# Patient Record
Sex: MALE | Race: WHITE | Employment: FULL TIME | URBAN - METROPOLITAN AREA
[De-identification: names, ages, dates, MRNs, and addresses within clinical notes are randomized per-mention and may not be internally consistent; named-entity substitution may affect disease eponyms.]

---

## 2017-03-20 ENCOUNTER — CARE COORDINATION (OUTPATIENT)
Dept: CARDIOLOGY | Facility: CLINIC | Age: 52
End: 2017-03-20

## 2017-03-20 NOTE — PROGRESS NOTES
Patient's wife Danielle called to speak with Dr. Quan or his PH nurses about Olivier.  She states patient called Dr. Quan about three weeks ago to discuss his plan of care but has not heard back.  Wife states that they are going to [facility] April 24th and she believes if he does not get a RHC here before they go they will require one there when they go and they much prefer it to be done here with Dr. Lundy. They are just really concerned about the plan for Olivier and would like to discuss it. Danielle states the patient is feeling well and the only change is an increases PSA level that is being rechecked in follow up. Patient's wife is also wondering if they should establish care in [place] vs here at the [facility].  They do not want to establish care cardiac care at [facility].  Will route message to PH team.    Anshul Golden, RN, BSN  Cardiology Care Coordinator  [facility]  [email or web address]  [phone]

## 2017-03-28 ENCOUNTER — DOCUMENTATION ONLY (OUTPATIENT)
Dept: CARDIOLOGY | Facility: CLINIC | Age: 52
End: 2017-03-28

## 2017-03-28 NOTE — TELEPHONE ENCOUNTER
Musa Martin M.D.  M.D. [facility]  Wali Doran M.D.   THUJARET. [facility]    Mr Estes and I discussed his condition by phone and concluded an appropriate strategy would include repeat right heart catherization, full pulmonary function with DLCO, and laboratories.                    Current Outpatient Prescriptions on File Prior to Visit:  metoprolol (TOPROL-XL) 25 MG 24 hr tablet Take 1 tablet (25 mg) by mouth daily   SIROLIMUS PO Take 0.5 mg by mouth daily .5mg daily except for Friday takes 1 mg   FLUCONAZOLE PO Take 200 mg by mouth daily   NIFEdipine osmotic (NIFEDICAL XL) 30 MG 24 hr tablet Take 30 mg by mouth daily   Tadalafil (CIALIS PO) Take 10 mg by mouth daily   PREDNISONE PO Take 5 mg by mouth every other day    Ursodiol (ACTIGALL PO) Take 300 mg by mouth 3 times daily   VITAMIN D, CHOLECALCIFEROL, PO Take 2,000 Units by mouth daily    valGANciclovir (VALCYTE) 450 MG tablet Take 500 mg by mouth every 48 hours as needed   moxifloxacin (AVELOX) 400 MG tablet Take 400 mg by mouth daily   CLONAZEPAM PO Take 2 mg by mouth At Bedtime   calcium carbonate (OS-PANFILO 500 MG [place]. [place]) 500 MG tablet Take 600 mg by mouth 2 times daily   Pantoprazole Sodium (PROTONIX PO) Take 40 mg by mouth   Rosuvastatin Calcium (CRESTOR PO) Take 5 mg by mouth   MAGNESIUM OXIDE PO Take 800 mg by mouth daily   insulin detemir (LEVEMIR FLEXPEN/FLEXTOUCH) 100 UNIT/ML soln Inject 10 Units Subcutaneous daily   insulin aspart (NOVOLOG FLEXPEN) 100 UNIT/ML soln Inject Subcutaneous daily as needed for high blood sugar   atovaquone (MEPRON) 750 MG/5ML suspension Take 1,500 mg by mouth daily     No current facility-administered medications on file prior to visit.      Results for BENEDICT ESTES (MRN [id number]) as of 3/28/2017 08:16   Ref. Range 7/14/2015 16:57 7/23/2015 00:00 8/17/2016 10:34 8/31/2016 14:42   GENEVIEVE Screen by EIA Latest Ref Range: <1.0  6.4 (H)      CRP Inflammation Latest Ref Range: 0.0 - 8.0 mg/L <2.9       Cytokine IL-6 Latest Ref Range: 0.00 - 3.00 pg/mL 7.20 (H)      Iron Latest Ref Range: 35 - 180 ug/dL 74      Iron Binding Cap Latest Ref Range: 240 - 430 ug/dL 384      Iron Saturation Index Latest Ref Range: 15 - 46 % 19      TSH Latest Ref Range: 0.40 - 4.00 mU/L 4.08 (H)      Vitamin B1 Unknown Specimen not rece...      RNA Polymerase III Gertrude IgG Unknown <10.0...      Scleroderma Antibody Scl-70 XAVIER IgG Latest Ref Range: 0.0 - 0.9 AI <0.2...      NM LUNG QUANT PERFUSION Unknown    Rpt   US IMAGING - HIM SCAN Unknown  Attch     HEART CATH RIGHT HEART CATH Unknown   Attch        Examination:NM LUNG SCAN VENTILATION AND PERFUSION, 7/14/2015 10:04 AM        Indication: Other chronic pulmonary heart diseases      Additional Information: none     Technique:     The patient received 2 mCi of Tc-99m DTPA aerosol for ventilation and  6.2 mCi of Tc-99m MAA for perfusion. Multiple images were obtained of  the lungs in Anterior, posterior, MARTIN, RPO, LPO, and CELESTINO projections.     Comparison: Same date chest x-ray     Findings: No suspicious ventilation or perfusion defect to suggest  pulmonary emboli.     IMPRESSION  Impression:  No evidence for pulmonary emboli.     I have personally reviewed the examination and initial interpretation  and I agree with the findings.     MEAGAN VALENCIA MD    ASSESSMENT AND RECOMMENDATION: Mr. 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 The patient has had vleht-buvoik-zjje disease with 2 flares after the original presentation that was with transaminitis. That was documented with liver biopsy. His other manifestations of chronic myfcj-bcbuwl-hkwo disease include sclerotic skin changes, mucosal changes in his mouth, xerostomia and polyserositis as reflected by the mild to moderate pericardial effusion that has been documented. 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 All of these immune dysregulation findings are very likely related and the possibility related to his sdyxt-rykwmn-slzx disease. I cannot exclude that this patient has another immune mediated disease, but

## 2017-03-29 DIAGNOSIS — I27.20 PULMONARY HYPERTENSION (H): Primary | ICD-10-CM

## 2017-03-29 RX ORDER — LIDOCAINE 40 MG/G
CREAM TOPICAL
Status: CANCELLED | OUTPATIENT
Start: 2017-03-29

## 2017-03-31 NOTE — PROGRESS NOTES
Dr. Quan called pt and pt will come to the [facility] for a Right Heart Cath and appt to see Dr. Quan before going to [facility] on April 24th.

## 2017-04-04 DIAGNOSIS — I27.20 PULMONARY HYPERTENSION (H): Primary | ICD-10-CM

## 2017-04-11 ENCOUNTER — APPOINTMENT (OUTPATIENT)
Dept: CARDIOLOGY | Facility: CLINIC | Age: 52
End: 2017-04-11
Attending: INTERNAL MEDICINE
Payer: COMMERCIAL

## 2017-04-11 ENCOUNTER — APPOINTMENT (OUTPATIENT)
Dept: MEDSURG UNIT | Facility: CLINIC | Age: 52
End: 2017-04-11
Attending: INTERNAL MEDICINE
Payer: COMMERCIAL

## 2017-04-11 ENCOUNTER — PRE VISIT (OUTPATIENT)
Dept: CARDIOLOGY | Facility: CLINIC | Age: 52
End: 2017-04-11

## 2017-04-11 ENCOUNTER — HOSPITAL ENCOUNTER (OUTPATIENT)
Facility: CLINIC | Age: 52
Discharge: HOME | End: 2017-04-11
Attending: INTERNAL MEDICINE | Admitting: INTERNAL MEDICINE
Payer: COMMERCIAL

## 2017-04-11 VITALS
DIASTOLIC BLOOD PRESSURE: 79 MMHG | SYSTOLIC BLOOD PRESSURE: 122 MMHG | OXYGEN SATURATION: 93 % | RESPIRATION RATE: 18 BRPM | TEMPERATURE: 98 F

## 2017-04-11 VITALS
DIASTOLIC BLOOD PRESSURE: 79 MMHG | HEART RATE: 86 BPM | HEIGHT: 77 IN | WEIGHT: 285.5 LBS | BODY MASS INDEX: 33.71 KG/M2 | OXYGEN SATURATION: 97 % | SYSTOLIC BLOOD PRESSURE: 122 MMHG

## 2017-04-11 DIAGNOSIS — I27.20 PULMONARY HYPERTENSION (H): ICD-10-CM

## 2017-04-11 DIAGNOSIS — I73.00 RAYNAUD'S SYNDROME: ICD-10-CM

## 2017-04-11 DIAGNOSIS — I27.20 PULMONARY HYPERTENSION (H): Primary | ICD-10-CM

## 2017-04-11 LAB
CO2 BLDCOV-SCNC: 19 MMOL/L (ref 21–28)
INR PPP: 1.04 (ref 0.86–1.14)
LACTATE BLD-SCNC: 7.6 MMOL/L (ref 0.7–2.1)
NT-PROBNP SERPL-MCNC: 668 PG/ML (ref 0–900)
PCO2 BLDV: 45 MM HG (ref 40–50)
PH BLDV: 7.24 PH (ref 7.32–7.43)
PO2 BLDV: 40 MM HG (ref 25–47)
SAO2 % BLDV FROM PO2: 66 %

## 2017-04-11 PROCEDURE — 99212 OFFICE O/P EST SF 10 MIN: CPT | Mod: 25

## 2017-04-11 PROCEDURE — 27211181 ZZH BALLOON TIP PRESSURE CR5

## 2017-04-11 PROCEDURE — 93464 EXERCISE W/HEMODYNAMIC MEAS: CPT

## 2017-04-11 PROCEDURE — 27210807 ZZH SHEATH CR6

## 2017-04-11 PROCEDURE — 4A023N6 MEASUREMENT OF CARDIAC SAMPLING AND PRESSURE, RIGHT HEART, PERCUTANEOUS APPROACH: ICD-10-PCS | Performed by: INTERNAL MEDICINE

## 2017-04-11 PROCEDURE — 85610 PROTHROMBIN TIME: CPT | Performed by: INTERNAL MEDICINE

## 2017-04-11 PROCEDURE — 83605 ASSAY OF LACTIC ACID: CPT

## 2017-04-11 PROCEDURE — 27210982 ZZH KIT RT HC TOTES DISP CR7

## 2017-04-11 PROCEDURE — 93451 RIGHT HEART CATH: CPT | Mod: 26 | Performed by: INTERNAL MEDICINE

## 2017-04-11 PROCEDURE — 93464 EXERCISE W/HEMODYNAMIC MEAS: CPT | Mod: 26 | Performed by: INTERNAL MEDICINE

## 2017-04-11 PROCEDURE — 93451 RIGHT HEART CATH: CPT

## 2017-04-11 PROCEDURE — 82803 BLOOD GASES ANY COMBINATION: CPT

## 2017-04-11 PROCEDURE — 40000166 ZZH STATISTIC PP CARE STAGE 1

## 2017-04-11 PROCEDURE — 83880 ASSAY OF NATRIURETIC PEPTIDE: CPT | Performed by: INTERNAL MEDICINE

## 2017-04-11 PROCEDURE — 27210787 ZZH MANIFOLD CR2

## 2017-04-11 PROCEDURE — 4A0335C MEASUREMENT OF ARTERIAL FLOW, CORONARY, PERCUTANEOUS APPROACH: ICD-10-PCS | Performed by: INTERNAL MEDICINE

## 2017-04-11 PROCEDURE — 99214 OFFICE O/P EST MOD 30 MIN: CPT | Mod: 25 | Performed by: INTERNAL MEDICINE

## 2017-04-11 RX ORDER — FLUTICASONE PROPIONATE 220 UG/1
2 AEROSOL, METERED RESPIRATORY (INHALATION) 2 TIMES DAILY
COMMUNITY
End: 2018-10-10

## 2017-04-11 RX ORDER — LIDOCAINE 40 MG/G
CREAM TOPICAL
Status: COMPLETED | OUTPATIENT
Start: 2017-04-11 | End: 2017-04-11

## 2017-04-11 RX ADMIN — LIDOCAINE: 40 CREAM TOPICAL at 07:51

## 2017-04-11 ASSESSMENT — PAIN SCALES - GENERAL: PAINLEVEL: NO PAIN (0)

## 2017-04-11 NOTE — PROCEDURES
PRELIMINARY CARDIAC CATH REPORT:     PROCEDURES PERFORMED:   Right Heart Catheterization    PHYSICIANS:  Attending Physician: Mary Cloud MD  Interventional Cardiology Fellow: None  Cardiology Fellow: Reji Lieberman MD    INDICATION:  Olivier Gómez is a 51 year old male with risk factor profile  elective basis. The clinical presentation was dyspnea on exertion. The indication for the procedure was severe PAH with worsening exercise tolerance.     DESCRIPTION:  1. Consent obtained with discussion of risks.  All questions were answered.  2. Sterile prep and procedure.  3. Location with Sheaths:   Rt IJ  7 Fr 10 cm [short]  4. Access: Local anesthetic with lidocaine.  A micropuncture 21 guage needle with ultrasound guidance was used to establish vascular access using a modified Seldinger technique.  5. Diagnostic Catheters:   7 Fr  [place] Chris  6. Guiding Catheters:  None  6. Estimated blood loss: < 5 ml    MEDICATIONS:  The procedure was performed under no conscious sedation.     Procedures:    HEMODYNAMICS:  BSA 2.7  1. HR 75 bpm  2. /78 mmHg  3. RA 12/6/8   4. RV 76/2  5. PA 80/17   6. PCW 17/12/8  7. PA sat 73.8%   8. PCW sat-not done  9. Hgb 12.8 g/dL   10. Carter CO 8.03   11. Carter CI 3.11   12. TD CO 9.2   13. TD CI 3.6   14. PVR 3.8    POST EXERCISE HEMODYNAMICS:  Patient exercise for 5min of laying bicycle.   1.  bpm  2. /95/126 mmHg  3. /31/69   4. PCW 25/18/18  5. PA sat 73.8%   6. Hgb 12.8 g/dL   7. Carter CO 8.1   8. Carter CI 3.1   9. TD CO 14.15  10. TD CI 5.48   11. PVR 3.7    Post Exercise Lactate: 7.6     Sheath Removal:  The 7th Fr sheath was manually removed in the cardiac catheterization laboratory.    Contrast: Isovue, 0 ml     Fluoroscopy Time: 1.7 min    COMPLICATIONS:  1. None    SUMMARY:   >> Normal right sided filling pressures.  >> Normal left sided filling pressures.  >> Severe pulmonary artery hypertension  >> Normal left ventricular filling pressures.  >> Hyperdynamic  cardiac output, 9.2 L/min with index 3.6 L/min/m2   >> Normal HR and BP respond to exercise  >> Increase mPA pressure to 69mmhg with exercise  >> Increase PWP with exercise   >> Appropriate increase in CO with exercise   >> Post exercise lactate of 7.6.    PLAN:   - Results Discuss with Dr. Quan  - patient to follow with Dr. Quan later today,     The attending cardiologist was present and supervised all critical aspects the procedure.    See CVIS report for final draft.    Reji Lieberman MD  Cardiology Fellow    Mary Cloud MD  Cardiology Staff

## 2017-04-11 NOTE — MR AVS SNAPSHOT
After Visit Summary   4/11/2017    Olivier Gómez    MRN: [id number]           Patient Information     Date Of Birth          1965        Visit Information        Provider Department      4/11/2017 12:00 PM Dk Quan MD [facility]        Care Instructions    Medication Changes:  No medication changes at this time. Please continue current medication regiment.     Patient Instructions:      Follow up Appointment Information:  June    We are located on the third floor of the [facility] ([facility]) on the [facility].  Our address is     [address] [place] [address] on 3rd [place] [address]      Thank you for allowing us to be a part of your care here at the [facility]    If you have questions or concerns please contact us at:    Marlys Anderson RN, BSN    Kai Gant (Schedule,P.A.)  Nurse Coordinator     Clinic   Pulmonary Hypertension   Pulmonary Hypertension  [facility]  (P)[phone]    (P) [phone]        (F)[phone]                ** Please note that you will NOT receive a reminder call regarding your scheduled testing, reminder calls are for provider appointments only.  If you are scheduled for testing within the [facility] system you may receive a call regarding pre-registration for billing purposes only.**     Remember to weigh yourself daily after voiding and before you consume any food or beverages and log the numbers.  If you have gained/lost 2 pounds overnight or 5 pounds in a week contact us immediately for medication adjustments or further instructions.  **Please call us immediately if you have any syncope, chest pain, edema, or decline in your functional status.        Follow-ups after your visit        Follow-up notes from your care team     Return in about 8 weeks (around 6/7/2017) for 1130 , with  "Avelina, Return PH, with, Labs.      Your next 10 appointments already scheduled     2017 11:00 AM CDT   Lab with  LAB   [facility] ([facility])    [address]  1st Floor  [facility] [address] [phone]            2017 11:30 AM CDT   (Arrive by 11:15 AM)   RETURN PRIMARY PULMONARY with Dk Quan MD   [facility] ([facility])    [address]  3rd [facility] [address] [phone]              Who to contact     If you have questions or need follow up information about today's clinic visit or your schedule please contact [facility] directly at [phone].  Normal or non-critical lab and imaging results will be communicated to you by MyChart, letter or phone within 4 business days after the clinic has received the results. If you do not hear from us within 7 days, please contact the clinic through MyChart or phone. If you have a critical or abnormal lab result, we will notify you by phone as soon as possible.  Submit refill requests through [employer] or call your pharmacy and they will forward the refill request to us. Please allow 3 business days for your refill to be completed.          Additional Information About Your Visit        [employer] Information     [employer] lets you send messages to your doctor, view your test results, renew your prescriptions, schedule appointments and more. To sign up, go to [email or web address] . Click on \"Log in\" on the left side of the screen, which will take you to the Welcome page. Then click on \"Sign up Now\" on the right side of the page.     You will be asked to enter the access code listed below, as well as some personal information. Please follow the directions to create your username and password.     Your access code is: [email or web address]  Expires: 2017 10:56 AM     Your access code will  in 90 days. If you need help or a new code, " "please call your [facility] clinic or [phone].        Care EveryWhere ID     This is your Care EveryWhere ID. This could be used by other organizations to access your [facility] medical records  [id number]        Your Vitals Were     Pulse Height Pulse Oximetry BMI (Body Mass Index)          86 1.956 m (6' 5\") 97% 33.86 kg/m2         Blood Pressure from Last 3 Encounters:   04/11/17 122/79   04/11/17 122/79   08/17/16 150/85    Weight from Last 3 Encounters:   04/11/17 129.5 kg (285 lb 8 oz)   04/12/16 133.8 kg (294 lb 15.6 oz)   08/26/15 133.8 kg (294 lb 15.6 oz)              Today, you had the following     No orders found for display       Primary Care Provider Office Phone # Fax #    Adilson Nicholson [phone]       [facility] [address] [facility] [address]   [facility] [address]        Thank you!     Thank you for choosing [facility]  for your care. Our goal is always to provide you with excellent care. Hearing back from our patients is one way we can continue to improve our services. Please take a few minutes to complete the written survey that you may receive in the mail after your visit with us. Thank you!             Your Updated Medication List - Protect others around you: Learn how to safely use, store and throw away your medicines at www.disposemymeds.org.          This list is accurate as of: 4/11/17  1:35 PM.  Always use your most recent med list.                   Brand Name Dispense Instructions for use    ACTIGALL PO      Take 300 mg by mouth 3 times daily       atovaquone 750 MG/5ML suspension    MEPRON     Take 1,500 mg by mouth daily       calcium carbonate 500 MG tablet    OS-PANFILO 500 mg [place]. Ca     Take 600 mg by mouth 2 times daily       CIALIS PO      Take 10 mg by mouth daily       CLONAZEPAM PO      Take 2 mg by mouth At Bedtime       CRESTOR PO      Take 5 mg by mouth       FLUCONAZOLE PO      Take 200 mg by mouth daily       fluticasone 220 " MCG/ACT Inhaler    FLOVENT HFA     Inhale 2 puffs into the lungs 2 times daily       MAGNESIUM OXIDE PO      Take 800 mg by mouth daily       metoprolol 25 MG 24 hr tablet    TOPROL-XL    90 tablet    Take 1 tablet (25 mg) by mouth daily       moxifloxacin 400 MG tablet    AVELOX     Take 400 mg by mouth daily       NIFEDICAL XL 30 MG 24 hr tablet   Generic drug:  NIFEdipine ER osmotic      Take 30 mg by mouth daily       PREDNISONE PO      Take 5 mg by mouth every other day       PROTONIX PO      Take 40 mg by mouth       SIROLIMUS PO      Take 0.5 mg by mouth daily .5mg daily except for Friday takes 1 mg       valGANciclovir 450 MG tablet    VALCYTE     Take 500 mg by mouth every 48 hours as needed       VITAMIN D (CHOLECALCIFEROL) PO      Take 2,000 Units by mouth daily

## 2017-04-11 NOTE — PROGRESS NOTES
Pt arrives to 2a, with spouse, for RHC. H&P needs to be updated. Consent is signed. INR and BNP sent to lab. CBC and chemistry completed 4/6/17 at [facility], see care everywhere-Ok per Dr Lieberman to not redraw these labs today.

## 2017-04-11 NOTE — DISCHARGE INSTRUCTIONS
[facility]                        Interventional Cardiology  Discharge Instructions   Post Right Heart Cath      AFTER YOU GO HOME:    DO drink plenty of fluids    DO resume your regular diet and medications unless otherwise instructed by your Primary Physician    Do Not scrub the procedure site vigorously    No lotion or powder to the puncture site for 3 days    CALL YOUR PRIMARY PHYSICIAN IF: You may resume all normal activity.  Monitor neck site for bleeding, swelling, or voice changes. If you notice bleeding or swelling immediately apply pressure to the site and call number below to speak with Cardiology Fellow.  If you experience any changes in your breathing you should call your doctor immediately or come to the closest Emergency Department.  Do not drive yourself.    ADDITIONAL INSTRUCTIONS: Medications: You are to resume all home medications including anticoagulation therapy unless otherwise advised by your primary cardiologist or nurse coordinator.    Follow Up: Per your primary cardiology team    If you have any questions or concerns regarding your procedure site please call [phone] at anytime and ask for Cardiology Fellow on call.  They are available 24 hours a day.  You may also contact the Cardiology Clinic after hours number at [phone].                                                       Telephone Numbers [phone] Monday-Friday 8:00 am to 4:30 pm    [phone] After 4:30 pm Monday-Friday, Weekends & Holidays  Ask for Interventional Cardiologist on call. Someone is on call 24 hours/day   [facility] toll free number [phone] Monday-Friday 8:00 am to 4:30 pm   [facility] Emergency Dept [phone]

## 2017-04-11 NOTE — PROGRESS NOTES
"Dk Quan M.D.  Cardiovascular Medicine    I personally saw and examined this patient, discussed care with housestaff and other consultants, reviewed current laboratories and imaging studies, and conveyed impression and diagnostic/therapeutic plan to patient.    Problem List  AML treated with BMT  GVHD  GI angiodysplasia  Sclerodema  Raynauds              Objective  /79  Pulse 86  Ht 1.956 m (6' 5\")  Wt 129.5 kg (285 lb 8 oz)  SpO2 97%  BMI 33.86 kg/m2   Constitutional: alert, oriented, normal gait and station, normal mentation.  Oral: moist mucous membrans  Lymph: without pathologic adenopathy  Chest: clear to ausculation and percussion  Cor: No evidence of left or right ventricular activity.  Rhythm is regular.  S1 normal, S2 split physiologically. Murmurs are not present  Abdomen: without tenderness, rebound, guarding, masses, ascites  Extremities: Edema not present  Neuro: no focal defects, normal mentation  Skin: without open lesions  Psych: oriented, verbal, mental status in tact    Wt Readings from Last 5 Encounters:   04/11/17 129.5 kg (285 lb 8 oz)   04/12/16 133.8 kg (294 lb 15.6 oz)   08/26/15 133.8 kg (294 lb 15.6 oz)   08/13/15 132.5 kg (292 lb)   07/14/15 127 kg (280 lb)       Meds  Current Outpatient Prescriptions   Medication     fluticasone (FLOVENT HFA) 220 MCG/ACT Inhaler     metoprolol (TOPROL-XL) 25 MG 24 hr tablet     SIROLIMUS PO     FLUCONAZOLE PO     NIFEdipine osmotic (NIFEDICAL XL) 30 MG 24 hr tablet     Tadalafil (CIALIS PO)     PREDNISONE PO     Ursodiol (ACTIGALL PO)     VITAMIN D, CHOLECALCIFEROL, PO     valGANciclovir (VALCYTE) 450 MG tablet     moxifloxacin (AVELOX) 400 MG tablet     CLONAZEPAM PO     calcium carbonate (OS-PANFILO 500 MG [place]. CA) 500 MG tablet     Pantoprazole Sodium (PROTONIX PO)     Rosuvastatin Calcium (CRESTOR PO)     MAGNESIUM OXIDE PO     atovaquone (MEPRON) 750 MG/5ML suspension     No current facility-administered medications for this visit.  "         Labs    Results for BENEDICT ESTES (MRN [id number]) as of 4/11/2017 11:58   Ref. Range 4/11/2017 08:03 4/11/2017 09:21 4/11/2017 09:25   Lactic Acid Latest Ref Range: 0.7 - 2.1 mmol/L  7.6 (HH)    N-Terminal Pro BNP Inpatient Latest Ref Range: 0 - 900 pg/mL 668     Ph Venous Latest Ref Range: 7.32 - 7.43 pH  7.24 (L)    PCO2 Venous Latest Ref Range: 40 - 50 mm Hg  45    PO2 Venous Latest Ref Range: 25 - 47 mm Hg  40    Bicarbonate Venous Latest Ref Range: 21 - 28 mmol/L  19 (L)    O2 Sat Venous Latest Units: %  66    INR Latest Ref Range: 0.86 - 1.14  1.04     HEART CATH RIGHT HEART CATH Unknown   Attch       Imaging     BSA 2.7  1. HR 75 bpm  2. /78 mmHg  3. RA 12/6/8   4. RV 76/2  5. PA 80/17   6. PCW 17/12/8  7. PA sat 73.8%   8. PCW sat-not done  9. Hgb 12.8 g/dL   10. Carter CO 8.03   11. Carter CI 3.11   12. TD CO 9.2   13. TD CI 3.6   14. PVR 3.8     POST EXERCISE HEMODYNAMICS:  Patient exercise for 5min of laying bicycle.   1.  bpm  2. /95/126 mmHg  3. /31/69   4. PCW 25/18/18  5. PA sat 73.8%   6. Hgb 12.8 g/dL   7. Carter CO 8.1   8. Carter CI 3.1   9. TD CO 14.15  10. TD CI 5.48   11. PVR 3.7     Post Exercise Lactate: 7.6    Assessment/Plan     Further assessment of pericardial effusion, hemodynamic assessment, possible drainage.

## 2017-04-11 NOTE — PROGRESS NOTES
Pt back from [facility] s/p RHC.  VSS.  Pt alert and oriented x4.  Pt denies any pain.  Right neck site F/D/I.  Pt's family at the bedside.  0948-D/C instructions went over with and given to pt and his family, all questions answered.  Pt discharged.  Pt going to clinic for an appointment.  Family with pt.

## 2017-04-11 NOTE — IP AVS SNAPSHOT
[address] [address]                                       After Visit Summary   4/11/2017    Olivier Gómez    MRN: [id number]           After Visit Summary Signature Page     I have received my discharge instructions, and my questions have been answered. I have discussed any challenges I see with this plan with the nurse or doctor.    ..........................................................................................................................................  Patient/Patient Representative Signature      ..........................................................................................................................................  Patient Representative Print Name and Relationship to Patient    ..................................................               ................................................  Date                                            Time    ..........................................................................................................................................  Reviewed by Signature/Title    ...................................................              ..............................................  Date                                                            Time

## 2017-04-11 NOTE — PROGRESS NOTES
D: Pt arrived in cath lab for Right Heart Catheterization  I: Right heart catheterization completed per MD.  7fr sheath removed from RIJ site, manual pressure applied until hemostasis achieved.  A: RIJ site clean, dry and intact. Soft, no hematoma.  P: Pt to transfer to  for discharge.  Pt educated on watching neck site for bleeding, hematoma, pressure on airway and voice changes.

## 2017-04-11 NOTE — PATIENT INSTRUCTIONS
Medication Changes:  No medication changes at this time. Please continue current medication regiment.     Patient Instructions:      Follow up Appointment Information:  June    We are located on the third floor of the [facility] ([facility]) on the [facility].  Our address is     [address] [place] [address] on 3rd [place] [address]      Thank you for allowing us to be a part of your care here at the [facility]    If you have questions or concerns please contact us at:    Marlys Anderson RN, BSN    Kai Gant (Schedule,P.A.)  Nurse Coordinator     Clinic   Pulmonary Hypertension   Pulmonary Hypertension  [facility]  (P)[phone]    (P) [phone]        (F)[phone]                ** Please note that you will NOT receive a reminder call regarding your scheduled testing, reminder calls are for provider appointments only.  If you are scheduled for testing within the [facility] system you may receive a call regarding pre-registration for billing purposes only.**     Remember to weigh yourself daily after voiding and before you consume any food or beverages and log the numbers.  If you have gained/lost 2 pounds overnight or 5 pounds in a week contact us immediately for medication adjustments or further instructions.  **Please call us immediately if you have any syncope, chest pain, edema, or decline in your functional status.

## 2017-04-11 NOTE — LETTER
"4/11/2017    RE: Olivier Gómez  [address]  [place] [address]     Dear Colleague,    Thank you for the opportunity to participate in the care of your patient, Olivier Gómez, at the [facility]. Please see a copy of my visit note below.    Dk Quan M.D.  Cardiovascular Medicine    I personally saw and examined this patient, discussed care with housestaff and other consultants, reviewed current laboratories and imaging studies, and conveyed impression and diagnostic/therapeutic plan to patient.    Problem List  AML treated with BMT  GVHD  GI angiodysplasia  Sclerodema  Raynauds              Objective  /79  Pulse 86  Ht 1.956 m (6' 5\")  Wt 129.5 kg (285 lb 8 oz)  SpO2 97%  BMI 33.86 kg/m2   Constitutional: alert, oriented, normal gait and station, normal mentation.  Oral: moist mucous membrans  Lymph: without pathologic adenopathy  Chest: clear to ausculation and percussion  Cor: No evidence of left or right ventricular activity.  Rhythm is regular.  S1 normal, S2 split physiologically. Murmurs are not present  Abdomen: without tenderness, rebound, guarding, masses, ascites  Extremities: Edema not present  Neuro: no focal defects, normal mentation  Skin: without open lesions  Psych: oriented, verbal, mental status in tact    Wt Readings from Last 5 Encounters:   04/11/17 129.5 kg (285 lb 8 oz)   04/12/16 133.8 kg (294 lb 15.6 oz)   08/26/15 133.8 kg (294 lb 15.6 oz)   08/13/15 132.5 kg (292 lb)   07/14/15 127 kg (280 lb)       Meds  Current Outpatient Prescriptions   Medication     fluticasone (FLOVENT HFA) 220 MCG/ACT Inhaler     metoprolol (TOPROL-XL) 25 MG 24 hr tablet     SIROLIMUS PO     FLUCONAZOLE PO     NIFEdipine osmotic (NIFEDICAL XL) 30 MG 24 hr tablet     Tadalafil (CIALIS PO)     PREDNISONE PO     Ursodiol (ACTIGALL PO)     VITAMIN D, CHOLECALCIFEROL, PO     valGANciclovir (VALCYTE) 450 MG tablet     moxifloxacin " (AVELOX) 400 MG tablet     CLONAZEPAM PO     calcium carbonate (OS-PANFILO 500 MG [place]. [place]) 500 MG tablet     Pantoprazole Sodium (PROTONIX PO)     Rosuvastatin Calcium (CRESTOR PO)     MAGNESIUM OXIDE PO     atovaquone (MEPRON) 750 MG/5ML suspension     No current facility-administered medications for this visit.          Labs    Results for BENEDICT ESTES (MRN [id number]) as of 4/11/2017 11:58   Ref. Range 4/11/2017 08:03 4/11/2017 09:21 4/11/2017 09:25   Lactic Acid Latest Ref Range: 0.7 - 2.1 mmol/L  7.6 (HH)    N-Terminal Pro BNP Inpatient Latest Ref Range: 0 - 900 pg/mL 668     Ph Venous Latest Ref Range: 7.32 - 7.43 pH  7.24 (L)    PCO2 Venous Latest Ref Range: 40 - 50 mm Hg  45    PO2 Venous Latest Ref Range: 25 - 47 mm Hg  40    Bicarbonate Venous Latest Ref Range: 21 - 28 mmol/L  19 (L)    O2 Sat Venous Latest Units: %  66    INR Latest Ref Range: 0.86 - 1.14  1.04     HEART CATH RIGHT HEART CATH Unknown   Attch       Imaging     BSA 2.7  1. HR 75 bpm  2. /78 mmHg  3. RA 12/6/8   4. RV 76/2  5. PA 80/17   6. PCW 17/12/8  7. PA sat 73.8%   8. PCW sat-not done  9. Hgb 12.8 g/dL   10. Carter CO 8.03   11. Carter CI 3.11   12. TD CO 9.2   13. TD CI 3.6   14. PVR 3.8     POST EXERCISE HEMODYNAMICS:  Patient exercise for 5min of laying bicycle.   1.  bpm  2. /95/126 mmHg  3. /31/69   4. PCW 25/18/18  5. PA sat 73.8%   6. Hgb 12.8 g/dL   7. Carter CO 8.1   8. Carter CI 3.1   9. TD CO 14.15  10. TD CI 5.48   11. PVR 3.7     Post Exercise Lactate: 7.6    Assessment/Plan     Further assessment of pericardial effusion, hemodynamic assessment, possible drainage.        Please do not hesitate to contact me if you have any questions/concerns.     Sincerely,     Dk Quan MD

## 2017-04-24 ENCOUNTER — DOCUMENTATION ONLY (OUTPATIENT)
Dept: OTHER | Facility: CLINIC | Age: 52
End: 2017-04-24

## 2017-04-24 NOTE — TELEPHONE ENCOUNTER
Impression:  1. Bone marrow transplantation for AML/remote  2. Graft versus host disease  3. High cardiac output  4. Raynauds  5. Pericardial effusion with compromise    White male patient traveling from [facility] where he was found to have an enlarging pericardial effusion for which drainage was suggested.  He wishes to have this here.  Recent catherization=:    HEMODYNAMICS:  BSA 2.7  1. HR 75 bpm  2. /78 mmHg  3. RA 12/6/8   4. RV 76/2  5. PA 80/17   6. PCW 17/12/8  7. PA sat 73.8%   8. PCW sat-not done  9. Hgb 12.8 g/dL   10. Carter CO 8.03   11. Carter CI 3.11   12. TD CO 9.2   13. TD CI 3.6   14. PVR 3.8     POST EXERCISE HEMODYNAMICS:  Patient exercise for 5min of laying bicycle.   1.  bpm  2. /95/126 mmHg  3. /31/69   4. PCW 25/18/18  5. PA sat 73.8%   6. Hgb 12.8 g/dL   7. Carter CO 8.1   8. Carter CI 3.1   9. TD CO 14.15  10. TD CI 5.48   11. PVR 3.7     Post Exercise Lactate: 7.6  Sheath Removal:  The 7th Fr sheath was manually removed in the cardiac catheterization laboratory.     Contrast: Isovue, 0 ml      Fluoroscopy Time: 1.7 min     COMPLICATIONS:  1. None     SUMMARY:   >> Normal right sided filling pressures.  >> Normal left sided filling pressures.  >> Severe pulmonary artery hypertension  >> Normal left ventricular filling pressures.  >> Hyperdynamic cardiac output, 9.2 L/min with index 3.6 L/min/m2   >> Normal HR and BP respond to exercise  >> Increase mPA pressure to 69mmhg with exercise  >> Increase PWP with exercise   >> Appropriate increase in CO with exercise   >> Post exercise lactate of 7.6.    Patient will be bringing imaging studies.

## 2017-04-25 ENCOUNTER — APPOINTMENT (OUTPATIENT)
Dept: CARDIOLOGY | Facility: CLINIC | Age: 52
DRG: 808 | End: 2017-04-25
Attending: INTERNAL MEDICINE
Payer: COMMERCIAL

## 2017-04-25 ENCOUNTER — APPOINTMENT (OUTPATIENT)
Dept: CARDIOLOGY | Facility: CLINIC | Age: 52
DRG: 808 | End: 2017-04-25
Payer: COMMERCIAL

## 2017-04-25 ENCOUNTER — APPOINTMENT (OUTPATIENT)
Dept: GENERAL RADIOLOGY | Facility: CLINIC | Age: 52
DRG: 808 | End: 2017-04-25
Attending: INTERNAL MEDICINE
Payer: COMMERCIAL

## 2017-04-25 ENCOUNTER — HOSPITAL ENCOUNTER (INPATIENT)
Facility: CLINIC | Age: 52
LOS: 6 days | Discharge: HOME | DRG: 808 | End: 2017-05-01
Attending: INTERNAL MEDICINE | Admitting: INTERNAL MEDICINE
Payer: COMMERCIAL

## 2017-04-25 DIAGNOSIS — T38.0X5A STEROID-INDUCED DIABETES MELLITUS (H): Primary | ICD-10-CM

## 2017-04-25 DIAGNOSIS — E09.9 STEROID-INDUCED DIABETES MELLITUS (H): Primary | ICD-10-CM

## 2017-04-25 DIAGNOSIS — G47.00 INSOMNIA, UNSPECIFIED TYPE: ICD-10-CM

## 2017-04-25 LAB
ALBUMIN SERPL-MCNC: 3.8 G/DL (ref 3.4–5)
ALP SERPL-CCNC: 91 U/L (ref 40–150)
ALT SERPL W P-5'-P-CCNC: 27 U/L (ref 0–70)
ANION GAP SERPL CALCULATED.3IONS-SCNC: 7 MMOL/L (ref 3–14)
APPEARANCE FLD: NORMAL
AST SERPL W P-5'-P-CCNC: 23 U/L (ref 0–45)
BASE DEFICIT BLDV-SCNC: 0.2 MMOL/L
BILIRUB DIRECT SERPL-MCNC: 0.1 MG/DL (ref 0–0.2)
BILIRUB SERPL-MCNC: 0.9 MG/DL (ref 0.2–1.3)
BUN SERPL-MCNC: 22 MG/DL (ref 7–30)
CALCIUM SERPL-MCNC: 9.1 MG/DL (ref 8.5–10.1)
CHLORIDE SERPL-SCNC: 111 MMOL/L (ref 94–109)
CO2 SERPL-SCNC: 23 MMOL/L (ref 20–32)
COLOR FLD: NORMAL
CREAT SERPL-MCNC: 1.61 MG/DL (ref 0.66–1.25)
EOSINOPHIL NFR FLD MANUAL: 1 %
ERYTHROCYTE [DISTWIDTH] IN BLOOD BY AUTOMATED COUNT: 18.3 % (ref 10–15)
GFR SERPL CREATININE-BSD FRML MDRD: 45 ML/MIN/1.7M2
GLUCOSE FLD-MCNC: 76 MG/DL
GLUCOSE SERPL-MCNC: 92 MG/DL (ref 70–99)
GRAM STN SPEC: NORMAL
HCO3 BLDV-SCNC: 24 MMOL/L (ref 21–28)
HCT VFR BLD AUTO: 41.1 % (ref 40–53)
HGB BLD-MCNC: 13.3 G/DL (ref 13.3–17.7)
INR PPP: 1.06 (ref 0.86–1.14)
LACTATE BLD-SCNC: 0.8 MMOL/L (ref 0.7–2.1)
LDH FLD L TO P-CCNC: 255 U/L
LYMPHOCYTES NFR FLD MANUAL: 5 %
MCH RBC QN AUTO: 28.5 PG (ref 26.5–33)
MCHC RBC AUTO-ENTMCNC: 32.4 G/DL (ref 31.5–36.5)
MCV RBC AUTO: 88 FL (ref 78–100)
MICRO REPORT STATUS: NORMAL
MRSA DNA SPEC QL NAA+PROBE: NORMAL
NEUTS BAND NFR FLD MANUAL: 5 %
O2/TOTAL GAS SETTING VFR VENT: 21 %
OTHER CELLS FLD MANUAL: 89 %
OXYHGB MFR BLDV: 67 %
PCO2 BLDV: 37 MM HG (ref 40–50)
PH BLDV: 7.42 PH (ref 7.32–7.43)
PLATELET # BLD AUTO: 284 10E9/L (ref 150–450)
PO2 BLDV: 37 MM HG (ref 25–47)
POTASSIUM SERPL-SCNC: 3.9 MMOL/L (ref 3.4–5.3)
PROT FLD-MCNC: 4.8 G/DL
PROT SERPL-MCNC: 6.6 G/DL (ref 6.8–8.8)
RBC # BLD AUTO: 4.66 10E12/L (ref 4.4–5.9)
RBC # FLD: NORMAL /UL
SODIUM SERPL-SCNC: 141 MMOL/L (ref 133–144)
SPECIMEN SOURCE FLD: NORMAL
SPECIMEN SOURCE: NORMAL
SPECIMEN SOURCE: NORMAL
WBC # BLD AUTO: 6.9 10E9/L (ref 4–11)
WBC # FLD AUTO: 901 /UL

## 2017-04-25 PROCEDURE — 84157 ASSAY OF PROTEIN OTHER: CPT | Performed by: INTERNAL MEDICINE

## 2017-04-25 PROCEDURE — 93451 RIGHT HEART CATH: CPT | Mod: 26 | Performed by: INTERNAL MEDICINE

## 2017-04-25 PROCEDURE — 27211089 ZZH KIT ACIST INJECTOR CR3

## 2017-04-25 PROCEDURE — 88185 FLOWCYTOMETRY/TC ADD-ON: CPT | Performed by: STUDENT IN AN ORGANIZED HEALTH CARE EDUCATION/TRAINING PROGRAM

## 2017-04-25 PROCEDURE — 82805 BLOOD GASES W/O2 SATURATION: CPT | Performed by: INTERNAL MEDICINE

## 2017-04-25 PROCEDURE — 25000128 H RX IP 250 OP 636: Performed by: STUDENT IN AN ORGANIZED HEALTH CARE EDUCATION/TRAINING PROGRAM

## 2017-04-25 PROCEDURE — 27210982 ZZH KIT RT HC TOTES DISP CR7

## 2017-04-25 PROCEDURE — 40001005 ZZHCL STATISTIC FLOW >15 ABY TC 88189: Performed by: STUDENT IN AN ORGANIZED HEALTH CARE EDUCATION/TRAINING PROGRAM

## 2017-04-25 PROCEDURE — 87070 CULTURE OTHR SPECIMN AEROBIC: CPT | Performed by: INTERNAL MEDICINE

## 2017-04-25 PROCEDURE — 20000004 ZZH R&B ICU UMMC

## 2017-04-25 PROCEDURE — 93005 ELECTROCARDIOGRAM TRACING: CPT

## 2017-04-25 PROCEDURE — 82945 GLUCOSE OTHER FLUID: CPT | Performed by: INTERNAL MEDICINE

## 2017-04-25 PROCEDURE — 88185 FLOWCYTOMETRY/TC ADD-ON: CPT | Performed by: INTERNAL MEDICINE

## 2017-04-25 PROCEDURE — 93308 TTE F-UP OR LMTD: CPT

## 2017-04-25 PROCEDURE — 93306 TTE W/DOPPLER COMPLETE: CPT | Mod: 26 | Performed by: INTERNAL MEDICINE

## 2017-04-25 PROCEDURE — 25000125 ZZHC RX 250: Performed by: INTERNAL MEDICINE

## 2017-04-25 PROCEDURE — 87075 CULTR BACTERIA EXCEPT BLOOD: CPT | Performed by: INTERNAL MEDICINE

## 2017-04-25 PROCEDURE — 80048 BASIC METABOLIC PNL TOTAL CA: CPT | Performed by: INTERNAL MEDICINE

## 2017-04-25 PROCEDURE — 93325 DOPPLER ECHO COLOR FLOW MAPG: CPT | Mod: 26 | Performed by: INTERNAL MEDICINE

## 2017-04-25 PROCEDURE — 87205 SMEAR GRAM STAIN: CPT | Performed by: INTERNAL MEDICINE

## 2017-04-25 PROCEDURE — 89051 BODY FLUID CELL COUNT: CPT | Performed by: INTERNAL MEDICINE

## 2017-04-25 PROCEDURE — 93306 TTE W/DOPPLER COMPLETE: CPT

## 2017-04-25 PROCEDURE — 27210785 ZZH KIT PERICARDIAL TAP CR8

## 2017-04-25 PROCEDURE — 36415 COLL VENOUS BLD VENIPUNCTURE: CPT | Performed by: INTERNAL MEDICINE

## 2017-04-25 PROCEDURE — 27210787 ZZH MANIFOLD CR2

## 2017-04-25 PROCEDURE — 80076 HEPATIC FUNCTION PANEL: CPT | Performed by: INTERNAL MEDICINE

## 2017-04-25 PROCEDURE — 00000155 ZZHCL STATISTIC H-CELL BLOCK W/STAIN: Performed by: INTERNAL MEDICINE

## 2017-04-25 PROCEDURE — 93321 DOPPLER ECHO F-UP/LMTD STD: CPT | Mod: 26 | Performed by: INTERNAL MEDICINE

## 2017-04-25 PROCEDURE — 00000102 ZZHCL STATISTIC CYTO WRIGHT STAIN TC: Performed by: INTERNAL MEDICINE

## 2017-04-25 PROCEDURE — 40000141 ZZH STATISTIC PERIPHERAL IV START W/O US GUIDANCE

## 2017-04-25 PROCEDURE — 71010 XR CHEST PORT 1 VW: CPT

## 2017-04-25 PROCEDURE — 40001004 ZZHCL STATISTIC FLOW INT 9-15 ABY TC 88188: Performed by: INTERNAL MEDICINE

## 2017-04-25 PROCEDURE — 85610 PROTHROMBIN TIME: CPT | Performed by: INTERNAL MEDICINE

## 2017-04-25 PROCEDURE — 87641 MR-STAPH DNA AMP PROBE: CPT | Performed by: INTERNAL MEDICINE

## 2017-04-25 PROCEDURE — 87252 VIRUS INOCULATION TISSUE: CPT | Performed by: INTERNAL MEDICINE

## 2017-04-25 PROCEDURE — 93010 ELECTROCARDIOGRAM REPORT: CPT | Performed by: INTERNAL MEDICINE

## 2017-04-25 PROCEDURE — 84311 SPECTROPHOTOMETRY: CPT | Performed by: INTERNAL MEDICINE

## 2017-04-25 PROCEDURE — 25000131 ZZH RX MED GY IP 250 OP 636 PS 637: Performed by: STUDENT IN AN ORGANIZED HEALTH CARE EDUCATION/TRAINING PROGRAM

## 2017-04-25 PROCEDURE — 99152 MOD SED SAME PHYS/QHP 5/>YRS: CPT

## 2017-04-25 PROCEDURE — 93451 RIGHT HEART CATH: CPT

## 2017-04-25 PROCEDURE — 25000132 ZZH RX MED GY IP 250 OP 250 PS 637: Performed by: STUDENT IN AN ORGANIZED HEALTH CARE EDUCATION/TRAINING PROGRAM

## 2017-04-25 PROCEDURE — 88184 FLOWCYTOMETRY/ TC 1 MARKER: CPT | Performed by: INTERNAL MEDICINE

## 2017-04-25 PROCEDURE — 33015 ZZHC TUBE PERICARDIOSTOMY: CPT

## 2017-04-25 PROCEDURE — 99153 MOD SED SAME PHYS/QHP EA: CPT

## 2017-04-25 PROCEDURE — 40000275 ZZH STATISTIC RCP TIME EA 10 MIN

## 2017-04-25 PROCEDURE — 87206 SMEAR FLUORESCENT/ACID STAI: CPT | Performed by: INTERNAL MEDICINE

## 2017-04-25 PROCEDURE — C1894 INTRO/SHEATH, NON-LASER: HCPCS

## 2017-04-25 PROCEDURE — 99221 1ST HOSP IP/OBS SF/LOW 40: CPT | Mod: 24 | Performed by: INTERNAL MEDICINE

## 2017-04-25 PROCEDURE — 93308 TTE F-UP OR LMTD: CPT | Mod: 26 | Performed by: INTERNAL MEDICINE

## 2017-04-25 PROCEDURE — 87640 STAPH A DNA AMP PROBE: CPT | Performed by: INTERNAL MEDICINE

## 2017-04-25 PROCEDURE — 33015 ZZHC TUBE PERICARDIOSTOMY: CPT | Mod: 59 | Performed by: INTERNAL MEDICINE

## 2017-04-25 PROCEDURE — 27210795 ZZH PAD DEFIB QUICK CR4

## 2017-04-25 PROCEDURE — 25000128 H RX IP 250 OP 636: Performed by: INTERNAL MEDICINE

## 2017-04-25 PROCEDURE — 83615 LACTATE (LD) (LDH) ENZYME: CPT | Performed by: INTERNAL MEDICINE

## 2017-04-25 PROCEDURE — 88305 TISSUE EXAM BY PATHOLOGIST: CPT | Performed by: INTERNAL MEDICINE

## 2017-04-25 PROCEDURE — 25000128 H RX IP 250 OP 636

## 2017-04-25 PROCEDURE — 88184 FLOWCYTOMETRY/ TC 1 MARKER: CPT | Performed by: STUDENT IN AN ORGANIZED HEALTH CARE EDUCATION/TRAINING PROGRAM

## 2017-04-25 PROCEDURE — 27211181 ZZH BALLOON TIP PRESSURE CR5

## 2017-04-25 PROCEDURE — 83605 ASSAY OF LACTIC ACID: CPT | Performed by: INTERNAL MEDICINE

## 2017-04-25 PROCEDURE — 88112 CYTOPATH CELL ENHANCE TECH: CPT | Performed by: INTERNAL MEDICINE

## 2017-04-25 PROCEDURE — 87102 FUNGUS ISOLATION CULTURE: CPT | Performed by: INTERNAL MEDICINE

## 2017-04-25 PROCEDURE — 85027 COMPLETE CBC AUTOMATED: CPT | Performed by: INTERNAL MEDICINE

## 2017-04-25 PROCEDURE — 87116 MYCOBACTERIA CULTURE: CPT | Performed by: INTERNAL MEDICINE

## 2017-04-25 RX ORDER — NICARDIPINE HYDROCHLORIDE 2.5 MG/ML
100 INJECTION INTRAVENOUS
Status: DISCONTINUED | OUTPATIENT
Start: 2017-04-25 | End: 2017-04-25 | Stop reason: HOSPADM

## 2017-04-25 RX ORDER — VERAPAMIL HYDROCHLORIDE 2.5 MG/ML
1-5 INJECTION, SOLUTION INTRAVENOUS
Status: DISCONTINUED | OUTPATIENT
Start: 2017-04-25 | End: 2017-04-25 | Stop reason: HOSPADM

## 2017-04-25 RX ORDER — METOPROLOL SUCCINATE 25 MG/1
25 TABLET, EXTENDED RELEASE ORAL DAILY
Status: DISCONTINUED | OUTPATIENT
Start: 2017-04-25 | End: 2017-05-01 | Stop reason: HOSPADM

## 2017-04-25 RX ORDER — CALCIUM CARBONATE 500(1250)
600 TABLET ORAL 2 TIMES DAILY
Status: DISCONTINUED | OUTPATIENT
Start: 2017-04-25 | End: 2017-04-26

## 2017-04-25 RX ORDER — PROTAMINE SULFATE 10 MG/ML
1-5 INJECTION, SOLUTION INTRAVENOUS
Status: DISCONTINUED | OUTPATIENT
Start: 2017-04-25 | End: 2017-04-25 | Stop reason: HOSPADM

## 2017-04-25 RX ORDER — HEPARIN SODIUM 1000 [USP'U]/ML
1000-10000 INJECTION, SOLUTION INTRAVENOUS; SUBCUTANEOUS EVERY 5 MIN PRN
Status: DISCONTINUED | OUTPATIENT
Start: 2017-04-25 | End: 2017-04-25 | Stop reason: HOSPADM

## 2017-04-25 RX ORDER — ARGATROBAN 1 MG/ML
350 INJECTION, SOLUTION INTRAVENOUS
Status: DISCONTINUED | OUTPATIENT
Start: 2017-04-25 | End: 2017-04-25 | Stop reason: HOSPADM

## 2017-04-25 RX ORDER — NIFEDIPINE 10 MG/1
10 CAPSULE ORAL
Status: DISCONTINUED | OUTPATIENT
Start: 2017-04-25 | End: 2017-04-25 | Stop reason: HOSPADM

## 2017-04-25 RX ORDER — PENICILLIN V POTASSIUM 500 MG/1
500 TABLET, FILM COATED ORAL
Status: DISCONTINUED | OUTPATIENT
Start: 2017-04-25 | End: 2017-05-01 | Stop reason: HOSPADM

## 2017-04-25 RX ORDER — FLUTICASONE PROPIONATE 220 UG/1
2 AEROSOL, METERED RESPIRATORY (INHALATION) 2 TIMES DAILY
Status: DISCONTINUED | OUTPATIENT
Start: 2017-04-25 | End: 2017-05-01 | Stop reason: HOSPADM

## 2017-04-25 RX ORDER — ACETAMINOPHEN 325 MG/1
650 TABLET ORAL EVERY 4 HOURS PRN
Status: DISCONTINUED | OUTPATIENT
Start: 2017-04-25 | End: 2017-05-01 | Stop reason: HOSPADM

## 2017-04-25 RX ORDER — PHENYLEPHRINE HCL IN 0.9% NACL 1 MG/10 ML
20-100 SYRINGE (ML) INTRAVENOUS
Status: DISCONTINUED | OUTPATIENT
Start: 2017-04-25 | End: 2017-04-25 | Stop reason: HOSPADM

## 2017-04-25 RX ORDER — CLOPIDOGREL BISULFATE 75 MG/1
75 TABLET ORAL
Status: DISCONTINUED | OUTPATIENT
Start: 2017-04-25 | End: 2017-04-25 | Stop reason: HOSPADM

## 2017-04-25 RX ORDER — LIDOCAINE HYDROCHLORIDE 10 MG/ML
30 INJECTION, SOLUTION EPIDURAL; INFILTRATION; INTRACAUDAL; PERINEURAL
Status: DISCONTINUED | OUTPATIENT
Start: 2017-04-25 | End: 2017-04-25 | Stop reason: HOSPADM

## 2017-04-25 RX ORDER — HYDROMORPHONE HYDROCHLORIDE 1 MG/ML
0.5 INJECTION, SOLUTION INTRAMUSCULAR; INTRAVENOUS; SUBCUTANEOUS EVERY 6 HOURS PRN
Status: DISCONTINUED | OUTPATIENT
Start: 2017-04-25 | End: 2017-05-01 | Stop reason: HOSPADM

## 2017-04-25 RX ORDER — SIROLIMUS 0.5 MG/1
0.5 TABLET, SUGAR COATED ORAL DAILY
Status: DISCONTINUED | OUTPATIENT
Start: 2017-04-25 | End: 2017-04-28

## 2017-04-25 RX ORDER — HYDROMORPHONE HYDROCHLORIDE 1 MG/ML
INJECTION, SOLUTION INTRAMUSCULAR; INTRAVENOUS; SUBCUTANEOUS
Status: COMPLETED
Start: 2017-04-25 | End: 2017-04-25

## 2017-04-25 RX ORDER — NALOXONE HYDROCHLORIDE 0.4 MG/ML
.1-.4 INJECTION, SOLUTION INTRAMUSCULAR; INTRAVENOUS; SUBCUTANEOUS
Status: DISCONTINUED | OUTPATIENT
Start: 2017-04-25 | End: 2017-05-01 | Stop reason: HOSPADM

## 2017-04-25 RX ORDER — ROSUVASTATIN CALCIUM 5 MG/1
5 TABLET, COATED ORAL DAILY
Status: DISCONTINUED | OUTPATIENT
Start: 2017-04-25 | End: 2017-05-01 | Stop reason: HOSPADM

## 2017-04-25 RX ORDER — NITROGLYCERIN 20 MG/100ML
.07-1.64 INJECTION INTRAVENOUS CONTINUOUS PRN
Status: DISCONTINUED | OUTPATIENT
Start: 2017-04-25 | End: 2017-04-25 | Stop reason: HOSPADM

## 2017-04-25 RX ORDER — ADENOSINE 3 MG/ML
12-12000 INJECTION, SOLUTION INTRAVENOUS
Status: DISCONTINUED | OUTPATIENT
Start: 2017-04-25 | End: 2017-04-25 | Stop reason: HOSPADM

## 2017-04-25 RX ORDER — PROTAMINE SULFATE 10 MG/ML
25-100 INJECTION, SOLUTION INTRAVENOUS EVERY 5 MIN PRN
Status: DISCONTINUED | OUTPATIENT
Start: 2017-04-25 | End: 2017-04-25 | Stop reason: HOSPADM

## 2017-04-25 RX ORDER — ATOVAQUONE 750 MG/5ML
1500 SUSPENSION ORAL DAILY
Status: DISCONTINUED | OUTPATIENT
Start: 2017-04-25 | End: 2017-05-01 | Stop reason: HOSPADM

## 2017-04-25 RX ORDER — TADALAFIL 10 MG/1
10 TABLET ORAL DAILY
Status: DISCONTINUED | OUTPATIENT
Start: 2017-04-25 | End: 2017-05-01 | Stop reason: HOSPADM

## 2017-04-25 RX ORDER — METHYLPREDNISOLONE SODIUM SUCCINATE 125 MG/2ML
125 INJECTION, POWDER, LYOPHILIZED, FOR SOLUTION INTRAMUSCULAR; INTRAVENOUS
Status: DISCONTINUED | OUTPATIENT
Start: 2017-04-25 | End: 2017-04-25 | Stop reason: HOSPADM

## 2017-04-25 RX ORDER — ENALAPRILAT 1.25 MG/ML
1.25-2.5 INJECTION INTRAVENOUS
Status: DISCONTINUED | OUTPATIENT
Start: 2017-04-25 | End: 2017-04-25 | Stop reason: HOSPADM

## 2017-04-25 RX ORDER — DOPAMINE HYDROCHLORIDE 160 MG/100ML
2-20 INJECTION, SOLUTION INTRAVENOUS CONTINUOUS PRN
Status: DISCONTINUED | OUTPATIENT
Start: 2017-04-25 | End: 2017-04-25 | Stop reason: HOSPADM

## 2017-04-25 RX ORDER — ARGATROBAN 1 MG/ML
150 INJECTION, SOLUTION INTRAVENOUS
Status: DISCONTINUED | OUTPATIENT
Start: 2017-04-25 | End: 2017-04-25 | Stop reason: HOSPADM

## 2017-04-25 RX ORDER — LIDOCAINE 40 MG/G
CREAM TOPICAL
Status: DISCONTINUED | OUTPATIENT
Start: 2017-04-25 | End: 2017-05-01 | Stop reason: HOSPADM

## 2017-04-25 RX ORDER — EPTIFIBATIDE 2 MG/ML
180 INJECTION, SOLUTION INTRAVENOUS EVERY 10 MIN PRN
Status: DISCONTINUED | OUTPATIENT
Start: 2017-04-25 | End: 2017-04-25 | Stop reason: HOSPADM

## 2017-04-25 RX ORDER — HYDROMORPHONE HYDROCHLORIDE 1 MG/ML
0.5 INJECTION, SOLUTION INTRAMUSCULAR; INTRAVENOUS; SUBCUTANEOUS ONCE
Status: COMPLETED | OUTPATIENT
Start: 2017-04-25 | End: 2017-04-25

## 2017-04-25 RX ORDER — POTASSIUM CHLORIDE 7.45 MG/ML
10 INJECTION INTRAVENOUS
Status: DISCONTINUED | OUTPATIENT
Start: 2017-04-25 | End: 2017-04-25 | Stop reason: HOSPADM

## 2017-04-25 RX ORDER — NITROGLYCERIN 0.4 MG/1
0.4 TABLET SUBLINGUAL EVERY 5 MIN PRN
Status: DISCONTINUED | OUTPATIENT
Start: 2017-04-25 | End: 2017-04-25 | Stop reason: HOSPADM

## 2017-04-25 RX ORDER — MAGNESIUM OXIDE 400 MG/1
800 TABLET ORAL DAILY
Status: DISCONTINUED | OUTPATIENT
Start: 2017-04-25 | End: 2017-05-01 | Stop reason: HOSPADM

## 2017-04-25 RX ORDER — NALOXONE HYDROCHLORIDE 0.4 MG/ML
0.4 INJECTION, SOLUTION INTRAMUSCULAR; INTRAVENOUS; SUBCUTANEOUS EVERY 5 MIN PRN
Status: DISCONTINUED | OUTPATIENT
Start: 2017-04-25 | End: 2017-04-25 | Stop reason: HOSPADM

## 2017-04-25 RX ORDER — ASPIRIN 81 MG/1
81-324 TABLET, CHEWABLE ORAL
Status: DISCONTINUED | OUTPATIENT
Start: 2017-04-25 | End: 2017-04-25 | Stop reason: HOSPADM

## 2017-04-25 RX ORDER — DOBUTAMINE HYDROCHLORIDE 200 MG/100ML
2-20 INJECTION INTRAVENOUS CONTINUOUS PRN
Status: DISCONTINUED | OUTPATIENT
Start: 2017-04-25 | End: 2017-04-25 | Stop reason: HOSPADM

## 2017-04-25 RX ORDER — POTASSIUM CHLORIDE 29.8 MG/ML
20 INJECTION INTRAVENOUS
Status: DISCONTINUED | OUTPATIENT
Start: 2017-04-25 | End: 2017-04-25 | Stop reason: HOSPADM

## 2017-04-25 RX ORDER — DIPHENHYDRAMINE HYDROCHLORIDE 50 MG/ML
25-50 INJECTION INTRAMUSCULAR; INTRAVENOUS
Status: DISCONTINUED | OUTPATIENT
Start: 2017-04-25 | End: 2017-04-25 | Stop reason: HOSPADM

## 2017-04-25 RX ORDER — NITROGLYCERIN 5 MG/ML
100-500 VIAL (ML) INTRAVENOUS
Status: DISCONTINUED | OUTPATIENT
Start: 2017-04-25 | End: 2017-04-25 | Stop reason: HOSPADM

## 2017-04-25 RX ORDER — FLUMAZENIL 0.1 MG/ML
0.2 INJECTION, SOLUTION INTRAVENOUS
Status: DISCONTINUED | OUTPATIENT
Start: 2017-04-25 | End: 2017-04-25 | Stop reason: HOSPADM

## 2017-04-25 RX ORDER — PRASUGREL 10 MG/1
10-60 TABLET, FILM COATED ORAL
Status: DISCONTINUED | OUTPATIENT
Start: 2017-04-25 | End: 2017-04-25 | Stop reason: HOSPADM

## 2017-04-25 RX ORDER — PANTOPRAZOLE SODIUM 40 MG/1
40 TABLET, DELAYED RELEASE ORAL DAILY
Status: DISCONTINUED | OUTPATIENT
Start: 2017-04-25 | End: 2017-05-01 | Stop reason: HOSPADM

## 2017-04-25 RX ORDER — CLOPIDOGREL BISULFATE 75 MG/1
300-600 TABLET ORAL
Status: DISCONTINUED | OUTPATIENT
Start: 2017-04-25 | End: 2017-04-25 | Stop reason: HOSPADM

## 2017-04-25 RX ORDER — NIFEDIPINE 30 MG/1
30 TABLET, EXTENDED RELEASE ORAL DAILY
Status: DISCONTINUED | OUTPATIENT
Start: 2017-04-25 | End: 2017-05-01 | Stop reason: HOSPADM

## 2017-04-25 RX ORDER — ASPIRIN 325 MG
325 TABLET ORAL
Status: DISCONTINUED | OUTPATIENT
Start: 2017-04-25 | End: 2017-04-25 | Stop reason: HOSPADM

## 2017-04-25 RX ORDER — PROMETHAZINE HYDROCHLORIDE 25 MG/ML
6.25-25 INJECTION, SOLUTION INTRAMUSCULAR; INTRAVENOUS EVERY 4 HOURS PRN
Status: DISCONTINUED | OUTPATIENT
Start: 2017-04-25 | End: 2017-04-25 | Stop reason: HOSPADM

## 2017-04-25 RX ORDER — METOPROLOL TARTRATE 1 MG/ML
5 INJECTION, SOLUTION INTRAVENOUS EVERY 5 MIN PRN
Status: DISCONTINUED | OUTPATIENT
Start: 2017-04-25 | End: 2017-04-25 | Stop reason: HOSPADM

## 2017-04-25 RX ORDER — ONDANSETRON 2 MG/ML
4 INJECTION INTRAMUSCULAR; INTRAVENOUS EVERY 4 HOURS PRN
Status: DISCONTINUED | OUTPATIENT
Start: 2017-04-25 | End: 2017-04-25 | Stop reason: HOSPADM

## 2017-04-25 RX ORDER — LORAZEPAM 2 MG/ML
.5-2 INJECTION INTRAMUSCULAR EVERY 4 HOURS PRN
Status: DISCONTINUED | OUTPATIENT
Start: 2017-04-25 | End: 2017-04-25 | Stop reason: HOSPADM

## 2017-04-25 RX ORDER — DEXTROSE MONOHYDRATE 25 G/50ML
12.5-5 INJECTION, SOLUTION INTRAVENOUS EVERY 30 MIN PRN
Status: DISCONTINUED | OUTPATIENT
Start: 2017-04-25 | End: 2017-04-25 | Stop reason: HOSPADM

## 2017-04-25 RX ORDER — CLONAZEPAM 1 MG/1
1 TABLET ORAL AT BEDTIME
Status: DISCONTINUED | OUTPATIENT
Start: 2017-04-25 | End: 2017-05-01 | Stop reason: HOSPADM

## 2017-04-25 RX ORDER — VALGANCICLOVIR 450 MG/1
500 TABLET, FILM COATED ORAL
Status: DISCONTINUED | OUTPATIENT
Start: 2017-04-26 | End: 2017-05-01 | Stop reason: HOSPADM

## 2017-04-25 RX ORDER — PREDNISONE 5 MG/1
5 TABLET ORAL EVERY OTHER DAY
Status: DISCONTINUED | OUTPATIENT
Start: 2017-04-27 | End: 2017-04-28

## 2017-04-25 RX ORDER — URSODIOL 300 MG/1
300 CAPSULE ORAL 3 TIMES DAILY
Status: DISCONTINUED | OUTPATIENT
Start: 2017-04-25 | End: 2017-05-01 | Stop reason: HOSPADM

## 2017-04-25 RX ORDER — NITROGLYCERIN 5 MG/ML
100-200 VIAL (ML) INTRAVENOUS
Status: DISCONTINUED | OUTPATIENT
Start: 2017-04-25 | End: 2017-04-25 | Stop reason: HOSPADM

## 2017-04-25 RX ORDER — KETOROLAC TROMETHAMINE 15 MG/ML
15 INJECTION, SOLUTION INTRAMUSCULAR; INTRAVENOUS EVERY 6 HOURS PRN
Status: DISCONTINUED | OUTPATIENT
Start: 2017-04-25 | End: 2017-04-26

## 2017-04-25 RX ORDER — FENTANYL CITRATE 50 UG/ML
25-50 INJECTION, SOLUTION INTRAMUSCULAR; INTRAVENOUS
Status: DISCONTINUED | OUTPATIENT
Start: 2017-04-25 | End: 2017-04-25 | Stop reason: HOSPADM

## 2017-04-25 RX ORDER — ONDANSETRON 2 MG/ML
4 INJECTION INTRAMUSCULAR; INTRAVENOUS EVERY 6 HOURS PRN
Status: DISCONTINUED | OUTPATIENT
Start: 2017-04-25 | End: 2017-05-01 | Stop reason: HOSPADM

## 2017-04-25 RX ORDER — EPTIFIBATIDE 2 MG/ML
1 INJECTION, SOLUTION INTRAVENOUS CONTINUOUS PRN
Status: DISCONTINUED | OUTPATIENT
Start: 2017-04-25 | End: 2017-04-25 | Stop reason: HOSPADM

## 2017-04-25 RX ORDER — FUROSEMIDE 10 MG/ML
20-100 INJECTION INTRAMUSCULAR; INTRAVENOUS
Status: DISCONTINUED | OUTPATIENT
Start: 2017-04-25 | End: 2017-04-25 | Stop reason: HOSPADM

## 2017-04-25 RX ORDER — OXYCODONE HYDROCHLORIDE 5 MG/1
5 TABLET ORAL EVERY 4 HOURS PRN
Status: DISCONTINUED | OUTPATIENT
Start: 2017-04-25 | End: 2017-05-01 | Stop reason: HOSPADM

## 2017-04-25 RX ORDER — HYDRALAZINE HYDROCHLORIDE 20 MG/ML
10-20 INJECTION INTRAMUSCULAR; INTRAVENOUS
Status: DISCONTINUED | OUTPATIENT
Start: 2017-04-25 | End: 2017-04-25 | Stop reason: HOSPADM

## 2017-04-25 RX ORDER — ATROPINE SULFATE 0.1 MG/ML
.5-1 INJECTION INTRAVENOUS
Status: DISCONTINUED | OUTPATIENT
Start: 2017-04-25 | End: 2017-04-25 | Stop reason: HOSPADM

## 2017-04-25 RX ORDER — FLUCONAZOLE 200 MG/1
200 TABLET ORAL DAILY
Status: DISCONTINUED | OUTPATIENT
Start: 2017-04-25 | End: 2017-05-01 | Stop reason: HOSPADM

## 2017-04-25 RX ORDER — ACETAMINOPHEN 650 MG/1
650 SUPPOSITORY RECTAL EVERY 4 HOURS PRN
Status: DISCONTINUED | OUTPATIENT
Start: 2017-04-25 | End: 2017-05-01 | Stop reason: HOSPADM

## 2017-04-25 RX ORDER — SODIUM NITROPRUSSIDE 25 MG/ML
100-200 INJECTION INTRAVENOUS
Status: DISCONTINUED | OUTPATIENT
Start: 2017-04-25 | End: 2017-04-25 | Stop reason: HOSPADM

## 2017-04-25 RX ADMIN — URSODIOL 300 MG: 300 CAPSULE ORAL at 19:41

## 2017-04-25 RX ADMIN — CALCIUM 625 MG: 500 TABLET ORAL at 19:39

## 2017-04-25 RX ADMIN — NIFEDIPINE 30 MG: 30 TABLET, FILM COATED, EXTENDED RELEASE ORAL at 17:17

## 2017-04-25 RX ADMIN — FENTANYL CITRATE 50 MCG: 50 INJECTION, SOLUTION INTRAMUSCULAR; INTRAVENOUS at 15:53

## 2017-04-25 RX ADMIN — URSODIOL 300 MG: 300 CAPSULE ORAL at 17:15

## 2017-04-25 RX ADMIN — ONDANSETRON 4 MG: 2 INJECTION INTRAMUSCULAR; INTRAVENOUS at 20:59

## 2017-04-25 RX ADMIN — OXYCODONE HYDROCHLORIDE 5 MG: 5 TABLET ORAL at 19:40

## 2017-04-25 RX ADMIN — MIDAZOLAM HYDROCHLORIDE 1 MG: 1 INJECTION, SOLUTION INTRAMUSCULAR; INTRAVENOUS at 15:38

## 2017-04-25 RX ADMIN — FLUCONAZOLE 200 MG: 200 TABLET ORAL at 17:17

## 2017-04-25 RX ADMIN — PANTOPRAZOLE SODIUM 40 MG: 40 TABLET, DELAYED RELEASE ORAL at 17:39

## 2017-04-25 RX ADMIN — TADALAFIL 10 MG: 10 TABLET, FILM COATED ORAL at 19:40

## 2017-04-25 RX ADMIN — MIDAZOLAM HYDROCHLORIDE 2 MG: 1 INJECTION, SOLUTION INTRAMUSCULAR; INTRAVENOUS at 15:25

## 2017-04-25 RX ADMIN — VITAMIN D, TAB 1000IU (100/BT) 2000 UNITS: 25 TAB at 17:18

## 2017-04-25 RX ADMIN — ACETAMINOPHEN 650 MG: 325 TABLET, FILM COATED ORAL at 20:00

## 2017-04-25 RX ADMIN — MAGNESIUM OXIDE TAB 400 MG (241.3 MG ELEMENTAL MG) 800 MG: 400 (241.3 MG) TAB at 17:19

## 2017-04-25 RX ADMIN — HYDROMORPHONE HYDROCHLORIDE 0.5 MG: 10 INJECTION, SOLUTION INTRAMUSCULAR; INTRAVENOUS; SUBCUTANEOUS at 17:19

## 2017-04-25 RX ADMIN — FENTANYL CITRATE 50 MCG: 50 INJECTION, SOLUTION INTRAMUSCULAR; INTRAVENOUS at 15:45

## 2017-04-25 RX ADMIN — MIDAZOLAM HYDROCHLORIDE 1 MG: 1 INJECTION, SOLUTION INTRAMUSCULAR; INTRAVENOUS at 15:30

## 2017-04-25 RX ADMIN — HYDROMORPHONE HYDROCHLORIDE 0.5 MG: 10 INJECTION, SOLUTION INTRAMUSCULAR; INTRAVENOUS; SUBCUTANEOUS at 20:27

## 2017-04-25 RX ADMIN — VITAMIN D, TAB 1000IU (100/BT) 2000 UNITS: 25 TAB at 19:40

## 2017-04-25 RX ADMIN — HYDROMORPHONE HYDROCHLORIDE 0.5 MG: 1 INJECTION, SOLUTION INTRAMUSCULAR; INTRAVENOUS; SUBCUTANEOUS at 17:19

## 2017-04-25 RX ADMIN — MIDAZOLAM HYDROCHLORIDE 2 MG: 1 INJECTION, SOLUTION INTRAMUSCULAR; INTRAVENOUS at 16:00

## 2017-04-25 RX ADMIN — FENTANYL CITRATE 100 MCG: 50 INJECTION, SOLUTION INTRAMUSCULAR; INTRAVENOUS at 15:26

## 2017-04-25 RX ADMIN — ATOVAQUONE 1500 MG: 750 SUSPENSION ORAL at 17:15

## 2017-04-25 RX ADMIN — PENICILLIN V POTASSIUM 500 MG: 500 TABLET, FILM COATED ORAL at 17:16

## 2017-04-25 RX ADMIN — METOPROLOL SUCCINATE 25 MG: 25 TABLET, EXTENDED RELEASE ORAL at 17:18

## 2017-04-25 RX ADMIN — FLUTICASONE PROPIONATE 2 PUFF: 220 AEROSOL, METERED RESPIRATORY (INHALATION) at 20:03

## 2017-04-25 RX ADMIN — SIROLIMUS 0.5 MG: 0.5 TABLET, SUGAR COATED ORAL at 17:16

## 2017-04-25 RX ADMIN — CLONAZEPAM 1 MG: 1 TABLET ORAL at 21:41

## 2017-04-25 RX ADMIN — ROSUVASTATIN CALCIUM 5 MG: 5 TABLET ORAL at 17:16

## 2017-04-25 RX ADMIN — KETOROLAC TROMETHAMINE 15 MG: 15 INJECTION, SOLUTION INTRAMUSCULAR; INTRAVENOUS at 22:49

## 2017-04-25 RX ADMIN — PENICILLIN V POTASSIUM 500 MG: 500 TABLET, FILM COATED ORAL at 19:42

## 2017-04-25 RX ADMIN — HYDROMORPHONE HYDROCHLORIDE 0.5 MG: 1 INJECTION, SOLUTION INTRAMUSCULAR; INTRAVENOUS; SUBCUTANEOUS at 20:27

## 2017-04-25 ASSESSMENT — PAIN DESCRIPTION - DESCRIPTORS
DESCRIPTORS: ACHING
DESCRIPTORS: DISCOMFORT

## 2017-04-25 NOTE — H&P
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     # Pericardial effusion  Patient has known mild to moderate pericardial effusion. Effusion is very large now on most recent echocardiogram (4/25/17) with early diastolic collapse of RV free wall concerning for early tamponade.  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 The patient has had olljh-inolty-exuy disease with 2 flares after the original presentation that was with transaminitis. That was documented with liver biopsy. His other manifestations of chronic hxhqk-yjyrkd-ubvt disease include sclerotic skin changes, mucosal changes in his mouth, xerostomia and polyserositis as reflected by the mild to moderate pericardial effusion that has been documented.   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    Patient seen and discussed with Dr. Quan who agrees with the above assessment and plan.    Sharita Oquendo  PGY2   [phone]    Primary Care Physician   Adelso Delgado    Chief Complaint   Pericardial  effusion    History of Present Illness   History obtained from chart review and discussed with patient.    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   Patient was seen at [facility] for his routine follow-up. He was getting an echocardiogram there per Cardiology request as he wasn't able to have it completed here earlier this month. The findings were concerning for pericardial effusion with tamponade physiology. Other than echocardiogram findings, pt is completely asymptomatic. He denies any SOB, CORREA, orthopnea, or chest pain. He denies lower extremity swelling. He denies recent fevers, chills, N/V, abdominal pain. Weight has been stable.    Past Medical History    I have reviewed this patient's medical history and updated it with pertinent information if needed.   Past Medical History:   Diagnosis Date     Cancer (H) 2011    AML     Rpypc-lbaajm-ttmp disease (H) need to confirm this history     History of blood transfusion      Pericardial effusion 2014     Raynaud disease      Scleroderma (H)        Past Surgical History   I have reviewed this patient's surgical history and updated it with pertinent information if needed.  Past Surgical History:   Procedure Laterality Date     ENT SURGERY      adenoidectomy     EYE SURGERY  2014    both eyes cataract surgery     HERNIA REPAIR  1980     VASCULAR SURGERY  2011    port       Prior to Admission Medications   Prior to Admission Medications   Prescriptions Last Dose Informant Patient Reported? Taking?   CLONAZEPAM PO  Self Yes No   Sig: Take 1 mg by mouth At Bedtime    FLUCONAZOLE PO  Self Yes No   Sig: Take 200 mg by mouth daily   MAGNESIUM OXIDE PO  Self Yes No   Sig: Take 800 mg by mouth daily   NIFEdipine osmotic (NIFEDICAL XL) 30 MG 24 hr tablet  Self Yes No   Sig: Take 30 mg by mouth daily   PREDNISONE PO  Self  Yes No   Sig: Take 5 mg by mouth every other day    Pantoprazole Sodium (PROTONIX PO)  Self Yes No   Sig: Take 40 mg by mouth daily    Penicillin V Potassium (PEN-VEE K OR)  Spouse/Significant Other Yes No   Sig: Take 500 mg by mouth 2 times daily   Rosuvastatin Calcium (CRESTOR PO)  Self Yes No   Sig: Take 5 mg by mouth daily    SIROLIMUS PO  Self Yes No   Sig: Take 0.5 mg by mouth daily    Tadalafil (CIALIS PO)  Self Yes No   Sig: Take 10 mg by mouth daily   Ursodiol (ACTIGALL PO)  Self Yes No   Sig: Take 300 mg by mouth 3 times daily   VITAMIN D, CHOLECALCIFEROL, PO  Self Yes No   Sig: Take 2,000 Units by mouth 3 times daily    atovaquone (MEPRON) 750 MG/5ML suspension  Self Yes No   Sig: Take 1,500 mg by mouth daily   calcium carbonate (OS-PANFILO 500 MG [place]. [place]) 500 MG tablet  Self Yes No   Sig: Take 600 mg by mouth 2 times daily   fluticasone (FLOVENT HFA) 220 MCG/ACT Inhaler  Self Yes No   Sig: Inhale 2 puffs into the lungs 2 times daily   metoprolol (TOPROL-XL) 25 MG 24 hr tablet  Self No No   Sig: Take 1 tablet (25 mg) by mouth daily   valGANciclovir (VALCYTE) 450 MG tablet  Self Yes No   Sig: Take 500 mg by mouth every 48 hours       Facility-Administered Medications: None     Allergies   Allergies   Allergen Reactions     Tegaderm Transparent Dressing (Informational Only) Rash       Social History   I have reviewed this patient's social history and updated it with pertinent information if needed. Olivier AGUDELO Rico  reports that he is a non-smoker but has been exposed to tobacco smoke. He does not have any smokeless tobacco history on file. He reports that he does not drink alcohol or use illicit drugs.    Family History   I have reviewed this patient's family history and updated it with pertinent information if needed.   Family History   Problem Relation Age of Onset     CANCER No family hx of      no skin cancer       Review of Systems   The 10 point Review of Systems is negative other than noted in the HPI or  here.     Physical Exam   /76  Temp 98.4  F (36.9  C) (Oral)  Wt 122.3 kg (269 lb 10 oz)  SpO2 95%  BMI 31.97 kg/m2  Vitals:    04/25/17 0900   Weight: 122.3 kg (269 lb 10 oz)       GENERAL: NAD. Resting in bed comfortably.  HEENT: NCAT. Normal conjunctiva. No scleral icterus.  LUNG: LCTAB. No wheezing or crackles.  CV: Regular rate and rhythm. No murmur or rub.   GI: Normoactive bowel sounds. Abdomen soft, non-distended, and non-tender. No palpable masses or organomegaly.  SKIN: Warm and dry. No concerning lesions or rash on exposed surfaces.  MSK: No LE edema.  NEURO: A&O x 3. No focal deficits.    Data   CBC  Recent Labs  Lab 04/25/17  1200   WBC 6.9   RBC 4.66   HGB 13.3   HCT 41.1   MCV 88   MCH 28.5   MCHC 32.4   RDW 18.3*        CMPNo lab results found in last 7 days.  INR  Recent Labs  Lab 04/25/17  1200   INR 1.06       Results for orders placed or performed during the hospital encounter of 08/31/16   NM Lung Quant Perfusion    Narrative    Examination:  NM LUNG QUANT PERFUSION       Date:  8/31/2016 2:42 PM     Indication:    Other secondary pulmonary hypertension     Previous Study: Lung VQ scan on 7/14/2015.    Additional Information: none    Technique:    The patient received 6.5 mCi of Tc-99m labeled MAA intravenously.  Anterior and posterior quantitative views were obtained of the lungs  using a dual headed camera camera. A standard eight view lung  perfusion scan was also obtained. Calculations were performed using  the geometric mean technique.    Findings:    Comparison is made with a previous VQ scan from 7/14/2015. The  previous VQ scan demonstrated normal perfusion of the lungs. The  perfusion images demonstrate normal perfusion of the lungs without  segmental or subsegmental perfusion abnormalities.    The quantitative evaluation shows that there is 52% function on the  right as compared to 48% function on the left.     Within the right lung there is approximately 16% in the  upper 1/3, 23%  in the middle 1/3, and 13% in the lower 1/3.     Within the left lung there is approximately 19% in the upper 1/3, 20%  in the middle 1/3, and 9% in the lower 1/3.    A right-to-left shunt fraction was calculated to be 2.6% which is well  within normal limits.      Impression    Impression:    1. Quantitative evaluation shows 52% contribution of the right lung as  compared to 48% contribution of the left lung.    2. No evidence of a right to left shunt and the lung perfusion images  are normal.    MEAGAN VALENCIA MD     I personally provided care for this patient, reviewed chart, discussed course with patient, housestaff and consulting physicians.  I answered all questions.  I reviewed echocardiogram, outside CT and believe pericardial tap for diagnosis and therapy with maintained central hemodynamic monitoring is indicated.    Dk Quan M.D.  Division of Cardiology  Department of Medicine

## 2017-04-25 NOTE — IP AVS SNAPSHOT
MRN:[id number]                      After Visit Summary   4/25/2017    Olivier Gómez    MRN: [id number]           Thank you!     Thank you for choosing [facility] for your care. Our goal is always to provide you with excellent care. Hearing back from our patients is one way we can continue to improve our services. Please take a few minutes to complete the written survey that you may receive in the mail after you visit with us. Thank you!        Patient Information     Date Of Birth          1965        Designated Caregiver       Most Recent Value    Caregiver    Will someone help with your care after discharge? no      About your hospital stay     You were admitted on:  April 25, 2017 You last received care in the:  [address] [facility]    You were discharged on:  May 1, 2017        Reason for your hospital stay       You were admitted for an enlarging pericardial effusion which was drained. Studies on the fluid did not show infection. 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 We started you on high dose steroids to treat this issue and stopped your sirolimus in the mean time.                  Who to Call     For medical emergencies, please call 911.  For non-urgent questions about your medical care, please call your primary care provider or clinic, [phone]          Attending Provider     Provider Specialty    Dk Quan MD Cardiology       Primary Care Provider Office Phone # Fax #    Adelso Delgado [phone]       [facility] [address]  [place] [address]         When to contact your care team       Call your primary doctor if you have any of the following:  increased shortness of breath, increased swelling or increased pain.                  After Care Instructions     Activity       Your activity upon discharge: activity as tolerated            Diet       Follow this diet upon discharge: Orders Placed This  Encounter      Regular Diet Adult            Discharge Instructions       You will be discharged with a slow prednisone taper due to pericardial effusion.  Hold sirolimus during this time.  We recommend you discuss current regimen with BMT doctors to allow for any changes they recommend. You will need to obtain pulmonary function testing and echocardiogram in 1 week to be followed-up by Dr. Quan. You will also be seen in Dr. Quan cardiology clinic in 4 weeks with right heart catheterization prior.  For diabetes continue levemir 10u daily and Januvia 50 + your correction scale.  Follow up with endocrinology for adjustments.                  Follow-up Appointments     Adult [facility] Follow-up and recommended labs and tests       You will need full PFTs with [facility] in 1 week in [place] with PCP Dr. Delgado.    Endocrinology follow-up in 1 week.    You will need a complete echocardiogram in 1 week in [place].    You will follow-up with Dr. Quan in 4 weeks with right heart catheterization prior to appointment.    Appointments on [place] and/or [facility] (with [facility] or [facility] provider or service). Call [phone] if you haven't heard regarding these appointments within 7 days of discharge.                  Your next 10 appointments already scheduled     Jun 07, 2017 11:00 AM CDT   Lab with  LAB   [facility] ([facility])    [address]  1st Floor  [facility] [address] [phone]            Jun 07, 2017 11:30 AM CDT   (Arrive by 11:15 AM)   RETURN PRIMARY PULMONARY with Dk Quan MD   [facility] ([facility])    [address]  3rd Floor  [facility] [address] [phone]              Additional Services     Medication Therapy Management Referral       Reason for referral:  on more than 5 medications and managing chronic disease and on more than 10 medications and hospitalized or in the ED in  the past 6 months     This service is designed to help you get the most from your medications.  A specially trained pharmacist will work closely with you and your doctors  to solve any problems related to your medications and to help you get the   best results from taking them.      The Medication Therapy Management staff will call you to schedule an appointment.                  Further instructions from your care team       Plan for discharge:  - detemir 10 units qAM this morning.   - sitagliptin 50mg daily   -correction aspart: high intensity before meals and bedtime  HIGH INSULIN RESISTANCE DOSING   Before Meals   Do Not give Correction Insulin if Pre-Meal BG less than 140.    For Pre-Meal  - 164 give 1 unit.    For Pre-Meal  - 189 give 2 units.    For Pre-Meal  - 214 give 3 units.    For Pre-Meal  - 239 give 4 units.    For Pre-Meal  - 264 give 5 units.    For Pre-Meal  - 289 give 6 units.    For Pre-Meal  - 314 give 7 units.    For Pre-Meal  - 339 give 8 units.    For Pre-Meal  - 364 give 9 units.    For Pre-Meal BG greater than or equal to 365 give 10 units     HIGH INSULIN RESISTANCE DOSING  Bedtime   Do Not give Bedtime Correction Insulin if BG less than 200.    For  - 224 give 1 units.    For  - 249 give 2 units.    For  - 274 give 3 units.    For  - 299 give 4 units.    For  - 324 give 5 units.    For  - 349 give 6 units.    For BG greater than or equal to 350 give 7 units.          -monitor glucoses ac, hs, and 0200 ( if awake)    Pending Results     Date and Time Order Name Status Description    4/28/2017 2200 Antinuclear antibody screen by EIA In process     4/25/2017 1457 Viral culture In process     4/25/2017 1356 Anaerobic bacterial culture Preliminary     4/25/2017 1356 Fungus Culture, non-blood Preliminary     4/25/2017 1356 AFB Culture Non Blood Preliminary             Statement of Approval     Ordered        "   17 1325  I have reviewed and agree with all the recommendations and orders detailed in this document.  EFFECTIVE NOW     Approved and electronically signed by:  Jerrell Navarro MD             Admission Information     Date & Time Provider Department Dept. Phone    2017 Dk Quan MD [address] [facility] [phone]      Your Vitals Were     Blood Pressure Temperature Respirations Weight Pulse Oximetry BMI (Body Mass Index)    117/82 (BP Location: Right arm) 98  F (36.7  C) (Oral) 18 125.1 kg (275 lb 11.2 oz) 94% 32.69 kg/m2      MyChart Information     [employer] lets you send messages to your doctor, view your test results, renew your prescriptions, schedule appointments and more. To sign up, go to [email or web address] . Click on \"Log in\" on the left side of the screen, which will take you to the Welcome page. Then click on \"Sign up Now\" on the right side of the page.     You will be asked to enter the access code listed below, as well as some personal information. Please follow the directions to create your username and password.     Your access code is: [email or web address]  Expires: 2017 10:56 AM     Your access code will  in 90 days. If you need help or a new code, please call your [facility] clinic or [phone].        Care EveryWhere ID     This is your Care EveryWhere ID. This could be used by other organizations to access your [facility] medical records  [id number]           Review of your medicines      START taking        Dose / Directions    blood glucose monitoring test strip   Commonly known as:  ONE TOUCH ULTRA        Use to test blood sugars 4 times daily or as directed.   Quantity:  100 strip   Refills:  11       insulin detemir 100 UNIT/ML injection   Commonly known as:  LEVEMIR        Dose:  10 Units   Inject 10 Units Subcutaneous every morning   Quantity:  3 mL   Refills:  1       sitagliptin 50 MG tablet   Commonly known as:  JANUVIA        Dose:  50 " mg   Take 1 tablet (50 mg) by mouth daily   Quantity:  30 tablet   Refills:  1       traZODone 50 MG tablet   Commonly known as:  DESYREL   Used for:  Insomnia, unspecified type        Dose:  50 mg   Take 1 tablet (50 mg) by mouth nightly as needed for sleep   Quantity:  90 tablet   Refills:  0         CONTINUE these medicines which may have CHANGED, or have new prescriptions. If we are uncertain of the size of tablets/capsules you have at home, strength may be listed as something that might have changed.        Dose / Directions    * predniSONE 10 MG tablet   Commonly known as:  DELTASONE   This may have changed:  You were already taking a medication with the same name, and this prescription was added. Make sure you understand how and when to take each.        Dose:  10 mg   Take 1 tablet (10 mg) by mouth daily   Quantity:  30 tablet   Refills:  0       * predniSONE 1 MG tablet   Commonly known as:  DELTASONE   This may have changed:  Another medication with the same name was added. Make sure you understand how and when to take each.        Dose:  5 mg   Start taking on:  6/11/2017   Take 5 tablets (5 mg) by mouth every other day   Refills:  0       * Notice:  This list has 2 medication(s) that are the same as other medications prescribed for you. Read the directions carefully, and ask your doctor or other care provider to review them with you.      CONTINUE these medicines which have NOT CHANGED        Dose / Directions    ACTIGALL PO        Dose:  300 mg   Take 300 mg by mouth 3 times daily   Refills:  0       atovaquone 750 MG/5ML suspension   Commonly known as:  MEPRON        Dose:  1500 mg   Take 1,500 mg by mouth daily   Refills:  0       calcium carbonate 500 MG tablet   Commonly known as:  OS-PANFILO 500 mg [place]. Ca        Dose:  600 mg   Take 600 mg by mouth 2 times daily   Refills:  0       CIALIS PO        Dose:  10 mg   Take 10 mg by mouth daily   Refills:  0       CLONAZEPAM PO        Dose:  1 mg   Take 1  mg by mouth At Bedtime   Refills:  0       CRESTOR PO        Dose:  5 mg   Take 5 mg by mouth daily   Refills:  0       FLUCONAZOLE PO        Dose:  200 mg   Take 200 mg by mouth daily   Refills:  0       fluticasone 220 MCG/ACT Inhaler   Commonly known as:  FLOVENT HFA        Dose:  2 puff   Inhale 2 puffs into the lungs 2 times daily   Refills:  0       MAGNESIUM OXIDE PO        Dose:  800 mg   Take 800 mg by mouth daily   Refills:  0       metoprolol 25 MG 24 hr tablet   Commonly known as:  TOPROL-XL   Used for:  Pulmonary hypertension (H)        Dose:  25 mg   Take 1 tablet (25 mg) by mouth daily   Quantity:  90 tablet   Refills:  3       NIFEDICAL XL 30 MG 24 hr tablet   Generic drug:  NIFEdipine ER osmotic        Dose:  30 mg   Take 30 mg by mouth daily   Refills:  0       PEN-VEE K OR        Dose:  500 mg   Take 500 mg by mouth 2 times daily   Refills:  0       PROTONIX PO        Dose:  40 mg   Take 40 mg by mouth daily   Refills:  0       valGANciclovir 450 MG tablet   Commonly known as:  VALCYTE   Notes to Patient:  Next dose 5/2        Dose:  500 mg   Take 500 mg by mouth every 48 hours   Refills:  0       VITAMIN D (CHOLECALCIFEROL) PO        Dose:  2000 Units   Take 2,000 Units by mouth 3 times daily   Refills:  0         STOP taking     SIROLIMUS PO                Where to get your medicines      These medications were sent to [facility] [place], [place] - [address]  [address] [address]     Phone:  [phone]     insulin detemir 100 UNIT/ML injection    sitagliptin 50 MG tablet    traZODone 50 MG tablet         These medications were sent [place] [facility] [address] [facility]  [address] [facility] [address]     Phone:  [phone]     blood glucose monitoring test strip         Some of these will need a paper prescription and others can be bought over the counter. Ask your nurse if you have questions.      Bring a paper prescription for each of these medications     predniSONE 10 MG tablet                Protect others around you: Learn how to safely use, store and throw away your medicines at www.disposemymeds.org.             Medication List: This is a list of all your medications and when to take them. Check marks below indicate your daily home schedule. Keep this list as a reference.      Medications           Morning Afternoon Evening Bedtime As Needed    ACTIGALL PO   Take 300 mg by mouth 3 times daily   Last time this was given:  300 mg on 5/1/2017  1:55 PM                                         atovaquone 750 MG/5ML suspension   Commonly known as:  MEPRON   Take 1,500 mg by mouth daily   Last time this was given:  1,500 mg on 5/1/2017  8:00 AM                                   blood glucose monitoring test strip   Commonly known as:  ONE TOUCH ULTRA   Use to test blood sugars 4 times daily or as directed.                                calcium carbonate 500 MG tablet   Commonly known as:  OS-PANFILO 500 mg [place]. Ca   Take 600 mg by mouth 2 times daily   Last time this was given:  1,250 mg on 5/1/2017  8:00 AM                                      CIALIS PO   Take 10 mg by mouth daily   Last time this was given:  10 mg on 5/1/2017  8:05 AM                                   CLONAZEPAM PO   Take 1 mg by mouth At Bedtime   Last time this was given:  1 mg on 4/30/2017  9:29 PM                                   CRESTOR PO   Take 5 mg by mouth daily   Last time this was given:  5 mg on 5/1/2017  7:59 AM                                   FLUCONAZOLE PO   Take 200 mg by mouth daily   Last time this was given:  200 mg on 5/1/2017  7:59 AM                                   fluticasone 220 MCG/ACT Inhaler   Commonly known as:  FLOVENT HFA   Inhale 2 puffs into the lungs 2 times daily   Last time this was given:  2 puffs on 5/1/2017  7:58 AM                                      insulin detemir 100 UNIT/ML injection   Commonly  known as:  LEVEMIR   Inject 10 Units Subcutaneous every morning   Last time this was given:  10 Units on 5/1/2017  8:00 AM                                   MAGNESIUM OXIDE PO   Take 800 mg by mouth daily   Last time this was given:  800 mg on 5/1/2017  7:59 AM                                   metoprolol 25 MG 24 hr tablet   Commonly known as:  TOPROL-XL   Take 1 tablet (25 mg) by mouth daily   Last time this was given:  25 mg on 5/1/2017  7:59 AM                                   NIFEDICAL XL 30 MG 24 hr tablet   Take 30 mg by mouth daily   Last time this was given:  30 mg on 5/1/2017  7:59 AM   Generic drug:  NIFEdipine ER osmotic                                   PEN-VEE K OR   Take 500 mg by mouth 2 times daily   Last time this was given:  500 mg on 5/1/2017  7:58 AM                                      * predniSONE 10 MG tablet   Commonly known as:  DELTASONE   Take 1 tablet (10 mg) by mouth daily   Last time this was given:  50 mg on 5/1/2017  7:59 AM                                   * predniSONE 1 MG tablet   Commonly known as:  DELTASONE   Take 5 tablets (5 mg) by mouth every other day   Start taking on:  6/11/2017   Last time this was given:  50 mg on 5/1/2017  7:59 AM                                PROTONIX PO   Take 40 mg by mouth daily   Last time this was given:  40 mg on 5/1/2017  8:00 AM                                   sitagliptin 50 MG tablet   Commonly known as:  JANUVIA   Take 1 tablet (50 mg) by mouth daily   Last time this was given:  50 mg on 5/1/2017  8:05 AM                                   traZODone 50 MG tablet   Commonly known as:  DESYREL   Take 1 tablet (50 mg) by mouth nightly as needed for sleep   Last time this was given:  50 mg on 4/30/2017 11:37 PM                                   valGANciclovir 450 MG tablet   Commonly known as:  VALCYTE   Take 500 mg by mouth every 48 hours   Last time this was given:  450 mg on 4/30/2017 12:57 AM   Notes to Patient:  Next dose 5/2                                 VITAMIN D (CHOLECALCIFEROL) PO   Take 2,000 Units by mouth 3 times daily   Last time this was given:  2,000 Units on 5/1/2017  1:55 PM                                         * Notice:  This list has 2 medication(s) that are the same as other medications prescribed for you. Read the directions carefully, and ask your doctor or other care provider to review them with you.

## 2017-04-25 NOTE — PROCEDURES
FINAL CARDIAC CATH REPORT:     PROCEDURES PERFORMED:   Right Heart Catheterization  Pericardiocentesis     PHYSICIANS:  Attending Physician: Sohn Lockett MD  Interventional Cardiology Fellow: None  Cardiology Fellow: Reji Lieberman MD    INDICATION:  Olivier Gómez is a 51 year old male with AML s/p BMT, ?scleroderma, Raynaud's disease and severe PAH who was admitted for enlarging pericardial effusion with some signs of early tamponade.     DESCRIPTION:  1. Consent obtained with discussion of risks.  All questions were answered.  2. Sterile prep and procedure.  3. Location with Sheaths:   Rt IJ  7 Fr 10 cm [short] Lock-in  4. Access: Local anesthetic with lidocaine.  A standard 18 guage needle with ultrasound guidance was used to establish vascular access using a modified Seldinger technique.  5. Diagnostic Catheters:   7 Fr  [place] Chris  6. Guiding Catheters:  None  6. Estimated blood loss: < 5 ml    MEDICATIONS:  The procedure was performed under conscious sedation for 50 minutes from 1525 to 1615.  Midazolam 6 mg and Fentanyl 200 mcg were administered.  Heart rate, BP, respiration, oxygen saturation and patient responses were monitored throughout the procedure with the assistance of the RN under my supervision.    Procedures:    HEMODYNAMICS:  BSA 1.96  1. HR 72 bpm  2. /79/94 mmHg  3. RA 15/13/12   4. /16  5. /37/60   6. PCW 15/20/16   7. PA sat 71.8%   8. PCW sat not obtained  9. Hgb 12.7 g/dL   10. Carter CO 7.8   11. Carter CI 3.0   12.  PVR 5.6     PERICARDIALCENTESIS  The patient s thorax was prepped and draped in sterile fashion. 1% Lidocaine was used to anesthetize the surrounding skin area.TTE was used to identify the fluid and observe the needle entering the pericardial space. The needle was introduced into the pericardial space. Appropriate fluid return was obtained, wire was passed through the needle and confirmed under flouroscopy that is remained within the pericardial sack. Track was  then dilated and an 8.3Fr pig tail catheter was placed in the pericardial space. The patient tolerated the procedure well and there were no complications. Blood loss was minimal.  - Approx. 1.1L of fluid was removed. Fluid was straw color with reddish tinge  - Pigtail catheter was left in placed  - Catheter secure prior to leaving cath lab.        Sheath Removal:  7Fr Lock in venous sheath was left in place as requested by primary team.   [place]-chris catheter was lock-in at 58cm     Contrast: Isovue, 0 ml     Fluoroscopy Time: 0.9 min    COMPLICATIONS:  1. None    SUMMARY:   >> High right sided filling pressures.  >> High left sided filling pressures.  >> Severe pulmonary artery hypertension  >> Normal cardiac output, 7.8 L/min with index 3.0 L/min/m2   >> Large pericardial effusion, without tamponade physiology.  >> Successful pericardiocentesis, with removal of 1030ml of blood tinged serous fluid  >> Pericardial drain left in place    PLAN:   >> Pericardial fluid sent for analysis (60cc)  >> [place]-Chris catheter lock-in place  >>. Return to the primary inpatient team for further evaluation and management.    The attending interventional cardiologist was present and supervised all critical aspects the procedure.    Findings discussed with Dr. Quan.    See CVIS report for final draft.    Reji Lieberman MD   Cardiology Fellow    Staff Cardiologist: I supervised the cardiology fellow and reviewed the hemodynamic findings with the fellow at the completion of the procedure.  I personally performed the pericardiocentesis.  I agree with the documentation above.    Shon Lockett MD

## 2017-04-25 NOTE — IP AVS SNAPSHOT
[address] [address]    Phone:  [phone]                                       After Visit Summary   4/25/2017    Olivier Gómez    MRN: [id number]           After Visit Summary Signature Page     I have received my discharge instructions, and my questions have been answered. I have discussed any challenges I see with this plan with the nurse or doctor.    ..........................................................................................................................................  Patient/Patient Representative Signature      ..........................................................................................................................................  Patient Representative Print Name and Relationship to Patient    ..................................................               ................................................  Date                                            Time    ..........................................................................................................................................  Reviewed by Signature/Title    ...................................................              ..............................................  Date                                                            Time

## 2017-04-26 ENCOUNTER — APPOINTMENT (OUTPATIENT)
Dept: GENERAL RADIOLOGY | Facility: CLINIC | Age: 52
DRG: 808 | End: 2017-04-26
Attending: INTERNAL MEDICINE
Payer: COMMERCIAL

## 2017-04-26 LAB
ABO + RH BLD: NORMAL
ABO + RH BLD: NORMAL
ANION GAP SERPL CALCULATED.3IONS-SCNC: 10 MMOL/L (ref 3–14)
BASE DEFICIT BLDA-SCNC: 2 MMOL/L
BASE DEFICIT BLDV-SCNC: 1.6 MMOL/L
BASE DEFICIT BLDV-SCNC: 1.9 MMOL/L
BASE DEFICIT BLDV-SCNC: 2.6 MMOL/L
BLD GP AB SCN SERPL QL: NORMAL
BLOOD BANK CMNT PATIENT-IMP: NORMAL
BUN SERPL-MCNC: 22 MG/DL (ref 7–30)
CALCIUM SERPL-MCNC: 8.6 MG/DL (ref 8.5–10.1)
CHLORIDE SERPL-SCNC: 109 MMOL/L (ref 94–109)
CO2 SERPL-SCNC: 24 MMOL/L (ref 20–32)
CREAT SERPL-MCNC: 1.83 MG/DL (ref 0.66–1.25)
ERYTHROCYTE [DISTWIDTH] IN BLOOD BY AUTOMATED COUNT: 18 % (ref 10–15)
GFR SERPL CREATININE-BSD FRML MDRD: 39 ML/MIN/1.7M2
GLUCOSE SERPL-MCNC: 155 MG/DL (ref 70–99)
HCO3 BLD-SCNC: 23 MMOL/L (ref 21–28)
HCO3 BLDV-SCNC: 23 MMOL/L (ref 21–28)
HCO3 BLDV-SCNC: 24 MMOL/L (ref 21–28)
HCT VFR BLD AUTO: 46.5 % (ref 40–53)
HGB BLD-MCNC: 14.9 G/DL (ref 13.3–17.7)
INTERPRETATION ECG - MUSE: NORMAL
LDH SERPL L TO P-CCNC: 218 U/L (ref 85–227)
MAGNESIUM SERPL-MCNC: 2.2 MG/DL (ref 1.6–2.3)
MCH RBC QN AUTO: 28.6 PG (ref 26.5–33)
MCHC RBC AUTO-ENTMCNC: 32 G/DL (ref 31.5–36.5)
MCV RBC AUTO: 89 FL (ref 78–100)
O2/TOTAL GAS SETTING VFR VENT: ABNORMAL %
O2/TOTAL GAS SETTING VFR VENT: NORMAL %
OXYHGB MFR BLDV: 55 %
OXYHGB MFR BLDV: 62 %
OXYHGB MFR BLDV: 63 %
OXYHGB MFR BLDV: 64 %
OXYHGB MFR BLDV: 67 %
PCO2 BLD: 38 MM HG (ref 35–45)
PCO2 BLDV: 38 MM HG (ref 40–50)
PCO2 BLDV: 39 MM HG (ref 40–50)
PCO2 BLDV: 41 MM HG (ref 40–50)
PCO2 BLDV: 41 MM HG (ref 40–50)
PCO2 BLDV: 42 MM HG (ref 40–50)
PH BLD: 7.39 PH (ref 7.35–7.45)
PH BLDV: 7.34 PH (ref 7.32–7.43)
PH BLDV: 7.37 PH (ref 7.32–7.43)
PH BLDV: 7.37 PH (ref 7.32–7.43)
PH BLDV: 7.38 PH (ref 7.32–7.43)
PH BLDV: 7.39 PH (ref 7.32–7.43)
PLATELET # BLD AUTO: 273 10E9/L (ref 150–450)
PO2 BLD: 64 MM HG (ref 80–105)
PO2 BLDV: 32 MM HG (ref 25–47)
PO2 BLDV: 35 MM HG (ref 25–47)
PO2 BLDV: 37 MM HG (ref 25–47)
PO2 BLDV: 38 MM HG (ref 25–47)
PO2 BLDV: 39 MM HG (ref 25–47)
POTASSIUM SERPL-SCNC: 4.4 MMOL/L (ref 3.4–5.3)
RBC # BLD AUTO: 5.21 10E12/L (ref 4.4–5.9)
SODIUM SERPL-SCNC: 142 MMOL/L (ref 133–144)
SPECIMEN EXP DATE BLD: NORMAL
WBC # BLD AUTO: 13.5 10E9/L (ref 4–11)

## 2017-04-26 PROCEDURE — 25000131 ZZH RX MED GY IP 250 OP 636 PS 637: Performed by: STUDENT IN AN ORGANIZED HEALTH CARE EDUCATION/TRAINING PROGRAM

## 2017-04-26 PROCEDURE — 71010 XR CHEST PORT 1 VW: CPT

## 2017-04-26 PROCEDURE — 82805 BLOOD GASES W/O2 SATURATION: CPT

## 2017-04-26 PROCEDURE — 25000128 H RX IP 250 OP 636: Performed by: STUDENT IN AN ORGANIZED HEALTH CARE EDUCATION/TRAINING PROGRAM

## 2017-04-26 PROCEDURE — 83735 ASSAY OF MAGNESIUM: CPT | Performed by: INTERNAL MEDICINE

## 2017-04-26 PROCEDURE — 36600 WITHDRAWAL OF ARTERIAL BLOOD: CPT

## 2017-04-26 PROCEDURE — 40000196 ZZH STATISTIC RAPCV CVP MONITORING

## 2017-04-26 PROCEDURE — 99233 SBSQ HOSP IP/OBS HIGH 50: CPT | Mod: 24 | Performed by: INTERNAL MEDICINE

## 2017-04-26 PROCEDURE — 25000132 ZZH RX MED GY IP 250 OP 250 PS 637: Performed by: STUDENT IN AN ORGANIZED HEALTH CARE EDUCATION/TRAINING PROGRAM

## 2017-04-26 PROCEDURE — 40000048 ZZH STATISTIC DAILY SWAN MONITORING

## 2017-04-26 PROCEDURE — 86850 RBC ANTIBODY SCREEN: CPT | Performed by: INTERNAL MEDICINE

## 2017-04-26 PROCEDURE — 80048 BASIC METABOLIC PNL TOTAL CA: CPT | Performed by: INTERNAL MEDICINE

## 2017-04-26 PROCEDURE — 82805 BLOOD GASES W/O2 SATURATION: CPT | Performed by: INTERNAL MEDICINE

## 2017-04-26 PROCEDURE — 86901 BLOOD TYPING SEROLOGIC RH(D): CPT | Performed by: INTERNAL MEDICINE

## 2017-04-26 PROCEDURE — 40000275 ZZH STATISTIC RCP TIME EA 10 MIN

## 2017-04-26 PROCEDURE — 25000132 ZZH RX MED GY IP 250 OP 250 PS 637: Performed by: INTERNAL MEDICINE

## 2017-04-26 PROCEDURE — 85027 COMPLETE CBC AUTOMATED: CPT | Performed by: INTERNAL MEDICINE

## 2017-04-26 PROCEDURE — 82803 BLOOD GASES ANY COMBINATION: CPT | Performed by: INTERNAL MEDICINE

## 2017-04-26 PROCEDURE — 86900 BLOOD TYPING SEROLOGIC ABO: CPT | Performed by: INTERNAL MEDICINE

## 2017-04-26 PROCEDURE — 20000004 ZZH R&B ICU UMMC

## 2017-04-26 RX ORDER — CALCIUM CARBONATE 500(1250)
1250 TABLET ORAL 2 TIMES DAILY
Status: DISCONTINUED | OUTPATIENT
Start: 2017-04-26 | End: 2017-05-01 | Stop reason: HOSPADM

## 2017-04-26 RX ORDER — HEPARIN SODIUM 5000 [USP'U]/.5ML
5000 INJECTION, SOLUTION INTRAVENOUS; SUBCUTANEOUS EVERY 8 HOURS
Status: DISCONTINUED | OUTPATIENT
Start: 2017-04-26 | End: 2017-05-01 | Stop reason: HOSPADM

## 2017-04-26 RX ADMIN — PANTOPRAZOLE SODIUM 40 MG: 40 TABLET, DELAYED RELEASE ORAL at 08:41

## 2017-04-26 RX ADMIN — HEPARIN SODIUM 5000 UNITS: 5000 INJECTION, SOLUTION INTRAVENOUS; SUBCUTANEOUS at 10:55

## 2017-04-26 RX ADMIN — URSODIOL 300 MG: 300 CAPSULE ORAL at 19:51

## 2017-04-26 RX ADMIN — SIROLIMUS 0.5 MG: 0.5 TABLET, SUGAR COATED ORAL at 08:42

## 2017-04-26 RX ADMIN — CALCIUM 1250 MG: 500 TABLET ORAL at 19:51

## 2017-04-26 RX ADMIN — OXYCODONE HYDROCHLORIDE 5 MG: 5 TABLET ORAL at 10:53

## 2017-04-26 RX ADMIN — PENICILLIN V POTASSIUM 500 MG: 500 TABLET, FILM COATED ORAL at 08:42

## 2017-04-26 RX ADMIN — NIFEDIPINE 30 MG: 30 TABLET, FILM COATED, EXTENDED RELEASE ORAL at 08:41

## 2017-04-26 RX ADMIN — TADALAFIL 10 MG: 10 TABLET, FILM COATED ORAL at 08:40

## 2017-04-26 RX ADMIN — PENICILLIN V POTASSIUM 500 MG: 500 TABLET, FILM COATED ORAL at 15:13

## 2017-04-26 RX ADMIN — MAGNESIUM OXIDE TAB 400 MG (241.3 MG ELEMENTAL MG) 800 MG: 400 (241.3 MG) TAB at 08:40

## 2017-04-26 RX ADMIN — URSODIOL 300 MG: 300 CAPSULE ORAL at 08:42

## 2017-04-26 RX ADMIN — OXYCODONE HYDROCHLORIDE 5 MG: 5 TABLET ORAL at 15:25

## 2017-04-26 RX ADMIN — URSODIOL 300 MG: 300 CAPSULE ORAL at 13:58

## 2017-04-26 RX ADMIN — HEPARIN SODIUM 5000 UNITS: 5000 INJECTION, SOLUTION INTRAVENOUS; SUBCUTANEOUS at 17:52

## 2017-04-26 RX ADMIN — ROSUVASTATIN CALCIUM 5 MG: 5 TABLET ORAL at 08:42

## 2017-04-26 RX ADMIN — OXYCODONE HYDROCHLORIDE 5 MG: 5 TABLET ORAL at 22:26

## 2017-04-26 RX ADMIN — VITAMIN D, TAB 1000IU (100/BT) 2000 UNITS: 25 TAB at 19:52

## 2017-04-26 RX ADMIN — METOPROLOL SUCCINATE 25 MG: 25 TABLET, EXTENDED RELEASE ORAL at 08:41

## 2017-04-26 RX ADMIN — FLUTICASONE PROPIONATE 2 PUFF: 220 AEROSOL, METERED RESPIRATORY (INHALATION) at 19:58

## 2017-04-26 RX ADMIN — VITAMIN D, TAB 1000IU (100/BT) 2000 UNITS: 25 TAB at 08:40

## 2017-04-26 RX ADMIN — CLONAZEPAM 1 MG: 1 TABLET ORAL at 22:26

## 2017-04-26 RX ADMIN — VALGANCICLOVIR HYDROCHLORIDE 450 MG: 450 TABLET, FILM COATED ORAL at 08:57

## 2017-04-26 RX ADMIN — VITAMIN D, TAB 1000IU (100/BT) 2000 UNITS: 25 TAB at 13:58

## 2017-04-26 RX ADMIN — KETOROLAC TROMETHAMINE 15 MG: 15 INJECTION, SOLUTION INTRAMUSCULAR; INTRAVENOUS at 05:03

## 2017-04-26 RX ADMIN — CALCIUM 1250 MG: 500 TABLET ORAL at 10:53

## 2017-04-26 RX ADMIN — FLUCONAZOLE 200 MG: 200 TABLET ORAL at 08:40

## 2017-04-26 RX ADMIN — ATOVAQUONE 1500 MG: 750 SUSPENSION ORAL at 08:43

## 2017-04-26 RX ADMIN — FLUTICASONE PROPIONATE 2 PUFF: 220 AEROSOL, METERED RESPIRATORY (INHALATION) at 08:45

## 2017-04-26 ASSESSMENT — PAIN DESCRIPTION - DESCRIPTORS
DESCRIPTORS: DISCOMFORT

## 2017-04-26 NOTE — PROGRESS NOTES
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     #Large pericardial effusion w/ early tamponade   Patient has known mild to moderate pericardial effusion. Effusion is very large on most recent echocardiogram (4/25/17) with early diastolic collapse of RV free wall concerning for early tamponade.  RHC 4/25/17: RA 15/13/12, /16, /73/60, PCW 15/20/16, PA sat 71.8%, CI 3.0, PVR 5.6  - s/p drainage 4/25 with catheter left in place  - Cell count, gram stain, cultures, AFB stain and culture, and adenosine deaminase pending  - Helotes in place with q6h hemodynamics    #Acute hypoxic respiratory failure  Post procedure patient with increasing O2 requirements up to 10L oxymask. No evidence of pneumothorax on CXR. Pt with significant pain post procedure and taking shallow breaths. Most likely etiology is post-procedure.  - Although ketorolac was helpful with pain control, pt has hx of GIB so will hold at this time. 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 The patient has had GVHD  with 2 flares after the original presentation that was with transaminitis and acute lung injury. His other manifestations of chronic GVHD include sclerotic skin changes, mucosal changes in his mouth, xerostomia and polyserositis as reflected by the mild to moderate pericardial effusion that has been documented.   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 Quan, who agrees with the above assessment and plan.    Sharita Oquendo  PGY2   [phone]  _____________________________________________________________________________  Interval History   Overnight patient with increasing O2 requirements. Thought overnight was that patient was shallow breathing due to pain. Pain is improving this morning and pt is using IS.    Physical Exam   BP 92/63  Temp 98.9  F (37.2  C) (Oral)  Resp 22  Wt 122.3 kg (269 lb 10 oz)  SpO2 93%  BMI 31.97 kg/m2  I/O last 3 completed shifts:  In: 342 [P.O.:120; I.V.:222]  Out: 3335 [Urine:2200; Emesis/NG output:100; Chest Tube:1035]  Hemodynamics: CVP 6, PA 87/40, PCW 5, CI 3.2     GENERAL: NAD. Resting in bed comfortably.  HEENT: NCAT. Normal conjunctiva. No scleral icterus.  LUNG: Coarse breath sounds in bilateral bases. No wheezing appreciated.  CV: Regular rate and rhythm. No murmur or rub.   GI: Normoactive bowel sounds. Abdomen soft, non-distended, and non-tender. No palpable masses or organomegaly.  SKIN: Pericardial drain over L chest, site is C/D/I.  MSK: No LE edema.  NEURO: A&O x 3. No focal deficits.    Data   CBC  Recent Labs  Lab 04/26/17  0324 04/25/17  1200   WBC 13.5* 6.9   RBC 5.21 4.66   HGB 14.9 13.3   HCT 46.5 41.1   MCV 89 88   MCH 28.6 28.5   MCHC 32.0 32.4   RDW 18.0* 18.3*    284     CMP  Recent Labs  Lab 04/26/17  0324 04/25/17  1200   NA  142 141   POTASSIUM 4.4 3.9   CHLORIDE 109 111*   CO2 24 23   ANIONGAP 10 7   * 92   BUN 22 22   CR 1.83* 1.61*   GFRESTIMATED 39* 45*   GFRESTBLACK 47* 55*   PANFILO 8.6 9.1   MAG 2.2  --    PROTTOTAL  --  6.6*   ALBUMIN  --  3.8   BILITOTAL  --  0.9   ALKPHOS  --  91   AST  --  23   ALT  --  27     INR  Recent Labs  Lab 04/25/17  1200   INR 1.06     Echocardiogram 4/25/17  Very large pericardial effusion. Early diastolic collapse of the RV free wall.  The IVC is dilated without respiratory variability. Early tamponade.    I personally provided care for this patient, reviewed chart, discussed course with patient, housestaff and consulting physicians.  I answered all questions.    Dk Quan M.D.  Division of Cardiology  Department of Medicine

## 2017-04-26 NOTE — PLAN OF CARE
Problem: Goal Outcome Summary  Goal: Goal Outcome Summary  Outcome: No Change  D: Pericardial effusion  I/A:   Neuro: AO x 4. PERRLA  Resp: Pt was on oxymask throughout the day, O2 delivery ranging 10-12 L with oxygen saturation ranging 88-93%. MD aware of high oxygen demands. ABG drawn this morning, PaO2 64.  CV: Pt was in sinus rhythm today. Blood pressures were within normal limits. Hemodynamic calculations were conducted Q6H per order. PA 85/35;68/30. CVP 5;6. CO 5.5. CI 2.1. Per Cards II, wedge was not performed this shift. Pt's HR ranged 70-90's today. Pt was afebrile. Pt's chest pain improved today throughout the day, oxycodone given for pain x2. Pericardial drain output around 200mL after pt getting up to chair.  GI: Pt continued on regular diet. Poor appetite, fluids encouraged.  : See flowsheet for urine output.  Skin: Pt's fingers were blue this afternoon due to pt's Reynaud's.  Family: Wife has been on unit majority of the day and updated about treatment plan.    P: Continue to monitor hemodynamic calculations per order. Continue to monitor respiratory status closely. Pericardial drain may be discontinued 4/27.

## 2017-04-26 NOTE — PLAN OF CARE
Problem: Goal Outcome Summary  Goal: Goal Outcome Summary  Outcome: No Change  D:  Pt planned admission, arrived to unit 4E at approximately 0900.  Admitted for worsening pericardial effusion.  PMH of AML s/p BMT, scleroderma, raynauds, [id number].  S/P pericardiocentesis and swan placement today.        I/A:   Neuro: Pt alert and oriented.  Uses call light appropriately.  Baseline numbness in feet and toes.  Dilaudid 0.5mg given for chest discomfort post procedure with relief.      CV:  NSR, HR 60-80s.  -110s/60-70s.  Pericardiocentesis drained 1000ml straw colored fluid.  R IJ swan placed.  PA pressure 80/30 with CVP of 6.  SVO2 67.  CO 6.4.  CI 2.5.  Resp:  On NC 1 liter. Sats 93-93%.  Lung sounds clear/diminished in bases.  Chest tube to water seal with no output.      GI/:  Regular diet post procedure.  Pt with good appetite.  +Bowel sounds.  BM prior to arrival on 04/25/2017.  Voids spontaneously with urinal.  Voided x4 good amounts throughout shift.    Skin:  Scars on extremities.  No acute issues.       Plan:  Continue plan of care.  Notify team of changes or concerns.

## 2017-04-26 NOTE — PROGRESS NOTES
Care Coordinator Progress Note     Admission Date/Time:  4/25/2017  Attending MD:  Dk Quan, *     Data  Chart reviewed, discussed with interdisciplinary team.   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      Concerns with insurance coverage for discharge needs: None.  Current Living Situation: Patient lives with spouse.  Support System: Supportive and Involved  Services Involved:  None.  Transportation: Family or Friend will provide  Barriers to Discharge: Pt is not medically ready for d/c.    Assessment  Pt had RHC and swan placement done yesterday.  Pt is in ICU with swan.  I had met pt and spouse yesterday afternoon.  Pt and spouse had asked the team to have some info, diagnosis and the type of procedure he is going to have, be faxed to pt insurance to have approval  CC informed pt and spouse how our process works regarding getting authorization for hospitalization.  CC informed pt and spouse that they don't need to fax any info., our UR department will review the chart and communicate with the insurance company.    CC discussed about pt and spouse request with UR dep. UR dep. stated our Financial clearance dep has already communicated with pt insurance and got the approval.  UR dep will follow up and communicate with pt insurance if pt hospital stay extended more than the approval days.  CC shared the above info with pt spouse.     Plan  Anticipated Discharge Date:  TBD.  Anticipated Discharge Plan:   TBD.  CC will cont to follow plan of care.      Neftali Jose RN, BSN  4A and 4E/ ICU  Care Coordinator  Phone: [phone]  Pager: [phone]

## 2017-04-26 NOTE — PLAN OF CARE
Problem: Goal Outcome Summary  Goal: Goal Outcome Summary  Outcome: Declining  D/I/A:   Neuro: Intact, calls appropriately. Anxious/restless throughout the night. Pt complained of chest pain/difficulty breathing, PRN oxy, dilaudid, tylenol, and toradol given. Pt stated his pain was tolerable after receiving Toradol.   CV: BP stable. HR 80s-90s NSR. EKG and chest x-ray done due to severe chest pain. L pericardial chest tube in place with 35mL out. RIJ swan @ 59. PA pressure 90/40, PAWP 5, CO 8.4, CI 3.2.   Pulm: O2 requirements continued to increase throughout shift. MDs notified. Pt went from needing 1L nasal cannula to 10-12L oxyplus. Currently on 10L oxyplus. O2 sats 88-90%. RR 18-24, with shallow breaths. Lung sounds clear/dim. IS encouraged, pt using IS independently. Pt states he can't take deep breathes due to pain. Switched to home CPAP while asleep, O2 sats did not improve.   GI: Pt had eaten a regular diet for dinner. Had emesis x1 shortly after Dilaudid was given, PRN zofran given and nausea subsided.   : Voided w/ urinal x1 with total of 450mL out.  P: Continue to monitor respiratory status and chest tube output, notify MDs of any changes.

## 2017-04-27 ENCOUNTER — APPOINTMENT (OUTPATIENT)
Dept: GENERAL RADIOLOGY | Facility: CLINIC | Age: 52
DRG: 808 | End: 2017-04-27
Payer: COMMERCIAL

## 2017-04-27 ENCOUNTER — APPOINTMENT (OUTPATIENT)
Dept: GENERAL RADIOLOGY | Facility: CLINIC | Age: 52
DRG: 808 | End: 2017-04-27
Attending: INTERNAL MEDICINE
Payer: COMMERCIAL

## 2017-04-27 ENCOUNTER — APPOINTMENT (OUTPATIENT)
Dept: CT IMAGING | Facility: CLINIC | Age: 52
DRG: 808 | End: 2017-04-27
Payer: COMMERCIAL

## 2017-04-27 ENCOUNTER — APPOINTMENT (OUTPATIENT)
Dept: CARDIOLOGY | Facility: CLINIC | Age: 52
DRG: 808 | End: 2017-04-27
Attending: INTERNAL MEDICINE
Payer: COMMERCIAL

## 2017-04-27 LAB
ADENOSINE DEAMINASE PCAR-CCNC: 4.9 U/L
ANION GAP SERPL CALCULATED.3IONS-SCNC: 9 MMOL/L (ref 3–14)
ANION GAP SERPL CALCULATED.3IONS-SCNC: 9 MMOL/L (ref 3–14)
BASE DEFICIT BLDA-SCNC: NORMAL MMOL/L
BASE DEFICIT BLDA-SCNC: NORMAL MMOL/L
BASE DEFICIT BLDV-SCNC: 0.7 MMOL/L
BASE DEFICIT BLDV-SCNC: 1.6 MMOL/L
BASE DEFICIT BLDV-SCNC: 1.6 MMOL/L
BASE DEFICIT BLDV-SCNC: 2 MMOL/L
BASE DEFICIT BLDV-SCNC: 2.7 MMOL/L
BASE EXCESS BLDA CALC-SCNC: NORMAL MMOL/L
BASE EXCESS BLDA CALC-SCNC: NORMAL MMOL/L
BASOPHILS # BLD AUTO: 0 10E9/L (ref 0–0.2)
BASOPHILS NFR BLD AUTO: 0.1 %
BUN SERPL-MCNC: 26 MG/DL (ref 7–30)
BUN SERPL-MCNC: 27 MG/DL (ref 7–30)
CALCIUM SERPL-MCNC: 8.2 MG/DL (ref 8.5–10.1)
CALCIUM SERPL-MCNC: 8.4 MG/DL (ref 8.5–10.1)
CHLORIDE SERPL-SCNC: 102 MMOL/L (ref 94–109)
CHLORIDE SERPL-SCNC: 102 MMOL/L (ref 94–109)
CO2 SERPL-SCNC: 22 MMOL/L (ref 20–32)
CO2 SERPL-SCNC: 23 MMOL/L (ref 20–32)
COPATH REPORT: NORMAL
COPATH REPORT: NORMAL
CREAT SERPL-MCNC: 1.96 MG/DL (ref 0.66–1.25)
CREAT SERPL-MCNC: 2.09 MG/DL (ref 0.66–1.25)
DIFFERENTIAL METHOD BLD: ABNORMAL
EOSINOPHIL # BLD AUTO: 0.1 10E9/L (ref 0–0.7)
EOSINOPHIL NFR BLD AUTO: 0.8 %
ERYTHROCYTE [DISTWIDTH] IN BLOOD BY AUTOMATED COUNT: 17.4 % (ref 10–15)
GFR SERPL CREATININE-BSD FRML MDRD: 33 ML/MIN/1.7M2
GFR SERPL CREATININE-BSD FRML MDRD: 36 ML/MIN/1.7M2
GLUCOSE SERPL-MCNC: 118 MG/DL (ref 70–99)
GLUCOSE SERPL-MCNC: 162 MG/DL (ref 70–99)
HCO3 BLD-SCNC: NORMAL MMOL/L (ref 21–28)
HCO3 BLD-SCNC: NORMAL MMOL/L (ref 21–28)
HCO3 BLDV-SCNC: 22 MMOL/L (ref 21–28)
HCO3 BLDV-SCNC: 23 MMOL/L (ref 21–28)
HCO3 BLDV-SCNC: 23 MMOL/L (ref 21–28)
HCO3 BLDV-SCNC: 24 MMOL/L (ref 21–28)
HCO3 BLDV-SCNC: 24 MMOL/L (ref 21–28)
HCT VFR BLD AUTO: 40.1 % (ref 40–53)
HGB BLD-MCNC: 13.4 G/DL (ref 13.3–17.7)
IMM GRANULOCYTES # BLD: 0 10E9/L (ref 0–0.4)
IMM GRANULOCYTES NFR BLD: 0.2 %
LACTATE BLD-SCNC: 0.6 MMOL/L (ref 0.7–2.1)
LYMPHOCYTES # BLD AUTO: 2.4 10E9/L (ref 0.8–5.3)
LYMPHOCYTES NFR BLD AUTO: 19.9 %
MAGNESIUM SERPL-MCNC: 2.2 MG/DL (ref 1.6–2.3)
MCH RBC QN AUTO: 29.1 PG (ref 26.5–33)
MCHC RBC AUTO-ENTMCNC: 33.4 G/DL (ref 31.5–36.5)
MCV RBC AUTO: 87 FL (ref 78–100)
MONOCYTES # BLD AUTO: 2.4 10E9/L (ref 0–1.3)
MONOCYTES NFR BLD AUTO: 20 %
NEUTROPHILS # BLD AUTO: 7.2 10E9/L (ref 1.6–8.3)
NEUTROPHILS NFR BLD AUTO: 59 %
NRBC # BLD AUTO: 0 10*3/UL
NRBC BLD AUTO-RTO: 0 /100
O2/TOTAL GAS SETTING VFR VENT: 21 %
O2/TOTAL GAS SETTING VFR VENT: 21 %
O2/TOTAL GAS SETTING VFR VENT: ABNORMAL %
O2/TOTAL GAS SETTING VFR VENT: ABNORMAL %
O2/TOTAL GAS SETTING VFR VENT: NORMAL %
OXYHGB MFR BLD: NORMAL % (ref 92–100)
OXYHGB MFR BLD: NORMAL % (ref 92–100)
OXYHGB MFR BLDV: 53 %
OXYHGB MFR BLDV: 54 %
OXYHGB MFR BLDV: 55 %
OXYHGB MFR BLDV: 56 %
OXYHGB MFR BLDV: 59 %
PCO2 BLD: NORMAL MM HG (ref 35–45)
PCO2 BLD: NORMAL MM HG (ref 35–45)
PCO2 BLDV: 36 MM HG (ref 40–50)
PCO2 BLDV: 38 MM HG (ref 40–50)
PCO2 BLDV: 39 MM HG (ref 40–50)
PCO2 BLDV: 39 MM HG (ref 40–50)
PCO2 BLDV: 40 MM HG (ref 40–50)
PH BLD: NORMAL PH (ref 7.35–7.45)
PH BLD: NORMAL PH (ref 7.35–7.45)
PH BLDV: 7.38 PH (ref 7.32–7.43)
PH BLDV: 7.4 PH (ref 7.32–7.43)
PH BLDV: 7.41 PH (ref 7.32–7.43)
PLATELET # BLD AUTO: 193 10E9/L (ref 150–450)
PLATELET # BLD EST: ABNORMAL 10*3/UL
PO2 BLD: NORMAL MM HG (ref 80–105)
PO2 BLD: NORMAL MM HG (ref 80–105)
PO2 BLDV: 30 MM HG (ref 25–47)
PO2 BLDV: 31 MM HG (ref 25–47)
PO2 BLDV: 32 MM HG (ref 25–47)
PO2 BLDV: 32 MM HG (ref 25–47)
PO2 BLDV: 33 MM HG (ref 25–47)
POTASSIUM SERPL-SCNC: 4.2 MMOL/L (ref 3.4–5.3)
POTASSIUM SERPL-SCNC: 4.4 MMOL/L (ref 3.4–5.3)
RADIOLOGIST FLAGS: NORMAL
RBC # BLD AUTO: 4.6 10E12/L (ref 4.4–5.9)
SODIUM SERPL-SCNC: 133 MMOL/L (ref 133–144)
SODIUM SERPL-SCNC: 134 MMOL/L (ref 133–144)
WBC # BLD AUTO: 12.1 10E9/L (ref 4–11)

## 2017-04-27 PROCEDURE — 40000196 ZZH STATISTIC RAPCV CVP MONITORING

## 2017-04-27 PROCEDURE — 4A133B3 MONITORING OF ARTERIAL PRESSURE, PULMONARY, PERCUTANEOUS APPROACH: ICD-10-PCS | Performed by: INTERNAL MEDICINE

## 2017-04-27 PROCEDURE — 99233 SBSQ HOSP IP/OBS HIGH 50: CPT | Mod: 24 | Performed by: INTERNAL MEDICINE

## 2017-04-27 PROCEDURE — 25000132 ZZH RX MED GY IP 250 OP 250 PS 637: Performed by: STUDENT IN AN ORGANIZED HEALTH CARE EDUCATION/TRAINING PROGRAM

## 2017-04-27 PROCEDURE — 80048 BASIC METABOLIC PNL TOTAL CA: CPT

## 2017-04-27 PROCEDURE — 40000275 ZZH STATISTIC RCP TIME EA 10 MIN

## 2017-04-27 PROCEDURE — 85004 AUTOMATED DIFF WBC COUNT: CPT | Performed by: INTERNAL MEDICINE

## 2017-04-27 PROCEDURE — 25000132 ZZH RX MED GY IP 250 OP 250 PS 637: Performed by: INTERNAL MEDICINE

## 2017-04-27 PROCEDURE — 93308 TTE F-UP OR LMTD: CPT | Mod: 26 | Performed by: INTERNAL MEDICINE

## 2017-04-27 PROCEDURE — 93321 DOPPLER ECHO F-UP/LMTD STD: CPT | Mod: 26 | Performed by: INTERNAL MEDICINE

## 2017-04-27 PROCEDURE — 82805 BLOOD GASES W/O2 SATURATION: CPT | Performed by: INTERNAL MEDICINE

## 2017-04-27 PROCEDURE — 93308 TTE F-UP OR LMTD: CPT

## 2017-04-27 PROCEDURE — 20000004 ZZH R&B ICU UMMC

## 2017-04-27 PROCEDURE — 40000048 ZZH STATISTIC DAILY SWAN MONITORING

## 2017-04-27 PROCEDURE — 71250 CT THORAX DX C-: CPT

## 2017-04-27 PROCEDURE — 0W9D30Z DRAINAGE OF PERICARDIAL CAVITY WITH DRAINAGE DEVICE, PERCUTANEOUS APPROACH: ICD-10-PCS | Performed by: INTERNAL MEDICINE

## 2017-04-27 PROCEDURE — 25000131 ZZH RX MED GY IP 250 OP 636 PS 637: Performed by: STUDENT IN AN ORGANIZED HEALTH CARE EDUCATION/TRAINING PROGRAM

## 2017-04-27 PROCEDURE — 25000125 ZZHC RX 250: Performed by: STUDENT IN AN ORGANIZED HEALTH CARE EDUCATION/TRAINING PROGRAM

## 2017-04-27 PROCEDURE — 85027 COMPLETE CBC AUTOMATED: CPT | Performed by: INTERNAL MEDICINE

## 2017-04-27 PROCEDURE — 83735 ASSAY OF MAGNESIUM: CPT | Performed by: INTERNAL MEDICINE

## 2017-04-27 PROCEDURE — 4A023N6 MEASUREMENT OF CARDIAC SAMPLING AND PRESSURE, RIGHT HEART, PERCUTANEOUS APPROACH: ICD-10-PCS | Performed by: INTERNAL MEDICINE

## 2017-04-27 PROCEDURE — 80048 BASIC METABOLIC PNL TOTAL CA: CPT | Performed by: INTERNAL MEDICINE

## 2017-04-27 PROCEDURE — 40000940 XR CHEST PORT 1 VW

## 2017-04-27 PROCEDURE — 93325 DOPPLER ECHO COLOR FLOW MAPG: CPT | Mod: 26 | Performed by: INTERNAL MEDICINE

## 2017-04-27 PROCEDURE — 02HQ32Z INSERTION OF MONITORING DEVICE INTO RIGHT PULMONARY ARTERY, PERCUTANEOUS APPROACH: ICD-10-PCS | Performed by: INTERNAL MEDICINE

## 2017-04-27 PROCEDURE — 25000128 H RX IP 250 OP 636: Performed by: STUDENT IN AN ORGANIZED HEALTH CARE EDUCATION/TRAINING PROGRAM

## 2017-04-27 PROCEDURE — 71010 XR CHEST PORT 1 VW: CPT

## 2017-04-27 PROCEDURE — 4A1239Z MONITORING OF CARDIAC OUTPUT, PERCUTANEOUS APPROACH: ICD-10-PCS | Performed by: INTERNAL MEDICINE

## 2017-04-27 PROCEDURE — 83605 ASSAY OF LACTIC ACID: CPT

## 2017-04-27 RX ADMIN — VALGANCICLOVIR HYDROCHLORIDE 450 MG: 450 TABLET, FILM COATED ORAL at 23:00

## 2017-04-27 RX ADMIN — VITAMIN D, TAB 1000IU (100/BT) 2000 UNITS: 25 TAB at 13:44

## 2017-04-27 RX ADMIN — CLONAZEPAM 1 MG: 1 TABLET ORAL at 22:56

## 2017-04-27 RX ADMIN — VITAMIN D, TAB 1000IU (100/BT) 2000 UNITS: 25 TAB at 20:46

## 2017-04-27 RX ADMIN — FLUTICASONE PROPIONATE 2 PUFF: 220 AEROSOL, METERED RESPIRATORY (INHALATION) at 08:17

## 2017-04-27 RX ADMIN — CALCIUM 1250 MG: 500 TABLET ORAL at 20:46

## 2017-04-27 RX ADMIN — CALCIUM 1250 MG: 500 TABLET ORAL at 08:16

## 2017-04-27 RX ADMIN — URSODIOL 300 MG: 300 CAPSULE ORAL at 08:16

## 2017-04-27 RX ADMIN — URSODIOL 300 MG: 300 CAPSULE ORAL at 13:44

## 2017-04-27 RX ADMIN — HEPARIN SODIUM 5000 UNITS: 5000 INJECTION, SOLUTION INTRAVENOUS; SUBCUTANEOUS at 10:16

## 2017-04-27 RX ADMIN — METOPROLOL SUCCINATE 25 MG: 25 TABLET, EXTENDED RELEASE ORAL at 08:16

## 2017-04-27 RX ADMIN — MAGNESIUM OXIDE TAB 400 MG (241.3 MG ELEMENTAL MG) 800 MG: 400 (241.3 MG) TAB at 08:16

## 2017-04-27 RX ADMIN — ATOVAQUONE 1500 MG: 750 SUSPENSION ORAL at 08:18

## 2017-04-27 RX ADMIN — FLUCONAZOLE 200 MG: 200 TABLET ORAL at 08:16

## 2017-04-27 RX ADMIN — PENICILLIN V POTASSIUM 500 MG: 500 TABLET, FILM COATED ORAL at 16:21

## 2017-04-27 RX ADMIN — VITAMIN D, TAB 1000IU (100/BT) 2000 UNITS: 25 TAB at 08:16

## 2017-04-27 RX ADMIN — URSODIOL 300 MG: 300 CAPSULE ORAL at 20:46

## 2017-04-27 RX ADMIN — PENICILLIN V POTASSIUM 500 MG: 500 TABLET, FILM COATED ORAL at 08:18

## 2017-04-27 RX ADMIN — PANTOPRAZOLE SODIUM 40 MG: 40 TABLET, DELAYED RELEASE ORAL at 08:16

## 2017-04-27 RX ADMIN — HEPARIN SODIUM 5000 UNITS: 5000 INJECTION, SOLUTION INTRAVENOUS; SUBCUTANEOUS at 02:06

## 2017-04-27 RX ADMIN — PREDNISONE 5 MG: 5 TABLET ORAL at 09:18

## 2017-04-27 RX ADMIN — TADALAFIL 10 MG: 10 TABLET, FILM COATED ORAL at 08:16

## 2017-04-27 RX ADMIN — SIROLIMUS 0.5 MG: 0.5 TABLET, SUGAR COATED ORAL at 08:16

## 2017-04-27 RX ADMIN — ROSUVASTATIN CALCIUM 5 MG: 5 TABLET ORAL at 08:18

## 2017-04-27 RX ADMIN — FLUTICASONE PROPIONATE 2 PUFF: 220 AEROSOL, METERED RESPIRATORY (INHALATION) at 20:47

## 2017-04-27 RX ADMIN — NIFEDIPINE 30 MG: 30 TABLET, FILM COATED, EXTENDED RELEASE ORAL at 08:18

## 2017-04-27 RX ADMIN — HEPARIN SODIUM 5000 UNITS: 5000 INJECTION, SOLUTION INTRAVENOUS; SUBCUTANEOUS at 18:13

## 2017-04-27 NOTE — PROGRESS NOTES
Patient continued to be on 12 L Oxymask with SpO2 88-91%. Patient does not report shortness of breath, and MDs are aware of his SpO2 and ABG on 12 L. Notified Cards II fellow of low CI (1.7) and ordered to recheck a Carter at 0500. Last CI was 2.1 and physician was updated. PA 75-85/20s-30s, CVP 5,14,9. SvO2 55-56, -1280. Chest tube to water seal, 60-80 cc/hr of serosang drainage while in chair. Received 5 mg Oxycodone for chest discomfort. Continue to monitor patient closely and update MDs as needed.

## 2017-04-27 NOTE — PLAN OF CARE
"Problem: Goal Outcome Summary  Goal: Goal Outcome Summary  Outcome: No Change  Neuro: AOX4, denies pain.   Respiratory: pt initially on 12lpm oxy plus mask this AM, at 1600 transitioned to 3lpm NC, per MD Avelina in room. Pt, tolerating well. SPO2 probe changed from finger to ear. L pericardial chest tube in place, patent. Increase output noted with activity. CT completed this afternoon.   Cardiac: BP and HR stable, Tmax 100.3, MD notified. [place] this AM advanced per Cardiology fellow from 58 to 76. Repeat chest X-ray completed. At 1420 noted pt to have a flat PA wave form. MD notified, in to assess. Adjusted SWAN to 68, repeat X-ray completed. Per MD, Templeton was \"coiled.\" MD in room at 1455 and removed SWAN. Cortis in place for CVP monitoring. ALISHA score completed per order, MD aware of results and high CVP.   GI: regular diet, no stool.   : adequate UOP.   Plan: continue to monitor and notify MD of any changes or concerns.       "

## 2017-04-27 NOTE — PROGRESS NOTES
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     #Large pericardial effusion w/ early tamponade   Patient has known mild to moderate pericardial effusion. Effusion is very large on most recent echocardiogram (4/25/17) with early diastolic collapse of RV free wall concerning for early tamponade.  RHC 4/25/17: RA 15/13/12, /16, /73/60, PCW 15/20/16, PA sat 71.8%, CI 3.0, PVR 5.6  - s/p drainage 4/25 with catheter left in place  - Cell count, gram stain, cultures, AFB stain and culture, and adenosine deaminase pending  - Bronson in place with q6h hemodynamics  - Repeat echo to assess pericardial effusion    #Acute hypoxic respiratory failure  Post procedure patient with increasing O2 requirements up to 10L oxymask. No evidence of pneumothorax on CXR. Pt with significant pain post procedure and taking shallow breaths. Most likely etiology is post-procedure.  - Although ketorolac was helpful with pain control, pt has hx of GIB so will hold at this time. 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 The patient has had GVHD  with 2 flares after the original presentation that was with transaminitis and acute lung injury. His other manifestations of chronic GVHD include sclerotic skin changes, mucosal changes in his mouth, xerostomia and polyserositis as reflected by the mild to moderate pericardial effusion that has been documented.   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 Quan, who agrees with the above assessment and plan.    Luis Daniel Banks MD  Cardiovascular Disease Fellow  Pager: [phone]    _____________________________________________________________________________  Interval History   - Unchanged O2 requirements. T  - Continues to have increased out put from pericardial drain when he stands up         Physical Exam   /65  Temp 100.3  F (37.9  C) (Oral)  Resp 25  Wt 122.3 kg (269 lb 10 oz)  SpO2 94%  BMI 31.97 kg/m2  I/O last 3 completed shifts:  In: 1578 [P.O.:1440; I.V.:138]  Out: 3077 [Urine:2175; Chest Tube:902]  Hemodynamics: CVP 6, PA 87/40, PCW 5, CI 3.2     GENERAL: NAD. Resting in bed comfortably.  HEENT: NCAT. Normal conjunctiva. No scleral icterus.  LUNG: Coarse breath sounds in bilateral bases. No wheezing appreciated.  CV: Regular rate and rhythm. No murmur or rub.+pericardial drain noted                                  GI: Normoactive bowel sounds. Abdomen soft, non-distended, and non-tender. No palpable masses or organomegaly.  SKIN: Pericardial drain over L chest, site is C/D/I.  MSK: No LE edema.  NEURO: A&O x 3. No focal deficits.    Data   CBC    Recent Labs  Lab 04/27/17  0500 04/26/17  0324 04/25/17  1200   WBC 12.1* 13.5* 6.9   RBC 4.60 5.21 4.66   HGB 13.4 14.9 13.3   HCT 40.1 46.5 41.1   MCV 87 89 88   MCH 29.1 28.6 28.5   MCHC 33.4 32.0 32.4   RDW 17.4* 18.0* 18.3*     273 284     CMP    Recent Labs  Lab 04/27/17  1349 04/27/17  0500 04/26/17  0324 04/25/17  1200    133 142 141   POTASSIUM 4.4 4.2 4.4 3.9   CHLORIDE 102 102 109 111*   CO2 23 22 24 23   ANIONGAP 9 9 10 7   * 118* 155* 92   BUN 26 27 22 22   CR 1.96* 2.09* 1.83* 1.61*   GFRESTIMATED 36* 33* 39* 45*   GFRESTBLACK 44* 40* 47* 55*   PANFILO 8.4* 8.2* 8.6 9.1   MAG  --  2.2 2.2  --    PROTTOTAL  --   --   --  6.6*   ALBUMIN  --   --   --  3.8   BILITOTAL  --   --   --  0.9   ALKPHOS  --   --   --  91   AST  --   --   --  23   ALT  --   --   --  27     INR    Recent Labs  Lab 04/25/17  1200   INR 1.06       Critical Care  I personally provided care for this patient, reviewed chart, discussed course with patient, housestaff and consulting physicians.  I answered all questions.  The patient has history of BMT for AML subsequently complicated by scleroderma form of GVH.  He remains on immunosuppression with rapamycin and prednisone.  Large pericardial effusions have been reported in patients with GVH in < 2% of cases, none this late out.  We have drained the majority of fluid though left posterior lateral fluid remains and is unlikely to be fully accessed via current pericardial tube.   He oxygen saturations are low as a combination of reduced inspiratory volumes, bilateral pleural effusions, and inaccurate readings from peripheral pulse oximetry and scleroderma.  We discussed augmented GVH disease as well as anti-inflammatory medications - increase steroids, add mycophenolate, re-assess PAH hemodynamics.  Tube out tomorrow,  SG removed today.  CT reviewed demonstrating marked reduction in effusion and small bilateral effusions.      Current Facility-Administered Medications   Medication     calcium carbonate (OS-PANFILO 500 mg [place]) tablet 1,250 mg     heparin sodium PF injection 5,000 Units     lidocaine 1 % 1 mL     lidocaine (LMX4) kit     sodium chloride (PF) 0.9% PF flush 3 mL     sodium chloride  (PF) 0.9% PF flush 3 mL     medication instruction     acetaminophen (TYLENOL) tablet 650 mg     acetaminophen (TYLENOL) Suppository 650 mg     atovaquone (MEPRON) suspension 1,500 mg     clonazePAM (klonoPIN) tablet 1 mg     fluconazole (DIFLUCAN) tablet 200 mg     fluticasone (FLOVENT HFA) 220 MCG/ACT Inhaler 2 puff     magnesium oxide (MAG-OX) tablet 800 mg     metoprolol (TOPROL-XL) 24 hr tablet 25 mg     NIFEdipine ER osmotic (PROCARDIA XL) 24 hr tablet 30 mg     pantoprazole (PROTONIX) EC tablet 40 mg     penicillin V potassium (VEETID) tablet 500 mg     predniSONE (DELTASONE) tablet 5 mg     rosuvastatin (CRESTOR) tablet 5 mg     tadalafil (CIALIS/ADCIRCA) tablet     ursodiol (ACTIGALL) capsule 300 mg     valGANciclovir (VALCYTE) tablet 450 mg     cholecalciferol (vitamin D) tablet 2,000 Units     sirolimus (RAPAMUNE BRAND) tablet 0.5 mg     oxyCODONE (ROXICODONE) IR tablet 5 mg     naloxone (NARCAN) injection 0.1-0.4 mg     HYDROmorphone (PF) (DILAUDID) injection 0.5 mg     ondansetron (ZOFRAN) injection 4 mg     45 minutes critical care assessing hemodynamics, body repositioning to facilitate drainage, review of echocardiogram  45 minute discussion with patient and family.        Dk Quan M.D.  Division of Cardiology  Department of Medicine

## 2017-04-28 ENCOUNTER — APPOINTMENT (OUTPATIENT)
Dept: GENERAL RADIOLOGY | Facility: CLINIC | Age: 52
DRG: 808 | End: 2017-04-28
Attending: INTERNAL MEDICINE
Payer: COMMERCIAL

## 2017-04-28 LAB
ACID FAST STN SPEC QL: NORMAL
ANION GAP SERPL CALCULATED.3IONS-SCNC: 8 MMOL/L (ref 3–14)
BASE DEFICIT BLDV-SCNC: 0.9 MMOL/L
BASE DEFICIT BLDV-SCNC: 1.1 MMOL/L
BASE DEFICIT BLDV-SCNC: 1.2 MMOL/L
BASE DEFICIT BLDV-SCNC: 2.4 MMOL/L
BUN SERPL-MCNC: 28 MG/DL (ref 7–30)
CALCIUM SERPL-MCNC: 8.7 MG/DL (ref 8.5–10.1)
CHLORIDE SERPL-SCNC: 103 MMOL/L (ref 94–109)
CO2 SERPL-SCNC: 23 MMOL/L (ref 20–32)
COPATH REPORT: NORMAL
CREAT SERPL-MCNC: 2.03 MG/DL (ref 0.66–1.25)
ERYTHROCYTE [DISTWIDTH] IN BLOOD BY AUTOMATED COUNT: 17 % (ref 10–15)
GFR SERPL CREATININE-BSD FRML MDRD: 35 ML/MIN/1.7M2
GLUCOSE BLDC GLUCOMTR-MCNC: 227 MG/DL (ref 70–99)
GLUCOSE BLDC GLUCOMTR-MCNC: 251 MG/DL (ref 70–99)
GLUCOSE BLDC GLUCOMTR-MCNC: 288 MG/DL (ref 70–99)
GLUCOSE SERPL-MCNC: 143 MG/DL (ref 70–99)
HCO3 BLDV-SCNC: 22 MMOL/L (ref 21–28)
HCO3 BLDV-SCNC: 23 MMOL/L (ref 21–28)
HCO3 BLDV-SCNC: 24 MMOL/L (ref 21–28)
HCO3 BLDV-SCNC: 24 MMOL/L (ref 21–28)
HCT VFR BLD AUTO: 37.5 % (ref 40–53)
HGB BLD-MCNC: 12.6 G/DL (ref 13.3–17.7)
MAGNESIUM SERPL-MCNC: 2.3 MG/DL (ref 1.6–2.3)
MCH RBC QN AUTO: 29.1 PG (ref 26.5–33)
MCHC RBC AUTO-ENTMCNC: 33.6 G/DL (ref 31.5–36.5)
MCV RBC AUTO: 87 FL (ref 78–100)
MICRO REPORT STATUS: NORMAL
O2/TOTAL GAS SETTING VFR VENT: ABNORMAL %
OXYHGB MFR BLDV: 52 %
OXYHGB MFR BLDV: 58 %
OXYHGB MFR BLDV: 59 %
OXYHGB MFR BLDV: 60 %
PCO2 BLDV: 36 MM HG (ref 40–50)
PCO2 BLDV: 36 MM HG (ref 40–50)
PCO2 BLDV: 39 MM HG (ref 40–50)
PCO2 BLDV: 39 MM HG (ref 40–50)
PH BLDV: 7.39 PH (ref 7.32–7.43)
PH BLDV: 7.39 PH (ref 7.32–7.43)
PH BLDV: 7.4 PH (ref 7.32–7.43)
PH BLDV: 7.42 PH (ref 7.32–7.43)
PLATELET # BLD AUTO: 182 10E9/L (ref 150–450)
PO2 BLDV: 30 MM HG (ref 25–47)
PO2 BLDV: 33 MM HG (ref 25–47)
PO2 BLDV: 34 MM HG (ref 25–47)
PO2 BLDV: 35 MM HG (ref 25–47)
POTASSIUM SERPL-SCNC: 4.3 MMOL/L (ref 3.4–5.3)
RBC # BLD AUTO: 4.33 10E12/L (ref 4.4–5.9)
SODIUM SERPL-SCNC: 134 MMOL/L (ref 133–144)
SPECIMEN SOURCE: NORMAL
WBC # BLD AUTO: 10.7 10E9/L (ref 4–11)

## 2017-04-28 PROCEDURE — 71010 XR CHEST PORT 1 VW: CPT

## 2017-04-28 PROCEDURE — 25000128 H RX IP 250 OP 636: Performed by: STUDENT IN AN ORGANIZED HEALTH CARE EDUCATION/TRAINING PROGRAM

## 2017-04-28 PROCEDURE — 82805 BLOOD GASES W/O2 SATURATION: CPT | Performed by: INTERNAL MEDICINE

## 2017-04-28 PROCEDURE — 85027 COMPLETE CBC AUTOMATED: CPT | Performed by: INTERNAL MEDICINE

## 2017-04-28 PROCEDURE — 82805 BLOOD GASES W/O2 SATURATION: CPT

## 2017-04-28 PROCEDURE — 00000146 ZZHCL STATISTIC GLUCOSE BY METER IP

## 2017-04-28 PROCEDURE — 40000141 ZZH STATISTIC PERIPHERAL IV START W/O US GUIDANCE

## 2017-04-28 PROCEDURE — 25000131 ZZH RX MED GY IP 250 OP 636 PS 637: Performed by: STUDENT IN AN ORGANIZED HEALTH CARE EDUCATION/TRAINING PROGRAM

## 2017-04-28 PROCEDURE — 25000132 ZZH RX MED GY IP 250 OP 250 PS 637: Performed by: INTERNAL MEDICINE

## 2017-04-28 PROCEDURE — 86038 ANTINUCLEAR ANTIBODIES: CPT | Performed by: INTERNAL MEDICINE

## 2017-04-28 PROCEDURE — 40000196 ZZH STATISTIC RAPCV CVP MONITORING

## 2017-04-28 PROCEDURE — 83735 ASSAY OF MAGNESIUM: CPT | Performed by: INTERNAL MEDICINE

## 2017-04-28 PROCEDURE — 25000132 ZZH RX MED GY IP 250 OP 250 PS 637: Performed by: STUDENT IN AN ORGANIZED HEALTH CARE EDUCATION/TRAINING PROGRAM

## 2017-04-28 PROCEDURE — 20000004 ZZH R&B ICU UMMC

## 2017-04-28 PROCEDURE — 99233 SBSQ HOSP IP/OBS HIGH 50: CPT | Mod: 24 | Performed by: INTERNAL MEDICINE

## 2017-04-28 PROCEDURE — 80048 BASIC METABOLIC PNL TOTAL CA: CPT | Performed by: INTERNAL MEDICINE

## 2017-04-28 RX ORDER — METHYLPREDNISOLONE SODIUM SUCCINATE 125 MG/2ML
125 INJECTION, POWDER, LYOPHILIZED, FOR SOLUTION INTRAMUSCULAR; INTRAVENOUS ONCE
Status: COMPLETED | OUTPATIENT
Start: 2017-04-28 | End: 2017-04-28

## 2017-04-28 RX ORDER — NICOTINE POLACRILEX 4 MG
15-30 LOZENGE BUCCAL
Status: DISCONTINUED | OUTPATIENT
Start: 2017-04-28 | End: 2017-05-01 | Stop reason: HOSPADM

## 2017-04-28 RX ORDER — DEXTROSE MONOHYDRATE 25 G/50ML
25-50 INJECTION, SOLUTION INTRAVENOUS
Status: DISCONTINUED | OUTPATIENT
Start: 2017-04-28 | End: 2017-05-01 | Stop reason: HOSPADM

## 2017-04-28 RX ADMIN — METHYLPREDNISOLONE SODIUM SUCCINATE 125 MG: 125 INJECTION, POWDER, LYOPHILIZED, FOR SOLUTION INTRAMUSCULAR; INTRAVENOUS at 11:39

## 2017-04-28 RX ADMIN — PENICILLIN V POTASSIUM 500 MG: 500 TABLET, FILM COATED ORAL at 09:04

## 2017-04-28 RX ADMIN — NIFEDIPINE 30 MG: 30 TABLET, FILM COATED, EXTENDED RELEASE ORAL at 09:03

## 2017-04-28 RX ADMIN — URSODIOL 300 MG: 300 CAPSULE ORAL at 13:45

## 2017-04-28 RX ADMIN — CALCIUM 1250 MG: 500 TABLET ORAL at 20:14

## 2017-04-28 RX ADMIN — FLUTICASONE PROPIONATE 2 PUFF: 220 AEROSOL, METERED RESPIRATORY (INHALATION) at 09:03

## 2017-04-28 RX ADMIN — URSODIOL 300 MG: 300 CAPSULE ORAL at 20:14

## 2017-04-28 RX ADMIN — MAGNESIUM OXIDE TAB 400 MG (241.3 MG ELEMENTAL MG) 800 MG: 400 (241.3 MG) TAB at 09:01

## 2017-04-28 RX ADMIN — VITAMIN D, TAB 1000IU (100/BT) 2000 UNITS: 25 TAB at 20:14

## 2017-04-28 RX ADMIN — TADALAFIL 10 MG: 10 TABLET, FILM COATED ORAL at 09:02

## 2017-04-28 RX ADMIN — URSODIOL 300 MG: 300 CAPSULE ORAL at 09:02

## 2017-04-28 RX ADMIN — CLONAZEPAM 1 MG: 1 TABLET ORAL at 22:30

## 2017-04-28 RX ADMIN — ACETAMINOPHEN 650 MG: 325 TABLET, FILM COATED ORAL at 05:35

## 2017-04-28 RX ADMIN — ROSUVASTATIN CALCIUM 5 MG: 5 TABLET ORAL at 09:04

## 2017-04-28 RX ADMIN — HEPARIN SODIUM 5000 UNITS: 5000 INJECTION, SOLUTION INTRAVENOUS; SUBCUTANEOUS at 18:08

## 2017-04-28 RX ADMIN — VITAMIN D, TAB 1000IU (100/BT) 2000 UNITS: 25 TAB at 13:45

## 2017-04-28 RX ADMIN — FLUCONAZOLE 200 MG: 200 TABLET ORAL at 09:02

## 2017-04-28 RX ADMIN — CALCIUM 1250 MG: 500 TABLET ORAL at 09:01

## 2017-04-28 RX ADMIN — METOPROLOL SUCCINATE 25 MG: 25 TABLET, EXTENDED RELEASE ORAL at 09:02

## 2017-04-28 RX ADMIN — VITAMIN D, TAB 1000IU (100/BT) 2000 UNITS: 25 TAB at 09:02

## 2017-04-28 RX ADMIN — FLUTICASONE PROPIONATE 2 PUFF: 220 AEROSOL, METERED RESPIRATORY (INHALATION) at 20:15

## 2017-04-28 RX ADMIN — OXYCODONE HYDROCHLORIDE 5 MG: 5 TABLET ORAL at 05:35

## 2017-04-28 RX ADMIN — HEPARIN SODIUM 5000 UNITS: 5000 INJECTION, SOLUTION INTRAVENOUS; SUBCUTANEOUS at 10:23

## 2017-04-28 RX ADMIN — PANTOPRAZOLE SODIUM 40 MG: 40 TABLET, DELAYED RELEASE ORAL at 09:01

## 2017-04-28 RX ADMIN — INSULIN ASPART 2 UNITS: 100 INJECTION, SOLUTION INTRAVENOUS; SUBCUTANEOUS at 16:32

## 2017-04-28 RX ADMIN — PENICILLIN V POTASSIUM 500 MG: 500 TABLET, FILM COATED ORAL at 15:46

## 2017-04-28 RX ADMIN — ATOVAQUONE 1500 MG: 750 SUSPENSION ORAL at 09:01

## 2017-04-28 RX ADMIN — SIROLIMUS 0.5 MG: 0.5 TABLET, SUGAR COATED ORAL at 09:02

## 2017-04-28 RX ADMIN — HEPARIN SODIUM 5000 UNITS: 5000 INJECTION, SOLUTION INTRAVENOUS; SUBCUTANEOUS at 03:36

## 2017-04-28 ASSESSMENT — PAIN DESCRIPTION - DESCRIPTORS: DESCRIPTORS: BURNING

## 2017-04-28 NOTE — PLAN OF CARE
Problem: Goal Outcome Summary  Goal: Goal Outcome Summary  Neuro intact, numbness baseline to toes, denies dizziness when standing     CV: approx 0515 pt c/o worsened left chest pain, O2 sats 83-85%, heart sounds regular, lungs clear, pt repositioned supine left and attempt supine right (worsening pain when turned to right)/stood/sat at edge of bed, pericardial drain continues to drain a small amount of serous fluid, cards 2 fellow at bedside, O2 sats improved to 92% and pain relieved when sitting at edge of bed and leaning forward. Pain worsened when taking deep breaths. BP stable, NSR. Afebrile, dependent edema. CVP 12-14, vanessa's q 6.     Pulm: 3-6L oxymask overnight, sats 87-90 (92% at best), venous oxyhemoglobin 54-58. Lungs clear.     GI: BM x 1, regular diet     : voids in urinal     Skin: intact     Wife at bedside ~2200, pt and wife updated by cards 2 fellow.

## 2017-04-28 NOTE — PROGRESS NOTES
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     Plan today:  - 1 mg/kg solumedrol today for pericardial effusion likely secondary to GVHD  - Discontinue sirolimus as potential to cause pericardial effusion given known cause of pleural effusion  - Formal BMT consult  - Transplant ID - current prophylaxis is appropriate for chronic GVHD and high dose steroids  - Remove pericardial drain this afternoon    #Large pericardial effusion w/ early tamponade   Patient has known mild to moderate pericardial effusion. Effusion is very large on most recent echocardiogram (4/25/17) with early diastolic collapse of RV free wall concerning for early tamponade.  RHC 4/25/17: RA 15/13/12, /16, /73/60, PCW 15/20/16, PA sat 71.8%, CI 3.0, PVR 5.6  - s/p drainage 4/25 with catheter left in place. LDH fluid 255, 218 serum, consistent with exudative effusion.  - Cell count 901 WBC 89% other cells, gram stain with rare WBC, cultures NGTD, AFB stain negative and culture in progress, and adenosine deaminase negative. Cytology negative.  - [place] removed 4/27, cortis remains in place  - Will discuss case with BMT and whether or not patient would benefit from increased immunosuppression if effusion is secondary to GVHD.    #Acute hypoxic respiratory failure  Post procedure patient with increasing O2 requirements up to 10L oxymask. No evidence of pneumothorax on CXR. Pt with significant pain post procedure and taking shallow breaths. Most likely etiology is post-procedure atelectasis.  - Although ketorolac was helpful with pain control, pt has hx of GIB so will hold at this time. 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 The patient has had GVHD  with 2 flares after the original presentation that was with transaminitis and acute lung injury. His other manifestations of chronic GVHD include sclerotic skin changes, mucosal changes in his mouth, xerostomia and polyserositis as reflected by the mild to moderate pericardial effusion that has been documented.   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 Quan, who agrees with the above assessment and plan.    Sharita Oquendo  PGY2   [phone]  _____________________________________________________________________________  Interval History   Overnight patient with an episode of worsened chest pain but otherwise feels it is improved.     Physical Exam   BP 94/60  Temp 98.1  F (36.7  C) (Oral)  Resp 14  Wt 122.3 kg (269 lb 10 oz)  SpO2 92%  BMI 31.97 kg/m2  I/O last 3 completed shifts:  In: 2276 [P.O.:2210; I.V.:66]  Out: 3795 [Urine:3125; Chest Tube:670]  Hemodynamics: CVP 6, PA 87/40, PCW 5, CI 3.2     GENERAL: NAD. Resting in bed comfortably.  HEENT: NCAT. Normal conjunctiva. No scleral icterus.  LUNG: Coarse breath sounds in bilateral bases. No wheezing  appreciated.  CV: Regular rate and rhythm. No murmur or rub.   GI: Normoactive bowel sounds. Abdomen soft, non-distended, and non-tender. No palpable masses or organomegaly.  SKIN: Pericardial drain over L chest, site is C/D/I.  MSK: No LE edema.  NEURO: A&O x 3. No focal deficits.    Data   CBC    Recent Labs  Lab 04/28/17  0134 04/27/17  0500 04/26/17  0324 04/25/17  1200   WBC 10.7 12.1* 13.5* 6.9   RBC 4.33* 4.60 5.21 4.66   HGB 12.6* 13.4 14.9 13.3   HCT 37.5* 40.1 46.5 41.1   MCV 87 87 89 88   MCH 29.1 29.1 28.6 28.5   MCHC 33.6 33.4 32.0 32.4   RDW 17.0* 17.4* 18.0* 18.3*    193 273 284     CMP    Recent Labs  Lab 04/28/17  0134 04/27/17  1349 04/27/17  0500 04/26/17  0324 04/25/17  1200    134 133 142 141   POTASSIUM 4.3 4.4 4.2 4.4 3.9   CHLORIDE 103 102 102 109 111*   CO2 23 23 22 24 23   ANIONGAP 8 9 9 10 7   * 162* 118* 155* 92   BUN 28 26 27 22 22   CR 2.03* 1.96* 2.09* 1.83* 1.61*   GFRESTIMATED 35* 36* 33* 39* 45*   GFRESTBLACK 42* 44* 40* 47* 55*   PANFILO 8.7 8.4* 8.2* 8.6 9.1   MAG 2.3  --  2.2 2.2  --    PROTTOTAL  --   --   --   --  6.6*   ALBUMIN  --   --   --   --  3.8   BILITOTAL  --   --   --   --  0.9   ALKPHOS  --   --   --   --  91   AST  --   --   --   --  23   ALT  --   --   --   --  27     INR    Recent Labs  Lab 04/25/17  1200   INR 1.06     Echocardiogram 4/25/17  Very large pericardial effusion. Early diastolic collapse of the RV free wall.  The IVC is dilated without respiratory variability. Early tamponade.    Critical Care ICU Note - Cardiology  Dk Quan M.D.    Impression:  BMT  GVH  Large pericardial effusion  Percutaneous pericardial drainage    The patient remains unstable in the ICU with parenteral medications for the adjustment of blood pressure and cardiac output and maintenance of renal function and presence of pericardial drainage tube.    I personally reviewed      Hemodynamic parameters obtained by central hemodynamic monitoring including RAP,  estimated LVEDP, pulmonary artery pressure, cardiac output and vascular resistances in order to adjust fluids and infused medications for blood pressure and cardiac output maintenance.    Volume status, renal function and nutritional support as judged by intake, output, daily weight and appropriate laboratories.    I reviewed individual and serial imaging studies including echocardiogram, chest x-ray, CT scan    I personally supervised the prescription of parenteral fluids, inotropes, vasodilators , and vasopressors in order to correct cardiac output, maintain urine output and renal function.    I personall discussed [facility] pateints condition with the patient or family designated decision maker in order to discuss current and on-going diagnostic and therapeutic options.    I personally removed the pericardial drainage tube without incidence    I

## 2017-04-28 NOTE — PROGRESS NOTES
CLINICAL NUTRITION SERVICES    Reason for Assessment:  Patient/family request for nutrition education regarding steroid induced hyperglycemia.    Diet History:  Patient reports no history of receiving nutrition education in the past. Patient/family reports being on prednisone before and experiencing high BG levels and increased appetite and having to adjust diet/CHO intake + use insulin for BG control.      Nutrition Diagnosis:  Food- and nutrition-related knowledge deficit related to no previous knowledge of consistent CHO diet as evidenced by patient report of no previous formal consistent CHO nutrition education.    Nutrition Prescription/Recs:  Continue regular diet, encourage following a consistent CHO diet which would allow for 2500 kcal (requirement for weight maintenance, will need lower kcal intake for weight loss) which would allow 6-9 CHO units/meal.     Interventions:  Nutrition Education- Provided verbal and written instruction on importance of following a consistent CHO diet. Discussed kcal/CHO goals and reviewed sources of CHO as well as low/no CHO sources.     Goals:    1.  Patient will verbalize CHO containing food groups.  2.  Patient will consume around 6 CHO units/meal.     Follow-up:   Patient to ask any further nutrition-related questions before discharge.  In addition, patient may request outpatient RD appointment.      Mary Lee RD, LD (pager [phone])

## 2017-04-28 NOTE — CONSULTS
"Social Work: Assessment with Discharge Plan    Patient Name:  Olivier Gómez  :  1965  Age:  51 year old  MRN:  [id number]  Risk/Complexity Score:  Filed Complexity Screen Score: 5  Completed assessment with:  Patient, pt's wife Danielle, team rounds, chart review    Presenting Information   Reason for Referral:  Length of stay.  Date of Intake:  2017  Referral Source:  Chart Review  Decision Maker:  self  Alternate Decision Maker:  Wife Danielle Gómez ([phone], [phone])  Health Care Directive:  Not on file  Living Situation:  Pt lives with his wife in a house in [place], [place]  Previous Functional Status:  Independent  Patient and family understanding of hospitalization:  restorative  Cultural/Language/Spiritual Considerations:  Gnosticist  Adjustment to Illness:  It is difficult to assess pt as he did not appear to be forthcoming or interested in speaking with SW.  Pt's wife states that pt wants to \"get out of here.\"  Pt's wife appears to be adjusting appropriately.    Physical Health  Reason for Admission:  No diagnosis found.  Services Needed/Recommended:  Likely home with no needs.    Mental Health/Chemical Dependency  Diagnosis:  None.  Support/Services in Place:  N/a  Services Needed/Recommended:  N/a    Support System  Significant relationship at present time:  Wife Danielle who is present with pt in hospital and appears to be very involved and supportive.  Family of origin is available for support:  Unable to determine today d/t pt not appearing to be interested in speaking with SW.  Other support available:  unclear  Gaps in support system:  none  Patient is caregiver to:  None     Provider Information   Primary Care Physician:  Adelso Delgado   [phone]   Clinic:  [facility] [address] [facility] [address] / [place] [facility]*      :  none    Financial   Income Source:  Pt works as a malpractice .  Financial Concerns:  None.  Insurance:    Payor/Plan Subscriber Name Rel " Member # Group #   [employer] - P1 OTH* BENEDICT ESTES  [id number]       [id number]       Discharge Plan   Patient and family discharge goal:  Home  Barriers to discharge:  Medical stability, continued work-up and treatment    Discharge Recommendations   Anticipated Disposition:  Home, no needs identified    Additional comments   SW met with pt and his wife Danielle to introduce SW role and offer support.  Pt and Danielle deny having any SW needs, but are aware of SW availability should needs arise.    HARRY Lucia, [facility]  Adult ICU Clinical   Pager [phone]

## 2017-04-29 ENCOUNTER — APPOINTMENT (OUTPATIENT)
Dept: GENERAL RADIOLOGY | Facility: CLINIC | Age: 52
DRG: 808 | End: 2017-04-29
Attending: INTERNAL MEDICINE
Payer: COMMERCIAL

## 2017-04-29 PROBLEM — E09.9 STEROID-INDUCED DIABETES MELLITUS (H): Status: ACTIVE | Noted: 2017-04-29

## 2017-04-29 PROBLEM — T38.0X5A STEROID-INDUCED DIABETES MELLITUS (H): Status: ACTIVE | Noted: 2017-04-29

## 2017-04-29 LAB
ANION GAP SERPL CALCULATED.3IONS-SCNC: 10 MMOL/L (ref 3–14)
BASE DEFICIT BLDV-SCNC: 1.4 MMOL/L
BASE DEFICIT BLDV-SCNC: 3.1 MMOL/L
BASE DEFICIT BLDV-SCNC: 4.7 MMOL/L
BUN SERPL-MCNC: 30 MG/DL (ref 7–30)
CALCIUM SERPL-MCNC: 8.4 MG/DL (ref 8.5–10.1)
CHLORIDE SERPL-SCNC: 106 MMOL/L (ref 94–109)
CO2 SERPL-SCNC: 22 MMOL/L (ref 20–32)
CREAT SERPL-MCNC: 1.73 MG/DL (ref 0.66–1.25)
ERYTHROCYTE [DISTWIDTH] IN BLOOD BY AUTOMATED COUNT: 17.2 % (ref 10–15)
GFR SERPL CREATININE-BSD FRML MDRD: 42 ML/MIN/1.7M2
GLUCOSE BLDC GLUCOMTR-MCNC: 159 MG/DL (ref 70–99)
GLUCOSE BLDC GLUCOMTR-MCNC: 196 MG/DL (ref 70–99)
GLUCOSE BLDC GLUCOMTR-MCNC: 200 MG/DL (ref 70–99)
GLUCOSE BLDC GLUCOMTR-MCNC: 209 MG/DL (ref 70–99)
GLUCOSE BLDC GLUCOMTR-MCNC: 209 MG/DL (ref 70–99)
GLUCOSE BLDC GLUCOMTR-MCNC: 226 MG/DL (ref 70–99)
GLUCOSE SERPL-MCNC: 210 MG/DL (ref 70–99)
HCO3 BLDV-SCNC: 20 MMOL/L (ref 21–28)
HCO3 BLDV-SCNC: 22 MMOL/L (ref 21–28)
HCO3 BLDV-SCNC: 23 MMOL/L (ref 21–28)
HCT VFR BLD AUTO: 35.4 % (ref 40–53)
HGB BLD-MCNC: 12 G/DL (ref 13.3–17.7)
MAGNESIUM SERPL-MCNC: 2.3 MG/DL (ref 1.6–2.3)
MCH RBC QN AUTO: 28.7 PG (ref 26.5–33)
MCHC RBC AUTO-ENTMCNC: 33.9 G/DL (ref 31.5–36.5)
MCV RBC AUTO: 85 FL (ref 78–100)
O2/TOTAL GAS SETTING VFR VENT: 21 %
OXYHGB MFR BLDV: 38 %
OXYHGB MFR BLDV: 63 %
OXYHGB MFR BLDV: 70 %
PCO2 BLDV: 32 MM HG (ref 40–50)
PCO2 BLDV: 34 MM HG (ref 40–50)
PCO2 BLDV: 35 MM HG (ref 40–50)
PH BLDV: 7.39 PH (ref 7.32–7.43)
PH BLDV: 7.4 PH (ref 7.32–7.43)
PH BLDV: 7.43 PH (ref 7.32–7.43)
PLATELET # BLD AUTO: 218 10E9/L (ref 150–450)
PO2 BLDV: 25 MM HG (ref 25–47)
PO2 BLDV: 36 MM HG (ref 25–47)
PO2 BLDV: 40 MM HG (ref 25–47)
POTASSIUM SERPL-SCNC: 4.7 MMOL/L (ref 3.4–5.3)
RBC # BLD AUTO: 4.18 10E12/L (ref 4.4–5.9)
SODIUM SERPL-SCNC: 137 MMOL/L (ref 133–144)
WBC # BLD AUTO: 7.9 10E9/L (ref 4–11)

## 2017-04-29 PROCEDURE — 25000132 ZZH RX MED GY IP 250 OP 250 PS 637: Performed by: STUDENT IN AN ORGANIZED HEALTH CARE EDUCATION/TRAINING PROGRAM

## 2017-04-29 PROCEDURE — 83036 HEMOGLOBIN GLYCOSYLATED A1C: CPT | Performed by: INTERNAL MEDICINE

## 2017-04-29 PROCEDURE — 99232 SBSQ HOSP IP/OBS MODERATE 35: CPT | Mod: 24 | Performed by: INTERNAL MEDICINE

## 2017-04-29 PROCEDURE — 40000048 ZZH STATISTIC DAILY SWAN MONITORING

## 2017-04-29 PROCEDURE — 21400006 ZZH R&B CCU INTERMEDIATE UMMC

## 2017-04-29 PROCEDURE — 25000132 ZZH RX MED GY IP 250 OP 250 PS 637: Performed by: INTERNAL MEDICINE

## 2017-04-29 PROCEDURE — 86038 ANTINUCLEAR ANTIBODIES: CPT | Performed by: INTERNAL MEDICINE

## 2017-04-29 PROCEDURE — 80048 BASIC METABOLIC PNL TOTAL CA: CPT | Performed by: INTERNAL MEDICINE

## 2017-04-29 PROCEDURE — 00000146 ZZHCL STATISTIC GLUCOSE BY METER IP

## 2017-04-29 PROCEDURE — 71010 XR CHEST PORT 1 VW: CPT

## 2017-04-29 PROCEDURE — 85027 COMPLETE CBC AUTOMATED: CPT | Performed by: INTERNAL MEDICINE

## 2017-04-29 PROCEDURE — 82805 BLOOD GASES W/O2 SATURATION: CPT

## 2017-04-29 PROCEDURE — 25000128 H RX IP 250 OP 636: Performed by: STUDENT IN AN ORGANIZED HEALTH CARE EDUCATION/TRAINING PROGRAM

## 2017-04-29 PROCEDURE — 83735 ASSAY OF MAGNESIUM: CPT | Performed by: INTERNAL MEDICINE

## 2017-04-29 PROCEDURE — 40000196 ZZH STATISTIC RAPCV CVP MONITORING

## 2017-04-29 PROCEDURE — 82805 BLOOD GASES W/O2 SATURATION: CPT | Performed by: INTERNAL MEDICINE

## 2017-04-29 RX ORDER — METHYLPREDNISOLONE SODIUM SUCCINATE 125 MG/2ML
125 INJECTION, POWDER, LYOPHILIZED, FOR SOLUTION INTRAMUSCULAR; INTRAVENOUS ONCE
Status: COMPLETED | OUTPATIENT
Start: 2017-04-29 | End: 2017-04-29

## 2017-04-29 RX ADMIN — URSODIOL 300 MG: 300 CAPSULE ORAL at 08:22

## 2017-04-29 RX ADMIN — URSODIOL 300 MG: 300 CAPSULE ORAL at 20:47

## 2017-04-29 RX ADMIN — CALCIUM 1250 MG: 500 TABLET ORAL at 20:47

## 2017-04-29 RX ADMIN — PANTOPRAZOLE SODIUM 40 MG: 40 TABLET, DELAYED RELEASE ORAL at 08:22

## 2017-04-29 RX ADMIN — MAGNESIUM OXIDE TAB 400 MG (241.3 MG ELEMENTAL MG) 800 MG: 400 (241.3 MG) TAB at 08:22

## 2017-04-29 RX ADMIN — PENICILLIN V POTASSIUM 500 MG: 500 TABLET, FILM COATED ORAL at 08:22

## 2017-04-29 RX ADMIN — NIFEDIPINE 30 MG: 30 TABLET, FILM COATED, EXTENDED RELEASE ORAL at 08:22

## 2017-04-29 RX ADMIN — OXYCODONE HYDROCHLORIDE 5 MG: 5 TABLET ORAL at 22:03

## 2017-04-29 RX ADMIN — ATOVAQUONE 1500 MG: 750 SUSPENSION ORAL at 08:24

## 2017-04-29 RX ADMIN — ROSUVASTATIN CALCIUM 5 MG: 5 TABLET ORAL at 09:52

## 2017-04-29 RX ADMIN — FLUCONAZOLE 200 MG: 200 TABLET ORAL at 08:22

## 2017-04-29 RX ADMIN — FLUTICASONE PROPIONATE 2 PUFF: 220 AEROSOL, METERED RESPIRATORY (INHALATION) at 20:46

## 2017-04-29 RX ADMIN — VITAMIN D, TAB 1000IU (100/BT) 2000 UNITS: 25 TAB at 20:47

## 2017-04-29 RX ADMIN — CLONAZEPAM 1 MG: 1 TABLET ORAL at 22:03

## 2017-04-29 RX ADMIN — HEPARIN SODIUM 5000 UNITS: 5000 INJECTION, SOLUTION INTRAVENOUS; SUBCUTANEOUS at 11:55

## 2017-04-29 RX ADMIN — Medication 1 MG: at 00:23

## 2017-04-29 RX ADMIN — VITAMIN D, TAB 1000IU (100/BT) 2000 UNITS: 25 TAB at 14:08

## 2017-04-29 RX ADMIN — CALCIUM 1250 MG: 500 TABLET ORAL at 08:22

## 2017-04-29 RX ADMIN — METHYLPREDNISOLONE SODIUM SUCCINATE 125 MG: 125 INJECTION, POWDER, LYOPHILIZED, FOR SOLUTION INTRAMUSCULAR; INTRAVENOUS at 10:18

## 2017-04-29 RX ADMIN — URSODIOL 300 MG: 300 CAPSULE ORAL at 14:08

## 2017-04-29 RX ADMIN — PENICILLIN V POTASSIUM 500 MG: 500 TABLET, FILM COATED ORAL at 16:27

## 2017-04-29 RX ADMIN — VITAMIN D, TAB 1000IU (100/BT) 2000 UNITS: 25 TAB at 08:22

## 2017-04-29 RX ADMIN — FLUTICASONE PROPIONATE 2 PUFF: 220 AEROSOL, METERED RESPIRATORY (INHALATION) at 08:23

## 2017-04-29 RX ADMIN — INSULIN ASPART 1 UNITS: 100 INJECTION, SOLUTION INTRAVENOUS; SUBCUTANEOUS at 06:58

## 2017-04-29 RX ADMIN — METOPROLOL SUCCINATE 25 MG: 25 TABLET, EXTENDED RELEASE ORAL at 08:22

## 2017-04-29 RX ADMIN — HEPARIN SODIUM 5000 UNITS: 5000 INJECTION, SOLUTION INTRAVENOUS; SUBCUTANEOUS at 03:51

## 2017-04-29 RX ADMIN — HEPARIN SODIUM 5000 UNITS: 5000 INJECTION, SOLUTION INTRAVENOUS; SUBCUTANEOUS at 20:48

## 2017-04-29 RX ADMIN — TADALAFIL 10 MG: 10 TABLET, FILM COATED ORAL at 08:22

## 2017-04-29 NOTE — PLAN OF CARE
Problem: Individualization  Goal: Patient Preferences  Outcome: Improving                                                                                                              Progress Note     D/A: Pt was alert and oriented and able to make his needs known, medications were given per MAR with additional night melatonin given per pt request, pt was able to be a SBA from the chair into the bed, pt was able to void freely into the urinal with no issues noted, dressing on chest CDI, pt refused to wear SCDs even after education and importance with prevention of blood clots/ DVT  Blood pressure 106/67, temperature 98.3  F (36.8  C), temperature source Oral, resp. rate 14, weight 122.3 kg (269 lb 10 oz), SpO2 91 %.    R: Pt sleeping between cares  SC: : Continue with POC

## 2017-04-29 NOTE — PLAN OF CARE
Problem: Goal Outcome Summary  Goal: Goal Outcome Summary  Outcome: No Change  Pt A&Ox4, uses call light appropriately afebrile, baseline numbness in toes and fingers r/t raynauds and chemotherapy.  SR 70-80's, CVP 11-13, MAP >70's.  Pericardial drain removed by Dr Sawyer at bedside, dressing saturated and changed x1.  LS clear/diminished, 4L NC, sats 90's. Rapimmune discontinued, high dose prednisone initiated.  BS and SS Q 4hr.  Reg diet, well tolerated, using urinal.  Ambulated in hallway, SBA for transfers.  Anticipate Cordis removal and floor orders tomorrow.  Will continue to monitor and update team as needed.

## 2017-04-29 NOTE — PLAN OF CARE
Problem: Goal Outcome Summary  Goal: Goal Outcome Summary  Outcome: Improving  /79 (BP Location: Right arm)  Temp 97.8  F (36.6  C) (Oral)  Resp 16  Wt 126.1 kg (277 lb 14.4 oz)  SpO2 92%  BMI 32.95 kg/m2     Patient arrived on 6C at ~1500 from unit 4E. Transferred via WC accompanied by his wife, son and daughter in-law. Patient admitted for worsening symptoms r/t pericardial effusion; SOB/fatigue. A&Ox4. Denies pain/nausea. SBA. VSS. Former drain site to left lateral chest is covered with pressure dressing, CDI; changed prior to transfer. Continuous tele in place, NSR. Patient has raynaud's, hands pink and cool. Baseline neuropathy to bilateral feet. PIV to LUE. High dose steroid therapy started today, here to manage hyperglycemia during steroid use. Possible discharge Tuesday. Urinal at bedside. Pleasant and cooperative with care.

## 2017-04-29 NOTE — CONSULTS
"Diabetes/Hyperglycemia Management Consult    Chief Complaint steroid induced hyperglycemia  Consult requested by: Dr. Sharita Oquendo, attending: Dr. Quan  History of Present Illness Mr. Olivier Gómez is a 52 yo man with a history of steroid induced diabetes, AML s/p BMT in 2011 with post transplant course complicated by GVHD, Raynaud's disease, and pulmonary HTN, who was admitted with enlarging pericardial effusion.  Pericardial effusion may be due to GVHD, and Olivier is now receiving high dose steroids.  He got methylprednisolone 125mg yesterday and again this morning.  Tomorrow steroid will change to prednisone 50mg BID.  Taper is yet to be determined.  Glucoses started to rise after getting MP 125mg yesterday, peaking at 288 last evening after supper.  Overnight came down with only small amount of correction aspart (159 this morning with total of 4 units aspart given in the previous 12 hours).  Glucose back up to the low 200s midday today.  Creatinine is 1.7 today, was 2.03 yesterday. Unclear what his baseline is.    Olivier reports that he was diagnosed with steroid induced diabetes 4 years ago.  Previously when getting prednisone 100mg he was started on detemir and aspart correction.  Per CareEverywhere, he was on detemir 10 units prior to it being stopped in March 2016 when prednisone tapered down to 5mg qod.  Olivier recalls that the detemir dose never changed, so he thinks he might have been on detemir 10 units when on larger dose of prednisone.  He recalls that he often would need correction insulin when he was on detemir 10 units, but usually only 1-2 units (glucose usually high 100s to low 200s).  His last hemoglobin A1c was checked on 3/1/16 and was 5.9%.  The highest A1c was 7.6% in 2/2015, per CareEverywhere.  Olivier has not checked his glucose much since his detemir insulin was stopped over a year ago.    Olivier reports feeling \"great\".  Appetite is good.  He is trying to follow RD recommendations and not eat more " "than 70g CHO per meal.  His wife reports that he loves to eat big bowels of Fruit Loops and mini donuts at home, so she is hoping he cuts back on both.  He reports feeling \"steroid buzz\", which impaired sleep last night.  His [birth year] is on [birth year], and he is hoping to discharge by then.    Progress notes from primary team and nursing reviewed, H&P reviewed, progress notes from outpatient endocrinologist in [facility] reviewed.      Recent Labs  Lab 04/29/17  1306 04/29/17  1036 04/29/17  0810 04/29/17  0655 04/29/17  0406 04/28/17  2345 04/28/17  2127  04/28/17  0134 04/27/17  1349 04/27/17  0500 04/26/17  0324 04/25/17  1200   GLC  --   --   --   --  210*  --   --   --  143* 162* 118* 155* 92   * 209* 159* 226*  --  227* 288*  < >  --   --   --   --   --    < > = values in this interval not displayed.      Diabetes Type: Steroid induced diabetes  Diabetes Duration: 4 years  Usual Diabetes Regimen: nothing prior to admission.  When on steroids in past: detemir 10 units, aspart sliding scale  Ability to [place] Prescribed Regimen: good  Diabetes Control: No results found for: A1C  Diabetes Complications: none  Able to Detect Hypoglycemia: yes  Usual Diabetes Care Provider: Endocrinologist: Dr. Marcial Lacey in [place] (has not seen since March 2016)  Factors Impacting Glucose Control: high dose steroids      Review of Systems  10 point ROS completed with pertinent positives and negatives noted in the HPI    Past medical, family and social histories are reviewed and updated.    Past Medical History  Past Medical History:   Diagnosis Date     Cancer (H) 2011    AML     Gyrog-htcsqg-invs disease (H) need to confirm this history     History of blood transfusion      Pericardial effusion 2014     Raynaud disease      Scleroderma (H)        Family History  Family History   Problem Relation Age of Onset     CANCER No family hx of      no skin cancer   Type 2 diabetes- maternal grandmother    Social " History  Social History     Social History     Marital status:      Spouse name: N/A     Number of children: N/A     Years of education: N/A     Social History Main Topics     Smoking status: Passive Smoke Exposure - Never Smoker     Smokeless tobacco: Not on file     Alcohol use No     Drug use: No     Sexual activity: Yes     Partners: Female     Other Topics Concern     Parent/Sibling W/ Cabg, Mi Or Angioplasty Before 65f 55m? No     Social History Narrative   Olivier is  and has children.  He lives in [place], [place].  He is an .      Physical Exam  /79 (BP Location: Right arm)  Temp 97.8  F (36.6  C) (Oral)  Resp 16  Wt 126.1 kg (277 lb 14.4 oz)  SpO2 92%  BMI 32.95 kg/m2    General:  pleasant man sitting up in chair, in no distress. His wife is present and supportive.  HEENT: NC/AT, PER and anicteric, non-injected, oral mucous membranes moist.   Lungs: unlabored respiration, no cough  Skin: warm and dry, no obvious lesions  MSK:  fluid movement of all extremities  Lymp:  no LE edema   Mental status:  alert, oriented x3, communicating clearly  Psych:  calm, even mood    Laboratory  Recent Labs   Lab Test  04/29/17   0406  04/28/17   0134   NA  137  134   POTASSIUM  4.7  4.3   CHLORIDE  106  103   CO2  22  23   ANIONGAP  10  8   GLC  210*  143*   BUN  30  28   CR  1.73*  2.03*   PANFILO  8.4*  8.7     CBC RESULTS:   Recent Labs   Lab Test  04/29/17   0406   WBC  7.9   RBC  4.18*   HGB  12.0*   HCT  35.4*   MCV  85   MCH  28.7   MCHC  33.9   RDW  17.2*   PLT  218       Liver Function Studies -   Recent Labs   Lab Test  04/25/17   1200   PROTTOTAL  6.6*   ALBUMIN  3.8   BILITOTAL  0.9   ALKPHOS  91   AST  23   ALT  27       Active Medications  Current Facility-Administered Medications   Medication     melatonin tablet 1 mg     [START ON 4/30/2017] predniSONE (DELTASONE) tablet 50 mg     insulin aspart (NovoLOG) inj (RAPID ACTING)     insulin aspart (NovoLOG) inj (RAPID ACTING)     insulin  aspart (NovoLOG) inj (RAPID ACTING)     insulin aspart (NovoLOG) inj (RAPID ACTING)     [START ON 4/30/2017] sitagliptin (JANUVIA) tablet 50 mg     glucose 40 % gel 15-30 g    Or     dextrose 50 % injection 25-50 mL    Or     glucagon injection 1 mg     calcium carbonate (OS-PANFILO 500 mg [place]. Ca) tablet 1,250 mg     heparin sodium PF injection 5,000 Units     lidocaine 1 % 1 mL     lidocaine (LMX4) kit     sodium chloride (PF) 0.9% PF flush 3 mL     sodium chloride (PF) 0.9% PF flush 3 mL     medication instruction     acetaminophen (TYLENOL) tablet 650 mg     acetaminophen (TYLENOL) Suppository 650 mg     atovaquone (MEPRON) suspension 1,500 mg     clonazePAM (klonoPIN) tablet 1 mg     fluconazole (DIFLUCAN) tablet 200 mg     fluticasone (FLOVENT HFA) 220 MCG/ACT Inhaler 2 puff     magnesium oxide (MAG-OX) tablet 800 mg     metoprolol (TOPROL-XL) 24 hr tablet 25 mg     NIFEdipine ER osmotic (PROCARDIA XL) 24 hr tablet 30 mg     pantoprazole (PROTONIX) EC tablet 40 mg     penicillin V potassium (VEETID) tablet 500 mg     rosuvastatin (CRESTOR) tablet 5 mg     tadalafil (CIALIS/ADCIRCA) tablet     ursodiol (ACTIGALL) capsule 300 mg     valGANciclovir (VALCYTE) tablet 450 mg     cholecalciferol (vitamin D) tablet 2,000 Units     oxyCODONE (ROXICODONE) IR tablet 5 mg     naloxone (NARCAN) injection 0.1-0.4 mg     HYDROmorphone (PF) (DILAUDID) injection 0.5 mg     ondansetron (ZOFRAN) injection 4 mg     No current outpatient prescriptions on file.       Current Diet    Active Diet Order      Regular Diet Adult      Assessment  Mr. Olivier Gómez is a 50 yo man with a history of steroid induced diabetes, AML s/p BMT in 2011 with post transplant course complicated by GVHD, Raynaud's disease, and pulmonary HTN, who was admitted with enlarging pericardial effusion. Currently with hyperglycemia related to high dose steroids: MP 125mg daily x 2 days, dose tapers to prednisone 50mg BID tomorrow.      Plan  -Meal aspart 1unit/8g CHO  for now- tomorrow we will try switching to DPP4-inhibitor in effort to simplify plan: Sitagliptin 50mg daily ($0 copay per discharge pharmacy, no interactions with current meds per inpatient pharmacist).  With current GFR he could take 100mg per pharmacist, but we will start with lower dose given unknown baseline.  -correction aspart increased to high intensity ac and hs.  -Depending on overnight glucoses we will consider starting basal insulin (detemir) tomorrow morning  -Pt is encouraged to continue following a moderate consistent carb diet.  -monitor glucose ac, hs, and 0200    We will continue to follow.    Susi San PA-C [phone]    Diabetes Management Team job code: [id number]

## 2017-04-29 NOTE — PROGRESS NOTES
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     Plan today:  - 1 mg/kg solumedrol d2 for pericardial effusion likely secondary to GVHD  - Plan for PO prednisone tomorrow, 50 BID  - Discontinue sirolimus as potential to cause pericardial effusion given that it is a known cause of pleural effusion  - Endocrine consult for insulin regimen on discharge  - Remove cortis today  - Transfer to floor    #Large pericardial effusion w/ early tamponade   Patient has known mild to moderate pericardial effusion. Effusion is very large on most recent echocardiogram (4/25/17) with early diastolic collapse of RV free wall concerning for early tamponade.  RHC 4/25/17: RA 15/13/12, /16, /73/60, PCW 15/20/16, PA sat 71.8%, CI 3.0, PVR 5.6  - s/p drainage 4/25 with catheter left in place. LDH fluid 255 and 218 serum, consistent with exudative effusion.  - Cell count 901 WBC 89% other cells, gram stain with rare WBC, cultures NGTD, AFB stain negative and culture in progress, and adenosine deaminase negative. Cytology negative.  - [place] removed 4/27, cortis remains in place, will remove today  - Discussed high dose steroids and d/c of sirolimus with his BMT doctors at [facility].    #Acute hypoxic respiratory failure, improving  Post procedure patient with increasing O2 requirements up to 10L oxymask. No evidence of pneumothorax on CXR. Pt with significant pain post procedure and taking shallow breaths. Most likely etiology is post-procedure atelectasis.  - Although ketorolac was helpful with pain control, pt has hx of GIB so will hold at this time. 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 The patient has had GVHD  with 2 flares after the original presentation that was with transaminitis and acute lung injury. His other manifestations of chronic GVHD include sclerotic skin changes, mucosal changes in his mouth, xerostomia and polyserositis as reflected by the mild to moderate pericardial effusion that has been documented.   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 Quan, who agrees with the above assessment and plan.    Sharita Oquendo  PGY2   [phone]  _____________________________________________________________________________  Interval History   Breathing is improved. Patient's chest pain is improving as well. Would like endocrine involvement for blood sugars.    Physical Exam   /70  Temp 98.3  F (36.8  C) (Oral)  Resp 14  Wt 122.3 kg (269 lb 10 oz)  SpO2 93%  BMI 31.97 kg/m2  I/O last 3 completed shifts:  In: 1160 [P.O.:1160]  Out: 2800 [Urine:2700; Chest Tube:100]  Hemodynamics: CVP 6, PA 87/40, PCW 5, CI 3.2     GENERAL: NAD. Resting in bed comfortably.  HEENT: NCAT. Normal conjunctiva. No scleral icterus.  LUNG: Coarse breath sounds in bilateral bases. No wheezing  appreciated.  CV: Regular rate and rhythm. No murmur or rub.   GI: Normoactive bowel sounds. Abdomen soft, non-distended, and non-tender. No palpable masses or organomegaly.  SKIN: Pericardial drain over L chest, site is C/D/I.  MSK: No LE edema.  NEURO: A&O x 3. No focal deficits.    Data   CBC    Recent Labs  Lab 04/29/17  0406 04/28/17  0134 04/27/17  0500 04/26/17  0324   WBC 7.9 10.7 12.1* 13.5*   RBC 4.18* 4.33* 4.60 5.21   HGB 12.0* 12.6* 13.4 14.9   HCT 35.4* 37.5* 40.1 46.5   MCV 85 87 87 89   MCH 28.7 29.1 29.1 28.6   MCHC 33.9 33.6 33.4 32.0   RDW 17.2* 17.0* 17.4* 18.0*    182 193 273     CMP    Recent Labs  Lab 04/29/17  0406 04/28/17  0134 04/27/17  1349 04/27/17  0500 04/26/17  0324 04/25/17  1200    134 134 133 142 141   POTASSIUM 4.7 4.3 4.4 4.2 4.4 3.9   CHLORIDE 106 103 102 102 109 111*   CO2 22 23 23 22 24 23   ANIONGAP 10 8 9 9 10 7   * 143* 162* 118* 155* 92   BUN 30 28 26 27 22 22   CR 1.73* 2.03* 1.96* 2.09* 1.83* 1.61*   GFRESTIMATED 42* 35* 36* 33* 39* 45*   GFRESTBLACK 50* 42* 44* 40* 47* 55*   PANFILO 8.4* 8.7 8.4* 8.2* 8.6 9.1   MAG 2.3 2.3  --  2.2 2.2  --    PROTTOTAL  --   --   --   --   --  6.6*   ALBUMIN  --   --   --   --   --  3.8   BILITOTAL  --   --   --   --   --  0.9   ALKPHOS  --   --   --   --   --  91   AST  --   --   --   --   --  23   ALT  --   --   --   --   --  27     INR    Recent Labs  Lab 04/25/17  1200   INR 1.06     Echocardiogram 4/25/17  Very large pericardial effusion. Early diastolic collapse of the RV free wall.  The IVC is dilated without respiratory variability. Early tamponade.      Impression:  BMT  GVH  Large pericardial effusion  Percutaneous pericardial drainage  Initiation of high dose steroid therapy    The patient remains unstable in the ICU with parenteral medications for the adjustment of blood pressure and cardiac output and maintenance of renal function and presence of pericardial drainage tube.    I personally reviewed       Hemodynamic parameters obtained by central hemodynamic monitoring including RAP, estimated LVEDP, pulmonary artery pressure, cardiac output and vascular resistances in order to adjust fluids and infused medications for blood pressure and cardiac output maintenance.    Volume status, renal function and nutritional support as judged by intake, output, daily weight and appropriate laboratories.    I reviewed individual and serial imaging studies including echocardiogram, chest x-ray, CT scan    I personally supervised the prescription of parenteral fluids, inotropes, vasodilators , and vasopressors in order to correct cardiac output, maintain urine output and renal function.    I personally discussed [facility] pateints condition with the patient or family designated decision maker in order to discuss current and on-going diagnostic and therapeutic options.    45 minutes    Discontinue icu

## 2017-04-30 LAB
BACTERIA SPEC CULT: NO GROWTH
GLUCOSE BLDC GLUCOMTR-MCNC: 176 MG/DL (ref 70–99)
GLUCOSE BLDC GLUCOMTR-MCNC: 180 MG/DL (ref 70–99)
GLUCOSE BLDC GLUCOMTR-MCNC: 190 MG/DL (ref 70–99)
GLUCOSE BLDC GLUCOMTR-MCNC: 191 MG/DL (ref 70–99)
GLUCOSE BLDC GLUCOMTR-MCNC: 209 MG/DL (ref 70–99)
HBA1C MFR BLD: 5.8 % (ref 4.3–6)
MICRO REPORT STATUS: NORMAL
SPECIMEN SOURCE: NORMAL

## 2017-04-30 PROCEDURE — 25000132 ZZH RX MED GY IP 250 OP 250 PS 637: Performed by: STUDENT IN AN ORGANIZED HEALTH CARE EDUCATION/TRAINING PROGRAM

## 2017-04-30 PROCEDURE — 25000131 ZZH RX MED GY IP 250 OP 636 PS 637: Performed by: PHYSICIAN ASSISTANT

## 2017-04-30 PROCEDURE — 40000275 ZZH STATISTIC RCP TIME EA 10 MIN

## 2017-04-30 PROCEDURE — 00000146 ZZHCL STATISTIC GLUCOSE BY METER IP

## 2017-04-30 PROCEDURE — 25000131 ZZH RX MED GY IP 250 OP 636 PS 637: Performed by: STUDENT IN AN ORGANIZED HEALTH CARE EDUCATION/TRAINING PROGRAM

## 2017-04-30 PROCEDURE — 25000132 ZZH RX MED GY IP 250 OP 250 PS 637: Performed by: PHYSICIAN ASSISTANT

## 2017-04-30 PROCEDURE — 25000132 ZZH RX MED GY IP 250 OP 250 PS 637: Performed by: INTERNAL MEDICINE

## 2017-04-30 PROCEDURE — 25000125 ZZHC RX 250: Performed by: STUDENT IN AN ORGANIZED HEALTH CARE EDUCATION/TRAINING PROGRAM

## 2017-04-30 PROCEDURE — 25000128 H RX IP 250 OP 636: Performed by: STUDENT IN AN ORGANIZED HEALTH CARE EDUCATION/TRAINING PROGRAM

## 2017-04-30 PROCEDURE — 21400006 ZZH R&B CCU INTERMEDIATE UMMC

## 2017-04-30 PROCEDURE — 99232 SBSQ HOSP IP/OBS MODERATE 35: CPT | Mod: 24 | Performed by: INTERNAL MEDICINE

## 2017-04-30 RX ORDER — PREDNISONE 20 MG/1
40 TABLET ORAL 2 TIMES DAILY WITH MEALS
Status: DISCONTINUED | OUTPATIENT
Start: 2017-05-07 | End: 2017-05-01 | Stop reason: HOSPADM

## 2017-04-30 RX ORDER — PREDNISONE 20 MG/1
20 TABLET ORAL DAILY
Status: DISCONTINUED | OUTPATIENT
Start: 2017-05-28 | End: 2017-05-01 | Stop reason: HOSPADM

## 2017-04-30 RX ORDER — PREDNISONE 1 MG/1
5 TABLET ORAL EVERY OTHER DAY
Status: ON HOLD | COMMUNITY
Start: 2017-06-11 | End: 2017-06-14

## 2017-04-30 RX ORDER — PREDNISONE 10 MG/1
10 TABLET ORAL DAILY
Qty: 30 TABLET | Refills: 0 | Status: ON HOLD | OUTPATIENT
Start: 2017-05-01 | End: 2017-06-14

## 2017-04-30 RX ORDER — PREDNISONE 10 MG/1
10 TABLET ORAL DAILY
Qty: 7 TABLET | Refills: 0 | Status: SHIPPED | OUTPATIENT
Start: 2017-06-04 | End: 2017-04-30

## 2017-04-30 RX ORDER — PREDNISONE 10 MG/1
10 TABLET ORAL DAILY
Status: DISCONTINUED | OUTPATIENT
Start: 2017-06-04 | End: 2017-05-01 | Stop reason: HOSPADM

## 2017-04-30 RX ORDER — PREDNISONE 20 MG/1
60 TABLET ORAL DAILY
Qty: 21 TABLET | Refills: 0 | Status: SHIPPED | OUTPATIENT
Start: 2017-05-14 | End: 2017-04-30

## 2017-04-30 RX ORDER — PREDNISONE 20 MG/1
40 TABLET ORAL 2 TIMES DAILY WITH MEALS
Qty: 28 TABLET | Refills: 0 | Status: SHIPPED | OUTPATIENT
Start: 2017-05-07 | End: 2017-04-30

## 2017-04-30 RX ORDER — PREDNISONE 50 MG/1
50 TABLET ORAL 2 TIMES DAILY WITH MEALS
Qty: 10 TABLET | Refills: 0 | Status: SHIPPED | OUTPATIENT
Start: 2017-05-01 | End: 2017-04-30

## 2017-04-30 RX ORDER — PREDNISONE 20 MG/1
40 TABLET ORAL DAILY
Qty: 14 TABLET | Refills: 0 | Status: SHIPPED | OUTPATIENT
Start: 2017-05-21 | End: 2017-04-30

## 2017-04-30 RX ORDER — PREDNISONE 10 MG/1
10 TABLET ORAL DAILY
Qty: 30 TABLET | Refills: 0 | Status: SHIPPED | OUTPATIENT
Start: 2017-05-01 | End: 2017-04-30

## 2017-04-30 RX ORDER — PREDNISONE 20 MG/1
40 TABLET ORAL DAILY
Status: DISCONTINUED | OUTPATIENT
Start: 2017-05-21 | End: 2017-05-01 | Stop reason: HOSPADM

## 2017-04-30 RX ORDER — PREDNISONE 20 MG/1
20 TABLET ORAL DAILY
Qty: 7 TABLET | Refills: 0 | Status: SHIPPED | OUTPATIENT
Start: 2017-05-28 | End: 2017-04-30

## 2017-04-30 RX ORDER — TRAZODONE HYDROCHLORIDE 50 MG/1
50 TABLET, FILM COATED ORAL
Status: DISCONTINUED | OUTPATIENT
Start: 2017-04-30 | End: 2017-05-01 | Stop reason: HOSPADM

## 2017-04-30 RX ORDER — PREDNISONE 50 MG/1
50 TABLET ORAL 2 TIMES DAILY WITH MEALS
Status: DISCONTINUED | OUTPATIENT
Start: 2017-04-30 | End: 2017-05-01 | Stop reason: HOSPADM

## 2017-04-30 RX ADMIN — HEPARIN SODIUM 5000 UNITS: 5000 INJECTION, SOLUTION INTRAVENOUS; SUBCUTANEOUS at 21:23

## 2017-04-30 RX ADMIN — FLUTICASONE PROPIONATE 2 PUFF: 220 AEROSOL, METERED RESPIRATORY (INHALATION) at 08:44

## 2017-04-30 RX ADMIN — METOPROLOL SUCCINATE 25 MG: 25 TABLET, EXTENDED RELEASE ORAL at 08:43

## 2017-04-30 RX ADMIN — FLUTICASONE PROPIONATE 2 PUFF: 220 AEROSOL, METERED RESPIRATORY (INHALATION) at 21:23

## 2017-04-30 RX ADMIN — URSODIOL 300 MG: 300 CAPSULE ORAL at 21:24

## 2017-04-30 RX ADMIN — PANTOPRAZOLE SODIUM 40 MG: 40 TABLET, DELAYED RELEASE ORAL at 08:43

## 2017-04-30 RX ADMIN — PREDNISONE 50 MG: 50 TABLET ORAL at 17:55

## 2017-04-30 RX ADMIN — PENICILLIN V POTASSIUM 500 MG: 500 TABLET, FILM COATED ORAL at 16:18

## 2017-04-30 RX ADMIN — URSODIOL 300 MG: 300 CAPSULE ORAL at 08:44

## 2017-04-30 RX ADMIN — HEPARIN SODIUM 5000 UNITS: 5000 INJECTION, SOLUTION INTRAVENOUS; SUBCUTANEOUS at 04:58

## 2017-04-30 RX ADMIN — MAGNESIUM OXIDE TAB 400 MG (241.3 MG ELEMENTAL MG) 800 MG: 400 (241.3 MG) TAB at 08:43

## 2017-04-30 RX ADMIN — TRAZODONE HYDROCHLORIDE 50 MG: 50 TABLET ORAL at 23:37

## 2017-04-30 RX ADMIN — CALCIUM 1250 MG: 500 TABLET ORAL at 08:43

## 2017-04-30 RX ADMIN — FLUCONAZOLE 200 MG: 200 TABLET ORAL at 08:44

## 2017-04-30 RX ADMIN — TADALAFIL 10 MG: 10 TABLET, FILM COATED ORAL at 08:41

## 2017-04-30 RX ADMIN — CLONAZEPAM 1 MG: 1 TABLET ORAL at 21:29

## 2017-04-30 RX ADMIN — SITAGLIPTIN 50 MG: 50 TABLET, FILM COATED ORAL at 08:44

## 2017-04-30 RX ADMIN — VITAMIN D, TAB 1000IU (100/BT) 2000 UNITS: 25 TAB at 21:23

## 2017-04-30 RX ADMIN — PENICILLIN V POTASSIUM 500 MG: 500 TABLET, FILM COATED ORAL at 08:44

## 2017-04-30 RX ADMIN — VITAMIN D, TAB 1000IU (100/BT) 2000 UNITS: 25 TAB at 08:44

## 2017-04-30 RX ADMIN — NIFEDIPINE 30 MG: 30 TABLET, FILM COATED, EXTENDED RELEASE ORAL at 08:43

## 2017-04-30 RX ADMIN — Medication 1 MG: at 23:37

## 2017-04-30 RX ADMIN — INSULIN DETEMIR 10 UNITS: 100 INJECTION, SOLUTION SUBCUTANEOUS at 09:53

## 2017-04-30 RX ADMIN — ATOVAQUONE 1500 MG: 750 SUSPENSION ORAL at 08:39

## 2017-04-30 RX ADMIN — PREDNISONE 50 MG: 20 TABLET ORAL at 08:41

## 2017-04-30 RX ADMIN — VALGANCICLOVIR HYDROCHLORIDE 450 MG: 450 TABLET, FILM COATED ORAL at 00:57

## 2017-04-30 RX ADMIN — HEPARIN SODIUM 5000 UNITS: 5000 INJECTION, SOLUTION INTRAVENOUS; SUBCUTANEOUS at 12:25

## 2017-04-30 RX ADMIN — VITAMIN D, TAB 1000IU (100/BT) 2000 UNITS: 25 TAB at 13:44

## 2017-04-30 RX ADMIN — ROSUVASTATIN CALCIUM 5 MG: 5 TABLET ORAL at 08:44

## 2017-04-30 RX ADMIN — URSODIOL 300 MG: 300 CAPSULE ORAL at 13:44

## 2017-04-30 RX ADMIN — CALCIUM 1250 MG: 500 TABLET ORAL at 21:24

## 2017-04-30 NOTE — PROGRESS NOTES
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     Plan today:  - Prednisone 50 mg BID x 7 days (see below for remainder of taper)  - Discontinue sirolimus as potential to cause pericardial effusion given that it is a known cause of pleural effusion  - Endocrine consult for insulin regimen on discharge    #Large pericardial effusion w/ early tamponade  Concerning for GVHD related effusion  Patient has known mild to moderate pericardial effusion. Effusion is very large on most recent echocardiogram (4/25/17) with early diastolic collapse of RV free wall concerning for early tamponade.  RHC 4/25/17: RA 15/13/12, /16, /73/60, PCW 15/20/16, PA sat 71.8%, CI 3.0, PVR 5.6  - s/p drainage 4/25 with catheter left in place. LDH fluid 255 and 218 serum, consistent with exudative effusion.  - Cell count 901 WBC 89% other cells, gram stain with rare WBC, cultures NGTD, AFB stain negative and culture in progress, and adenosine deaminase negative. Cytology negative.  - Holding sirolimus  - Prednisone taper:    50 mg BID x 7 days (4/30 - 5/6)    40 mg BID x 7 days (5/6 - 5/13)    60 mg daily x 7 days (5/14 - 5/20)    40 mg daily thereafter until taper discussed with Dr. 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 The patient has had GVHD  with 2 flares after the original presentation that was with transaminitis and acute lung injury. His other manifestations of chronic GVHD include sclerotic skin changes, mucosal changes in his mouth, xerostomia and polyserositis as reflected by the mild to moderate  pericardial effusion that has been documented.   - Prophylaxis: continue penicillin, atovaquone, fluconazole, and valganciclovir  - Immunosuppression: continue prednisone and hold sirolimus    #Acute hypoxic respiratory failure, resolved  Post procedure patient with increasing O2 requirements up to 10L oxymask. No evidence of pneumothorax on CXR. Pt with significant pain post procedure and taking shallow breaths. Most likely etiology is post-procedure atelectasis.   - Now on room air     #Pulmonary HTN  #Scleroderma vs GVHD  #Raynauds disease  RHC 4/25/17: RA 15/13/12, /16, /73/60, PCW 15/20/16, PA sat 71.8%, CI 3.0, PVR 5.6  - Continue nifedipine 30 mg daily  - Continue tadalafil 10 mg daily    FEN  - Regular diet  - PRN lyte replacement    Prophy/Misc  - VTE: Heparin sq  - GI/PUD: PPI daily  - Bowels: PRN     Lines:   - R PA catheter 4/25/17 - 4/27  - Pericardial drain 4/25/17 - 4/28/17    Consults:  Endocrine    Code status: Full  Disposition: Possible d/c tomorrow.    Patient seen and discussed with Dr. Quan, who agrees with the above assessment and plan.    Sharita Oquendo  PGY2   [phone]  _____________________________________________________________________________  Interval History   NAEON. Pt notes chest pain is resolved. Breathing is significantly improved.    Physical Exam   /62 (BP Location: Right arm)  Temp 97.6  F (36.4  C) (Oral)  Resp 18  Wt 123.5 kg (272 lb 3.2 oz)  SpO2 90%  BMI 32.28 kg/m2  I/O last 3 completed shifts:  In: 1250 [P.O.:1250]  Out: 2700 [Urine:2700]  Hemodynamics: CVP 6, PA 87/40, PCW 5, CI 3.2     GENERAL: NAD. Resting in bed comfortably.  HEENT: NCAT. Normal conjunctiva. No scleral icterus.  LUNG: Coarse breath sounds in bilateral bases. No wheezing appreciated.  CV: Regular rate and rhythm. No murmur or rub.   GI: Normoactive bowel sounds. Abdomen soft, non-distended, and non-tender. No palpable masses or organomegaly.  SKIN: Pericardial drain  over L chest, site is C/D/I.  MSK: No LE edema.  NEURO: A&O x 3. No focal deficits.    Data   CBC    Recent Labs  Lab 04/29/17  0406 04/28/17  0134 04/27/17  0500 04/26/17  0324   WBC 7.9 10.7 12.1* 13.5*   RBC 4.18* 4.33* 4.60 5.21   HGB 12.0* 12.6* 13.4 14.9   HCT 35.4* 37.5* 40.1 46.5   MCV 85 87 87 89   MCH 28.7 29.1 29.1 28.6   MCHC 33.9 33.6 33.4 32.0   RDW 17.2* 17.0* 17.4* 18.0*    182 193 273     CMP    Recent Labs  Lab 04/29/17  0406 04/28/17  0134 04/27/17  1349 04/27/17  0500 04/26/17  0324 04/25/17  1200    134 134 133 142 141   POTASSIUM 4.7 4.3 4.4 4.2 4.4 3.9   CHLORIDE 106 103 102 102 109 111*   CO2 22 23 23 22 24 23   ANIONGAP 10 8 9 9 10 7   * 143* 162* 118* 155* 92   BUN 30 28 26 27 22 22   CR 1.73* 2.03* 1.96* 2.09* 1.83* 1.61*   GFRESTIMATED 42* 35* 36* 33* 39* 45*   GFRESTBLACK 50* 42* 44* 40* 47* 55*   PANFILO 8.4* 8.7 8.4* 8.2* 8.6 9.1   MAG 2.3 2.3  --  2.2 2.2  --    PROTTOTAL  --   --   --   --   --  6.6*   ALBUMIN  --   --   --   --   --  3.8   BILITOTAL  --   --   --   --   --  0.9   ALKPHOS  --   --   --   --   --  91   AST  --   --   --   --   --  23   ALT  --   --   --   --   --  27     INR    Recent Labs  Lab 04/25/17  1200   INR 1.06     Echocardiogram 4/25/17  Very large pericardial effusion. Early diastolic collapse of the RV free wall.  The IVC is dilated without respiratory variability. Early tamponade.    I personally provided care for this patient, reviewed chart, discussed course with patient, housestaff and consulting physicians.  I answered all questions.    Dk Quan M.D.  Division of Cardiology  Department of Medicine

## 2017-04-30 NOTE — PLAN OF CARE
"Problem: Goal Outcome Summary  Goal: Goal Outcome Summary  Outcome: Therapy, progress toward functional goals as expected  /77 (BP Location: Right arm)  Temp 98  F (36.7  C) (Oral)  Resp 18  Wt 123.5 kg (272 lb 3.2 oz)  SpO2 93%  BMI 32.28 kg/m2     VSS. A&Ox4. Denies pain/nausea. Trends: SB/SR, HR= 55-90bpm, 0-2 PVC. BG's 180, 190 & 209; SSI only, started on levemir 10units daily this AM. Lab drawn for HgbA1c today. PIV removed from LUE d/t pain at site, no s/s of extravasation; order requested for \"OK to NOT have a PIV\". ECHO ordered for 5/1. Discharges plans initiated for 5/1. Pressure dressing changed to Left lateral chest by precepting RN. New dressing applied, CDI. Pleasant and cooperative with care.       "

## 2017-04-30 NOTE — PLAN OF CARE
Problem: Goal Outcome Summary  Goal: Goal Outcome Summary  Outcome: Improving  D/I/A: Pt sleep most of night, afebrile, denies pain. Sinus rhythm, HR 70-80s, -70s. Uses CPAP for sleeping. LS clear, dim bases. Bowel sounds present. Voids spontaneously urinal, adequate output. PIV is bothering him, still flushing and not reddened.     P: Monitor hemodynamics, continue to encourage self-care. Begin taper steroids. Notify Cards 2 with concerns/changes.

## 2017-04-30 NOTE — DISCHARGE SUMMARY
[facility], [place]    Cardiology 2 Service  Discharge Summary    Date of Admission:  4/25/2017  Date of Discharge:  5/1/2017  Discharging Provider: Gene Maxwell MD  Date of Service (when I saw the patient): 05/01/17    Discharge Diagnoses   1. Large pericardial effusion likely secondary to GVHD  2. Steroid induced diabetes mellitus  3. 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 Acute hypoxic respiratory failure  5. Chronic GVHD with scleroderma, pHTN, Raynaud's    History of Present Illness   Olivier Gómez is an 51 year old M with AML s/p BMT 2011 c/b GVHD, possible scleroderma, GAVE w/ GIB, Raynaud's disease, and pulmonary HTN who presents due to enlarging pericardial effusion noted on echocardiogram completed at regular BMT appointment at [facility].     Hospital Course   Olivier Gómez was admitted on 4/25/2017.  The following problems were addressed during his hospitalization:     # Large pericardial effusion with early tamponade  Concerning for GVHD related effusion  Patient underwent routine follow-up echocardiogram while at BMT follow-up appointment at [facility].  There, his known pericardial effusion was noted to be enlarging and with early tamponade physiology. Echocardiogram obtained on admission to [facility] showed: effusion is very large with early diastolic collapse of RV free wall concerning for early tamponade. He also underwent RHC that showed: RA 15/13/12, /16, /73/60, PCW 15/20/16, PA sat 71.8%, CI 3.0, PVR 5.6. Given findings patient had pericardial drain placed on 4/25/17 with a total output of about 1.6L. Initial fluid studies showed an exudative effusion. He had 901 WBC however 89% other cells. Gram stain with rare WBC and cultures NGTD. AFB stain negative. Adenosine deaminase negative and cytology negative. Given these findings, it was deemed that enlarging effusion was possibly related to chronic GVHD vs Sirolimus induced serositis.   Sirolimus  was held.  He was discharged on prednisone taper (discussed with Dr. Martin who ok'd taper).  Patient will have follow-up echocardiogram in 1 week in [place].  He will follow-up with Dr. Quan in 4 weeks with [facility] prior to appointment.  - Prednisone taper:    50 mg BID x 7 days (4/30 - 5/6)    40 mg BID x 7 days (5/6 - 5/13)    60 mg daily x 7 days (5/14 - 5/20)    40 mg daily thereafter until discussion with BMT [facility]    #Steroid induced diabetes mellitus  Endocrinology was consulted to ensure appropriate regimen with steroid taper.  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 The patient has had GVHD with acute lung injury with 2 flares after the original presentation that was with transaminitis . His other manifestations of chronic GVHD include sclerotic skin changes, mucosal changes in his mouth, xerostomia and polyserositis as reflected by the mild to moderate pericardial effusion that has been documented.   - Prophylaxis: continue penicillin, atovaquone, fluconazole, and valganciclovir  - Immunosuppression: continue prednisone (taper above) and hold sirolimus     # Acute hypoxic respiratory failure, resolved  Post procedure patient with increasing O2 requirements up to 10L oxymask. No evidence of pneumothorax on CXR. Pt with significant pain post procedure and was taking shallow breaths. Most likely etiology is post-procedure atelectasis. On discharge patient is back on room air.       # Pulmonary HTN  # Scleroderma vs GVHD  # Raynauds disease  [facility] 4/25/17: RA 15/13/12, /16, /73/60, PCW 15/20/16, PA sat 71.8%, CI 3.0, PVR 5.6  Patient will continue nifedipine 30 mg daily and tadalafil 10 mg daily. Patient will need pulmonary function testing with  [facility] in 1 week in [place]. He will follow-up with Dr. Quan in 4 weeks.    Patient seen and discussed with Nikkie, who agrees with the above plan.      Significant Results and Procedures   Echocardiogram 4/25/17  Very large pericardial effusion. Early diastolic collapse of the RV free wall.  The IVC is dilated without respiratory variability. Early tamponade.    Echocardiogram 4/27/17  The pericardial fluid distribution is circumferential with a maximum diameter of 2.84 cm.  Inferior vena cava dilation is present consistent with elevated right atrial pressure without other signs of tamponade.  Pericardial drain in place  Organizing fibrinous material seen in pericardial space suggestive of early effusive constrictive pericarditis  This study was compared with the study from 4/5/2017 .  There is moderate pericardial effusion    Echocardiogram 5/1/17  Interpretation Summary  Limited study.  Small-moderate kev effusion, localized along the LV lateral wall. No chamber  collapse seen on limited views, though detailed evaluation of tamponade  physiology was not performed.  Dilation of the inferior vena cava is present with abnormal respiratory  variation in diameter.  Severe pulmonary hypertension is present.  Effusion appears smaller compared to prior study.    [facility] 4/25/17      Pending Results   These results will be followed up by Dr. Quan.    Unresulted Labs Ordered in the Past 30 Days of this Admission     Date and Time Order Name Status Description    4/25/2017 1457 Viral culture In process     4/25/2017 1356 Anaerobic bacterial culture Preliminary     4/25/2017 1356 Fungus Culture, non-blood Preliminary     4/25/2017 1356 AFB Culture Non Blood Preliminary           Code Status   Full Code    Physical Exam   Temp: 98  F (36.7  C) Temp src: Oral BP: 117/82   Heart Rate: 71 Resp: 18 SpO2: 94 % O2 Device: None (Room air)    Vitals:    04/29/17 1455 04/30/17 0647 05/01/17 0619   Weight: 126.1 kg (277 lb  14.4 oz) 123.5 kg (272 lb 3.2 oz) 125.1 kg (275 lb 11.2 oz)     Vital Signs with Ranges  Temp:  [97.8  F (36.6  C)-98  F (36.7  C)] 98  F (36.7  C)  Heart Rate:  [70-79] 71  Resp:  [16-18] 18  BP: (103-117)/(69-82) 117/82  SpO2:  [91 %-94 %] 94 %  I/O last 3 completed shifts:  In: 980 [P.O.:980]  Out: 1525 [Urine:1525]    Constitutional: Pleasant seen resting comfortably in bed in NAD. Alert and interactive.   HEENT: NCAT. PERRL, EOMI, anicteric sclera. Oral mucosa pink and moist with no lesions or thrush.  Hematologic / Lymphatic: No overt bleeding.   Respiratory: Non-labored breathing, good air exchange, lungs clear to auscultation bilaterally. No cough or wheeze noted.   Cardiovascular: Regular rate and rhythm. No murmur or rub.   GI: Normoactive bowel sounds. Abdomen soft, non-distended, and non-tender. No palpable masses or organomegaly.  Genitourinary: Deferred.   Skin: Warm and dry. No concerning lesions or rash on exposed surfaces.  Musculoskeletal: Extremities grossly normal, non-tender, no edema. Strong peripheral pulses. Good strength and ROM in bed.   Neurologic: A&O x 3, CNs 2-12 grossly intact, speech normal, gait normal, sensation to light touch grossly WNL. Grossly non-focal.  Neuropsychiatric: Mentation and affect appear normal/appropriate.  Vascular Access: None    Discharge Disposition   Discharged to home  Condition at discharge: Stable    Follow-up Labs/Appointments:    Consultations This Hospital Stay   BLOOD & BONE MARROW TRANSPLANT IP CONSULT  INFECTIOUS DISEASE TRANSPLANT HSCT/ HEME MALIG ADULT IP CONSULT  ENDOCRINOLOGY IP CONSULT  SMOKING CESSATION PROGRAM IP CONSULT    Discharge Orders     Medication Therapy Management Referral     Reason for your hospital stay   You were admitted for an enlarging pericardial effusion which was drained. Studies on the fluid did not show infection. 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 We started you on high dose steroids to treat this  issue and stopped your sirolimus in the mean time.     Activity   Your activity upon discharge: activity as tolerated     When to contact your care team   Call your primary doctor if you have any of the following:  increased shortness of breath, increased swelling or increased pain.     Adult [facility] Follow-up and recommended labs and tests   You will need full PFTs with [facility] in 1 week in [place] with PCP Dr. Delgado.    Endocrinology follow-up in 1 week.    You will need a complete echocardiogram in 1 week in [place].    You will follow-up with Dr. Quan in 4 weeks with right heart catheterization prior to appointment.    Appointments on [place] and/or [facility] (with [facility] or [facility] provider or service). Call [phone] if you haven't heard regarding these appointments within 7 days of discharge.     Discharge Instructions   You will be discharged with a slow prednisone taper due to pericardial effusion.  Hold sirolimus during this time.  We recommend you discuss current regimen with BMT doctors to allow for any changes they recommend. You will need to obtain pulmonary function testing and echocardiogram in 1 week to be followed-up by Dr. Quan. You will also be seen in Dr. Quan cardiology clinic in 4 weeks with right heart catheterization prior.  For diabetes continue levemir 10u daily and Januvia 50 + your correction scale.  Follow up with endocrinology for adjustments.     Diet   Follow this diet upon discharge: Orders Placed This Encounter     Regular Diet Adult       Discharge Medications   Discharge Medication List as of 5/1/2017  2:21 PM      START taking these medications    Details   traZODone (DESYREL) 50 MG tablet Take 1 tablet (50 mg) by mouth nightly as needed for sleep, Disp-90 tablet, R-0, E-Prescribe      blood glucose monitoring (ONE TOUCH ULTRA) test strip Use to test blood sugars 4 times daily or as directed., Disp-100 strip, R-11, E-Prescribe      sitagliptin (JANUVIA)  50 MG tablet Take 1 tablet (50 mg) by mouth daily, Disp-30 tablet, R-1, E-Prescribe      insulin detemir (LEVEMIR) 100 UNIT/ML injection Inject 10 Units Subcutaneous every morning, Disp-3 mL, R-1, E-Prescribe         CONTINUE these medications which have CHANGED    Details   !! predniSONE (DELTASONE) 1 MG tablet Take 5 tablets (5 mg) by mouth every other day, Historical      !! predniSONE (DELTASONE) 10 MG tablet Take 1 tablet (10 mg) by mouth daily, Disp-30 tablet, R-0, Local Print  50 mg BID x 7 days (4/30 - 5/6)    40 mg BID x 7 days (5/6 - 5/13)    60 mg daily x 7 days (5/14 - 5/20)    40 mg daily thereafter until discussion with BMT doctor       !! - Potential duplicate medications found. Please discuss with provider.      CONTINUE these medications which have NOT CHANGED    Details   Penicillin V Potassium (PEN-VEE K OR) Take 500 mg by mouth 2 times daily, Historical      fluticasone (FLOVENT HFA) 220 MCG/ACT Inhaler Inhale 2 puffs into the lungs 2 times daily, Historical      metoprolol (TOPROL-XL) 25 MG 24 hr tablet Take 1 tablet (25 mg) by mouth daily, Disp-90 tablet, R-3, E-Prescribe      FLUCONAZOLE PO Take 200 mg by mouth daily, Historical      NIFEdipine osmotic (NIFEDICAL XL) 30 MG 24 hr tablet Take 30 mg by mouth daily, Historical      Tadalafil (CIALIS PO) Take 10 mg by mouth daily, Historical      Ursodiol (ACTIGALL PO) Take 300 mg by mouth 3 times daily, Historical      VITAMIN D, CHOLECALCIFEROL, PO Take 2,000 Units by mouth 3 times daily , Historical      valGANciclovir (VALCYTE) 450 MG tablet Take 500 mg by mouth every 48 hours , Historical      CLONAZEPAM PO Take 1 mg by mouth At Bedtime , Historical      calcium carbonate (OS-PANFILO 500 MG [employer] [place] [place]) 500 MG tablet Take 600 mg by mouth 2 times daily, Historical      Pantoprazole Sodium (PROTONIX PO) Take 40 mg by mouth daily , Historical      Rosuvastatin Calcium (CRESTOR PO) Take 5 mg by mouth daily , Historical      MAGNESIUM OXIDE PO Take 800 mg  by mouth daily, Historical      atovaquone (MEPRON) 750 MG/5ML suspension Take 1,500 mg by mouth daily, Historical         STOP taking these medications       SIROLIMUS PO Comments:   Reason for Stopping:             Allergies   Allergies   Allergen Reactions     Tegaderm Transparent Dressing (Informational Only) Rash     Data   CBC    Recent Labs  Lab 05/01/17  0703 04/29/17  0406 04/28/17  0134 04/27/17  0500   WBC 11.4* 7.9 10.7 12.1*   RBC 4.46 4.18* 4.33* 4.60   HGB 12.6* 12.0* 12.6* 13.4   HCT 38.5* 35.4* 37.5* 40.1   MCV 86 85 87 87   MCH 28.3 28.7 29.1 29.1   MCHC 32.7 33.9 33.6 33.4   RDW 17.4* 17.2* 17.0* 17.4*    218 182 193     CMP  Recent Labs  Lab 05/01/17  0703 04/29/17  0406 04/28/17  0134 04/27/17  1349 04/27/17  0500  04/25/17  1200    137 134 134 133  < > 141   POTASSIUM 4.7 4.7 4.3 4.4 4.2  < > 3.9   CHLORIDE 106 106 103 102 102  < > 111*   CO2 20 22 23 23 22  < > 23   ANIONGAP 9 10 8 9 9  < > 7   * 210* 143* 162* 118*  < > 92   BUN 37* 30 28 26 27  < > 22   CR 1.75* 1.73* 2.03* 1.96* 2.09*  < > 1.61*   GFRESTIMATED 41* 42* 35* 36* 33*  < > 45*   GFRESTBLACK 50* 50* 42* 44* 40*  < > 55*   PANFILO 8.4* 8.4* 8.7 8.4* 8.2*  < > 9.1   MAG 2.4* 2.3 2.3  --  2.2  < >  --    PROTTOTAL  --   --   --   --   --   --  6.6*   ALBUMIN  --   --   --   --   --   --  3.8   BILITOTAL  --   --   --   --   --   --  0.9   ALKPHOS  --   --   --   --   --   --  91   AST  --   --   --   --   --   --  23   ALT  --   --   --   --   --   --  27   < > = values in this interval not displayed.  INR    Recent Labs  Lab 04/25/17  1200   INR 1.06     Nacho Maxwell MD, MPH  PGY-3, Internal Medicine   [phone]

## 2017-04-30 NOTE — PROGRESS NOTES
Diabetes Consult Daily  Progress Note          Assessment/Plan:   Mr. Olivier Gómez is a 52 yo man with a history of steroid induced diabetes, AML s/p BMT in 2011 with post transplant course complicated by GVHD, Raynaud's disease, and pulmonary HTN, who was admitted with enlarging pericardial effusion. Pericardial effusion may be due to GVHD, and Olivier is now receiving high dose steroids. Steroid tapered to prednisone 50mg BId on 4/30/17.    Glucoses ranging 180-200 yesterday evening to this morning with meal aspart 1 unit/8g and high intensity correction (no basal insulin).  Pt would like to avoid taking meal insulin at home if possible.  Steroid tapered today.    Plan:  -started detemir 10 units qAM this morning.  We'll adjust dose based on overnight glucoses.  -started sitagliptin 50mg daily today, depending on renal function we can increase to 100mg if needed.  -meal aspart discontinued with initiation of sitagliptin  -correction aspart: high intensity ac and hs  -monitor glucoses ac, hs, and 0200    Pt requests that we contact his outpatient endocrinologist, Dr. Lacey in [place], to update him on diabetes treatment plan.  We will call tomorrow.     Outpatient diabetes follow up: Dr. Lacey within 1 week of discharge.  Plan discussed with patient and primary team.    ADDENDUM: Steroid taper now in: pred 50mg BID x 7 days, 40mg BID x 7 days, total of 60mg daily x 7 days, and then continue tapering by 20mg weekly.  This schedule may be altered by pt's providers at [facility] after discharge. Close f/u with endocrinology outpatient will be needed for insulin adjustments.           Interval History:   The last 24 hours progress and nursing notes reviewed.  Steroid changed from MP 125mg to prednisone 50mg BID today.  Spoke with cardiology team and plan for now is to continue on this dose of pred for 1-2 more days, then taper to 40mg BID. Awaiting further taper schedule from  "BMT/pharmacy.    Olivier had no complaints today.  He continues to work on consuming <70g CHO with meals.  Sleep somewhat improved last night, but still up for awhile due to \"steroid buzz\".    When variable doses of prednisone in the past pt took detemir 10 units, and recalls glucoses running in the mid 100s to low 200s.    Last creatinine checked 4/29: 1.7.  Per Olivier, his baseline creatinine is usually 1.6-1.7.          Recent Labs  Lab 04/30/17  1227 04/30/17  0707 04/29/17  2113 04/29/17  1655 04/29/17  1306 04/29/17  1036  04/29/17  0406  04/28/17  0134 04/27/17  1349 04/27/17  0500 04/26/17  0324 04/25/17  1200   GLC  --   --   --   --   --   --   --  210*  --  143* 162* 118* 155* 92   * 180* 200* 196* 209* 209*  < >  --   < >  --   --   --   --   --    < > = values in this interval not displayed.            Review of Systems:   See interval hx          Medications:       Active Diet Order      Regular Diet Adult     Physical Exam:  Gen: Alert, resting in bed, in NAD, pt's wife is at bedside.  HEENT: NC/AT, mucous membranes are moist  Resp: Unlabored  Ext: gloves on bilateral hands   Neuro:oriented x3, communicating clearly  /75 (BP Location: Right arm)  Temp 98.3  F (36.8  C) (Oral)  Resp 18  Wt 123.5 kg (272 lb 3.2 oz)  SpO2 94%  BMI 32.28 kg/m2           Data:   No results found for: A1C           CBC RESULTS:   Recent Labs   Lab Test  04/29/17   0406   WBC  7.9   RBC  4.18*   HGB  12.0*   HCT  35.4*   MCV  85   MCH  28.7   MCHC  33.9   RDW  17.2*   PLT  218     Recent Labs   Lab Test  04/29/17   0406  04/28/17   0134   NA  137  134   POTASSIUM  4.7  4.3   CHLORIDE  106  103   CO2  22  23   ANIONGAP  10  8   GLC  210*  143*   BUN  30  28   CR  1.73*  2.03*   PANFILO  8.4*  8.7     Liver Function Studies -   Recent Labs   Lab Test  04/25/17   1200   PROTTOTAL  6.6*   ALBUMIN  3.8   BILITOTAL  0.9   ALKPHOS  91   AST  23   ALT  27     Lab Results   Component Value Date    INR 1.06 04/25/2017    INR " 1.04 04/11/2017    INR 1.0 07/14/2015     Susi San PA-C [phone]  Diabetes Management job code 1229

## 2017-05-01 ENCOUNTER — CARE COORDINATION (OUTPATIENT)
Dept: CARE COORDINATION | Facility: CLINIC | Age: 52
End: 2017-05-01

## 2017-05-01 ENCOUNTER — APPOINTMENT (OUTPATIENT)
Dept: CARDIOLOGY | Facility: CLINIC | Age: 52
DRG: 808 | End: 2017-05-01
Attending: INTERNAL MEDICINE
Payer: COMMERCIAL

## 2017-05-01 VITALS
OXYGEN SATURATION: 94 % | WEIGHT: 275.7 LBS | DIASTOLIC BLOOD PRESSURE: 82 MMHG | RESPIRATION RATE: 18 BRPM | BODY MASS INDEX: 32.69 KG/M2 | SYSTOLIC BLOOD PRESSURE: 117 MMHG | TEMPERATURE: 98 F

## 2017-05-01 LAB
ANA SER QL IA: 9.9
ANION GAP SERPL CALCULATED.3IONS-SCNC: 9 MMOL/L (ref 3–14)
BUN SERPL-MCNC: 37 MG/DL (ref 7–30)
CALCIUM SERPL-MCNC: 8.4 MG/DL (ref 8.5–10.1)
CHLORIDE SERPL-SCNC: 106 MMOL/L (ref 94–109)
CO2 SERPL-SCNC: 20 MMOL/L (ref 20–32)
CREAT SERPL-MCNC: 1.75 MG/DL (ref 0.66–1.25)
ERYTHROCYTE [DISTWIDTH] IN BLOOD BY AUTOMATED COUNT: 17.4 % (ref 10–15)
GFR SERPL CREATININE-BSD FRML MDRD: 41 ML/MIN/1.7M2
GLUCOSE BLDC GLUCOMTR-MCNC: 179 MG/DL (ref 70–99)
GLUCOSE SERPL-MCNC: 185 MG/DL (ref 70–99)
HCT VFR BLD AUTO: 38.5 % (ref 40–53)
HGB BLD-MCNC: 12.6 G/DL (ref 13.3–17.7)
MAGNESIUM SERPL-MCNC: 2.4 MG/DL (ref 1.6–2.3)
MCH RBC QN AUTO: 28.3 PG (ref 26.5–33)
MCHC RBC AUTO-ENTMCNC: 32.7 G/DL (ref 31.5–36.5)
MCV RBC AUTO: 86 FL (ref 78–100)
PLATELET # BLD AUTO: 277 10E9/L (ref 150–450)
POTASSIUM SERPL-SCNC: 4.7 MMOL/L (ref 3.4–5.3)
RBC # BLD AUTO: 4.46 10E12/L (ref 4.4–5.9)
SODIUM SERPL-SCNC: 136 MMOL/L (ref 133–144)
WBC # BLD AUTO: 11.4 10E9/L (ref 4–11)

## 2017-05-01 PROCEDURE — 25000132 ZZH RX MED GY IP 250 OP 250 PS 637: Performed by: PHYSICIAN ASSISTANT

## 2017-05-01 PROCEDURE — 83735 ASSAY OF MAGNESIUM: CPT | Performed by: INTERNAL MEDICINE

## 2017-05-01 PROCEDURE — 93325 DOPPLER ECHO COLOR FLOW MAPG: CPT | Mod: 26 | Performed by: INTERNAL MEDICINE

## 2017-05-01 PROCEDURE — 93308 TTE F-UP OR LMTD: CPT | Mod: 26 | Performed by: INTERNAL MEDICINE

## 2017-05-01 PROCEDURE — 25000131 ZZH RX MED GY IP 250 OP 636 PS 637: Performed by: STUDENT IN AN ORGANIZED HEALTH CARE EDUCATION/TRAINING PROGRAM

## 2017-05-01 PROCEDURE — 25000132 ZZH RX MED GY IP 250 OP 250 PS 637: Performed by: INTERNAL MEDICINE

## 2017-05-01 PROCEDURE — 80048 BASIC METABOLIC PNL TOTAL CA: CPT | Performed by: INTERNAL MEDICINE

## 2017-05-01 PROCEDURE — 93321 DOPPLER ECHO F-UP/LMTD STD: CPT | Mod: 26 | Performed by: INTERNAL MEDICINE

## 2017-05-01 PROCEDURE — 85027 COMPLETE CBC AUTOMATED: CPT | Performed by: INTERNAL MEDICINE

## 2017-05-01 PROCEDURE — 00000146 ZZHCL STATISTIC GLUCOSE BY METER IP

## 2017-05-01 PROCEDURE — 93308 TTE F-UP OR LMTD: CPT

## 2017-05-01 PROCEDURE — 36415 COLL VENOUS BLD VENIPUNCTURE: CPT | Performed by: INTERNAL MEDICINE

## 2017-05-01 PROCEDURE — 99238 HOSP IP/OBS DSCHRG MGMT 30/<: CPT | Mod: 24 | Performed by: INTERNAL MEDICINE

## 2017-05-01 PROCEDURE — 25000132 ZZH RX MED GY IP 250 OP 250 PS 637: Performed by: STUDENT IN AN ORGANIZED HEALTH CARE EDUCATION/TRAINING PROGRAM

## 2017-05-01 RX ORDER — TRAZODONE HYDROCHLORIDE 50 MG/1
50 TABLET, FILM COATED ORAL
Qty: 90 TABLET | Refills: 0 | Status: SHIPPED | OUTPATIENT
Start: 2017-05-01 | End: 2017-06-14

## 2017-05-01 RX ADMIN — PENICILLIN V POTASSIUM 500 MG: 500 TABLET, FILM COATED ORAL at 07:58

## 2017-05-01 RX ADMIN — PREDNISONE 50 MG: 50 TABLET ORAL at 07:59

## 2017-05-01 RX ADMIN — CALCIUM 1250 MG: 500 TABLET ORAL at 08:00

## 2017-05-01 RX ADMIN — PANTOPRAZOLE SODIUM 40 MG: 40 TABLET, DELAYED RELEASE ORAL at 08:00

## 2017-05-01 RX ADMIN — VITAMIN D, TAB 1000IU (100/BT) 2000 UNITS: 25 TAB at 13:55

## 2017-05-01 RX ADMIN — FLUCONAZOLE 200 MG: 200 TABLET ORAL at 07:59

## 2017-05-01 RX ADMIN — TADALAFIL 10 MG: 10 TABLET, FILM COATED ORAL at 08:05

## 2017-05-01 RX ADMIN — NIFEDIPINE 30 MG: 30 TABLET, FILM COATED, EXTENDED RELEASE ORAL at 07:59

## 2017-05-01 RX ADMIN — MAGNESIUM OXIDE TAB 400 MG (241.3 MG ELEMENTAL MG) 800 MG: 400 (241.3 MG) TAB at 07:59

## 2017-05-01 RX ADMIN — ROSUVASTATIN CALCIUM 5 MG: 5 TABLET ORAL at 07:59

## 2017-05-01 RX ADMIN — METOPROLOL SUCCINATE 25 MG: 25 TABLET, EXTENDED RELEASE ORAL at 07:59

## 2017-05-01 RX ADMIN — VITAMIN D, TAB 1000IU (100/BT) 2000 UNITS: 25 TAB at 08:00

## 2017-05-01 RX ADMIN — URSODIOL 300 MG: 300 CAPSULE ORAL at 08:00

## 2017-05-01 RX ADMIN — URSODIOL 300 MG: 300 CAPSULE ORAL at 13:55

## 2017-05-01 RX ADMIN — SITAGLIPTIN 50 MG: 50 TABLET, FILM COATED ORAL at 08:05

## 2017-05-01 RX ADMIN — INSULIN DETEMIR 10 UNITS: 100 INJECTION, SOLUTION SUBCUTANEOUS at 08:00

## 2017-05-01 RX ADMIN — FLUTICASONE PROPIONATE 2 PUFF: 220 AEROSOL, METERED RESPIRATORY (INHALATION) at 07:58

## 2017-05-01 RX ADMIN — ATOVAQUONE 1500 MG: 750 SUSPENSION ORAL at 08:00

## 2017-05-01 NOTE — DISCHARGE INSTRUCTIONS
Plan for discharge:  - detemir 10 units qAM this morning.   - sitagliptin 50mg daily   -correction aspart: high intensity before meals and bedtime  HIGH INSULIN RESISTANCE DOSING   Before Meals   Do Not give Correction Insulin if Pre-Meal BG less than 140.    For Pre-Meal  - 164 give 1 unit.    For Pre-Meal  - 189 give 2 units.    For Pre-Meal  - 214 give 3 units.    For Pre-Meal  - 239 give 4 units.    For Pre-Meal  - 264 give 5 units.    For Pre-Meal  - 289 give 6 units.    For Pre-Meal  - 314 give 7 units.    For Pre-Meal  - 339 give 8 units.    For Pre-Meal  - 364 give 9 units.    For Pre-Meal BG greater than or equal to 365 give 10 units     HIGH INSULIN RESISTANCE DOSING  Bedtime   Do Not give Bedtime Correction Insulin if BG less than 200.    For  - 224 give 1 units.    For  - 249 give 2 units.    For  - 274 give 3 units.    For  - 299 give 4 units.    For  - 324 give 5 units.    For  - 349 give 6 units.    For BG greater than or equal to 350 give 7 units.          -monitor glucoses ac, hs, and 0200 ( if awake)

## 2017-05-01 NOTE — PLAN OF CARE
Problem: Respiratory Insufficiency (Adult)  Goal: Identify Related Risk Factors and Signs and Symptoms  Related risk factors and signs and symptoms are identified upon initiation of Human Response Clinical Practice Guideline (CPG)  Outcome: No Change  2000-2330 shift  D/says he needs a med for sleep, the prednisone is preventing his sleep. He says Melatonin last night for sleep. Says he had pain pill last night for sleep  I/told him I would call MD and ask for a sleeping pill for him to see if this will help-called and MD said she would order something  P/monitor for changes    PIV none  D/He says he has had no PIV, and Dr Quan okayed no PIV

## 2017-05-01 NOTE — PLAN OF CARE
Problem: Goal Outcome Summary  Goal: Goal Outcome Summary  Outcome: Adequate for Discharge Date Met:  05/01/17  DISCHARGE   Discharged to: Home  Via: Automobile  Accompanied by: Family  Discharge Instructions: diet, activity, medications, follow up appointments, when to call the MD, and what to watchout for (i.e. s/s of infection, increasing SOB, palpitations, chest pain,)  Prescriptions: To be filled by [facility] pharmacy per pt's request; medication list reviewed & sent with pt; glucometer test strips sent to [facility] in [place], [place] per pt request  Follow Up Appointments: arranged; information given  Belongings: All sent with pt  IV: out  Telemetry: off  Pt exhibits understanding of above discharge instructions; all questions answered.  Discharge Paperwork: faxed

## 2017-05-01 NOTE — PROGRESS NOTES
Diabetes Consult Daily  Progress Note          Assessment/Plan:    Olivier Gómez is a 50 yo man with a history of steroid induced diabetes, AML s/p BMT in 2011 with post transplant course complicated by GVHD, Raynaud's disease, and pulmonary HTN, who was admitted with enlarging pericardial effusion. Pericardial effusion may be due to GVHD, and Olivier is now receiving high dose steroids.   Steroid tapered to prednisone 50mg BId on 4/30/17.     Plan for discharge:  - detemir 10 units qAM this morning.   - sitagliptin 50mg daily   -correction aspart: high intensity ac and hs  -monitor glucoses ac, hs, and 0200 ( if awake)     Called Dr. Marcial Hsu [facility] with diabetes update, clinic will be calling back  Copy of this note given to Mr. Gómez   I reviewed  the diabetes management plan with the primary team    I reviewed the last 24 hours progress notes           Interval History:   Started on detemir on (4/29) blood sugars are moderately controlled  Glucose 176 at HS, no glucose recorded at 0200  \fasting glucose 185  Appetite is good, no report of nausea and or vomiting  Reviewed foods for snacks and to keep snack carbohydrates to around 15 grams    Steroid taper: prednisone 50mg BID x 7 days ( started 4/30), 40mg BID x 7 days, total of 60mg daily x 7 days, and then continue tapering by 20mg weekly. This schedule may be altered by pt's providers at [facility] after discharge. Close f/u with endocrinology outpatient will be needed for insulin adjustments.        Recent Labs  Lab 05/01/17  0703 04/30/17  2122 04/30/17  1743 04/30/17  1343 04/30/17  1227 04/30/17  0707 04/29/17  2113  04/29/17  0406  04/28/17  0134 04/27/17  1349 04/27/17  0500 04/26/17  0324   *  --   --   --   --   --   --   --  210*  --  143* 162* 118* 155*   BGM  --  176* 209* 191* 190* 180* 200*  < >  --   < >  --   --   --   --    < > = values in this interval not displayed.            Review of Systems:       please see interval history       Medications:       Active Diet Order      Regular Diet Adult      Diet     Physical Exam:  Gen: Alert, resting in bed, in NAD   HEENT: NC/AT, mucous membranes are moist  Resp: Unlabored  Ext: moving all extremities  Neuro:oriented x3, communicating clearly  /69 (BP Location: Right arm)  Temp 97.8  F (36.6  C) (Oral)  Resp 18  Wt 125.1 kg (275 lb 11.2 oz)  SpO2 92%  BMI 32.69 kg/m2           Data:     Lab Results   Component Value Date    A1C 5.8 04/29/2017              CBC RESULTS:   Recent Labs   Lab Test  05/01/17 0703   WBC  11.4*   RBC  4.46   HGB  12.6*   HCT  38.5*   MCV  86   MCH  28.3   MCHC  32.7   RDW  17.4*   PLT  277     Recent Labs   Lab Test  05/01/17   0703  04/29/17   0406   NA  136  137   POTASSIUM  4.7  4.7   CHLORIDE  106  106   CO2  20  22   ANIONGAP  9  10   GLC  185*  210*   BUN  37*  30   CR  1.75*  1.73*   PANFILO  8.4*  8.4*     Liver Function Studies -   Recent Labs   Lab Test  04/25/17   1200   PROTTOTAL  6.6*   ALBUMIN  3.8   BILITOTAL  0.9   ALKPHOS  91   AST  23   ALT  27     Lab Results   Component Value Date    INR 1.06 04/25/2017    INR 1.04 04/11/2017    INR 1.0 07/14/2015           Shirley Lou, CNP pager [phone]  Diabetes Management Job Code 1537

## 2017-05-01 NOTE — PROGRESS NOTES
"[facility]  \"Hello, my name is Marisol Khan , and I am calling from the [facility].  I want to check in and see how you are doing, after leaving the hospital.  You may also receive a call from your Care Coordinator (care team), but I want to make sure you don t have any urgent needs.  I have a couple questions to review with you:     Post-Discharge Outreach                                                    Olivier Gómez is a 51 year old male     Follow-up Appointments           Adult [facility] Follow-up and recommended labs and tests       You will need full PFTs with DLCO in 1 week in [place] with PCP Dr. Delgado.     Endocrinology follow-up in 1 week.     You will need a complete echocardiogram in 1 week in [place].     You will follow-up with Dr. Quan in 4 weeks with right heart catheterization prior to appointment.     Appointments on [place] and/or [facility] campus (with [facility] or [facility] provider or service). Call [phone] if you haven't heard regarding these appointments within 7 days of discharge.                       Your next 10 appointments already scheduled            Jun 07, 2017 11:00 AM CDT   Lab with  LAB   [facility] ([facility])     [address] [place] [id number] [phone]                  Jun 07, 2017 11:30 AM CDT   (Arrive by 11:15 AM)   RETURN PRIMARY PULMONARY with Dk Quan MD   [facility] ([facility])               Care Team:    Patient Care Team       Relationship Specialty Notifications Start End    Adelso Delgado PCP - General Internal Medicine  4/11/17     Phone: [phone] Fax: [phone]         [facility] [address] [place] [address]    Adilson Nicholson MD     6/23/15     Comment:  Merged    Phone: [phone] Fax: [phone]         [facility] [address]  " [place] [address]    Fahad Bush MD Referring Physician Rheumatology  6/23/15     Phone: [phone] Fax: [phone]          [facility] [address] [facility] [address] [facility] [address]    Adilson Nicholson MD     6/23/15     Phone: [phone] Fax: [phone]         [facility] [address]  [place] [address]    Marlys Anderson, RN Registered Nurse Cardiology Admissions 7/14/15     Comment:  Pulmonary HTN    Phone: [phone] Pager: [phone] Fax: [phone]       Musa Martin MD Oncology  8/13/15     Phone: [phone] Fax: [phone]        [address]  [facility]  [address]      Marlys Anderson, RN Registered Nurse Cardiology Admissions 4/11/17     Comment:  Pulmonary HTN    Phone: [phone] Pager: [phone] Fax: [phone]               Transition of Care Review                                                      Did you have a surgery or procedure during your hospital visit? Yes   If yes, do you have any of the following:     Signs of infection:  NO    Pain:  No     Pain Scale (0-10) 0/10     Location: NA    Wound/incision concerns? NO    Do you have all of your medications/refills?  Yes    Are you having any side effects or questions about your medication(s)? No    Do you have any new or worsening symptoms?  No    Do you have any future appointments scheduled?   Yes             Plan                                                      Thanks for your time.  Your Care Coordinator may follow-up within the next couple days.  In the meantime if you have questions, concerns or problems call your care team.        Marisol Khan

## 2017-05-02 LAB
BACTERIA SPEC CULT: NORMAL
Lab: NORMAL
MICRO REPORT STATUS: NORMAL
SPECIMEN SOURCE: NORMAL

## 2017-05-08 ENCOUNTER — TRANSFERRED RECORDS (OUTPATIENT)
Dept: HEALTH INFORMATION MANAGEMENT | Facility: CLINIC | Age: 52
End: 2017-05-08

## 2017-05-15 ENCOUNTER — APPOINTMENT (OUTPATIENT)
Dept: MEDSURG UNIT | Facility: CLINIC | Age: 52
DRG: 809 | End: 2017-05-15
Attending: INTERNAL MEDICINE
Payer: COMMERCIAL

## 2017-05-15 ENCOUNTER — APPOINTMENT (OUTPATIENT)
Dept: CARDIOLOGY | Facility: CLINIC | Age: 52
DRG: 809 | End: 2017-05-15
Attending: INTERNAL MEDICINE
Payer: COMMERCIAL

## 2017-05-15 ENCOUNTER — HOSPITAL ENCOUNTER (INPATIENT)
Facility: CLINIC | Age: 52
LOS: 1 days | Discharge: HOME | DRG: 809 | End: 2017-05-16
Attending: INTERNAL MEDICINE | Admitting: INTERNAL MEDICINE
Payer: COMMERCIAL

## 2017-05-15 ENCOUNTER — APPOINTMENT (OUTPATIENT)
Dept: LAB | Facility: CLINIC | Age: 52
DRG: 809 | End: 2017-05-15
Attending: INTERNAL MEDICINE
Payer: COMMERCIAL

## 2017-05-15 DIAGNOSIS — I27.20 PULMONARY HYPERTENSION (H): Primary | ICD-10-CM

## 2017-05-15 DIAGNOSIS — I27.20 PULMONARY HYPERTENSION (H): ICD-10-CM

## 2017-05-15 DIAGNOSIS — I27.21 PULMONARY ARTERIAL HYPERTENSION (H): Primary | ICD-10-CM

## 2017-05-15 LAB
ALBUMIN SERPL-MCNC: 3.4 G/DL (ref 3.4–5)
ALP SERPL-CCNC: 56 U/L (ref 40–150)
ALT SERPL W P-5'-P-CCNC: 95 U/L (ref 0–70)
ANION GAP SERPL CALCULATED.3IONS-SCNC: 8 MMOL/L (ref 3–14)
AST SERPL W P-5'-P-CCNC: 28 U/L (ref 0–45)
BASOPHILS # BLD AUTO: 0 10E9/L (ref 0–0.2)
BASOPHILS NFR BLD AUTO: 0 %
BILIRUB SERPL-MCNC: 1 MG/DL (ref 0.2–1.3)
BUN SERPL-MCNC: 41 MG/DL (ref 7–30)
CALCIUM SERPL-MCNC: 8.7 MG/DL (ref 8.5–10.1)
CHLORIDE SERPL-SCNC: 104 MMOL/L (ref 94–109)
CO2 SERPL-SCNC: 23 MMOL/L (ref 20–32)
CREAT SERPL-MCNC: 1.71 MG/DL (ref 0.66–1.25)
CRP SERPL-MCNC: <2.9 MG/L (ref 0–8)
DIFFERENTIAL METHOD BLD: ABNORMAL
EOSINOPHIL # BLD AUTO: 0 10E9/L (ref 0–0.7)
EOSINOPHIL NFR BLD AUTO: 0 %
ERYTHROCYTE [DISTWIDTH] IN BLOOD BY AUTOMATED COUNT: 19.3 % (ref 10–15)
GFR SERPL CREATININE-BSD FRML MDRD: 42 ML/MIN/1.7M2
GLUCOSE BLDC GLUCOMTR-MCNC: 128 MG/DL (ref 70–99)
GLUCOSE BLDC GLUCOMTR-MCNC: 146 MG/DL (ref 70–99)
GLUCOSE BLDC GLUCOMTR-MCNC: 235 MG/DL (ref 70–99)
GLUCOSE SERPL-MCNC: 151 MG/DL (ref 70–99)
HCT VFR BLD AUTO: 43.4 % (ref 40–53)
HGB BLD-MCNC: 14.4 G/DL (ref 13.3–17.7)
IMM GRANULOCYTES # BLD: 0.1 10E9/L (ref 0–0.4)
IMM GRANULOCYTES NFR BLD: 0.6 %
LYMPHOCYTES # BLD AUTO: 1.5 10E9/L (ref 0.8–5.3)
LYMPHOCYTES NFR BLD AUTO: 9.5 %
MCH RBC QN AUTO: 28.6 PG (ref 26.5–33)
MCHC RBC AUTO-ENTMCNC: 33.2 G/DL (ref 31.5–36.5)
MCV RBC AUTO: 86 FL (ref 78–100)
MONOCYTES # BLD AUTO: 0.7 10E9/L (ref 0–1.3)
MONOCYTES NFR BLD AUTO: 4.1 %
NEUTROPHILS # BLD AUTO: 13.6 10E9/L (ref 1.6–8.3)
NEUTROPHILS NFR BLD AUTO: 85.8 %
NRBC # BLD AUTO: 0.1 10*3/UL
NRBC BLD AUTO-RTO: 0 /100
NT-PROBNP SERPL-MCNC: ABNORMAL PG/ML (ref 0–900)
PLATELET # BLD AUTO: 237 10E9/L (ref 150–450)
POTASSIUM SERPL-SCNC: 4.8 MMOL/L (ref 3.4–5.3)
PROT SERPL-MCNC: 5.8 G/DL (ref 6.8–8.8)
RBC # BLD AUTO: 5.03 10E12/L (ref 4.4–5.9)
SODIUM SERPL-SCNC: 136 MMOL/L (ref 133–144)
WBC # BLD AUTO: 15.9 10E9/L (ref 4–11)

## 2017-05-15 PROCEDURE — 40000275 ZZH STATISTIC RCP TIME EA 10 MIN

## 2017-05-15 PROCEDURE — 25000125 ZZHC RX 250: Performed by: STUDENT IN AN ORGANIZED HEALTH CARE EDUCATION/TRAINING PROGRAM

## 2017-05-15 PROCEDURE — 4A133B3 MONITORING OF ARTERIAL PRESSURE, PULMONARY, PERCUTANEOUS APPROACH: ICD-10-PCS | Performed by: INTERNAL MEDICINE

## 2017-05-15 PROCEDURE — 40000196 ZZH STATISTIC RAPCV CVP MONITORING

## 2017-05-15 PROCEDURE — 36415 COLL VENOUS BLD VENIPUNCTURE: CPT | Performed by: INTERNAL MEDICINE

## 2017-05-15 PROCEDURE — 85025 COMPLETE CBC W/AUTO DIFF WBC: CPT | Performed by: INTERNAL MEDICINE

## 2017-05-15 PROCEDURE — 27210787 ZZH MANIFOLD CR2

## 2017-05-15 PROCEDURE — 40000048 ZZH STATISTIC DAILY SWAN MONITORING

## 2017-05-15 PROCEDURE — 27210982 ZZH KIT RT HC TOTES DISP CR7

## 2017-05-15 PROCEDURE — 83880 ASSAY OF NATRIURETIC PEPTIDE: CPT | Performed by: INTERNAL MEDICINE

## 2017-05-15 PROCEDURE — 93321 DOPPLER ECHO F-UP/LMTD STD: CPT | Mod: 26 | Performed by: INTERNAL MEDICINE

## 2017-05-15 PROCEDURE — 4A023N6 MEASUREMENT OF CARDIAC SAMPLING AND PRESSURE, RIGHT HEART, PERCUTANEOUS APPROACH: ICD-10-PCS | Performed by: INTERNAL MEDICINE

## 2017-05-15 PROCEDURE — 93451 RIGHT HEART CATH: CPT

## 2017-05-15 PROCEDURE — 93308 TTE F-UP OR LMTD: CPT

## 2017-05-15 PROCEDURE — 20000004 ZZH R&B ICU UMMC

## 2017-05-15 PROCEDURE — 40000172 ZZH STATISTIC PROCEDURE PREP ONLY

## 2017-05-15 PROCEDURE — 93308 TTE F-UP OR LMTD: CPT | Mod: 26 | Performed by: INTERNAL MEDICINE

## 2017-05-15 PROCEDURE — 02HQ32Z INSERTION OF MONITORING DEVICE INTO RIGHT PULMONARY ARTERY, PERCUTANEOUS APPROACH: ICD-10-PCS | Performed by: INTERNAL MEDICINE

## 2017-05-15 PROCEDURE — 99221 1ST HOSP IP/OBS SF/LOW 40: CPT | Mod: 24 | Performed by: INTERNAL MEDICINE

## 2017-05-15 PROCEDURE — 25000132 ZZH RX MED GY IP 250 OP 250 PS 637: Performed by: STUDENT IN AN ORGANIZED HEALTH CARE EDUCATION/TRAINING PROGRAM

## 2017-05-15 PROCEDURE — 93325 DOPPLER ECHO COLOR FLOW MAPG: CPT | Mod: 26 | Performed by: INTERNAL MEDICINE

## 2017-05-15 PROCEDURE — C1894 INTRO/SHEATH, NON-LASER: HCPCS

## 2017-05-15 PROCEDURE — 25000128 H RX IP 250 OP 636: Performed by: STUDENT IN AN ORGANIZED HEALTH CARE EDUCATION/TRAINING PROGRAM

## 2017-05-15 PROCEDURE — 27211181 ZZH BALLOON TIP PRESSURE CR5

## 2017-05-15 PROCEDURE — 00000146 ZZHCL STATISTIC GLUCOSE BY METER IP

## 2017-05-15 PROCEDURE — 93010 ELECTROCARDIOGRAM REPORT: CPT | Performed by: INTERNAL MEDICINE

## 2017-05-15 PROCEDURE — 4A1239Z MONITORING OF CARDIAC OUTPUT, PERCUTANEOUS APPROACH: ICD-10-PCS | Performed by: INTERNAL MEDICINE

## 2017-05-15 PROCEDURE — 93005 ELECTROCARDIOGRAM TRACING: CPT

## 2017-05-15 PROCEDURE — 93451 RIGHT HEART CATH: CPT | Mod: 26 | Performed by: INTERNAL MEDICINE

## 2017-05-15 PROCEDURE — 80053 COMPREHEN METABOLIC PANEL: CPT | Performed by: INTERNAL MEDICINE

## 2017-05-15 PROCEDURE — 86140 C-REACTIVE PROTEIN: CPT | Performed by: INTERNAL MEDICINE

## 2017-05-15 RX ORDER — PENICILLIN V POTASSIUM 500 MG/1
500 TABLET, FILM COATED ORAL
Status: DISCONTINUED | OUTPATIENT
Start: 2017-05-15 | End: 2017-05-16 | Stop reason: HOSPADM

## 2017-05-15 RX ORDER — URSODIOL 300 MG/1
300 CAPSULE ORAL 3 TIMES DAILY
Status: DISCONTINUED | OUTPATIENT
Start: 2017-05-15 | End: 2017-05-16 | Stop reason: HOSPADM

## 2017-05-15 RX ORDER — TADALAFIL 5 MG/1
10 TABLET ORAL DAILY
Status: DISCONTINUED | OUTPATIENT
Start: 2017-05-15 | End: 2017-05-15

## 2017-05-15 RX ORDER — HYDROCODONE BITARTRATE AND ACETAMINOPHEN 5; 325 MG/1; MG/1
1 TABLET ORAL EVERY 6 HOURS PRN
Status: DISCONTINUED | OUTPATIENT
Start: 2017-05-15 | End: 2017-05-16 | Stop reason: HOSPADM

## 2017-05-15 RX ORDER — LIDOCAINE 40 MG/G
CREAM TOPICAL
Status: DISCONTINUED | OUTPATIENT
Start: 2017-05-15 | End: 2017-05-16 | Stop reason: HOSPADM

## 2017-05-15 RX ORDER — POLYETHYLENE GLYCOL 3350 17 G/17G
17 POWDER, FOR SOLUTION ORAL DAILY PRN
Status: DISCONTINUED | OUTPATIENT
Start: 2017-05-15 | End: 2017-05-16 | Stop reason: HOSPADM

## 2017-05-15 RX ORDER — TADALAFIL 5 MG/1
10 TABLET ORAL 2 TIMES DAILY
Status: DISCONTINUED | OUTPATIENT
Start: 2017-05-15 | End: 2017-05-15

## 2017-05-15 RX ORDER — CALCIUM CARBONATE 500(1250)
1250 TABLET ORAL 2 TIMES DAILY
Status: DISCONTINUED | OUTPATIENT
Start: 2017-05-15 | End: 2017-05-16 | Stop reason: HOSPADM

## 2017-05-15 RX ORDER — MAGNESIUM OXIDE 400 MG/1
800 TABLET ORAL DAILY
Status: DISCONTINUED | OUTPATIENT
Start: 2017-05-16 | End: 2017-05-16 | Stop reason: HOSPADM

## 2017-05-15 RX ORDER — ROSUVASTATIN CALCIUM 5 MG/1
5 TABLET, COATED ORAL DAILY
Status: DISCONTINUED | OUTPATIENT
Start: 2017-05-16 | End: 2017-05-16 | Stop reason: HOSPADM

## 2017-05-15 RX ORDER — DIGOXIN 125 MCG
125 TABLET ORAL DAILY
Status: DISCONTINUED | OUTPATIENT
Start: 2017-05-15 | End: 2017-05-16 | Stop reason: HOSPADM

## 2017-05-15 RX ORDER — METOPROLOL SUCCINATE 25 MG/1
25 TABLET, EXTENDED RELEASE ORAL DAILY
Status: DISCONTINUED | OUTPATIENT
Start: 2017-05-15 | End: 2017-05-16 | Stop reason: HOSPADM

## 2017-05-15 RX ORDER — ALUMINA, MAGNESIA, AND SIMETHICONE 2400; 2400; 240 MG/30ML; MG/30ML; MG/30ML
15-30 SUSPENSION ORAL EVERY 4 HOURS PRN
Status: DISCONTINUED | OUTPATIENT
Start: 2017-05-15 | End: 2017-05-16 | Stop reason: HOSPADM

## 2017-05-15 RX ORDER — NIFEDIPINE 30 MG/1
30 TABLET, EXTENDED RELEASE ORAL DAILY
Status: DISCONTINUED | OUTPATIENT
Start: 2017-05-16 | End: 2017-05-16 | Stop reason: HOSPADM

## 2017-05-15 RX ORDER — LIDOCAINE 40 MG/G
CREAM TOPICAL
Status: COMPLETED | OUTPATIENT
Start: 2017-05-15 | End: 2017-05-15

## 2017-05-15 RX ORDER — DEXTROSE MONOHYDRATE 25 G/50ML
25-50 INJECTION, SOLUTION INTRAVENOUS
Status: DISCONTINUED | OUTPATIENT
Start: 2017-05-15 | End: 2017-05-16 | Stop reason: HOSPADM

## 2017-05-15 RX ORDER — ONDANSETRON 2 MG/ML
4 INJECTION INTRAMUSCULAR; INTRAVENOUS EVERY 6 HOURS PRN
Status: DISCONTINUED | OUTPATIENT
Start: 2017-05-15 | End: 2017-05-16 | Stop reason: HOSPADM

## 2017-05-15 RX ORDER — TADALAFIL 20 MG/1
40 TABLET ORAL DAILY
Status: DISCONTINUED | OUTPATIENT
Start: 2017-05-16 | End: 2017-05-16 | Stop reason: HOSPADM

## 2017-05-15 RX ORDER — VALGANCICLOVIR 450 MG/1
450 TABLET, FILM COATED ORAL
Status: DISCONTINUED | OUTPATIENT
Start: 2017-05-16 | End: 2017-05-16 | Stop reason: HOSPADM

## 2017-05-15 RX ORDER — PREDNISONE 10 MG/1
10 TABLET ORAL DAILY
Status: DISCONTINUED | OUTPATIENT
Start: 2017-05-15 | End: 2017-05-15

## 2017-05-15 RX ORDER — MORPHINE SULFATE 2 MG/ML
1 INJECTION, SOLUTION INTRAMUSCULAR; INTRAVENOUS
Status: DISCONTINUED | OUTPATIENT
Start: 2017-05-15 | End: 2017-05-16 | Stop reason: HOSPADM

## 2017-05-15 RX ORDER — ACETAMINOPHEN 650 MG/1
650 SUPPOSITORY RECTAL EVERY 4 HOURS PRN
Status: DISCONTINUED | OUTPATIENT
Start: 2017-05-15 | End: 2017-05-16 | Stop reason: HOSPADM

## 2017-05-15 RX ORDER — FLUCONAZOLE 200 MG/1
200 TABLET ORAL DAILY
Status: DISCONTINUED | OUTPATIENT
Start: 2017-05-15 | End: 2017-05-16 | Stop reason: HOSPADM

## 2017-05-15 RX ORDER — ATOVAQUONE 750 MG/5ML
1500 SUSPENSION ORAL DAILY
Status: DISCONTINUED | OUTPATIENT
Start: 2017-05-15 | End: 2017-05-16 | Stop reason: HOSPADM

## 2017-05-15 RX ORDER — PANTOPRAZOLE SODIUM 40 MG/1
40 TABLET, DELAYED RELEASE ORAL DAILY
Status: DISCONTINUED | OUTPATIENT
Start: 2017-05-16 | End: 2017-05-16 | Stop reason: HOSPADM

## 2017-05-15 RX ORDER — TADALAFIL 20 MG/1
40 TABLET ORAL DAILY
Status: DISCONTINUED | OUTPATIENT
Start: 2017-05-16 | End: 2017-05-15

## 2017-05-15 RX ORDER — ASPIRIN 81 MG/1
324 TABLET, CHEWABLE ORAL ONCE
Status: COMPLETED | OUTPATIENT
Start: 2017-05-15 | End: 2017-05-15

## 2017-05-15 RX ORDER — AMOXICILLIN 250 MG
1-2 CAPSULE ORAL 2 TIMES DAILY
Status: DISCONTINUED | OUTPATIENT
Start: 2017-05-15 | End: 2017-05-16 | Stop reason: HOSPADM

## 2017-05-15 RX ORDER — NICOTINE POLACRILEX 4 MG
15-30 LOZENGE BUCCAL
Status: DISCONTINUED | OUTPATIENT
Start: 2017-05-15 | End: 2017-05-16 | Stop reason: HOSPADM

## 2017-05-15 RX ORDER — ONDANSETRON 4 MG/1
4 TABLET, ORALLY DISINTEGRATING ORAL EVERY 6 HOURS PRN
Status: DISCONTINUED | OUTPATIENT
Start: 2017-05-15 | End: 2017-05-16 | Stop reason: HOSPADM

## 2017-05-15 RX ORDER — PREDNISONE 5 MG/1
5 TABLET ORAL EVERY OTHER DAY
Status: DISCONTINUED | OUTPATIENT
Start: 2017-06-11 | End: 2017-05-15

## 2017-05-15 RX ORDER — ACETAMINOPHEN 325 MG/1
650 TABLET ORAL EVERY 4 HOURS PRN
Status: DISCONTINUED | OUTPATIENT
Start: 2017-05-15 | End: 2017-05-16 | Stop reason: HOSPADM

## 2017-05-15 RX ORDER — NALOXONE HYDROCHLORIDE 0.4 MG/ML
.1-.4 INJECTION, SOLUTION INTRAMUSCULAR; INTRAVENOUS; SUBCUTANEOUS
Status: DISCONTINUED | OUTPATIENT
Start: 2017-05-15 | End: 2017-05-16 | Stop reason: HOSPADM

## 2017-05-15 RX ORDER — NITROGLYCERIN 0.4 MG/1
0.4 TABLET SUBLINGUAL EVERY 5 MIN PRN
Status: DISCONTINUED | OUTPATIENT
Start: 2017-05-15 | End: 2017-05-15

## 2017-05-15 RX ADMIN — ASPIRIN 81 MG CHEWABLE TABLET 324 MG: 81 TABLET CHEWABLE at 20:08

## 2017-05-15 RX ADMIN — PENICILLIN V POTASSIUM 500 MG: 500 TABLET, FILM COATED ORAL at 21:24

## 2017-05-15 RX ADMIN — PREDNISONE 30 MG: 20 TABLET ORAL at 20:13

## 2017-05-15 RX ADMIN — ATOVAQUONE 1500 MG: 750 SUSPENSION ORAL at 21:24

## 2017-05-15 RX ADMIN — FLUCONAZOLE 200 MG: 200 TABLET ORAL at 20:13

## 2017-05-15 RX ADMIN — ACETAMINOPHEN 650 MG: 325 TABLET, FILM COATED ORAL at 18:34

## 2017-05-15 RX ADMIN — URSODIOL 300 MG: 300 CAPSULE ORAL at 20:15

## 2017-05-15 RX ADMIN — METOPROLOL SUCCINATE 25 MG: 25 TABLET, EXTENDED RELEASE ORAL at 20:13

## 2017-05-15 RX ADMIN — HYDROCODONE BITARTRATE AND ACETAMINOPHEN 1 TABLET: 5; 325 TABLET ORAL at 20:25

## 2017-05-15 RX ADMIN — DIGOXIN 125 MCG: 0.12 TABLET ORAL at 20:13

## 2017-05-15 RX ADMIN — ENOXAPARIN SODIUM 40 MG: 40 INJECTION SUBCUTANEOUS at 20:08

## 2017-05-15 RX ADMIN — LIDOCAINE: 40 CREAM TOPICAL at 14:30

## 2017-05-15 RX ADMIN — VITAMIN D, TAB 1000IU (100/BT) 2000 UNITS: 25 TAB at 20:08

## 2017-05-15 RX ADMIN — CALCIUM 1250 MG: 500 TABLET ORAL at 20:08

## 2017-05-15 ASSESSMENT — ACTIVITIES OF DAILY LIVING (ADL)
NUMBER_OF_TIMES_PATIENT_HAS_FALLEN_WITHIN_LAST_SIX_MONTHS: 1
COMMUNICATION: 0-->UNDERSTANDS/COMMUNICATES WITHOUT DIFFICULTY
COGNITION: 0 - NO COGNITION ISSUES REPORTED
CHANGE_IN_FUNCTIONAL_STATUS_SINCE_ONSET_OF_CURRENT_ILLNESS/INJURY: NO
RETIRED_COMMUNICATION: 0-->UNDERSTANDS/COMMUNICATES WITHOUT DIFFICULTY
TOILETING: 0-->INDEPENDENT
FALL_HISTORY_WITHIN_LAST_SIX_MONTHS: YES
SWALLOWING: 0-->SWALLOWS FOODS/LIQUIDS WITHOUT DIFFICULTY
AMBULATION: 0-->INDEPENDENT
DRESS: 0-->INDEPENDENT
EATING: 0-->INDEPENDENT
WHICH_OF_THE_ABOVE_FUNCTIONAL_RISKS_HAD_A_RECENT_ONSET_OR_CHANGE?: FALL HISTORY
SWALLOWING: 0-->SWALLOWS FOODS/LIQUIDS WITHOUT DIFFICULTY
TRANSFERRING: 0-->INDEPENDENT
BATHING: 0-->INDEPENDENT
RETIRED_EATING: 0-->INDEPENDENT
AMBULATION: 0-->INDEPENDENT
TOILETING: 0-->INDEPENDENT
BATHING: 0-->INDEPENDENT
TRANSFERRING: 0-->INDEPENDENT
DRESS: 0-->INDEPENDENT

## 2017-05-15 ASSESSMENT — PAIN DESCRIPTION - DESCRIPTORS: DESCRIPTORS: ACHING

## 2017-05-15 NOTE — CONSULTS
"Dk Quan M.D.  Cardiovascular Medicine    I personally saw and examined this patient, discussed care with housestaff and other consultants, reviewed current laboratories and imaging studies, and conveyed impression and diagnostic/therapeutic plan to patient.    Problem List  1. Remote history of AML  2. History of  Bone marrow transplantation  3. GVH/scleroderma phenotype  4. Pulmonary hypertension  5. History of large pericardial effusion  6. Pericardial drainage  7. High dose steroids on taper for GVH  8. CKD  9. Leukocytosis secondary to steroids    Plan:  1. Increase tadalafil to 40QD  2. Obtain approval for second agent, inhaled prostacyclin  3. Bone marrow GVH people to see  4. Outpatient PET for sarcoid (sic)  5. Please obtain quantitative immunoglobulins, GENEVIEVE, serum protein elp.  6. Please have rheumatology see before discharge  7. Zebulon Chris can be discontinued 5/16    History    White male well known to me seen and admitted for continuing evaluation of several PH as a consequence of GVH disease.  Echocardiogram and hemodynamics show: markedly elevated PA pressure, PVR of 8.5.  Compliance 96.9/61    Objective  /84  Temp 97.5  F (36.4  C) (Oral)  Resp 20  Ht 1.956 m (6' 5\")  Wt 124.7 kg (275 lb)  SpO2 98%  BMI 32.61 kg/m2  No intake or output data in the 24 hours ending 05/15/17 1824  Wt Readings from Last 5 Encounters:   05/15/17 124.7 kg (275 lb)   05/01/17 125.1 kg (275 lb 11.2 oz)   04/11/17 129.5 kg (285 lb 8 oz)   04/12/16 133.8 kg (294 lb 15.6 oz)   08/26/15 133.8 kg (294 lb 15.6 oz)       Meds    sodium chloride (PF)  3 mL Intracatheter Q8H     aspirin  324 mg Oral Once     enoxaparin  40 mg Subcutaneous Q24H     senna-docusate  1-2 tablet Oral BID       Labs  CMP  Recent Labs  Lab 05/15/17  1153      POTASSIUM 4.8   CHLORIDE 104   CO2 23   ANIONGAP 8   *   BUN 41*   CR 1.71*   GFRESTIMATED 42*   GFRESTBLACK 51*   PANFILO 8.7   PROTTOTAL 5.8*   ALBUMIN 3.4   BILITOTAL 1.0 "   ALKPHOS 56   AST 28   ALT 95*     CBC  Recent Labs  Lab 05/15/17  1153   WBC 15.9*   RBC 5.03   HGB 14.4   HCT 43.4   MCV 86   MCH 28.6   MCHC 33.2   RDW 19.3*            Imaging   1. HR 63 bpm  2. /80/96 mmHg  3. RA 9   4. /8  5. /40/62   6. PCW 12   7. PA sat 69.2%   8. PCW sat 94.9%  9. Hgb 14.4 g/dL   10. Carter CO 6.1   11. Carter CI 2.4   12. TD CO 5.8   13. TD CI 2.3   14. PVR 8.1  15. TPR 10.1  16. SVR 1140    Compliance:  96.8/61      Name: BENEDICT ESTES  MRN: [id number]  : 1965  Study Date: 05/15/2017 12:16 PM  Age: 52 yrs  Gender: Male  Patient Location: [facility]  Reason For Study: [facility]  Ordering Physician: PILI SEGURA  Referring Physician: PILI SEGURA  Performed By: [facility]     BSA: 2.6 m2  Height: 77 in  Weight: 275 lb  HR: 79  _____________________________________________________________________________  __        Procedure  Limited Echocardiogram with portions of two-dimensional, color and spectral  Doppler performed.  _____________________________________________________________________________  __        Interpretation Summary  Moderate to severe right ventricular dilation is present.  Global right ventricular function is mildly reduced.  Right ventricular systolic pressure is 85mmHg above the right atrial pressure.  PV acceleration time 70ms.  Dilation of the inferior vena cava is present with abnormal respiratory  variation in diameter.  Trivial pericardial effusion is present.  _____________________________________________________________________________  __        Left Ventricle  Global and regional left ventricular function is normal with an EF of 60-65%.  Paradoxical septal motion consistent with right ventricular pressure and  volume overload is present.     Right Ventricle  Moderate to severe right ventricular dilation is present. Global right  ventricular function is mildly reduced.     Tricuspid Valve  Trace to mild tricuspid  insufficiency is present. Right ventricular systolic  pressure is 85mmHg above the right atrial pressure.     Pulmonic Valve  Trace pulmonic insufficiency is present. PV acceleration time 70ms.        Vessels  Dilation of the inferior vena cava is present with abnormal respiratory  variation in diameter.     Pericardium  Trivial pericardial effusion is present.  _____________________________________________________________________________  __           Doppler Measurements & Calculations  PA acc time: 0.07 sec  TR max nieves: 460.0 cm/sec  TR max P.6 mmHg

## 2017-05-15 NOTE — H&P
CARDIOLOGY CARDS 2 HISTORY AND PHYSICAL     PC:   Admission for evaluation of PH     HPI:  Mr. Olivier Gómez is a 52-year old male with a PMHx of AML s/p BMT (itself c/b GvHD), T2DM and a recent admission for pericardial effusion with early tamponade who was electively admitted on 05/15 for evaluation of RV function.     Mr. Gómez was recently admitted to [facility] under Dr. Quan for management of a large pericardial effusion which was incidentally discovered on a routine TTE. At this point he had RHC and then subsequent pericardiocentesis of 1.1 L of fluid. The RHC revealed severe pulmonary hypertension with poor RV function. This prompted admission to [facility], where Mr. Gómez was found to eventually have a exudative effusion with negative gram stain, cultures and adenosine deaminase. The effusion was thought to be due to sirolimus pericardial toxicity vs. chronic GVHD.    Since going home, Mr. Gómez notes progressive weakness. He notes that his exercise tolerance still allows him to walk him 30 minutes on level ground without stopping, albeit slowly. Mr. Gómez also had three episodes of near syncope since his discharge, one of which occurred while he had taken multiple clonazepam pills. This prompted his wife to alert EMS and then transfer him to the nearest ER. This was thought to be due to sedatives. He was discharged without being admitted. Mr. Gómez notes progressive weakness while being on the steroids but without overt syncope, angina, fever, chills/rigors, hemoptysis, leg symptoms of DVT, or abdo distension/peripheral edema.     Today, Mr. Gómez was having an electively scheduled RHC. His PA pressures were noted to be elevated despite tadalafil usage, so he was admitted for inpatient evaluation of PH as well as commencement of a second agent.     At the time of the consultation, Mr. Gómez was without complaints.     ROS otherwise negative .      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 Authorizing Provider   traZODone (DESYREL) 50 MG tablet Take 1 tablet (50 mg) by mouth nightly as needed for sleep 5/1/17  Yes Gene Maxwell MD   insulin detemir (LEVEMIR) 100 UNIT/ML injection Inject 10 Units Subcutaneous every morning 4/30/17 5/30/17 Yes Dk Quan MD   predniSONE (DELTASONE) 10 MG tablet Take 1 tablet (10 mg) by mouth daily 5/1/17  Yes Dk Quan MD   Penicillin V Potassium (PEN-VEE K OR) Take 500 mg by mouth 2 times daily   Yes Reported, Patient   fluticasone (FLOVENT HFA) 220 MCG/ACT Inhaler Inhale 2 puffs into the lungs 2 times daily   Yes Reported, Patient   metoprolol (TOPROL-XL) 25 MG 24 hr tablet Take 1 tablet (25 mg) by mouth daily 11/29/16  Yes Dk Quan MD   FLUCONAZOLE PO Take 200 mg by mouth daily   Yes Reported, Patient   NIFEdipine osmotic (NIFEDICAL XL) 30 MG 24 hr tablet Take 30 mg by mouth daily   Yes Reported, Patient   Tadalafil (CIALIS PO) Take 10 mg by mouth daily   Yes Reported, Patient   Ursodiol (ACTIGALL PO) Take 300 mg by mouth 3 times daily   Yes Reported, Patient   VITAMIN D, CHOLECALCIFEROL, PO Take 2,000 Units  by mouth 3 times daily    Yes Reported, Patient   valGANciclovir (VALCYTE) 450 MG tablet Take 500 mg by mouth every 48 hours    Yes Reported, Patient   CLONAZEPAM PO Take 1 mg by mouth At Bedtime    Yes Reported, Patient   calcium carbonate (OS-PANFILO 500 MG [place]) 500 MG tablet Take 600 mg by mouth 2 times daily   Yes Reported, Patient   Pantoprazole Sodium (PROTONIX PO) Take 40 mg by mouth daily    Yes Reported, Patient   Rosuvastatin Calcium (CRESTOR PO) Take 5 mg by mouth daily    Yes Reported, Patient   atovaquone (MEPRON) 750 MG/5ML suspension Take 1,500 mg by mouth daily   Yes Reported, Patient   blood glucose monitoring (ONE TOUCH ULTRA) test strip Use to test blood sugars 4 times daily or as directed. 5/1/17   Gene Maxwell MD   predniSONE (DELTASONE) 1 MG tablet Take 5 tablets (5 mg) by mouth every other day 6/11/17   Dk Quan MD   MAGNESIUM OXIDE PO Take 800 mg by mouth daily    Reported, Patient       VITAL SIGNS:  Temp: 97.5  F (36.4  C) Temp src: Oral BP: 129/84   Heart Rate: 57 Resp: 20 SpO2: 98 % (ear probe) O2 Device: None (Room air)      275 lbs 0 oz        PHYSICAL EXAM:  Gen: Looks well  HEENT: MMM, no oxygen, RIJ [place]   Resp: No signs of resp distress, CTAB   CVS: RIJ [place], no heaves or thrills, pulse regular, S1+S2 with loudly split S2 with loud P2  Abdo: ND, S, NT, no HSM, +BS  Extremities: Warm, well-perfused  Neuro: GCS 15/15, AAOx3    Labs:   Recent Labs   Lab Test  05/15/17   1153   HGB  14.4   HCT  43.4   WBC  15.9*   MCV  86   MCH  28.6   MCHC  33.2   RDW  19.3*   PLT  237   NA  136   POTASSIUM  4.8   CHLORIDE  104   CO2  23   BUN  41*   CR  1.71*   GLC  151*   PANFILO  8.7   ALBUMIN  3.4   BILITOTAL  1.0   ALKPHOS  56   AST  28   ALT  95*       EKG 05/15/17:  Rate approx 68 bpm, LAE, incomplete RBBB without ischemic changes     TTE 05/15:  Interpretation Summary  Moderate to severe right ventricular dilation is present.  Global right ventricular function is mildly  reduced.  Right ventricular systolic pressure is 85mmHg above the right atrial pressure.  PV acceleration time 70ms.  Dilation of the inferior vena cava is present with abnormal respiratory  variation in diameter.  Trivial pericardial effusion is present.    RHC 04/25:  BSA 1.96  1. HR 72 bpm  2. /79/94 mmHg  3. RA 15/13/12   4. /16  5. /37/60   6. PCW 15/20/16   7. PA sat 71.8%   8. PCW sat not obtained  9. Hgb 12.7 g/dL   10. Carter CO 7.8   11. Carter CI 3.0   12. PVR 5.6     RHC 05/15:    BSA 2.6  1. HR 63 bpm  2. /80/96 mmHg  3. RA 9   4. /8  5. /40/62   6. PCW 12   7. PA sat 69.2%   8. PCW sat 94.9%  9. Hgb 14.4 g/dL   10. Carter CO 6.1   11. Carter CI 2.4   12. TD CO 5.8   13. TD CI 2.3   14. PVR 8.1  15. TPR 10.1  16. SVR 1140      ASSESSMENT/PLAN:  Mr. Olivier Gómez is a 52-year old male with a PMHx of AML c/b GVHD who presented for evaluation of PH directly from the cath lab.     - RV dysfunction and PH (likely multiple etiologies)   - Increase tadalafil to 40 mg q24h    - Start digoxin 125 micrograms daily   - Will consider commencement of prostanoids or ERAs    - Volume optimized at present without clear need for diuresis   - Will not start BB or ACEi given preserved LV function    - Consider V/Q scan to r/o CTEPH in view of UE DVTs   - Per Dr. Quan, to consider OP PET to r/o sarcoidosis    - Evaluate overnight to see if PA pressures are variable     - AML c/b GvHD   - Consult Dr. Robert to rule out GVHD as the inciting factor for PH    To be staffed on 05/16 with Dr. Cervantes.       Brian Barlow   Cardiology Fellow  [phone]

## 2017-05-15 NOTE — PROGRESS NOTES
Prep and teaching complete for Rheart cath with swan placement, pt admitted directly from clinic and plan for admit to ICU after. Danielle, wife at bedside, will wait in [facility]; Danielle's phone--[phone]..

## 2017-05-15 NOTE — PROCEDURES
CARDIAC CATH REPORT:     PROCEDURES PERFORMED:   Right Heart Catheterization    PHYSICIANS:  Attending Physician: John Titus MD  Cardiology Fellow: REGINALDO Schafer MD, PhD    INDICATION:  Olivier Gómez is a 52 year old male with hx of AML s/p BMT 2011, scleroderma, pulmonary hypertension (on tadalafil, mean PA 60). Planned for leave-in swan to guide PH therapies    DESCRIPTION:  1. Consent obtained with discussion of risks.  All questions were answered.  2. Sterile prep and procedure.  3. Location with Sheaths:   Rt IJ  7 Fr 10 cm [short]  4. Access: Local anesthetic with lidocaine.  A standard 18 guage needle with ultrasound guidance was used to establish vascular access using a modified Seldinger technique.  6. Estimated blood loss: < 5 ml    MEDICATIONS:  No sedation or any meds were given.    Procedures:    HEMODYNAMICS:  BSA 2.6  1. HR 63 bpm  2. /80/96 mmHg  3. RA 9   4. /8  5. /40/62   6. PCW 12   7. PA sat 69.2%   8. PCW sat 94.9%  9. Hgb 14.4 g/dL   10. Carter CO 6.1   11. Carter CI 2.4   12. TD CO 5.8   13. TD CI 2.3   14. PVR 8.1  15. TPR 10.1  16. SVR  1140    Contrast: Isovue, 0 ml     Fluoroscopy Time: 1.0 min    COMPLICATIONS:  1. None    SUMMARY:   >> Normal right sided filling pressures.  >> Normal left sided filling pressures.  >> Severe pulmonary artery hypertension  >> Normal cardiac output, 6.1 L/min with index 2.4 L/min/m2   >> Clyde left in at 54cm at the hub in the Right PA    PLAN:   >> Bedrest per protocol.  >> Continued medical management and lifestyle modification for cardiovascular risk factor optimization.   >> Admit to ICU for swan guided therapies    The attending interventional cardiologist was present and supervised all critical aspects the procedure.    Findings discussed with Dr. Quan.    REGINALDO Schafer MD, PhD   Cardiology Fellow

## 2017-05-15 NOTE — PLAN OF CARE
Problem: Individualization  Goal: Patient Preferences  Outcome: No Change  Patient received from cath lab. Patient belongings in room, wife at bedside. Patient alert and oriented. Vital signs stable. Satting well on room air. Continue to monitor patient status and notify MD of significant changes.

## 2017-05-16 ENCOUNTER — APPOINTMENT (OUTPATIENT)
Dept: CARDIOLOGY | Facility: CLINIC | Age: 52
DRG: 809 | End: 2017-05-16
Attending: INTERNAL MEDICINE
Payer: COMMERCIAL

## 2017-05-16 VITALS
BODY MASS INDEX: 32.47 KG/M2 | OXYGEN SATURATION: 96 % | TEMPERATURE: 98.7 F | WEIGHT: 275 LBS | SYSTOLIC BLOOD PRESSURE: 131 MMHG | HEIGHT: 77 IN | DIASTOLIC BLOOD PRESSURE: 93 MMHG | RESPIRATION RATE: 20 BRPM

## 2017-05-16 LAB
ANION GAP SERPL CALCULATED.3IONS-SCNC: 8 MMOL/L (ref 3–14)
BASE DEFICIT BLDV-SCNC: 1.9 MMOL/L
BUN SERPL-MCNC: 35 MG/DL (ref 7–30)
CALCIUM SERPL-MCNC: 7.9 MG/DL (ref 8.5–10.1)
CHLORIDE SERPL-SCNC: 103 MMOL/L (ref 94–109)
CO2 SERPL-SCNC: 22 MMOL/L (ref 20–32)
CREAT SERPL-MCNC: 1.52 MG/DL (ref 0.66–1.25)
ERYTHROCYTE [DISTWIDTH] IN BLOOD BY AUTOMATED COUNT: 19.3 % (ref 10–15)
GFR SERPL CREATININE-BSD FRML MDRD: 48 ML/MIN/1.7M2
GLUCOSE BLDC GLUCOMTR-MCNC: 150 MG/DL (ref 70–99)
GLUCOSE BLDC GLUCOMTR-MCNC: 162 MG/DL (ref 70–99)
GLUCOSE BLDC GLUCOMTR-MCNC: 180 MG/DL (ref 70–99)
GLUCOSE SERPL-MCNC: 212 MG/DL (ref 70–99)
HCO3 BLDV-SCNC: 23 MMOL/L (ref 21–28)
HCT VFR BLD AUTO: 43.7 % (ref 40–53)
HGB BLD-MCNC: 14.8 G/DL (ref 13.3–17.7)
INTERPRETATION ECG - MUSE: NORMAL
MCH RBC QN AUTO: 29.3 PG (ref 26.5–33)
MCHC RBC AUTO-ENTMCNC: 33.9 G/DL (ref 31.5–36.5)
MCV RBC AUTO: 87 FL (ref 78–100)
O2/TOTAL GAS SETTING VFR VENT: ABNORMAL %
OXYHGB MFR BLDV: 60 %
PCO2 BLDV: 36 MM HG (ref 40–50)
PH BLDV: 7.4 PH (ref 7.32–7.43)
PLATELET # BLD AUTO: 189 10E9/L (ref 150–450)
PO2 BLDV: 35 MM HG (ref 25–47)
POTASSIUM SERPL-SCNC: 4.5 MMOL/L (ref 3.4–5.3)
RBC # BLD AUTO: 5.05 10E12/L (ref 4.4–5.9)
SODIUM SERPL-SCNC: 133 MMOL/L (ref 133–144)
WBC # BLD AUTO: 16.3 10E9/L (ref 4–11)

## 2017-05-16 PROCEDURE — 93325 DOPPLER ECHO COLOR FLOW MAPG: CPT | Mod: 26 | Performed by: INTERNAL MEDICINE

## 2017-05-16 PROCEDURE — 86235 NUCLEAR ANTIGEN ANTIBODY: CPT | Performed by: STUDENT IN AN ORGANIZED HEALTH CARE EDUCATION/TRAINING PROGRAM

## 2017-05-16 PROCEDURE — 25000131 ZZH RX MED GY IP 250 OP 636 PS 637: Performed by: STUDENT IN AN ORGANIZED HEALTH CARE EDUCATION/TRAINING PROGRAM

## 2017-05-16 PROCEDURE — 82784 ASSAY IGA/IGD/IGG/IGM EACH: CPT | Performed by: STUDENT IN AN ORGANIZED HEALTH CARE EDUCATION/TRAINING PROGRAM

## 2017-05-16 PROCEDURE — 86038 ANTINUCLEAR ANTIBODIES: CPT | Performed by: STUDENT IN AN ORGANIZED HEALTH CARE EDUCATION/TRAINING PROGRAM

## 2017-05-16 PROCEDURE — 25900017 H RX MED GY IP 259 OP 259 PS 637: Performed by: STUDENT IN AN ORGANIZED HEALTH CARE EDUCATION/TRAINING PROGRAM

## 2017-05-16 PROCEDURE — 40000048 ZZH STATISTIC DAILY SWAN MONITORING

## 2017-05-16 PROCEDURE — 93308 TTE F-UP OR LMTD: CPT | Mod: 26 | Performed by: INTERNAL MEDICINE

## 2017-05-16 PROCEDURE — 40000275 ZZH STATISTIC RCP TIME EA 10 MIN

## 2017-05-16 PROCEDURE — 82787 IGG 1 2 3 OR 4 EACH: CPT | Performed by: STUDENT IN AN ORGANIZED HEALTH CARE EDUCATION/TRAINING PROGRAM

## 2017-05-16 PROCEDURE — 93308 TTE F-UP OR LMTD: CPT

## 2017-05-16 PROCEDURE — 25000125 ZZHC RX 250: Performed by: STUDENT IN AN ORGANIZED HEALTH CARE EDUCATION/TRAINING PROGRAM

## 2017-05-16 PROCEDURE — 00000402 ZZHCL STATISTIC TOTAL PROTEIN: Performed by: STUDENT IN AN ORGANIZED HEALTH CARE EDUCATION/TRAINING PROGRAM

## 2017-05-16 PROCEDURE — 86355 B CELLS TOTAL COUNT: CPT | Performed by: STUDENT IN AN ORGANIZED HEALTH CARE EDUCATION/TRAINING PROGRAM

## 2017-05-16 PROCEDURE — 83516 IMMUNOASSAY NONANTIBODY: CPT | Performed by: STUDENT IN AN ORGANIZED HEALTH CARE EDUCATION/TRAINING PROGRAM

## 2017-05-16 PROCEDURE — 36415 COLL VENOUS BLD VENIPUNCTURE: CPT | Performed by: STUDENT IN AN ORGANIZED HEALTH CARE EDUCATION/TRAINING PROGRAM

## 2017-05-16 PROCEDURE — 85027 COMPLETE CBC AUTOMATED: CPT | Performed by: STUDENT IN AN ORGANIZED HEALTH CARE EDUCATION/TRAINING PROGRAM

## 2017-05-16 PROCEDURE — 84165 PROTEIN E-PHORESIS SERUM: CPT | Performed by: STUDENT IN AN ORGANIZED HEALTH CARE EDUCATION/TRAINING PROGRAM

## 2017-05-16 PROCEDURE — 00000146 ZZHCL STATISTIC GLUCOSE BY METER IP

## 2017-05-16 PROCEDURE — 40000196 ZZH STATISTIC RAPCV CVP MONITORING

## 2017-05-16 PROCEDURE — 82785 ASSAY OF IGE: CPT | Performed by: STUDENT IN AN ORGANIZED HEALTH CARE EDUCATION/TRAINING PROGRAM

## 2017-05-16 PROCEDURE — 93321 DOPPLER ECHO F-UP/LMTD STD: CPT | Mod: 26 | Performed by: INTERNAL MEDICINE

## 2017-05-16 PROCEDURE — 25000132 ZZH RX MED GY IP 250 OP 250 PS 637: Performed by: STUDENT IN AN ORGANIZED HEALTH CARE EDUCATION/TRAINING PROGRAM

## 2017-05-16 PROCEDURE — 80048 BASIC METABOLIC PNL TOTAL CA: CPT | Performed by: STUDENT IN AN ORGANIZED HEALTH CARE EDUCATION/TRAINING PROGRAM

## 2017-05-16 PROCEDURE — 82805 BLOOD GASES W/O2 SATURATION: CPT | Performed by: STUDENT IN AN ORGANIZED HEALTH CARE EDUCATION/TRAINING PROGRAM

## 2017-05-16 PROCEDURE — 99239 HOSP IP/OBS DSCHRG MGMT >30: CPT | Mod: 24 | Performed by: INTERNAL MEDICINE

## 2017-05-16 RX ORDER — DIGOXIN 125 MCG
125 TABLET ORAL DAILY
Qty: 90 TABLET | Refills: 3 | Status: SHIPPED | OUTPATIENT
Start: 2017-05-16 | End: 2017-05-16

## 2017-05-16 RX ORDER — DIGOXIN 125 MCG
125 TABLET ORAL DAILY
Qty: 90 TABLET | Refills: 3 | Status: SHIPPED | OUTPATIENT
Start: 2017-05-16 | End: 2019-07-07

## 2017-05-16 RX ORDER — TADALAFIL 20 MG/1
40 TABLET ORAL DAILY
Qty: 30 TABLET | Refills: 11 | Status: SHIPPED | OUTPATIENT
Start: 2017-05-16 | End: 2017-05-23

## 2017-05-16 RX ADMIN — CALCIUM 1250 MG: 500 TABLET ORAL at 09:14

## 2017-05-16 RX ADMIN — PREDNISONE 30 MG: 20 TABLET ORAL at 09:13

## 2017-05-16 RX ADMIN — FLUTICASONE FUROATE 1 PUFF: 200 POWDER RESPIRATORY (INHALATION) at 09:24

## 2017-05-16 RX ADMIN — INSULIN ASPART 1 UNITS: 100 INJECTION, SOLUTION INTRAVENOUS; SUBCUTANEOUS at 13:50

## 2017-05-16 RX ADMIN — INSULIN ASPART 1 UNITS: 100 INJECTION, SOLUTION INTRAVENOUS; SUBCUTANEOUS at 18:37

## 2017-05-16 RX ADMIN — HYDROCODONE BITARTRATE AND ACETAMINOPHEN 1 TABLET: 5; 325 TABLET ORAL at 02:18

## 2017-05-16 RX ADMIN — PANTOPRAZOLE SODIUM 40 MG: 40 TABLET, DELAYED RELEASE ORAL at 09:14

## 2017-05-16 RX ADMIN — VALGANCICLOVIR 450 MG: 450 TABLET, FILM COATED ORAL at 09:13

## 2017-05-16 RX ADMIN — VITAMIN D, TAB 1000IU (100/BT) 2000 UNITS: 25 TAB at 09:15

## 2017-05-16 RX ADMIN — PENICILLIN V POTASSIUM 500 MG: 500 TABLET, FILM COATED ORAL at 09:16

## 2017-05-16 RX ADMIN — URSODIOL 300 MG: 300 CAPSULE ORAL at 09:16

## 2017-05-16 RX ADMIN — VITAMIN D, TAB 1000IU (100/BT) 2000 UNITS: 25 TAB at 13:50

## 2017-05-16 RX ADMIN — URSODIOL 300 MG: 300 CAPSULE ORAL at 13:50

## 2017-05-16 RX ADMIN — FLUCONAZOLE 200 MG: 200 TABLET ORAL at 13:50

## 2017-05-16 RX ADMIN — INSULIN ASPART 1 UNITS: 100 INJECTION, SOLUTION INTRAVENOUS; SUBCUTANEOUS at 09:03

## 2017-05-16 RX ADMIN — TADALAFIL 40 MG: 20 TABLET, FILM COATED ORAL at 10:39

## 2017-05-16 RX ADMIN — DIGOXIN 125 MCG: 0.12 TABLET ORAL at 09:13

## 2017-05-16 RX ADMIN — METOPROLOL SUCCINATE 25 MG: 25 TABLET, EXTENDED RELEASE ORAL at 09:14

## 2017-05-16 RX ADMIN — PREDNISONE 30 MG: 20 TABLET ORAL at 18:28

## 2017-05-16 RX ADMIN — ROSUVASTATIN CALCIUM 5 MG: 5 TABLET ORAL at 09:13

## 2017-05-16 RX ADMIN — INSULIN DETEMIR 5 UNITS: 100 INJECTION, SOLUTION SUBCUTANEOUS at 10:39

## 2017-05-16 RX ADMIN — NIFEDIPINE 30 MG: 30 TABLET, FILM COATED, EXTENDED RELEASE ORAL at 09:16

## 2017-05-16 RX ADMIN — PENICILLIN V POTASSIUM 500 MG: 500 TABLET, FILM COATED ORAL at 16:12

## 2017-05-16 ASSESSMENT — PAIN DESCRIPTION - DESCRIPTORS: DESCRIPTORS: SORE

## 2017-05-16 NOTE — DISCHARGE SUMMARY
[facility]  Discharge Summary     Olivier Gómez MRN# [id number]   YOB: 1965 Age: 52 year old       Admission Date: 5/15/2017  Discharge Date: 2017      Discharge Diagnoses:  1. Pulmonary hypertension  2. Chxhs-hipvmn-tmgs disease (GvHD)     Imagin. TTE 05/15:  Interpretation Summary  Moderate to severe right ventricular dilation is present.  Global right ventricular function is mildly reduced.  Right ventricular systolic pressure is 85mmHg above the right atrial pressure.  PV acceleration time 70ms.  Dilation of the inferior vena cava is present with abnormal respiratory  variation in diameter.  Trivial pericardial effusion is present.      Procedures:  1. RHC 05/15:  BSA 2.6  1. HR 63 bpm  2. /80/96 mmHg  3. RA 9   4. /8  5. /40/62   6. PCW 12   7. PA sat 69.2%   8. PCW sat 94.9%  9. Hgb 14.4 g/dL   10. Carter CO 6.1   11. Carter CI 2.4   12. TD CO 5.8   13. TD CI 2.3   14. PVR 8.1  15. TPR 10.1  16. SVR 1140       Consults:  Hematology   Rheumatology    HPI (adapted from admission H&P)  Mr. Olivier Gómez is a 52-year old male with a PMHx of AML s/p BMT (itself c/b GvHD), T2DM and a recent admission for pericardial effusion with early tamponade who was electively admitted on 05/15 for evaluation of RV function.      Mr. Gómez was recently admitted to [facility] under Dr. Quan for management of a large pericardial effusion which was incidentally discovered on a routine TTE. At this point he had RHC and then subsequent pericardiocentesis of 1.1 L of fluid. The RHC revealed severe pulmonary hypertension with poor RV function. This prompted admission to [facility], where Mr. Gómez was found to eventually have a exudative effusion with negative gram stain, cultures and adenosine deaminase. The effusion was thought to be due to sirolimus pericardial toxicity vs. chronic GVHD.     Since going home, Mr. Gómez notes progressive weakness. He notes that  his exercise tolerance still allows him to walk him 30 minutes on level ground without stopping, albeit slowly. Mr. Gómez also had three episodes of near syncope since his discharge, one of which occurred while he had taken multiple clonazepam pills. This prompted his wife to alert EMS and then transfer him to the nearest ER. This was thought to be due to sedatives. He was discharged without being admitted. Mr. Gómez notes progressive weakness while being on the steroids but without overt syncope, angina, fever, chills/rigors, hemoptysis, leg symptoms of DVT, or abdo distension/peripheral edema.      Today, Mr. Gómez was having an electively scheduled RHC. His PA pressures were noted to be elevated despite tadalafil usage, so he was admitted for inpatient evaluation of PH as well as commencement of a second agent.        Brief Hospital Course  Pulmonary hypertension    Mr. Gómez was admitted after RHC for evaluation of the etiology of his pulmonary hypertension. He was started on digoxin 125 micrograms daily and his tadalfil dose was increased to 40 mg q24h. Hematology and Rheumatology consultations were sought. Dr. Kim (Rheumatology) suggested that his clinical syndrome may be consistent with scleroderma and further serologic testing would be obtained with the aim to follow Mr. Gómez in clinic in a month's time. At this point, endothelin receptor antagonists and/or rituximab can be considered for therapy. Dr. Aviles (Rheumatology) suggested endothelin receptor antagonists or steroid-sparing immunosuppression. Mr. Gómez opted to follow-up with Rheumatology at [facility].     Following the aforementioned, Mr. Gómez was discharged with the intention of started inhaled treprostinil on an outpatient basis after insurance authorization was obtained.     Discharge Information  Discharge diet:  Cardiac  Discharge activity:  Activity as tolerated  Disposition:  Discharged to home      Medication Changes  1. Tadalafil  increased to 40 mg q24h   2. Start digoxin 125 micrograms daily    Discharge Medications  Current Discharge Medication List      CONTINUE these medications which have NOT CHANGED    Details   traZODone (DESYREL) 50 MG tablet Take 1 tablet (50 mg) by mouth nightly as needed for sleep  Qty: 90 tablet, Refills: 0    Associated Diagnoses: Insomnia, unspecified type      insulin detemir (LEVEMIR) 100 UNIT/ML injection Inject 10 Units Subcutaneous every morning  Qty: 3 mL, Refills: 1    Associated Diagnoses: Steroid-induced diabetes mellitus (H)      !! predniSONE (DELTASONE) 10 MG tablet Take 1 tablet (10 mg) by mouth daily  Qty: 30 tablet, Refills: 0    Comments:   50 mg BID x 7 days (4/30 - 5/6)    40 mg BID x 7 days (5/6 - 5/13)    60 mg daily x 7 days (5/14 - 5/20)    40 mg daily thereafter until discussion with BMT doctor  Associated Diagnoses: Steroid-induced diabetes mellitus (H)      Penicillin V Potassium (PEN-VEE K OR) Take 500 mg by mouth 2 times daily      fluticasone (FLOVENT HFA) 220 MCG/ACT Inhaler Inhale 2 puffs into the lungs 2 times daily      metoprolol (TOPROL-XL) 25 MG 24 hr tablet Take 1 tablet (25 mg) by mouth daily  Qty: 90 tablet, Refills: 3    Associated Diagnoses: Pulmonary hypertension (H)      FLUCONAZOLE PO Take 200 mg by mouth daily      NIFEdipine osmotic (NIFEDICAL XL) 30 MG 24 hr tablet Take 30 mg by mouth daily      Tadalafil (CIALIS PO) Take 10 mg by mouth daily      Ursodiol (ACTIGALL PO) Take 300 mg by mouth 3 times daily      VITAMIN D, CHOLECALCIFEROL, PO Take 2,000 Units by mouth 3 times daily       valGANciclovir (VALCYTE) 450 MG tablet Take 450 mg by mouth every 48 hours       CLONAZEPAM PO Take 1 mg by mouth At Bedtime       calcium carbonate (OS-PANFILO 500 MG [place]. [place]) 500 MG tablet Take 600 mg by mouth 2 times daily      Pantoprazole Sodium (PROTONIX PO) Take 40 mg by mouth daily       Rosuvastatin Calcium (CRESTOR PO) Take 5 mg by mouth daily       atovaquone (MEPRON) 750  MG/5ML suspension Take 1,500 mg by mouth daily      blood glucose monitoring (ONE TOUCH ULTRA) test strip Use to test blood sugars 4 times daily or as directed.  Qty: 100 strip, Refills: 11    Associated Diagnoses: Steroid-induced diabetes mellitus (H)      !! predniSONE (DELTASONE) 1 MG tablet Take 5 tablets (5 mg) by mouth every other day      MAGNESIUM OXIDE PO Take 800 mg by mouth daily       !! - Potential duplicate medications found. Please discuss with provider.          Discharge Follow-up  - Dr. Quan, as previously scheduled  - Dr. Kim or Dr. Bush (Rheumatology)   - [facility] on June 19th, 2017    Code Status  FULL     CC  Patient Care Team:  Adelso Delgado as PCP - General (Internal Medicine)  Adilson Nicholson MD Molitor, Jerry A, MD as Referring Physician (Rheumatology)  Adilson Nicholson MD Olka, Tiffany, RN as Registered Nurse (Cardiology)  Musa Martin as MD (Oncology)  Marlys Anderson RN as Registered Nurse (Cardiology)

## 2017-05-16 NOTE — PROGRESS NOTES
CARDIOLOGY CARDS 2 CONSULT PROGRESS NOTE    SUBJECTIVE:  Mr. Gómez feels well. No dyspnea, CP, pre-syncope. Started digoxin last night.    Tele without event.     ROS otherwise negative.    OBJECTIVE:  Vital signs:  116/83  Data Unavailable  12  98.7  275 lbs 0 oz    PHYSICAL EXAM:  Gen: Looks well  HEENT: RIJ [place] site healthy, MMM  Resp: No signs of resp distress, CTAB  CVS: LIJV JVP to mid-neck, S1+S2 with loud P2  Abdo: ND, S, NT, +BS  Extremities: Warm, well-perfused, no edema  Neuro: GCS 15/15, AAOx3     Recent Labs   Lab Test  05/16/17   1006  05/15/17   1153   HGB  14.8  14.4   HCT  43.7  43.4   WBC  16.3*  15.9*   MCV  87  86   MCH  29.3  28.6   MCHC  33.9  33.2   RDW  19.3*  19.3*   PLT  189  237   NA  133  136   POTASSIUM  4.5  4.8   CHLORIDE  103  104   CO2  22  23   BUN  35*  41*   CR  1.52*  1.71*   GLC  212*  151*   PANFILO  7.9*  8.7   ALBUMIN   --   3.4   BILITOTAL   --   1.0   ALKPHOS   --   56   AST   --   28   ALT   --   95*       ASSESSMENT/PLAN:  Mr. Olivier Gómez is a 52-year old male with a PMHx of AML c/b GVHD who presented for evaluation of PH directly from the cath lab.      - Severe RV dysfunction and severe pulmonary hypertension    - Tadalafil 40 mg q24h starting 05/16    - Continue  digoxin 125 micrograms daily   - Limited TTE with bubble study   - Rheumatology consultation to evaluate the etiology of PH    - Per Dr. Quan, GENEVIEVE, quantitative immunoglobulins, and SPEP    - To have inhaled trepostinil started as OP (d/w Dr. Quan)   - Volume optimized at present without clear need for diuresis   - Will not start BB or ACEi given preserved LV function    - PFTs, CT chest, V/Q scan done previously      - AML c/b GvHD   - BMT team consulted to evaluated likelihood of GVHD leading to PH    Seen and staffed with Dr. Cervantes.      Brian Barlow   Cardiology Fellow  [phone]

## 2017-05-16 NOTE — CONSULTS
Rheumatology Consultation: requested by Dr. Barlow to evaluate worsening PAH    Assessment    Severe PAH-associated with features of LcSSc, including GENEVIEVE/centromere autoantibody, GAVE with GI bleeding, raynaud's with skin ulceration, mild sclerodactyly, pericardial effusion, and mild restrictive lung disease. Nonetheless, he lacks several typical features of LcSSc (CREST) including esophogeal dysmotility, telangectasias, and calcinosis. In addition, his raynaud's and sclerodactyly lack the typical periungual capillary dysplasia of scleroderma. Interestingly, he was treated with rituximab with reported improvement in his skin disease and other GVHD manifestations. Taken together, I am concerned that his GVHD reaction has elicited scleroderma-like autoimmunity with clinically important manifestations. Based on this concern, I have undertaken a laboratory investigation to re-assess his autoimmune serologies. Should he again demonstrate a LcSSc-specific pattern of autoantibodies (+GENEVIEVE/+centromere), I think it would be appropriate to diagnose LcSSC with pneumonitis and PAH, and initiate re-treatment with rituximab and consider the initiation of endothelin receptor antagonist therapy. I will plan to discuss this impression with Dr. Quan.    Marcin Kim MD  Professor of Medicine  Director, Division of Rheumatic and Autoimmune Diseases      HPI:  Mr. 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 Treatment course has included rituximab in the past, as well as current high dose corticosteroids.    He reports that this course was recently complicated by the development of massive pericardial effusion requiring surgical drainage. Since this development, he complains of increased dizziness, CORREA, PFT abnormalities, and much  worsened PA pressures of mean 62. As a result, he is in the 3rd week of treatment with high dose corticosteroids, currently 30 mg twice daily.    I note that in August 2015 the patient consulted with Dr. Fahad Bush in Rheumatology (note in Epic) and it was his opinion that skin findings, serologies, raynaud's were compatible with LcSSc but not diagnostic, particularly in light of the GVHD history. Nevertheless, he felt that careful follow-up was necessary particularly should more severe PAH develop.    It should be noted that in addition to the above findings, the patient has had UGI bleeding due to GAVE (improved with ablations and proton pump inhibitor therapy), raynaud's with ulcers, lichen planus, CORREA, dizziness, and modest decreased DLCO and FVC. The patient admits to some ankle skin thickening, but no calcinosis or telangectasia. He reports that he has had skin ulcers with his raynaud's. He feels his skin improved greatly after treatment of GVHD with rituximab.    He reports improved skin ulcers and raynaud's since his tadalafil dose was doubled; digoxin has also been added given his PA pressures, and an inhaled prostacyclin is planned. No history of treatment with an endothelin receptor antagonist.    PMI:  Patient Active Problem List   Diagnosis     Scleroderma (H)     Pericardial effusion     Fjdib-fszxtn-rros disease (H)     Blood clot in vein     Raynaud's syndrome     Sicca syndrome (H)     Steroid-induced diabetes mellitus (H)     Pulmonary hypertension (H)     Past Surgical History:   Procedure Laterality Date     ENT SURGERY      adenoidectomy     EYE SURGERY  2014    both eyes cataract surgery     HERNIA REPAIR  1980     VASCULAR SURGERY  2011    port   tonsillectomy  Past Medical History:   Diagnosis Date     Cancer (H) 2011    AML     Nstbx-hfxxxd-ekmw disease (H) need to confirm this history     History of blood transfusion      Pericardial effusion 2014     Raynaud disease      Scleroderma (H)  "   AML s/p fully matched allogeneic BMT, GAVE with GI bleed, DVT, pericardial effusions, LFT elevations with GVHD, digital ulcers, morpheaform skin lesions, LcSSc with +GENEVIEVE/centromere Ab, pericardial effusion with tamponade, hyperlipidemia, type II diabetes, HTN, CKD,     Social History     Social History     Marital status:      Spouse name: N/A     Number of children: N/A     Years of education: N/A     Occupational History     Not on file.     Social History Main Topics     Smoking status: Passive Smoke Exposure - Never Smoker     Smokeless tobacco: Not on file     Alcohol use No     Drug use: No     Sexual activity: Yes     Partners: Female     Other Topics Concern     Parent/Sibling W/ Cabg, Mi Or Angioplasty Before 65f 55m? No     Social History Narrative     FHx:  +Heart disease in father  +Lymphoma in mother  +Sibs healthy    ROS:  +dry eyes  +toe numbness  +dry mouth  Remainder of the 14 point ROS obtained and found negative.    Physical Exam:  Blood pressure 118/85, temperature 98.7  F (37.1  C), temperature source Oral, resp. rate 21, height 1.956 m (6' 5\"), weight 124.7 kg (275 lb), SpO2 95 %.    Constitutional: WD-WN-WG cooperative  Eyes: nl EOM, PERRLA, vision, conjunctiva, sclera  ENT: nl external ears, nose, hearing, lips, teeth  Neck: no mass or thyroid enlargement  Resp: lungs clear to auscultation, nl to palpation, nl effort  CV: RRR, no murmurs, rubs or gallops, no edema  GI: no ABD mass or tenderness, no HSM  MS: All TMJ, neck, shoulder, elbow, wrist, hand, spine, hip, knee, ankle, and foot joints were examined and otherwise found normal. Normal  strength. No active synovitis or deformity. Full ROM.  Skin: no nail pitting, alopecia, rash; +puffy digits with vasospasm evident  Neuro: nl cranial nerves, strength, sensation  Psych: nl judgement, orientation, memory, affect.    Laboratory:    Component      Latest Ref Rng & Units 7/14/2015 4/29/2017 5/15/2017   Scleroderma Antibody Scl-70 " XAVIER IgG      0.0 - 0.9 AI <0.2 . . .     CRP Inflammation      0.0 - 8.0 mg/L <2.9  <2.9   GENEVIEVE Screen by EIA      <1.0 6.4 (H) 9.9 (H)    Cytokine IL-6      0.00 - 3.00 pg/mL 7.20 (H)     RNA Polymerase III Gertrude IgG       <10.0 . . .       Component      Latest Ref Rng & Units 5/15/2017   WBC      4.0 - 11.0 10e9/L 15.9 (H)   RBC Count      4.4 - 5.9 10e12/L 5.03   Hemoglobin      13.3 - 17.7 g/dL 14.4   Hematocrit      40.0 - 53.0 % 43.4   MCV      78 - 100 fl 86   MCH      26.5 - 33.0 pg 28.6   MCHC      31.5 - 36.5 g/dL 33.2   RDW      10.0 - 15.0 % 19.3 (H)   Platelet Count      150 - 450 10e9/L 237   Diff Method       Automated Method   % Neutrophils      % 85.8   % Lymphocytes      % 9.5   % Monocytes      % 4.1   % Eosinophils      % 0.0   % Basophils      % 0.0   % Immature Granulocytes      % 0.6   Nucleated RBCs      0 /100 0   Absolute Neutrophil      1.6 - 8.3 10e9/L 13.6 (H)   Absolute Lymphocytes      0.8 - 5.3 10e9/L 1.5   Absolute Monocytes      0.0 - 1.3 10e9/L 0.7   Absolute Eosinophils      0.0 - 0.7 10e9/L 0.0   Absolute Basophils      0.0 - 0.2 10e9/L 0.0   Abs Immature Granulocytes      0 - 0.4 10e9/L 0.1   Absolute Nucleated RBC       0.1   Sodium      133 - 144 mmol/L 136   Potassium      3.4 - 5.3 mmol/L 4.8   Chloride      94 - 109 mmol/L 104   Carbon Dioxide      20 - 32 mmol/L 23   Anion Gap      3 - 14 mmol/L 8   Glucose      70 - 99 mg/dL 151 (H)   Urea Nitrogen      7 - 30 mg/dL 41 (H)   Creatinine      0.66 - 1.25 mg/dL 1.71 (H)   GFR Estimate      >60 mL/min/1.7m2 42 (L)   GFR Estimate If Black      >60 mL/min/1.7m2 51 (L)   Calcium      8.5 - 10.1 mg/dL 8.7   Bilirubin Total      0.2 - 1.3 mg/dL 1.0   Albumin      3.4 - 5.0 g/dL 3.4   Protein Total      6.8 - 8.8 g/dL 5.8 (L)   Alkaline Phosphatase      40 - 150 U/L 56   ALT      0 - 70 U/L 95 (H)   AST      0 - 45 U/L 28   CRP Inflammation      0.0 - 8.0 mg/L <2.9     [facility]  PULMONARY FUNCTION STUDY    Name:     BENEDICT ESTES  W  Location: [facility]           CSN: [id number]  : 1965/52Y   Order:    [id number]   MR#: [id number]  Date of Study:      2017    ORDERING PROVIDER:   Adelso Delgado M.D.    DIAGNOSTIS:   Pericardial effusion.     SPIROMETRY:   Prebronchodilator:   FVC: 4.54, 74%.   FEV1: 3.39, 72%.   FEV1/FVC: 75.     Postbronchodilator:   FVC: 4.63, 75%, +2% change.   FEV1: 3.58, 76%, +5% change.     DIFFUSION CAPACITY:   DLCO corrected: 26.29, 80%.     IMPRESSION:   1. ATS criteria was met for the spirometry and diffusion components of this test.   2. Reductions in FVC and FEV1 would be suggestive of a nonspecific ventilatory defect; however, would tend to favor more restrictive pulmonary physiology.   3. Postbronchodilator spirometry was not improved.   4. Diffusion capacity was not reduced.   5. Obtaining full long volumes would be beneficial to assess for true restrictive physiology.   6. Clinical correlation is advised.     FRAN SAMUEL III, DO [[id number]]   [place] [phone]      [facility] Transcription ID:[id number]  [id number]  DD:  2017 10:18:31 CST  DT:   2017 11:57:17 CST    ccPAUMAKAYLA SAMUEL III, DO, Dictating Physician    Patient Information Name: BENEDICT ESTES  Study Date: 2017  MRN: [id number]  : 1965  Gender: M  Account Number: [id number]  Reason for Study: F/U Pericardial Effusion Tap  Patient Location: Adult Echo Lab  Height: 193.04cm  Weight: 124.74kg  Patient Header: BSA: 2.48810 m2  Study Location: [facility]     Interp Summary Ejection Fraction = 65-70%.  Abnormal LV septal wall motion - flattened in systole and diastole.  There is borderline concentric left ventricular hypertrophy.  Grade I diastolic dysfunction.  The right ventricle is moderately dilated.  The right atrium is moderately dilated.  There is trace mitral regurgitation.  Moderate tricuspid regurgitation by color Doppler.  Right ventricular systolic pressure is measured at 96 mmHg. Consistent  with severe pulmonary  hypertension  Mild pulmonic valvular insufficiency.  Small pericardial effusion. The pericardial effusion is posterior.     Procedures The study performed was a(n) complete 2-D echo with M-Mode, color and spectral Doppler.  The study was performed by [facility].  The study quality was diagnostic.  The study was performed in the Echo Lab.      Study Result   EXAMINATION: CT CHEST W/O CONTRAST, 4/27/2017 4:13 PM     COMPARISON: Same-day chest x-ray.     HISTORY: Pericardial effusion     TECHNIQUE: CT imaging obtained through the chest without intravenous  contrast. Coronal and axial MIP reformatted images obtained.     Dose DLP: 365 mGy*cm     FINDINGS:   Visualized thyroid gland is unremarkable. Central airway clear.  Thoracic esophagus is unremarkable. Left-sided intercostal approach  pericardial drain which enters the pericardium over the region of the  apex of the heart, and traverses superiorly over the heart just  posterior to the ascending aorta with pigtail terminating in the  pericardial space just anterior to the lower SVC. There is associated  mild antidependent pneumopericardium. Mild to moderate pericardial  effusion seen in the dependent portion of the pericardial space.     No pneumothorax.     Thoracic aorta unremarkable. Mildly patulous thoracic esophagus.     Marked dilatation of the main pulmonary artery measuring 4.4 cm in  diameter. No pathologic-appearing lymphadenopathy in the chest.     There are a few, randomly distributed pulmonary nodules, for example a  6 mm nodule in the right upper lobe on series 6 image 106, and 5 mm  nodule in the left upper lobe on series 6 image 84.     Small bilateral pleural effusions with extension into the left major  fissure and associated bibasilar atelectasis.     2.1 cm retrocrural calcification. Visualized portion of the upper  abdomen is limited but unremarkable. No acute or suspicious  osseous  lesion.         IMPRESSION:   1. Left intercostal approach pericardial drain placed with tip over  the superior pericardial space anterior to the SVC with associated  mild pneumopericardium. Mild to moderate pericardial effusion.  2. Scattered pulmonary nodules measure up to 6 mm. Consider 6-12 month  follow-up if prior comparisons are unavailable.   3. Small bilateral pleural effusions.     [Consider Follow Up: Pulmonary nodule]     This report will be copied to the [facility] to ensure a  provider acknowledges the finding.      I have personally reviewed the examination and initial interpretation  and I agree with the findings.     JAYLEN LAWSON MD

## 2017-05-16 NOTE — CONSULTS
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      His post-transplantation course has been complicated by mbtvb-jeleve-qkgd disease involving the skin, liver, oral mucosa, treated with steroid taper and   therapeutic tacrolimus in 03/2012.On 03/06/2012 he initiated prednisone 50 mg orally twice daily and tacrolimus. He underwent a transjugular liver biopsy, 03/06/2012 which confirmed liver dguqm-tmilxd-igqb disease with possible superimposed drug induced liver injury. His LFTs peaked on 03/07/2012 with an ALT of 1518, , total bilirubin 1.4. Prednisone was discontinued 08/16/2012. He was prescribed oral dexamethasone swish and spit for oral fveks-wntxjl-mgbh disease. He is followed by his local gastroenterologist with endoscopy for cauterization that he has required for gastrointestinal bleeding. He has had 4 cauterizations. As result of the GI bleeding in the stomach his Lovenox was discontinued. The patient ultimately tapered off steroids in 08/2012 and remained on low-dose tacrolimus. His tacrolimus was switched to sirolimus in 12/2013 due to renal insufficiency. In 11/2014, he was noted to have sclerosis in his lower extremities and chest area and was treated again with systemic steroids. During his visit in November 2014 he noted skin pruritus, he had skin sclerosis with scleroderma-like  changes of the chest, lower extremities, face, and back. He was not a near photopheresis center, and was started on a tapering dose of corticosteroids and continued on Rapamune. In December, he developed shortness of breath. A CT scan confirmed pericardial effusion and a strongly positive GENEVIEVE. Pulmonary function tests were consistent with restrictive rather than obstructive disease. 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 On 1/10/16 we tapered his Prednisone to 5 mg every other day, and continued Rapamycin. 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 This has improved with Cialis and rituximab; the latter was given for 4 treatments without incident. GENEVIEVE was positive. He has also had leg swelling which improved with corticosteroids GVHD with acute lung injury with 2 flares after the original presentation that was with transaminitis. Generally stable immunosuppression with sirolimus and prednisone 5 mg qod since January 2016 up until recent admission for hypoxic respiratory failure and pericardial effusion as further described below. He was reportedly on a beta blocker after diagnosis of pulmonary HTN via R heart cath at [facility]. On 6/22/16 he was re-evaluated, at which time, the patient's ebosi-blpzfr-qujd disease was quiescent. Given persistent lower extremity edema felt to possibly be secondary to sirolimus, we reduced his Rapamycin dose to the lower side of therapeutic with 0.5 mg once daily, except on Fridays he takes Rapamycin 1 mg. He continued prednisone 5 mg po every other day (taking since 1/11/16). Repeat PFTs 6/22/16 revealed worsening FEV1 concerning for component of obstruction and possible GVHD of lung. He was asymptomatic and had a viral respiratory tract infection one month prior. He was initiated on inhaled  steroid therapy Flovent  mcg 2 puffs PO BID.     He was recently admitted from 4/25 to 5/1 with acute hypoxic respiratory failure, steroid-induced DM, and large pericardial effusion likely 2/2 GVH. The effusion was noted on regular BMT appointment at [facility]. This was noted to be enlarging and with early tamponade physiology. Echocardiogram obtained on admission to [facility] showed: effusion is very large with early diastolic collapse of RV free wall concerning for early tamponade. Given findings patient had pericardial drain placed on 4/25/17 with a total output of about 1.6L. Initial fluid studies showed an exudative effusion. He had 901 WBC however 89% other cells. Gram stain with rare WBC and cultures NGTD. AFB stain negative. Adenosine deaminase negative and cytology negative. Given these findings, it was deemed that enlarging effusion was possibly related to chronic GVHD vs Sirolimus induced serositis. Sirolimus was held. He was discharged on prednisone taper (discussed with Dr. Martin in [facility] who ok'd taper). Plan was 50 mg BID x 7 days (4/30 - 5/6), 40 mg BID x 7 days (5/6 - 5/13), 60 mg daily x 7 days (5/14 - 5/20), and 40 mg daily thereafter until discussion with BMT [facility].    He was electively admitted on 5/15 to evaluate RV function and management pericardial effusion. Since going home, Mr. Gómez notes progressive weakness. He notes that his exercise tolerance still allows him to walk him 30 minutes on level ground without stopping, albeit slowly. Mr. Gómez also had three episodes of near syncope since his discharge, one of which occurred while he had taken multiple clonazepam pills. This prompted his wife to alert EMS and then transfer him to the nearest ER. This was thought to be due to sedatives. He was discharged without being admitted. Mr. Gómez notes progressive weakness. Due to elevated PA pressures, his tadalafil was increased to 40 mg (from 10 mg) and he was started on  digoxin.     Mr. Gómez was recently admitted to [facility] under Dr. Quan for management of a large pericardial effusion which was incidentally discovered on a routine TTE. At this point he had RHC and then subsequent pericardiocentesis of 1.1 L of fluid. The RHC revealed severe pulmonary hypertension with poor RV function. This prompted admission to [facility], where Mr. Gómez was found to eventually have a exudative effusion with negative gram stain, cultures and adenosine deaminase. The effusion was thought to be due to sirolimus pericardial toxicity vs. chronic GVHD. Since going home, Mr. Gómez notes progressive weakness. Didn't feel much better after pericardial effusion drained. Actually felt worse    ROS  No neuropathy  Does not feel any recent skin changes  No abdominal pain, diarrhea, lumps or bumps, fevers, chills  Remainder full 10-point ROS otherwise unremarkable         Past Medical History:     Past Medical History:   Diagnosis Date     Cancer (H) 2011    AML     Tsyfq-uxkifr-dlcm disease (H) need to confirm this history     History of blood transfusion      Pericardial effusion 2014     Raynaud disease      Scleroderma (H)    RHC 04/25:  BSA 1.96  1. HR 72 bpm  2. /79/94 mmHg  3. RA 15/13/12   4. /16  5. /37/60   6. PCW 15/20/16   7. PA sat 71.8%   8. PCW sat not obtained  9. Hgb 12.7 g/dL   10. Carter CO 7.8   11. Carter CI 3.0   12. PVR 5.6     GENEVIEVE titers:  3/17/15 GENEVIEVE >1:1280  3/29/15 GENEVIEVE >1:640  4/29/15 GENEVIEVE 1:640  6/07/15 GENEVIEVE >1:640  7/07/15 GENEVIEVE 1:320  9/13/15 GENEVIEVE 1:160  1/10/16 GENEVIEVE 1:640  6/19/16 GENEVIEVE >1:640  9/11/16 GENEVIEVE>1:640  12/11/16 GENEVIEVE>1:640          Past Surgical History:     Past Surgical History:   Procedure Laterality Date     ENT SURGERY      adenoidectomy     EYE SURGERY  2014    both eyes cataract surgery     HERNIA REPAIR  1980     VASCULAR SURGERY  2011    port            Social History:     Social History     Social History     Marital status:      Spouse name:  "N/A     Number of children: N/A     Years of education: N/A     Social History Main Topics     Smoking status: Passive Smoke Exposure - Never Smoker     Smokeless tobacco: None     Alcohol use No     Drug use: No     Sexual activity: Yes     Partners: Female     Other Topics Concern     Parent/Sibling W/ Cabg, Mi Or Angioplasty Before 65f 55m? No     Social History Narrative            Family History:     Family History   Problem Relation Age of Onset     CANCER No family hx of      no skin cancer     One family member with NHL         Allergies:     Allergies   Allergen Reactions     Tegaderm Transparent Dressing (Informational Only) Rash            Medications:       sodium chloride (PF)  3 mL Intracatheter Q8H     enoxaparin  40 mg Subcutaneous Q24H     senna-docusate  1-2 tablet Oral BID     atovaquone  1,500 mg Oral Daily     fluconazole  200 mg Oral Daily     calcium carbonate  1,250 mg Oral BID     fluticasone furoate  1 puff Inhalation Daily     magnesium oxide (MAG-OX) tablet 800 mg  800 mg Oral Daily     NIFEdipine ER osmotic  30 mg Oral Daily     metoprolol  25 mg Oral Daily     pantoprazole (PROTONIX) EC tablet 40 mg  40 mg Oral Daily     penicillin V potassium (VEETID) tablet 500 mg  500 mg Oral BID     rosuvastatin (CRESTOR) tablet 5 mg  5 mg Oral Daily     ursodiol (ACTIGALL) capsule 300 mg  300 mg Oral TID     valGANciclovir  450 mg Oral Q48H     cholecalciferol  2,000 Units Oral TID     predniSONE  30 mg Oral BID w/meals     insulin detemir  5 Units Subcutaneous QAM AC     insulin aspart  1-7 Units Subcutaneous TID AC     insulin aspart  1-5 Units Subcutaneous At Bedtime     digoxin  125 mcg Oral Daily     tadalafil (ADCIRCA/CIALIS) tablet  40 mg Oral Daily            Physical exam:   Vital signs:  Temp: 98.7  F (37.1  C) Temp src: Oral BP: 116/83   Heart Rate: 73 Resp: 12 SpO2: 92 % O2 Device: None (Room air)   Height: 195.6 cm (6' 5\") Weight: 124.7 kg (275 lb)  Estimated body mass index is 32.61 " "kg/(m^2) as calculated from the following:    Height as of this encounter: 1.956 m (6' 5\").    Weight as of this encounter: 124.7 kg (275 lb).    General: NAD, appears comfortable  HEENT: MMM. No oral lesions. SG catheter in place R neck  CV: RRR, normal S1 and S2, no m/r/g  Resp: CTA bilaterally    Abdomen: soft, non-tender, non-distended. Normoactive bs. Some ecchymoses lower part of abdomen in injection site locations    Skin: some slightly hyperpigmented skin under neck. Some smooth and tightened skin distal legs. Fingers red in color, but not particularly warm    Lymph: no cervical, axillary, inguinal LAD    Neuro: alert, conversing appropriately. Grossly w/o focal deficits        Labs:   The following labs were reviewed  I/O:   Intake/Output Summary (Last 24 hours) at 05/16/17 1321  Last data filed at 05/16/17 0300   Gross per 24 hour   Intake              490 ml   Output             1775 ml   Net            -1285 ml     Weights:  Wt Readings from Last 5 Encounters:   05/15/17 124.7 kg (275 lb)   05/01/17 125.1 kg (275 lb 11.2 oz)   04/11/17 129.5 kg (285 lb 8 oz)   04/12/16 133.8 kg (294 lb 15.6 oz)   08/26/15 133.8 kg (294 lb 15.6 oz)       CMP:  Recent Labs  Lab 05/16/17  1006 05/15/17  1153    136   POTASSIUM 4.5 4.8   CHLORIDE 103 104   CO2 22 23   ANIONGAP 8 8   * 151*   BUN 35* 41*   CR 1.52* 1.71*   GFRESTIMATED 48* 42*   GFRESTBLACK 58* 51*   PANFILO 7.9* 8.7   PROTTOTAL  --  5.8*   ALBUMIN  --  3.4   BILITOTAL  --  1.0   ALKPHOS  --  56   AST  --  28   ALT  --  95*     CBC:  Recent Labs  Lab 05/16/17  1006 05/15/17  1153   WBC 16.3* 15.9*   RBC 5.05 5.03   HGB 14.8 14.4   HCT 43.7 43.4   MCV 87 86   MCH 29.3 28.6   MCHC 33.9 33.2   RDW 19.3* 19.3*    237           Studies:     RHC 05/15:     BSA 2.6  1. HR 63 bpm  2. /80/96 mmHg  3. RA 9   4. /8  5. /40/62   6. PCW 12   7. PA sat 69.2%   8. PCW sat 94.9%  9. Hgb 14.4 g/dL   10. Carter CO 6.1   11. Carter CI 2.4   12. TD " CO 5.8   13. TD CI 2.3   14. PVR 8.1  15. TPR 10.1  16. 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     # chronic GVHD of the skin, mouth and liver, pericardial effusion, bronchiolitis obliterans: no recent worsening in his cGVH other than pericardial effusion which could also be d/t sirolimus. No clear weigh to differentiate. currently on steroid taper and holding sirolimus.   Recs  -updated case with his BMT doctor (Dr. Martin). No previous changes from earlier plan of steroid taper (previously outlined), antimicrobial prophylaxis (atovaquone, fluconazole, and valcyte). Continue to hold sirolimus. He has f/u Dr. Martin in June to determine further immunosuppression plan    # Pulmonary hypertension: He underwent Cardiac Cath 9/2016 which revealed this. This is getting worse. Uncommon manifestation of GVH, more likely from scleroderma syndrome. Sirolimus in rare circumstances can also cause pulmonary fibrosis, but this is being held at this time.   Recs  -agree with cardiology aggressive management of pulmonary HTN  -agree with TTE and PFTs, rheumatologic evaluation  -no change in cGVH plan that has already been established     # Enlarging pericardial without tamponade SOB did not improve after drainage. SOB seems more related to pulm HTN. He is being followed by Dr. Quan at [facility].   Recs  -continue to monitor     # AML in complete remission, 5 years post allogeneic stem cell transplantation: no active issues. graft: Engrafted. Full donor chimerism.  100% donor on 12/11/16.  Recs  -Dr. Martin to continue to monitor     # Acute on chronic renal insufficiency: improved  Recs  -continue to monitor    Thank you for this consult.  Patient discussed with Dr. Aviles who agrees with assessment and plan.   Jelani Ramey MD   PGY4  [facility] On[facility] Fellow

## 2017-05-17 LAB
ALBUMIN SERPL ELPH-MCNC: 3.4 G/DL (ref 3.7–5.1)
ALPHA1 GLOB SERPL ELPH-MCNC: 0.2 G/DL (ref 0.2–0.4)
ALPHA2 GLOB SERPL ELPH-MCNC: 0.4 G/DL (ref 0.5–0.9)
ANA SER QL IA: 11.7
B-GLOBULIN SERPL ELPH-MCNC: 0.6 G/DL (ref 0.6–1)
CD19 CELLS # BLD: 658 CELLS/UL (ref 107–698)
CD19 CELLS NFR BLD: 61 % (ref 6–27)
CD19 INTERPRETATION: ABNORMAL
CENTROMERE IGG SER-ACNC: ABNORMAL AI (ref 0–0.9)
ELP INTERPRETATION: ABNORMAL
ENA RNP IGG SER IA-ACNC: ABNORMAL AI (ref 0–0.9)
ENA SCL70 IGG SER IA-ACNC: ABNORMAL AI (ref 0–0.9)
ENA SM IGG SER-ACNC: ABNORMAL AI (ref 0–0.9)
ENA SS-A IGG SER IA-ACNC: 3.7 AI (ref 0–0.9)
ENA SS-B IGG SER IA-ACNC: ABNORMAL AI (ref 0–0.9)
GAMMA GLOB SERPL ELPH-MCNC: 0.5 G/DL (ref 0.7–1.6)
IGA SERPL-MCNC: 22 MG/DL (ref 70–380)
IGD SER-MCNC: NORMAL MG/DL
IGE SERPL-ACNC: 2 KIU/L (ref 0–114)
M PROTEIN SERPL ELPH-MCNC: 0.1 G/DL

## 2017-05-17 NOTE — PLAN OF CARE
Problem: Goal Outcome Summary  Goal: Goal Outcome Summary  Outcome: Improving  D: Has been in the MICU with management of pulmonary hypertension but now ready for discharge.  IA: AVSS. Ambulated about 100 yards on room air with no increased work of breathing and SpO2's 95%. Denies discomfort.  P: Preparing for discharge.

## 2017-05-18 LAB — RNA POLYMERASE III ABY IGG: 7

## 2017-05-19 ENCOUNTER — DOCUMENTATION ONLY (OUTPATIENT)
Dept: CARDIOLOGY | Facility: CLINIC | Age: 52
End: 2017-05-19

## 2017-05-19 ENCOUNTER — E-VISIT (OUTPATIENT)
Dept: CARDIOLOGY | Facility: CLINIC | Age: 52
End: 2017-05-19
Payer: COMMERCIAL

## 2017-05-19 DIAGNOSIS — I27.20: Primary | ICD-10-CM

## 2017-05-19 DIAGNOSIS — D89.813 GRAFT-VERSUS-HOST DISEASE (H): ICD-10-CM

## 2017-05-19 LAB
IGG SERPL-MCNC: 585 MG/DL (ref 695–1620)
IGG1 SER-MCNC: 433 MG/DL (ref 300–856)
IGG2 SER-MCNC: 101 MG/DL (ref 158–761)
IGG3 SER-MCNC: 22 MG/DL (ref 24–192)
IGG4 SER-MCNC: 2 MG/DL (ref 11–86)
SPECIMEN SOURCE: NORMAL
STATUS - QUEST: NORMAL
VIRUS SPEC CULT: NORMAL

## 2017-05-19 PROCEDURE — 99207 ZZC NO BILLABLE SERVICE THIS VISIT: CPT | Mod: ZP | Performed by: INTERNAL MEDICINE

## 2017-05-19 NOTE — TELEPHONE ENCOUNTER
The GENEVIEVE/centromere high positivity support the LcSSc diagnosis here. I don't think the SSA is inconsistent.     I see that serum IgG and IgA are low, and this represents a relative contraindication to re-dosing with rituximab. This could, however, serve as a rationale for treating with IVIG. IVIG has shown some efficacy in skin, joint and lung disease in scleroderma uncontrolled trials, but I don't know about PAH.     Mukesh     Component                                                       Latest Ref Rng & Units                   4/29/2017 5/16/2017                               IGG                                                             695 - 1620 mg/dL                                               585 (L)                                 IgG1                                                            300 - 856 mg/dL                                                Pending                                 IgG2                                                            158 - 761 mg/dL                                                Pending                                 IgG3                                                            24 - 192 mg/dL                                                 Pending                                 IgG4                                                            11 - 86 mg/dL                                                  Pending                                 RNP Antibody IgG                                                0.0 - 0.9 AI                                                   <0.2 . . .                               Mobley XAVIER Antibody IgG                                          0.0 - 0.9 AI                                                   <0.2 . . .                               SSA (Ro) (XAVIER) Antibody, IgG                                    0.0 - 0.9 AI                                                   3.7 (H)                                 SSB (La) (XAVIER)  Antibody, IgG                                    0.0 - 0.9 AI                                                   <0.2 . . .                               Scleroderma Antibody Scl-70 XAVIER IgG                             0.0 - 0.9 AI                                                   <0.2 . . .                               CD19 B Cells                                                    6 - 27 %                                                       61 (H)                                   Absolute CD19                                                   107 - 698 cells/uL                                             658                                     CD19 Interpretation                                                                                                            << Do Not Report >>                     GENEVIEVE Screen by EIA                                               <1.0                                     9.9 (H)                                                       IGA                                                             70 - 380 mg/dL                                                 22 (L)                                   IGE                                                             0 - 114 KIU/L                                                  2                                       Immunoglobulin D                                                                                                               <0.7 . . .                               Centromere Antibody IgG                                         0.0 - 0.9 AI                                                   >8.0 (H) . . .

## 2017-05-19 NOTE — TELEPHONE ENCOUNTER
I personally reviewed vital signs, medications, labs and imaging.     Key findings: Longstanding, complex multi-organ chronic GVHD history, including serositis, KARLEE, and sclerodermoid features. Most pressing issue is a new diagnosis of PAH, which is not classically a chronic GVHD manifestation. Nonetheless, rare reports exist of PAH coincident with chronic GVHD exist. It is unlikely that intensifying immunosuppression beyond current high-dose steroids (which should help treat his serositis) will improve his PAH. Our group has previously reported elevated endothelin-1 (ET-1) plasma concentrations in chronic GVHD (Ketan et al, Blood. 2016 Jun 16;127(41):3085-39), and one wonders if his current situation reflects a high ET-1 state that may be improved by the use of endothelin antagonists such as bosentan. ET-1 plasma concentration may be able to be tested by the Cytokine Reference Laboratory here at the [facility] (although note this is not an FDA-approved clinical test). I would favor a trial of an ET-1 inhibitor or related treatment as opposed to more immunosuppression in his case. Long term, additional steroid-sparing agents may need to be considered for his recurrent serositis (e.g., rituximab, ibrutinib, or ruxolitinib), but none would be likely to improve his cardiopulmonary status as acutely as therapy specifically directed at PAH.     I would be happy to follow him in our BMT clinic if desired, although his BMT-related complications have been managed primarily by his physicians at [facility].      It was a pleasure meeting Mr. Gómez today.     Max Aviles MD  Date of Service (when I saw the patient): 05/16/17

## 2017-05-19 NOTE — MR AVS SNAPSHOT
"              After Visit Summary   5/19/2017    Olivier Gómez    MRN: [id number]           Patient Information     Date Of Birth          1965        Visit Information        Provider Department      5/19/2017 4:47 PM Dk Quan MD [facility]        Today's Diagnoses     Episodic pulmonary hypertension (H)    -  1    Zvixe-mgnysy-ewxc disease (H)           Follow-ups after your visit        Your next 10 appointments already scheduled     Dec 06, 2017  1:30 PM CST   Lab with  LAB   [facility]    [address]  1st Floor  [facility] [address] [phone]            Dec 06, 2017  2:00 PM CST   (Arrive by 1:45 PM)   RETURN PRIMARY PULMONARY with Dk Quan MD   [facility] ([facility])    [address] [place] [address]  3rd Floor  [facility] [address] [phone]              Who to contact     If you have questions or need follow up information about today's clinic visit or your schedule please contact [facility] directly at [phone].  Normal or non-critical lab and imaging results will be communicated to you by [employer], letter or phone within 4 business days after the clinic has received the results. If you do not hear from us within 7 days, please contact the clinic through [employer] or phone. If you have a critical or abnormal lab result, we will notify you by phone as soon as possible.  Submit refill requests through [employer] or call your pharmacy and they will forward the refill request to us. Please allow 3 business days for your refill to be completed.          Additional Information About Your Visit        [employer] Information     [employer] lets you send messages to your doctor, view your test results, renew your prescriptions, schedule appointments and more. To sign up, go to [email or web address] . Click on \"Log in\" on the left side of the screen, which will take you to the " "Welcome page. Then click on \"Sign up Now\" on the right side of the page.     You will be asked to enter the access code listed below, as well as some personal information. Please follow the directions to create your username and password.     Your access code is: [email or web address]  Expires: 10/17/2017  6:30 AM     Your access code will  in 90 days. If you need help or a new code, please call your [facility] or [phone].        Care EveryWhere ID     This is your Care EveryWhere ID. This could be used by other organizations to access your [facility] medical records  [id number]         Blood Pressure from Last 3 Encounters:   17 124/79   17 124/79   17 107/71    Weight from Last 3 Encounters:   17 113.7 kg (250 lb 11.2 oz)   17 113.4 kg (250 lb)   17 115 kg (253 lb 9.6 oz)              Today, you had the following     No orders found for display       Primary Care Provider Office Phone # Fax #    Adelso Delgado [phone]       [facility] [address]  [place] [address]        Equal Access to Services     DIEUDONNE RILEY AH: Hadii aad ku hadasho Soomaali, waaxda luqadaha, qaybta kaalmada adeegyada, waxay idiin hayaan adeeg selam la'uday . So [facility] [phone].    ATENCIÓN: Si habla español, tiene a garza disposición servicios gratuitos de asistencia lingüística. Llame al [phone].    We comply with applicable federal civil rights laws and [place] laws. We do not discriminate on the basis of race, color, national origin, age, disability sex, sexual orientation or gender identity.            Thank you!     Thank you for choosing [facility]  for your care. Our goal is always to provide you with excellent care. Hearing back from our patients is one way we can continue to improve our services. Please take a few minutes to complete the written survey that you may receive in the mail after your visit with us. Thank you!           "   Your Updated Medication List - Protect others around you: Learn how to safely use, store and throw away your medicines at www.disposemymeds.org.          This list is accurate as of: 5/19/17 11:59 PM.  Always use your most recent med list.                   Brand Name Dispense Instructions for use Diagnosis    ACTIGALL PO      Take 300 mg by mouth 3 times daily        atovaquone 750 MG/5ML suspension    MEPRON     Take 1,500 mg by mouth daily        blood glucose monitoring test strip    ONE TOUCH ULTRA    100 strip    Use to test blood sugars 4 times daily or as directed.    Steroid-induced diabetes mellitus (H)       calcium carbonate 1250 MG tablet    OS-PANFILO 500 mg [place]. [place]     Take 600 mg by mouth 2 times daily        CLONAZEPAM PO      Take 1 mg by mouth At Bedtime        CRESTOR PO      Take 5 mg by mouth daily        digoxin 125 MCG tablet    LANOXIN    90 tablet    Take 1 tablet (125 mcg) by mouth daily    Pulmonary hypertension (H)       FLUCONAZOLE PO      Take 200 mg by mouth daily        fluticasone 220 MCG/ACT Inhaler    FLOVENT HFA     Inhale 2 puffs into the lungs 2 times daily        PEN-VEE K OR      Take 500 mg by mouth 2 times daily        PROTONIX PO      Take 40 mg by mouth daily        VITAMIN D (CHOLECALCIFEROL) PO      Take 2,000 Units by mouth 3 times daily

## 2017-05-23 DIAGNOSIS — I27.20 PULMONARY HYPERTENSION (H): ICD-10-CM

## 2017-05-23 LAB
FUNGUS SPEC CULT: NORMAL
MICRO REPORT STATUS: NORMAL
SPECIMEN SOURCE: NORMAL

## 2017-05-24 RX ORDER — TADALAFIL 20 MG/1
40 TABLET ORAL DAILY
Qty: 30 TABLET | Refills: 11 | Status: SHIPPED | OUTPATIENT
Start: 2017-05-24 | End: 2017-05-26

## 2017-05-25 ENCOUNTER — TELEPHONE (OUTPATIENT)
Dept: CARDIOLOGY | Facility: CLINIC | Age: 52
End: 2017-05-25

## 2017-05-25 NOTE — TELEPHONE ENCOUNTER
Prior Authorization Specialty Medication Request    Medication/Dose: Opsumit  Frequency: Daily    Route: PO  Take one tablet (10 mg) by mouth daily. Quantity 30 tablets/30 days  Diagnosis and ICD: I27.2. 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

## 2017-05-25 NOTE — TELEPHONE ENCOUNTER
PA Initiation    Medication: Opsumit 10 MG  Insurance Company: [employer] [facility] - Phone [phone] Fax [phone]  Start Date: 5/25/2017     Urgent request has been faxed to [facility] (f:[phone]). [facility] report was attached. Hub form has been faxed to [employer].

## 2017-05-26 DIAGNOSIS — I27.20 PULMONARY HYPERTENSION (H): ICD-10-CM

## 2017-05-26 RX ORDER — TADALAFIL 20 MG/1
40 TABLET ORAL DAILY
Qty: 60 TABLET | Refills: 11 | Status: SHIPPED | OUTPATIENT
Start: 2017-05-26 | End: 2017-07-07

## 2017-06-05 NOTE — TELEPHONE ENCOUNTER
PRIOR AUTHORIZATION DENIED    Medication: Opsumit - Denied    Denial Date: 6/5/2017    Denial Rational: [facility] Pharmacist, Ishaan, stated PA was denied as patient has not adequately tried/failed therapy with PDE5 inhibitor (tadalafil 40 mg daily). He states pharmacy claims showed the patient to only be taking 20 mg, despite information provided with request stating the patient was already at a therapeutic dose. As patient requires Opsumit in combination with tadalafil, a letter of medical necessity will be written for appeal. Additionally, [employer] states that enrollment form (submitted on 5/25/2017) was not received. Form has been re-faxed with request this be expedited and pt be enrolled in 30-day free trial while the appeal process for the prior authorization is managed.

## 2017-06-07 ENCOUNTER — PRE VISIT (OUTPATIENT)
Dept: CARDIOLOGY | Facility: CLINIC | Age: 52
End: 2017-06-07

## 2017-06-07 ENCOUNTER — OFFICE VISIT (OUTPATIENT)
Dept: CARDIOLOGY | Facility: CLINIC | Age: 52
End: 2017-06-07
Attending: INTERNAL MEDICINE
Payer: COMMERCIAL

## 2017-06-07 VITALS
SYSTOLIC BLOOD PRESSURE: 119 MMHG | BODY MASS INDEX: 31.76 KG/M2 | HEIGHT: 77 IN | WEIGHT: 269 LBS | HEART RATE: 89 BPM | OXYGEN SATURATION: 96 % | DIASTOLIC BLOOD PRESSURE: 79 MMHG

## 2017-06-07 DIAGNOSIS — I27.20 PULMONARY HYPERTENSION (H): ICD-10-CM

## 2017-06-07 DIAGNOSIS — I27.20 PULMONARY HYPERTENSION (H): Primary | ICD-10-CM

## 2017-06-07 DIAGNOSIS — R06.09 DYSPNEA ON EXERTION: ICD-10-CM

## 2017-06-07 DIAGNOSIS — I27.21 PAH (PULMONARY ARTERY HYPERTENSION) (H): Primary | ICD-10-CM

## 2017-06-07 LAB
ANION GAP SERPL CALCULATED.3IONS-SCNC: 9 MMOL/L (ref 3–14)
BUN SERPL-MCNC: 27 MG/DL (ref 7–30)
CALCIUM SERPL-MCNC: 9 MG/DL (ref 8.5–10.1)
CHLORIDE SERPL-SCNC: 107 MMOL/L (ref 94–109)
CO2 SERPL-SCNC: 24 MMOL/L (ref 20–32)
CREAT SERPL-MCNC: 1.62 MG/DL (ref 0.66–1.25)
ERYTHROCYTE [DISTWIDTH] IN BLOOD BY AUTOMATED COUNT: 23.1 % (ref 10–15)
GFR SERPL CREATININE-BSD FRML MDRD: 45 ML/MIN/1.7M2
GLUCOSE SERPL-MCNC: 115 MG/DL (ref 70–99)
HCT VFR BLD AUTO: 45.1 % (ref 40–53)
HGB BLD-MCNC: 14.2 G/DL (ref 13.3–17.7)
MCH RBC QN AUTO: 28.6 PG (ref 26.5–33)
MCHC RBC AUTO-ENTMCNC: 31.5 G/DL (ref 31.5–36.5)
MCV RBC AUTO: 91 FL (ref 78–100)
NT-PROBNP SERPL-MCNC: 8023 PG/ML (ref 0–125)
PLATELET # BLD AUTO: 222 10E9/L (ref 150–450)
POTASSIUM SERPL-SCNC: 4.5 MMOL/L (ref 3.4–5.3)
RBC # BLD AUTO: 4.96 10E12/L (ref 4.4–5.9)
SODIUM SERPL-SCNC: 140 MMOL/L (ref 133–144)
WBC # BLD AUTO: 10.2 10E9/L (ref 4–11)

## 2017-06-07 PROCEDURE — 36415 COLL VENOUS BLD VENIPUNCTURE: CPT | Performed by: INTERNAL MEDICINE

## 2017-06-07 PROCEDURE — 83880 ASSAY OF NATRIURETIC PEPTIDE: CPT | Performed by: INTERNAL MEDICINE

## 2017-06-07 PROCEDURE — 99213 OFFICE O/P EST LOW 20 MIN: CPT | Mod: ZF

## 2017-06-07 PROCEDURE — 85027 COMPLETE CBC AUTOMATED: CPT | Performed by: INTERNAL MEDICINE

## 2017-06-07 PROCEDURE — 80048 BASIC METABOLIC PNL TOTAL CA: CPT | Performed by: INTERNAL MEDICINE

## 2017-06-07 PROCEDURE — 99214 OFFICE O/P EST MOD 30 MIN: CPT | Mod: GC | Performed by: INTERNAL MEDICINE

## 2017-06-07 ASSESSMENT — PAIN SCALES - GENERAL: PAINLEVEL: NO PAIN (0)

## 2017-06-07 NOTE — PATIENT INSTRUCTIONS
Medication Changes:  We will start Opsumit (macitentan)   Call us when you start your new medication.    Stop Nifedipine    Patient Instructions:      Follow up Appointment Information:  6-8 weeks with labs    We are located on the third floor [facility] on [facility].  Our address is     [address] [place] [address] on [address]rd [place] [address]      Thank you for allowing us to be a part of your care here at the [facility]    If you have questions or concerns please contact us at:    Marlys Anderson, RN, BSN    Kai Gant (Schedule,P.A.)  Nurse Coordinator     Clinic   Pulmonary Hypertension   Pulmonary Hypertension  [facility]  (P)[phone]    (P) [phone]        (F)[phone]                ** Please note that you will NOT receive a reminder call regarding your scheduled testing, reminder calls are for provider appointments only.  If you are scheduled for testing within the [employer] system you may receive a call regarding pre-registration for billing purposes only.**     Remember to weigh yourself daily after voiding and before you consume any food or beverages and log the numbers.  If you have gained/lost 2 pounds overnight or 5 pounds in a week contact us immediately for medication adjustments or further instructions.  **Please call us immediately if you have any syncope, chest pain, edema, or decline in your functional status.

## 2017-06-07 NOTE — NURSING NOTE
Med Reconcile: Reviewed and verified all current medications with the patient. The updated medication list was printed and given to the patient.  New Medication: Patient was educated regarding newly prescribed medication, including discussion of  the indication, administration, side effects, and when to report to MD or RN. Patient demonstrated understanding of this information and agreed to call with further questions or concerns.  Return Appointment: Patient given instructions regarding scheduling next clinic visit. Patient demonstrated understanding of this information and agreed to call with further questions or concerns.  Patient stated he understood all health information given and agreed to call with further questions or concerns.     Medication Changes:  We will start Opsumit (macitentan)   Call us when you start your new medication.    Stop Nifedipine    Patient Instructions:      Follow up Appointment Information:  6-8 weeks with labs

## 2017-06-07 NOTE — PROGRESS NOTES
"Dk Quan M.D.  Cardiovascular Medicine    I personally saw and examined this patient, discussed care with housestaff and other consultants, reviewed current laboratories and imaging studies, and conveyed impression and diagnostic/therapeutic plan to patient.    Problem List  AML treated with BMT  GVHD  GI angiodysplasia  Sclerodema  Raynauds  Pericardial effusion (4/25/17)  Transient loss of consciousness, 2017 (E.D. Evaluation thought it was clonazepam related)  CKD (felt in part to be d/t tacrolimus)      Assessment/Plan     Plan  - Start Opsumit  - Stop nifedipine   - Further assessment of pericardial effusion  - Will need hemodynamics again, decide when at next visit    Follow up in 6-8 weeks with labs    Patient was seen and plan d/w Dr. Avelina Bermudez  Cardiology Fellow      HPI     Mr. 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 He returns for follow-up.  He has no interim history of syncope or pre-syncope.  He exercises regular on a machine.  He becomes bored but not tired.  He has occasional cough, no hemoptysis.  He has no wheezing.  He has had no progression of his GVH. He has had no medication changes.  Recent laboratory values are reviewed.  He has no symptoms or finding of right heart failure.       He is on tadalafil 40 mg PO QD and digoxin 125 mcg    Today, he feels he well, especially with lower prednisone dose (still on 20 mg). He had a dizzy spell in May 2017 and had a subsequent right heart cath and echocardiogram. No dizziness since then. He can walk as far as he wants, though he has to go slower than usual.        Objective  /79 (BP Location: Left arm, Patient Position: Chair, Cuff Size: Adult Large)  Pulse 89  Ht 1.956 m (6' 5\")  Wt 122 kg (269 lb)  SpO2 96%  BMI 31.9 kg/m2   Gen: alert, oriented, normal gait and station, normal " mentation.  Oral: moist mucous membrans  Lymph: without pathologic adenopathy  Chest: clear to ausculation and percussion  Cor: No evidence of left or right ventricular activity.  Rhythm is regular.  S1 normal, loud S2 . Murmurs are not present. Not muffled. Negative Kussmaul's sign.  Abdomen: without tenderness, rebound, guarding, masses, ascites  Extremities: Edema not present  Neuro: no focal defects, normal mentation  Skin: without open lesions  Psych: oriented, verbal, mental status in tact    Wt Readings from Last 5 Encounters:   06/07/17 122 kg (269 lb)   05/15/17 124.7 kg (275 lb)   05/01/17 125.1 kg (275 lb 11.2 oz)   04/11/17 129.5 kg (285 lb 8 oz)   04/12/16 133.8 kg (294 lb 15.6 oz)       Meds    Current Outpatient Prescriptions on File Prior to Visit:  tadalafil (CIALIS) 20 MG tablet Take 2 tablets (40 mg) by mouth daily   digoxin (LANOXIN) 125 MCG tablet Take 1 tablet (125 mcg) by mouth daily   traZODone (DESYREL) 50 MG tablet Take 1 tablet (50 mg) by mouth nightly as needed for sleep   blood glucose monitoring (ONE TOUCH ULTRA) test strip Use to test blood sugars 4 times daily or as directed.   [START ON 6/11/2017] predniSONE (DELTASONE) 1 MG tablet Take 5 tablets (5 mg) by mouth every other day   predniSONE (DELTASONE) 10 MG tablet Take 1 tablet (10 mg) by mouth daily   Penicillin V Potassium (PEN-VEE K OR) Take 500 mg by mouth 2 times daily   fluticasone (FLOVENT HFA) 220 MCG/ACT Inhaler Inhale 2 puffs into the lungs 2 times daily   metoprolol (TOPROL-XL) 25 MG 24 hr tablet Take 1 tablet (25 mg) by mouth daily   FLUCONAZOLE PO Take 200 mg by mouth daily   NIFEdipine osmotic (NIFEDICAL XL) 30 MG 24 hr tablet Take 30 mg by mouth daily   Ursodiol (ACTIGALL PO) Take 300 mg by mouth 3 times daily   VITAMIN D, CHOLECALCIFEROL, PO Take 2,000 Units by mouth 3 times daily    valGANciclovir (VALCYTE) 450 MG tablet Take 450 mg by mouth every 48 hours    CLONAZEPAM PO Take 1 mg by mouth At Bedtime    calcium  carbonate (OS-PANFILO 500 MG [place]. [place]) 500 MG tablet Take 600 mg by mouth 2 times daily   Pantoprazole Sodium (PROTONIX PO) Take 40 mg by mouth daily    Rosuvastatin Calcium (CRESTOR PO) Take 5 mg by mouth daily    MAGNESIUM OXIDE PO Take 800 mg by mouth daily   atovaquone (MEPRON) 750 MG/5ML suspension Take 1,500 mg by mouth daily   insulin detemir (LEVEMIR) 100 UNIT/ML injection Inject 10 Units Subcutaneous every morning     No current facility-administered medications on file prior to visit.       Labs    Results for BENEDICT ESTES (MRN [id number]) as of 4/11/2017 11:58   Ref. Range 4/11/2017 08:03 4/11/2017 09:21 4/11/2017 09:25   Lactic Acid Latest Ref Range: 0.7 - 2.1 mmol/L  7.6 (HH)    N-Terminal Pro BNP Inpatient Latest Ref Range: 0 - 900 pg/mL 668     Ph Venous Latest Ref Range: 7.32 - 7.43 pH  7.24 (L)    PCO2 Venous Latest Ref Range: 40 - 50 mm Hg  45    PO2 Venous Latest Ref Range: 25 - 47 mm Hg  40    Bicarbonate Venous Latest Ref Range: 21 - 28 mmol/L  19 (L)    O2 Sat Venous Latest Units: %  66    INR Latest Ref Range: 0.86 - 1.14  1.04     HEART CATH RIGHT HEART CATH Unknown   Attch       [facility] 7/14/15  RA 7  RV 42/7  PA 48/22, 32  PCWP 12  F CO (CI) 7.9 (3.2)  TD CO (CI) 11 (4.5)   PVR 2.5      [facility] 4/11/2017   BSA 2.7  1. HR 75 bpm  2. /78 mmHg  3. RA 12/6/8   4. RV 76/2  5. PA 80/17, 43  6. PCW 17/12/8  7. PA sat 73.8%   8. PCW sat-not done  9. Hgb 12.8 g/dL   10. Carter CO 8.03   11. Carter CI 3.11   12. TD CO 9.2   13. TD CI 3.6   14. PVR 3.8  POST EXERCISE HEMODYNAMICS:  Patient exercise for 5min of laying bicycle.   1.  bpm  2. /95/126 mmHg  3. /31/69   4. PCW 25/18/18  5. PA sat 73.8%   6. Hgb 12.8 g/dL   7. Carter CO 8.1   8. Carter CI 3.1   9. TD CO 14.15  10. TD CI 5.48   11. PVR 3.7  Post Exercise Lactate: 7.6    [facility] 5/15/2017  BSA 2.6  1. HR 63 bpm  2. /80/96 mmHg  3. RA 9   4. /8  5. /40/62   6. PCW 12   7. PA sat 69.2%   8. PCW sat 94.9%  9. Hgb 14.4 g/dL    10. Carter CO 6.1   11. Carter CI 2.4   12. TD CO 5.8   13. TD CI 2.3   14. PVR 8.1  15. TPR 10.1  16. SVR  114    Echo 5/16/17  Interpretation Summary  There was no shunt at the atrial septal level as assessed by agitated saline bubble study at rest and reportedly with Valsalva maneuver. Due to technical difficulty, the images during Valsalva maneuver could not be captured. If  clinical suspicion for atrial level shunting is high, cMR or ADRIEN can be Helpful.    Echo 5/17/17  Interpretation Summary  Moderate to severe right ventricular dilation is present.  Global right ventricular function is mildly reduced.  Right ventricular systolic pressure is 85mmHg above the right atrial pressure.  PV acceleration time 70ms.  Dilation of the inferior vena cava is present with abnormal respiratory  variation in diameter.  Trivial pericardial effusion is present.      May 8th   Ejection Fraction = 65-70%.  Abnormal LV septal wall motion - flattened in systole and diastole.  There is borderline concentric left ventricular hypertrophy.  Grade I diastolic dysfunction.  The right ventricle is moderately dilated.  The right atrium is moderately dilated.  There is trace mitral regurgitation.  Moderate tricuspid regurgitation by color Doppler.  Right ventricular systolic pressure is measured at 96 mmHg. Consistent with severe pulmonary  hypertension  Mild pulmonic valvular insufficiency.  Small pericardial effusion. The pericardial effusion is posterior.    Bone Marrow Transplant  I, Max Aviles, saw this patient with the fellow and agree with the resident s findings and plan of care as documented in the Dr. Ramey's note.       I personally reviewed vital signs, medications, labs and imaging.     Key findings: Longstanding, complex multi-organ chronic GVHD history, including serositis, KARLEE, and sclerodermoid features.  Most pressing issue is a new diagnosis of PAH, which is not classically a chronic GVHD manifestation.   Nonetheless, rare reports exist of PAH coincident with chronic GVHD exist.  It is unlikely that intensifying immunosuppression beyond current high-dose steroids (which should help treat his serositis) will improve his PAH.  Our group has previously reported elevated endothelin-1 (ET-1) plasma concentrations in chronic GVHD (Ketan et al, Blood. 2016 Jun 16;127(24):3087-97), and one wonders if his current situation reflects a high ET-1 state that may be improved by the use of endothelin antagonists such as bosentan.  ET-1 plasma concentration may be able to be tested by the [facility] Reference Laboratory here at the [facility] (although note this is not an FDA-approved clinical test). I would favor a trial of an ET-1 inhibitor or related treatment as opposed to more immunosuppression in his case.  Long term, additional steroid-sparing agents may need to be considered for his recurrent serositis (e.g., rituximab, ibrutinib, or ruxolitinib), but none would be likely to improve his cardiopulmonary status as acutely as therapy specifically directed at PAH.     I would be happy to follow him in our BMT clinic if desired, although his BMT-related complications have been managed primarily by his physicians at [facility].        It was a pleasure meeting Mr. Gómez today.     Max Aviles MD  Date of Service (when I saw the patient): 05/16/17

## 2017-06-07 NOTE — MR AVS SNAPSHOT
After Visit Summary   6/7/2017    Olivier Gómez    MRN: [id number]           Patient Information     Date Of Birth          1965        Visit Information        Provider Department      6/7/2017 11:30 AM Dk Quan MD [facility]        Care Instructions    Medication Changes:  We will start Opsumit (macitentan)   Call us when you start your new medication.    Stop Nifedipine    Patient Instructions:      Follow up Appointment Information:  6-8 weeks with labs    We are located on the third floor of the [facility] on the [facility].  Our address is     [address] [place] [address] on [address]rd [place] [address]      Thank you for allowing us to be a part of your care here at the [facility]    If you have questions or concerns please contact us at:    Marlys Anderson RN, BSN    Kai Gant (Schedule,P.A.)  Nurse Coordinator     Clinic   Pulmonary Hypertension   Pulmonary Hypertension  [facility]  (P)[phone]    (P) [phone]        (F)[phone]                ** Please note that you will NOT receive a reminder call regarding your scheduled testing, reminder calls are for provider appointments only.  If you are scheduled for testing within the [facility] system you may receive a call regarding pre-registration for billing purposes only.**     Remember to weigh yourself daily after voiding and before you consume any food or beverages and log the numbers.  If you have gained/lost 2 pounds overnight or 5 pounds in a week contact us immediately for medication adjustments or further instructions.  **Please call us immediately if you have any syncope, chest pain, edema, or decline in your functional status.          Follow-ups after your visit        Follow-up notes from your care team     Return in about 8 years  "(around 2025) for with Avelina, Return PH, with, Labs.      Your next 10 appointments already scheduled     2017  1:00 PM CDT   Pulmonary Hypertension Return with Dk Quan MD   [facility] [place] [facility])    [address] [place] [id number] [phone]              Who to contact     If you have questions or need follow up information about today's clinic visit or your schedule please contact [facility] directly at [phone].  Normal or non-critical lab and imaging results will be communicated to you by SoFihart, letter or phone within 4 business days after the clinic has received the results. If you do not hear from us within 7 days, please contact the clinic through SoFihart or phone. If you have a critical or abnormal lab result, we will notify you by phone as soon as possible.  Submit refill requests through sougou or call your pharmacy and they will forward the refill request to us. Please allow 3 business days for your refill to be completed.          Additional Information About Your Visit        MyChart Information     sougou lets you send messages to your doctor, view your test results, renew your prescriptions, schedule appointments and more. To sign up, go to [email or web address] . Click on \"Log in\" on the left side of the screen, which will take you to the Welcome page. Then click on \"Sign up Now\" on the right side of the page.     You will be asked to enter the access code listed below, as well as some personal information. Please follow the directions to create your username and password.     Your access code is: [email or web address]  Expires: 2017 10:56 AM     Your access code will  in 90 days. If you need help or a new code, please call your [facility] clinic or [phone].        Care EveryWhere ID     This is your Care EveryWhere ID. This could be used by other organizations to access " "your [facility] medical records  [id number]        Your Vitals Were     Pulse Height Pulse Oximetry BMI (Body Mass Index)          89 1.956 m (6' 5\") 96% 31.9 kg/m2         Blood Pressure from Last 3 Encounters:   06/07/17 119/79   05/16/17 (!) 131/93   05/01/17 117/82    Weight from Last 3 Encounters:   06/07/17 122 kg (269 lb)   05/15/17 124.7 kg (275 lb)   05/01/17 125.1 kg (275 lb 11.2 oz)              Today, you had the following     No orders found for display       Primary Care Provider Office Phone # Fax #    Adelso Delgado [phone]       [facility] [address]  [place] [address]        Thank you!     Thank you for choosing [facility]  for your care. Our goal is always to provide you with excellent care. Hearing back from our patients is one way we can continue to improve our services. Please take a few minutes to complete the written survey that you may receive in the mail after your visit with us. Thank you!             Your Updated Medication List - Protect others around you: Learn how to safely use, store and throw away your medicines at www.disposemymeds.org.          This list is accurate as of: 6/7/17  1:27 PM.  Always use your most recent med list.                   Brand Name Dispense Instructions for use    ACTIGALL PO      Take 300 mg by mouth 3 times daily       atovaquone 750 MG/5ML suspension    MEPRON     Take 1,500 mg by mouth daily       blood glucose monitoring test strip    ONE TOUCH ULTRA    100 strip    Use to test blood sugars 4 times daily or as directed.       calcium carbonate 1250 MG tablet    OS-PANFILO 500 mg [place]. Ca     Take 600 mg by mouth 2 times daily       CLONAZEPAM PO      Take 1 mg by mouth At Bedtime       CRESTOR PO      Take 5 mg by mouth daily       digoxin 125 MCG tablet    LANOXIN    90 tablet    Take 1 tablet (125 mcg) by mouth daily       FLUCONAZOLE PO      Take 200 mg by mouth daily       fluticasone 220 MCG/ACT " Inhaler    FLOVENT HFA     Inhale 2 puffs into the lungs 2 times daily       insulin detemir 100 UNIT/ML injection    LEVEMIR    3 mL    Inject 10 Units Subcutaneous every morning       MAGNESIUM OXIDE PO      Take 800 mg by mouth daily       metoprolol 25 MG 24 hr tablet    TOPROL-XL    90 tablet    Take 1 tablet (25 mg) by mouth daily       NIFEDICAL XL 30 MG 24 hr tablet   Generic drug:  NIFEdipine ER osmotic      Take 30 mg by mouth daily       PEN-VEE K OR      Take 500 mg by mouth 2 times daily       * predniSONE 10 MG tablet    DELTASONE    30 tablet    Take 1 tablet (10 mg) by mouth daily       * predniSONE 1 MG tablet   Start taking on:  6/11/2017    DELTASONE     Take 5 tablets (5 mg) by mouth every other day       PROTONIX PO      Take 40 mg by mouth daily       tadalafil 20 MG tablet    CIALIS    60 tablet    Take 2 tablets (40 mg) by mouth daily       traZODone 50 MG tablet    DESYREL    90 tablet    Take 1 tablet (50 mg) by mouth nightly as needed for sleep       valGANciclovir 450 MG tablet    VALCYTE     Take 450 mg by mouth every 48 hours       VITAMIN D (CHOLECALCIFEROL) PO      Take 2,000 Units by mouth 3 times daily       * Notice:  This list has 2 medication(s) that are the same as other medications prescribed for you. Read the directions carefully, and ask your doctor or other care provider to review them with you.

## 2017-06-07 NOTE — LETTER
6/7/2017      RE: Olivier Gómez  [address]  [place] [address]       Dear Colleague,    Thank you for the opportunity to participate in the care of your patient, Olivier Gómez, at the [facility]. Please see a copy of my visit note below.            Dk Quan M.D.  Cardiovascular Medicine    I personally saw and examined this patient, discussed care with housestaff and other consultants, reviewed current laboratories and imaging studies, and conveyed impression and diagnostic/therapeutic plan to patient.    Problem List  AML treated with BMT  GVHD  GI angiodysplasia  Sclerodema  Raynauds  Pericardial effusion (4/25/17)  Transient loss of consciousness, 2017 (E.D. Evaluation thought it was clonazepam related)  CKD (felt in part to be d/t tacrolimus)      Assessment/Plan     Plan  - Start Opsumit  - Stop nifedipine   - Further assessment of pericardial effusion  - Will need hemodynamics again, decide when at next visit    Follow up in 6-8 weeks with labs    Patient was seen and plan d/w Dr. Avelina Bermudez  Cardiology Fellow      HPI     Mr. 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 He returns for follow-up.  He has no interim history of syncope or pre-syncope.  He exercises regular on a machine.  He becomes bored but not tired.  He has occasional cough, no hemoptysis.  He has no wheezing.  He has had no progression of his GVH. He has had no medication changes.  Recent laboratory values are reviewed.  He has no symptoms or finding of right heart failure.       He is on tadalafil 40 mg PO QD and digoxin 125 mcg    Today, he feels he well, especially with lower prednisone dose (still on 20 mg). He had a dizzy spell in May 2017 and had a subsequent right heart cath and echocardiogram. No dizziness since then.  "He can walk as far as he wants, though he has to go slower than usual.        Objective  /79 (BP Location: Left arm, Patient Position: Chair, Cuff Size: Adult Large)  Pulse 89  Ht 1.956 m (6' 5\")  Wt 122 kg (269 lb)  SpO2 96%  BMI 31.9 kg/m2   Gen: alert, oriented, normal gait and station, normal mentation.  Oral: moist mucous membrans  Lymph: without pathologic adenopathy  Chest: clear to ausculation and percussion  Cor: No evidence of left or right ventricular activity.  Rhythm is regular.  S1 normal, loud S2 . Murmurs are not present. Not muffled. Negative Kussmaul's sign.  Abdomen: without tenderness, rebound, guarding, masses, ascites  Extremities: Edema not present  Neuro: no focal defects, normal mentation  Skin: without open lesions  Psych: oriented, verbal, mental status in tact    Wt Readings from Last 5 Encounters:   06/07/17 122 kg (269 lb)   05/15/17 124.7 kg (275 lb)   05/01/17 125.1 kg (275 lb 11.2 oz)   04/11/17 129.5 kg (285 lb 8 oz)   04/12/16 133.8 kg (294 lb 15.6 oz)       Meds    Current Outpatient Prescriptions on File Prior to Visit:  tadalafil (CIALIS) 20 MG tablet Take 2 tablets (40 mg) by mouth daily   digoxin (LANOXIN) 125 MCG tablet Take 1 tablet (125 mcg) by mouth daily   traZODone (DESYREL) 50 MG tablet Take 1 tablet (50 mg) by mouth nightly as needed for sleep   blood glucose monitoring (ONE TOUCH ULTRA) test strip Use to test blood sugars 4 times daily or as directed.   [START ON 6/11/2017] predniSONE (DELTASONE) 1 MG tablet Take 5 tablets (5 mg) by mouth every other day   predniSONE (DELTASONE) 10 MG tablet Take 1 tablet (10 mg) by mouth daily   Penicillin V Potassium (PEN-VEE K OR) Take 500 mg by mouth 2 times daily   fluticasone (FLOVENT HFA) 220 MCG/ACT Inhaler Inhale 2 puffs into the lungs 2 times daily   metoprolol (TOPROL-XL) 25 MG 24 hr tablet Take 1 tablet (25 mg) by mouth daily   FLUCONAZOLE PO Take 200 mg by mouth daily   NIFEdipine osmotic (NIFEDICAL XL) 30 MG " 24 hr tablet Take 30 mg by mouth daily   Ursodiol (ACTIGALL PO) Take 300 mg by mouth 3 times daily   VITAMIN D, CHOLECALCIFEROL, PO Take 2,000 Units by mouth 3 times daily    valGANciclovir (VALCYTE) 450 MG tablet Take 450 mg by mouth every 48 hours    CLONAZEPAM PO Take 1 mg by mouth At Bedtime    calcium carbonate (OS-PANFILO 500 MG [place]. CA) 500 MG tablet Take 600 mg by mouth 2 times daily   Pantoprazole Sodium (PROTONIX PO) Take 40 mg by mouth daily    Rosuvastatin Calcium (CRESTOR PO) Take 5 mg by mouth daily    MAGNESIUM OXIDE PO Take 800 mg by mouth daily   atovaquone (MEPRON) 750 MG/5ML suspension Take 1,500 mg by mouth daily   insulin detemir (LEVEMIR) 100 UNIT/ML injection Inject 10 Units Subcutaneous every morning     No current facility-administered medications on file prior to visit.       Labs    Results for BENEDICT ESTES (MRN [id number]) as of 4/11/2017 11:58   Ref. Range 4/11/2017 08:03 4/11/2017 09:21 4/11/2017 09:25   Lactic Acid Latest Ref Range: 0.7 - 2.1 mmol/L  7.6 (HH)    N-Terminal Pro BNP Inpatient Latest Ref Range: 0 - 900 pg/mL 668     Ph Venous Latest Ref Range: 7.32 - 7.43 pH  7.24 (L)    PCO2 Venous Latest Ref Range: 40 - 50 mm Hg  45    PO2 Venous Latest Ref Range: 25 - 47 mm Hg  40    Bicarbonate Venous Latest Ref Range: 21 - 28 mmol/L  19 (L)    O2 Sat Venous Latest Units: %  66    INR Latest Ref Range: 0.86 - 1.14  1.04     HEART CATH RIGHT HEART CATH Unknown   Attch       [facility] 7/14/15  RA 7  RV 42/7  PA 48/22, 32  PCWP 12  F CO (CI) 7.9 (3.2)  TD CO (CI) 11 (4.5)   PVR 2.5      [facility] 4/11/2017   BSA 2.7  1. HR 75 bpm  2. /78 mmHg  3. RA 12/6/8   4. RV 76/2  5. PA 80/17, 43  6. PCW 17/12/8  7. PA sat 73.8%   8. PCW sat-not done  9. Hgb 12.8 g/dL   10. Carter CO 8.03   11. Carter CI 3.11   12. TD CO 9.2   13. TD CI 3.6   14. PVR 3.8  POST EXERCISE HEMODYNAMICS:  Patient exercise for 5min of laying bicycle.   1.  bpm  2. /95/126 mmHg  3. /31/69   4. PCW 25/18/18  5. PA  sat 73.8%   6. Hgb 12.8 g/dL   7. Carter CO 8.1   8. Carter CI 3.1   9. TD CO 14.15  10. TD CI 5.48   11. PVR 3.7  Post Exercise Lactate: 7.6    [facility] 5/15/2017  BSA 2.6  1. HR 63 bpm  2. /80/96 mmHg  3. RA 9   4. /8  5. /40/62   6. PCW 12   7. PA sat 69.2%   8. PCW sat 94.9%  9. Hgb 14.4 g/dL   10. Carter CO 6.1   11. Carter CI 2.4   12. TD CO 5.8   13. TD CI 2.3   14. PVR 8.1  15. TPR 10.1  16. SVR  114    Echo 5/16/17  Interpretation Summary  There was no shunt at the atrial septal level as assessed by agitated saline bubble study at rest and reportedly with Valsalva maneuver. Due to technical difficulty, the images during Valsalva maneuver could not be captured. If  clinical suspicion for atrial level shunting is high, cMR or ADRIEN can be Helpful.    Echo 5/17/17  Interpretation Summary  Moderate to severe right ventricular dilation is present.  Global right ventricular function is mildly reduced.  Right ventricular systolic pressure is 85mmHg above the right atrial pressure.  PV acceleration time 70ms.  Dilation of the inferior vena cava is present with abnormal respiratory  variation in diameter.  Trivial pericardial effusion is present.      May 8th   Ejection Fraction = 65-70%.  Abnormal LV septal wall motion - flattened in systole and diastole.  There is borderline concentric left ventricular hypertrophy.  Grade I diastolic dysfunction.  The right ventricle is moderately dilated.  The right atrium is moderately dilated.  There is trace mitral regurgitation.  Moderate tricuspid regurgitation by color Doppler.  Right ventricular systolic pressure is measured at 96 mmHg. Consistent with severe pulmonary  hypertension  Mild pulmonic valvular insufficiency.  Small pericardial effusion. The pericardial effusion is posterior.    Bone Marrow Transplant  I, Max Aviles, saw this patient with the fellow and agree with the resident s findings and plan of care as documented in the Dr. Ramey's note.       I  personally reviewed vital signs, medications, labs and imaging.     Key findings: Longstanding, complex multi-organ chronic GVHD history, including serositis, KARLEE, and sclerodermoid features.  Most pressing issue is a new diagnosis of PAH, which is not classically a chronic GVHD manifestation.  Nonetheless, rare reports exist of PAH coincident with chronic GVHD exist.  It is unlikely that intensifying immunosuppression beyond current high-dose steroids (which should help treat his serositis) will improve his PAH.  Our group has previously reported elevated endothelin-1 (ET-1) plasma concentrations in chronic GVHD (Ketan et al, Blood. 2016 Jun 16;127(24):3084-13), and one wonders if his current situation reflects a high ET-1 state that may be improved by the use of endothelin antagonists such as bosentan.  ET-1 plasma concentration may be able to be tested by the Cytokine Reference Laboratory here at the [facility] (although note this is not an FDA-approved clinical test). I would favor a trial of an ET-1 inhibitor or related treatment as opposed to more immunosuppression in his case.  Long term, additional steroid-sparing agents may need to be considered for his recurrent serositis (e.g., rituximab, ibrutinib, or ruxolitinib), but none would be likely to improve his cardiopulmonary status as acutely as therapy specifically directed at PAH.     I would be happy to follow him in our BMT clinic if desired, although his BMT-related complications have been managed primarily by his physicians at [facility].        It was a pleasure meeting Mr. Gómez today.     Max Aviles MD  Date of Service (when I saw the patient): 05/16/17         Dk Quan MD

## 2017-06-07 NOTE — NURSING NOTE
Chief Complaint   Patient presents with     Follow Up For     Return for PH F/U with Labs prior

## 2017-06-09 NOTE — TELEPHONE ENCOUNTER
MEDICATION APPEAL APPROVED    Medication: Opsumit - Appeal Approved  Authorization Effective Date: 6/9/2017  Authorization Expiration Date: 9/7/2017  Approved Dose/Quantity: 30 Tabs/30 Days  Reference #: [id number]   Insurance Company:  [facility] states that appeal has been approved from 6/9/17 - 9/7/17.

## 2017-06-12 ENCOUNTER — CARE COORDINATION (OUTPATIENT)
Dept: CARDIOLOGY | Facility: CLINIC | Age: 52
End: 2017-06-12

## 2017-06-12 LAB
ALBUMIN SERPL-MCNC: 3.8 G/DL (ref 3.4–4.8)
ALP SERPL-CCNC: 71 U/L (ref 38–126)
ALT SERPL-CCNC: 88 U/L (ref 0–55)
ANION GAP SERPL CALCULATED.3IONS-SCNC: 15 MMOL/L (ref 6–18)
AST SERPL-CCNC: 53 U/L (ref 0–37)
BILIRUB SERPL-MCNC: 0.8 MG/DL (ref 0.2–1.3)
BUN SERPL-MCNC: 30 MG/DL (ref 5–20)
CALCIUM SERPL-MCNC: 9.5 MG/DL (ref 8.4–10.2)
CHLORIDE SERPLBLD-SCNC: 104 MMOL/L (ref 99–109)
CO2 SERPL-SCNC: 21 MMOL/L (ref 22–34)
CREAT SERPL-MCNC: 1.82 MG/DL (ref 0.5–1.3)
ERYTHROCYTE [DISTWIDTH] IN BLOOD BY AUTOMATED COUNT: 19.7 % (ref 11.5–15.5)
GFR SERPL CREATININE-BSD FRML MDRD: 39 ML/MIN/1.73M2
GLUCOSE SERPL-MCNC: 160 MG/DL (ref 70–100)
HCT VFR BLD AUTO: 42.1 % (ref 40–50)
HEMOGLOBIN: 13.2 G/DL (ref 13.5–17.5)
MCH RBC QN AUTO: 27.6 PG (ref 25.5–34)
MCHC RBC AUTO-ENTMCNC: 31.3 G/DL (ref 31.5–36.5)
MCV RBC AUTO: 88.2 FL (ref 80–98)
PLATELET # BLD AUTO: 256 10^9/L (ref 140–400)
POTASSIUM SERPL-SCNC: 4.7 MMOL/L (ref 3.5–5.1)
PROT SERPL-MCNC: 5.8 G/DL (ref 6.3–8.2)
RBC # BLD AUTO: 4.77 10^12/L (ref 4.4–5.8)
SODIUM SERPL-SCNC: 135 MMOL/L (ref 135–145)
WBC # BLD AUTO: 11.4 10^9/L (ref 4–11)

## 2017-06-13 NOTE — PROGRESS NOTES
LFTs are elevated and BP was stable.    Date: 6/13/2017    Time of Call: 11:35 AM     Diagnosis:  High LFTs     [ TORB ] Ordering provider: .reg   Order: Have pt stop Opsumit (macitentan) and recheck labs on Friday.     Order received by: Marlys Anderson RN      Follow-up/additional notes: Called pt and gave instructions.  Pt was concerned about flying [email or web address] on Saturday.  Pt would like a call back regarding this.  Will get back to patient regarding this questions.  All other questions and concerns addressed and answered.

## 2017-06-13 NOTE — PROGRESS NOTES
Problem: Pt called in stating since he started the Opsumit (macitentan) on Thursday 6/8/17 he is feeling more tired and not himself.    Background: Mr. 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 The patient has had yqlnc-umhrqj-waoj disease with 2 flares after the original presentation that was with transaminitis. That was documented with liver biopsy. His other manifestations of chronic hzmbf-vzmkce-dmul disease include sclerotic skin changes, mucosal changes in his mouth, xerostomia and polyserositis as reflected by the mild to moderate pericardial effusion that has been documented. 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 All of these immune dysregulation findings are very likely related and the possibility related to his piipi-shzaer-gswl disease. I cannot exclude that this patient has another immune mediated disease     Assessment: Pt states that he is not having any increased shortness of breath or weight gain.  Pt is just feeling more tired since starting the new medication Opsumit (macitentan).    Intervention: Pt was requested to have labs drawn and his B/P checked.    Response: Orders were sent to lab and information was given to the pt.  All questions and concerns answered.

## 2017-06-14 ENCOUNTER — APPOINTMENT (OUTPATIENT)
Dept: CARDIOLOGY | Facility: CLINIC | Age: 52
DRG: 286 | End: 2017-06-14
Attending: FAMILY MEDICINE
Payer: COMMERCIAL

## 2017-06-14 ENCOUNTER — HOSPITAL ENCOUNTER (INPATIENT)
Facility: CLINIC | Age: 52
LOS: 9 days | Discharge: HOME HEALTH | DRG: 286 | End: 2017-06-23
Attending: FAMILY MEDICINE | Admitting: INTERNAL MEDICINE
Payer: COMMERCIAL

## 2017-06-14 ENCOUNTER — APPOINTMENT (OUTPATIENT)
Dept: GENERAL RADIOLOGY | Facility: CLINIC | Age: 52
DRG: 286 | End: 2017-06-14
Attending: FAMILY MEDICINE
Payer: COMMERCIAL

## 2017-06-14 DIAGNOSIS — E09.9 STEROID-INDUCED DIABETES MELLITUS (H): ICD-10-CM

## 2017-06-14 DIAGNOSIS — D89.813 GRAFT-VERSUS-HOST DISEASE (H): Primary | ICD-10-CM

## 2017-06-14 DIAGNOSIS — I31.39 PERICARDIAL EFFUSION: ICD-10-CM

## 2017-06-14 DIAGNOSIS — T38.0X5A STEROID-INDUCED DIABETES MELLITUS (H): ICD-10-CM

## 2017-06-14 DIAGNOSIS — I27.20 PULMONARY HYPERTENSION (H): ICD-10-CM

## 2017-06-14 LAB
ALBUMIN SERPL-MCNC: 3.2 G/DL (ref 3.4–5)
ALP SERPL-CCNC: 68 U/L (ref 40–150)
ALT SERPL W P-5'-P-CCNC: 87 U/L (ref 0–70)
ANION GAP SERPL CALCULATED.3IONS-SCNC: 6 MMOL/L (ref 3–14)
APTT PPP: 26 SEC (ref 22–37)
AST SERPL W P-5'-P-CCNC: 40 U/L (ref 0–45)
BASOPHILS # BLD AUTO: 0 10E9/L (ref 0–0.2)
BASOPHILS NFR BLD AUTO: 0.2 %
BILIRUB SERPL-MCNC: 0.8 MG/DL (ref 0.2–1.3)
BUN SERPL-MCNC: 31 MG/DL (ref 7–30)
CALCIUM SERPL-MCNC: 8.8 MG/DL (ref 8.5–10.1)
CHLORIDE SERPL-SCNC: 108 MMOL/L (ref 94–109)
CO2 SERPL-SCNC: 26 MMOL/L (ref 20–32)
CREAT SERPL-MCNC: 2.07 MG/DL (ref 0.66–1.25)
CRP SERPL-MCNC: 7.5 MG/L (ref 0–8)
DIFFERENTIAL METHOD BLD: ABNORMAL
EOSINOPHIL # BLD AUTO: 0 10E9/L (ref 0–0.7)
EOSINOPHIL NFR BLD AUTO: 0 %
ERYTHROCYTE [DISTWIDTH] IN BLOOD BY AUTOMATED COUNT: 23 % (ref 10–15)
ERYTHROCYTE [SEDIMENTATION RATE] IN BLOOD BY WESTERGREN METHOD: 4 MM/H (ref 0–20)
GFR SERPL CREATININE-BSD FRML MDRD: 34 ML/MIN/1.7M2
GLUCOSE SERPL-MCNC: 115 MG/DL (ref 70–99)
HCT VFR BLD AUTO: 39.1 % (ref 40–53)
HGB BLD-MCNC: 12.7 G/DL (ref 13.3–17.7)
IMM GRANULOCYTES # BLD: 0.1 10E9/L (ref 0–0.4)
IMM GRANULOCYTES NFR BLD: 0.6 %
INR PPP: 1.01 (ref 0.86–1.14)
LIPASE SERPL-CCNC: 81 U/L (ref 73–393)
LYMPHOCYTES # BLD AUTO: 1.6 10E9/L (ref 0.8–5.3)
LYMPHOCYTES NFR BLD AUTO: 14.3 %
MAGNESIUM SERPL-MCNC: 1.8 MG/DL (ref 1.6–2.3)
MCH RBC QN AUTO: 28.8 PG (ref 26.5–33)
MCHC RBC AUTO-ENTMCNC: 32.5 G/DL (ref 31.5–36.5)
MCV RBC AUTO: 89 FL (ref 78–100)
MONOCYTES # BLD AUTO: 0.7 10E9/L (ref 0–1.3)
MONOCYTES NFR BLD AUTO: 6.8 %
NEUTROPHILS # BLD AUTO: 8.4 10E9/L (ref 1.6–8.3)
NEUTROPHILS NFR BLD AUTO: 78.1 %
NRBC # BLD AUTO: 0.2 10*3/UL
NRBC BLD AUTO-RTO: 2 /100
NT-PROBNP SERPL-MCNC: ABNORMAL PG/ML (ref 0–900)
PHOSPHATE SERPL-MCNC: 3.4 MG/DL (ref 2.5–4.5)
PLATELET # BLD AUTO: 205 10E9/L (ref 150–450)
PLATELET # BLD AUTO: 225 10E9/L (ref 150–450)
POTASSIUM SERPL-SCNC: 4.3 MMOL/L (ref 3.4–5.3)
PROT SERPL-MCNC: 5.7 G/DL (ref 6.8–8.8)
RBC # BLD AUTO: 4.41 10E12/L (ref 4.4–5.9)
SODIUM SERPL-SCNC: 140 MMOL/L (ref 133–144)
TROPONIN I SERPL-MCNC: 0.12 UG/L (ref 0–0.04)
WBC # BLD AUTO: 10.8 10E9/L (ref 4–11)

## 2017-06-14 PROCEDURE — 99285 EMERGENCY DEPT VISIT HI MDM: CPT | Mod: 25 | Performed by: FAMILY MEDICINE

## 2017-06-14 PROCEDURE — 71010 XR CHEST PORT 1 VW: CPT

## 2017-06-14 PROCEDURE — 93308 TTE F-UP OR LMTD: CPT

## 2017-06-14 PROCEDURE — 85730 THROMBOPLASTIN TIME PARTIAL: CPT | Performed by: FAMILY MEDICINE

## 2017-06-14 PROCEDURE — 93325 DOPPLER ECHO COLOR FLOW MAPG: CPT | Mod: 26 | Performed by: INTERNAL MEDICINE

## 2017-06-14 PROCEDURE — 80053 COMPREHEN METABOLIC PANEL: CPT | Performed by: FAMILY MEDICINE

## 2017-06-14 PROCEDURE — 93005 ELECTROCARDIOGRAM TRACING: CPT | Performed by: FAMILY MEDICINE

## 2017-06-14 PROCEDURE — 83880 ASSAY OF NATRIURETIC PEPTIDE: CPT | Performed by: FAMILY MEDICINE

## 2017-06-14 PROCEDURE — 93321 DOPPLER ECHO F-UP/LMTD STD: CPT | Mod: 26 | Performed by: INTERNAL MEDICINE

## 2017-06-14 PROCEDURE — 21400003 ZZH R&B CCU CRITICAL UMMC

## 2017-06-14 PROCEDURE — 85652 RBC SED RATE AUTOMATED: CPT | Performed by: FAMILY MEDICINE

## 2017-06-14 PROCEDURE — 93308 TTE F-UP OR LMTD: CPT | Mod: 26 | Performed by: INTERNAL MEDICINE

## 2017-06-14 PROCEDURE — 99221 1ST HOSP IP/OBS SF/LOW 40: CPT | Mod: 25 | Performed by: INTERNAL MEDICINE

## 2017-06-14 PROCEDURE — 86140 C-REACTIVE PROTEIN: CPT | Performed by: FAMILY MEDICINE

## 2017-06-14 PROCEDURE — 85025 COMPLETE CBC W/AUTO DIFF WBC: CPT | Performed by: FAMILY MEDICINE

## 2017-06-14 PROCEDURE — 85049 AUTOMATED PLATELET COUNT: CPT | Performed by: INTERNAL MEDICINE

## 2017-06-14 PROCEDURE — 84484 ASSAY OF TROPONIN QUANT: CPT | Performed by: FAMILY MEDICINE

## 2017-06-14 PROCEDURE — 93010 ELECTROCARDIOGRAM REPORT: CPT | Mod: Z6 | Performed by: FAMILY MEDICINE

## 2017-06-14 PROCEDURE — 25000128 H RX IP 250 OP 636: Performed by: INTERNAL MEDICINE

## 2017-06-14 PROCEDURE — 83735 ASSAY OF MAGNESIUM: CPT | Performed by: FAMILY MEDICINE

## 2017-06-14 PROCEDURE — 85610 PROTHROMBIN TIME: CPT | Performed by: FAMILY MEDICINE

## 2017-06-14 PROCEDURE — 25000132 ZZH RX MED GY IP 250 OP 250 PS 637: Performed by: INTERNAL MEDICINE

## 2017-06-14 PROCEDURE — 36415 COLL VENOUS BLD VENIPUNCTURE: CPT | Performed by: INTERNAL MEDICINE

## 2017-06-14 PROCEDURE — 83690 ASSAY OF LIPASE: CPT | Performed by: FAMILY MEDICINE

## 2017-06-14 PROCEDURE — 84100 ASSAY OF PHOSPHORUS: CPT | Performed by: FAMILY MEDICINE

## 2017-06-14 RX ORDER — NALOXONE HYDROCHLORIDE 0.4 MG/ML
.1-.4 INJECTION, SOLUTION INTRAMUSCULAR; INTRAVENOUS; SUBCUTANEOUS
Status: DISCONTINUED | OUTPATIENT
Start: 2017-06-14 | End: 2017-06-20

## 2017-06-14 RX ORDER — URSODIOL 300 MG/1
300 CAPSULE ORAL 3 TIMES DAILY
Status: DISCONTINUED | OUTPATIENT
Start: 2017-06-14 | End: 2017-06-23 | Stop reason: HOSPADM

## 2017-06-14 RX ORDER — TADALAFIL 20 MG/1
40 TABLET ORAL DAILY
Status: DISCONTINUED | OUTPATIENT
Start: 2017-06-14 | End: 2017-06-23 | Stop reason: HOSPADM

## 2017-06-14 RX ORDER — FLUCONAZOLE 200 MG/1
200 TABLET ORAL DAILY
Status: DISCONTINUED | OUTPATIENT
Start: 2017-06-14 | End: 2017-06-23 | Stop reason: HOSPADM

## 2017-06-14 RX ORDER — METOPROLOL SUCCINATE 25 MG/1
25 TABLET, EXTENDED RELEASE ORAL DAILY
Status: DISCONTINUED | OUTPATIENT
Start: 2017-06-14 | End: 2017-06-17

## 2017-06-14 RX ORDER — DIGOXIN 125 MCG
125 TABLET ORAL DAILY
Status: DISCONTINUED | OUTPATIENT
Start: 2017-06-15 | End: 2017-06-23 | Stop reason: HOSPADM

## 2017-06-14 RX ORDER — METHYLPREDNISOLONE SODIUM SUCCINATE 125 MG/2ML
125 INJECTION, POWDER, LYOPHILIZED, FOR SOLUTION INTRAMUSCULAR; INTRAVENOUS ONCE
Status: COMPLETED | OUTPATIENT
Start: 2017-06-14 | End: 2017-06-14

## 2017-06-14 RX ORDER — ROSUVASTATIN CALCIUM 5 MG/1
5 TABLET, COATED ORAL DAILY
Status: DISCONTINUED | OUTPATIENT
Start: 2017-06-15 | End: 2017-06-23 | Stop reason: HOSPADM

## 2017-06-14 RX ORDER — LIDOCAINE 40 MG/G
CREAM TOPICAL
Status: DISCONTINUED | OUTPATIENT
Start: 2017-06-14 | End: 2017-06-23 | Stop reason: HOSPADM

## 2017-06-14 RX ORDER — LIDOCAINE 40 MG/G
CREAM TOPICAL
Status: DISCONTINUED | OUTPATIENT
Start: 2017-06-14 | End: 2017-06-20

## 2017-06-14 RX ORDER — PREDNISONE 5 MG/1
5 TABLET ORAL EVERY OTHER DAY
Status: DISCONTINUED | OUTPATIENT
Start: 2017-06-14 | End: 2017-06-15

## 2017-06-14 RX ORDER — PREDNISONE 10 MG/1
10 TABLET ORAL DAILY
Status: DISCONTINUED | OUTPATIENT
Start: 2017-06-14 | End: 2017-06-15

## 2017-06-14 RX ORDER — LIDOCAINE HYDROCHLORIDE 10 MG/ML
INJECTION, SOLUTION EPIDURAL; INFILTRATION; INTRACAUDAL; PERINEURAL
Status: DISCONTINUED
Start: 2017-06-14 | End: 2017-06-14 | Stop reason: HOSPADM

## 2017-06-14 RX ORDER — HEPARIN SODIUM 5000 [USP'U]/.5ML
5000 INJECTION, SOLUTION INTRAVENOUS; SUBCUTANEOUS EVERY 12 HOURS
Status: DISCONTINUED | OUTPATIENT
Start: 2017-06-14 | End: 2017-06-19

## 2017-06-14 RX ORDER — VALGANCICLOVIR 450 MG/1
450 TABLET, FILM COATED ORAL
Status: DISCONTINUED | OUTPATIENT
Start: 2017-06-14 | End: 2017-06-15 | Stop reason: CLARIF

## 2017-06-14 RX ORDER — FUROSEMIDE 10 MG/ML
10 INJECTION INTRAMUSCULAR; INTRAVENOUS ONCE
Status: COMPLETED | OUTPATIENT
Start: 2017-06-14 | End: 2017-06-14

## 2017-06-14 RX ORDER — AMOXICILLIN 250 MG
1-2 CAPSULE ORAL 2 TIMES DAILY
Status: DISCONTINUED | OUTPATIENT
Start: 2017-06-14 | End: 2017-06-23 | Stop reason: HOSPADM

## 2017-06-14 RX ORDER — CLONAZEPAM 1 MG/1
1 TABLET ORAL AT BEDTIME
Status: DISCONTINUED | OUTPATIENT
Start: 2017-06-14 | End: 2017-06-23 | Stop reason: HOSPADM

## 2017-06-14 RX ORDER — ATOVAQUONE 750 MG/5ML
1500 SUSPENSION ORAL DAILY
Status: DISCONTINUED | OUTPATIENT
Start: 2017-06-14 | End: 2017-06-23 | Stop reason: HOSPADM

## 2017-06-14 RX ORDER — FLUTICASONE PROPIONATE 220 UG/1
2 AEROSOL, METERED RESPIRATORY (INHALATION) 2 TIMES DAILY
Status: DISCONTINUED | OUTPATIENT
Start: 2017-06-14 | End: 2017-06-23 | Stop reason: HOSPADM

## 2017-06-14 RX ADMIN — ATOVAQUONE 1500 MG: 750 SUSPENSION ORAL at 22:47

## 2017-06-14 RX ADMIN — FLUCONAZOLE 200 MG: 200 TABLET ORAL at 21:34

## 2017-06-14 RX ADMIN — URSODIOL 300 MG: 300 CAPSULE ORAL at 21:34

## 2017-06-14 RX ADMIN — TADALAFIL 40 MG: 20 TABLET, FILM COATED ORAL at 21:33

## 2017-06-14 RX ADMIN — FUROSEMIDE 10 MG: 10 INJECTION, SOLUTION INTRAVENOUS at 22:47

## 2017-06-14 RX ADMIN — Medication 1500 MG: at 21:34

## 2017-06-14 RX ADMIN — VITAMIN D, TAB 1000IU (100/BT) 2000 UNITS: 25 TAB at 21:34

## 2017-06-14 RX ADMIN — METOPROLOL SUCCINATE 25 MG: 25 TABLET, FILM COATED, EXTENDED RELEASE ORAL at 21:34

## 2017-06-14 RX ADMIN — METHYLPREDNISOLONE 125 MG: 125 INJECTION, POWDER, LYOPHILIZED, FOR SOLUTION INTRAMUSCULAR; INTRAVENOUS at 21:34

## 2017-06-14 RX ADMIN — HEPARIN SODIUM 5000 UNITS: 5000 INJECTION, SOLUTION INTRAVENOUS; SUBCUTANEOUS at 21:34

## 2017-06-14 RX ADMIN — CLONAZEPAM 1 MG: 1 TABLET ORAL at 21:34

## 2017-06-14 RX ADMIN — FLUTICASONE PROPIONATE 2 PUFF: 220 AEROSOL, METERED RESPIRATORY (INHALATION) at 22:48

## 2017-06-14 ASSESSMENT — ACTIVITIES OF DAILY LIVING (ADL)
TRANSFERRING: 0-->INDEPENDENT
CHANGE_IN_FUNCTIONAL_STATUS_SINCE_ONSET_OF_CURRENT_ILLNESS/INJURY: NO
COMMUNICATION: 0-->UNDERSTANDS/COMMUNICATES WITHOUT DIFFICULTY
BATHING: 0-->INDEPENDENT
DRESS: 0-->INDEPENDENT
SWALLOWING: 0-->SWALLOWS FOODS/LIQUIDS WITHOUT DIFFICULTY
TOILETING: 0-->INDEPENDENT
AMBULATION: 0-->INDEPENDENT
EATING: 0-->INDEPENDENT

## 2017-06-14 ASSESSMENT — ENCOUNTER SYMPTOMS
CONFUSION: 0
HEADACHES: 0
SORE THROAT: 0
NECK STIFFNESS: 0
ACTIVITY CHANGE: 1
LIGHT-HEADEDNESS: 0
NAUSEA: 0
FEVER: 0
COLOR CHANGE: 0
ARTHRALGIAS: 0
NUMBNESS: 0
DIFFICULTY URINATING: 0
DIZZINESS: 0
SINUS PRESSURE: 0
VOMITING: 0
SHORTNESS OF BREATH: 1
CHEST TIGHTNESS: 0
EYE REDNESS: 0
WEAKNESS: 1
PALPITATIONS: 0
CHILLS: 0
CHOKING: 0
ABDOMINAL PAIN: 0
DIARRHEA: 0
FATIGUE: 1
DYSPHORIC MOOD: 1
COUGH: 0
DYSURIA: 0
DECREASED CONCENTRATION: 1

## 2017-06-14 NOTE — LETTER
[address] [facility] [address] [address]  Phone: [phone]    June 20, 2017        Olivier Gómez  [address]  [place] [address]          To whom it may concern:    RE: Olivier Gómez was hospitalized at the [facility] on 6/14/2017 with critical illness. He remains hospitalized and thus cannot embark on his originally scheduled airplane trip with his wife, Elli Gómez. Please allow them to reschedule their trip. Thank you.     Please contact me for questions or concerns.      Best wishes,          Jelani Storm MD, PhD  [facility]

## 2017-06-14 NOTE — ED NOTES
"ED to Floor Handoff      S:  Olivier Gómez is a 52 year old male who speaks English and lives with family members,  in a home  They arrived in the ED by car from home with a complaint of Shortness of Breath    Initial vitals were:   BP: 121/72  Pulse: 86  Heart Rate: 78  Temp: 97.9  F (36.6  C)  Resp: 18  Height: 195.6 cm (6' 5\")  Weight: 124.7 kg (275 lb)  SpO2: 96 %  Allergies:   Allergies   Allergen Reactions     Tegaderm Transparent Dressing (Informational Only) Rash   .  The meds given in the ED and their home medications are:   Current Facility-Administered Medications   Medication     lidocaine 1 % 1 mL     lidocaine (LMX4) kit     sodium chloride (PF) 0.9% PF flush 3 mL     sodium chloride (PF) 0.9% PF flush 3 mL     lidocaine (PF) (XYLOCAINE) 1 % injection     Current Outpatient Prescriptions   Medication     insulin aspart (NOVOLOG FLEXPEN) 100 UNIT/ML injection     insulin detemir (LEVEMIR FLEXPEN/FLEXTOUCH) 100 UNIT/ML injection     digoxin (LANOXIN) 125 MCG tablet     predniSONE (DELTASONE) 1 MG tablet     predniSONE (DELTASONE) 10 MG tablet     Penicillin V Potassium (PEN-VEE K OR)     fluticasone (FLOVENT HFA) 220 MCG/ACT Inhaler     metoprolol (TOPROL-XL) 25 MG 24 hr tablet     FLUCONAZOLE PO     Ursodiol (ACTIGALL PO)     VITAMIN D, CHOLECALCIFEROL, PO     valGANciclovir (VALCYTE) 450 MG tablet     CLONAZEPAM PO     calcium carbonate (OS-PANFILO 500 MG [place]. [place]) 500 MG tablet     Pantoprazole Sodium (PROTONIX PO)     Rosuvastatin Calcium (CRESTOR PO)     MAGNESIUM OXIDE PO     atovaquone (MEPRON) 750 MG/5ML suspension     tadalafil (CIALIS) 20 MG tablet     blood glucose monitoring (ONE TOUCH ULTRA) test strip     Social demographics are   Social History     Social History     Marital status:      Spouse name: N/A     Number of children: N/A     Years of education: N/A     Social History Main Topics     Smoking status: Passive Smoke Exposure - Never Smoker     Smokeless tobacco: None     Alcohol " use No     Drug use: No     Sexual activity: Yes     Partners: Female     Other Topics Concern     Parent/Sibling W/ Cabg, Mi Or Angioplasty Before 65f 55m? No     Social History Narrative       B:   The patient has been ill for 1 week and during this time the symptoms have remained the same.  In the ED was diagnosed with   Final diagnoses:   Pericardial effusion    Infection/sepsis suspected:No Isolation type; No active isolations   A:   In the ED these meds were given:   Medications   lidocaine 1 % 1 mL (not administered)   lidocaine (LMX4) kit (not administered)   sodium chloride (PF) 0.9% PF flush 3 mL (not administered)   sodium chloride (PF) 0.9% PF flush 3 mL (not administered)   lidocaine (PF) (XYLOCAINE) 1 % injection (not administered)     Drips running?  No  Labs results   Labs Ordered and Resulted from Time of ED Arrival Up to the Time of Departure from the ED   CBC WITH PLATELETS DIFFERENTIAL - Abnormal; Notable for the following:        Result Value    Hemoglobin 12.7 (*)     Hematocrit 39.1 (*)     RDW 23.0 (*)     Nucleated RBCs 2 (*)     Absolute Neutrophil 8.4 (*)     All other components within normal limits   COMPREHENSIVE METABOLIC PANEL - Abnormal; Notable for the following:     Glucose 115 (*)     Urea Nitrogen 31 (*)     Creatinine 2.07 (*)     GFR Estimate 34 (*)     GFR Estimate If Black 41 (*)     Albumin 3.2 (*)     Protein Total 5.7 (*)     ALT 87 (*)     All other components within normal limits   TROPONIN I - Abnormal; Notable for the following:     Troponin I ES 0.124 (*)     All other components within normal limits   NT PROBNP INPATIENT - Abnormal; Notable for the following:     N-Terminal Pro BNP Inpatient 36461 (*)     All other components within normal limits   INR   PARTIAL THROMBOPLASTIN TIME   CRP INFLAMMATION   ERYTHROCYTE SEDIMENTATION RATE AUTO   MAGNESIUM   PHOSPHORUS   LIPASE   PULSE OXIMETRY NURSING   CARDIAC CONTINUOUS MONITORING   PERIPHERAL IV CATHETER     Imaging  "Studies:   Recent Results (from the past 24 hour(s))   XR Chest Port 1 View    Narrative    1. Near complete resolution of previously seen perihilar and basilar  opacities.  2. Trace right pleural effusion. No significant left pleural effusion.  3. Stable enlargement of the cardiac silhouette.     Recent vital signs /72  Pulse 86  Temp 97.9  F (36.6  C) (Oral)  Resp 22  Ht 1.956 m (6' 5\")  Wt 124.7 kg (275 lb)  SpO2 96%  BMI 32.61 kg/m2  Cardiac Rhythm: ,      Abnormal labs/tests/findings requiring intervention:---  Pain control: good  Nausea control: pt had none  R:   Transfer assistance needed: Independent  Family present during ED course? yes  Family currently present? Yes, wife is  Pt needs tele? Yes  Code Status: Full Code  Tasks needing to be completed:---    Linnette nix-- [id number] [phone] [facility] ED  [phone] [facility] ED      "

## 2017-06-14 NOTE — H&P
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 6/14 TTE with moderate sized pericardial effusion.      #Moderate Pericardial Effusion: Per 6/14 TTE. Recent admissions to [facility] 2 4/25 - 5/1/17 and 5/15- 5/17/17 for pericardial effusion s/p pericardial drain placement on 4/25/17.  - Consider drain placement if worsening or signs of tamponade physiology     #Dyspnea on Exertion: Unclear etiology, though with acute onset after initiation of Opsumit and discontinuation of Nifedipine. Pt also has increased size of pericardial effusion, with no tamponade physiology. Currently not hypoxemic, satting well on Room air, no sob at rest, no tachycardia, lower suspicion for PE.  Troponin slightly elevated at 0.125 on admission, will trend as below.  Pt does have s/s of volume overload, will diurese.     #Troponinemia: Trop of 0.125, no chest pain or acute ischemic changes on EKG. Low suspicion for ACS, but will trend.  --Trend tropes    #Volume Overload: Dilated IVC on ECHO, JVD on exam.   -Lasix 10mg IV once    #BRANDYN on CKD: Creatine of 2.07, with baseline. BUN:Cr ratio 15. Suspect cardiorenal, given evidence of volume overload.  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 The patient has had ufpew-ijoxly-kfik disease with 2 flares after the original presentation that was with transaminitis. That was documented with liver biopsy. His other manifestations of chronic  "uqote-udqbxz-pvqh disease include sclerotic skin changes, mucosal changes in his mouth, xerostomia and polyserositis as reflected by the mild to moderate pericardial effusion that has been documented.   - Prophylaxis: continue atovaquone, fluconazole, and valganciclovir  - Immunosuppression: Home meds include prednisone and sirolimus  - Hold home Prednisone 20mg daily  - Methylpred 125mg once on admission (will need to re-assess tomorrow whether additional doses are needed vs. Continuation of home prednisone dose)    #Pulmonary HTN w/ RV Dysfunction  #Scleroderma  #Raynauds disease  - Continue tadalafil 10 mg daily  - Continue digoxin 125mcg daily  - Continue metoprolol BID     #Insomnia:  - Continue Clonazepam 1mg PO qhs     ##Other  - Prophylaxis:   -DVT: Ambulate    -GI: None   -Bowel: Senna-docusate  - FEN: Regular diet  - IVF: None  - Electrolyte Protocol: No, given CKD    - Telemetry: Yes  - Family: Mother (psychiatric nurse) at bedside, wife will be visiting later   - Code status: Full Code    Disposition:  Admit to [address]  PGY-2  Internal Medicine  [phone]    This patient was staffed with the attending, Dr. Quan, who agrees with the above assessment and plan.            Chief Complaint   Dyspnea on Exertion  This history was obtained from the patient, who is a reliable historian, and a review of the medical record.       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 Quan with subsequent initiation of Opsumit and discontinuation of nifedipine.  Pt states that he was feeling \"great\" on Wed 6/7/17 when he was in the office with Dr. Quan, but on Thursday after he started taking Opsumit, he " immediately started feeling bad.   He felt very fatigued with any activity, could hardly walk from car to office without feeling exhausted and SOB.  Due to worsening symptom, Dr. Quan advised pt to stop taking Opsumit on Monday and come to ED. Despite stopping the Opsumit, pt states that his symptoms have not gotten any better.   No fevers, chills, chest pain, abdominal pain, cough, lower extremity swelling.     Recent admissions to [facility] 2:   4/25 - 5/1/17 and 5/15- 5/17/17 for pericardial effusion s/p pericardial drain placement on 4/25/17.     ED Course:   -TTE showing moderate pericardial effusion increased from prior TTE, with no signs of tamponade.   -NT ProBNP 67332, Trop 0.124  -CBC, Cr of 2.07 (baseline 1.7),  ESR 4, CRP 7.5, INR 1.01, Lipase 81,   -CXR trace R pleural effusion, EKG Sinus, appears low voltage       Review of Systems   All 12 systems reviewed.  Relevant positives/negatives noted in HPI above      Past Medical History   (Reviewed and updated)  Past Medical History:   Diagnosis Date     Cancer (H) 2011    AML     Eudov-woyehp-tlic disease (H) need to confirm this history     History of blood transfusion      Pericardial effusion 2014     Raynaud disease      Scleroderma (H)           Past Surgical History   (Reviewed and updated)  Past Surgical History:   Procedure Laterality Date     ENT SURGERY      adenoidectomy     EYE SURGERY  2014    both eyes cataract surgery     HERNIA REPAIR  1980     VASCULAR SURGERY  2011    port         Allergies   (Reviewed and updated)   Allergies   Allergen Reactions     Tegaderm Transparent Dressing (Informational Only) Rash         Medications   (Reviewed and updated)    No current facility-administered medications on file prior to encounter.   Current Outpatient Prescriptions on File Prior to Encounter:  digoxin (LANOXIN) 125 MCG tablet Take 1 tablet (125 mcg) by mouth daily   predniSONE (DELTASONE) 1 MG tablet Take 5 tablets (5 mg) by mouth every other  "day   predniSONE (DELTASONE) 10 MG tablet Take 1 tablet (10 mg) by mouth daily   Penicillin V Potassium (PEN-VEE K OR) Take 500 mg by mouth 2 times daily   fluticasone (FLOVENT HFA) 220 MCG/ACT Inhaler Inhale 2 puffs into the lungs 2 times daily   metoprolol (TOPROL-XL) 25 MG 24 hr tablet Take 1 tablet (25 mg) by mouth daily   FLUCONAZOLE PO Take 200 mg by mouth daily   Ursodiol (ACTIGALL PO) Take 300 mg by mouth 3 times daily   VITAMIN D, CHOLECALCIFEROL, PO Take 2,000 Units by mouth 3 times daily    valGANciclovir (VALCYTE) 450 MG tablet Take 450 mg by mouth every 48 hours    CLONAZEPAM PO Take 1 mg by mouth At Bedtime    calcium carbonate (OS-PANFILO 500 MG [place]. [place]) 500 MG tablet Take 600 mg by mouth 2 times daily   Pantoprazole Sodium (PROTONIX PO) Take 40 mg by mouth daily    Rosuvastatin Calcium (CRESTOR PO) Take 5 mg by mouth daily    MAGNESIUM OXIDE PO Take 800 mg by mouth daily   atovaquone (MEPRON) 750 MG/5ML suspension Take 1,500 mg by mouth daily   tadalafil (CIALIS) 20 MG tablet Take 2 tablets (40 mg) by mouth daily   blood glucose monitoring (ONE TOUCH ULTRA) test strip Use to test blood sugars 4 times daily or as directed.         Family History     - No premature CAD, valvular disorders or arrhythmias      Social History   (Reviewed and updated)  Social History   Substance Use Topics     Smoking status: Passive Smoke Exposure - Never Smoker     Smokeless tobacco: Not on file     Alcohol use No   - Never smoker  - Previously used chewing tobacco  - Works as a Medical Defence  in [employer]         Physical Exam   Vitals: Blood pressure (!) 122/92, pulse 86, temperature 98.4  F (36.9  C), temperature source Oral, resp. rate 20, height 1.956 m (6' 5\"), weight 125.1 kg (275 lb 14.4 oz), SpO2 94 %.    Gen: Resting comfortably, in no acute distress  Head: Normocephalic, atraumatic   Eyes: OMAR, EOMI   ENT: No sinus tenderness, oropharynx clear with no erythema or exudates, no " LAD, neck supple   CV: Normal rate, regular rhythm, no murmurs/gallops/rubs; +JVD   Resp: Non-labored breathing on room air, CTAB with no wheezing or rhonchi   Abd: +BS, soft, NTND, no costovertebral angle tenderness, no peritoneal signs   Ext: Warm, well-perfused, no edema       Data   Labs:  BMP  Recent Labs  Lab 06/14/17 1750 06/12/17    135   POTASSIUM 4.3 4.7   CHLORIDE 108 104   PANFILO 8.8 9.5   CO2 26 21*   BUN 31* 30*   CR 2.07* 1.82*   * 160*     CBC  Recent Labs  Lab 06/14/17 1750 06/12/17   WBC 10.8 11.4*   RBC 4.41 4.77   HGB 12.7* 13.2*   HCT 39.1* 42.1   MCV 89 88.2   MCH 28.8 27.6   MCHC 32.5 31.3*   RDW 23.0* 19.7*    256     INR  Recent Labs  Lab 06/14/17 1750   INR 1.01     LFTs  Recent Labs  Lab 06/14/17 1750 06/12/17   ALKPHOS 68 71   AST 40 53*   ALT 87* 88*   BILITOTAL 0.8 0.8   PROTTOTAL 5.7* 5.8*   ALBUMIN 3.2* 3.8      PANC  Recent Labs  Lab 06/14/17 1750   LIPASE 81       RHC 7/14/15  RA 7  RV 42/7  PA 48/22, 32  PCWP 12  F CO (CI) 7.9 (3.2)  TD CO (CI) 11 (4.5)   PVR 2.5     [facility] 4/11/2017   BSA 2.7  1. HR 75 bpm  2. /78 mmHg  3. RA 12/6/8   4. RV 76/2  5. PA 80/17, 43  6. PCW 17/12/8  7. PA sat 73.8%   8. PCW sat-not done  9. Hgb 12.8 g/dL   10. Carter CO 8.03   11. Carter CI 3.11   12. TD CO 9.2   13. TD CI 3.6   14. PVR 3.8  POST EXERCISE HEMODYNAMICS:  Patient exercise for 5min of laying bicycle.   1.  bpm  2. /95/126 mmHg  3. /31/69   4. PCW 25/18/18  5. PA sat 73.8%   6. Hgb 12.8 g/dL   7. Carter CO 8.1   8. Carter CI 3.1   9. TD CO 14.15  10. TD CI 5.48   11. PVR 3.7  Post Exercise Lactate: 7.6     RH 5/15/2017  BSA 2.6  1. HR 63 bpm  2. /80/96 mmHg  3. RA 9   4. /8  5. /40/62   6. PCW 12   7. PA sat 69.2%   8. PCW sat 94.9%  9. Hgb 14.4 g/dL   10. Carter CO 6.1   11. Carter CI 2.4   12. TD CO 5.8   13. TD CI 2.3   14. PVR 8.1  15. TPR 10.1  16. SVR  114     Echo 5/16/17  Interpretation Summary  There was no shunt at the atrial  septal level as assessed by agitated saline bubble study at rest and reportedly with Valsalva maneuver. Due to technical difficulty, the images during Valsalva maneuver could not be captured. If  clinical suspicion for atrial level shunting is high, cMR or ADRIEN can be Helpful.     Echo 5/17/17  Interpretation Summary  Moderate to severe right ventricular dilation is present.  Global right ventricular function is mildly reduced.  Right ventricular systolic pressure is 85mmHg above the right atrial pressure.  PV acceleration time 70ms.  Dilation of the inferior vena cava is present with abnormal respiratory  variation in diameter.  Trivial pericardial effusion is present.        TTE 6/14   Interpretation Summary  Severe pulmonary hypertension is present.  Left ventricular function, chamber size, wall motion, and wall thickness are  normal.The EF is 55-60%.  Severe right ventricular dilation is present.Global right ventricular function  is mildly to moderately reduced.  Dilation of the inferior vena cava is present with abnormal respiratory  variation in diameter. Estimated right atrial pressure is > 15 mmHg.  Moderate pericardial effusion. Chamber compression is not present; there is no  evidence for tamponade.  Effusion is larger than the last study, but has been present on recent  studies. No other change.  _____________________________________________________________________________  _  Imaging  All images reviewed.  Relevant imaging noted below    CXR 6/14/17  Impression:   1. Near complete resolution of previously seen perihilar and basilar  opacities.  2. Trace right pleural effusion. No significant left pleural effusion.  3. Stable enlargement of the cardiac silhouette.  I personally provided care for this patient, reviewed chart, discussed course with patient, housestaff and consulting physicians.  I answered all questions.    Dk Quan M.D.  Division of Cardiology  Department of Medicine  Unfortunately  patient returns with recurrent effusion worsening shortness of breath, pericardial effusion and elevation of PA pressures casting dount on current therapeutic plans.

## 2017-06-14 NOTE — IP AVS SNAPSHOT
[address] [address]    Phone:  [phone]                                       After Visit Summary   6/14/2017    Olivier Gómez    MRN: [id number]           After Visit Summary Signature Page     I have received my discharge instructions, and my questions have been answered. I have discussed any challenges I see with this plan with the nurse or doctor.    ..........................................................................................................................................  Patient/Patient Representative Signature      ..........................................................................................................................................  Patient Representative Print Name and Relationship to Patient    ..................................................               ................................................  Date                                            Time    ..........................................................................................................................................  Reviewed by Signature/Title    ...................................................              ..............................................  Date                                                            Time

## 2017-06-14 NOTE — ED PROVIDER NOTES
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 Quan with a 1 week history of fatigue and dyspnea on exertion. Patient and wife relate that he had done well following his 5/17 discharge and he presented to Dr. Quan's clinic on 6/7 (1 week ago) for routine follow up. Patient and wife note that he was started on Opsumit and his nifedipine was discontinued at that visit. Patient notes that he has deteriorated since that time and has felt so severely fatigued that he was even unable to walk from his car to his office a couple of days ago. He notes that he had a stop and rest half way though due to exhaustion and shortness of breath. Patient denies fever, chills, dizziness, palpitations, chest pain, nausea, vomiting, abdominal pain, cough, or lower extremity swelling. Patient notes that he did have a syncopal episode prior to his ED presentation and subsequent admission on 4/25. Per chart, this was attributed to the patient having taken multiple doses of Klonopin that day. Patient denies syncopal episodes in the interim.     Patient had a TTE done on 05/15. This demonstrated moderate to severe right ventricular dilation. Global right ventricular function was mildly reduced. Right ventricular systolic pressure was 85mmHg above the right atrial pressure. PV acceleration time was 70ms. Dilation of the inferior vena cava was present with abnormal respiratory variation in diameter. A trivial pericardial effusion was present.    I have reviewed the Medications, Allergies,  Past Medical and Surgical History, and Social History in the [employer] system.    PAST MEDICAL HISTORY:   Past Medical History:   Diagnosis Date     Cancer (H) 2011    AML     Qyyeq-[id number]-fjhw disease (H) need to confirm this history     History of blood transfusion      Pericardial effusion 2014     Raynaud disease      Scleroderma (H)        PAST SURGICAL HISTORY:   Past Surgical History:   Procedure Laterality Date     ENT SURGERY      adenoidectomy     EYE SURGERY  2014    both eyes cataract surgery     HERNIA REPAIR  1980     VASCULAR SURGERY  2011    port       FAMILY HISTORY:   Family History   Problem Relation Age of Onset     CANCER No family hx of      no skin cancer       SOCIAL HISTORY:   Social History   Substance Use Topics     Smoking status: Passive Smoke Exposure - Never Smoker     Smokeless tobacco: Not on file     Alcohol use No       Current Discharge Medication List      CONTINUE these medications which have NOT CHANGED    Details   insulin aspart (NOVOLOG FLEXPEN) 100 UNIT/ML injection Inject 5 Units Subcutaneous 3 times daily (with meals) Plus SS      insulin detemir (LEVEMIR FLEXPEN/FLEXTOUCH) 100 UNIT/ML injection Inject 10 Units Subcutaneous At Bedtime      digoxin (LANOXIN) 125 MCG tablet Take 1 tablet (125 mcg) by mouth daily  Qty: 90 tablet, Refills: 3    Associated Diagnoses: Pulmonary hypertension (H)      Penicillin V Potassium (PEN-VEE K OR) Take 500 mg by mouth 2 times daily      fluticasone (FLOVENT HFA) 220 MCG/ACT Inhaler Inhale 2 puffs into the lungs 2 times daily      metoprolol (TOPROL-XL) 25 MG 24 hr tablet Take 1 tablet (25 mg) by mouth daily  Qty: 90 tablet, Refills: 3    Associated Diagnoses: Pulmonary hypertension (H)      FLUCONAZOLE PO Take 200 mg by mouth daily      Ursodiol (ACTIGALL PO) Take 300 mg by mouth 3 times daily      VITAMIN D, CHOLECALCIFEROL, PO Take 2,000 Units by mouth 3 times daily       valGANciclovir (VALCYTE) 450 MG tablet Take 450 mg by mouth every 48  hours       CLONAZEPAM PO Take 1 mg by mouth At Bedtime       calcium carbonate (OS-PANFILO 500 MG [place]. CA) 500 MG tablet Take 600 mg by mouth 2 times daily      Pantoprazole Sodium (PROTONIX PO) Take 40 mg by mouth daily       Rosuvastatin Calcium (CRESTOR PO) Take 5 mg by mouth daily       MAGNESIUM OXIDE PO Take 800 mg by mouth daily      atovaquone (MEPRON) 750 MG/5ML suspension Take 1,500 mg by mouth daily      PREDNISONE PO Take 20 mg by mouth daily      tadalafil (CIALIS) 20 MG tablet Take 2 tablets (40 mg) by mouth daily  Qty: 60 tablet, Refills: 11    Associated Diagnoses: Pulmonary hypertension (H)      blood glucose monitoring (ONE TOUCH ULTRA) test strip Use to test blood sugars 4 times daily or as directed.  Qty: 100 strip, Refills: 11    Associated Diagnoses: Steroid-induced diabetes mellitus (H)                Allergies   Allergen Reactions     Tegaderm Transparent Dressing (Informational Only) Rash     Review of Systems   Constitutional: Positive for activity change (patient notes increasing fatigability with activity) and fatigue. Negative for chills and fever.   HENT: Negative for congestion, sinus pressure and sore throat.    Eyes: Negative for redness and visual disturbance.   Respiratory: Positive for shortness of breath. Negative for cough, choking and chest tightness.    Cardiovascular: Positive for leg swelling (patient wears compression stocking). Negative for chest pain and palpitations.   Gastrointestinal: Negative for abdominal pain, diarrhea, nausea and vomiting.   Genitourinary: Negative for difficulty urinating and dysuria.   Musculoskeletal: Negative for arthralgias, gait problem and neck stiffness.   Skin: Negative for color change and rash.   Allergic/Immunologic: Positive for immunocompromised state (patient is on prednisone.).   Neurological: Positive for weakness (generalized weakness nonfocal). Negative for dizziness, syncope, light-headedness, numbness and headaches.  "  Psychiatric/Behavioral: Positive for decreased concentration and dysphoric mood. Negative for confusion.   All other systems reviewed and are negative.      Physical Exam   BP: 121/72  Pulse: 86  Temp: 97.9  F (36.6  C)  Resp: 18  Height: 195.6 cm (6' 5\")  Weight: 124.7 kg (275 lb)  SpO2: 96 %  Physical Exam   Constitutional: He is oriented to person, place, and time. He appears well-developed and well-nourished. He appears distressed.   Patient ER with minimal symptoms currently arrest.  Patient's wife is also here present.   HENT:   Head: Normocephalic and atraumatic.   Eyes: Conjunctivae and EOM are normal. Pupils are equal, round, and reactive to light. No scleral icterus.   Neck: Normal range of motion. Neck supple. No JVD present.   Cardiovascular: Regular rhythm.    Pulmonary/Chest: No stridor. No respiratory distress.   Abdominal: He exhibits no distension. There is no tenderness.   Musculoskeletal: He exhibits edema (trace bilateral compression stockings). He exhibits no tenderness.   Neurological: He is alert and oriented to person, place, and time. He has normal reflexes. No cranial nerve deficit. Coordination normal.   Skin: Skin is warm and dry. No rash noted. He is not diaphoretic. No erythema. No pallor.   Psychiatric:   Flat  affect otherwise appropriate   Nursing note and vitals reviewed.      ED Course     ED Course     Records reviewed in Epic previous auscultation history of pericardial effusion that required intervention.    Discussed case with Dr. Quan also.  Chest x-ray EKG labs and an echo were ordered.    Chest x-ray shows improvement from previous.    Echocardiogram was done revealing findings of moderate pericardial effusion slightly increased from previous echo done last week.  Patient's ejection fraction 55-60% no wall motion abnormality.  Patient right-sided pressures consistent with severe pulmonary hypertension.  Patient laboratory testing troponin 0.124 BNP 10,917  Lipase 81 " magnesium 1.8 glucose 1:15 BUN 31 creatinine 2.07.  Potassium 4.3  AST 40 ALT 87 alk phos 68 total bili 0.8  White count 10.8 hemoglobin 12.7 platelets 225.  INR 1.01.    Patient was seen by Dr. Quan down here in the ER we did update him on his findings as noted above.  Dr. Quan reviewed all the above also.  Patient to be admitted to the cards 2 service for further evaluation of this increasing fatigability dyspnea on exertion with pericardial effusion also noted.  No sign currently of tampanode.    Patient admitted to cards 2 service.      Procedures             EKG Interpretation:      Interpreted by Danyel Al  Time reviewed: 1628  Symptoms at time of EKG: kinney and fatigue   Rhythm: normal sinus   Rate: normal  Axis: right  Ectopy: none  Conduction: normal  ST Segments/ T Waves: Nonspecific ST-T wave changes  Q Waves: none  Comparison to prior: No old EKG available    Clinical Impression: Normal sinus rhythm with right axis deviation          Critical Care time:  none               Labs Ordered and Resulted from Time of ED Arrival Up to the Time of Departure from the ED   CBC WITH PLATELETS DIFFERENTIAL - Abnormal; Notable for the following:        Result Value    Hemoglobin 12.7 (*)     Hematocrit 39.1 (*)     RDW 23.0 (*)     Nucleated RBCs 2 (*)     Absolute Neutrophil 8.4 (*)     All other components within normal limits   COMPREHENSIVE METABOLIC PANEL - Abnormal; Notable for the following:     Glucose 115 (*)     Urea Nitrogen 31 (*)     Creatinine 2.07 (*)     GFR Estimate 34 (*)     GFR Estimate If Black 41 (*)     Albumin 3.2 (*)     Protein Total 5.7 (*)     ALT 87 (*)     All other components within normal limits   TROPONIN I - Abnormal; Notable for the following:     Troponin I ES 0.124 (*)     All other components within normal limits   NT PROBNP INPATIENT - Abnormal; Notable for the following:     N-Terminal Pro BNP Inpatient [address] (*)     All other components within normal limits   INR    PARTIAL THROMBOPLASTIN TIME   CRP INFLAMMATION   ERYTHROCYTE SEDIMENTATION RATE AUTO   MAGNESIUM   PHOSPHORUS   LIPASE   PULSE OXIMETRY NURSING   CARDIAC CONTINUOUS MONITORING   PERIPHERAL IV CATHETER     Results for orders placed or performed during the hospital encounter of 06/14/17   XR Chest Port 1 View    Narrative    Exam: XR CHEST PORT 1 VW, 6/14/2017 5:38 PM    Indication: Dyspnea on exertion    Comparison: 4/29/2017, 4/20/2017, 4/27/2017.    Findings:   A single portable AP view the chest was obtained. Interval removal of  right IJ sheath. The cardiomediastinal silhouette is unchanged. No  pneumothorax. Tiny residual right pleural effusion. No significant  left pleural effusion. Resolution of previously seen perihilar and  basilar opacities.      Impression    Impression:   1. Near complete resolution of previously seen perihilar and basilar  opacities.  2. Trace right pleural effusion. No significant left pleural effusion.  3. Stable enlargement of the cardiac silhouette.    I have personally reviewed the examination and initial interpretation  and I agree with the findings.    CLARENCE GRACIA MD   CBC with platelets differential   Result Value Ref Range    WBC 10.8 4.0 - 11.0 10e9/L    RBC Count 4.41 4.4 - 5.9 10e12/L    Hemoglobin 12.7 (L) 13.3 - 17.7 g/dL    Hematocrit 39.1 (L) 40.0 - 53.0 %    MCV 89 78 - 100 fl    MCH 28.8 26.5 - 33.0 pg    MCHC 32.5 31.5 - 36.5 g/dL    RDW 23.0 (H) 10.0 - 15.0 %    Platelet Count 205 150 - 450 10e9/L    Diff Method Automated Method     % Neutrophils 78.1 %    % Lymphocytes 14.3 %    % Monocytes 6.8 %    % Eosinophils 0.0 %    % Basophils 0.2 %    % Immature Granulocytes 0.6 %    Nucleated RBCs 2 (H) 0 /100    Absolute Neutrophil 8.4 (H) 1.6 - 8.3 10e9/L    Absolute Lymphocytes 1.6 0.8 - 5.3 10e9/L    Absolute Monocytes 0.7 0.0 - 1.3 10e9/L    Absolute Eosinophils 0.0 0.0 - 0.7 10e9/L    Absolute Basophils 0.0 0.0 - 0.2 10e9/L    Abs Immature Granulocytes 0.1 0  - 0.4 10e9/L    Absolute Nucleated RBC 0.2    INR   Result Value Ref Range    INR 1.01 0.86 - 1.14   Partial thromboplastin time   Result Value Ref Range    PTT 26 22 - 37 sec   CRP inflammation   Result Value Ref Range    CRP Inflammation 7.5 0.0 - 8.0 mg/L   Erythrocyte sedimentation rate auto   Result Value Ref Range    Sed Rate 4 0 - 20 mm/h   Comprehensive metabolic panel   Result Value Ref Range    Sodium 140 133 - 144 mmol/L    Potassium 4.3 3.4 - 5.3 mmol/L    Chloride 108 94 - 109 mmol/L    Carbon Dioxide 26 20 - 32 mmol/L    Anion Gap 6 3 - 14 mmol/L    Glucose 115 (H) 70 - 99 mg/dL    Urea Nitrogen 31 (H) 7 - 30 mg/dL    Creatinine 2.07 (H) 0.66 - 1.25 mg/dL    GFR Estimate 34 (L) >60 mL/min/1.7m2    GFR Estimate If Black 41 (L) >60 mL/min/1.7m2    Calcium 8.8 8.5 - 10.1 mg/dL    Bilirubin Total 0.8 0.2 - 1.3 mg/dL    Albumin 3.2 (L) 3.4 - 5.0 g/dL    Protein Total 5.7 (L) 6.8 - 8.8 g/dL    Alkaline Phosphatase 68 40 - 150 U/L    ALT 87 (H) 0 - 70 U/L    AST 40 0 - 45 U/L   Magnesium   Result Value Ref Range    Magnesium 1.8 1.6 - 2.3 mg/dL   Phosphorus   Result Value Ref Range    Phosphorus 3.4 2.5 - 4.5 mg/dL   Troponin I   Result Value Ref Range    Troponin I ES 0.124 (HH) 0.000 - 0.045 ug/L   Nt probnp inpatient (BNP)   Result Value Ref Range    N-Terminal Pro BNP Inpatient 39809 (H) 0 - 900 pg/mL   Lipase   Result Value Ref Range    Lipase 81 73 - 393 U/L   Platelet count   Result Value Ref Range    Platelet Count 225 150 - 450 10e9/L   EKG 12-lead, tracing only   Result Value Ref Range    Interpretation ECG Click View Image link to view waveform and result    Echocardiogram Limited    Narrative    [id number]  [id number]  [id number]  [id number]           [facility],[place]  Echocardiography Laboratory  [address] [place] [id number]     Name: BENEDICT ESTES  MRN: [id number]  : 1965  Study Date: 2017 04:35 PM  Age: 52 yrs  Gender: Male  Patient  Location: [facility]  Reason For Study: Pericardial Effusion, PHTN  Ordering Physician: BHAKTI LONGO  Performed By: RUST Deidra Poe     BSA: 2.6 m2  Height: 77 in  Weight: 275 lb  BP: 121/72 mmHg  _____________________________________________________________________________  __        Procedure  Limited Echocardiogram with portions of two-dimensional, color and spectral  Doppler performed.  _____________________________________________________________________________  __        Interpretation Summary  Severe pulmonary hypertension is present.  Left ventricular function, chamber size, wall motion, and wall thickness are  normal.The EF is 55-60%.  Severe right ventricular dilation is present.Global right ventricular function  is mildly to moderately reduced.  Dilation of the inferior vena cava is present with abnormal respiratory  variation in diameter. Estimated right atrial pressure is > 15 mmHg.  Moderate pericardial effusion. Chamber compression is not present; there is no  evidence for tamponade.  Effusion is larger than the last study, but has been present on recent  studies. No other change.  _____________________________________________________________________________  __        Left Ventricle  Left ventricular function, chamber size, wall motion, and wall thickness are  normal.The EF is 55-60%. Flattened septum is consistent with right ventricular  pressure and volume overload.     Right Ventricle  Severe right ventricular dilation is present. Global right ventricular  function is mildly to moderately reduced.     Atria  The left atrium appears normal. Severe right atrial enlargement is present.        Mitral Valve  The mitral valve is normal.     Aortic Valve  Aortic valve is normal in structure and function.     Tricuspid Valve  Moderate tricuspid insufficiency is present. The right ventricular systolic  pressure is approximated at 95.6 mmHg plus the right atrial pressure. Severe  pulmonary hypertension is  present.     Pulmonic Valve  Trace to mild pulmonic insufficiency is present.     Vessels  The aorta root is normal. Dilation of the inferior vena cava is present with  abnormal respiratory variation in diameter. Estimated right atrial pressure is  > 15 mmHg.     Pericardium  Chamber compression is not present; there is no evidence for tamponade.     _____________________________________________________________________________  __           Doppler Measurements & Calculations  PA V2 max: 52.5 cm/sec  PA max P.1 mmHg  PA acc time: 0.09 sec  TR max nieves: 489.0 cm/sec  TR max P.6 mmHg           _____________________________________________________________________________  __           Report approved by: Jane Her 2017 05:18 PM            Consults  Cardiology: Responded (17)    Assessments & Plan (with Medical Decision Making)  52-year-old male history of hypertension recent pericardial effusion requiring drainage.  Patient notes increasing fatigability LFTs slightly elevated recent started a medication  patient noting some increasing fatigability with exertion.  Chest x-ray appears improved EKG without significant changes echo done showing moderate pericardial effusion which is increased from previous echo week ago.  He should hemodynamically stable discussed with Dr. Quan who did see the patient ER and will admit patient to his service for further workup and evaluation.           I have reviewed the nursing notes.    I have reviewed the findings, diagnosis, plan and need for follow up with the patient.    Current Discharge Medication List          Final diagnoses:   Pericardial effusion   Pulmonary hypertension (H)   Steroid-induced diabetes mellitus (H)     I, Harlan Duffy, am serving as a trained medical scribe to document services personally performed by Danyel Al MD, based on the provider's statements to me.   I, Danyel Al MD, was physically present and have  reviewed and verified the accuracy of this note documented by Harlan Duffy.  6/14/2017   [facility], [facility] EMERGENCY DEPARTMENT    This note was created at least in part by the use of dragon voice dictation system. Inadvertent typographical errors may still exist.  Danyel Al MD.         Danyel Al MD  06/14/17 0708

## 2017-06-14 NOTE — IP AVS SNAPSHOT
MRN:[id number]                      After Visit Summary   6/14/2017    Olivier Gómez    MRN: [id number]           Thank you!     Thank you for choosing [facility] for your care. Our goal is always to provide you with excellent care. Hearing back from our patients is one way we can continue to improve our services. Please take a few minutes to complete the written survey that you may receive in the mail after you visit with us. Thank you!        Patient Information     Date Of Birth          1965        Designated Caregiver       Most Recent Value    Caregiver    Will someone help with your care after discharge? yes    Name of designated caregiver Danielle    Phone number of caregiver [phone]    Caregiver address same as patient      About your hospital stay     You were admitted on:  June 14, 2017 You last received care in the:  [address] [facility]    You were discharged on:  June 23, 2017        Reason for your hospital stay       You were admitted for shortness of breath. This improved with initiation of flolan and removal of some fluid with diuretics.                  Who to Call     For medical emergencies, please call 911.  For non-urgent questions about your medical care, please call your primary care provider or clinic, [phone]          Attending Provider     Provider Specialty    Danyel Al MD Emergency Medicine    [facility]       Primary Care Provider Office Phone # Fax #    Adelso Delgado [phone]      After Care Instructions     Activity       Your activity upon discharge: activity as tolerated            Diet       Follow this diet upon discharge: Orders Placed This Encounter      Fluid restriction 1500 ML FLUID      2 Gram Sodium Diet            Discharge Instructions       Please start taking lasix 40 mg every day. This prescription was sent to [facility] in [place]. Please follow up with your PCP in about 1 week with  labs to check you kidney function and potassium. You will also need to have your CMV PCR checked every 2 weeks, while you are on the ruxolitinib (Jakafi). This can be done at you home clinic and forwarded to your oncologist at [facility]. You will also follow up with pulmonary hypertension clinic in 1-2 weeks. You will be contacted to schedule this appointment.            IV access       **Ordering Provider MUST call/page Care Coordinator/ to discuss arranging this service**    You are going home with the following vascular access device: Queen.                  Follow-up Appointments     Follow Up and recommended labs and tests       Follow up with primary care provider, Adelso Delgado, within 7 days to evaluate medication change (lasix 40 mg qday).  The following labs/tests are recommended: CMP, lipids, CBC. He will also need monitoring labs while on Jakafi (ruxolitinib). CBC 2-4 weeks. Lipids every 8-12 weeks. CMV PCR every 2 weeks.    Please arrange follow up with Dr. Martin at [facility].    Will also have follow up with pulmonary hypertension clinic in 1-2 weeks that will be arranged post-discharge.                  Your next 10 appointments already scheduled     Jul 18, 2017  1:00 PM CDT   Pulmonary Hypertension Return with Dk Quan MD   [facility] [place] ([facility])    [address] [place], [place] [address]  [facility] [address] [phone]              Additional Services     CARDIAC REHAB REFERRAL       Your provider has referred you to: [facility] [address] [facility] [address] [facility] [address] fax [phone]            Home infusion referral       Your provider has referred you to:   [facility]   Phone: [phone]   Fax: [phone]     For home IV Flolan infusion and Queen line supplies.    Home Infusion Pharmacist to adjust therapy based on labs and clinical assessments: No (Home Infusion will call for  "order)    Labs:  May draw labs from Venous Catheter: No  Home Infusion Pharmacist to order labs based on therapy type and clinical assessments: No  Call/Fax Lab Results to: Dr. Dk Quan     Agency Staff to assess nursing needs for Infusion Therapy.    Access Device Management:  IV Access Type: Queen  Routine site care per agency protocol.                  General Recommendations To Control Heart Failure When You Get Home     Instructions To Patients and Families: Please read and check off each of these important instructions as you do them when you get home.           Weight and symptoms      ___ Put a scale in your bathroom  ___ Post a weight chart or calendar next to the scale  ___Weigh yourself every day as soon as you you get up in the morning. You should only be wearing your pajamas. Write your weight on the chart/calendar.  ___ Bring your weight chart/calendar with you to all appointments    ___Call your doctor if you gain 2 pounds in 1 day or 5 pounds in 1 week from your \"dry\" weight (baseline weight). Also call your doctor if you have shortness of breath that gets worse over time, leg swelling or fatigue.         Medicines and diet     ___ Make sure to take your medicines as prescribed.    ___Bring a current list of your medicines and all of your medicine bottles with you to all appointments.    ___ Limit fluids if you still have swelling or shortness of breath, or if your doctor tells you to do so.  ___ Eat less than 2000 mg of sodium (salt) every day. Read food labels, and do not add salt to meals.   ___ Heart healthy diet with low fat and low cholesterol          Activity and suggested lifestyle changes    ___ Stay active. Talk to your doctor about an exercise program that is safe for your heart.    ___ Stop smoking. Reduce alcohol use.      ___ Lose weight if you are overweight. Extra weight puts a lot of stress on the heart.          Control for Leg Swelling   ___ Keep your legs elevated (raised) " as needed for swelling. If swelling is uncomfortable or elevation doesn t help, ask your doctor about using ACE wrap or Jobst stockings.          Follow-up appointments   ___ Make a [facility] Clinic appointment with a basic metabolic panel lab draw 3 to 5 days after you leave the hospital. Call one of the following locations:   [facility]  [phone],  [facility] [phone],  [facility]  [phone].     ___ Make sure to take your medications as prescribed and bring an accurate list of your medications and your weight chart/calendar to your follow up appointment at the [facility].E[facility] Clinic for continued education and adjustments          What is the CORE clinic?    The C.O.R.E (Cardiomyopathy, Optimization, Rehabilitation, Education) Clinic is a heart failure specialty clinic within the [facility]. At C.O.R.E., you will work with nurse practitioners to carefully adjust medicines, get education and learn who and when to call if symptoms appear. C.O.R.E nurses specialize in helping you:    better understand your disease.    slow the progress of your disease.    improve the length and quality of your life.    detect future heart problems before they become life threatening.    avoid hospital stays.            Pending Results     No orders found from 6/12/2017 to 6/15/2017.            Statement of Approval     Ordered          06/23/17 1543  I have reviewed and agree with all the recommendations and orders detailed in this document.  EFFECTIVE NOW     Approved and electronically signed by:  Earl Arreguin MD             Admission Information     Date & Time Provider Department Dept. Phone    6/14/2017 Dk Quan MD [address] [facility] [phone]      Your Vitals Were     Blood Pressure Pulse Temperature Respirations Height Weight    104/70 (BP Location: Left arm) 86 98.8  " F (37.1  C) (Oral) 16 1.956 m (6' 5\") 117.3 kg (258 lb 9.6 oz)    Pulse Oximetry BMI (Body Mass Index)                94% 30.67 kg/m2          Sendio Information     Sendio lets you send messages to your doctor, view your test results, renew your prescriptions, schedule appointments and more. To sign up, go to [email or web address] . Click on \"Log in\" on the left side of the screen, which will take you to the Welcome page. Then click on \"Sign up Now\" on the right side of the page.     You will be asked to enter the access code listed below, as well as some personal information. Please follow the directions to create your username and password.     Your access code is: [email or web address]  Expires: 2017 10:56 AM     Your access code will  in 90 days. If you need help or a new code, please call your [facility] clinic or [phone].        Care EveryWhere ID     This is your Care EveryWhere ID. This could be used by other organizations to access your [facility] medical records  [id number]        Equal Access to Services     DIEUDONNE RILEY AH: Hadreinaldo Elliott, wajody rich, qaybta kaalmada juan, maría segal. So [facility] [phone].    ATENCIÓN: Si habla español, tiene a garza disposición servicios gratuitos de asistencia lingüística. Jair al [phone].    We comply with applicable federal civil rights laws and [place] laws. We do not discriminate on the basis of race, color, national origin, age, disability sex, sexual orientation or gender identity.               Review of your medicines      START taking        Dose / Directions    furosemide 40 MG tablet   Commonly known as:  LASIX   Used for:  Pulmonary hypertension (H)        Dose:  40 mg   Take 1 tablet (40 mg) by mouth daily   Quantity:  30 tablet   Refills:  0       glycine diluent 32.175 mL with epoprostenol 247.5 mcg infusion   Used for:  Pulmonary hypertension (H)        Dose:  6.5 ng/kg/min   Inject " 791.05 ng/min into the vein continuous   Refills:  0       ruxolitinib 5 MG Tabs tablet CHEMO   Commonly known as:  JAKAFI   Used for:  Pericardial effusion, Pulmonary hypertension (H), Vpfql-idryfq-lden disease (H)        Dose:  5 mg   Take 1 tablet (5 mg) by mouth 2 times daily   Quantity:  60 tablet   Refills:  0         CONTINUE these medicines which may have CHANGED, or have new prescriptions. If we are uncertain of the size of tablets/capsules you have at home, strength may be listed as something that might have changed.        Dose / Directions    LEVEMIR FLEXPEN/FLEXTOUCH 100 UNIT/ML injection   This may have changed:  Another medication with the same name was removed. Continue taking this medication, and follow the directions you see here.   Generic drug:  insulin detemir        Dose:  10 Units   Inject 10 Units Subcutaneous every morning   Refills:  0         CONTINUE these medicines which have NOT CHANGED        Dose / Directions    ACTIGALL PO        Dose:  300 mg   Take 300 mg by mouth 3 times daily   Refills:  0       atovaquone 750 MG/5ML suspension   Commonly known as:  MEPRON        Dose:  1500 mg   Take 1,500 mg by mouth daily   Refills:  0       blood glucose monitoring test strip   Commonly known as:  ONE TOUCH ULTRA   Used for:  Steroid-induced diabetes mellitus (H)        Use to test blood sugars 4 times daily or as directed.   Quantity:  100 strip   Refills:  11       calcium carbonate 1250 MG tablet   Commonly known as:  OS-PANFILO 500 mg [place]. [place]        Dose:  600 mg   Take 600 mg by mouth 2 times daily   Refills:  0       CLONAZEPAM PO        Dose:  1 mg   Take 1 mg by mouth At Bedtime   Refills:  0       CRESTOR PO        Dose:  5 mg   Take 5 mg by mouth daily   Refills:  0       digoxin 125 MCG tablet   Commonly known as:  LANOXIN   Used for:  Pulmonary hypertension (H)        Dose:  125 mcg   Take 1 tablet (125 mcg) by mouth daily   Quantity:  90 tablet   Refills:  3       FLUCONAZOLE PO         Dose:  200 mg   Take 200 mg by mouth daily   Refills:  0       fluticasone 220 MCG/ACT Inhaler   Commonly known as:  FLOVENT HFA        Dose:  2 puff   Inhale 2 puffs into the lungs 2 times daily   Refills:  0       magnesium plus protein 133 MG tablet        Dose:  266 mg   Take 266 mg by mouth daily (2 tablets)   Refills:  0       NovoLOG FLEXPEN 100 UNIT/ML injection   Generic drug:  insulin aspart        Dose:  5 Units   Inject 5 Units Subcutaneous 3 times daily (with meals) Plus SS   Refills:  0       PEN-VEE K OR        Dose:  500 mg   Take 500 mg by mouth 2 times daily   Refills:  0       PREDNISONE PO        Dose:  20 mg   Take 20 mg by mouth daily   Refills:  0       PROTONIX PO        Dose:  40 mg   Take 40 mg by mouth daily   Refills:  0       tadalafil 20 MG tablet   Commonly known as:  CIALIS   Used for:  Pulmonary hypertension (H)        Dose:  40 mg   Take 2 tablets (40 mg) by mouth daily   Quantity:  60 tablet   Refills:  11       VALACYCLOVIR HCL PO        Dose:  500 mg   Take 500 mg by mouth every other day   Refills:  0       VITAMIN D (CHOLECALCIFEROL) PO        Dose:  2000 Units   Take 2,000 Units by mouth 3 times daily   Refills:  0         STOP taking     metoprolol 25 MG 24 hr tablet   Commonly known as:  TOPROL-XL                Where to get your medicines      These medications were sent to [facility] [place] [address]  [address] [facility] [address]     Phone:  [phone]     furosemide 40 MG tablet         Some of these will need a paper prescription and others can be bought over the counter. Ask your nurse if you have questions.     Bring a paper prescription for each of these medications     ruxolitinib 5 MG Tabs tablet CHEMO                Protect others around you: Learn how to safely use, store and throw away your medicines at www.disposemymeds.org.             Medication List: This is a list of all your medications and when to  take them. Check marks below indicate your daily home schedule. Keep this list as a reference.      Medications           Morning Afternoon Evening Bedtime As Needed    ACTIGALL PO   Take 300 mg by mouth 3 times daily   Last time this was given:  300 mg on 6/23/2017  8:50 AM                                atovaquone 750 MG/5ML suspension   Commonly known as:  MEPRON   Take 1,500 mg by mouth daily   Last time this was given:  1,500 mg on 6/22/2017 10:40 PM                                blood glucose monitoring test strip   Commonly known as:  ONE TOUCH ULTRA   Use to test blood sugars 4 times daily or as directed.                                calcium carbonate 1250 MG tablet   Commonly known as:  OS-PANFILO 500 mg [place]. [place]   Take 600 mg by mouth 2 times daily                                CLONAZEPAM PO   Take 1 mg by mouth At Bedtime   Last time this was given:  1 mg on 6/22/2017 10:41 PM                                CRESTOR PO   Take 5 mg by mouth daily   Last time this was given:  5 mg on 6/23/2017  8:50 AM                                digoxin 125 MCG tablet   Commonly known as:  LANOXIN   Take 1 tablet (125 mcg) by mouth daily   Last time this was given:  125 mcg on 6/23/2017  8:51 AM                                FLUCONAZOLE PO   Take 200 mg by mouth daily   Last time this was given:  200 mg on 6/22/2017 11:49 AM                                fluticasone 220 MCG/ACT Inhaler   Commonly known as:  FLOVENT HFA   Inhale 2 puffs into the lungs 2 times daily   Last time this was given:  2 puffs on 6/23/2017  8:49 AM                                furosemide 40 MG tablet   Commonly known as:  LASIX   Take 1 tablet (40 mg) by mouth daily   Last time this was given:  40 mg on 6/23/2017  8:50 AM                                glycine diluent 32.175 mL with epoprostenol 247.5 mcg infusion   Inject 791.05 ng/min into the vein continuous   Last time this was given:  6/23/2017 10:23 AM                                 LEVEMIR FLEXPEN/FLEXTOUCH 100 UNIT/ML injection   Inject 10 Units Subcutaneous every morning   Generic drug:  insulin detemir                                magnesium plus protein 133 MG tablet   Take 266 mg by mouth daily (2 tablets)                                NovoLOG FLEXPEN 100 UNIT/ML injection   Inject 5 Units Subcutaneous 3 times daily (with meals) Plus SS   Last time this was given:  1 Units on 6/22/2017 10:44 PM   Generic drug:  insulin aspart                                PEN-VEE K OR   Take 500 mg by mouth 2 times daily   Last time this was given:  500 mg on 6/23/2017  8:50 AM                                PREDNISONE PO   Take 20 mg by mouth daily   Last time this was given:  20 mg on 6/23/2017  8:50 AM                                PROTONIX PO   Take 40 mg by mouth daily   Last time this was given:  40 mg on 6/23/2017  8:51 AM                                ruxolitinib 5 MG Tabs tablet CHEMO   Commonly known as:  JAKAFI   Take 1 tablet (5 mg) by mouth 2 times daily   Last time this was given:  5 mg on 6/23/2017  5:56 AM                                tadalafil 20 MG tablet   Commonly known as:  CIALIS   Take 2 tablets (40 mg) by mouth daily   Last time this was given:  40 mg on 6/22/2017 10:41 PM                                VALACYCLOVIR HCL PO   Take 500 mg by mouth every other day   Last time this was given:  500 mg on 6/22/2017  8:36 AM                                VITAMIN D (CHOLECALCIFEROL) PO   Take 2,000 Units by mouth 3 times daily   Last time this was given:  2,000 Units on 6/23/2017  8:51 AM

## 2017-06-14 NOTE — LETTER
[address] [facility] [address] [address]  Phone: [phone]    June 20, 2017        Olivier Gómez  [address]  [place] [address]          To whom it may concern:    RE: Olivier Gómez was hospitalized at the [facility] on 6/14/2017 with critical illness. He remains hospitalized and thus cannot embark on his originally scheduled airplane trip with his wife, Elli Gómez. Please allow them to reschedule their trip. Thank you.     Please contact me for questions or concerns.      Best wishes,          Jelani Storm MD, PhD  [facility]

## 2017-06-15 LAB
DEPRECATED CALCIDIOL+CALCIFEROL SERPL-MC: 44 UG/L (ref 20–75)
INTERPRETATION ECG - MUSE: NORMAL
TROPONIN I SERPL-MCNC: 0.09 UG/L (ref 0–0.04)
TROPONIN I SERPL-MCNC: 0.11 UG/L (ref 0–0.04)

## 2017-06-15 PROCEDURE — 84484 ASSAY OF TROPONIN QUANT: CPT | Performed by: INTERNAL MEDICINE

## 2017-06-15 PROCEDURE — 25000125 ZZHC RX 250: Performed by: INTERNAL MEDICINE

## 2017-06-15 PROCEDURE — 82306 VITAMIN D 25 HYDROXY: CPT | Performed by: INTERNAL MEDICINE

## 2017-06-15 PROCEDURE — 36415 COLL VENOUS BLD VENIPUNCTURE: CPT | Performed by: INTERNAL MEDICINE

## 2017-06-15 PROCEDURE — 99233 SBSQ HOSP IP/OBS HIGH 50: CPT | Mod: GC | Performed by: INTERNAL MEDICINE

## 2017-06-15 PROCEDURE — 21400003 ZZH R&B CCU CRITICAL UMMC

## 2017-06-15 PROCEDURE — 25000132 ZZH RX MED GY IP 250 OP 250 PS 637: Performed by: INTERNAL MEDICINE

## 2017-06-15 PROCEDURE — 25000128 H RX IP 250 OP 636: Performed by: INTERNAL MEDICINE

## 2017-06-15 RX ORDER — VALACYCLOVIR HYDROCHLORIDE 500 MG/1
500 TABLET, FILM COATED ORAL EVERY OTHER DAY
Status: DISCONTINUED | OUTPATIENT
Start: 2017-06-16 | End: 2017-06-23 | Stop reason: HOSPADM

## 2017-06-15 RX ORDER — PREDNISONE 20 MG/1
20 TABLET ORAL 2 TIMES DAILY WITH MEALS
Status: DISCONTINUED | OUTPATIENT
Start: 2017-06-15 | End: 2017-06-20

## 2017-06-15 RX ADMIN — Medication 1500 MG: at 12:13

## 2017-06-15 RX ADMIN — ATOVAQUONE 1500 MG: 750 SUSPENSION ORAL at 22:18

## 2017-06-15 RX ADMIN — TADALAFIL 40 MG: 20 TABLET, FILM COATED ORAL at 22:13

## 2017-06-15 RX ADMIN — ROSUVASTATIN CALCIUM 5 MG: 5 TABLET ORAL at 08:02

## 2017-06-15 RX ADMIN — SENNOSIDES AND DOCUSATE SODIUM 2 TABLET: 8.6; 5 TABLET ORAL at 08:01

## 2017-06-15 RX ADMIN — FLUCONAZOLE 200 MG: 200 TABLET ORAL at 12:13

## 2017-06-15 RX ADMIN — HEPARIN SODIUM 5000 UNITS: 5000 INJECTION, SOLUTION INTRAVENOUS; SUBCUTANEOUS at 08:02

## 2017-06-15 RX ADMIN — PREDNISONE 20 MG: 20 TABLET ORAL at 18:40

## 2017-06-15 RX ADMIN — DIGOXIN 125 MCG: 125 TABLET ORAL at 08:01

## 2017-06-15 RX ADMIN — FLUTICASONE PROPIONATE 2 PUFF: 220 AEROSOL, METERED RESPIRATORY (INHALATION) at 22:18

## 2017-06-15 RX ADMIN — Medication 1500 MG: at 22:12

## 2017-06-15 RX ADMIN — URSODIOL 300 MG: 300 CAPSULE ORAL at 08:01

## 2017-06-15 RX ADMIN — VITAMIN D, TAB 1000IU (100/BT) 2000 UNITS: 25 TAB at 08:01

## 2017-06-15 RX ADMIN — URSODIOL 300 MG: 300 CAPSULE ORAL at 22:12

## 2017-06-15 RX ADMIN — METOPROLOL SUCCINATE 25 MG: 25 TABLET, FILM COATED, EXTENDED RELEASE ORAL at 22:12

## 2017-06-15 RX ADMIN — URSODIOL 300 MG: 300 CAPSULE ORAL at 12:13

## 2017-06-15 RX ADMIN — VITAMIN D, TAB 1000IU (100/BT) 2000 UNITS: 25 TAB at 12:13

## 2017-06-15 RX ADMIN — CLONAZEPAM 1 MG: 1 TABLET ORAL at 22:12

## 2017-06-15 RX ADMIN — VITAMIN D, TAB 1000IU (100/BT) 2000 UNITS: 25 TAB at 22:12

## 2017-06-15 RX ADMIN — PREDNISONE 20 MG: 20 TABLET ORAL at 12:13

## 2017-06-15 RX ADMIN — FLUTICASONE PROPIONATE 2 PUFF: 220 AEROSOL, METERED RESPIRATORY (INHALATION) at 08:00

## 2017-06-15 NOTE — PHARMACY-ADMISSION MEDICATION HISTORY
Admission medication history interview status for the 6/14/2017 admission is complete. See Epic admission navigator for allergy information, pharmacy, prior to admission medications and immunization status.     Medication history interview sources:  Patient, patient's wife    Changes made to PTA medication list (reason)  Added: valacyclovir, magnesium plus protein   Deleted: valgancyclovir  Changed: vitamin D, Levemir    Additional medication history information (including reliability of information, actions taken by pharmacist):None      Prior to Admission medications    Medication Sig Last Dose Taking? Auth Provider   VALACYCLOVIR HCL PO Take 500 mg by mouth every other day 6/14/2017 Yes Unknown, Entered By History   Specialty Vitamins Products (MAGNESIUM PLUS PROTEIN) 133 MG tablet Take 266 mg by mouth daily (2 tablets)  Yes Unknown, Entered By History   insulin aspart (NOVOLOG FLEXPEN) 100 UNIT/ML injection Inject 5 Units Subcutaneous 3 times daily (with meals) Plus SS 6/14/2017 at 0800 Yes Reported, Patient   insulin detemir (LEVEMIR FLEXPEN/FLEXTOUCH) 100 UNIT/ML injection Inject 10 Units Subcutaneous every morning  6/14/2017 at 0800 Yes Reported, Patient   digoxin (LANOXIN) 125 MCG tablet Take 1 tablet (125 mcg) by mouth daily 6/14/2017 at 0800 Yes Brian Barlow MD   Penicillin V Potassium (PEN-VEE K OR) Take 500 mg by mouth 2 times daily 6/14/2017 at 0800 Yes Reported, Patient   fluticasone (FLOVENT HFA) 220 MCG/ACT Inhaler Inhale 2 puffs into the lungs 2 times daily 6/14/2017 at 0800 Yes Reported, Patient   metoprolol (TOPROL-XL) 25 MG 24 hr tablet Take 1 tablet (25 mg) by mouth daily 6/13/2017 at 2200 Yes Dk Quan MD   FLUCONAZOLE PO Take 200 mg by mouth daily 6/13/2017 at 2200 Yes Reported, Patient   Ursodiol (ACTIGALL PO) Take 300 mg by mouth 3 times daily 6/14/2017 at 0800 Yes Reported, Patient   VITAMIN D, CHOLECALCIFEROL, PO Take 2,000 Units by mouth 3 times daily  6/14/2017 at 0800 Yes  Reported, Patient   CLONAZEPAM PO Take 1 mg by mouth At Bedtime  6/13/2017 at 2200 Yes Reported, Patient   calcium carbonate (OS-PANFILO 500 MG [place]. [place]) 500 MG tablet Take 600 mg by mouth 2 times daily 6/13/2017 at 2200 Yes Reported, Patient   Pantoprazole Sodium (PROTONIX PO) Take 40 mg by mouth daily  6/14/2017 at 0800 Yes Reported, Patient   Rosuvastatin Calcium (CRESTOR PO) Take 5 mg by mouth daily  6/14/2017 at 0800 Yes Reported, Patient   atovaquone (MEPRON) 750 MG/5ML suspension Take 1,500 mg by mouth daily 6/13/2017 at 2200 Yes Reported, Patient   PREDNISONE PO Take 20 mg by mouth daily 6/14/2017 at 0800  Unknown, Entered By History   tadalafil (CIALIS) 20 MG tablet Take 2 tablets (40 mg) by mouth daily 6/14/2017 at 0800  Dk Quan MD   blood glucose monitoring (ONE TOUCH ULTRA) test strip Use to test blood sugars 4 times daily or as directed.   Gene aMxwell MD         Medication history completed by:   Snehal Handley, PharmD, BCPS  Pager [phone]

## 2017-06-15 NOTE — PLAN OF CARE
Problem: Goal Outcome Summary  Goal: Goal Outcome Summary  D: Admitted on 6/14 for worsening SOB/CORREA and TTE with moderate sized pericardial effusion  I/A: Pt is alert and oriented x 4, deneis any pain or discomfort. Up independently in the room.  Reports CORREA with activity and with ambulation, HR elevates up to 120, and at rest 80-90, SR on the monitor, VSS,  afebrile.  On room air Sp 02 98%, chest sounds RLL and LLL with fine crackles. Uses the urinal, voiding in adequate amounts. On regular diet, no nausea or vomiting. Skin is warm, dry and intact.  P: Continue to monitor and notify Cards 2 of issues and concerns.

## 2017-06-15 NOTE — PLAN OF CARE
Problem: Goal Outcome Summary  Goal: Goal Outcome Summary  Outcome: No Change  Pt slept well on Bipap overnight. No CO pain. A&O x4, all VSS. No other issues noted will continue to monitor and address as needed.

## 2017-06-15 NOTE — PLAN OF CARE
Problem: Goal Outcome Summary  Goal: Goal Outcome Summary  Outcome: No Change  Admitted from in [place] for increased shortness of breath with activity. Wife, mother and sister present. Monitor shows SR 80-90s. Denies pain. C/O shortness of breath on exertion. Lungs are diminished in bases with crackles in RLL. Voids in urinal. Up in bed eating with family and playing cards. Received 1x methyl pred and lasix. See flowsheets for assessments and additional data.  A: Stable PH.  P: Monitor UO. Continue current cares and notify providers with questions or concerns.       Diagnosis: Increased shortness of breath; Pulmonary Hypertension  Admitted from: ER  Via: litter  Accompanied by: family  Belongings: Placed in closet; valuables sent home with family, declined sending any items to security.  Admission Profile: Complete  Teaching: orientation to unit, call don't fall, use of console, meal times, visiting hours, when to call for the RN (angina/sob/dizzyness, etc.), and enforced importance of safety   Access: PIV  Telemetry: Placed on patient  Height/Weight: Complete

## 2017-06-16 ENCOUNTER — APPOINTMENT (OUTPATIENT)
Dept: CT IMAGING | Facility: CLINIC | Age: 52
DRG: 286 | End: 2017-06-16
Attending: INTERNAL MEDICINE
Payer: COMMERCIAL

## 2017-06-16 ENCOUNTER — DOCUMENTATION ONLY (OUTPATIENT)
Dept: PHARMACY | Facility: CLINIC | Age: 52
End: 2017-06-16

## 2017-06-16 ENCOUNTER — APPOINTMENT (OUTPATIENT)
Dept: CARDIOLOGY | Facility: CLINIC | Age: 52
DRG: 286 | End: 2017-06-16
Payer: COMMERCIAL

## 2017-06-16 PROBLEM — I27.20: Status: ACTIVE | Noted: 2017-06-16

## 2017-06-16 LAB
ANION GAP SERPL CALCULATED.3IONS-SCNC: 7 MMOL/L (ref 3–14)
BASE DEFICIT BLDV-SCNC: 0 MMOL/L
BASE DEFICIT BLDV-SCNC: 3.6 MMOL/L
BASE DEFICIT BLDV-SCNC: 4.4 MMOL/L
BUN SERPL-MCNC: 41 MG/DL (ref 7–30)
CALCIUM SERPL-MCNC: 9.4 MG/DL (ref 8.5–10.1)
CHLORIDE SERPL-SCNC: 108 MMOL/L (ref 94–109)
CO2 SERPL-SCNC: 23 MMOL/L (ref 20–32)
CREAT SERPL-MCNC: 2.1 MG/DL (ref 0.66–1.25)
GFR SERPL CREATININE-BSD FRML MDRD: 33 ML/MIN/1.7M2
GLUCOSE BLDC GLUCOMTR-MCNC: 169 MG/DL (ref 70–99)
GLUCOSE BLDC GLUCOMTR-MCNC: 184 MG/DL (ref 70–99)
GLUCOSE BLDC GLUCOMTR-MCNC: 201 MG/DL (ref 70–99)
GLUCOSE SERPL-MCNC: 187 MG/DL (ref 70–99)
HCO3 BLDV-SCNC: 20 MMOL/L (ref 21–28)
HCO3 BLDV-SCNC: 21 MMOL/L (ref 21–28)
HCO3 BLDV-SCNC: 24 MMOL/L (ref 21–28)
O2/TOTAL GAS SETTING VFR VENT: 21 %
O2/TOTAL GAS SETTING VFR VENT: ABNORMAL %
O2/TOTAL GAS SETTING VFR VENT: ABNORMAL %
OXYHGB MFR BLDV: 44 %
OXYHGB MFR BLDV: 48 %
OXYHGB MFR BLDV: 48 %
PCO2 BLDV: 32 MM HG (ref 40–50)
PCO2 BLDV: 33 MM HG (ref 40–50)
PCO2 BLDV: 35 MM HG (ref 40–50)
PH BLDV: 7.4 PH (ref 7.32–7.43)
PH BLDV: 7.4 PH (ref 7.32–7.43)
PH BLDV: 7.45 PH (ref 7.32–7.43)
PO2 BLDV: 29 MM HG (ref 25–47)
PO2 BLDV: 30 MM HG (ref 25–47)
PO2 BLDV: 32 MM HG (ref 25–47)
POTASSIUM SERPL-SCNC: 4.7 MMOL/L (ref 3.4–5.3)
SODIUM SERPL-SCNC: 138 MMOL/L (ref 133–144)

## 2017-06-16 PROCEDURE — 93451 RIGHT HEART CATH: CPT | Mod: 26 | Performed by: INTERNAL MEDICINE

## 2017-06-16 PROCEDURE — 25000131 ZZH RX MED GY IP 250 OP 636 PS 637: Performed by: INTERNAL MEDICINE

## 2017-06-16 PROCEDURE — 25000128 H RX IP 250 OP 636: Performed by: STUDENT IN AN ORGANIZED HEALTH CARE EDUCATION/TRAINING PROGRAM

## 2017-06-16 PROCEDURE — 40000048 ZZH STATISTIC DAILY SWAN MONITORING

## 2017-06-16 PROCEDURE — 00000146 ZZHCL STATISTIC GLUCOSE BY METER IP

## 2017-06-16 PROCEDURE — 27211181 ZZH BALLOON TIP PRESSURE CR5

## 2017-06-16 PROCEDURE — 40000196 ZZH STATISTIC RAPCV CVP MONITORING

## 2017-06-16 PROCEDURE — 27210995 ZZH RX 272: Performed by: STUDENT IN AN ORGANIZED HEALTH CARE EDUCATION/TRAINING PROGRAM

## 2017-06-16 PROCEDURE — 99292 CRITICAL CARE ADDL 30 MIN: CPT | Mod: GC | Performed by: INTERNAL MEDICINE

## 2017-06-16 PROCEDURE — 25000132 ZZH RX MED GY IP 250 OP 250 PS 637: Performed by: STUDENT IN AN ORGANIZED HEALTH CARE EDUCATION/TRAINING PROGRAM

## 2017-06-16 PROCEDURE — 20000004 ZZH R&B ICU UMMC

## 2017-06-16 PROCEDURE — 70450 CT HEAD/BRAIN W/O DYE: CPT

## 2017-06-16 PROCEDURE — 99291 CRITICAL CARE FIRST HOUR: CPT | Mod: 25 | Performed by: INTERNAL MEDICINE

## 2017-06-16 PROCEDURE — 27210787 ZZH MANIFOLD CR2

## 2017-06-16 PROCEDURE — 93451 RIGHT HEART CATH: CPT

## 2017-06-16 PROCEDURE — 27210982 ZZH KIT RT HC TOTES DISP CR7

## 2017-06-16 PROCEDURE — 36415 COLL VENOUS BLD VENIPUNCTURE: CPT | Performed by: INTERNAL MEDICINE

## 2017-06-16 PROCEDURE — 25000132 ZZH RX MED GY IP 250 OP 250 PS 637: Performed by: INTERNAL MEDICINE

## 2017-06-16 PROCEDURE — 4A023N6 MEASUREMENT OF CARDIAC SAMPLING AND PRESSURE, RIGHT HEART, PERCUTANEOUS APPROACH: ICD-10-PCS | Performed by: INTERNAL MEDICINE

## 2017-06-16 PROCEDURE — 80048 BASIC METABOLIC PNL TOTAL CA: CPT | Performed by: INTERNAL MEDICINE

## 2017-06-16 PROCEDURE — 72125 CT NECK SPINE W/O DYE: CPT

## 2017-06-16 PROCEDURE — C1894 INTRO/SHEATH, NON-LASER: HCPCS

## 2017-06-16 PROCEDURE — 82805 BLOOD GASES W/O2 SATURATION: CPT | Performed by: INTERNAL MEDICINE

## 2017-06-16 PROCEDURE — 25000125 ZZHC RX 250: Performed by: INTERNAL MEDICINE

## 2017-06-16 PROCEDURE — 4A133B3 MONITORING OF ARTERIAL PRESSURE, PULMONARY, PERCUTANEOUS APPROACH: ICD-10-PCS | Performed by: INTERNAL MEDICINE

## 2017-06-16 PROCEDURE — 25000128 H RX IP 250 OP 636: Performed by: INTERNAL MEDICINE

## 2017-06-16 PROCEDURE — 4A1239Z MONITORING OF CARDIAC OUTPUT, PERCUTANEOUS APPROACH: ICD-10-PCS | Performed by: INTERNAL MEDICINE

## 2017-06-16 RX ORDER — OXYCODONE HYDROCHLORIDE 5 MG/1
5 TABLET ORAL ONCE
Status: COMPLETED | OUTPATIENT
Start: 2017-06-16 | End: 2017-06-16

## 2017-06-16 RX ORDER — OXYCODONE HYDROCHLORIDE 5 MG/1
5 TABLET ORAL EVERY 4 HOURS PRN
Status: DISCONTINUED | OUTPATIENT
Start: 2017-06-16 | End: 2017-06-18

## 2017-06-16 RX ORDER — EPOPROSTENOL SODIUM 0.5 MG/1
INJECTION, POWDER, LYOPHILIZED, FOR SOLUTION INTRAVENOUS EVERY 8 HOURS
Status: DISCONTINUED | OUTPATIENT
Start: 2017-06-16 | End: 2017-06-23 | Stop reason: HOSPADM

## 2017-06-16 RX ORDER — DEXTROSE MONOHYDRATE 25 G/50ML
25-50 INJECTION, SOLUTION INTRAVENOUS
Status: DISCONTINUED | OUTPATIENT
Start: 2017-06-16 | End: 2017-06-23 | Stop reason: HOSPADM

## 2017-06-16 RX ORDER — NICOTINE POLACRILEX 4 MG
15-30 LOZENGE BUCCAL
Status: DISCONTINUED | OUTPATIENT
Start: 2017-06-16 | End: 2017-06-23 | Stop reason: HOSPADM

## 2017-06-16 RX ORDER — ACETAMINOPHEN 325 MG/1
325-975 TABLET ORAL EVERY 6 HOURS PRN
Status: DISCONTINUED | OUTPATIENT
Start: 2017-06-16 | End: 2017-06-23 | Stop reason: HOSPADM

## 2017-06-16 RX ADMIN — VITAMIN D, TAB 1000IU (100/BT) 2000 UNITS: 25 TAB at 22:15

## 2017-06-16 RX ADMIN — OXYCODONE HYDROCHLORIDE 5 MG: 5 TABLET ORAL at 23:04

## 2017-06-16 RX ADMIN — HEPARIN SODIUM 5000 UNITS: 5000 INJECTION, SOLUTION INTRAVENOUS; SUBCUTANEOUS at 08:21

## 2017-06-16 RX ADMIN — Medication 1500 MG: at 22:15

## 2017-06-16 RX ADMIN — ROSUVASTATIN CALCIUM 5 MG: 5 TABLET ORAL at 08:20

## 2017-06-16 RX ADMIN — EPOPROSTENOL SODIUM 2 NG/KG/MIN: 1.5 INJECTION, POWDER, LYOPHILIZED, FOR SOLUTION INTRAVENOUS at 20:00

## 2017-06-16 RX ADMIN — FLUTICASONE PROPIONATE 2 PUFF: 220 AEROSOL, METERED RESPIRATORY (INHALATION) at 08:20

## 2017-06-16 RX ADMIN — TADALAFIL 40 MG: 20 TABLET, FILM COATED ORAL at 22:15

## 2017-06-16 RX ADMIN — HEPARIN SODIUM 5000 UNITS: 5000 INJECTION, SOLUTION INTRAVENOUS; SUBCUTANEOUS at 22:13

## 2017-06-16 RX ADMIN — EPOPROSTENOL SODIUM: 0.5 INJECTION, POWDER, LYOPHILIZED, FOR SOLUTION INTRAVENOUS at 20:10

## 2017-06-16 RX ADMIN — INSULIN ASPART 1 UNITS: 100 INJECTION, SOLUTION INTRAVENOUS; SUBCUTANEOUS at 17:08

## 2017-06-16 RX ADMIN — VALACYCLOVIR HYDROCHLORIDE 500 MG: 500 TABLET, FILM COATED ORAL at 08:21

## 2017-06-16 RX ADMIN — SENNOSIDES AND DOCUSATE SODIUM 1 TABLET: 8.6; 5 TABLET ORAL at 08:20

## 2017-06-16 RX ADMIN — Medication 1500 MG: at 13:15

## 2017-06-16 RX ADMIN — URSODIOL 300 MG: 300 CAPSULE ORAL at 08:20

## 2017-06-16 RX ADMIN — OXYCODONE HYDROCHLORIDE 5 MG: 5 TABLET ORAL at 18:54

## 2017-06-16 RX ADMIN — URSODIOL 300 MG: 300 CAPSULE ORAL at 13:15

## 2017-06-16 RX ADMIN — ATOVAQUONE 1500 MG: 750 SUSPENSION ORAL at 22:15

## 2017-06-16 RX ADMIN — VITAMIN D, TAB 1000IU (100/BT) 2000 UNITS: 25 TAB at 13:15

## 2017-06-16 RX ADMIN — FLUTICASONE PROPIONATE 2 PUFF: 220 AEROSOL, METERED RESPIRATORY (INHALATION) at 22:18

## 2017-06-16 RX ADMIN — PREDNISONE 20 MG: 20 TABLET ORAL at 17:02

## 2017-06-16 RX ADMIN — INSULIN ASPART 3 UNITS: 100 INJECTION, SOLUTION INTRAVENOUS; SUBCUTANEOUS at 13:12

## 2017-06-16 RX ADMIN — URSODIOL 300 MG: 300 CAPSULE ORAL at 22:15

## 2017-06-16 RX ADMIN — PREDNISONE 20 MG: 20 TABLET ORAL at 08:20

## 2017-06-16 RX ADMIN — VITAMIN D, TAB 1000IU (100/BT) 2000 UNITS: 25 TAB at 08:21

## 2017-06-16 RX ADMIN — FLUCONAZOLE 200 MG: 200 TABLET ORAL at 13:15

## 2017-06-16 RX ADMIN — DIGOXIN 125 MCG: 125 TABLET ORAL at 08:21

## 2017-06-16 RX ADMIN — CLONAZEPAM 1 MG: 1 TABLET ORAL at 22:15

## 2017-06-16 ASSESSMENT — PAIN DESCRIPTION - DESCRIPTORS
DESCRIPTORS: ACHING
DESCRIPTORS: ACHING

## 2017-06-16 NOTE — PROGRESS NOTES
Brief Crosscover Note for Fall Documentation    S: Called to bedside after patient found on the ground in the bathroom. When I arrived, he was lying prone with evidence of bleeding from his head. His head was head per C-spine protocols and he was placed on a backboard by rolling him to lateral decubitus, then supine. His head was then strapped down and he was buckled to the board. He stated that he lost consciousness while sitting on the toilet. He had finished going to the bathroom and was washing his hands when he became lightheaded. He attempt to sit down on the toilet to resolve his symtpoms, but quickly lost consciousness. He denies headache, vision changes, or weakness/numbness. No neck pain.    O:   Gen: lying on back board, no acute distress  HEENT: 1-2 cm laceration above his right eye; EOMI, PERRL    Telemetry reviewed. Patient with sinus rhythm of ~ 80 bpm prior to fall.    A/P: Syncope induced fall leading to head laceration.    # Syncope  # Head laceration  # Head trauma  Suspect vasovagal or orthostatic hypotension. No evidence of open globe on exam.  - CT Head  - CT C-spine  - trauma consult to eval c-spine and head lac    Earl Arreguin, PGY-2  Internal Medicine  [phone]    I personally provided care for this patient, reviewed chart, discussed course with patient, housestaff and consulting physicians.  I answered all questions.    Dk Quan M.D.  Division of Cardiology  Department of Medicine

## 2017-06-16 NOTE — PLAN OF CARE
"Problem: Goal Outcome Summary  Goal: Goal Outcome Summary  Outcome: No Change  Events: patient arrived to CVICU from 6C around 0845. [place] placed.      Assessment: Neurologically at baseline, A&Ox4, no deficits. CV- swan numbers CO/CI 4.2/1.6, PA pressure 115/40. CVP 14. sv02 48. paop 40. SR, afebrile, MAPs 70-80's. Marked orthostatic hypotension, dizziness, CORREA with ambulation/sitting edge of bed and especially \"bending over\" per pt especially to urinate. Had a significant fall in BR last night in PCU- 1.5\" Lac to R forehead. Steri strips intact. Negative head CT. Pericardial effusions on TTE from 6/14- may plan for pericardial window/pericadriocentesis. Murmur detected upon auscultation. Resp- RA, LS dim in bases otherwise clear b/l. Uses home cpap overnight. GI/- NPO since 0200 but now back to low sat fat cardiac diet. Adequate uop into urinal. Access- Right Peripheral Iv, R [place] IJ.   Activity: Independently repositioning.      Plan: flolan. Pericardial window/pericardiocentesis.              "

## 2017-06-16 NOTE — CONSULTS
[facility] Hematology Consult    Reason for consult: Pulmonary HTN and chronic GVHD following allo sib transplant in 2011.         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 His is an unusual presentation of GVHD. I discussed his case with his primary cardiology team and with my attending, Dr. Bolanos. Dr. Quan has contacted Dr. Martin, his primary BMT doctor at [facility]. At this time ruxolitinib (Jakafi) would be a good long-term immunosuppressant that would allow minimization of steroid use and potentially help with his pulmonary HTN (Tabarroki et al 2014, Leukemia Jul 28 (2) [phone]). We recommend ruxolitinib 5 mg BID.     Please call with questions.  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 He has suffered from chronic GVHD, sclerdoermatous changes, pulmonary HTN, pericardial effusion, and steroid induced diabetes mellitus. He was originally on tacrolimus for GVHD but this was stopped 'years ago' d/t concern for renal function. He was then on sirolimus for approximately 5 years but this was stopped in April d/t development of pericardial effusion and concern that this was sirolimus-induced serositis. He has since been on prednisone 20 mg QD. He had a TTE on 5/15/17 showing moderate to severe right ventricular dilation and he has been admitted to the [address] 4/25-5/1, 5/15-5/17, and now since 6/14. He was started on Macetentin on 6/7 for pulmonary HTN; a prior publication showing elevated endothelin-1 levels in plasma in chronic GVHD was cited as supportive evidence (see addendum to note on 6/7). Unfortunately the patient took the drug for less than a week when he became extremely fatigued and developed exercise intolerance. This admission he had an echocardiogram showing severe pulmonary HTN and an  increased pericardial effusion. The current plan per his primary team is a percutaneous drainage of his pericardial effusion    On exam the pt reported severe fatigue and exercise intolerance. Last night he became weak after standing up and fell and hit his head. He also reports some dry eye and mouth sx that are unchanged from prior. He denies N/V/D, fever. The pt and his wife had questions about possibly restarting sirolimus or another immunosuppressant.             Past Medical History:     Past Medical History:   Diagnosis Date     Cancer (H) 2011    AML     Vppzv-xsnryl-higt disease (H) need to confirm this history     History of blood transfusion      Pericardial effusion 2014     Raynaud disease      Scleroderma (H)              Past Surgical History:      Past Surgical History:   Procedure Laterality Date     ENT SURGERY      adenoidectomy     EYE SURGERY  2014    both eyes cataract surgery     HERNIA REPAIR  1980     VASCULAR SURGERY  2011    port             Social History:     Social History   Substance Use Topics     Smoking status: Passive Smoke Exposure - Never Smoker     Smokeless tobacco: Not on file     Alcohol use No             Family History:     Family History   Problem Relation Age of Onset     CANCER No family hx of      no skin cancer     Family history reviewed          Medications:     Current Facility-Administered Medications   Medication     HOLD MEDICATION     glucose 40 % gel 15-30 g    Or     dextrose 50 % injection 25-50 mL    Or     glucagon injection 1 mg     insulin aspart (NovoLOG) inj (RAPID ACTING)     insulin aspart (NovoLOG) inj (RAPID ACTING)     valACYclovir (VALTREX) tablet 500 mg     predniSONE (DELTASONE) tablet 20 mg     lidocaine 1 % 1 mL     lidocaine (LMX4) kit     sodium chloride (PF) 0.9% PF flush 3 mL     sodium chloride (PF) 0.9% PF flush 3 mL     atovaquone (MEPRON) suspension 1,500 mg     calcium carbonate (OS-PANFILO 600 mg [place]. Ca) tablet 1,500 mg     clonazePAM  (klonoPIN) tablet 1 mg     digoxin (LANOXIN) tablet 125 mcg     fluconazole (DIFLUCAN) tablet 200 mg     fluticasone (FLOVENT HFA) 220 MCG/ACT Inhaler 2 puff     metoprolol (TOPROL-XL) 24 hr tablet 25 mg     rosuvastatin (CRESTOR) tablet 5 mg     tadalafil (ADCIRCA/CIALIS) tablet     ursodiol (ACTIGALL) capsule 300 mg     cholecalciferol (vitamin D) tablet 2,000 Units     naloxone (NARCAN) injection 0.1-0.4 mg     lidocaine 1 % 1 mL     lidocaine (LMX4) kit     sodium chloride (PF) 0.9% PF flush 3 mL     sodium chloride (PF) 0.9% PF flush 3 mL     heparin sodium PF injection 5,000 Units     senna-docusate (SENOKOT-S;PERICOLACE) 8.6-50 MG per tablet 1-2 tablet             Review of Systems:   A comprehensive review of systems was performed and found to be negative except as described in this note           Physical Exam:   Vitals were reviewed  Temp: 97.2  F (36.2  C) Temp src: Oral BP: 94/75   Heart Rate: 86 Resp: 16 SpO2: 93 % O2 Device: None (Room air)    General: NAD  HEENT: No scleral icterus, no oral lesions  Pulm: poor air movement in the bases  CV: RRR, no m/r/g  Abd: soft, nontender, no hepatosplenomegaly  Extremities: trace pretibial edema  Skin: Some depigmented skin over the anterior chest and upper back, no other rash noted on skin exam  Neuro: Alert, conversant  Psych: Appropriate mood and affect         Data:   All lab and imaging data from this admission reviewed.  Notably Cr is 2.07    Yuridia Bradshaw MD      Attending Addendum:  Attending Addendum:    I have reviewed today's vital signs, medications, labs and imaging results with fellow . I afterwards have seen the patient independently and personally obtained patients history, performed physical examination myself and formulated the treatment/clinical managment plan for today.     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 Additional symptoms/signs of possible GVHD in the past have included Raynauds, mild/moderate sclerotic skin changes on upper left chest and legs. Lastly, diagnosed with pulmonary HTN in 2015 and has been on cialis.    More recently issues of pericardial effusions have developed, led to requirement for drain placement, and then recent trial of Macetenin on 6/7 for the pulmonary HTN with worsening of symptoms and readmission with increased effusion and pulmonary HTN.    We were asked to see Mr. Gómez for recommendations for GVHD.    On exam he has some mild sclerotic skin changes on his lower legs and upper chest more hyperpigmented. He has no significant oral changes consistent with GVHD and has no sclerotic tendons or diminished ROM.    Labs reviewed: Notable for creatinine of around 2, WBC 10, Hgb 12.7, plt 205    Discussed with the patient and Dr. Quan that serositis can be a manifestation of cGVHD. On pericardial fluid back in 4/2017 infectious causes and AML was ruled out. Thus, it is quite possible the serositis is related to possible GVHD. He was recently removed from sirolimus due to the concern that was leading to the pericardial effusion. However, it has accumulated despite holding the medication so that is less likely. I think it would be reasonable to try low dose jakafi for cGVHD which has been shown to have successful rates of response in cGVHD as noted in Kiana et al Leukemia 2015 OCtober 29 (10) reviewing outcomes of 95 patients, 41 of which had SR- cGVHD, with CHAVES of 84%. Thus, a trial of jakafi seems reasonable.     Typical starting dose for GVHD is 5 " mg twice a day and titrating up to 10 mg twice a day if counts tolerate it. His renal insufficiency may impact this slightly but the 5 mg twice a day dosing is still likely reasonable. CMV reactivation can be seen with Jakafi so every 2 week CMV PCR tests initially would be important to follow.    Sharon Bolanos

## 2017-06-16 NOTE — PROGRESS NOTES
Cardiology Progress Note    Events and interval changes in past 24 hours:   Syncope- he was dizzy after getting up from toilet in bathroom so he sat back on toilet, then LOC fell, head laceration (CT head normal). Had some shaking. Was back to baseline quickly. Tele leads came off while he was down so not clear if arrythmia at the time. No arrythmia on tele otherwise.      OBJECTIVE FINDINGS:  Temp: 98  F (36.7  C) Temp  Min: 97.7  F (36.5  C)  Max: 98.4  F (36.9  C)  Resp: 16 Resp  Min: 16  Max: 20  SpO2: 98 % SpO2  Min: 94 %  Max: 98 %    No Data Recorded  Heart Rate: 89 Heart Rate  Min: 81  Max: 107  BP: 103/81 Systolic (24hrs), Av , Min:96 , Max:117   Diastolic (24hrs), Av, Min:70, Max:85    Gen: Patient is AOx3, in NAD. Appears comfortable.    HEENT: PER, EOMI, MMM  Resp: slight crackles at bases b/l otherwise clear  CV: RRR, normal S1/S2, JVP 8-9cm, mid peripheral edema  Abd: NT, ND,    0.8/0.5 today 0.8/2.1    Intake/Output Summary (Last 24 hours) at 06/15/17 0546  Last data filed at 17 2200   Gross per 24 hour   Intake              840 ml   Output              550 ml   Net              290 ml     Vitals:    17 1530 17 1949 06/15/17 0550   Weight: 124.7 kg (275 lb) 125.1 kg (275 lb 14.4 oz) 121.7 kg (268 lb 4.8 oz)         valACYclovir  500 mg Oral Every Other Day     predniSONE  20 mg Oral BID w/meals     sodium chloride (PF)  3 mL Intracatheter Q8H     atovaquone  1,500 mg Oral Daily     calcium carbonate  1,500 mg Oral BID     clonazePAM (klonoPIN) tablet 1 mg  1 mg Oral At Bedtime     digoxin  125 mcg Oral Daily     fluconazole  200 mg Oral Daily     fluticasone  2 puff Inhalation BID     metoprolol  25 mg Oral Daily     rosuvastatin (CRESTOR) tablet 5 mg  5 mg Oral Daily     tadalafil  40 mg Oral Daily     ursodiol (ACTIGALL) capsule 300 mg  300 mg Oral TID     cholecalciferol  2,000 Units Oral TID     sodium chloride (PF)  3 mL Intracatheter Q8H     heparin  5,000 Units  Subcutaneous Q12H     senna-docusate  1-2 tablet Oral BID   lidocaine, lidocaine 4%, sodium chloride (PF), naloxone, lidocaine, lidocaine 4%, sodium chloride (PF)    CMP    Recent Labs  Lab 06/14/17 1750 06/12/17    135   POTASSIUM 4.3 4.7   CHLORIDE 108 104   CO2 26 21*   ANIONGAP 6 15   * 160*   BUN 31* 30*   CR 2.07* 1.82*   GFRESTIMATED 34* 39   GFRESTBLACK 41*  --    PANFILO 8.8 9.5   MAG 1.8  --    PHOS 3.4  --    PROTTOTAL 5.7* 5.8*   ALBUMIN 3.2* 3.8   BILITOTAL 0.8 0.8   ALKPHOS 68 71   AST 40 53*   ALT 87* 88*     CBC    Recent Labs  Lab 06/14/17 2155 06/14/17 1750 06/12/17   WBC  --  10.8 11.4*   RBC  --  4.41 4.77   HGB  --  12.7* 13.2*   HCT  --  39.1* 42.1   MCV  --  89 88.2   MCH  --  28.8 27.6   MCHC  --  32.5 31.3*   RDW  --  23.0* 19.7*    205 256     INR    Recent Labs  Lab 06/14/17 1750   INR 1.01     Arterial Blood GasNo lab results found in last 7 days.    RHC  1. HR 63 bpm  2. /80/96 mmHg  3. RA 9   4. /8  5. /40/62   6. PCW 12   7. PA sat 69.2%   8. PCW sat 94.9%  9. Hgb 14.4 g/dL   10. Carter CO 6.1   11. Carter CI 2.4   12. TD CO 5.8   13. TD CI 2.3   14. PVR 8.1  15. TPR 10.1  16. SVR  1140    Imaging and other studies:  EKG: NSR, biphasic t v2-v3  Echocardiogram: 6/15  Severe pulmonary hypertension is present.  Left ventricular function, chamber size, wall motion, and wall thickness are  normal.The EF is 55-60%.  Severe right ventricular dilation is present.Global right ventricular function  is mildly to moderately reduced.  Dilation of the inferior vena cava is present with abnormal respiratory  variation in diameter. Estimated right atrial pressure is > 15 mmHg.  Moderate pericardial effusion. Chamber compression is not present; there is no  evidence for tamponade.  Effusion is larger than the last study, but has been present on recent  studies. No other change.    Chest x-ray: Impression:   1. Near complete resolution of previously seen perihilar and  basilar  opacities.  2. Trace right pleural effusion. No significant left pleural effusion.  3. Stable enlargement of the cardiac silhouette.    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 6/14 TTE with moderate sized pericardial effusion.      # Syncope 6/15 in hospital-suspect low CO from RV dysfunction from severe PAH, query hemodynamic effect of pericardial effusion-less likely    #Dyspnea on exertion at home,reason for admission- likely from underlying PAH, moderate pleural effusion less likely   # Moderate pericardial effusion-etiology not clear, previously thought to be from serositis from GVHD vs sirolimus, ruled out pulsus paradoxsus SBP difference 5mmHg on 6/15    # severe PAH  # RV mild-moderate dysfunction  # moderate TR    # GVHD  # Steroid induced diabetes    PLAN:  -leave in swan. start IV flolan  -pericardial drain. F/u with pericardial studies  -Dr. Quan spoke with BMT attending. Will consider mana stat inhibitor for treatment of GVHD since PAH maybe from GVHD  -hold beta blocker today given syncope. Not clear why he is on beta blocker    -continue increased dose of prednisone to 20mg BID, atovaquone, fluconazole, valacylovir, ursodiol  -Insulin sliding scale. Tomorrow switch to home regimen    Will staff with Dr. Avelina Corea  General cards fellow, PGY4  Pager [phone]    Critical Care  ICU Note - Cardiology  Dk Quan M.D.    Impression:  1. History of bone marrow transplant  2. GVH  3. Severe pulmonary hypertension  4. Pericardial effusion    The patient remains unstable in the ICU with on-going need for parenteral medications for the adjustment of blood pressure and cardiac output and maintenance of renal function.      The patient is seen for ; shock requiring vasopressor and or inotropic agents; low cardiac output necessitating  inotropic agents, vasopressors and afterload reducing agents; chronic renal failure requiring fluid and diuretic management; sI personally reviewed:    Arterial and venous blood gases to assess acid base balance, oxygenation, and ventilator settings.      Hemodynamic parameters obtained by central hemodynamic monitoring including RAP, estimated LVEDP, pulmonary artery pressure, cardiac output and vascular resistances in order to adjust fluids and infused medications for blood pressure and cardiac output maintenance.      Volume status, renal function and nutritional support as judged by intake, output, daily weight and appropriate laboratories.    I reviewed individual and serial imaging studies including echocardiogram, chest x-ray, CT scan    I personally supervised the prescription of parenteral fluids, inotropes, vasodilators , and vasopressors in order to correct cardiac output, maintain urine output and renal function.    I personally met with patient or family to discuss current and future diagnostic and therapeutic strategies    I coordinated care with heme onc here, BMT at [facility].      I discussed the off label usage of Jakafi as suggested by both [facility] and [facility].  I discussed this with [facility] who requested request for off-label, non-formulary usage.  Information sent.      175 minutes of critical care

## 2017-06-16 NOTE — PHARMACY-CONSULT NOTE
Parenteral Prostacyclin Therapy Initiation     This patient has been initiated on a continuous IV Epoprostenol (Flolan, Veletri) infusion for the treatment of pulmonary hypertension. The patient's current prostacyclin dosing parameters are as follows:    1.  Epoprostenol (Flolan, Veletri) Dosing Weight = 121.7 kg.  (Please note: the prostacyclin dosing weight for therapy initiation is the most recent actual body weight).  This weight will remain the dosing weight for the duration of therapy.    2.  Prostacyclin Concentration = 7,500 nanograms/mL (7.5 mcg/mL).  3.  Prostacyclin Dose = 2 nanograms/kg/min, titrate the infusion by 1 nanogram/kg/min Q 6 H to achieve a final dose of 8 nanograms/kg/min.    Lin Bolton, PharmD  June 16, 2017

## 2017-06-16 NOTE — PROGRESS NOTES
Prior Authorization Request    Shaniqua  Qty: 60  Day Supply: 30  Diagnosis: GVHD    Denial Date: 06/19/2017   Appeal Submitted: 06/19/2017   Appeal Status: Approved  Expected copay: 0.00  Effective Dates: 06/16/2017 - 07/31/2017    Note: Jakafi was approved through a medical benefit and must be dispensed through [facility] (Ph: [phone]). A one-time override was placed for outpatient pharmacy to dispense.     Insurance: [employer]  Phone: [phone]  ID: [id number]      Submitted via: [email or web address], telephone, and fax      Debbie Watts  Pharmacy Liaison  Cell: [phone] Page: [phone]

## 2017-06-16 NOTE — PROCEDURES
FINAL CARDIAC CATH LAB REPORT    PROCEDURE: Right heart catheterization    PHYSICIANS:  Attending Cardiology Staff: Shon Lockett MD  Cardiology Fellow: Kun Castro MD     HPI:  Olivier Gómez is a 52 year old male with history including severe pulmonary arterial hypertension secondary to scleroderma who presents on an basis for hemodynamic assessment prior to initiation of Flolan.     DESCRIPTION:   Venous Location: right internal jugular vein  Access: Local anesthetic with lidocaine. A standard (18 g) needle with ultrasound guidance was used to establish venous access.  Venous Sheath:7F standard sheath   Catheters: 7F [place] Chris PA catheter    Right Heart Catheterization:  /73/91  HR 91  BSA 2.6    RA 20/21/16   /20  /42/67   PCW 14/14/12   Carter CO 3.9   Carter CI 1.5   TD CO 4.1   TD CI 1.6   PA sat 55.0%   Hgb 13.5 g/dL   PVR 14.1  TPR 17.2    COMPLICATIONS:   None    IMPRESSION:   1. Increased right-sided and normal left-sided filling pressures.   2. Severe pulmonary hypertension with a mean pulmonary artery pressure of 67 mmHg.  3. Decreased cardiac output (3.9 L/min) and cardiac index (1.5)  4. PA catheter positioned in the right pulmonary artery and locked in place at 58 cm to the hub.    PLAN:   1. Management of severe pulmonary arterial hypertension per inpatient CARDS II team.  2. Continued medical management for cardiovascular risk factor optimization.    Findings discussed with inpatient cardiology attending Dr. Quan.     See CVIS report for final draft.      Kun Castro MD  Cardiovascular Disease Fellow  [phone]      Staff Cardiologist: I supervised the cardiology fellow and reviewed the hemodynamic findings with the fellow at the completion of the procedure.  I agree with the documentation above.    Shon Lockett MD

## 2017-06-16 NOTE — CONSULTS
[facility], [place]    Consult note: Trauma Service     Time of Admission/Consult Request (page/call): 6/16/2017, 0030    Consulting services:  none    Assessment & Plan   Trauma mechanism:Fall   Time/date of injury:6/15/2017, 2330  Known Injuries:  1. Forehead laceration  Other diagnoses:   None    Procedure:    Plan:  1. No further intervention  2. C-collar cleared (see attached procedure note)      Code status: Full code    General Cares:  GI Prophylaxis: as per primary team  DVT Prophylaxis: as per primary team  Pulmonary toilet:as per primary team      ETOH: This patient was asked if in the last 3-6 months there has been a time when he had  5 or more drinks in a single day/outing.. Patient answer to the screening question was in the negative. No intervention needed.  Primary Care Physician   Adelso Delgado    Chief Complaint   Fall, asymptomatic    History is obtained from the patient    History of Present Illness   Olivier Gómez is a 52 year old male who is admitted to the cardiology service. He had a syncopal episode while in the toilet, and suffered a fall. C-spine was secured by the primary team, C- collar placed. Head CT and cervical spine CT were performed as well, which were negative. Steristrips were applied to a small laceration on the right forehead.  The patient has no new symptoms after the fall, and denies any headaches, vomiting or focal neurologic signs.    Past Medical History    I have reviewed this patient's medical history and updated it with pertinent information if needed.   Past Medical History:   Diagnosis Date     Cancer (H) 2011    AML     [id number] disease (H) need to confirm this history     History of blood transfusion      Pericardial effusion 2014     Raynaud disease      Scleroderma (H)        Past Surgical History   I have reviewed this patient's surgical history and updated it with pertinent information if needed.  Past Surgical History:    Procedure Laterality Date     ENT SURGERY      adenoidectomy     EYE SURGERY  2014    both eyes cataract surgery     HERNIA REPAIR  1980     VASCULAR SURGERY  2011    port     Prior to Admission Medications   Prior to Admission Medications   Prescriptions Last Dose Informant Patient Reported? Taking?   CLONAZEPAM PO 6/13/2017 at 2200 Self Yes Yes   Sig: Take 1 mg by mouth At Bedtime    FLUCONAZOLE PO 6/13/2017 at 2200 Self Yes Yes   Sig: Take 200 mg by mouth daily   PREDNISONE PO 6/14/2017 at 0800  Yes No   Sig: Take 20 mg by mouth daily   Pantoprazole Sodium (PROTONIX PO) 6/14/2017 at 0800 Self Yes Yes   Sig: Take 40 mg by mouth daily    Penicillin V Potassium (PEN-VEE K OR) 6/14/2017 at 0800 Spouse/Significant Other Yes Yes   Sig: Take 500 mg by mouth 2 times daily   Rosuvastatin Calcium (CRESTOR PO) 6/14/2017 at 0800 Self Yes Yes   Sig: Take 5 mg by mouth daily    Specialty Vitamins Products (MAGNESIUM PLUS PROTEIN) 133 MG tablet   Yes Yes   Sig: Take 266 mg by mouth daily (2 tablets)   Ursodiol (ACTIGALL PO) 6/14/2017 at 0800 Self Yes Yes   Sig: Take 300 mg by mouth 3 times daily   VALACYCLOVIR HCL PO 6/14/2017  Yes Yes   Sig: Take 500 mg by mouth every other day   VITAMIN D, CHOLECALCIFEROL, PO 6/14/2017 at 0800 Self Yes Yes   Sig: Take 2,000 Units by mouth 3 times daily    atovaquone (MEPRON) 750 MG/5ML suspension 6/13/2017 at 2200 Self Yes Yes   Sig: Take 1,500 mg by mouth daily   blood glucose monitoring (ONE TOUCH ULTRA) test strip   No No   Sig: Use to test blood sugars 4 times daily or as directed.   calcium carbonate (OS-PANFILO 500 MG [place]. [place]) 500 MG tablet 6/13/2017 at 2200 Self Yes Yes   Sig: Take 600 mg by mouth 2 times daily   digoxin (LANOXIN) 125 MCG tablet 6/14/2017 at 0800  No Yes   Sig: Take 1 tablet (125 mcg) by mouth daily   fluticasone (FLOVENT HFA) 220 MCG/ACT Inhaler 6/14/2017 at 0800 Self Yes Yes   Sig: Inhale 2 puffs into the lungs 2 times daily   insulin aspart (NOVOLOG FLEXPEN) 100  UNIT/ML injection 6/14/2017 at 0800  Yes Yes   Sig: Inject 5 Units Subcutaneous 3 times daily (with meals) Plus SS   insulin detemir (LEVEMIR FLEXPEN/FLEXTOUCH) 100 UNIT/ML injection 6/14/2017 at 0800  Yes Yes   Sig: Inject 10 Units Subcutaneous every morning    insulin detemir (LEVEMIR) 100 UNIT/ML injection   No No   Sig: Inject 10 Units Subcutaneous every morning   metoprolol (TOPROL-XL) 25 MG 24 hr tablet 6/13/2017 at 2200 Self No Yes   Sig: Take 1 tablet (25 mg) by mouth daily   tadalafil (CIALIS) 20 MG tablet 6/14/2017 at 0800  No No   Sig: Take 2 tablets (40 mg) by mouth daily      Facility-Administered Medications: None     Allergies   Allergies   Allergen Reactions     Tegaderm Transparent Dressing (Informational Only) Rash       Social History   Social History     Social History     Marital status:      Spouse name: N/A     Number of children: N/A     Years of education: N/A     Occupational History     Not on file.     Social History Main Topics     Smoking status: Passive Smoke Exposure - Never Smoker     Smokeless tobacco: Not on file     Alcohol use No     Drug use: No     Sexual activity: Yes     Partners: Female     Other Topics Concern     Parent/Sibling W/ Cabg, Mi Or Angioplasty Before 65f 55m? No     Social History Narrative       Family History   I have reviewed this patient's family history and updated it with pertinent information if needed.   Family History   Problem Relation Age of Onset     CANCER No family hx of      no skin cancer       Review of Systems   CONSTITUTIONAL: No fever, chills, sweats, fatigue   EYES: no visual blurring, no double vision or visual loss  ENT: no decrease in hearing, no tinnitus, no vertigo, no hoarseness  RESPIRATORY: no shortness of breath, no cough, no sputum   CARDIOVASCULAR: no palpitations, no chest  pain, no exertional chest pain or pressure  GASTROINTESTINAL: no nausea or vomiting, or abd pain  GENITOURINARY: no dysuria, no frequency or hesitancy,  no hematuria  MUSCULOSKELETAL: no weakness, no redness, no swelling, no joint pain,   SKIN: no rashes, ecchymoses, abrasions or lacerations  NEUROLOGIC: no numbness or tingling of hands, no numbness or tingling  of feet, no syncope, no tremors or weakness  PSYCHIATRIC: no sleep disturbances, no anxiety or depression    Physical Exam   Temp: 98  F (36.7  C) Temp src: Oral BP: 112/78   Heart Rate: 81 Resp: 16 SpO2: 98 % O2 Device: None (Room air)    Vital Signs with Ranges  Temp:  [97.7  F (36.5  C)-98.4  F (36.9  C)] 98  F (36.7  C)  Heart Rate:  [] 81  Resp:  [16-20] 16  BP: ()/(70-85) 112/78  SpO2:  [94 %-98 %] 98 % 268 lbs 4.8 oz    Primary Survey:  Airway: patient talking  Breathing: symmetric respiratory effort bilaterally  Circulation: central pulses present and peripheral pulses present  Disability: Pupils - left 4 mm and brisk, right 4 mm and brisk     [place] Coma Scale - Total 15/15  Eye Response (E): 4  4= spontaneous,  3= to verbal/voice, 2=  to pain, 1= No response   Verbal Response (V): 5   5= Orientated, converses,  4= Confused, converses, 3= Inappropriate words,  2= Incomprehensible sounds,  1=No response   Motor Response (M): 6   6= Obeys commands, 5= Localizes to pain, 4= Withdrawal to pain, 3=Fexion to pain, 2= Extension to pain, 1= No response    Secondary Survey:  General: alert, oriented to person, place, time  Head: small 2 cm superficial laceration on the right forehead with steristrips on it,, normocephalic, trachea midline  Eyes: PERRLA, pupils 3 mm, EOMI, corneas and conjunctivae clear  Ears: pearly grey bilateral TMs and non-inflamed external ear canals  Nose: nares patent, no drainage, nasal septum non-tender  Mouth/Throat: no exudates or erythema,  no dental tenderness or malocclusions, no tongue lacerations  Neck:  C-collar present. No midline posterior tenderness,  Chest/Pulmonary: normal respiratory rate and rhythm,  bilateral clear breath sounds with minimal crackles  bilaterally, no wheezes, rales or rhonchi, no chest wall tenderness or deformities,   Cardiovascular: S1, S2,  normal and regular rate and rhythm, no murmurs  Abdomen: soft, non-tender, no guarding, no rebound tenderness and no tenderness to palpation  : normal external genitalia, pelvis stable to lateral compression,  no lopes, no urine assess/urine yellow and clear  Back/Spine: no deformity, no midline tenderness, no sacral tenderness,  no step-offs and no abrasions or contusions  Musculoskel/Extremities: normal extremities, full AROM of major joints without tenderness, edema, erythema, ecchymosis, or abrasions.  Hand: no gross deformities of hands or fingers. Full AROM of hand and fingers in flexion and extension.  strength equal and symmetric.   Skin: no rashes, laceration, ecchymosis, skin warm and dry.   Neuro: PERRLA, alert, oriented x 3. CN II-XII grossly intact. No focal deficits. Strength 5/5 x 4 extremities.  Sensation intact.  Psychiatric: affect/mood normal, cooperative, normal judgement/insight and memory intact    Data   UA RESULTS:  No results for input(s): COLOR, APPEARANCE, URINEGLC, URINEBILI, URINEKETONE, SG, UBLD, URINEPH, PROTEIN, UROBILINOGEN, NITRITE, LEUKEST, RBCU, WBCU in the last 53900 hours.   Results for orders placed or performed during the hospital encounter of 06/14/17 (from the past 24 hour(s))   Troponin I   Result Value Ref Range    Troponin I ES 0.086 (H) 0.000 - 0.045 ug/L   Vitamin D Deficiency   Result Value Ref Range    Vitamin D Deficiency screening 44 20 - 75 ug/L   CT Cervical Spine w/o Contrast    Narrative    CT CERVICAL SPINE W/O CONTRAST 6/16/2017 12:15 AM    History: Fall with head trauma and LOC    Comparison:  None    Technique: Using multidetector thin collimation helical acquisition  technique, axial, coronal and sagittal 2-3 mm thickness CT images of  the cervical spine were obtained without intravenous contrast.    Findings:   The lateral masses of C1  appear normally aligned on C2.  The normal cervical lordotic curvature is preserved. Cervical  vertebral alignment is within normal limits. There is no evidence of  fracture or significant prevertebral soft tissue swelling. Mild disc  space narrowing at C6-C7. Findings on a level by level basis are as  follows:    C2-3:  No spinal canal or neuroforaminal stenosis. Mild left uncinate  hypertrophy.    C3-4:  Left uncinate hypertrophy and left facet hypertrophy causes  moderate neural foraminal narrowing on the left. No significant right  neural foraminal narrowing. The spinal canal is patent.     C4-5:  Facet hypertrophy causes mild right neural foraminal narrowing.  No significant spinal canal stenosis.    C5-6:  No spinal canal or neuroforaminal stenosis.    C6-7:  Mild bilateral neural foraminal narrowing secondary to uncinate  hypertrophy. Mild spinal canal narrowing.    C7-T1: No spinal canal or neuroforaminal stenosis.    No abnormality is noted of the visualized paraspinous tissues.      Impression    Impression:    1. No acute fracture or subluxation.  2. Mild to moderate degenerative changes causing multilevel neural  foraminal narrowing as above.       Studies:  CT Cervical Spine w/o Contrast   Preliminary Result   Impression:     1. No acute fracture or subluxation.   2. Mild to moderate degenerative changes causing multilevel neural   foraminal narrowing as above.      XR Chest Port 1 View   Final Result   Impression:    1. Near complete resolution of previously seen perihilar and basilar   opacities.   2. Trace right pleural effusion. No significant left pleural effusion.   3. Stable enlargement of the cardiac silhouette.      I have personally reviewed the examination and initial interpretation   and I agree with the findings.      CLARENCE GRACIA MD      CT Head w/o Contrast    (Results Pending)       Morgan Christianson

## 2017-06-16 NOTE — PROCEDURES
[facility] Trauma Service: Cervical-Spine Clearance     Date of Service: 6/16/2017  Admission Date/Time: 6/14/2017  3:44 PM    Clinically Cleared: Yes    Cervical-Spines Cleared  By:  Clinical exam by imaging.No pain/ tenderness with intact ROM in all directions.  Date: 6/16/2017  Time: 0300  By Whom: Morgan Christianson    CT Cervical-Spine  Date: 6/16/2017  Results:    1. No acute fracture or subluxation.     2. Mild to moderate degenerative changes causing multilevel neural  foraminal narrowing as above.

## 2017-06-17 ENCOUNTER — APPOINTMENT (OUTPATIENT)
Dept: GENERAL RADIOLOGY | Facility: CLINIC | Age: 52
DRG: 286 | End: 2017-06-17
Payer: COMMERCIAL

## 2017-06-17 LAB
ANION GAP SERPL CALCULATED.3IONS-SCNC: 6 MMOL/L (ref 3–14)
BASE DEFICIT BLDV-SCNC: 0.3 MMOL/L
BASE DEFICIT BLDV-SCNC: 0.6 MMOL/L
BASE DEFICIT BLDV-SCNC: 0.8 MMOL/L
BASE EXCESS BLDV CALC-SCNC: 0.8 MMOL/L
BUN SERPL-MCNC: 41 MG/DL (ref 7–30)
CALCIUM SERPL-MCNC: 9.1 MG/DL (ref 8.5–10.1)
CHLORIDE SERPL-SCNC: 107 MMOL/L (ref 94–109)
CO2 SERPL-SCNC: 25 MMOL/L (ref 20–32)
CREAT SERPL-MCNC: 2.05 MG/DL (ref 0.66–1.25)
DIGOXIN SERPL-MCNC: 0.9 UG/L (ref 0.5–2)
ERYTHROCYTE [DISTWIDTH] IN BLOOD BY AUTOMATED COUNT: 22.6 % (ref 10–15)
GFR SERPL CREATININE-BSD FRML MDRD: 34 ML/MIN/1.7M2
GLUCOSE BLDC GLUCOMTR-MCNC: 171 MG/DL (ref 70–99)
GLUCOSE BLDC GLUCOMTR-MCNC: 173 MG/DL (ref 70–99)
GLUCOSE BLDC GLUCOMTR-MCNC: 177 MG/DL (ref 70–99)
GLUCOSE BLDC GLUCOMTR-MCNC: 193 MG/DL (ref 70–99)
GLUCOSE SERPL-MCNC: 184 MG/DL (ref 70–99)
HCO3 BLDV-SCNC: 24 MMOL/L (ref 21–28)
HCO3 BLDV-SCNC: 24 MMOL/L (ref 21–28)
HCO3 BLDV-SCNC: 25 MMOL/L (ref 21–28)
HCO3 BLDV-SCNC: 25 MMOL/L (ref 21–28)
HCT VFR BLD AUTO: 40.5 % (ref 40–53)
HGB BLD-MCNC: 13.1 G/DL (ref 13.3–17.7)
MAGNESIUM SERPL-MCNC: 1.9 MG/DL (ref 1.6–2.3)
MAGNESIUM SERPL-MCNC: 2 MG/DL (ref 1.6–2.3)
MCH RBC QN AUTO: 28.9 PG (ref 26.5–33)
MCHC RBC AUTO-ENTMCNC: 32.3 G/DL (ref 31.5–36.5)
MCV RBC AUTO: 89 FL (ref 78–100)
O2/TOTAL GAS SETTING VFR VENT: 10 %
O2/TOTAL GAS SETTING VFR VENT: 21 %
O2/TOTAL GAS SETTING VFR VENT: ABNORMAL %
O2/TOTAL GAS SETTING VFR VENT: NORMAL %
OXYHGB MFR BLDV: 53 %
OXYHGB MFR BLDV: 56 %
OXYHGB MFR BLDV: 57 %
OXYHGB MFR BLDV: 61 %
PCO2 BLDV: 37 MM HG (ref 40–50)
PCO2 BLDV: 39 MM HG (ref 40–50)
PCO2 BLDV: 40 MM HG (ref 40–50)
PCO2 BLDV: 41 MM HG (ref 40–50)
PH BLDV: 7.39 PH (ref 7.32–7.43)
PH BLDV: 7.39 PH (ref 7.32–7.43)
PH BLDV: 7.42 PH (ref 7.32–7.43)
PH BLDV: 7.42 PH (ref 7.32–7.43)
PHOSPHATE SERPL-MCNC: 4.4 MG/DL (ref 2.5–4.5)
PLATELET # BLD AUTO: 221 10E9/L (ref 150–450)
PO2 BLDV: 35 MM HG (ref 25–47)
PO2 BLDV: 35 MM HG (ref 25–47)
PO2 BLDV: 36 MM HG (ref 25–47)
PO2 BLDV: 37 MM HG (ref 25–47)
POTASSIUM SERPL-SCNC: 4.3 MMOL/L (ref 3.4–5.3)
POTASSIUM SERPL-SCNC: 4.7 MMOL/L (ref 3.4–5.3)
RBC # BLD AUTO: 4.54 10E12/L (ref 4.4–5.9)
SODIUM SERPL-SCNC: 138 MMOL/L (ref 133–144)
WBC # BLD AUTO: 13.7 10E9/L (ref 4–11)

## 2017-06-17 PROCEDURE — 25000128 H RX IP 250 OP 636: Performed by: STUDENT IN AN ORGANIZED HEALTH CARE EDUCATION/TRAINING PROGRAM

## 2017-06-17 PROCEDURE — 25000132 ZZH RX MED GY IP 250 OP 250 PS 637: Performed by: INTERNAL MEDICINE

## 2017-06-17 PROCEDURE — 40000196 ZZH STATISTIC RAPCV CVP MONITORING

## 2017-06-17 PROCEDURE — 82805 BLOOD GASES W/O2 SATURATION: CPT | Performed by: INTERNAL MEDICINE

## 2017-06-17 PROCEDURE — 25000125 ZZHC RX 250: Performed by: INTERNAL MEDICINE

## 2017-06-17 PROCEDURE — 00000146 ZZHCL STATISTIC GLUCOSE BY METER IP

## 2017-06-17 PROCEDURE — 27210995 ZZH RX 272: Performed by: STUDENT IN AN ORGANIZED HEALTH CARE EDUCATION/TRAINING PROGRAM

## 2017-06-17 PROCEDURE — 71010 XR CHEST PORT 1 VW: CPT

## 2017-06-17 PROCEDURE — 83735 ASSAY OF MAGNESIUM: CPT | Performed by: INTERNAL MEDICINE

## 2017-06-17 PROCEDURE — 40000940 XR CHEST PORT 1 VW

## 2017-06-17 PROCEDURE — 25000132 ZZH RX MED GY IP 250 OP 250 PS 637: Performed by: STUDENT IN AN ORGANIZED HEALTH CARE EDUCATION/TRAINING PROGRAM

## 2017-06-17 PROCEDURE — 85027 COMPLETE CBC AUTOMATED: CPT | Performed by: INTERNAL MEDICINE

## 2017-06-17 PROCEDURE — 25000128 H RX IP 250 OP 636: Performed by: INTERNAL MEDICINE

## 2017-06-17 PROCEDURE — 99291 CRITICAL CARE FIRST HOUR: CPT | Mod: GC | Performed by: INTERNAL MEDICINE

## 2017-06-17 PROCEDURE — 80048 BASIC METABOLIC PNL TOTAL CA: CPT | Performed by: INTERNAL MEDICINE

## 2017-06-17 PROCEDURE — 80162 ASSAY OF DIGOXIN TOTAL: CPT | Performed by: INTERNAL MEDICINE

## 2017-06-17 PROCEDURE — 40000275 ZZH STATISTIC RCP TIME EA 10 MIN

## 2017-06-17 PROCEDURE — 20000004 ZZH R&B ICU UMMC

## 2017-06-17 PROCEDURE — 84100 ASSAY OF PHOSPHORUS: CPT | Performed by: INTERNAL MEDICINE

## 2017-06-17 PROCEDURE — 40000048 ZZH STATISTIC DAILY SWAN MONITORING

## 2017-06-17 PROCEDURE — 99292 CRITICAL CARE ADDL 30 MIN: CPT | Mod: GC | Performed by: INTERNAL MEDICINE

## 2017-06-17 PROCEDURE — 84132 ASSAY OF SERUM POTASSIUM: CPT | Performed by: INTERNAL MEDICINE

## 2017-06-17 RX ORDER — GRANISETRON HYDROCHLORIDE 1 MG/ML
1 INJECTION INTRAVENOUS EVERY 12 HOURS
Status: DISCONTINUED | OUTPATIENT
Start: 2017-06-17 | End: 2017-06-23 | Stop reason: HOSPADM

## 2017-06-17 RX ORDER — FUROSEMIDE 10 MG/ML
10 INJECTION INTRAMUSCULAR; INTRAVENOUS ONCE
Status: COMPLETED | OUTPATIENT
Start: 2017-06-17 | End: 2017-06-17

## 2017-06-17 RX ORDER — HYDROMORPHONE HYDROCHLORIDE 2 MG/1
2 TABLET ORAL EVERY 4 HOURS PRN
Status: DISCONTINUED | OUTPATIENT
Start: 2017-06-17 | End: 2017-06-18

## 2017-06-17 RX ORDER — TRAMADOL HYDROCHLORIDE 50 MG/1
50 TABLET ORAL EVERY 6 HOURS PRN
Status: DISCONTINUED | OUTPATIENT
Start: 2017-06-17 | End: 2017-06-17

## 2017-06-17 RX ORDER — GRANISETRON HYDROCHLORIDE 1 MG/1
1 TABLET, FILM COATED ORAL 2 TIMES DAILY
Status: DISCONTINUED | OUTPATIENT
Start: 2017-06-17 | End: 2017-06-18

## 2017-06-17 RX ADMIN — ACETAMINOPHEN 650 MG: 325 TABLET, FILM COATED ORAL at 08:39

## 2017-06-17 RX ADMIN — CLONAZEPAM 1 MG: 1 TABLET ORAL at 23:01

## 2017-06-17 RX ADMIN — URSODIOL 300 MG: 300 CAPSULE ORAL at 08:40

## 2017-06-17 RX ADMIN — ATOVAQUONE 1500 MG: 750 SUSPENSION ORAL at 23:01

## 2017-06-17 RX ADMIN — FUROSEMIDE 10 MG: 10 INJECTION, SOLUTION INTRAVENOUS at 12:01

## 2017-06-17 RX ADMIN — FLUTICASONE PROPIONATE 2 PUFF: 220 AEROSOL, METERED RESPIRATORY (INHALATION) at 08:41

## 2017-06-17 RX ADMIN — Medication 1500 MG: at 11:56

## 2017-06-17 RX ADMIN — EPOPROSTENOL SODIUM: 0.5 INJECTION, POWDER, LYOPHILIZED, FOR SOLUTION INTRAVENOUS at 04:00

## 2017-06-17 RX ADMIN — Medication 1500 MG: at 23:01

## 2017-06-17 RX ADMIN — URSODIOL 300 MG: 300 CAPSULE ORAL at 23:02

## 2017-06-17 RX ADMIN — EPOPROSTENOL SODIUM 1 SYRINGE: 0.5 INJECTION, POWDER, LYOPHILIZED, FOR SOLUTION INTRAVENOUS at 11:57

## 2017-06-17 RX ADMIN — PREDNISONE 20 MG: 20 TABLET ORAL at 08:40

## 2017-06-17 RX ADMIN — VITAMIN D, TAB 1000IU (100/BT) 2000 UNITS: 25 TAB at 11:56

## 2017-06-17 RX ADMIN — EPOPROSTENOL SODIUM 6 NG/KG/MIN: 1.5 INJECTION, POWDER, LYOPHILIZED, FOR SOLUTION INTRAVENOUS at 20:00

## 2017-06-17 RX ADMIN — VITAMIN D, TAB 1000IU (100/BT) 2000 UNITS: 25 TAB at 08:39

## 2017-06-17 RX ADMIN — ACETAMINOPHEN 975 MG: 325 TABLET, FILM COATED ORAL at 14:31

## 2017-06-17 RX ADMIN — INSULIN ASPART 1 UNITS: 100 INJECTION, SOLUTION INTRAVENOUS; SUBCUTANEOUS at 14:31

## 2017-06-17 RX ADMIN — HEPARIN SODIUM 5000 UNITS: 5000 INJECTION, SOLUTION INTRAVENOUS; SUBCUTANEOUS at 19:54

## 2017-06-17 RX ADMIN — GRANISETRON HYDROCHLORIDE 1 MG: 1 TABLET, FILM COATED ORAL at 11:56

## 2017-06-17 RX ADMIN — HEPARIN SODIUM 5000 UNITS: 5000 INJECTION, SOLUTION INTRAVENOUS; SUBCUTANEOUS at 08:40

## 2017-06-17 RX ADMIN — EPOPROSTENOL SODIUM 3 NG/KG/MIN: 1.5 INJECTION, POWDER, LYOPHILIZED, FOR SOLUTION INTRAVENOUS at 03:51

## 2017-06-17 RX ADMIN — FLUCONAZOLE 200 MG: 200 TABLET ORAL at 11:56

## 2017-06-17 RX ADMIN — GRANISETRON HYDROCHLORIDE 1 MG: 1 INJECTION INTRAVENOUS at 19:55

## 2017-06-17 RX ADMIN — SENNOSIDES AND DOCUSATE SODIUM 1 TABLET: 8.6; 5 TABLET ORAL at 19:54

## 2017-06-17 RX ADMIN — VITAMIN D, TAB 1000IU (100/BT) 2000 UNITS: 25 TAB at 23:01

## 2017-06-17 RX ADMIN — EPOPROSTENOL SODIUM 5 NG/KG/MIN: 1.5 INJECTION, POWDER, LYOPHILIZED, FOR SOLUTION INTRAVENOUS at 18:48

## 2017-06-17 RX ADMIN — URSODIOL 300 MG: 300 CAPSULE ORAL at 11:55

## 2017-06-17 RX ADMIN — PREDNISONE 20 MG: 20 TABLET ORAL at 18:09

## 2017-06-17 RX ADMIN — FLUTICASONE PROPIONATE 2 PUFF: 220 AEROSOL, METERED RESPIRATORY (INHALATION) at 23:02

## 2017-06-17 RX ADMIN — ROSUVASTATIN CALCIUM 5 MG: 5 TABLET ORAL at 08:39

## 2017-06-17 RX ADMIN — EPOPROSTENOL SODIUM 4 NG/KG/MIN: 1.5 INJECTION, POWDER, LYOPHILIZED, FOR SOLUTION INTRAVENOUS at 11:56

## 2017-06-17 RX ADMIN — INSULIN ASPART 1 UNITS: 100 INJECTION, SOLUTION INTRAVENOUS; SUBCUTANEOUS at 10:22

## 2017-06-17 RX ADMIN — EPOPROSTENOL SODIUM: 0.5 INJECTION, POWDER, LYOPHILIZED, FOR SOLUTION INTRAVENOUS at 20:00

## 2017-06-17 RX ADMIN — TADALAFIL 40 MG: 20 TABLET, FILM COATED ORAL at 23:02

## 2017-06-17 RX ADMIN — HYDROMORPHONE HYDROCHLORIDE 2 MG: 2 TABLET ORAL at 19:54

## 2017-06-17 RX ADMIN — DIGOXIN 125 MCG: 125 TABLET ORAL at 08:39

## 2017-06-17 ASSESSMENT — PAIN DESCRIPTION - DESCRIPTORS
DESCRIPTORS: ACHING

## 2017-06-17 NOTE — PROGRESS NOTES
Cardiology Progress Note    Events and interval changes in past 24 hours:   Pt doesn't feel that much different from flolan      OBJECTIVE FINDINGS:  Temp: 98  F (36.7  C) Temp  Min: 96.6  F (35.9  C)  Max: 98  F (36.7  C)  Resp: 16 Resp  Min: 10  Max: 23  SpO2: 93 % SpO2  Min: 89 %  Max: 99 %    No Data Recorded  Heart Rate: 90 Heart Rate  Min: 78  Max: 101  BP: 96/71 Systolic (24hrs), Av , Min:94 , Max:115   Diastolic (24hrs), Av, Min:67, Max:89    cvp 16 pa 90/50/70 pa sat 53% CI 1.8 CO 4.6 SVR 1000 bsa 2.5    Gen: Patient is AOx3, in NAD. Appears comfortable.    HEENT: PER, EOMI, MMM  Resp: slight crackles at bases b/l otherwise clear  CV: RRR, normal S1/S2, JVP 8-9cm, mid peripheral edema  Abd: NT, ND,    0.2/2 today na    Intake/Output Summary (Last 24 hours) at 06/15/17 0546  Last data filed at 17 2200   Gross per 24 hour   Intake              840 ml   Output              550 ml   Net              290 ml     Vitals:    17 1530 17 1949 06/15/17 0550   Weight: 124.7 kg (275 lb) 125.1 kg (275 lb 14.4 oz) 121.7 kg (268 lb 4.8 oz)       - MEDICATION INSTRUCTIONS -       - MEDICATION INSTRUCTIONS -       - MEDICATION INSTRUCTIONS -       epoprostenol (FLOLAN) intravenous infusion 3 ng/kg/min (17 0700)        insulin aspart  1-7 Units Subcutaneous TID AC     insulin aspart  1-5 Units Subcutaneous At Bedtime     epoprostenol (FLOLAN) syringe change   Intravenous Q8H     valACYclovir  500 mg Oral Every Other Day     predniSONE  20 mg Oral BID w/meals     sodium chloride (PF)  3 mL Intracatheter Q8H     atovaquone  1,500 mg Oral Daily     calcium carbonate  1,500 mg Oral BID     clonazePAM (klonoPIN) tablet 1 mg  1 mg Oral At Bedtime     digoxin  125 mcg Oral Daily     fluconazole  200 mg Oral Daily     fluticasone  2 puff Inhalation BID     metoprolol  25 mg Oral Daily     rosuvastatin (CRESTOR) tablet 5 mg  5 mg Oral Daily     tadalafil  40 mg Oral Daily     ursodiol (ACTIGALL)  capsule 300 mg  300 mg Oral TID     cholecalciferol  2,000 Units Oral TID     sodium chloride (PF)  3 mL Intracatheter Q8H     heparin  5,000 Units Subcutaneous Q12H     senna-docusate  1-2 tablet Oral BID   HOLD MEDICATION, glucose **OR** dextrose **OR** glucagon, - MEDICATION INSTRUCTIONS -, - MEDICATION INSTRUCTIONS -, - MEDICATION INSTRUCTIONS -, oxyCODONE, acetaminophen, lidocaine, lidocaine 4%, sodium chloride (PF), naloxone, lidocaine, lidocaine 4%, sodium chloride (PF)    CMP    Recent Labs  Lab 06/17/17  0400 06/16/17  1154 06/14/17  1750 06/12/17    138 140 135   POTASSIUM 4.7 4.7 4.3 4.7   CHLORIDE 107 108 108 104   CO2 25 23 26 21*   ANIONGAP 6 7 6 15   * 187* 115* 160*   BUN 41* 41* 31* 30*   CR 2.05* 2.10* 2.07* 1.82*   GFRESTIMATED 34* 33* 34* 39   GFRESTBLACK 41* 40* 41*  --    PANFILO 9.1 9.4 8.8 9.5   MAG 2.0  --  1.8  --    PHOS 4.4  --  3.4  --    PROTTOTAL  --   --  5.7* 5.8*   ALBUMIN  --   --  3.2* 3.8   BILITOTAL  --   --  0.8 0.8   ALKPHOS  --   --  68 71   AST  --   --  40 53*   ALT  --   --  87* 88*     CBC    Recent Labs  Lab 06/17/17  0400 06/14/17  2155 06/14/17  1750 06/12/17   WBC 13.7*  --  10.8 11.4*   RBC 4.54  --  4.41 4.77   HGB 13.1*  --  12.7* 13.2*   HCT 40.5  --  39.1* 42.1   MCV 89  --  89 88.2   MCH 28.9  --  28.8 27.6   MCHC 32.3  --  32.5 31.3*   RDW 22.6*  --  23.0* 19.7*    225 205 256     INR    Recent Labs  Lab 06/14/17  1750   INR 1.01     Arterial Blood Gas    Recent Labs  Lab 06/17/17  0400 06/16/17  2332 06/16/17  2039 06/16/17  1655   O2PER 10 2L 21% 21       RHC  1. HR 63 bpm  2. /80/96 mmHg  3. RA 9   4. /8  5. /40/62   6. PCW 12   7. PA sat 69.2%   8. PCW sat 94.9%  9. Hgb 14.4 g/dL   10. Carter CO 6.1   11. Carter CI 2.4   12. TD CO 5.8   13. TD CI 2.3   14. PVR 8.1  15. TPR 10.1  16. SVR  1140    Imaging and other studies:  EKG: NSR, biphasic t v2-v3  Echocardiogram: 6/15  Severe pulmonary hypertension is present.  Left  ventricular function, chamber size, wall motion, and wall thickness are  normal.The EF is 55-60%.  Severe right ventricular dilation is present.Global right ventricular function  is mildly to moderately reduced.  Dilation of the inferior vena cava is present with abnormal respiratory  variation in diameter. Estimated right atrial pressure is > 15 mmHg.  Moderate pericardial effusion. Chamber compression is not present; there is no  evidence for tamponade.  Effusion is larger than the last study, but has been present on recent  studies. No other change.    Chest x-ray: Impression:   1. Near complete resolution of previously seen perihilar and basilar  opacities.  2. Trace right pleural effusion. No significant left pleural effusion.  3. Stable enlargement of the cardiac silhouette.    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 6/14 TTE with moderate sized pericardial effusion.      # Syncope 6/15 in hospital-suspect low CO from RV dysfunction from severe PAH, query hemodynamic effect of pericardial effusion-less likely    #Dyspnea on exertion at home,reason for admission- likely from underlying PAH, moderate pleural effusion less likely   #  Moderate pericardial effusion-etiology not clear, previously thought to be from serositis from GVHD vs sirolimus, ruled out pulsus paradoxsus SBP difference 5mmHg on 6/15    # severe PAH  # RV mild-moderate dysfunction  # moderate TR    # GVHD  # Steroid induced diabetes    PLAN:   IV flolan. Titrate up as able  -Dr. Quan spoke with BMT attending. Will consider mana stat inhibitor for treatment of GVHD since PAH maybe from GVHD  -hold beta blocker low CI  -continue increased dose of prednisone to 20mg BID, atovaquone, fluconazole, valacylovir, ursodiol    Staffed with Dr. Avelina Corea  General cards fellow, PGY4  Pager [phone]    I personally provided care for this patient, reviewed chart, discussed course with patient, housestaff and consulting physicians.  I answered all questions.            Critical Care    Hemodynamic monitoring for PAH/GVH noted  Hemodynamics reviewed and diuretics and epoprostenol dose titration supervised throughout the day.  We are concerned that with diuresis tamponade physiology may become apparent.  140 minutes: obtaining hemodynamic readings, revising medication dosing, monitoring for tamponade, coordination of care with catherization laboratory and bone marrow transplant.       Dk Quan M.D.  Division of Cardiology  Department of Medicine

## 2017-06-17 NOTE — PLAN OF CARE
Problem: Goal Outcome Summary  Goal: Goal Outcome Summary  1) pt will be hemodynamically stable  2) pt will tolerate up titration of IV flolan  Outcome: No Change     D/I/A: Pt admitted on 6/14 with SOB and CORREA. Pt A&Ox4. Sinus Rhythm.  On NC/oxiplus 5lpm during the day, SpO2 92%. LS clear and diminished juanita lower lobes. IV flolan now at 5 ng/kg/min. Pt complain of headache that is managed with tylenol, no nausea, flushed facial color. Walked in [address] 1x with RN and tolerated well, denies dizziness. Sat in chair all day, family present and updated.      P: Continue to monitor/assess pt, provide pain management, contact MD with questions/concerns.      Jarad Sutherland  RN, BSN

## 2017-06-18 ENCOUNTER — APPOINTMENT (OUTPATIENT)
Dept: GENERAL RADIOLOGY | Facility: CLINIC | Age: 52
DRG: 286 | End: 2017-06-18
Payer: COMMERCIAL

## 2017-06-18 ENCOUNTER — APPOINTMENT (OUTPATIENT)
Dept: GENERAL RADIOLOGY | Facility: CLINIC | Age: 52
DRG: 286 | End: 2017-06-18
Attending: STUDENT IN AN ORGANIZED HEALTH CARE EDUCATION/TRAINING PROGRAM
Payer: COMMERCIAL

## 2017-06-18 LAB
ABO + RH BLD: NORMAL
ABO + RH BLD: NORMAL
ANION GAP SERPL CALCULATED.3IONS-SCNC: 6 MMOL/L (ref 3–14)
ANION GAP SERPL CALCULATED.3IONS-SCNC: 7 MMOL/L (ref 3–14)
BASE EXCESS BLDV CALC-SCNC: 3.2 MMOL/L
BASE EXCESS BLDV CALC-SCNC: 3.4 MMOL/L
BASE EXCESS BLDV CALC-SCNC: 3.9 MMOL/L
BLD GP AB SCN SERPL QL: NORMAL
BLOOD BANK CMNT PATIENT-IMP: NORMAL
BUN SERPL-MCNC: 36 MG/DL (ref 7–30)
BUN SERPL-MCNC: 38 MG/DL (ref 7–30)
CA-I BLD-MCNC: 5.1 MG/DL (ref 4.4–5.2)
CALCIUM SERPL-MCNC: 9.2 MG/DL (ref 8.5–10.1)
CALCIUM SERPL-MCNC: 9.5 MG/DL (ref 8.5–10.1)
CHLORIDE SERPL-SCNC: 102 MMOL/L (ref 94–109)
CHLORIDE SERPL-SCNC: 105 MMOL/L (ref 94–109)
CO2 SERPL-SCNC: 25 MMOL/L (ref 20–32)
CO2 SERPL-SCNC: 27 MMOL/L (ref 20–32)
CREAT SERPL-MCNC: 1.71 MG/DL (ref 0.66–1.25)
CREAT SERPL-MCNC: 1.74 MG/DL (ref 0.66–1.25)
ERYTHROCYTE [DISTWIDTH] IN BLOOD BY AUTOMATED COUNT: 22.4 % (ref 10–15)
GFR SERPL CREATININE-BSD FRML MDRD: 41 ML/MIN/1.7M2
GFR SERPL CREATININE-BSD FRML MDRD: 42 ML/MIN/1.7M2
GLUCOSE BLDC GLUCOMTR-MCNC: 148 MG/DL (ref 70–99)
GLUCOSE BLDC GLUCOMTR-MCNC: 152 MG/DL (ref 70–99)
GLUCOSE BLDC GLUCOMTR-MCNC: 157 MG/DL (ref 70–99)
GLUCOSE BLDC GLUCOMTR-MCNC: 194 MG/DL (ref 70–99)
GLUCOSE SERPL-MCNC: 155 MG/DL (ref 70–99)
GLUCOSE SERPL-MCNC: 168 MG/DL (ref 70–99)
HCO3 BLDV-SCNC: 28 MMOL/L (ref 21–28)
HCT VFR BLD AUTO: 39.3 % (ref 40–53)
HGB BLD-MCNC: 12.6 G/DL (ref 13.3–17.7)
MAGNESIUM SERPL-MCNC: 1.9 MG/DL (ref 1.6–2.3)
MCH RBC QN AUTO: 28.6 PG (ref 26.5–33)
MCHC RBC AUTO-ENTMCNC: 32.1 G/DL (ref 31.5–36.5)
MCV RBC AUTO: 89 FL (ref 78–100)
O2/TOTAL GAS SETTING VFR VENT: 21 %
O2/TOTAL GAS SETTING VFR VENT: ABNORMAL %
O2/TOTAL GAS SETTING VFR VENT: ABNORMAL %
OXYHGB MFR BLDV: 54 %
OXYHGB MFR BLDV: 58 %
OXYHGB MFR BLDV: 62 %
PCO2 BLDV: 41 MM HG (ref 40–50)
PH BLDV: 7.44 PH (ref 7.32–7.43)
PH BLDV: 7.44 PH (ref 7.32–7.43)
PH BLDV: 7.45 PH (ref 7.32–7.43)
PHOSPHATE SERPL-MCNC: 3.8 MG/DL (ref 2.5–4.5)
PLATELET # BLD AUTO: 188 10E9/L (ref 150–450)
PO2 BLDV: 32 MM HG (ref 25–47)
PO2 BLDV: 34 MM HG (ref 25–47)
PO2 BLDV: 36 MM HG (ref 25–47)
POTASSIUM SERPL-SCNC: 4.2 MMOL/L (ref 3.4–5.3)
POTASSIUM SERPL-SCNC: 4.4 MMOL/L (ref 3.4–5.3)
RADIOLOGIST FLAGS: ABNORMAL
RBC # BLD AUTO: 4.4 10E12/L (ref 4.4–5.9)
SODIUM SERPL-SCNC: 136 MMOL/L (ref 133–144)
SODIUM SERPL-SCNC: 136 MMOL/L (ref 133–144)
SPECIMEN EXP DATE BLD: NORMAL
WBC # BLD AUTO: 13 10E9/L (ref 4–11)

## 2017-06-18 PROCEDURE — 20000004 ZZH R&B ICU UMMC

## 2017-06-18 PROCEDURE — 93010 ELECTROCARDIOGRAM REPORT: CPT | Performed by: INTERNAL MEDICINE

## 2017-06-18 PROCEDURE — 40000048 ZZH STATISTIC DAILY SWAN MONITORING

## 2017-06-18 PROCEDURE — 25000128 H RX IP 250 OP 636: Performed by: STUDENT IN AN ORGANIZED HEALTH CARE EDUCATION/TRAINING PROGRAM

## 2017-06-18 PROCEDURE — 25000128 H RX IP 250 OP 636: Performed by: INTERNAL MEDICINE

## 2017-06-18 PROCEDURE — 25000132 ZZH RX MED GY IP 250 OP 250 PS 637: Performed by: INTERNAL MEDICINE

## 2017-06-18 PROCEDURE — 83735 ASSAY OF MAGNESIUM: CPT | Performed by: INTERNAL MEDICINE

## 2017-06-18 PROCEDURE — 85027 COMPLETE CBC AUTOMATED: CPT | Performed by: INTERNAL MEDICINE

## 2017-06-18 PROCEDURE — 80048 BASIC METABOLIC PNL TOTAL CA: CPT | Performed by: STUDENT IN AN ORGANIZED HEALTH CARE EDUCATION/TRAINING PROGRAM

## 2017-06-18 PROCEDURE — 84100 ASSAY OF PHOSPHORUS: CPT | Performed by: INTERNAL MEDICINE

## 2017-06-18 PROCEDURE — 27210995 ZZH RX 272: Performed by: STUDENT IN AN ORGANIZED HEALTH CARE EDUCATION/TRAINING PROGRAM

## 2017-06-18 PROCEDURE — 86850 RBC ANTIBODY SCREEN: CPT | Performed by: INTERNAL MEDICINE

## 2017-06-18 PROCEDURE — 25000132 ZZH RX MED GY IP 250 OP 250 PS 637: Performed by: STUDENT IN AN ORGANIZED HEALTH CARE EDUCATION/TRAINING PROGRAM

## 2017-06-18 PROCEDURE — 71010 XR CHEST PORT 1 VW: CPT | Mod: 77

## 2017-06-18 PROCEDURE — 99233 SBSQ HOSP IP/OBS HIGH 50: CPT | Mod: GC | Performed by: INTERNAL MEDICINE

## 2017-06-18 PROCEDURE — 80048 BASIC METABOLIC PNL TOTAL CA: CPT | Performed by: INTERNAL MEDICINE

## 2017-06-18 PROCEDURE — 82805 BLOOD GASES W/O2 SATURATION: CPT | Performed by: INTERNAL MEDICINE

## 2017-06-18 PROCEDURE — 93005 ELECTROCARDIOGRAM TRACING: CPT

## 2017-06-18 PROCEDURE — 25000125 ZZHC RX 250: Performed by: STUDENT IN AN ORGANIZED HEALTH CARE EDUCATION/TRAINING PROGRAM

## 2017-06-18 PROCEDURE — 86901 BLOOD TYPING SEROLOGIC RH(D): CPT | Performed by: INTERNAL MEDICINE

## 2017-06-18 PROCEDURE — 71010 XR CHEST PORT 1 VW: CPT

## 2017-06-18 PROCEDURE — 40000196 ZZH STATISTIC RAPCV CVP MONITORING

## 2017-06-18 PROCEDURE — 82330 ASSAY OF CALCIUM: CPT | Performed by: INTERNAL MEDICINE

## 2017-06-18 PROCEDURE — 25000125 ZZHC RX 250: Performed by: INTERNAL MEDICINE

## 2017-06-18 PROCEDURE — 00000146 ZZHCL STATISTIC GLUCOSE BY METER IP

## 2017-06-18 PROCEDURE — 86900 BLOOD TYPING SEROLOGIC ABO: CPT | Performed by: INTERNAL MEDICINE

## 2017-06-18 RX ORDER — ALUMINA, MAGNESIA, AND SIMETHICONE 2400; 2400; 240 MG/30ML; MG/30ML; MG/30ML
30 SUSPENSION ORAL EVERY 4 HOURS PRN
Status: DISCONTINUED | OUTPATIENT
Start: 2017-06-18 | End: 2017-06-23 | Stop reason: HOSPADM

## 2017-06-18 RX ORDER — ONDANSETRON 4 MG/1
4 TABLET, ORALLY DISINTEGRATING ORAL EVERY 6 HOURS PRN
Status: DISCONTINUED | OUTPATIENT
Start: 2017-06-18 | End: 2017-06-23 | Stop reason: HOSPADM

## 2017-06-18 RX ORDER — FUROSEMIDE 10 MG/ML
20 INJECTION INTRAMUSCULAR; INTRAVENOUS ONCE
Status: COMPLETED | OUTPATIENT
Start: 2017-06-18 | End: 2017-06-18

## 2017-06-18 RX ORDER — ONDANSETRON 2 MG/ML
4 INJECTION INTRAMUSCULAR; INTRAVENOUS EVERY 6 HOURS PRN
Status: DISCONTINUED | OUTPATIENT
Start: 2017-06-18 | End: 2017-06-23 | Stop reason: HOSPADM

## 2017-06-18 RX ORDER — PROCHLORPERAZINE 25 MG
25 SUPPOSITORY, RECTAL RECTAL EVERY 12 HOURS PRN
Status: DISCONTINUED | OUTPATIENT
Start: 2017-06-18 | End: 2017-06-23 | Stop reason: HOSPADM

## 2017-06-18 RX ORDER — SODIUM CHLORIDE 9 MG/ML
INJECTION, SOLUTION INTRAVENOUS
Status: DISCONTINUED
Start: 2017-06-18 | End: 2017-06-18 | Stop reason: HOSPADM

## 2017-06-18 RX ORDER — OXYCODONE HYDROCHLORIDE 5 MG/1
5-10 TABLET ORAL EVERY 4 HOURS PRN
Status: DISCONTINUED | OUTPATIENT
Start: 2017-06-18 | End: 2017-06-23 | Stop reason: HOSPADM

## 2017-06-18 RX ORDER — PANTOPRAZOLE SODIUM 40 MG/1
40 TABLET, DELAYED RELEASE ORAL DAILY
Status: DISCONTINUED | OUTPATIENT
Start: 2017-06-18 | End: 2017-06-23 | Stop reason: HOSPADM

## 2017-06-18 RX ORDER — PROCHLORPERAZINE MALEATE 5 MG
5-10 TABLET ORAL EVERY 6 HOURS PRN
Status: DISCONTINUED | OUTPATIENT
Start: 2017-06-18 | End: 2017-06-23 | Stop reason: HOSPADM

## 2017-06-18 RX ADMIN — URSODIOL 300 MG: 300 CAPSULE ORAL at 12:04

## 2017-06-18 RX ADMIN — PANTOPRAZOLE SODIUM 40 MG: 40 TABLET, DELAYED RELEASE ORAL at 10:26

## 2017-06-18 RX ADMIN — EPOPROSTENOL SODIUM 6 NG/KG/MIN: 1.5 INJECTION, POWDER, LYOPHILIZED, FOR SOLUTION INTRAVENOUS at 20:23

## 2017-06-18 RX ADMIN — EPOPROSTENOL SODIUM: 0.5 INJECTION, POWDER, LYOPHILIZED, FOR SOLUTION INTRAVENOUS at 03:15

## 2017-06-18 RX ADMIN — OXYCODONE HYDROCHLORIDE 10 MG: 5 TABLET ORAL at 08:23

## 2017-06-18 RX ADMIN — INSULIN ASPART 1 UNITS: 100 INJECTION, SOLUTION INTRAVENOUS; SUBCUTANEOUS at 12:49

## 2017-06-18 RX ADMIN — ATOVAQUONE 1500 MG: 750 SUSPENSION ORAL at 22:08

## 2017-06-18 RX ADMIN — VITAMIN D, TAB 1000IU (100/BT) 2000 UNITS: 25 TAB at 08:49

## 2017-06-18 RX ADMIN — EPOPROSTENOL SODIUM 7 NG/KG/MIN: 1.5 INJECTION, POWDER, LYOPHILIZED, FOR SOLUTION INTRAVENOUS at 06:52

## 2017-06-18 RX ADMIN — DIGOXIN 125 MCG: 125 TABLET ORAL at 08:49

## 2017-06-18 RX ADMIN — PROCHLORPERAZINE EDISYLATE 10 MG: 5 INJECTION INTRAMUSCULAR; INTRAVENOUS at 22:22

## 2017-06-18 RX ADMIN — EPOPROSTENOL SODIUM 6 NG/KG/MIN: 1.5 INJECTION, POWDER, LYOPHILIZED, FOR SOLUTION INTRAVENOUS at 17:27

## 2017-06-18 RX ADMIN — FUROSEMIDE 20 MG: 10 INJECTION, SOLUTION INTRAVENOUS at 16:25

## 2017-06-18 RX ADMIN — HEPARIN SODIUM 5000 UNITS: 5000 INJECTION, SOLUTION INTRAVENOUS; SUBCUTANEOUS at 08:52

## 2017-06-18 RX ADMIN — ROSUVASTATIN CALCIUM 5 MG: 5 TABLET ORAL at 08:49

## 2017-06-18 RX ADMIN — ONDANSETRON 4 MG: 4 TABLET, ORALLY DISINTEGRATING ORAL at 22:04

## 2017-06-18 RX ADMIN — Medication 1500 MG: at 12:04

## 2017-06-18 RX ADMIN — FLUTICASONE PROPIONATE 2 PUFF: 220 AEROSOL, METERED RESPIRATORY (INHALATION) at 22:07

## 2017-06-18 RX ADMIN — ALUMINUM HYDROXIDE, MAGNESIUM HYDROXIDE, AND DIMETHICONE 30 ML: 400; 400; 40 SUSPENSION ORAL at 09:22

## 2017-06-18 RX ADMIN — ACETAMINOPHEN 975 MG: 325 TABLET, FILM COATED ORAL at 01:55

## 2017-06-18 RX ADMIN — ONDANSETRON 4 MG: 4 TABLET, ORALLY DISINTEGRATING ORAL at 14:42

## 2017-06-18 RX ADMIN — PREDNISONE 20 MG: 20 TABLET ORAL at 17:10

## 2017-06-18 RX ADMIN — OXYCODONE HYDROCHLORIDE 5 MG: 5 TABLET ORAL at 16:49

## 2017-06-18 RX ADMIN — Medication 1500 MG: at 22:06

## 2017-06-18 RX ADMIN — ACETAMINOPHEN 975 MG: 325 TABLET, FILM COATED ORAL at 14:42

## 2017-06-18 RX ADMIN — TADALAFIL 40 MG: 20 TABLET, FILM COATED ORAL at 22:06

## 2017-06-18 RX ADMIN — SENNOSIDES AND DOCUSATE SODIUM 1 TABLET: 8.6; 5 TABLET ORAL at 08:49

## 2017-06-18 RX ADMIN — VALACYCLOVIR HYDROCHLORIDE 500 MG: 500 TABLET, FILM COATED ORAL at 08:49

## 2017-06-18 RX ADMIN — EPOPROSTENOL SODIUM: 0.5 INJECTION, POWDER, LYOPHILIZED, FOR SOLUTION INTRAVENOUS at 20:24

## 2017-06-18 RX ADMIN — FLUCONAZOLE 200 MG: 200 TABLET ORAL at 12:04

## 2017-06-18 RX ADMIN — GRANISETRON HYDROCHLORIDE 1 MG: 1 INJECTION INTRAVENOUS at 09:04

## 2017-06-18 RX ADMIN — EPOPROSTENOL SODIUM 6 NG/KG/MIN: 1.5 INJECTION, POWDER, LYOPHILIZED, FOR SOLUTION INTRAVENOUS at 12:01

## 2017-06-18 RX ADMIN — FLUTICASONE PROPIONATE 2 PUFF: 220 AEROSOL, METERED RESPIRATORY (INHALATION) at 08:52

## 2017-06-18 RX ADMIN — OXYCODONE HYDROCHLORIDE 5 MG: 5 TABLET ORAL at 03:14

## 2017-06-18 RX ADMIN — EPOPROSTENOL SODIUM 7 NG/KG/MIN: 1.5 INJECTION, POWDER, LYOPHILIZED, FOR SOLUTION INTRAVENOUS at 01:55

## 2017-06-18 RX ADMIN — URSODIOL 300 MG: 300 CAPSULE ORAL at 22:06

## 2017-06-18 RX ADMIN — PREDNISONE 20 MG: 20 TABLET ORAL at 08:49

## 2017-06-18 RX ADMIN — Medication 2 G: at 17:44

## 2017-06-18 RX ADMIN — OXYCODONE HYDROCHLORIDE 5 MG: 5 TABLET ORAL at 12:42

## 2017-06-18 RX ADMIN — OXYCODONE HYDROCHLORIDE 5 MG: 5 TABLET ORAL at 20:23

## 2017-06-18 RX ADMIN — URSODIOL 300 MG: 300 CAPSULE ORAL at 08:49

## 2017-06-18 RX ADMIN — ACETAMINOPHEN 975 MG: 325 TABLET, FILM COATED ORAL at 08:23

## 2017-06-18 RX ADMIN — OXYCODONE HYDROCHLORIDE 5 MG: 5 TABLET ORAL at 14:42

## 2017-06-18 RX ADMIN — OXYCODONE HYDROCHLORIDE 5 MG: 5 TABLET ORAL at 04:22

## 2017-06-18 RX ADMIN — EPOPROSTENOL SODIUM 6 NG/KG/MIN: 1.5 INJECTION, POWDER, LYOPHILIZED, FOR SOLUTION INTRAVENOUS at 20:22

## 2017-06-18 RX ADMIN — EPOPROSTENOL SODIUM: 0.5 INJECTION, POWDER, LYOPHILIZED, FOR SOLUTION INTRAVENOUS at 12:03

## 2017-06-18 RX ADMIN — HEPARIN SODIUM 5000 UNITS: 5000 INJECTION, SOLUTION INTRAVENOUS; SUBCUTANEOUS at 20:23

## 2017-06-18 RX ADMIN — CLONAZEPAM 1 MG: 1 TABLET ORAL at 22:06

## 2017-06-18 RX ADMIN — SENNOSIDES AND DOCUSATE SODIUM 2 TABLET: 8.6; 5 TABLET ORAL at 20:23

## 2017-06-18 RX ADMIN — GRANISETRON HYDROCHLORIDE 1 MG: 1 INJECTION INTRAVENOUS at 20:33

## 2017-06-18 RX ADMIN — INSULIN ASPART 1 UNITS: 100 INJECTION, SOLUTION INTRAVENOUS; SUBCUTANEOUS at 17:08

## 2017-06-18 ASSESSMENT — PAIN DESCRIPTION - DESCRIPTORS
DESCRIPTORS: PRESSURE
DESCRIPTORS: ACHING
DESCRIPTORS: PRESSURE
DESCRIPTORS: ACHING

## 2017-06-18 NOTE — PLAN OF CARE
Problem: Goal Outcome Summary  Goal: Goal Outcome Summary  1) pt will be hemodynamically stable  2) pt will tolerate up titration of IV flolan   D/I/A: Pt admitted on 6/14 with SOB and CORREA.  Increased HA and new chest discomfort this morning; decreased Flolan to 6 ng/kg/min and gave oxycodone and tylenol as available. /50; CVP 12-15; CO 4.8; CI 1.9; MPA 66;  when calculated with MD-obtained PCWP 10.  [place] numbers inappropriate this evening with increased ectopy; MD notified with CXR obtained; MD to determine placement of [place]; has been noted at 57 cm all day. 2g Mg infusing for Mg 1.9. Emesis x1; pt reported HA improved after. Poor appetite; encouraged intake of supplement shake. Good UOP after IV lasix x1 this afternoon.   P: Plan to change ICU rooms to encourage daylight and maintenance of appropriate sleep/wake cycle. Continue to monitor. Notify MD of changes/concerns.

## 2017-06-18 NOTE — PROGRESS NOTES
"D: 53 yo patient receiving IV flolan for PPH.    I/A: Entered patient room to assist bedside nurse in timely administration of AM medications.  Patient flushed, diaphoretic, c/o generalized \"throbbing headache\" and states that En just decreased flolan and he just took pain medications that have not helped with HA.  States \"I feel miserable\".  Able to tolerate AM po meds after kytril dose given and small amount saltine crackers.  After laying back HOB, c/o reflux and requested protonix and/or maalox.  MD team notified who ordered protonix and mylanta.  Pt also provided cold ice pack/compress to use over eyes in attempt to relieve HA (offered and pt declined, but requested per wife) fan also ordered to possibly help with \"flushed\" feeling.    P: Mylanta dose given, already reports relief, lights remain off in room, cold pack brought o bedside, will watch for fan to arrive from SPD- wife updated on interventions and encouraged to use call light for ongoing questions/concerns.  "

## 2017-06-18 NOTE — PROGRESS NOTES
D: Af-VSS. A/O. C/o headache associated w/ increase of flolan iv dose, tylenol/oxycodone given. MD notified, increase in oxycodone prn order.   I: As above. Calm environment for rest provided.  A: Guarded.  P: Continue to monitor. Notify MD of changes/concerns.

## 2017-06-18 NOTE — PROGRESS NOTES
Cardiology Progress Note    Events and interval changes in past 24 hours: On Flolan 7 this Am but having a lot of headache and so had to back it up to 6. Has had some chest tightness but thinks this may be more from acid reflux he thinks.     Hold BB  Continue flolan and tadalafil  Discuss about possible pericardial window as long term plan.   PPI initiation for chest tightness  Discuss about jakafi with insurance  Family discussion    OBJECTIVE FINDINGS:  Temp: 97.9  F (36.6  C) Temp  Min: 97.1  F (36.2  C)  Max: 98  F (36.7  C)  Resp: 20 Resp  Min: 10  Max: 20  SpO2: 94 % SpO2  Min: 76 %  Max: 96 %    No Data Recorded  Heart Rate: 82 Heart Rate  Min: 79  Max: 99  BP: 103/70 Systolic (24hrs), Av , Min:93 , Max:121   Diastolic (24hrs), Av, Min:60, Max:96    cvp 14 pa 100/45/63 PA sat 62 % CI 1.8 CO 4.6 SVR 1000 bsa 2.5    Gen: Patient is AOx3, in NAD. Appears comfortable.    HEENT: PER, EOMI, MMM  Resp: slight crackles at bases b/l otherwise clear  CV: RRR, normal S1/S2, JVP 8-9cm, mid peripheral edema  Abd: NT, ND,    0.2/2 today na    Intake/Output Summary (Last 24 hours) at 06/15/17 0546  Last data filed at 17 2200   Gross per 24 hour   Intake              840 ml   Output              550 ml   Net              290 ml     Vitals:    17 1530 17 1949 06/15/17 0550   Weight: 124.7 kg (275 lb) 125.1 kg (275 lb 14.4 oz) 121.7 kg (268 lb 4.8 oz)       - MEDICATION INSTRUCTIONS -       - MEDICATION INSTRUCTIONS -       - MEDICATION INSTRUCTIONS -       epoprostenol (FLOLAN) intravenous infusion 7 ng/kg/min (17 0700)        NaCl         granisetron  1 mg Oral BID     granisetron  1 mg Intravenous Q12H     insulin aspart  1-7 Units Subcutaneous TID AC     insulin aspart  1-5 Units Subcutaneous At Bedtime     epoprostenol (FLOLAN) syringe change   Intravenous Q8H     valACYclovir  500 mg Oral Every Other Day     predniSONE  20 mg Oral BID w/meals     sodium chloride (PF)  3 mL Intracatheter  Q8H     atovaquone  1,500 mg Oral Daily     calcium carbonate  1,500 mg Oral BID     clonazePAM (klonoPIN) tablet 1 mg  1 mg Oral At Bedtime     digoxin  125 mcg Oral Daily     fluconazole  200 mg Oral Daily     fluticasone  2 puff Inhalation BID     rosuvastatin (CRESTOR) tablet 5 mg  5 mg Oral Daily     tadalafil  40 mg Oral Daily     ursodiol (ACTIGALL) capsule 300 mg  300 mg Oral TID     cholecalciferol  2,000 Units Oral TID     sodium chloride (PF)  3 mL Intracatheter Q8H     heparin  5,000 Units Subcutaneous Q12H     senna-docusate  1-2 tablet Oral BID   oxyCODONE, HYDROmorphone, HOLD MEDICATION, glucose **OR** dextrose **OR** glucagon, - MEDICATION INSTRUCTIONS -, - MEDICATION INSTRUCTIONS -, - MEDICATION INSTRUCTIONS -, acetaminophen, lidocaine, lidocaine 4%, sodium chloride (PF), naloxone, lidocaine, lidocaine 4%, sodium chloride (PF)    CMP    Recent Labs  Lab 06/18/17  0413 06/17/17  1548 06/17/17  0400 06/16/17  1154 06/14/17  1750 06/12/17     --  138 138 140 135   POTASSIUM 4.4 4.3 4.7 4.7 4.3 4.7   CHLORIDE 105  --  107 108 108 104   CO2 25  --  25 23 26 21*   ANIONGAP 6  --  6 7 6 15   *  --  184* 187* 115* 160*   BUN 38*  --  41* 41* 31* 30*   CR 1.74*  --  2.05* 2.10* 2.07* 1.82*   GFRESTIMATED 41*  --  34* 33* 34* 39   GFRESTBLACK 50*  --  41* 40* 41*  --    PANFILO 9.2  --  9.1 9.4 8.8 9.5   MAG 1.9 1.9 2.0  --  1.8  --    PHOS 3.8  --  4.4  --  3.4  --    PROTTOTAL  --   --   --   --  5.7* 5.8*   ALBUMIN  --   --   --   --  3.2* 3.8   BILITOTAL  --   --   --   --  0.8 0.8   ALKPHOS  --   --   --   --  68 71   AST  --   --   --   --  40 53*   ALT  --   --   --   --  87* 88*     CBC    Recent Labs  Lab 06/18/17  0413 06/17/17  0400 06/14/17  2155 06/14/17  1750 06/12/17   WBC 13.0* 13.7*  --  10.8 11.4*   RBC 4.40 4.54  --  4.41 4.77   HGB 12.6* 13.1*  --  12.7* 13.2*   HCT 39.3* 40.5  --  39.1* 42.1   MCV 89 89  --  89 88.2   MCH 28.6 28.9  --  28.8 27.6   MCHC 32.1 32.3  --  32.5 31.3*    RDW 22.4* 22.6*  --  23.0* 19.7*    221 225 205 256     INR    Recent Labs  Lab 06/14/17  1750   INR 1.01     Arterial Blood Gas    Recent Labs  Lab 06/18/17  0413 06/17/17  2237 06/17/17  1548 06/17/17  0857   O2PER 5L 5L 5LPM 21       RHC  1. HR 63 bpm  2. /80/96 mmHg  3. RA 9   4. /8  5. /40/62   6. PCW 12   7. PA sat 69.2%   8. PCW sat 94.9%  9. Hgb 14.4 g/dL   10. Carter CO 6.1   11. Carter CI 2.4   12. TD CO 5.8   13. TD CI 2.3   14. PVR 8.1  15. TPR 10.1  16. SVR  1140    Imaging and other studies:  EKG: NSR, biphasic t v2-v3  Echocardiogram: 6/15  Severe pulmonary hypertension is present.  Left ventricular function, chamber size, wall motion, and wall thickness are  normal.The EF is 55-60%.  Severe right ventricular dilation is present.Global right ventricular function  is mildly to moderately reduced.  Dilation of the inferior vena cava is present with abnormal respiratory  variation in diameter. Estimated right atrial pressure is > 15 mmHg.  Moderate pericardial effusion. Chamber compression is not present; there is no  evidence for tamponade.  Effusion is larger than the last study, but has been present on recent  studies. No other change.    Chest x-ray: Impression:   1. Near complete resolution of previously seen perihilar and basilar  opacities.  2. Trace right pleural effusion. No significant left pleural effusion.  3. Stable enlargement of the cardiac silhouette.    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 6/14 TTE with moderate sized pericardial effusion.      # Syncope 6/15 in hospital-suspect low CO from RV dysfunction from severe PAH, query hemodynamic effect of pericardial effusion-less likely    #Dyspnea on exertion at home,reason for admission- likely from underlying PAH, moderate pleural effusion less likely   # Moderate pericardial effusion-etiology not clear, previously thought to be from serositis from GVHD vs sirolimus, ruled out pulsus paradoxsus SBP difference 5mmHg on 6/15    # severe primary pulmonary hypertension.   # RV mild-moderate dysfunction  # moderate TR    # GVHD  # Steroid induced diabetes    PLAN:   IV flolan, on tadalafil 40 mg per day.   -Dr. Quan spoke with BMT attending. Will consider mana stat inhibitor for treatment of GVHD since PAH maybe from GVHD  -hold beta blocker low CI and output on RHC  -continue increased dose of prednisone at 20mg BID, atovaquone, fluconazole, valacylovir, ursodiol  -Insulin sliding scale. Tomorrow switch to home regimen    Will staff with Dr. Avelina Burton MD  Cardiology     I personally provided care for this patient, reviewed chart, discussed course with patient, housestaff and consulting physicians.  I answered all questions.    Dk Quan M.D.  Division of Cardiology  Department of Medicine

## 2017-06-19 ENCOUNTER — TELEPHONE (OUTPATIENT)
Dept: CARDIOLOGY | Facility: CLINIC | Age: 52
End: 2017-06-19

## 2017-06-19 ENCOUNTER — APPOINTMENT (OUTPATIENT)
Dept: GENERAL RADIOLOGY | Facility: CLINIC | Age: 52
DRG: 286 | End: 2017-06-19
Payer: COMMERCIAL

## 2017-06-19 LAB
ANION GAP SERPL CALCULATED.3IONS-SCNC: 9 MMOL/L (ref 3–14)
BUN SERPL-MCNC: 35 MG/DL (ref 7–30)
CALCIUM SERPL-MCNC: 9.2 MG/DL (ref 8.5–10.1)
CHLORIDE SERPL-SCNC: 103 MMOL/L (ref 94–109)
CO2 SERPL-SCNC: 22 MMOL/L (ref 20–32)
CREAT SERPL-MCNC: 1.56 MG/DL (ref 0.66–1.25)
ERYTHROCYTE [DISTWIDTH] IN BLOOD BY AUTOMATED COUNT: 22.4 % (ref 10–15)
GFR SERPL CREATININE-BSD FRML MDRD: 47 ML/MIN/1.7M2
GLUCOSE BLDC GLUCOMTR-MCNC: 133 MG/DL (ref 70–99)
GLUCOSE BLDC GLUCOMTR-MCNC: 145 MG/DL (ref 70–99)
GLUCOSE BLDC GLUCOMTR-MCNC: 165 MG/DL (ref 70–99)
GLUCOSE BLDC GLUCOMTR-MCNC: 168 MG/DL (ref 70–99)
GLUCOSE SERPL-MCNC: 168 MG/DL (ref 70–99)
HCT VFR BLD AUTO: 40.5 % (ref 40–53)
HGB BLD-MCNC: 13.1 G/DL (ref 13.3–17.7)
INTERPRETATION ECG - MUSE: NORMAL
MAGNESIUM SERPL-MCNC: 2.6 MG/DL (ref 1.6–2.3)
MCH RBC QN AUTO: 28.6 PG (ref 26.5–33)
MCHC RBC AUTO-ENTMCNC: 32.3 G/DL (ref 31.5–36.5)
MCV RBC AUTO: 88 FL (ref 78–100)
PLATELET # BLD AUTO: 190 10E9/L (ref 150–450)
POTASSIUM SERPL-SCNC: 4.5 MMOL/L (ref 3.4–5.3)
RBC # BLD AUTO: 4.58 10E12/L (ref 4.4–5.9)
SODIUM SERPL-SCNC: 134 MMOL/L (ref 133–144)
WBC # BLD AUTO: 12 10E9/L (ref 4–11)

## 2017-06-19 PROCEDURE — 25000128 H RX IP 250 OP 636: Performed by: STUDENT IN AN ORGANIZED HEALTH CARE EDUCATION/TRAINING PROGRAM

## 2017-06-19 PROCEDURE — 25000132 ZZH RX MED GY IP 250 OP 250 PS 637: Performed by: STUDENT IN AN ORGANIZED HEALTH CARE EDUCATION/TRAINING PROGRAM

## 2017-06-19 PROCEDURE — 36415 COLL VENOUS BLD VENIPUNCTURE: CPT | Performed by: INTERNAL MEDICINE

## 2017-06-19 PROCEDURE — 25000128 H RX IP 250 OP 636: Performed by: INTERNAL MEDICINE

## 2017-06-19 PROCEDURE — 27210995 ZZH RX 272: Performed by: STUDENT IN AN ORGANIZED HEALTH CARE EDUCATION/TRAINING PROGRAM

## 2017-06-19 PROCEDURE — 83735 ASSAY OF MAGNESIUM: CPT | Performed by: INTERNAL MEDICINE

## 2017-06-19 PROCEDURE — 85027 COMPLETE CBC AUTOMATED: CPT | Performed by: INTERNAL MEDICINE

## 2017-06-19 PROCEDURE — 25000132 ZZH RX MED GY IP 250 OP 250 PS 637: Performed by: INTERNAL MEDICINE

## 2017-06-19 PROCEDURE — 80048 BASIC METABOLIC PNL TOTAL CA: CPT | Performed by: INTERNAL MEDICINE

## 2017-06-19 PROCEDURE — 20000004 ZZH R&B ICU UMMC

## 2017-06-19 PROCEDURE — 25000125 ZZHC RX 250: Performed by: INTERNAL MEDICINE

## 2017-06-19 PROCEDURE — 40000196 ZZH STATISTIC RAPCV CVP MONITORING

## 2017-06-19 PROCEDURE — 71010 XR CHEST PORT 1 VW: CPT

## 2017-06-19 PROCEDURE — 99232 SBSQ HOSP IP/OBS MODERATE 35: CPT | Mod: GC | Performed by: INTERNAL MEDICINE

## 2017-06-19 PROCEDURE — 40000048 ZZH STATISTIC DAILY SWAN MONITORING

## 2017-06-19 PROCEDURE — 00000146 ZZHCL STATISTIC GLUCOSE BY METER IP

## 2017-06-19 RX ORDER — FUROSEMIDE 10 MG/ML
60 INJECTION INTRAMUSCULAR; INTRAVENOUS ONCE
Status: COMPLETED | OUTPATIENT
Start: 2017-06-19 | End: 2017-06-19

## 2017-06-19 RX ADMIN — GRANISETRON HYDROCHLORIDE 1 MG: 1 INJECTION INTRAVENOUS at 19:58

## 2017-06-19 RX ADMIN — INSULIN ASPART 1 UNITS: 100 INJECTION, SOLUTION INTRAVENOUS; SUBCUTANEOUS at 12:12

## 2017-06-19 RX ADMIN — FLUTICASONE PROPIONATE 2 PUFF: 220 AEROSOL, METERED RESPIRATORY (INHALATION) at 09:37

## 2017-06-19 RX ADMIN — PREDNISONE 20 MG: 20 TABLET ORAL at 09:33

## 2017-06-19 RX ADMIN — ONDANSETRON 4 MG: 2 INJECTION INTRAMUSCULAR; INTRAVENOUS at 04:41

## 2017-06-19 RX ADMIN — PANTOPRAZOLE SODIUM 40 MG: 40 TABLET, DELAYED RELEASE ORAL at 09:32

## 2017-06-19 RX ADMIN — URSODIOL 300 MG: 300 CAPSULE ORAL at 09:33

## 2017-06-19 RX ADMIN — INSULIN ASPART 1 UNITS: 100 INJECTION, SOLUTION INTRAVENOUS; SUBCUTANEOUS at 16:47

## 2017-06-19 RX ADMIN — URSODIOL 300 MG: 300 CAPSULE ORAL at 11:54

## 2017-06-19 RX ADMIN — FLUTICASONE PROPIONATE 2 PUFF: 220 AEROSOL, METERED RESPIRATORY (INHALATION) at 22:32

## 2017-06-19 RX ADMIN — VITAMIN D, TAB 1000IU (100/BT) 2000 UNITS: 25 TAB at 09:34

## 2017-06-19 RX ADMIN — ROSUVASTATIN CALCIUM 5 MG: 5 TABLET ORAL at 09:36

## 2017-06-19 RX ADMIN — CLONAZEPAM 1 MG: 1 TABLET ORAL at 22:32

## 2017-06-19 RX ADMIN — HEPARIN SODIUM 5000 UNITS: 5000 INJECTION, SOLUTION INTRAVENOUS; SUBCUTANEOUS at 09:34

## 2017-06-19 RX ADMIN — SENNOSIDES AND DOCUSATE SODIUM 2 TABLET: 8.6; 5 TABLET ORAL at 09:33

## 2017-06-19 RX ADMIN — EPOPROSTENOL SODIUM: 0.5 INJECTION, POWDER, LYOPHILIZED, FOR SOLUTION INTRAVENOUS at 19:55

## 2017-06-19 RX ADMIN — Medication 1500 MG: at 11:54

## 2017-06-19 RX ADMIN — ATOVAQUONE 1500 MG: 750 SUSPENSION ORAL at 22:32

## 2017-06-19 RX ADMIN — EPOPROSTENOL SODIUM 6.5 NG/KG/MIN: 1.5 INJECTION, POWDER, LYOPHILIZED, FOR SOLUTION INTRAVENOUS at 19:54

## 2017-06-19 RX ADMIN — TADALAFIL 40 MG: 20 TABLET, FILM COATED ORAL at 22:32

## 2017-06-19 RX ADMIN — FLUCONAZOLE 200 MG: 200 TABLET ORAL at 11:54

## 2017-06-19 RX ADMIN — EPOPROSTENOL SODIUM: 0.5 INJECTION, POWDER, LYOPHILIZED, FOR SOLUTION INTRAVENOUS at 04:37

## 2017-06-19 RX ADMIN — OXYCODONE HYDROCHLORIDE 5 MG: 5 TABLET ORAL at 11:54

## 2017-06-19 RX ADMIN — GRANISETRON HYDROCHLORIDE 1 MG: 1 INJECTION INTRAVENOUS at 09:38

## 2017-06-19 RX ADMIN — EPOPROSTENOL SODIUM 6 NG/KG/MIN: 1.5 INJECTION, POWDER, LYOPHILIZED, FOR SOLUTION INTRAVENOUS at 01:33

## 2017-06-19 RX ADMIN — FUROSEMIDE 60 MG: 10 INJECTION, SOLUTION INTRAVENOUS at 16:47

## 2017-06-19 RX ADMIN — PREDNISONE 20 MG: 20 TABLET ORAL at 17:36

## 2017-06-19 RX ADMIN — OXYCODONE HYDROCHLORIDE 5 MG: 5 TABLET ORAL at 06:13

## 2017-06-19 RX ADMIN — Medication 1500 MG: at 22:32

## 2017-06-19 RX ADMIN — EPOPROSTENOL SODIUM: 0.5 INJECTION, POWDER, LYOPHILIZED, FOR SOLUTION INTRAVENOUS at 11:54

## 2017-06-19 RX ADMIN — OXYCODONE HYDROCHLORIDE 5 MG: 5 TABLET ORAL at 01:29

## 2017-06-19 RX ADMIN — EPOPROSTENOL SODIUM 6 NG/KG/MIN: 1.5 INJECTION, POWDER, LYOPHILIZED, FOR SOLUTION INTRAVENOUS at 07:21

## 2017-06-19 RX ADMIN — OXYCODONE HYDROCHLORIDE 5 MG: 5 TABLET ORAL at 09:32

## 2017-06-19 RX ADMIN — URSODIOL 300 MG: 300 CAPSULE ORAL at 22:32

## 2017-06-19 RX ADMIN — EPOPROSTENOL SODIUM 6.5 NG/KG/MIN: 1.5 INJECTION, POWDER, LYOPHILIZED, FOR SOLUTION INTRAVENOUS at 16:57

## 2017-06-19 RX ADMIN — ACETAMINOPHEN 975 MG: 325 TABLET, FILM COATED ORAL at 09:32

## 2017-06-19 RX ADMIN — PROCHLORPERAZINE EDISYLATE 10 MG: 5 INJECTION INTRAMUSCULAR; INTRAVENOUS at 09:34

## 2017-06-19 RX ADMIN — OXYCODONE HYDROCHLORIDE 5 MG: 5 TABLET ORAL at 04:36

## 2017-06-19 RX ADMIN — SENNOSIDES AND DOCUSATE SODIUM 2 TABLET: 8.6; 5 TABLET ORAL at 19:56

## 2017-06-19 RX ADMIN — ACETAMINOPHEN 975 MG: 325 TABLET, FILM COATED ORAL at 01:28

## 2017-06-19 RX ADMIN — DIGOXIN 125 MCG: 125 TABLET ORAL at 09:34

## 2017-06-19 ASSESSMENT — PAIN DESCRIPTION - DESCRIPTORS
DESCRIPTORS: ACHING
DESCRIPTORS: ACHING

## 2017-06-19 NOTE — PLAN OF CARE
Problem: Goal Outcome Summary  Goal: Goal Outcome Summary  1) pt will be hemodynamically stable  2) pt will tolerate up titration of IV flolan   Outcome: No Change  A&O. Up to chair and walked in hallway. Floland increased to 6.5. Will advance this evening again per MD. On 2L NC and 4L oxymask through out day. SR 80-90s, increased to 120s with walking. BP stable. [place] is to be pulled tomorrow after chandra is placed. Not much of an appetite. Drinking water through out the day. Compazine given once in the AM, and oxycodone 5mg given 2X. Voding without difficultly. No BM.      Plan: remove swan when chandra is placed.

## 2017-06-19 NOTE — TELEPHONE ENCOUNTER
PA Initiation    Medication: Flolan  Insurance Company:    Pharmacy Filling the Rx: [facility] - [place] - [address] [place] [address]  Filling Pharmacy Phone: [phone]  Filling Pharmacy Fax: [phone]  Start Date: 6/19/2017    Enrollment form and supporting documentation for initiation of IV Flolan have been faxed urgently to [facility].

## 2017-06-19 NOTE — PROGRESS NOTES
Cardiology Progress Note    Events and interval changes in past 24 hours: On Flolan 6 this Am, feeling better. Headaches better.     Hold BB  Continue flolan and tadalafil, titrate flolan to 6.5 and see if he has symptoms.   No plans for pericardial window as this may not be beneficial and will be high risk considering immunosuppression.  Discuss with IR regarding Queen placement.   Discuss about jakafi with insurance, awaiting call from Dr. Quan.   Fluid gain secondary to steroids, will diurese today with 60 mg IV lasix. Net 2.3 L negative yesterday.     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 6/14 TTE with moderate sized pericardial effusion.      # Syncope 6/15 in hospital-suspect low CO from RV dysfunction from severe PAH, query hemodynamic effect of pericardial effusion-less likely  # Dyspnea on exertion at home,reason for admission- likely from underlying PAH, moderate pleural effusion less likely   # Moderate pericardial effusion-etiology not clear, previously thought to be from serositis from GVHD vs sirolimus, ruled out pulsus paradoxsus SBP difference 5mmHg on 6/15  # Severe primary pulmonary hypertension.   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 The patient has had vwbnd-khgrtl-vuak disease with 2 flares after the original presentation that was with transaminitis. That was documented with liver biopsy. His other manifestations of chronic gcvzq-nfxgmb-ullv disease include sclerotic skin changes, mucosal changes in his mouth, xerostomia and polyserositis as reflected by the mild to moderate pericardial effusion that has been documented.   # Steroid induced diabetes    PLAN:    - IV flolan, on tadalafil 40 mg per day. Will titrate flolan up to 6.5 today.   - Continue prednisone at 20 mg PO BID now.   - Continue atoraquone, fluconazole and valganciclovir.  - Dr. Quan spoke with BMT attending. Will consider mana stat inhibitor for treatment of GVHD since PAH maybe from GVHD, awaiting call from Dr. Quan.  - Hold beta blocker low CI and output on RHC  - Continue increased dose of prednisone at 20mg BID, atovaquone, fluconazole, valacylovir, ursodiol  - Continue to diurese with lasix 60 mg IV today. Aim for net negative at least 1.5 L.   - Insulin sliding scale.     Will staff with Dr. Clarke.    Ti Burton MD  Cardiology     Attending Attestation:  Patient seen and examined by me with the team. I have performed all pertinent elements of the physical examination and reviewed the note above. I have reviewed pertinent laboratory, echocardiographic, imaging, and cardiac catheterization results. I agree with the plan of care as described in this note.    Royce Clarke MD, PhD    OBJECTIVE FINDINGS:  Temp: 98.1  F (36.7  C) Temp  Min: 97.7  F (36.5  C)  Max: 98.9  F (37.2  C)  Resp: 20 Resp  Min: 18  Max: 20  SpO2: 93 % SpO2  Min: 90 %  Max: 96 %    No Data Recorded  Heart Rate: 88 Heart Rate  Min: 82  Max: 95  BP: 117/73 Systolic (24hrs), Av , Min:96 , Max:117   Diastolic (24hrs), Av, Min:66, Max:88    [place] in the RA, not able to get other numbers.     Gen: Patient is AOx3, in NAD. Appears comfortable.    HEENT: PER,  EOMI, MMM  Resp: slight crackles at bases b/l otherwise clear  CV: RRR, normal S1/S2, JVP 8 cm, mid peripheral edema  Abd: NT, ND,    0.2/2 today na    Intake/Output Summary (Last 24 hours) at 06/15/17 0546  Last data filed at 06/14/17 2200   Gross per 24 hour   Intake              840 ml   Output              550 ml   Net              290 ml     Vitals:    06/14/17 1530 06/14/17 1949 06/15/17 0550   Weight: 124.7 kg (275 lb) 125.1 kg (275 lb 14.4 oz) 121.7 kg (268 lb 4.8 oz)       - MEDICATION INSTRUCTIONS -       - MEDICATION INSTRUCTIONS -       - MEDICATION INSTRUCTIONS -       epoprostenol (FLOLAN) intravenous infusion 5.998 ng/kg/min (06/19/17 0500)        pantoprazole (PROTONIX) EC tablet 40 mg  40 mg Oral Daily     cholecalciferol  2,000 Units Oral Daily     granisetron  1 mg Intravenous Q12H     insulin aspart  1-7 Units Subcutaneous TID AC     insulin aspart  1-5 Units Subcutaneous At Bedtime     epoprostenol (FLOLAN) syringe change   Intravenous Q8H     valACYclovir  500 mg Oral Every Other Day     predniSONE  20 mg Oral BID w/meals     sodium chloride (PF)  3 mL Intracatheter Q8H     atovaquone  1,500 mg Oral Daily     calcium carbonate  1,500 mg Oral BID     clonazePAM (klonoPIN) tablet 1 mg  1 mg Oral At Bedtime     digoxin  125 mcg Oral Daily     fluconazole  200 mg Oral Daily     fluticasone  2 puff Inhalation BID     rosuvastatin (CRESTOR) tablet 5 mg  5 mg Oral Daily     tadalafil  40 mg Oral Daily     ursodiol (ACTIGALL) capsule 300 mg  300 mg Oral TID     sodium chloride (PF)  3 mL Intracatheter Q8H     heparin  5,000 Units Subcutaneous Q12H     senna-docusate  1-2 tablet Oral BID   oxyCODONE, alum & mag hydroxide-simethicone, ondansetron **OR** ondansetron, prochlorperazine **OR** prochlorperazine **OR** prochlorperazine, HOLD MEDICATION, glucose **OR** dextrose **OR** glucagon, - MEDICATION INSTRUCTIONS -, - MEDICATION INSTRUCTIONS -, - MEDICATION INSTRUCTIONS -, acetaminophen, lidocaine,  lidocaine 4%, sodium chloride (PF), naloxone, lidocaine, lidocaine 4%, sodium chloride (PF)    CMP    Recent Labs  Lab 06/18/17  1725 06/18/17  0413 06/17/17  1548 06/17/17  0400 06/16/17  1154 06/14/17  1750    136  --  138 138 140   POTASSIUM 4.2 4.4 4.3 4.7 4.7 4.3   CHLORIDE 102 105  --  107 108 108   CO2 27 25  --  25 23 26   ANIONGAP 7 6  --  6 7 6   * 155*  --  184* 187* 115*   BUN 36* 38*  --  41* 41* 31*   CR 1.71* 1.74*  --  2.05* 2.10* 2.07*   GFRESTIMATED 42* 41*  --  34* 33* 34*   GFRESTBLACK 51* 50*  --  41* 40* 41*   PANFILO 9.5 9.2  --  9.1 9.4 8.8   MAG  --  1.9 1.9 2.0  --  1.8   PHOS  --  3.8  --  4.4  --  3.4   PROTTOTAL  --   --   --   --   --  5.7*   ALBUMIN  --   --   --   --   --  3.2*   BILITOTAL  --   --   --   --   --  0.8   ALKPHOS  --   --   --   --   --  68   AST  --   --   --   --   --  40   ALT  --   --   --   --   --  87*     CBC    Recent Labs  Lab 06/18/17  0413 06/17/17  0400 06/14/17  2155 06/14/17  1750   WBC 13.0* 13.7*  --  10.8   RBC 4.40 4.54  --  4.41   HGB 12.6* 13.1*  --  12.7*   HCT 39.3* 40.5  --  39.1*   MCV 89 89  --  89   MCH 28.6 28.9  --  28.8   MCHC 32.1 32.3  --  32.5   RDW 22.4* 22.6*  --  23.0*    221 225 205     INR    Recent Labs  Lab 06/14/17  1750   INR 1.01     Arterial Blood Gas    Recent Labs  Lab 06/18/17  1725 06/18/17  1210 06/18/17  0413 06/17/17  2237   O2PER 3LPM 21.0 5L 5L       RHC  1. HR 63 bpm  2. /80/96 mmHg  3. RA 9   4. /8  5. /40/62   6. PCW 12   7. PA sat 69.2%   8. PCW sat 94.9%  9. Hgb 14.4 g/dL   10. Carter CO 6.1   11. Carter CI 2.4   12. TD CO 5.8   13. TD CI 2.3   14. PVR 8.1  15. TPR 10.1  16. SVR  1140    Imaging and other studies:  EKG: NSR, biphasic t v2-v3  Echocardiogram: 6/15  Severe pulmonary hypertension is present.  Left ventricular function, chamber size, wall motion, and wall thickness are  normal.The EF is 55-60%.  Severe right ventricular dilation is present.Global right ventricular  function  is mildly to moderately reduced.  Dilation of the inferior vena cava is present with abnormal respiratory  variation in diameter. Estimated right atrial pressure is > 15 mmHg.  Moderate pericardial effusion. Chamber compression is not present; there is no  evidence for tamponade.  Effusion is larger than the last study, but has been present on recent  studies. No other change.    Chest x-ray: Impression:   1. Near complete resolution of previously seen perihilar and basilar  opacities.  2. Trace right pleural effusion. No significant left pleural effusion.  3. Stable enlargement of the cardiac silhouette.    RHC 6/16 BSA 2.6  RA 20/21/16   /20  /42/67   PCW 14/14/12   Carter CO 3.9   Carter CI 1.5   TD CO 4.1   TD CI 1.6   PA sat 55.0%   Hgb 13.5 g/dL   PVR 14.1  TPR 17.2    5/25/16      ECHO this admission:

## 2017-06-19 NOTE — CONSULTS
Patient is on IR schedule 6/20/2017 for a Single lumen tunneled Queen for flolan.   Labs WNL for procedure.   Orders for NPO, scrubs and antibiotics have been entered.   Consent will be done prior to procedure.    Please contact the IR charge RN at [phone] for estimated time of procedure.      Carolyn Randolph IR RPA  [phone] Call pager  [phone] pager

## 2017-06-19 NOTE — CONSULTS
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  We prepared request documenting his history, physical findings, laboratories and other diagnostic testing.  I held phone conferences with medical director and director of pharmacy.    He was approved for usage!

## 2017-06-19 NOTE — PLAN OF CARE
Problem: Individualization  Goal: Patient Preferences  D/I/A: Patient alert and oriented x4. Afebrile. Flolan gtt continued @ 6. LS clear, 3L oximask. Sinus rhythm. Dr. Quan unable to advance swan at beginning of shift. Cobb @ 0cm. Nausea reported at 2130, oral zofran given at 2200, emesis of 400cc @ 2205. IV Compazine given 2220. Nausea relieved 2300. Tylenol and oxycodone given for headaches.   P: Advance swan during day shift. Will continue to monitor and notify MD of any changes.

## 2017-06-20 ENCOUNTER — APPOINTMENT (OUTPATIENT)
Dept: INTERVENTIONAL RADIOLOGY/VASCULAR | Facility: CLINIC | Age: 52
DRG: 286 | End: 2017-06-20
Attending: RADIOLOGY PRACTITIONER ASSISTANT
Payer: COMMERCIAL

## 2017-06-20 ENCOUNTER — APPOINTMENT (OUTPATIENT)
Dept: GENERAL RADIOLOGY | Facility: CLINIC | Age: 52
DRG: 286 | End: 2017-06-20
Payer: COMMERCIAL

## 2017-06-20 LAB
ANION GAP SERPL CALCULATED.3IONS-SCNC: 11 MMOL/L (ref 3–14)
ANION GAP SERPL CALCULATED.3IONS-SCNC: 9 MMOL/L (ref 3–14)
BUN SERPL-MCNC: 35 MG/DL (ref 7–30)
BUN SERPL-MCNC: 43 MG/DL (ref 7–30)
CALCIUM SERPL-MCNC: 9 MG/DL (ref 8.5–10.1)
CALCIUM SERPL-MCNC: 9.3 MG/DL (ref 8.5–10.1)
CHLORIDE SERPL-SCNC: 100 MMOL/L (ref 94–109)
CHLORIDE SERPL-SCNC: 98 MMOL/L (ref 94–109)
CO2 SERPL-SCNC: 24 MMOL/L (ref 20–32)
CO2 SERPL-SCNC: 25 MMOL/L (ref 20–32)
CREAT SERPL-MCNC: 1.69 MG/DL (ref 0.66–1.25)
CREAT SERPL-MCNC: 1.75 MG/DL (ref 0.66–1.25)
ERYTHROCYTE [DISTWIDTH] IN BLOOD BY AUTOMATED COUNT: 22.5 % (ref 10–15)
GFR SERPL CREATININE-BSD FRML MDRD: 41 ML/MIN/1.7M2
GFR SERPL CREATININE-BSD FRML MDRD: 43 ML/MIN/1.7M2
GLUCOSE BLDC GLUCOMTR-MCNC: 214 MG/DL (ref 70–99)
GLUCOSE BLDC GLUCOMTR-MCNC: 217 MG/DL (ref 70–99)
GLUCOSE SERPL-MCNC: 152 MG/DL (ref 70–99)
GLUCOSE SERPL-MCNC: 202 MG/DL (ref 70–99)
HCT VFR BLD AUTO: 41.7 % (ref 40–53)
HGB BLD-MCNC: 13.5 G/DL (ref 13.3–17.7)
MAGNESIUM SERPL-MCNC: 2.4 MG/DL (ref 1.6–2.3)
MCH RBC QN AUTO: 28.7 PG (ref 26.5–33)
MCHC RBC AUTO-ENTMCNC: 32.4 G/DL (ref 31.5–36.5)
MCV RBC AUTO: 89 FL (ref 78–100)
PLATELET # BLD AUTO: 157 10E9/L (ref 150–450)
POTASSIUM SERPL-SCNC: 4.2 MMOL/L (ref 3.4–5.3)
POTASSIUM SERPL-SCNC: 4.3 MMOL/L (ref 3.4–5.3)
RBC # BLD AUTO: 4.7 10E12/L (ref 4.4–5.9)
SODIUM SERPL-SCNC: 133 MMOL/L (ref 133–144)
SODIUM SERPL-SCNC: 135 MMOL/L (ref 133–144)
WBC # BLD AUTO: 14.9 10E9/L (ref 4–11)

## 2017-06-20 PROCEDURE — 25000132 ZZH RX MED GY IP 250 OP 250 PS 637: Performed by: INTERNAL MEDICINE

## 2017-06-20 PROCEDURE — 83735 ASSAY OF MAGNESIUM: CPT | Performed by: INTERNAL MEDICINE

## 2017-06-20 PROCEDURE — 25000125 ZZHC RX 250: Performed by: PHYSICIAN ASSISTANT

## 2017-06-20 PROCEDURE — 36415 COLL VENOUS BLD VENIPUNCTURE: CPT | Performed by: INTERNAL MEDICINE

## 2017-06-20 PROCEDURE — 99152 MOD SED SAME PHYS/QHP 5/>YRS: CPT

## 2017-06-20 PROCEDURE — 25000128 H RX IP 250 OP 636: Performed by: STUDENT IN AN ORGANIZED HEALTH CARE EDUCATION/TRAINING PROGRAM

## 2017-06-20 PROCEDURE — 25000132 ZZH RX MED GY IP 250 OP 250 PS 637: Performed by: STUDENT IN AN ORGANIZED HEALTH CARE EDUCATION/TRAINING PROGRAM

## 2017-06-20 PROCEDURE — 25000128 H RX IP 250 OP 636: Performed by: PHYSICIAN ASSISTANT

## 2017-06-20 PROCEDURE — 02H633Z INSERTION OF INFUSION DEVICE INTO RIGHT ATRIUM, PERCUTANEOUS APPROACH: ICD-10-PCS | Performed by: PHYSICIAN ASSISTANT

## 2017-06-20 PROCEDURE — 27210904 ZZH KIT CR6

## 2017-06-20 PROCEDURE — 71010 XR CHEST PORT 1 VW: CPT

## 2017-06-20 PROCEDURE — 40000048 ZZH STATISTIC DAILY SWAN MONITORING

## 2017-06-20 PROCEDURE — 36558 INSERT TUNNELED CV CATH: CPT

## 2017-06-20 PROCEDURE — 85027 COMPLETE CBC AUTOMATED: CPT | Performed by: INTERNAL MEDICINE

## 2017-06-20 PROCEDURE — 99233 SBSQ HOSP IP/OBS HIGH 50: CPT | Mod: GC | Performed by: INTERNAL MEDICINE

## 2017-06-20 PROCEDURE — 27210995 ZZH RX 272: Performed by: PHYSICIAN ASSISTANT

## 2017-06-20 PROCEDURE — 40000275 ZZH STATISTIC RCP TIME EA 10 MIN

## 2017-06-20 PROCEDURE — 12000008 ZZH R&B INTERMEDIATE UMMC

## 2017-06-20 PROCEDURE — 80048 BASIC METABOLIC PNL TOTAL CA: CPT | Performed by: INTERNAL MEDICINE

## 2017-06-20 PROCEDURE — 27210908 ZZH NEEDLE CR4

## 2017-06-20 PROCEDURE — 25000125 ZZHC RX 250: Performed by: INTERNAL MEDICINE

## 2017-06-20 PROCEDURE — 27210732 ZZH ACCESSORY CR1

## 2017-06-20 PROCEDURE — 25000128 H RX IP 250 OP 636: Performed by: INTERNAL MEDICINE

## 2017-06-20 PROCEDURE — 40000196 ZZH STATISTIC RAPCV CVP MONITORING

## 2017-06-20 PROCEDURE — 27210995 ZZH RX 272: Performed by: STUDENT IN AN ORGANIZED HEALTH CARE EDUCATION/TRAINING PROGRAM

## 2017-06-20 PROCEDURE — C1751 CATH, INF, PER/CENT/MIDLINE: HCPCS

## 2017-06-20 PROCEDURE — 36415 COLL VENOUS BLD VENIPUNCTURE: CPT | Performed by: STUDENT IN AN ORGANIZED HEALTH CARE EDUCATION/TRAINING PROGRAM

## 2017-06-20 PROCEDURE — 80048 BASIC METABOLIC PNL TOTAL CA: CPT | Performed by: STUDENT IN AN ORGANIZED HEALTH CARE EDUCATION/TRAINING PROGRAM

## 2017-06-20 PROCEDURE — C1769 GUIDE WIRE: HCPCS

## 2017-06-20 PROCEDURE — 00000146 ZZHCL STATISTIC GLUCOSE BY METER IP

## 2017-06-20 PROCEDURE — 27210738 ZZH ACCESSORY CR2

## 2017-06-20 PROCEDURE — 99153 MOD SED SAME PHYS/QHP EA: CPT

## 2017-06-20 RX ORDER — CEFAZOLIN SODIUM 2 G/100ML
2 INJECTION, SOLUTION INTRAVENOUS
Status: COMPLETED | OUTPATIENT
Start: 2017-06-20 | End: 2017-06-20

## 2017-06-20 RX ORDER — NALOXONE HYDROCHLORIDE 0.4 MG/ML
.1-.4 INJECTION, SOLUTION INTRAMUSCULAR; INTRAVENOUS; SUBCUTANEOUS
Status: DISCONTINUED | OUTPATIENT
Start: 2017-06-20 | End: 2017-06-20 | Stop reason: HOSPADM

## 2017-06-20 RX ORDER — HEPARIN SODIUM,PORCINE 10 UNIT/ML
5 VIAL (ML) INTRAVENOUS
Status: COMPLETED | OUTPATIENT
Start: 2017-06-20 | End: 2017-06-20

## 2017-06-20 RX ORDER — PREDNISONE 20 MG/1
20 TABLET ORAL DAILY
Status: DISCONTINUED | OUTPATIENT
Start: 2017-06-21 | End: 2017-06-23 | Stop reason: HOSPADM

## 2017-06-20 RX ORDER — HEPARIN SODIUM,PORCINE 10 UNIT/ML
1 VIAL (ML) INTRAVENOUS EVERY 24 HOURS
Status: CANCELLED | OUTPATIENT
Start: 2017-06-20

## 2017-06-20 RX ORDER — HEPARIN SODIUM,PORCINE 10 UNIT/ML
1 VIAL (ML) INTRAVENOUS
Status: CANCELLED | OUTPATIENT
Start: 2017-06-20

## 2017-06-20 RX ORDER — CEFAZOLIN SODIUM 1 G/50ML
3 SOLUTION INTRAVENOUS
Status: DISCONTINUED | OUTPATIENT
Start: 2017-06-20 | End: 2017-06-20

## 2017-06-20 RX ORDER — FUROSEMIDE 10 MG/ML
40 INJECTION INTRAMUSCULAR; INTRAVENOUS ONCE
Status: COMPLETED | OUTPATIENT
Start: 2017-06-20 | End: 2017-06-20

## 2017-06-20 RX ORDER — FENTANYL CITRATE 50 UG/ML
25-50 INJECTION, SOLUTION INTRAMUSCULAR; INTRAVENOUS EVERY 5 MIN PRN
Status: DISCONTINUED | OUTPATIENT
Start: 2017-06-20 | End: 2017-06-20 | Stop reason: HOSPADM

## 2017-06-20 RX ORDER — FLUMAZENIL 0.1 MG/ML
0.2 INJECTION, SOLUTION INTRAVENOUS
Status: DISCONTINUED | OUTPATIENT
Start: 2017-06-20 | End: 2017-06-20 | Stop reason: HOSPADM

## 2017-06-20 RX ORDER — FUROSEMIDE 10 MG/ML
60 INJECTION INTRAMUSCULAR; INTRAVENOUS ONCE
Status: COMPLETED | OUTPATIENT
Start: 2017-06-20 | End: 2017-06-20

## 2017-06-20 RX ADMIN — FENTANYL CITRATE 25 MCG: 50 INJECTION INTRAMUSCULAR; INTRAVENOUS at 10:03

## 2017-06-20 RX ADMIN — Medication 5000 UNITS: at 10:22

## 2017-06-20 RX ADMIN — FLUTICASONE PROPIONATE 2 PUFF: 220 AEROSOL, METERED RESPIRATORY (INHALATION) at 21:15

## 2017-06-20 RX ADMIN — ACETAMINOPHEN 650 MG: 325 TABLET, FILM COATED ORAL at 08:34

## 2017-06-20 RX ADMIN — GRANISETRON HYDROCHLORIDE 1 MG: 1 INJECTION INTRAVENOUS at 21:13

## 2017-06-20 RX ADMIN — EPOPROSTENOL SODIUM 6.5 NG/KG/MIN: 1.5 INJECTION, POWDER, LYOPHILIZED, FOR SOLUTION INTRAVENOUS at 06:38

## 2017-06-20 RX ADMIN — EPOPROSTENOL SODIUM: 0.5 INJECTION, POWDER, LYOPHILIZED, FOR SOLUTION INTRAVENOUS at 12:09

## 2017-06-20 RX ADMIN — SODIUM CHLORIDE, PRESERVATIVE FREE 0.5 ML: 5 INJECTION INTRAVENOUS at 10:22

## 2017-06-20 RX ADMIN — CEFAZOLIN SODIUM 2 G: 2 INJECTION, SOLUTION INTRAVENOUS at 10:01

## 2017-06-20 RX ADMIN — MIDAZOLAM 0.5 MG: 1 INJECTION INTRAMUSCULAR; INTRAVENOUS at 10:02

## 2017-06-20 RX ADMIN — VITAMIN D, TAB 1000IU (100/BT) 2000 UNITS: 25 TAB at 08:21

## 2017-06-20 RX ADMIN — DIGOXIN 125 MCG: 125 TABLET ORAL at 08:23

## 2017-06-20 RX ADMIN — EPOPROSTENOL SODIUM: 0.5 INJECTION, POWDER, LYOPHILIZED, FOR SOLUTION INTRAVENOUS at 21:14

## 2017-06-20 RX ADMIN — URSODIOL 300 MG: 300 CAPSULE ORAL at 12:09

## 2017-06-20 RX ADMIN — FUROSEMIDE 60 MG: 10 INJECTION, SOLUTION INTRAVENOUS at 13:37

## 2017-06-20 RX ADMIN — SENNOSIDES AND DOCUSATE SODIUM 1 TABLET: 8.6; 5 TABLET ORAL at 21:10

## 2017-06-20 RX ADMIN — MIDAZOLAM 1 MG: 1 INJECTION INTRAMUSCULAR; INTRAVENOUS at 09:44

## 2017-06-20 RX ADMIN — EPOPROSTENOL SODIUM 6.5 NG/KG/MIN: 1.5 INJECTION, POWDER, LYOPHILIZED, FOR SOLUTION INTRAVENOUS at 01:18

## 2017-06-20 RX ADMIN — PANTOPRAZOLE SODIUM 40 MG: 40 TABLET, DELAYED RELEASE ORAL at 08:21

## 2017-06-20 RX ADMIN — EPOPROSTENOL SODIUM 6.5 NG/KG/MIN: 1.5 INJECTION, POWDER, LYOPHILIZED, FOR SOLUTION INTRAVENOUS at 16:40

## 2017-06-20 RX ADMIN — LIDOCAINE HYDROCHLORIDE 10 ML: 10 INJECTION, SOLUTION EPIDURAL; INFILTRATION; INTRACAUDAL; PERINEURAL at 10:22

## 2017-06-20 RX ADMIN — EPOPROSTENOL SODIUM 6.5 NG/KG/MIN: 1.5 INJECTION, POWDER, LYOPHILIZED, FOR SOLUTION INTRAVENOUS at 21:17

## 2017-06-20 RX ADMIN — Medication 1500 MG: at 21:10

## 2017-06-20 RX ADMIN — GRANISETRON HYDROCHLORIDE 1 MG: 1 INJECTION INTRAVENOUS at 08:25

## 2017-06-20 RX ADMIN — MIDAZOLAM 0.5 MG: 1 INJECTION INTRAMUSCULAR; INTRAVENOUS at 09:51

## 2017-06-20 RX ADMIN — FLUCONAZOLE 200 MG: 200 TABLET ORAL at 12:09

## 2017-06-20 RX ADMIN — EPOPROSTENOL SODIUM 6.5 NG/KG/MIN: 1.5 INJECTION, POWDER, LYOPHILIZED, FOR SOLUTION INTRAVENOUS at 04:35

## 2017-06-20 RX ADMIN — VALACYCLOVIR HYDROCHLORIDE 500 MG: 500 TABLET, FILM COATED ORAL at 08:23

## 2017-06-20 RX ADMIN — FENTANYL CITRATE 25 MCG: 50 INJECTION INTRAMUSCULAR; INTRAVENOUS at 09:51

## 2017-06-20 RX ADMIN — TADALAFIL 40 MG: 20 TABLET, FILM COATED ORAL at 21:10

## 2017-06-20 RX ADMIN — URSODIOL 300 MG: 300 CAPSULE ORAL at 21:10

## 2017-06-20 RX ADMIN — EPOPROSTENOL SODIUM 6.5 NG/KG/MIN: 1.5 INJECTION, POWDER, LYOPHILIZED, FOR SOLUTION INTRAVENOUS at 12:05

## 2017-06-20 RX ADMIN — SENNOSIDES AND DOCUSATE SODIUM 1 TABLET: 8.6; 5 TABLET ORAL at 08:23

## 2017-06-20 RX ADMIN — PREDNISONE 20 MG: 20 TABLET ORAL at 08:23

## 2017-06-20 RX ADMIN — Medication 1500 MG: at 12:09

## 2017-06-20 RX ADMIN — FUROSEMIDE 40 MG: 10 INJECTION, SOLUTION INTRAVENOUS at 19:02

## 2017-06-20 RX ADMIN — ROSUVASTATIN CALCIUM 5 MG: 5 TABLET ORAL at 08:24

## 2017-06-20 RX ADMIN — ACETAMINOPHEN 650 MG: 325 TABLET, FILM COATED ORAL at 06:05

## 2017-06-20 RX ADMIN — INSULIN ASPART 2 UNITS: 100 INJECTION, SOLUTION INTRAVENOUS; SUBCUTANEOUS at 18:33

## 2017-06-20 RX ADMIN — FENTANYL CITRATE 50 MCG: 50 INJECTION INTRAMUSCULAR; INTRAVENOUS at 09:44

## 2017-06-20 RX ADMIN — EPOPROSTENOL SODIUM 6.5 SYRINGE: 0.5 INJECTION, POWDER, LYOPHILIZED, FOR SOLUTION INTRAVENOUS at 04:36

## 2017-06-20 RX ADMIN — CLONAZEPAM 1 MG: 1 TABLET ORAL at 21:10

## 2017-06-20 RX ADMIN — ATOVAQUONE 1500 MG: 750 SUSPENSION ORAL at 21:09

## 2017-06-20 RX ADMIN — FLUTICASONE PROPIONATE 2 PUFF: 220 AEROSOL, METERED RESPIRATORY (INHALATION) at 08:25

## 2017-06-20 RX ADMIN — URSODIOL 300 MG: 300 CAPSULE ORAL at 08:23

## 2017-06-20 ASSESSMENT — PAIN DESCRIPTION - DESCRIPTORS
DESCRIPTORS: ACHING
DESCRIPTORS: ACHING

## 2017-06-20 NOTE — PROGRESS NOTES
Cardiology Progress Note    Events and interval changes in past 24 hours:On flolan 6.5 this AM, has had some symptoms with headache and flushing. Did not titrate dose up.       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 6/14 TTE with moderate sized pericardial effusion.      # Syncope 6/15 in hospital-suspect low CO from RV dysfunction from severe PAH, query hemodynamic effect of pericardial effusion-less likely  # Dyspnea on exertion at home,reason for admission- likely from underlying PAH, moderate pleural effusion less likely   # Moderate pericardial effusion-etiology not clear, previously thought to be from serositis from GVHD vs sirolimus, ruled out pulsus paradoxsus SBP difference 5mmHg on 6/15  # Severe primary pulmonary hypertension.   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 The patient has had doypr-zjygnk-jkdb disease with 2 flares after the original presentation that was with transaminitis. That was documented with liver biopsy. His other manifestations of chronic zuavj-tynqvr-eibu disease include sclerotic skin changes, mucosal changes in his mouth, xerostomia and polyserositis as reflected by the mild to moderate pericardial effusion that  has been documented.   # Steroid induced diabetes  # Mild Leukocytosis  # Mild bump in cr, overall cr improving    PLAN:  Hold BB  Continue flolan and tadalafil, continue flolan at 6.5 this AM  No plans for pericardial window as this may not be beneficial and will be high risk considering immunosuppression.  Queen placement today - done  Jakafi approved, await pharmacy delivery today.   Fluid gain secondary to steroids, will diurese today with 60 mg IV lasix. Net 1.8 L negative yesterday.   Completed five day burst dose of higher dose of prednisone, convert to 20 mg per day.   Insulin SSI    Discussed with Dr Cervantes.     Ti Burton MD  Cardiology     OBJECTIVE FINDINGS:  Temp: 98  F (36.7  C) Temp  Min: 97.6  F (36.4  C)  Max: 98.4  F (36.9  C)  Resp: 16 Resp  Min: 16  Max: 20  SpO2: 94 % SpO2  Min: 90 %  Max: 100 %    No Data Recorded  Heart Rate: 88 Heart Rate  Min: 76  Max: 98  BP: 103/70 Systolic (24hrs), Av , Min:99 , Max:121   Diastolic (24hrs), Av, Min:67, Max:85    Rogers in the RA, not able to get other numbers.     Gen: Patient is AOx3, in NAD. Appears comfortable.    HEENT: PER, EOMI, MMM  Resp: slight crackles at bases b/l otherwise clear  CV: RRR, normal S1/S2, JVP 10 cm, loud P2 and split P2  Abd: NT, ND,    0.2/2 today na    Intake/Output Summary (Last 24 hours) at 06/15/17 0546  Last data filed at 17 2200   Gross per 24 hour   Intake              840 ml   Output              550 ml   Net              290 ml       Vitals:    17 1530 17 1949 06/15/17 0550 17 1600   Weight: 124.7 kg (275 lb) 125.1 kg (275 lb 14.4 oz) 121.7 kg (268 lb 4.8 oz) 122.1 kg (269 lb 2.9 oz)       - MEDICATION INSTRUCTIONS -       - MEDICATION INSTRUCTIONS -       - MEDICATION INSTRUCTIONS -       epoprostenol (FLOLAN) intravenous infusion 6.5 ng/kg/min (17 0638)        ruxolitinib  5 mg Oral BID     pantoprazole (PROTONIX) EC tablet 40 mg  40 mg Oral Daily      cholecalciferol  2,000 Units Oral Daily     granisetron  1 mg Intravenous Q12H     insulin aspart  1-7 Units Subcutaneous TID AC     insulin aspart  1-5 Units Subcutaneous At Bedtime     epoprostenol (FLOLAN) syringe change   Intravenous Q8H     valACYclovir  500 mg Oral Every Other Day     predniSONE  20 mg Oral BID w/meals     sodium chloride (PF)  3 mL Intracatheter Q8H     atovaquone  1,500 mg Oral Daily     calcium carbonate  1,500 mg Oral BID     clonazePAM (klonoPIN) tablet 1 mg  1 mg Oral At Bedtime     digoxin  125 mcg Oral Daily     fluconazole  200 mg Oral Daily     fluticasone  2 puff Inhalation BID     rosuvastatin (CRESTOR) tablet 5 mg  5 mg Oral Daily     tadalafil  40 mg Oral Daily     ursodiol (ACTIGALL) capsule 300 mg  300 mg Oral TID     sodium chloride (PF)  3 mL Intracatheter Q8H     senna-docusate  1-2 tablet Oral BID   oxyCODONE, alum & mag hydroxide-simethicone, ondansetron **OR** ondansetron, prochlorperazine **OR** prochlorperazine **OR** prochlorperazine, HOLD MEDICATION, glucose **OR** dextrose **OR** glucagon, - MEDICATION INSTRUCTIONS -, - MEDICATION INSTRUCTIONS -, - MEDICATION INSTRUCTIONS -, acetaminophen, lidocaine, lidocaine 4%, sodium chloride (PF), naloxone, lidocaine, lidocaine 4%, sodium chloride (PF)    [facility]    Recent Labs  Lab 06/20/17  0431 06/19/17  0536 06/18/17  1725 06/18/17  0413 06/17/17  1548 06/17/17  0400  06/14/17  1750    134 136 136  --  138  < > 140   POTASSIUM 4.3 4.5 4.2 4.4 4.3 4.7  < > 4.3   CHLORIDE 100 103 102 105  --  107  < > 108   CO2 25 22 27 25  --  25  < > 26   ANIONGAP 9 9 7 6  --  6  < > 6   * 168* 168* 155*  --  184*  < > 115*   BUN 35* 35* 36* 38*  --  41*  < > 31*   CR 1.69* 1.56* 1.71* 1.74*  --  2.05*  < > 2.07*   GFRESTIMATED 43* 47* 42* 41*  --  34*  < > 34*   GFRESTBLACK 52* 57* 51* 50*  --  41*  < > 41*   PANFILO 9.3 9.2 9.5 9.2  --  9.1  < > 8.8   MAG 2.4* 2.6*  --  1.9 1.9 2.0  --  1.8   PHOS  --   --   --  3.8  --  4.4  --  3.4    PROTTOTAL  --   --   --   --   --   --   --  5.7*   ALBUMIN  --   --   --   --   --   --   --  3.2*   BILITOTAL  --   --   --   --   --   --   --  0.8   ALKPHOS  --   --   --   --   --   --   --  68   AST  --   --   --   --   --   --   --  40   ALT  --   --   --   --   --   --   --  87*   < > = values in this interval not displayed.  CBC    Recent Labs  Lab 06/20/17  0431 06/19/17  0536 06/18/17  0413 06/17/17  0400   WBC 14.9* 12.0* 13.0* 13.7*   RBC 4.70 4.58 4.40 4.54   HGB 13.5 13.1* 12.6* 13.1*   HCT 41.7 40.5 39.3* 40.5   MCV 89 88 89 89   MCH 28.7 28.6 28.6 28.9   MCHC 32.4 32.3 32.1 32.3   RDW 22.5* 22.4* 22.4* 22.6*    190 188 221     INR    Recent Labs  Lab 06/14/17  1750   INR 1.01     Arterial Blood Gas    Recent Labs  Lab 06/18/17  1725 06/18/17  1210 06/18/17  0413 06/17/17  2237   O2PER 3LPM 21.0 5L 5L       RHC  1. HR 63 bpm  2. /80/96 mmHg  3. RA 9   4. /8  5. /40/62   6. PCW 12   7. PA sat 69.2%   8. PCW sat 94.9%  9. Hgb 14.4 g/dL   10. Carter CO 6.1   11. Carter CI 2.4   12. TD CO 5.8   13. TD CI 2.3   14. PVR 8.1  15. TPR 10.1  16. SVR  1140    Imaging and other studies:  EKG: NSR, biphasic t v2-v3  Echocardiogram: 6/15  Severe pulmonary hypertension is present.  Left ventricular function, chamber size, wall motion, and wall thickness are  normal.The EF is 55-60%.  Severe right ventricular dilation is present.Global right ventricular function  is mildly to moderately reduced.  Dilation of the inferior vena cava is present with abnormal respiratory  variation in diameter. Estimated right atrial pressure is > 15 mmHg.  Moderate pericardial effusion. Chamber compression is not present; there is no  evidence for tamponade.  Effusion is larger than the last study, but has been present on recent  studies. No other change.    Chest x-ray: Impression:   1. Near complete resolution of previously seen perihilar and basilar  opacities.  2. Trace right pleural effusion. No significant  left pleural effusion.  3. Stable enlargement of the cardiac silhouette.    RHC 6/16 BSA 2.6  RA 20/21/16   /20  /42/67   PCW 14/14/12   Carter CO 3.9   Carter CI 1.5   TD CO 4.1   TD CI 1.6   PA sat 55.0%   Hgb 13.5 g/dL   PVR 14.1  TPR 17.2    5/25/16      ECHO this admission:

## 2017-06-20 NOTE — PLAN OF CARE
Problem: Goal Outcome Summary  Goal: Goal Outcome Summary  1) pt will be hemodynamically stable  2) pt will tolerate up titration of IV flolan   Outcome: Improving  Flolan remains at 6.5 ng/kg/min. C/o headache. Medicated x 1. Slept well overnight. Vital signs stable. Afebrile. Plan for chandra catheter placement today. Continue to monitor closely.

## 2017-06-20 NOTE — PLAN OF CARE
Problem: Goal Outcome Summary  Goal: Goal Outcome Summary  1) pt will be hemodynamically stable  2) pt will tolerate up titration of IV flolan   Outcome: Improving  Patient down to IR this am for Queen cath placement. Flolan infusion at 6.5 ng/kg/min into Queen. Karo/Silas dc'd. Floor orders. Diuresed with 60 mg IV Lasix now. Awaiting Jakafi medication from Pharmacy. Will transfer to floor when room available. VSS and WNL. 2LNC for oxygen saturations > 92. No other events. Will continue with plan of care.

## 2017-06-20 NOTE — PROCEDURES
Interventional Radiology Brief Post Procedure Note    Procedure: IR CVC TUNNEL PLACEMENT > 5 YRS OF AGE    Proceduralist: Elsy Bernabe PA-C    Assistant: SEAN Roberto PA-C and None    Time Out: Prior to the start of the procedure and with procedural staff participation, I verbally confirmed the patient s identity using two indicators, relevant allergies, that the procedure was appropriate and matched the consent or emergent situation, and that the correct equipment/implants were available. Immediately prior to starting the procedure I conducted the Time Out with the procedural staff and re-confirmed the patient s name, procedure, and site/side. (The Joint Commission universal protocol was followed.)  Yes    Medications   Medication Event Details Admin User Admin Time   midazolam (VERSED) injection 0.5-1 mg Medication Given Dose: 1 mg; Route: Intravenous Jahaira Fisher RN 6/20/2017  9:44 AM   fentaNYL Citrate (PF) (SUBLIMAZE) injection 25-50 mcg Medication Given Dose: 50 mcg; Route: Intravenous Jahaira Fisher RN 6/20/2017  9:44 AM   midazolam (VERSED) injection 0.5-1 mg Medication Given Dose: 0.5 mg; Route: Intravenous Jahaira Fisher RN 6/20/2017  9:51 AM   fentaNYL Citrate (PF) (SUBLIMAZE) injection 25-50 mcg Medication Given Dose: 25 mcg; Route: Intravenous Jahaira Fisher RN 6/20/2017  9:51 AM   ceFAZolin sodium-dextrose (ANCEF) infusion 2 g Medication Given Dose: 2 g; Route: Intravenous Jahaira Fisher RN 6/20/2017 10:01 AM   midazolam (VERSED) injection 0.5-1 mg Medication Given Dose: 0.5 mg; Route: Intravenous Jahaira Fisher RN 6/20/2017 10:02 AM   fentaNYL Citrate (PF) (SUBLIMAZE) injection 25-50 mcg Medication Given Dose: 25 mcg; Route: Intravenous Jahaira Fisher RN 6/20/2017 10:03 AM   lidocaine BUFFERED 1 % injection 1-30 mL Medication Given by Other Dose: 10 mL; Route: Intradermal Jahaira Fisher RN 6/20/2017 10:22 AM    heparin 5,000 units in 0.9% sodium chloride 1000 mL Medication New Bag by Other Clinician Dose: 5,000 Units; Route: TABLE Jahaira Christensen RN 6/20/2017 10:22 AM   heparin lock flush 10 UNIT/ML injection 5 mL Medication Given by Other Dose: 0.5 mL; Route: Intravenous; Comment: titrated to priming volume of cathetr Jahaira Fisher RN 6/20/2017 10:22 AM       Sedation: IR Nurse Monitored Care   Post Procedure Summary:  Prior to the start of the procedure and with procedural staff participation, I verbally confirmed the patient s identity using two indicators, relevant allergies, that the procedure was appropriate and matched the consent or emergent situation, and that the correct equipment/implants were available. Immediately prior to starting the procedure I conducted the Time Out with the procedural staff and re-confirmed the patient s name, procedure, and site/side. (The Joint Commission universal protocol was followed.)  Yes       Sedatives: Fentanyl and Midazolam (Versed)    Vital signs, airway and pulse oximetry were monitored and remained stable throughout the procedure and sedation was maintained until the procedure was complete.  The patient was monitored by staff until sedation discharge criteria were met.    Patient tolerance: Patient tolerated the procedure well with no immediate complications.    Time of sedation in minutes: 35 minutes from beginning to end of physician one to one monitoring.    Findings: Completed placement of 5 [place]gu[place]an, 30.5 cm single lumen tunneled central venous catheter via left IJ. Priming volume of 0.47 mL. Aspirates and flushes freely, heparin locked and ready for immediate use. No immediate complication    Estimated Blood Loss: Less than 10 ml    Fluoroscopy Time: 0.6 minute(s)    SPECIMENS: None    Complications: 1. None     Condition: Stable    Plan: Line ready for immediate use. Follow up per primary team.    Comments: See dictated procedure note for full  details.    Elsy Bernabe PA-C

## 2017-06-20 NOTE — PROGRESS NOTES
Interventional Radiology Intra-procedural Nursing Note    Patient Name: Olivier Gómez  Medical Record Number: [id number]  Today's Date: June 20, 2017    Start Time: Sedation 0944, time out 0950  End of procedure time: 1020  Procedure: Single Lumen central venous catheter placement  Report given to: Amol LOZADA RN  Time pt departs:  1044  Provider: MILAGRO Garvin; MILAGRO Dove     Other Notes: Pt in IR 1, ID & procedure verified, consent signed. Pt transferred to fluoroscopy table, positioned supine, prepped and monitored per protocol. Chest prep completed with benita wipes prior to final prep.   Catheter placed on left chest via LIJ, flushed and hep-locked by provider. Priming volume of catheter is 0.47 ml. Catheter ready for use.  Pt tolerated procedure well, vitals stable, moderate sedation given with versed 2 mg, fentanyl 100 mcg, sedation time 35 minutes.    GUADALUPE WANG

## 2017-06-21 ENCOUNTER — APPOINTMENT (OUTPATIENT)
Dept: GENERAL RADIOLOGY | Facility: CLINIC | Age: 52
DRG: 286 | End: 2017-06-21
Payer: COMMERCIAL

## 2017-06-21 ENCOUNTER — APPOINTMENT (OUTPATIENT)
Dept: OCCUPATIONAL THERAPY | Facility: CLINIC | Age: 52
DRG: 286 | End: 2017-06-21
Attending: STUDENT IN AN ORGANIZED HEALTH CARE EDUCATION/TRAINING PROGRAM
Payer: COMMERCIAL

## 2017-06-21 LAB
ANION GAP SERPL CALCULATED.3IONS-SCNC: 8 MMOL/L (ref 3–14)
ANION GAP SERPL CALCULATED.3IONS-SCNC: 8 MMOL/L (ref 3–14)
BUN SERPL-MCNC: 42 MG/DL (ref 7–30)
BUN SERPL-MCNC: 44 MG/DL (ref 7–30)
CALCIUM SERPL-MCNC: 8.5 MG/DL (ref 8.5–10.1)
CALCIUM SERPL-MCNC: 8.9 MG/DL (ref 8.5–10.1)
CHLORIDE SERPL-SCNC: 97 MMOL/L (ref 94–109)
CHLORIDE SERPL-SCNC: 98 MMOL/L (ref 94–109)
CO2 SERPL-SCNC: 26 MMOL/L (ref 20–32)
CO2 SERPL-SCNC: 30 MMOL/L (ref 20–32)
CREAT SERPL-MCNC: 1.62 MG/DL (ref 0.66–1.25)
CREAT SERPL-MCNC: 1.75 MG/DL (ref 0.66–1.25)
ERYTHROCYTE [DISTWIDTH] IN BLOOD BY AUTOMATED COUNT: 21.8 % (ref 10–15)
GFR SERPL CREATININE-BSD FRML MDRD: 41 ML/MIN/1.7M2
GFR SERPL CREATININE-BSD FRML MDRD: 45 ML/MIN/1.7M2
GLUCOSE BLDC GLUCOMTR-MCNC: 133 MG/DL (ref 70–99)
GLUCOSE BLDC GLUCOMTR-MCNC: 149 MG/DL (ref 70–99)
GLUCOSE BLDC GLUCOMTR-MCNC: 166 MG/DL (ref 70–99)
GLUCOSE SERPL-MCNC: 139 MG/DL (ref 70–99)
GLUCOSE SERPL-MCNC: 175 MG/DL (ref 70–99)
HCT VFR BLD AUTO: 38.6 % (ref 40–53)
HGB BLD-MCNC: 13.2 G/DL (ref 13.3–17.7)
MAGNESIUM SERPL-MCNC: 2.3 MG/DL (ref 1.6–2.3)
MCH RBC QN AUTO: 29.6 PG (ref 26.5–33)
MCHC RBC AUTO-ENTMCNC: 34.2 G/DL (ref 31.5–36.5)
MCV RBC AUTO: 87 FL (ref 78–100)
MICRO REPORT STATUS: NORMAL
MYCOBACTERIUM SPEC CULT: NORMAL
PLATELET # BLD AUTO: 131 10E9/L (ref 150–450)
POTASSIUM SERPL-SCNC: 4.1 MMOL/L (ref 3.4–5.3)
POTASSIUM SERPL-SCNC: 5 MMOL/L (ref 3.4–5.3)
RBC # BLD AUTO: 4.46 10E12/L (ref 4.4–5.9)
SODIUM SERPL-SCNC: 132 MMOL/L (ref 133–144)
SODIUM SERPL-SCNC: 136 MMOL/L (ref 133–144)
SPECIMEN SOURCE: NORMAL
WBC # BLD AUTO: 13.9 10E9/L (ref 4–11)

## 2017-06-21 PROCEDURE — 97165 OT EVAL LOW COMPLEX 30 MIN: CPT | Mod: GO | Performed by: OCCUPATIONAL THERAPIST

## 2017-06-21 PROCEDURE — 80048 BASIC METABOLIC PNL TOTAL CA: CPT | Performed by: INTERNAL MEDICINE

## 2017-06-21 PROCEDURE — 25000132 ZZH RX MED GY IP 250 OP 250 PS 637: Performed by: INTERNAL MEDICINE

## 2017-06-21 PROCEDURE — 40000275 ZZH STATISTIC RCP TIME EA 10 MIN

## 2017-06-21 PROCEDURE — 25000132 ZZH RX MED GY IP 250 OP 250 PS 637: Performed by: STUDENT IN AN ORGANIZED HEALTH CARE EDUCATION/TRAINING PROGRAM

## 2017-06-21 PROCEDURE — 36415 COLL VENOUS BLD VENIPUNCTURE: CPT | Performed by: INTERNAL MEDICINE

## 2017-06-21 PROCEDURE — 00000146 ZZHCL STATISTIC GLUCOSE BY METER IP

## 2017-06-21 PROCEDURE — 99232 SBSQ HOSP IP/OBS MODERATE 35: CPT | Mod: GC | Performed by: INTERNAL MEDICINE

## 2017-06-21 PROCEDURE — 40000133 ZZH STATISTIC OT WARD VISIT: Performed by: OCCUPATIONAL THERAPIST

## 2017-06-21 PROCEDURE — 83735 ASSAY OF MAGNESIUM: CPT | Performed by: INTERNAL MEDICINE

## 2017-06-21 PROCEDURE — 21400006 ZZH R&B CCU INTERMEDIATE UMMC

## 2017-06-21 PROCEDURE — 25000128 H RX IP 250 OP 636: Performed by: INTERNAL MEDICINE

## 2017-06-21 PROCEDURE — 97535 SELF CARE MNGMENT TRAINING: CPT | Mod: GO | Performed by: OCCUPATIONAL THERAPIST

## 2017-06-21 PROCEDURE — 36416 COLLJ CAPILLARY BLOOD SPEC: CPT | Performed by: INTERNAL MEDICINE

## 2017-06-21 PROCEDURE — 40000048 ZZH STATISTIC DAILY SWAN MONITORING

## 2017-06-21 PROCEDURE — 97110 THERAPEUTIC EXERCISES: CPT | Mod: GO | Performed by: OCCUPATIONAL THERAPIST

## 2017-06-21 PROCEDURE — 40000196 ZZH STATISTIC RAPCV CVP MONITORING

## 2017-06-21 PROCEDURE — 27210995 ZZH RX 272: Performed by: STUDENT IN AN ORGANIZED HEALTH CARE EDUCATION/TRAINING PROGRAM

## 2017-06-21 PROCEDURE — 25000125 ZZHC RX 250: Performed by: STUDENT IN AN ORGANIZED HEALTH CARE EDUCATION/TRAINING PROGRAM

## 2017-06-21 PROCEDURE — 71010 XR CHEST PORT 1 VW: CPT

## 2017-06-21 PROCEDURE — 25000128 H RX IP 250 OP 636: Performed by: STUDENT IN AN ORGANIZED HEALTH CARE EDUCATION/TRAINING PROGRAM

## 2017-06-21 PROCEDURE — 85027 COMPLETE CBC AUTOMATED: CPT | Performed by: INTERNAL MEDICINE

## 2017-06-21 RX ORDER — POTASSIUM CL/LIDO/0.9 % NACL 10MEQ/0.1L
10 INTRAVENOUS SOLUTION, PIGGYBACK (ML) INTRAVENOUS
Status: DISCONTINUED | OUTPATIENT
Start: 2017-06-21 | End: 2017-06-23 | Stop reason: HOSPADM

## 2017-06-21 RX ORDER — VALACYCLOVIR HYDROCHLORIDE 500 MG/1
500 TABLET, FILM COATED ORAL EVERY OTHER DAY
Status: DISCONTINUED | OUTPATIENT
Start: 2017-06-21 | End: 2017-06-22

## 2017-06-21 RX ORDER — POTASSIUM CHLORIDE 7.45 MG/ML
10 INJECTION INTRAVENOUS
Status: DISCONTINUED | OUTPATIENT
Start: 2017-06-21 | End: 2017-06-23 | Stop reason: HOSPADM

## 2017-06-21 RX ORDER — FUROSEMIDE 10 MG/ML
60 INJECTION INTRAMUSCULAR; INTRAVENOUS ONCE
Status: COMPLETED | OUTPATIENT
Start: 2017-06-21 | End: 2017-06-21

## 2017-06-21 RX ORDER — POTASSIUM CHLORIDE 750 MG/1
20-40 TABLET, EXTENDED RELEASE ORAL
Status: DISCONTINUED | OUTPATIENT
Start: 2017-06-21 | End: 2017-06-23 | Stop reason: HOSPADM

## 2017-06-21 RX ORDER — MAGNESIUM SULFATE HEPTAHYDRATE 40 MG/ML
4 INJECTION, SOLUTION INTRAVENOUS EVERY 4 HOURS PRN
Status: DISCONTINUED | OUTPATIENT
Start: 2017-06-21 | End: 2017-06-23 | Stop reason: HOSPADM

## 2017-06-21 RX ORDER — POTASSIUM CHLORIDE 1.5 G/1.58G
20-40 POWDER, FOR SOLUTION ORAL
Status: DISCONTINUED | OUTPATIENT
Start: 2017-06-21 | End: 2017-06-23 | Stop reason: HOSPADM

## 2017-06-21 RX ORDER — POTASSIUM CHLORIDE 29.8 MG/ML
20 INJECTION INTRAVENOUS
Status: DISCONTINUED | OUTPATIENT
Start: 2017-06-21 | End: 2017-06-23 | Stop reason: HOSPADM

## 2017-06-21 RX ORDER — PENICILLIN V POTASSIUM 500 MG/1
500 TABLET, FILM COATED ORAL
Status: DISCONTINUED | OUTPATIENT
Start: 2017-06-21 | End: 2017-06-23 | Stop reason: HOSPADM

## 2017-06-21 RX ADMIN — ATOVAQUONE 1500 MG: 750 SUSPENSION ORAL at 21:45

## 2017-06-21 RX ADMIN — ROSUVASTATIN CALCIUM 5 MG: 5 TABLET ORAL at 08:41

## 2017-06-21 RX ADMIN — INSULIN ASPART 1 UNITS: 100 INJECTION, SOLUTION INTRAVENOUS; SUBCUTANEOUS at 19:18

## 2017-06-21 RX ADMIN — URSODIOL 300 MG: 300 CAPSULE ORAL at 08:39

## 2017-06-21 RX ADMIN — PANTOPRAZOLE SODIUM 40 MG: 40 TABLET, DELAYED RELEASE ORAL at 08:39

## 2017-06-21 RX ADMIN — INSULIN ASPART 2 UNITS: 100 INJECTION, SOLUTION INTRAVENOUS; SUBCUTANEOUS at 14:48

## 2017-06-21 RX ADMIN — EPOPROSTENOL SODIUM: 0.5 INJECTION, POWDER, LYOPHILIZED, FOR SOLUTION INTRAVENOUS at 23:27

## 2017-06-21 RX ADMIN — FLUTICASONE PROPIONATE 2 PUFF: 220 AEROSOL, METERED RESPIRATORY (INHALATION) at 08:40

## 2017-06-21 RX ADMIN — FUROSEMIDE 60 MG: 10 INJECTION, SOLUTION INTRAVENOUS at 10:29

## 2017-06-21 RX ADMIN — URSODIOL 300 MG: 300 CAPSULE ORAL at 11:40

## 2017-06-21 RX ADMIN — EPOPROSTENOL SODIUM 6.5 NG/KG/MIN: 1.5 INJECTION, POWDER, LYOPHILIZED, FOR SOLUTION INTRAVENOUS at 07:59

## 2017-06-21 RX ADMIN — SENNOSIDES AND DOCUSATE SODIUM 2 TABLET: 8.6; 5 TABLET ORAL at 20:25

## 2017-06-21 RX ADMIN — URSODIOL 300 MG: 300 CAPSULE ORAL at 21:45

## 2017-06-21 RX ADMIN — PENICILLIN V POTASSIUM 500 MG: 500 TABLET, FILM COATED ORAL at 17:21

## 2017-06-21 RX ADMIN — FLUTICASONE PROPIONATE 2 PUFF: 220 AEROSOL, METERED RESPIRATORY (INHALATION) at 21:45

## 2017-06-21 RX ADMIN — GRANISETRON HYDROCHLORIDE 1 MG: 1 INJECTION INTRAVENOUS at 20:25

## 2017-06-21 RX ADMIN — EPOPROSTENOL SODIUM: 0.5 INJECTION, POWDER, LYOPHILIZED, FOR SOLUTION INTRAVENOUS at 04:00

## 2017-06-21 RX ADMIN — INSULIN ASPART 4 UNITS: 100 INJECTION, SOLUTION INTRAVENOUS; SUBCUTANEOUS at 10:10

## 2017-06-21 RX ADMIN — VITAMIN D, TAB 1000IU (100/BT) 2000 UNITS: 25 TAB at 08:39

## 2017-06-21 RX ADMIN — EPOPROSTENOL SODIUM 6.5 NG/KG/MIN: 1.5 INJECTION, POWDER, LYOPHILIZED, FOR SOLUTION INTRAVENOUS at 02:39

## 2017-06-21 RX ADMIN — FLUCONAZOLE 200 MG: 200 TABLET ORAL at 11:40

## 2017-06-21 RX ADMIN — OXYCODONE HYDROCHLORIDE 5 MG: 5 TABLET ORAL at 05:06

## 2017-06-21 RX ADMIN — DIGOXIN 125 MCG: 125 TABLET ORAL at 08:39

## 2017-06-21 RX ADMIN — PREDNISONE 20 MG: 20 TABLET ORAL at 08:39

## 2017-06-21 RX ADMIN — Medication 1500 MG: at 21:45

## 2017-06-21 RX ADMIN — VALACYCLOVIR HYDROCHLORIDE 500 MG: 500 TABLET, FILM COATED ORAL at 20:25

## 2017-06-21 RX ADMIN — EPOPROSTENOL SODIUM 6.5 NG/KG/MIN: 1.5 INJECTION, POWDER, LYOPHILIZED, FOR SOLUTION INTRAVENOUS at 13:17

## 2017-06-21 RX ADMIN — TADALAFIL 40 MG: 20 TABLET, FILM COATED ORAL at 21:45

## 2017-06-21 RX ADMIN — ACETAMINOPHEN 650 MG: 325 TABLET, FILM COATED ORAL at 01:29

## 2017-06-21 RX ADMIN — CLONAZEPAM 1 MG: 1 TABLET ORAL at 22:30

## 2017-06-21 RX ADMIN — EPOPROSTENOL SODIUM 6.5 NG/KG/MIN: 1.5 INJECTION, POWDER, LYOPHILIZED, FOR SOLUTION INTRAVENOUS at 18:35

## 2017-06-21 RX ADMIN — EPOPROSTENOL SODIUM: 0.5 INJECTION, POWDER, LYOPHILIZED, FOR SOLUTION INTRAVENOUS at 16:00

## 2017-06-21 RX ADMIN — SENNOSIDES AND DOCUSATE SODIUM 2 TABLET: 8.6; 5 TABLET ORAL at 08:39

## 2017-06-21 RX ADMIN — GRANISETRON HYDROCHLORIDE 1 MG: 1 INJECTION INTRAVENOUS at 08:41

## 2017-06-21 RX ADMIN — EPOPROSTENOL SODIUM 6.5 NG/KG/MIN: 1.5 INJECTION, POWDER, LYOPHILIZED, FOR SOLUTION INTRAVENOUS at 23:27

## 2017-06-21 RX ADMIN — Medication 1500 MG: at 11:40

## 2017-06-21 ASSESSMENT — ACTIVITIES OF DAILY LIVING (ADL): PREVIOUS_RESPONSIBILITIES: MEAL PREP;HOUSEKEEPING;LAUNDRY;SHOPPING;YARDWORK;MEDICATION MANAGEMENT;FINANCES;DRIVING;WORK

## 2017-06-21 NOTE — PLAN OF CARE
Problem: Goal Outcome Summary  Goal: Goal Outcome Summary     OT 4E Orders received, evaluation complete, treatment initiated.  Pt doing well, SBA for in room transfers.  Tolerated functional mobility around unit pushing wc, HR increased from 80's to 123, O2 dropped to 93% on 4L.   Limited by headache and decreased activity tolerance.  Rec: home w A and OP Cardiac Rehab for Pulmonary hypertension

## 2017-06-21 NOTE — PROGRESS NOTES
06/21/17 0800   Quick Adds   Type of Visit Initial Occupational Therapy Evaluation   Living Environment   Lives With spouse   Living Arrangements house   Number of Stairs to Enter Home 2  (can stay on main floor if needed)   Number of Stairs Within Home 12   Transportation Available family or friend will provide;car   Living Environment Comment Pt works FT in law   Self-Care   Dominant Hand right   Usual Activity Tolerance excellent   Current Activity Tolerance moderate   Regular Exercise yes   Activity/Exercise Type running/jogging;strength training   Exercise Amount/Frequency 3-5 times/wk   Activity/Exercise/Self-Care Comment Pt reports 4x a week 25' on elliptical.  1 mo ago had follow up for ca and had fluid drained from PE. Since then med switching and CORREA has limited function. Physically active   Functional Level Prior   Ambulation 0-->independent   Transferring 0-->independent   Toileting 0-->independent   Bathing 0-->independent   Dressing 0-->independent   Eating 0-->independent   Communication 0-->understands/communicates without difficulty   Swallowing 0-->swallows foods/liquids without difficulty   Cognition 0 - no cognition issues reported   Fall history within last six months yes   Number of times patient has fallen within last six months 1   General Information   Onset of Illness/Injury or Date of Surgery - Date 06/14/17   Referring Physician Dr Lyle Burton   Patient/Family Goals Statement to get back to work, 25' on bike   Additional Occupational Profile Info/Pertinent History of Current Problem 52 year old  male w/ 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 6/14 TTE with moderate sized pericardial effusion.     Precautions/Limitations fall precautions   General Observations Pt pleasant and agreeable, wife present.    General Info Comments activity up w A   Cognitive Status Examination   Orientation orientation to person, place and time   Level of Consciousness alert   Visual Perception   Visual Perception Comments intact   Sensory Examination   Sensory Comments some numbness from chem 7 years ago   Pain Assessment   Patient Currently in Pain Yes, see Vital Sign flowsheet   Integumentary/Edema   Integumentary/Edema no deficits were identifed   Posture   Posture Comments hunched shoulders,   Range of Motion (ROM)   ROM Quick Adds No deficits were identified   Strength   Strength Comments below his baseline   Hand Strength   Hand Strength Comments intact   Muscle Tone Assessment   Muscle Tone Comments intact   Coordination   Gross Motor Coordination WFL   Fine Motor Coordination WFL   Mobility   Bed Mobility Comments IND   Transfer Skill: Bed to Chair/Chair to Bed   Level of Bath: Bed to Chair independent   Transfer Skill: Sit to Stand   Level of Bath: Sit/Stand independent   Balance   Balance Comments below baseline per report due to oxy   Grooming   Level of Bath: Grooming independent   Eating/Self Feeding   Level of Bath: Eating independent   Instrumental Activities of Daily Living (IADL)   Previous Responsibilities meal prep;housekeeping;laundry;shopping;yardwork;medication management;finances;driving;work   Activities of Daily Living Analysis   Impairments Contributing to Impaired Activities of Daily Living balance impaired;cognition impaired;pain;strength decreased   General Therapy Interventions   Planned Therapy  "Interventions IADL retraining;ADL retraining;strengthening;home program guidelines;progressive activity/exercise;risk factor education   Clinical Impression   Criteria for Skilled Therapeutic Interventions Met yes, treatment indicated   OT Diagnosis ICU admit, assess mobility   Influenced by the following impairments decreased activity tolerance, pain   Assessment of Occupational Performance 1-3 Performance Deficits   Identified Performance Deficits home management, CR   Clinical Decision Making (Complexity) Low complexity   Therapy Frequency daily   Predicted Duration of Therapy Intervention (days/wks) 2 weeks   Anticipated Discharge Disposition Home with Outpatient Therapy   Risks and Benefits of Treatment have been explained. Yes   Patient, Family & other staff in agreement with plan of care Yes   [facility] TM \"6 Clicks\"   2016, Trustees of [facility], under license to [employer].  All rights reserved.   6 Clicks Short Forms Daily Activity Inpatient Short Form   [facility]  \"6 Clicks\" Daily Activity Inpatient Short Form   1. Putting on and taking off regular lower body clothing? 4 - None   2. Bathing (including washing, rinsing, drying)? 4 - None   3. Toileting, which includes using toilet, bedpan or urinal? 4 - None   4. Putting on and taking off regular upper body clothing? 4 - None   5. Taking care of personal grooming such as brushing teeth? 4 - None   6. Eating meals? 4 - None   Daily Activity Raw Score (Score out of 24.Lower scores equate to lower levels of function) 24   Total Evaluation Time   Total Evaluation Time (Minutes) 10     "

## 2017-06-21 NOTE — PLAN OF CARE
Problem: Goal Outcome Summary  Goal: Goal Outcome Summary  1) pt will be hemodynamically stable  2) pt will tolerate up titration of IV flolan   Outcome: Improving  Awaiting placement on 6C. Flolan infusion continues at 6.5 ng/kg/min. Assessment unchanged. Worked with PT / OT today. Diuresed with 60 mg IV lasix. Urine output ~ 1500 ml in response. AOx4, on 4LNC with oxygen saturations 92 - 96 percent. Clear, diminished lung sounds. SR 80s. VSS and WNL.

## 2017-06-21 NOTE — PROGRESS NOTES
Care Coordinator Progress Note     Admission Date/Time:  6/14/2017  Attending MD:  Dk Quan, *     Data  Chart reviewed, discussed with interdisciplinary team.   Patient was admitted for:    Pericardial effusion  Pulmonary hypertension (H)  Steroid-induced diabetes mellitus (H)  Ozayh-uwawtv-ndrn disease (H).    Concerns with insurance coverage for discharge needs: None.  Current Living Situation: Patient lives with spouse.  Support System: Supportive  Services Involved: None prior to admission  Transportation: Family or Friend will provide  Barriers to Discharge: Medical plan of care, IV Flolan    Coordination of Care and Referrals: Provided patient/family with options for IV Flolan .        Assessment    Writer informed today during rounds that this patient is awaiting a bed on 6C and will be discharging home to [place] on IV Flolan.   This writer spoke to [facility] representative Jewell (Ph: [phone]) regarding this patient's possible discharge on IV Flolan. Per Marlys Corcoran (Care coordinator for Pulm HTN patients) had spoken to [facility] regarding this patient but no referral received yet.    This writer spoke to Marlys (Ph: [phone] Pager: [phone]) who confirmed that she is working with Lea at [facility] regarding this patient's IV Flolan, and that she will follow up to ensure that the referral has been received.     This writer met with the patient and his wife, Danielle, at the bedside to introduce the role of the care coordinator and to assess for discharge needs. The patient is aware that he is going home with IV Flolan and was updated on the referral to [facility]. The patient states that he was living and working in the community independently before this admission and that his wife or family members (father and son were both at the bedside) are able to provide transportation as needed. The patient and his wife state that he uses CPAP at night and that they have their home  machine at the bedside.     The patient is currently on 4L nasal cannula, medical team is attempting to wean as able before discharge. ICU or 6C RNCC will continue to follow and assist as needed.     Plan  Anticipated Discharge Date:  TBD  Anticipated Discharge Plan:  Home with IV Flolan.    Beti Saunders, RN Care Coordinator covering for Neftali Jose CVICU Care Coordinator    Ph: [phone]  Pager: [phone]

## 2017-06-21 NOTE — PROGRESS NOTES
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 Now initiating flolan for PAH.    Plan:  # Severe PAH  # RV dysfunction  Did not tolerate 7 ng/kg/min, so will remain at 6.5. Queen placed 6/20.  - flolan 6.5 ng/kg/min  - continue home digoxin and tadalafil  - hold metoprolol    # Hypoxic respiratory failure, improving  Suspect this is related to mild pulmonary edema and elevated right sided pressures.  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 CT Head and CT C-spine negative.    # Moderate Pericardial Effusion, without tamponade  - Consider drain placement if worsening or signs of tamponade physiology      # BRANDYN on CKD 3, resolved  Secondary to cardiorenal. Improved with diuresis.    Chronic medical problems:  # Insomnia/anxiety: continue clonazepam qhs  # Steroid induced hyperglycemia: SSI  # CV risk reduction: continue rosuvastatin    FEN: 2 gram sodium, 1500 ml fluid restriction  Proph: none, ambulate  Dispo: pending training for flolan and resolution of hypoxia    Code: FULL    I have discussed this patient and plan with Dr. Clarke.    Earl Arreguin, PGY-2  Internal Medicine  [phone]        Interval History:   Notes reviewed. No acute events overnight. Queen placed yesterday.    Feeling better. Not short of breath. Having headache at nights.         Review Of Systems   Negative except as stated above.          Medications:   Reviewed. Details in  "EPIC.     Blood pressure 100/70, pulse 86, temperature 98.1  F (36.7  C), temperature source Oral, resp. rate 8, height 1.956 m (6' 5\"), weight 122.1 kg (269 lb 2.9 oz), SpO2 98 %.    General: lying in bed, resting, comfortable  CV: regular, 3/6 systolic systolic murmur at the LLSB  Chest: clear bilaterally  Abdomen: Soft, nontender, nondistended. +BS.  Extremities: 1+ edema to mid shin  Neuro:  Face symmetric, moving all extremities without focal deficit         Data:   Reviewed. Details in EPIC.    Attending Attestation:  Patient seen and examined by me with the team. I have performed all pertinent elements of the physical examination and reviewed the note above. I have reviewed pertinent laboratory, echocardiographic, imaging, and cardiac catheterization results. I agree with the plan of care as described in this note.    Royce Clarke MD, PhD      "

## 2017-06-21 NOTE — PLAN OF CARE
Problem: Goal Outcome Summary  Goal: Goal Outcome Summary  1) pt will be hemodynamically stable  2) pt will tolerate up titration of IV flolan   Outcome: Improving  Pt stable over NOC. Walked around 4th floor before bed last NOC; tolerated same well. Wife assisted with evening cares/bath. Pt has slept most of NOC. C/o HA this am. Given oxycodone, which pt states helped his HA. Continuing to monitor.    Problem: Pulmonary Hypertension, Persistent (NICU)  Goal: Signs and Symptoms of Listed Potential Problems Will be Absent or Manageable (Pulmonary Hypertension, Persistent)  Signs and symptoms of listed potential problems will be absent or manageable by discharge/transition of care (reference Pulmonary Hypertension, Persistent (NICU) CPG).     06/19/17 8055   Pulmonary Hypertension, Persistent   Problems Assessed (Persistent Pulmonary Hypertension) all   Problems Present (Persistent Pulmonary Hypertension) hypoxia/hypoxemia

## 2017-06-21 NOTE — PROGRESS NOTES
"CLINICAL NUTRITION SERVICES - ASSESSMENT NOTE     Nutrition Prescription    RECOMMENDATIONS FOR MDs/PROVIDERS TO ORDER:  Consider diet liberalization to 3 g Na to help increase PO intake and provide more menu options.     Malnutrition Status:    Non-severe malnutrition in the context of acute illness    Future/Additional Recommendations:  If PO intakes decrease further, consider calorie counts to assess need for further interventions.     REASON FOR ASSESSMENT  Olivier Gómez is a/an 52 year old male assessed by the dietitian for LOS    NUTRITION HISTORY  Pt received nutrition education re: steroid induced hyperglycemia on 4/28/17.   Pt has been following a low sodium diet at home. Pt and his wife have been reading labels.     CURRENT NUTRITION ORDERS  Diet: 2 g Sodium  Intake/Tolerance: % of meals per RN flowsheet when recorded. Fair diet/feeding tolerance per RN notes. Pt states he hasn't been eating great d/t dislike of the hospital food and some poor appetite. He did not eat much of anything on Sunday and just a couple meals/day of cereal, fruit, omelets, etc other days. He tried and Ensure one day but he vomited after.     LABS  Labs reviewed  Na 132 (L)    MEDICATIONS  Medications reviewed  Os-yash 1500 mg BID  2000 units Vitamin D    ANTHROPOMETRICS  Height: 195.6 cm (6' 5\")  Most Recent Weight: 122.1 kg (269 lb 2.9 oz)    IBW: 94.5 kg  BMI: Obesity Grade I BMI 30-34.9  Weight History: ~6% wt loss in 2 months  Wt Readings from Last 10 Encounters:   06/19/17 122.1 kg (269 lb 2.9 oz)   06/07/17 122 kg (269 lb)   05/15/17 124.7 kg (275 lb)   05/01/17 125.1 kg (275 lb 11.2 oz)   04/11/17 129.5 kg (285 lb 8 oz)   04/12/16 133.8 kg (294 lb 15.6 oz)   08/26/15 133.8 kg (294 lb 15.6 oz)   08/13/15 132.5 kg (292 lb)   07/14/15 127 kg (280 lb)   07/14/15 131.7 kg (290 lb 5.5 oz)     Dosing Weight: 101 kg (adjusted from lowest wt on 6/15 of 121.7 kg)    ASSESSED NUTRITION NEEDS  Estimated Energy Needs: 5818 - 303 " kcals/day (25 - 30 kcals/kg)  Justification: Maintenance  Estimated Protein Needs: 121 - 152 grams protein/day (1.2 - 1.5 grams of pro/kg)  Justification: Hypercatabolism with acute illness  Estimated Fluid Needs: 1 mL/kcal  Justification: Maintenance    PHYSICAL FINDINGS  See malnutrition section below.    MALNUTRITION  % Intake: < 75% for > 7 days (non-severe)  % Weight Loss: Weight loss does not meet criteria  Subcutaneous Fat Loss: None observed  Muscle Loss: Pt/wife noted mild generalized loss  Fluid Accumulation/Edema: Does not meet criteria (trace)  Malnutrition Diagnosis: Non-severe malnutrition in the context of acute illness    NUTRITION DIAGNOSIS  Inadequate oral intake related to fair appetite and menu fatigue as evidenced by consuming variable amounts % of meals (mainly cereal, fruit omelets)      INTERVENTIONS  Implementation  Nutrition Education: Role of RD, strategies for increasing intake.    Made comments for call center staff to allow pt to go over Na restriction for the meal if under for the day (i.e. If pt only going to order 1-2 meals, etc)    Goals  Patient to consume % of nutritionally adequate meal trays TID, or the equivalent with supplements/snacks.     Monitoring/Evaluation  Progress toward goals will be monitored and evaluated per protocol.    Sandi Rodriguez RD, LD, [facility]  CV[facility] Dietitian  Pager: [phone]

## 2017-06-21 NOTE — PLAN OF CARE
Problem: Goal Outcome Summary  Goal: Goal Outcome Summary  1) pt will be hemodynamically stable  2) pt will tolerate up titration of IV flolan   PT 4E: Per chart review and discussion with OT, pt lacking in skilled PT eval needs. SBA for mobility and without balance concerns.

## 2017-06-22 ENCOUNTER — APPOINTMENT (OUTPATIENT)
Dept: OCCUPATIONAL THERAPY | Facility: CLINIC | Age: 52
DRG: 286 | End: 2017-06-22
Payer: COMMERCIAL

## 2017-06-22 LAB
ALBUMIN SERPL-MCNC: 3.1 G/DL (ref 3.4–5)
ALP SERPL-CCNC: 53 U/L (ref 40–150)
ALT SERPL W P-5'-P-CCNC: 36 U/L (ref 0–70)
ANION GAP SERPL CALCULATED.3IONS-SCNC: 5 MMOL/L (ref 3–14)
AST SERPL W P-5'-P-CCNC: 21 U/L (ref 0–45)
BILIRUB DIRECT SERPL-MCNC: 0.3 MG/DL (ref 0–0.2)
BILIRUB SERPL-MCNC: 0.7 MG/DL (ref 0.2–1.3)
BUN SERPL-MCNC: 46 MG/DL (ref 7–30)
CALCIUM SERPL-MCNC: 8.5 MG/DL (ref 8.5–10.1)
CHLORIDE SERPL-SCNC: 97 MMOL/L (ref 94–109)
CO2 SERPL-SCNC: 32 MMOL/L (ref 20–32)
CREAT SERPL-MCNC: 1.83 MG/DL (ref 0.66–1.25)
ERYTHROCYTE [DISTWIDTH] IN BLOOD BY AUTOMATED COUNT: 21.9 % (ref 10–15)
GFR SERPL CREATININE-BSD FRML MDRD: 39 ML/MIN/1.7M2
GLUCOSE BLDC GLUCOMTR-MCNC: 170 MG/DL (ref 70–99)
GLUCOSE BLDC GLUCOMTR-MCNC: 186 MG/DL (ref 70–99)
GLUCOSE BLDC GLUCOMTR-MCNC: 228 MG/DL (ref 70–99)
GLUCOSE SERPL-MCNC: 124 MG/DL (ref 70–99)
HCT VFR BLD AUTO: 37.6 % (ref 40–53)
HGB BLD-MCNC: 12.3 G/DL (ref 13.3–17.7)
MAGNESIUM SERPL-MCNC: 2.4 MG/DL (ref 1.6–2.3)
MCH RBC QN AUTO: 28.7 PG (ref 26.5–33)
MCHC RBC AUTO-ENTMCNC: 32.7 G/DL (ref 31.5–36.5)
MCV RBC AUTO: 88 FL (ref 78–100)
PLATELET # BLD AUTO: 146 10E9/L (ref 150–450)
POTASSIUM SERPL-SCNC: 3.7 MMOL/L (ref 3.4–5.3)
PROT SERPL-MCNC: 5.4 G/DL (ref 6.8–8.8)
RBC # BLD AUTO: 4.29 10E12/L (ref 4.4–5.9)
SODIUM SERPL-SCNC: 134 MMOL/L (ref 133–144)
WBC # BLD AUTO: 12.6 10E9/L (ref 4–11)

## 2017-06-22 PROCEDURE — 99232 SBSQ HOSP IP/OBS MODERATE 35: CPT | Mod: GC | Performed by: INTERNAL MEDICINE

## 2017-06-22 PROCEDURE — 25000128 H RX IP 250 OP 636: Performed by: STUDENT IN AN ORGANIZED HEALTH CARE EDUCATION/TRAINING PROGRAM

## 2017-06-22 PROCEDURE — 00000146 ZZHCL STATISTIC GLUCOSE BY METER IP

## 2017-06-22 PROCEDURE — 25000125 ZZHC RX 250: Performed by: STUDENT IN AN ORGANIZED HEALTH CARE EDUCATION/TRAINING PROGRAM

## 2017-06-22 PROCEDURE — 83735 ASSAY OF MAGNESIUM: CPT | Performed by: INTERNAL MEDICINE

## 2017-06-22 PROCEDURE — 80076 HEPATIC FUNCTION PANEL: CPT | Performed by: INTERNAL MEDICINE

## 2017-06-22 PROCEDURE — 40000133 ZZH STATISTIC OT WARD VISIT: Performed by: OCCUPATIONAL THERAPIST

## 2017-06-22 PROCEDURE — 25000132 ZZH RX MED GY IP 250 OP 250 PS 637: Performed by: STUDENT IN AN ORGANIZED HEALTH CARE EDUCATION/TRAINING PROGRAM

## 2017-06-22 PROCEDURE — 25000132 ZZH RX MED GY IP 250 OP 250 PS 637: Performed by: INTERNAL MEDICINE

## 2017-06-22 PROCEDURE — 25000128 H RX IP 250 OP 636: Performed by: INTERNAL MEDICINE

## 2017-06-22 PROCEDURE — 97535 SELF CARE MNGMENT TRAINING: CPT | Mod: GO | Performed by: OCCUPATIONAL THERAPIST

## 2017-06-22 PROCEDURE — 85027 COMPLETE CBC AUTOMATED: CPT | Performed by: INTERNAL MEDICINE

## 2017-06-22 PROCEDURE — 97110 THERAPEUTIC EXERCISES: CPT | Mod: GO | Performed by: OCCUPATIONAL THERAPIST

## 2017-06-22 PROCEDURE — 27210995 ZZH RX 272: Performed by: STUDENT IN AN ORGANIZED HEALTH CARE EDUCATION/TRAINING PROGRAM

## 2017-06-22 PROCEDURE — 36415 COLL VENOUS BLD VENIPUNCTURE: CPT | Performed by: INTERNAL MEDICINE

## 2017-06-22 PROCEDURE — 21400006 ZZH R&B CCU INTERMEDIATE UMMC

## 2017-06-22 PROCEDURE — 80048 BASIC METABOLIC PNL TOTAL CA: CPT | Performed by: INTERNAL MEDICINE

## 2017-06-22 RX ORDER — FUROSEMIDE 40 MG
40 TABLET ORAL DAILY
Status: DISCONTINUED | OUTPATIENT
Start: 2017-06-22 | End: 2017-06-23 | Stop reason: HOSPADM

## 2017-06-22 RX ADMIN — CLONAZEPAM 1 MG: 1 TABLET ORAL at 22:41

## 2017-06-22 RX ADMIN — EPOPROSTENOL SODIUM 6.5 NG/KG/MIN: 1.5 INJECTION, POWDER, LYOPHILIZED, FOR SOLUTION INTRAVENOUS at 14:37

## 2017-06-22 RX ADMIN — URSODIOL 300 MG: 300 CAPSULE ORAL at 11:49

## 2017-06-22 RX ADMIN — PENICILLIN V POTASSIUM 500 MG: 500 TABLET, FILM COATED ORAL at 15:39

## 2017-06-22 RX ADMIN — ROSUVASTATIN CALCIUM 5 MG: 5 TABLET ORAL at 08:36

## 2017-06-22 RX ADMIN — PREDNISONE 20 MG: 20 TABLET ORAL at 08:36

## 2017-06-22 RX ADMIN — PANTOPRAZOLE SODIUM 40 MG: 40 TABLET, DELAYED RELEASE ORAL at 08:36

## 2017-06-22 RX ADMIN — EPOPROSTENOL SODIUM 6.5 NG/KG/MIN: 1.5 INJECTION, POWDER, LYOPHILIZED, FOR SOLUTION INTRAVENOUS at 19:28

## 2017-06-22 RX ADMIN — EPOPROSTENOL SODIUM 6.5 NG/KG/MIN: 1.5 INJECTION, POWDER, LYOPHILIZED, FOR SOLUTION INTRAVENOUS at 04:32

## 2017-06-22 RX ADMIN — VALACYCLOVIR HYDROCHLORIDE 500 MG: 500 TABLET, FILM COATED ORAL at 08:36

## 2017-06-22 RX ADMIN — ACETAMINOPHEN 975 MG: 325 TABLET, FILM COATED ORAL at 07:37

## 2017-06-22 RX ADMIN — FUROSEMIDE 40 MG: 40 TABLET ORAL at 14:42

## 2017-06-22 RX ADMIN — INSULIN ASPART 1 UNITS: 100 INJECTION, SOLUTION INTRAVENOUS; SUBCUTANEOUS at 20:01

## 2017-06-22 RX ADMIN — DIGOXIN 125 MCG: 125 TABLET ORAL at 08:34

## 2017-06-22 RX ADMIN — SENNOSIDES AND DOCUSATE SODIUM 1 TABLET: 8.6; 5 TABLET ORAL at 08:36

## 2017-06-22 RX ADMIN — URSODIOL 300 MG: 300 CAPSULE ORAL at 08:34

## 2017-06-22 RX ADMIN — FLUCONAZOLE 200 MG: 200 TABLET ORAL at 11:49

## 2017-06-22 RX ADMIN — EPOPROSTENOL SODIUM 6.5 NG/KG/MIN: 1.5 INJECTION, POWDER, LYOPHILIZED, FOR SOLUTION INTRAVENOUS at 09:29

## 2017-06-22 RX ADMIN — EPOPROSTENOL SODIUM: 0.5 INJECTION, POWDER, LYOPHILIZED, FOR SOLUTION INTRAVENOUS at 09:55

## 2017-06-22 RX ADMIN — FLUTICASONE PROPIONATE 2 PUFF: 220 AEROSOL, METERED RESPIRATORY (INHALATION) at 22:41

## 2017-06-22 RX ADMIN — POTASSIUM CHLORIDE 20 MEQ: 750 TABLET, EXTENDED RELEASE ORAL at 09:54

## 2017-06-22 RX ADMIN — EPOPROSTENOL SODIUM: 0.5 INJECTION, POWDER, LYOPHILIZED, FOR SOLUTION INTRAVENOUS at 04:33

## 2017-06-22 RX ADMIN — FLUTICASONE PROPIONATE 2 PUFF: 220 AEROSOL, METERED RESPIRATORY (INHALATION) at 08:36

## 2017-06-22 RX ADMIN — Medication 1500 MG: at 22:41

## 2017-06-22 RX ADMIN — SENNOSIDES AND DOCUSATE SODIUM 2 TABLET: 8.6; 5 TABLET ORAL at 20:00

## 2017-06-22 RX ADMIN — VITAMIN D, TAB 1000IU (100/BT) 2000 UNITS: 25 TAB at 08:34

## 2017-06-22 RX ADMIN — PENICILLIN V POTASSIUM 500 MG: 500 TABLET, FILM COATED ORAL at 08:36

## 2017-06-22 RX ADMIN — ATOVAQUONE 1500 MG: 750 SUSPENSION ORAL at 22:40

## 2017-06-22 RX ADMIN — TADALAFIL 40 MG: 20 TABLET, FILM COATED ORAL at 22:41

## 2017-06-22 RX ADMIN — GRANISETRON HYDROCHLORIDE 1 MG: 1 INJECTION INTRAVENOUS at 08:37

## 2017-06-22 RX ADMIN — URSODIOL 300 MG: 300 CAPSULE ORAL at 22:41

## 2017-06-22 RX ADMIN — EPOPROSTENOL SODIUM: 0.5 INJECTION, POWDER, LYOPHILIZED, FOR SOLUTION INTRAVENOUS at 19:29

## 2017-06-22 RX ADMIN — Medication 1500 MG: at 11:49

## 2017-06-22 NOTE — PLAN OF CARE
Problem: Goal Outcome Summary  Goal: Goal Outcome Summary  1) pt will be hemodynamically stable  2) pt will tolerate up titration of IV flolan   Outcome: No Change  Pt transferred from  around 7pm yesterday for Flolan initiation. Pt A/Ox4, VSS on 2-3L O2, CPAP overnight. Pt up with SBA, recent fall on 6/15/17 (syncopy). Pt has Flolan running at 6.5ng/kg/min through Queen, current tolerable rate. Pt voiding adequately. Continue to monitor and notify MD of questions or concerns.

## 2017-06-22 NOTE — PROGRESS NOTES
Cardiology Progress Note    Events and interval changes in past 24 hours:     On stable discharge dose of flolan 6.5 this AM, feeling better, good UOP.       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 6/14 TTE with moderate sized pericardial effusion.      # Syncope 6/15 in hospital-suspect low CO from RV dysfunction from severe PAH, query hemodynamic effect of pericardial effusion-less likely  # Dyspnea on exertion at home,reason for admission- likely from underlying PAH, moderate pleural effusion less likely   # Moderate pericardial effusion-etiology not clear, previously thought to be from serositis from GVHD vs sirolimus, ruled out pulsus paradoxsus SBP difference 5mmHg on 6/15  # Severe primary pulmonary hypertension.   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 The patient has had qiwcc-aecrkq-stcv disease with 2 flares after the original presentation that was with transaminitis. That was documented with liver biopsy. His other manifestations of chronic vdbav-folweo-ltmd disease include sclerotic skin changes, mucosal changes in his mouth, xerostomia and polyserositis as reflected by the mild to moderate pericardial effusion that has been  documented.   # Steroid induced diabetes  # Mild Leukocytosis  # Mild bump in cr, overall cr improving    PLAN:      Hold BB considering known RV failure with severe dilatation. On Dig for RV support.  Continue flolan and tadalafil, continue flolan at 6.5 this AM, Flolan running through [facility], approval still pending for flolan.   No plans for pericardial window as this may not be beneficial and will be high risk considering immunosuppression.  Fluid gain secondary to steroids, diuresed well and was net negative 2.7 L yesterday.  Completed five day burst dose of higher dose of prednisone, converted to 20 mg per day.   Insulin SSI   Clonazepam for anxiety.   Not on any diuretic at home, initiated lasix at 40 mg po daily.   Jakafi per BMT. Monitor for jakafi - CBC 2-4 weeks. Lipids every 8-12 weeks, renal hepatic function with lipids at follow up.     Discussed with Dr Vince Burton MD  Cardiology     OBJECTIVE FINDINGS:  Temp: 98.5  F (36.9  C) Temp  Min: 98.1  F (36.7  C)  Max: 98.6  F (37  C)  Resp: 18 Resp  Min: 8  Max: 31  SpO2: 96 % SpO2  Min: 93 %  Max: 99 %    No Data Recorded  Heart Rate: 86 Heart Rate  Min: 78  Max: 123  BP: 101/66 Systolic (24hrs), Av , Min:100 , Max:112   Diastolic (24hrs), Av, Min:66, Max:79    [place] in the RA, not able to get other numbers.     Gen: Patient is AOx3, in NAD. Appears comfortable.    HEENT: PER, EOMI, MMM  Resp: slight crackles at bases b/l otherwise clear  CV: RRR, normal S1/S2, JVP 10 cm, loud P2 and split P2  Abd: NT, ND  Ext: Trace edema, better than before.     0.2/2 today na    Intake/Output Summary (Last 24 hours) at 06/15/17 0546  Last data filed at 17 2200   Gross per 24 hour   Intake              840 ml   Output              550 ml   Net              290 ml       Vitals:    17 1530 17 1949 06/15/17 0550 17 1600   Weight: 124.7 kg (275 lb) 125.1 kg (275 lb 14.4 oz) 121.7 kg (268 lb 4.8 oz) 122.1 kg (269 lb 2.9  oz)    06/22/17 0611   Weight: 117.9 kg (260 lb)       - MEDICATION INSTRUCTIONS -       - MEDICATION INSTRUCTIONS -       - MEDICATION INSTRUCTIONS -       epoprostenol (FLOLAN) intravenous infusion 6.5 ng/kg/min (06/22/17 0432)        valACYclovir (VALTREX) tablet 500 mg  500 mg Oral Every Other Day     penicillin V potassium (VEETID) tablet 500 mg  500 mg Oral BID     predniSONE  20 mg Oral Daily     ruxolitinib  5 mg Oral BID     pantoprazole (PROTONIX) EC tablet 40 mg  40 mg Oral Daily     cholecalciferol  2,000 Units Oral Daily     granisetron  1 mg Intravenous Q12H     insulin aspart  1-7 Units Subcutaneous TID AC     insulin aspart  1-5 Units Subcutaneous At Bedtime     epoprostenol (FLOLAN) syringe change   Intravenous Q8H     valACYclovir  500 mg Oral Every Other Day     atovaquone  1,500 mg Oral Daily     calcium carbonate  1,500 mg Oral BID     clonazePAM (klonoPIN) tablet 1 mg  1 mg Oral At Bedtime     digoxin  125 mcg Oral Daily     fluconazole  200 mg Oral Daily     fluticasone  2 puff Inhalation BID     rosuvastatin (CRESTOR) tablet 5 mg  5 mg Oral Daily     tadalafil  40 mg Oral Daily     ursodiol (ACTIGALL) capsule 300 mg  300 mg Oral TID     sodium chloride (PF)  3 mL Intracatheter Q8H     senna-docusate  1-2 tablet Oral BID   magnesium plus protein, potassium chloride, potassium chloride, potassium chloride, potassium chloride with lidocaine, potassium chloride, magnesium sulfate, magnesium sulfate, oxyCODONE, alum & mag hydroxide-simethicone, ondansetron **OR** ondansetron, prochlorperazine **OR** prochlorperazine **OR** prochlorperazine, glucose **OR** dextrose **OR** glucagon, - MEDICATION INSTRUCTIONS -, - MEDICATION INSTRUCTIONS -, - MEDICATION INSTRUCTIONS -, acetaminophen, lidocaine, lidocaine 4%, sodium chloride (PF)    CMP    Recent Labs  Lab 06/21/17  1830 06/21/17  0610 06/20/17  2105 06/20/17  0431 06/19/17  0536  06/18/17  0413  06/17/17  0400    132* 133 135 134  < > 136  --   138   POTASSIUM 4.1 5.0 4.2 4.3 4.5  < > 4.4  < > 4.7   CHLORIDE 97 98 98 100 103  < > 105  --  107   CO2 30 26 24 25 22  < > 25  --  25   ANIONGAP 8 8 11 9 9  < > 6  --  6   * 139* 202* 152* 168*  < > 155*  --  184*   BUN 44* 42* 43* 35* 35*  < > 38*  --  41*   CR 1.75* 1.62* 1.75* 1.69* 1.56*  < > 1.74*  --  2.05*   GFRESTIMATED 41* 45* 41* 43* 47*  < > 41*  --  34*   GFRESTBLACK 50* 54* 50* 52* 57*  < > 50*  --  41*   PANFILO 8.9 8.5 9.0 9.3 9.2  < > 9.2  --  9.1   MAG  --  2.3  --  2.4* 2.6*  --  1.9  < > 2.0   PHOS  --   --   --   --   --   --  3.8  --  4.4   < > = values in this interval not displayed.  CBC    Recent Labs  Lab 06/21/17  0610 06/20/17  0431 06/19/17  0536 06/18/17  0413   WBC 13.9* 14.9* 12.0* 13.0*   RBC 4.46 4.70 4.58 4.40   HGB 13.2* 13.5 13.1* 12.6*   HCT 38.6* 41.7 40.5 39.3*   MCV 87 89 88 89   MCH 29.6 28.7 28.6 28.6   MCHC 34.2 32.4 32.3 32.1   RDW 21.8* 22.5* 22.4* 22.4*   * 157 190 188     INR  No lab results found in last 7 days.  Arterial Blood Gas    Recent Labs  Lab 06/18/17  1725 06/18/17  1210 06/18/17  0413 06/17/17  2237   O2PER 3LPM 21.0 5L 5L       RHC  1. HR 63 bpm  2. /80/96 mmHg  3. RA 9   4. /8  5. /40/62   6. PCW 12   7. PA sat 69.2%   8. PCW sat 94.9%  9. Hgb 14.4 g/dL   10. Carter CO 6.1   11. Carter CI 2.4   12. TD CO 5.8   13. TD CI 2.3   14. PVR 8.1  15. TPR 10.1  16. SVR  1140    Imaging and other studies:  EKG: NSR, biphasic t v2-v3  Echocardiogram: 6/15  Severe pulmonary hypertension is present.  Left ventricular function, chamber size, wall motion, and wall thickness are  normal.The EF is 55-60%.  Severe right ventricular dilation is present.Global right ventricular function  is mildly to moderately reduced.  Dilation of the inferior vena cava is present with abnormal respiratory  variation in diameter. Estimated right atrial pressure is > 15 mmHg.  Moderate pericardial effusion. Chamber compression is not present; there is no  evidence  for tamponade.  Effusion is larger than the last study, but has been present on recent  studies. No other change.    Chest x-ray: Impression:   1. Near complete resolution of previously seen perihilar and basilar  opacities.  2. Trace right pleural effusion. No significant left pleural effusion.  3. Stable enlargement of the cardiac silhouette.    RHC 6/16 BSA 2.6  RA 20/21/16   /20  /42/67   PCW 14/14/12   Carter CO 3.9   Carter CI 1.5   TD CO 4.1   TD CI 1.6   PA sat 55.0%   Hgb 13.5 g/dL   PVR 14.1  TPR 17.2    5/25/16      ECHO this admission:         Attending Attestation:  Patient seen and examined by me with the team. I have performed all pertinent elements of the physical examination and reviewed the note above. I have reviewed pertinent laboratory, echocardiographic, imaging, and cardiac catheterization results. I agree with the plan of care as described in this note.    Royce Clarke MD, PhD

## 2017-06-22 NOTE — PLAN OF CARE
Problem: Pulmonary Hypertension, Persistent (NICU)  Goal: Signs and Symptoms of Listed Potential Problems Will be Absent or Manageable (Pulmonary Hypertension, Persistent)  Signs and symptoms of listed potential problems will be absent or manageable by discharge/transition of care (reference Pulmonary Hypertension, Persistent (NICU) CPG).   Home Oxygen Assessment Tools  Patient's 02 sat on RA at rest 96% for 5 minutes. Patient's Sp02 96%, after 2 minutes of standing on the side of the bed. If patient's Sp02 at rest is less than 88%, then oxygen may be needed and must monitor patient's Sp02 with activity. Patient 02 97% sat on  RA with activity after 5 minutes. Patient's Sp02 97% on room air after 1 minute of rest.

## 2017-06-22 NOTE — TELEPHONE ENCOUNTER
Received call from [facility] stating PA has been approved for Flolan from 6/22/17 - 9/20/17. Approval Ref #: [id number]. [facility] has been notified of approval and will begin to process urgently.

## 2017-06-22 NOTE — PLAN OF CARE
Problem: Goal Outcome Summary  Goal: Goal Outcome Summary  1) pt will be hemodynamically stable  2) pt will tolerate up titration of IV flolan   Outcome: No Change  D/A/I:  Patient A&O x4, denied palpitations, dizziness, and nausea.  Reported mild CORREA, lung sounds clear to auscultation, no abnormal heart sounds noted.  Had +2 edema in legs and feet, had good urine output, see I&O flowsheet.  In sinus rhythm with HR 80s, SBP 100s, SaO2 97-99% on room air.  Flolan gtt continued at 6.5 ng/kg/min (6.33 ml/hr).  Potassium replaced per protocol.  Reported a headache that was decreased with acetaminophen.  Six-minute walk test performed during shift, see previous note for details.  Ambulated in room with standby assist, was steady on his feet.  Wife and son visited during shift.  [facility] RN provided flolan education in room, intends to return in the morning for follow-up education.     P:  Continue to monitor pain, VS, heart rhythm, fluid status, cardiac and respiratory status.  Notify care team of changes in patient condition or other concerns.  Expected to discharge in AM.

## 2017-06-22 NOTE — PROGRESS NOTES
Care Coordinator Progress Note     Admission Date/Time:  6/14/2017  Attending MD:  Dk Quan  Data  Chart reviewed, discussed with interdisciplinary team.   Patient was admitted for:    Pericardial effusion  Pulmonary hypertension (H)  Steroid-induced diabetes mellitus (H)  Ahakt-lqxeus-pobk disease (H).  Assessment  This writer spoke with Lona, nurse coordinator at [facility] (P: [phone], F: [phone]) who stated that they are going to start teaching today around 11am and the teaching RN will be Sommer. Pt updated with plan.     Plan  Anticipated Discharge Date: TBD  Anticipated Discharge Plan: Discharge to home with home infusion.  CC will continue to monitor patient's medical condition and progress towards discharge.  Blanca Monreal RN BSN  6C Unit Care Coordinator  Phone number: [phone]  Pager: [phone]    Addendum  This writer spoke with Ashtyn, at [facility] (P: [phone]) to check on prior auth. Per Ashtyn, prior auth was sent to [facility] yesterday and they have all necessary clinical documentation. Awaiting prior auth approval.

## 2017-06-23 ENCOUNTER — APPOINTMENT (OUTPATIENT)
Dept: OCCUPATIONAL THERAPY | Facility: CLINIC | Age: 52
DRG: 286 | End: 2017-06-23
Payer: COMMERCIAL

## 2017-06-23 VITALS
RESPIRATION RATE: 16 BRPM | HEART RATE: 86 BPM | DIASTOLIC BLOOD PRESSURE: 70 MMHG | BODY MASS INDEX: 30.53 KG/M2 | WEIGHT: 258.6 LBS | OXYGEN SATURATION: 94 % | SYSTOLIC BLOOD PRESSURE: 104 MMHG | HEIGHT: 77 IN | TEMPERATURE: 98.8 F

## 2017-06-23 PROBLEM — J96.01 ACUTE RESPIRATORY FAILURE WITH HYPOXIA (H): Status: ACTIVE | Noted: 2017-06-23

## 2017-06-23 LAB
ANION GAP SERPL CALCULATED.3IONS-SCNC: 7 MMOL/L (ref 3–14)
BUN SERPL-MCNC: 38 MG/DL (ref 7–30)
CALCIUM SERPL-MCNC: 8.4 MG/DL (ref 8.5–10.1)
CHLORIDE SERPL-SCNC: 99 MMOL/L (ref 94–109)
CO2 SERPL-SCNC: 28 MMOL/L (ref 20–32)
CREAT SERPL-MCNC: 1.56 MG/DL (ref 0.66–1.25)
ERYTHROCYTE [DISTWIDTH] IN BLOOD BY AUTOMATED COUNT: 22.1 % (ref 10–15)
GFR SERPL CREATININE-BSD FRML MDRD: 47 ML/MIN/1.7M2
GLUCOSE BLDC GLUCOMTR-MCNC: 124 MG/DL (ref 70–99)
GLUCOSE BLDC GLUCOMTR-MCNC: 164 MG/DL (ref 70–99)
GLUCOSE SERPL-MCNC: 100 MG/DL (ref 70–99)
HCT VFR BLD AUTO: 39.1 % (ref 40–53)
HGB BLD-MCNC: 12.7 G/DL (ref 13.3–17.7)
MAGNESIUM SERPL-MCNC: 2.4 MG/DL (ref 1.6–2.3)
MCH RBC QN AUTO: 28.4 PG (ref 26.5–33)
MCHC RBC AUTO-ENTMCNC: 32.5 G/DL (ref 31.5–36.5)
MCV RBC AUTO: 88 FL (ref 78–100)
PLATELET # BLD AUTO: 144 10E9/L (ref 150–450)
POTASSIUM SERPL-SCNC: 3.7 MMOL/L (ref 3.4–5.3)
RBC # BLD AUTO: 4.47 10E12/L (ref 4.4–5.9)
SODIUM SERPL-SCNC: 134 MMOL/L (ref 133–144)
WBC # BLD AUTO: 10.9 10E9/L (ref 4–11)

## 2017-06-23 PROCEDURE — 85027 COMPLETE CBC AUTOMATED: CPT | Performed by: INTERNAL MEDICINE

## 2017-06-23 PROCEDURE — 00000146 ZZHCL STATISTIC GLUCOSE BY METER IP

## 2017-06-23 PROCEDURE — 25000132 ZZH RX MED GY IP 250 OP 250 PS 637: Performed by: STUDENT IN AN ORGANIZED HEALTH CARE EDUCATION/TRAINING PROGRAM

## 2017-06-23 PROCEDURE — 25000128 H RX IP 250 OP 636: Performed by: STUDENT IN AN ORGANIZED HEALTH CARE EDUCATION/TRAINING PROGRAM

## 2017-06-23 PROCEDURE — 36415 COLL VENOUS BLD VENIPUNCTURE: CPT | Performed by: INTERNAL MEDICINE

## 2017-06-23 PROCEDURE — 97110 THERAPEUTIC EXERCISES: CPT | Mod: GO | Performed by: OCCUPATIONAL THERAPIST

## 2017-06-23 PROCEDURE — 25000132 ZZH RX MED GY IP 250 OP 250 PS 637: Performed by: INTERNAL MEDICINE

## 2017-06-23 PROCEDURE — 25000125 ZZHC RX 250: Performed by: STUDENT IN AN ORGANIZED HEALTH CARE EDUCATION/TRAINING PROGRAM

## 2017-06-23 PROCEDURE — 80048 BASIC METABOLIC PNL TOTAL CA: CPT | Performed by: INTERNAL MEDICINE

## 2017-06-23 PROCEDURE — 27210995 ZZH RX 272: Performed by: STUDENT IN AN ORGANIZED HEALTH CARE EDUCATION/TRAINING PROGRAM

## 2017-06-23 PROCEDURE — 83735 ASSAY OF MAGNESIUM: CPT | Performed by: INTERNAL MEDICINE

## 2017-06-23 PROCEDURE — 40000133 ZZH STATISTIC OT WARD VISIT: Performed by: OCCUPATIONAL THERAPIST

## 2017-06-23 PROCEDURE — 99238 HOSP IP/OBS DSCHRG MGMT 30/<: CPT | Mod: GC | Performed by: INTERNAL MEDICINE

## 2017-06-23 PROCEDURE — 97530 THERAPEUTIC ACTIVITIES: CPT | Mod: GO | Performed by: OCCUPATIONAL THERAPIST

## 2017-06-23 RX ORDER — FUROSEMIDE 40 MG
40 TABLET ORAL DAILY
Qty: 30 TABLET | Refills: 0 | Status: ON HOLD | OUTPATIENT
Start: 2017-06-23 | End: 2019-08-27

## 2017-06-23 RX ADMIN — EPOPROSTENOL SODIUM 6.5 NG/KG/MIN: 1.5 INJECTION, POWDER, LYOPHILIZED, FOR SOLUTION INTRAVENOUS at 00:55

## 2017-06-23 RX ADMIN — PANTOPRAZOLE SODIUM 40 MG: 40 TABLET, DELAYED RELEASE ORAL at 08:51

## 2017-06-23 RX ADMIN — ROSUVASTATIN CALCIUM 5 MG: 5 TABLET ORAL at 08:50

## 2017-06-23 RX ADMIN — EPOPROSTENOL SODIUM 6.5 NG/KG/MIN: 1.5 INJECTION, POWDER, LYOPHILIZED, FOR SOLUTION INTRAVENOUS at 10:23

## 2017-06-23 RX ADMIN — PREDNISONE 20 MG: 20 TABLET ORAL at 08:50

## 2017-06-23 RX ADMIN — FLUCONAZOLE 200 MG: 200 TABLET ORAL at 16:10

## 2017-06-23 RX ADMIN — ACETAMINOPHEN 650 MG: 325 TABLET, FILM COATED ORAL at 05:58

## 2017-06-23 RX ADMIN — EPOPROSTENOL SODIUM: 0.5 INJECTION, POWDER, LYOPHILIZED, FOR SOLUTION INTRAVENOUS at 10:23

## 2017-06-23 RX ADMIN — FLUTICASONE PROPIONATE 2 PUFF: 220 AEROSOL, METERED RESPIRATORY (INHALATION) at 08:49

## 2017-06-23 RX ADMIN — EPOPROSTENOL SODIUM 6.5 NG/KG/MIN: 1.5 INJECTION, POWDER, LYOPHILIZED, FOR SOLUTION INTRAVENOUS at 05:40

## 2017-06-23 RX ADMIN — PENICILLIN V POTASSIUM 500 MG: 500 TABLET, FILM COATED ORAL at 08:50

## 2017-06-23 RX ADMIN — SENNOSIDES AND DOCUSATE SODIUM 2 TABLET: 8.6; 5 TABLET ORAL at 08:50

## 2017-06-23 RX ADMIN — POTASSIUM CHLORIDE 20 MEQ: 750 TABLET, EXTENDED RELEASE ORAL at 16:10

## 2017-06-23 RX ADMIN — PENICILLIN V POTASSIUM 500 MG: 500 TABLET, FILM COATED ORAL at 16:09

## 2017-06-23 RX ADMIN — URSODIOL 300 MG: 300 CAPSULE ORAL at 16:09

## 2017-06-23 RX ADMIN — VITAMIN D, TAB 1000IU (100/BT) 2000 UNITS: 25 TAB at 08:51

## 2017-06-23 RX ADMIN — FUROSEMIDE 40 MG: 40 TABLET ORAL at 08:50

## 2017-06-23 RX ADMIN — INSULIN ASPART 1 UNITS: 100 INJECTION, SOLUTION INTRAVENOUS; SUBCUTANEOUS at 16:14

## 2017-06-23 RX ADMIN — EPOPROSTENOL SODIUM: 0.5 INJECTION, POWDER, LYOPHILIZED, FOR SOLUTION INTRAVENOUS at 00:56

## 2017-06-23 RX ADMIN — URSODIOL 300 MG: 300 CAPSULE ORAL at 08:50

## 2017-06-23 RX ADMIN — DIGOXIN 125 MCG: 125 TABLET ORAL at 08:51

## 2017-06-23 RX ADMIN — Medication 1500 MG: at 16:09

## 2017-06-23 ASSESSMENT — PAIN DESCRIPTION - DESCRIPTORS: DESCRIPTORS: ACHING

## 2017-06-23 NOTE — PROGRESS NOTES
Care Coordinator- Discharge Planning     Admission Date/Time:  6/14/2017  Attending MD:  Dk Quan  Data  Date of initial CC assessment: 6/21/2017  Chart reviewed, discussed with interdisciplinary team.   Patient was admitted for:   1. Xnmnl-vfrfpa-ojsz disease (H)    2. Pericardial effusion    3. Pulmonary hypertension (H)    4. Steroid-induced diabetes mellitus (H)         Assessment  Full assessment completed in previous note  This writer spoke with [employer] (P: [phone]) to check on prior auth. Per Ashtyn, prior auth was approved and they have shipped the home supply of medication for patient to discharge today.  This writer spoke with [facility] home teaching RN who stated that she is going to do some additional teaching with pt and family and get him hooked up to his home pump.  Intervention  Arrangements made with [facility] (P: [phone], F: [phone]) for home IV Flolan infusion and Queen line supplies.  Plan  Anticipated Discharge Date: 6/23/2017  Anticipated Discharge Plan: Discharge to home with home infusion  Blanca Monreal RN BSN  6C Unit Care Coordinator  Phone number: [phone]  Pager: [phone]

## 2017-06-23 NOTE — PROGRESS NOTES
"SPIRITUAL HEALTH SERVICES  SPIRITUAL ASSESSMENT Progress Note  [facility]     REFERRAL SOURCE: Initial visit    Spoke briefly with patient's wife and on, while patient was out of the room. Wife expressed significant stress about studying how to follow all of the patient's home care procedures, and the pressure of needing to pass the test in order for the patient to go home. Wife expressed plan to follow all procedures with  and son together, until they are comfortable enough to do it one on one. Wife expressed the challenge of being away from home for so long and significant fear and disgust about \"the Libertarian healthcare bill\" because her 's medications cost \"$33,000 a month, so if this thing goes through, I don't know what we'll do. It's not like he can just stop taking them.\" Patient's son expressed similar exasperation with the effect of politics on his dad's health care.    PLAN: No follow up needed at this time. Patient expected to be discharged today.    Domitila Morgan   Intern  Pager [phone]  "

## 2017-06-23 NOTE — DISCHARGE SUMMARY
Cardiology Discharge Summary  Olivier Gómez MRN: [id number]  1965  Home clinic: [facility]  Primary care provider: Adelso Delgado  ___________________________________          Date of Admission:  6/14/2017  Date of Discharge:  6/23/2017  Admitting Physician:  Dk Quan MD  Discharge Physician:  Royce Clarke MD, PhD  Discharging Service:  Cardiology 2     Primary Provider: Adelso Delgado         Reason for Admission:   Shortness of breath, Flolan initiation          Discharge Diagnosis:   Acute hypoxemic respiratory failure  Acute diastolic heart failure  Severe PAH with RV dysfuntion  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  Left ventricular function, chamber size, wall motion, and wall thickness are normal.The EF is 55-60%.  Severe right ventricular dilation is present. Global right ventricular function is mildly to moderately reduced.  Dilation of the inferior vena cava is present with abnormal respiratory variation in diameter. Estimated right atrial pressure is > 15 mmHg.  Moderate pericardial effusion. Chamber compression is not present; there is no evidence for tamponade.  Effusion is larger than the last study, but has been present on recent studies. No other change.    Queen Placement  Findings: Completed placement of 5 Icelandic, 30.5 cm single lumen tunneled central venous catheter via left IJ. Priming volume of 0.47 mL. Aspirates and flushes freely, heparin locked and ready for immediate use. No immediate complication    CT Head w/o contrast  Impression: No  acute intracranial pathology.    CT C-spine  Impression:    1. No acute fracture or subluxation.  2. Mild to moderate degenerative changes causing multilevel neural  foraminal narrowing as above.         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 He did not tolerate 7 ng/kg/min, so will remain at 6.5. Queen was placed 6/20. Prior to discharge, he underwent 6 minute walk without any hypoxia. He also passed flolan training prior to discharge. He will continue on tadalafil 40 mg qday and digoxin 125 mcg qday, but his metoprolol was held this admission and discontinued upon discharge. This can be revisited during his outpatient visit with Dr. Quan on 7/18.    # Acute hypoxic respiratory failure  # Acute diastolic heart failure  Suspect this is related to mild pulmonary edema and elevated right sided pressures. Resolved with diuresis. He was discharged on lasix 40 mg qday. He will have PCP follow up with BMP in 1-2 weeks.     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 He will need CBC every 2-4 weeks, lipids every 8-12 weeks, and CMP at follow up. He underwent a steroid burst during the admission, but was tapered down to prednisone 20 mg qday at prior to discharge. Otherwise, he was continued on his preadmission prophylaxis of penicillin, atovaquone, fluconazole, and valganciclovir. He was also continued on his ursodiol. He will arrange follow up with Dr. Martin at [facility].       # Syncope 6/15, in hospital  Possibly due to low CO in setting of RV dysfunction. CT Head and CT C-spine negative. No recurrent episodes.     # Moderate Pericardial Effusion, without tamponade  Consider drain placement if worsening or signs of tamponade physiology.       # BRANDYN on CKD 3, resolved  Secondary to cardiorenal. Improved with  "diuresis.    Physical Exam on day of Discharge:  Blood pressure 104/70, pulse 86, temperature 98.8  F (37.1  C), temperature source Oral, resp. rate 16, height 1.956 m (6' 5\"), weight 117.3 kg (258 lb 9.6 oz), SpO2 94 %.    General: lying in bed, resting, comfortable  CV: regular, 3/6 systolic systolic murmur at the LLSB  Chest: clear bilaterally  Abdomen: Soft, nontender, nondistended. +BS.  Extremities: 1+ edema to mid shin  Neuro:  Face symmetric, moving all extremities without focal deficit    Lines/Tubes:  Left IJ Queen         Pending Results:   None          Discharge Medications:     Current Discharge Medication List      START taking these medications    Details   glycine diluent 32.175 mL with epoprostenol 247.5 mcg infusion Inject 791.05 ng/min into the vein continuous    Associated Diagnoses: Pulmonary hypertension (H)      furosemide (LASIX) 40 MG tablet Take 1 tablet (40 mg) by mouth daily  Qty: 30 tablet, Refills: 0    Associated Diagnoses: Pulmonary hypertension (H)      ruxolitinib (JAKAFI) 5 MG TABS tablet CHEMO Take 1 tablet (5 mg) by mouth 2 times daily  Qty: 60 tablet, Refills: 0    Associated Diagnoses: Pericardial effusion; Pulmonary hypertension (H); Qrjbt-baiwmg-bfaq disease (H)         CONTINUE these medications which have NOT CHANGED    Details   VALACYCLOVIR HCL PO Take 500 mg by mouth every other day      Specialty Vitamins Products (MAGNESIUM PLUS PROTEIN) 133 MG tablet Take 266 mg by mouth daily (2 tablets)      insulin aspart (NOVOLOG FLEXPEN) 100 UNIT/ML injection Inject 5 Units Subcutaneous 3 times daily (with meals) Plus SS      insulin detemir (LEVEMIR FLEXPEN/FLEXTOUCH) 100 UNIT/ML injection Inject 10 Units Subcutaneous every morning       digoxin (LANOXIN) 125 MCG tablet Take 1 tablet (125 mcg) by mouth daily  Qty: 90 tablet, Refills: 3    Associated Diagnoses: Pulmonary hypertension (H)      Penicillin V Potassium (PEN-VEE K OR) Take 500 mg by mouth 2 times daily    "   fluticasone (FLOVENT HFA) 220 MCG/ACT Inhaler Inhale 2 puffs into the lungs 2 times daily      FLUCONAZOLE PO Take 200 mg by mouth daily      Ursodiol (ACTIGALL PO) Take 300 mg by mouth 3 times daily      VITAMIN D, CHOLECALCIFEROL, PO Take 2,000 Units by mouth 3 times daily       CLONAZEPAM PO Take 1 mg by mouth At Bedtime       calcium carbonate (OS-PANFILO 500 MG [place]. CA) 500 MG tablet Take 600 mg by mouth 2 times daily      Pantoprazole Sodium (PROTONIX PO) Take 40 mg by mouth daily       Rosuvastatin Calcium (CRESTOR PO) Take 5 mg by mouth daily       atovaquone (MEPRON) 750 MG/5ML suspension Take 1,500 mg by mouth daily      PREDNISONE PO Take 20 mg by mouth daily      tadalafil (CIALIS) 20 MG tablet Take 2 tablets (40 mg) by mouth daily  Qty: 60 tablet, Refills: 11    Associated Diagnoses: Pulmonary hypertension (H)      blood glucose monitoring (ONE TOUCH ULTRA) test strip Use to test blood sugars 4 times daily or as directed.  Qty: 100 strip, Refills: 11    Associated Diagnoses: Steroid-induced diabetes mellitus (H)         STOP taking these medications       metoprolol (TOPROL-XL) 25 MG 24 hr tablet Comments:   Reason for Stopping:                    Discharge Instructions and Follow-Up:     Discharge Procedure Orders  CARDIAC REHAB REFERRAL   Referral Type: Rehab Therapy Cardiac Therapy     Home infusion referral     Reason for your hospital stay   Order Comments: You were admitted for shortness of breath. This improved with initiation of flolan and removal of some fluid with diuretics.     Activity   Order Comments: Your activity upon discharge: activity as tolerated   Order Specific Question Answer Comments   Is discharge order? Yes      IV access   Order Comments: **Ordering Provider MUST call/page Care Coordinator/ to discuss arranging this service**    You are going home with the following vascular access device: Queen.     Discharge Instructions   Order Comments: Please start taking  lasix 40 mg every day. This prescription was sent to [facility] in [place]. Please follow up with your PCP in about 1 week with labs to check you kidney function and potassium. You will also need to have your CMV PCR checked every 2 weeks, while you are on the ruxolitinib (Jakafi). This can be done at you home clinic and forwarded to your oncologist at [facility]. You will also follow up with pulmonary hypertension clinic in 1-2 weeks. You will be contacted to schedule this appointment.     Follow Up and recommended labs and tests   Order Comments: Follow up with primary care provider, Adelso Delgado, within 7 days to evaluate medication change (lasix 40 mg qday).  The following labs/tests are recommended: CMP, lipids, CBC. He will also need monitoring labs while on Jakafi (ruxolitinib). CBC 2-4 weeks. Lipids every 8-12 weeks. CMV PCR every 2 weeks.    Please arrange follow up with Dr. Martin at [facility].    Will also have follow up with pulmonary hypertension clinic in 1-2 weeks that will be arranged post-discharge.     Full Code     Diet   Order Comments: Follow this diet upon discharge: Orders Placed This Encounter     Fluid restriction 1500 ML FLUID     2 Gram Sodium Diet   Order Specific Question Answer Comments   Is discharge order? Yes         IV access: Left IJ Queen            Discharge Disposition:   Home         Condition on Discharge:   Discharge condition: Good   Code status on discharge: Full Code      Date of service: 6/23/2017  The patient was discussed with Dr. Clarke, who is in agreement of above plan    Earl Arreguin, PGY-2  Internal Medicine  [phone]    Attending Attestation:  Patient seen and examined by me and the team on the day of discharge. Please refer to the discharge summary for further details. The plan of care was discussed with the patient; the medications were reviewed and any changes explained. The patient is discharged to home. Follow up appointments have been  arranged for the patient.    Royce Clarke MD, PhD

## 2017-06-23 NOTE — PROGRESS NOTES
-Pt and family receiving education from [facility] for home infusion cares.   -Pt and family also to receive education for home care of infusion access port.  -Pt and family reviewed and understood his discharge orders, instructions, follow-up appointments and prescriptions.  -Pt wishes to have his prescriptions filled at the hospital pharmacy.

## 2017-06-23 NOTE — PLAN OF CARE
Problem: Goal Outcome Summary  Goal: Goal Outcome Summary  1) pt will be hemodynamically stable  2) pt will tolerate up titration of IV flolan   Patient has been assessed for Home Oxygen needs. Oxygen readings:     *Pulse oximetry (SpO2) = 95% on room air at rest while awake.     *SpO2 improved to NA% on RAliters/minute at rest.     *SpO2 = 92-96% on room air during activity/with exercise.     *SpO2 improved to *NA% on RA liters/minute during activity/with exercise.    OT/CR:  Pt. tolerated amb. 23min. with 1 seated rest bt.stairs task.  Pt. completed up/down 4 stairs X 3 sets with SBA.  O2 throughout session on RA ranging 92%-96%.  Recommend d/c home with A prn and OP CR phase II.

## 2017-06-23 NOTE — PLAN OF CARE
Problem: Goal Outcome Summary  Goal: Goal Outcome Summary  1) pt will be hemodynamically stable  2) pt will tolerate up titration of IV flolan   Outcome: No Change  D: Pt with 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 6/14 TTE with moderate sized pericardial effusion. Pt here for Flolan gtt initiation.     A/I: Pt is A&Ox4. Pt VSS on RA. Pt is up with SBA. Pt is voiding adequate amounts in urinal overnight. Pt is able to make needs known and uses call light appropriately. Pt denies pain, nausea, and SOB. Pt lung sounds clear. Pt has +2 edema in legs and feet. Pt in SR rates 70s-80s overnight. Flolan gtt continues at 6.5 ng/kg/min (6.33 ml/hr). This morning at 0600 pt reports headache and received Acetaminophen PRN. Pt awake and studying materials for home Flolan gtt care.     P: [facility] will be back around 0900 to continue teaching regarding Flolan gtt at home. Pt Also needs teaching on Queen dressing change process. Pt is allergic to Tegaderm and specific supplies for this are in room. RN will continue to monitor and assess. RN will update team with any changes/concerns. Lisa Molina

## 2017-06-23 NOTE — CONSULTS
Olivier and his wife were seen for central line dressing changes. They were quite familiar with the process from having done this in the past. We were able to review s/s of infection and when to notify a health care provider and they answered questions about the process. They were given all the written material for the class.

## 2017-06-24 NOTE — PLAN OF CARE
Problem: Goal Outcome Summary  Goal: Goal Outcome Summary  1) pt will be hemodynamically stable  2) pt will tolerate up titration of IV flolan   Occupational Therapy Discharge Summary     Reason for therapy discharge:    Discharged to home with outpatient therapy.     Progress towards therapy goal(s). See goals on Care Plan in [facility] electronic health record for goal details.  Goals partially met.  Barriers to achieving goals:   discharge from facility.     Therapy recommendation(s):    Continued therapy is recommended.  Rationale/Recommendations:  CR for building functional endurance. .

## 2017-07-07 ENCOUNTER — CARE COORDINATION (OUTPATIENT)
Dept: CARDIOLOGY | Facility: CLINIC | Age: 52
End: 2017-07-07

## 2017-07-07 DIAGNOSIS — I27.20 PULMONARY HYPERTENSION (H): ICD-10-CM

## 2017-07-07 DIAGNOSIS — D89.813 GRAFT-VERSUS-HOST DISEASE (H): ICD-10-CM

## 2017-07-07 DIAGNOSIS — I31.39 PERICARDIAL EFFUSION: ICD-10-CM

## 2017-07-07 RX ORDER — TADALAFIL 20 MG/1
40 TABLET ORAL DAILY
Qty: 60 TABLET | Refills: 11 | Status: SHIPPED | OUTPATIENT
Start: 2017-07-07 | End: 2018-06-11

## 2017-07-07 NOTE — PROGRESS NOTES
Problem: Pt called the triage line requesting refills on the Adcirca (tadalafil) and the Jakafi (ruxolitinib) as well as questions regarding increasing his Flolan (epoprostenol sodium).     Background: Pt was recently D/C from the hospital on these new medications.      Assessment: Pt is only experiencing slight jaw pain when he eats.  He is still getting a little tired and cant walk to far.  Still feels a little weak.     Intervention: Per Dr. Quan we are going to increase the Flolan (epoprostenol sodium) to 1 ng/kg/min twice a week to a goal of 20 ng/kg/min.    Response: I called [facility] and gave the above order for the Flolan (epoprostenol sodium) titrations as well as followed up on the script for Adcirca (tadalafil) and Jakafi (ruxolitinib).  I also called pt and updated him with the above orders.  I have answered all questions and addressed all concerns.

## 2017-07-07 NOTE — PROGRESS NOTES
Patient's spouse calls in stating they need the followin. Should he increase Flolan from 6.5 to 7?  2. They need the Rx for Adcirca 50mg daily and Jakafi 5mg BID faxed to [facility] at [phone].      Routed to Dr. Quan's PHTN RNCC for follow up. Can call patient's cell.

## 2017-07-19 ENCOUNTER — PRE VISIT (OUTPATIENT)
Dept: CARDIOLOGY | Facility: CLINIC | Age: 52
End: 2017-07-19

## 2017-07-19 ENCOUNTER — OFFICE VISIT (OUTPATIENT)
Dept: CARDIOLOGY | Facility: CLINIC | Age: 52
End: 2017-07-19
Attending: INTERNAL MEDICINE
Payer: COMMERCIAL

## 2017-07-19 VITALS
WEIGHT: 254.1 LBS | SYSTOLIC BLOOD PRESSURE: 97 MMHG | OXYGEN SATURATION: 98 % | DIASTOLIC BLOOD PRESSURE: 67 MMHG | BODY MASS INDEX: 30 KG/M2 | HEIGHT: 77 IN

## 2017-07-19 DIAGNOSIS — I27.20 PULMONARY HYPERTENSION (H): Primary | ICD-10-CM

## 2017-07-19 DIAGNOSIS — R06.09 DYSPNEA ON EXERTION: ICD-10-CM

## 2017-07-19 PROCEDURE — 99214 OFFICE O/P EST MOD 30 MIN: CPT | Mod: 24 | Performed by: INTERNAL MEDICINE

## 2017-07-19 PROCEDURE — 99212 OFFICE O/P EST SF 10 MIN: CPT | Mod: ZF

## 2017-07-19 ASSESSMENT — PAIN SCALES - GENERAL: PAINLEVEL: NO PAIN (0)

## 2017-07-19 NOTE — TELEPHONE ENCOUNTER
Return Pulmonary Hypertension Patient Form       Patient Name: Olivier Gómez Age: 52M [birth year] MRN: [id number]   Todays Provider: Avelina    Appt: 7/19/2017             PH Medications: Revatio, Flolan 10 ng/kg/min (titrating by 1ng/kg/min twice weekly goal of 20) Pump rate 58ml)             Todays Testing: N/A                  Patient Update: Pt was last seen on 6/7/17 by Dr. Quan, at that time we started him on Opsumit, stopped the nifedipine and requested he follow up in 6-8 weeks.   Since that time he called in on 6/12/17 stating he was feeling more tied and not himself, the opsumit was stopped due to the elevated LFTs.  He called in a few days later still not feeling well and was admitted to the ED.  During his stay there he was started on IV flolan and jakafi.  He started ruxolitinib this admission. He will need CBC every 2-4 weeks, lipids every 8-12 weeks, and CMP at follow up.

## 2017-07-19 NOTE — PATIENT INSTRUCTIONS
Medication Changes:  No medication changes at this time. Please continue current medication regiment.     Patient Instructions:      Follow up Appointment Information:  6 weeks with Dr. Quan and Echocardiogram    We are located on the third floor of [facility] on the [facility].  Our address is     [address] [place] [address] [place] [address] [place] [address]      Thank you for allowing us to be a part of your care here at the [facility]    If you have questions or concerns please contact us at:    Marlys Anderson RN, BSN    Kai Gant (Schedule,P.A.)  Nurse Coordinator     Clinic   Pulmonary Hypertension   Pulmonary Hypertension  [facility]  (P)[phone]    (P) [phone]        (F)[phone]                ** Please note that you will NOT receive a reminder call regarding your scheduled testing, reminder calls are for provider appointments only.  If you are scheduled for testing within the [facility] system you may receive a call regarding pre-registration for billing purposes only.**     Remember to weigh yourself daily after voiding and before you consume any food or beverages and log the numbers.  If you have gained/lost 2 pounds overnight or 5 pounds in a week contact us immediately for medication adjustments or further instructions.  **Please call us immediately if you have any syncope, chest pain, edema, or decline in your functional status.

## 2017-07-19 NOTE — PROGRESS NOTES
"Dk Quan M.D.  Cardiovascular Medicine    I personally saw and examined this patient, discussed care with housestaff and other consultants, reviewed current laboratories and imaging studies, and conveyed impression and diagnostic/therapeutic plan to patient.        History  Patient returns for follow-up with increasing exercise tolerance in proportion to increase in Remodulin, though still shortness of breath persist.  He continues to have exercise intolerance and fatigue.  Weights are down 25 pounds Sclerodema has improved    REVIEW of SYMPTOMS    Constitutional: without fever, chills, night sweats.    HEENT: without dry eyes, dry mouth, sinusitis, corryza, visual changes  Endocrine: without polyuria, polydypsia, polyphagia, heat or cold intolerance, changing mental status  Cardiology: without chest pain, tightness, heaviness, pressure, paroxysmal dyspnea, orthopnea, palpitation, pre-syncope or syncope or device discharge (if present)  Pulmonary: without asthma, wheezing, cough, hemoptysis  GI: without nausea, emesis, jaundice, pain, hematemesis, melena  : without frequency, urgency, hematuria, stones, pain, abnormal bleeding, frequency, urgency  Neurologic: without TIA, CVA, trauma, seizure  Dermatologic: without lesions, abrasion rash,   Orthopedic/Rheum: without significant joint pain, impairment, limb, polyserositis, ulceration, Raynauds  Heme: without mass, bruising, frequent infection, anemia  Psychiatric: without substance abuse, hallucination, medication, depression    Exercise tolerance: limited      Objective  BP 97/67  Ht 6' 5\" (1.956 m)  Wt 254 lb 1.6 oz (115.3 kg)  SpO2 98%  BMI 30.13 kg/m2  Constitutional: alert, oriented, normal gait and station, normal mentation.  Oral: moist mucous membrans  Lymph: without pathologic adenopathy  Chest: clear to ausculation and percussion  Cor: No evidence of left or right ventricular activity.  Rhythm is regular.  S1 normal, S2 split physiologically. " Murmurs are not present  Abdomen: without tenderness, rebound, guarding, masses, ascites  Extremities: Edema not present  Neuro: no focal defects, normal mentation  Skin: without open lesions  Psych: oriented, verbal, mental status in tact  Wt Readings from Last 5 Encounters:   17 254 lb 1.6 oz (115.3 kg)   17 258 lb 9.6 oz (117.3 kg)   17 269 lb (122 kg)   05/15/17 275 lb (124.7 kg)   17 275 lb 11.2 oz (125.1 kg)       Meds  Current Outpatient Prescriptions   Medication     ruxolitinib (JAKAFI) 5 MG TABS tablet CHEMO     tadalafil, PAH, (ADCIRCA) 20 MG TABS     glycine diluent 32.175 mL with epoprostenol 247.5 mcg infusion     furosemide (LASIX) 40 MG tablet     VALACYCLOVIR HCL PO     Specialty Vitamins Products (MAGNESIUM PLUS PROTEIN) 133 MG tablet     insulin aspart (NOVOLOG FLEXPEN) 100 UNIT/ML injection     insulin detemir (LEVEMIR FLEXPEN/FLEXTOUCH) 100 UNIT/ML injection     PREDNISONE PO     digoxin (LANOXIN) 125 MCG tablet     blood glucose monitoring (ONE TOUCH ULTRA) test strip     Penicillin V Potassium (PEN-VEE K OR)     fluticasone (FLOVENT HFA) 220 MCG/ACT Inhaler     FLUCONAZOLE PO     Ursodiol (ACTIGALL PO)     VITAMIN D, CHOLECALCIFEROL, PO     CLONAZEPAM PO     calcium carbonate (OS-PANFILO 500 MG [place]. CA) 500 MG tablet     Pantoprazole Sodium (PROTONIX PO)     Rosuvastatin Calcium (CRESTOR PO)     atovaquone (MEPRON) 750 MG/5ML suspension     No current facility-administered medications for this visit.          Labs    Reviewed outside laboratories;      Imaging     [facility]  Echocardiography Laboratory  [address] [place] [address]     Name: BENEDICT ESTES  MRN: [id number]  : 1965  Study Date: 2017 04:35 PM  Age: 52 yrs  Gender: Male  Patient Location: [facility]  Reason For Study: Pericardial Effusion, PHTN  Ordering Physician: BHAKTI LONGO  Performed By: [facility] Deidra Poe     BSA: 2.6 m2  Height: 77 in  Weight:  275 lb  BP: 121/72 mmHg  _____________________________________________________________________________  __        Procedure  Limited Echocardiogram with portions of two-dimensional, color and spectral  Doppler performed.  _____________________________________________________________________________  __        Interpretation Summary  Severe pulmonary hypertension is present.  Left ventricular function, chamber size, wall motion, and wall thickness are  normal.The EF is 55-60%.  Severe right ventricular dilation is present.Global right ventricular function  is mildly to moderately reduced.  Dilation of the inferior vena cava is present with abnormal respiratory  variation in diameter. Estimated right atrial pressure is > 15 mmHg.  Moderate pericardial effusion. Chamber compression is not present; there is no  evidence for tamponade.  Effusion is larger than the last study, but has been present on recent  studies. No other change.  _____________________________________________________________________________  __        Left Ventricle  Left ventricular function, chamber size, wall motion, and wall thickness are  normal.The EF is 55-60%. Flattened septum is consistent with right ventricular  pressure and volume overload.     Right Ventricle  Severe right ventricular dilation is present. Global right ventricular  function is mildly to moderately reduced.     Atria  The left atrium appears normal. Severe right atrial enlargement is present.        Mitral Valve  The mitral valve is normal.     Aortic Valve  Aortic valve is normal in structure and function.     Tricuspid Valve  Moderate tricuspid insufficiency is present. The right ventricular systolic  pressure is approximated at 95.6 mmHg plus the right atrial pressure. Severe  pulmonary hypertension is present.     Pulmonic Valve  Trace to mild pulmonic insufficiency is present.     Vessels  The aorta root is normal. Dilation of the inferior vena cava is present  with  abnormal respiratory variation in diameter. Estimated right atrial pressure is  > 15 mmHg.     Pericardium  Chamber compression is not present; there is no evidence for tamponade.     _____________________________________________________________________________  __           Doppler Measurements & Calculations  PA V2 max: 52.5 cm/sec  PA max P.1 mmHg  PA acc time: 0.09 sec  TR max nieves: 489.0 cm/sec  TR max P.6 mmHg        Assessment/Plan     1. Increase remodulin 1ng/kg/min two times per week

## 2017-07-19 NOTE — NURSING NOTE
Chief Complaint   Patient presents with     Follow Up For     Return for PH F/U     Vitals were taken and medications were reconciled.    Ele Lopez,RMA  12:56 PM

## 2017-07-19 NOTE — NURSING NOTE
Med Reconcile: Reviewed and verified all current medications with the patient. The updated medication list was printed and given to the patient.  Return Appointment: Patient given instructions regarding scheduling next clinic visit. Patient demonstrated understanding of this information and agreed to call with further questions or concerns.  Patient stated he understood all health information given and agreed to call with further questions or concerns.     Medication Changes:  No medication changes at this time. Please continue current medication regiment.     Patient Instructions:      Follow up Appointment Information:  6 weeks with Dr. Quan and Echocardiogram

## 2017-07-19 NOTE — LETTER
"7/19/2017      RE: Olivier Gómez  [address]  [place] [facility] [address]       Dear Colleague,    Thank you for the opportunity to participate in the care of your patient, Olivier Gómez, at the [facility]. Please see a copy of my visit note below.    Dk Quan M.D.  Cardiovascular Medicine    I personally saw and examined this patient, discussed care with housestaff and other consultants, reviewed current laboratories and imaging studies, and conveyed impression and diagnostic/therapeutic plan to patient.        History  Patient returns for follow-up with increasing exercise tolerance in proportion to increase in Remodulin, though still shortness of breath persist.  He continues to have exercise intolerance and fatigue.  Weights are down 25 pounds Sclerodema has improved    REVIEW of SYMPTOMS    Constitutional: without fever, chills, night sweats.    HEENT: without dry eyes, dry mouth, sinusitis, corryza, visual changes  Endocrine: without polyuria, polydypsia, polyphagia, heat or cold intolerance, changing mental status  Cardiology: without chest pain, tightness, heaviness, pressure, paroxysmal dyspnea, orthopnea, palpitation, pre-syncope or syncope or device discharge (if present)  Pulmonary: without asthma, wheezing, cough, hemoptysis  GI: without nausea, emesis, jaundice, pain, hematemesis, melena  : without frequency, urgency, hematuria, stones, pain, abnormal bleeding, frequency, urgency  Neurologic: without TIA, CVA, trauma, seizure  Dermatologic: without lesions, abrasion rash,   Orthopedic/Rheum: without significant joint pain, impairment, limb, polyserositis, ulceration, Raynauds  Heme: without mass, bruising, frequent infection, anemia  Psychiatric: without substance abuse, hallucination, medication, depression    Exercise tolerance: limited      Objective  BP 97/67  Ht 6' 5\" (1.956 m)  Wt 254 lb 1.6 oz (115.3 kg)  SpO2 98%  BMI " 30.13 kg/m2  Constitutional: alert, oriented, normal gait and station, normal mentation.  Oral: moist mucous membrans  Lymph: without pathologic adenopathy  Chest: clear to ausculation and percussion  Cor: No evidence of left or right ventricular activity.  Rhythm is regular.  S1 normal, S2 split physiologically. Murmurs are not present  Abdomen: without tenderness, rebound, guarding, masses, ascites  Extremities: Edema not present  Neuro: no focal defects, normal mentation  Skin: without open lesions  Psych: oriented, verbal, mental status in tact  Wt Readings from Last 5 Encounters:   07/19/17 254 lb 1.6 oz (115.3 kg)   06/23/17 258 lb 9.6 oz (117.3 kg)   06/07/17 269 lb (122 kg)   05/15/17 275 lb (124.7 kg)   05/01/17 275 lb 11.2 oz (125.1 kg)       Meds  Current Outpatient Prescriptions   Medication     ruxolitinib (JAKAFI) 5 MG TABS tablet CHEMO     tadalafil, PAH, (ADCIRCA) 20 MG TABS     glycine diluent 32.175 mL with epoprostenol 247.5 mcg infusion     furosemide (LASIX) 40 MG tablet     VALACYCLOVIR HCL PO     Specialty Vitamins Products (MAGNESIUM PLUS PROTEIN) 133 MG tablet     insulin aspart (NOVOLOG FLEXPEN) 100 UNIT/ML injection     insulin detemir (LEVEMIR FLEXPEN/FLEXTOUCH) 100 UNIT/ML injection     PREDNISONE PO     digoxin (LANOXIN) 125 MCG tablet     blood glucose monitoring (ONE TOUCH ULTRA) test strip     Penicillin V Potassium (PEN-VEE K OR)     fluticasone (FLOVENT HFA) 220 MCG/ACT Inhaler     FLUCONAZOLE PO     Ursodiol (ACTIGALL PO)     VITAMIN D, CHOLECALCIFEROL, PO     CLONAZEPAM PO     calcium carbonate (OS-PANFILO 500 MG [place]. [place]) 500 MG tablet     Pantoprazole Sodium (PROTONIX PO)     Rosuvastatin Calcium (CRESTOR PO)     atovaquone (MEPRON) 750 MG/5ML suspension     No current facility-administered medications for this visit.          Labs    Reviewed outside laboratories;      Imaging     [facility],[place]  Echocardiography Laboratory  [address] [place] [address]  [place] [address]     Name: BENEDICT ESTES  MRN: [id number]  : 1965  Study Date: 2017 04:35 PM  Age: 52 yrs  Gender: Male  Patient Location: [facility]  Reason For Study: Pericardial Effusion, PHTN  Ordering Physician: BHAKTI LONGO  Performed By: LANDY Poe     BSA: 2.6 m2  Height: 77 in  Weight: 275 lb  BP: 121/72 mmHg  _____________________________________________________________________________  __        Procedure  Limited Echocardiogram with portions of two-dimensional, color and spectral  Doppler performed.  _____________________________________________________________________________  __        Interpretation Summary  Severe pulmonary hypertension is present.  Left ventricular function, chamber size, wall motion, and wall thickness are  normal.The EF is 55-60%.  Severe right ventricular dilation is present.Global right ventricular function  is mildly to moderately reduced.  Dilation of the inferior vena cava is present with abnormal respiratory  variation in diameter. Estimated right atrial pressure is > 15 mmHg.  Moderate pericardial effusion. Chamber compression is not present; there is no  evidence for tamponade.  Effusion is larger than the last study, but has been present on recent  studies. No other change.  _____________________________________________________________________________  __        Left Ventricle  Left ventricular function, chamber size, wall motion, and wall thickness are  normal.The EF is 55-60%. Flattened septum is consistent with right ventricular  pressure and volume overload.     Right Ventricle  Severe right ventricular dilation is present. Global right ventricular  function is mildly to moderately reduced.     Atria  The left atrium appears normal. Severe right atrial enlargement is present.        Mitral Valve  The mitral valve is normal.     Aortic Valve  Aortic valve is normal in structure and function.     Tricuspid Valve  Moderate tricuspid  insufficiency is present. The right ventricular systolic  pressure is approximated at 95.6 mmHg plus the right atrial pressure. Severe  pulmonary hypertension is present.     Pulmonic Valve  Trace to mild pulmonic insufficiency is present.     Vessels  The aorta root is normal. Dilation of the inferior vena cava is present with  abnormal respiratory variation in diameter. Estimated right atrial pressure is  > 15 mmHg.     Pericardium  Chamber compression is not present; there is no evidence for tamponade.     _____________________________________________________________________________  __           Doppler Measurements & Calculations  PA V2 max: 52.5 cm/sec  PA max P.1 mmHg  PA acc time: 0.09 sec  TR max nieves: 489.0 cm/sec  TR max P.6 mmHg        Assessment/Plan     1. Increase remodulin 1ng/kg/min two times per week        Dk Quan MD

## 2017-07-19 NOTE — MR AVS SNAPSHOT
After Visit Summary   7/19/2017    Olivier Gómez    MRN: [id number]           Patient Information     Date Of Birth          1965        Visit Information        Provider Department      7/19/2017 1:00 PM Dk Quan MD [facility]        Today's Diagnoses     Pulmonary hypertension (H)    -  1      Care Instructions    Medication Changes:  No medication changes at this time. Please continue current medication regiment.     Patient Instructions:      Follow up Appointment Information:  6 weeks with Dr. Quan and Echocardiogram    We are located on the third floor of [facility] on the [facility].  Our address is     [address] [place] [address] [place] [address] [place] [address]      Thank you for allowing us to be a part of your care here at the [facility]    If you have questions or concerns please contact us at:    Marlys Anderson RN, BSN    Kai Gant ([facility]A.)  Nurse Coordinator     Clinic   Pulmonary Hypertension   Pulmonary Hypertension  [facility]  (P)[phone]    (P) [phone]        (F)[phone]                ** Please note that you will NOT receive a reminder call regarding your scheduled testing, reminder calls are for provider appointments only.  If you are scheduled for testing within the [facility] system you may receive a call regarding pre-registration for billing purposes only.**     Remember to weigh yourself daily after voiding and before you consume any food or beverages and log the numbers.  If you have gained/lost 2 pounds overnight or 5 pounds in a week contact us immediately for medication adjustments or further instructions.  **Please call us immediately if you have any syncope, chest pain, edema, or decline in your functional status.          Follow-ups after your visit       "  Follow-up notes from your care team     Return in about 5 weeks (around 8/23/2017) for with Avelina, Return PH, with, Echo.      Future tests that were ordered for you today     Open Future Orders        Priority Expected Expires Ordered    Echocardiogram Complete Routine  7/19/2018 7/19/2017    Comprehensive metabolic panel Routine 7/19/2017 10/19/2017 7/19/2017    N terminal pro BNP outpatient Routine 7/19/2017 10/19/2017 7/19/2017    CBC with platelets Routine 7/19/2017 10/19/2017 7/19/2017    Lipid panel reflex to direct LDL Routine 7/19/2017 10/19/2017 7/19/2017            Who to contact     If you have questions or need follow up information about today's clinic visit or your schedule please contact [facility] directly at [phone].  Normal or non-critical lab and imaging results will be communicated to you by [facility] [employer], letter or phone within 4 business days after the clinic has received the results. If you do not hear from us within 7 days, please contact the clinic through [facility] [employer] or phone. If you have a critical or abnormal lab result, we will notify you by phone as soon as possible.  Submit refill requests through [facility] or call your pharmacy and they will forward the refill request to us. Please allow 3 business days for your refill to be completed.          Additional Information About Your Visit        [facility] [employer] Information     [facility] lets you send messages to your doctor, view your test results, renew your prescriptions, schedule appointments and more. To sign up, go to [email or web address] . Click on \"Log in\" on the left side of the screen, which will take you to the Welcome page. Then click on \"Sign up Now\" on the right side of the page.     You will be asked to enter the access code listed below, as well as some personal information. Please follow the directions to create your username and password.     Your access code is: [email or web address]  Expires: 10/17/2017  6:30 AM     Your access " "code will  in 90 days. If you need help or a new code, please call your [place] clinic or [phone].        Care EveryWhere ID     This is your Care EveryWhere ID. This could be used by other organizations to access your [place] medical records  [id number]        Your Vitals Were     Height Pulse Oximetry BMI (Body Mass Index)             1.956 m (6' 5\") 98% 30.13 kg/m2          Blood Pressure from Last 3 Encounters:   17 97/67   17 104/70   17 119/79    Weight from Last 3 Encounters:   17 115.3 kg (254 lb 1.6 oz)   17 117.3 kg (258 lb 9.6 oz)   17 122 kg (269 lb)                 Today's Medication Changes          These changes are accurate as of: 17 11:59 PM.  If you have any questions, ask your nurse or doctor.               These medicines have changed or have updated prescriptions.        Dose/Directions    furosemide 40 MG tablet   Commonly known as:  LASIX   This may have changed:    - how much to take  - additional instructions   Used for:  Pulmonary hypertension (H)        Dose:  40 mg   Take 1 tablet (40 mg) by mouth daily   Quantity:  30 tablet   Refills:  0                Primary Care Provider Office Phone # Fax #    Adelso Delgado [phone]       [facility] [address]  [place] [address]        Equal Access to Services     [facility]: Hadii aad ku hadasho Soomaali, waaxda luqadaha, qaybta kaalmada adeegyada, maría peterson . So [facility] [phone].    ATENCIÓN: Si habla español, tiene a garza disposición servicios gratuitos de asistencia lingüística. Llame al [phone].    We comply with applicable federal civil rights laws and [place] laws. We do not discriminate on the basis of race, color, national origin, age, disability sex, sexual orientation or gender identity.            Thank you!     Thank you for choosing [facility]  for your care. Our goal is always to provide " you with excellent care. Hearing back from our patients is one way we can continue to improve our services. Please take a few minutes to complete the written survey that you may receive in the mail after your visit with us. Thank you!             Your Updated Medication List - Protect others around you: Learn how to safely use, store and throw away your medicines at www.disposemymeds.org.          This list is accurate as of: 7/19/17 11:59 PM.  Always use your most recent med list.                   Brand Name Dispense Instructions for use Diagnosis    ACTIGALL PO      Take 300 mg by mouth 3 times daily        atovaquone 750 MG/5ML suspension    MEPRON     Take 1,500 mg by mouth daily        blood glucose monitoring test strip    ONE TOUCH ULTRA    100 strip    Use to test blood sugars 4 times daily or as directed.    Steroid-induced diabetes mellitus (H)       calcium carbonate 1250 MG tablet    OS-PANFILO 500 mg [place]. [place]     Take 600 mg by mouth 2 times daily        CLONAZEPAM PO      Take 1 mg by mouth At Bedtime        CRESTOR PO      Take 5 mg by mouth daily        digoxin 125 MCG tablet    LANOXIN    90 tablet    Take 1 tablet (125 mcg) by mouth daily    Pulmonary hypertension (H)       FLUCONAZOLE PO      Take 200 mg by mouth daily        fluticasone 220 MCG/ACT Inhaler    FLOVENT HFA     Inhale 2 puffs into the lungs 2 times daily        furosemide 40 MG tablet    LASIX    30 tablet    Take 1 tablet (40 mg) by mouth daily    Pulmonary hypertension (H)       glycine diluent 32.175 mL with epoprostenol 247.5 mcg infusion      Inject 791.05 ng/min into the vein continuous    Pulmonary hypertension (H)       LEVEMIR FLEXPEN/FLEXTOUCH 100 UNIT/ML injection   Generic drug:  insulin detemir      Inject 10 Units Subcutaneous every morning        magnesium plus protein 133 MG tablet      Take 266 mg by mouth daily (2 tablets)        NovoLOG FLEXPEN 100 UNIT/ML injection   Generic drug:  insulin aspart      Inject 5 Units  Subcutaneous 3 times daily (with meals) Plus SS        PEN-VEE K OR      Take 500 mg by mouth 2 times daily        PREDNISONE PO      Take 20 mg by mouth daily        PROTONIX PO      Take 40 mg by mouth daily        ruxolitinib 5 MG Tabs tablet CHEMO    JAKAFI    60 tablet    Take 1 tablet (5 mg) by mouth 2 times daily    Pericardial effusion, Pulmonary hypertension (H), Fomdi-cjwzyy-sufe disease (H)       tadalafil (PAH) 20 MG Tabs    ADCIRCA    60 tablet    Take 2 tablets (40 mg) by mouth daily    Pulmonary hypertension (H)       VALACYCLOVIR HCL PO      Take 500 mg by mouth every other day        VITAMIN D (CHOLECALCIFEROL) PO      Take 2,000 Units by mouth 3 times daily

## 2017-08-03 NOTE — IP AVS SNAPSHOT
MRN:[id number]                      After Visit Summary   4/11/2017    Olivier Gómez    MRN: [id number]           Visit Information        Department      4/11/2017  7:19 AM [address] [facility]          Review of your medicines      UNREVIEWED medicines. Ask your doctor about these medicines        Dose / Directions    ACTIGALL PO        Dose:  300 mg   Take 300 mg by mouth 3 times daily   Refills:  0       atovaquone 750 MG/5ML suspension   Commonly known as:  MEPRON        Dose:  1500 mg   Take 1,500 mg by mouth daily   Refills:  0       calcium carbonate 500 MG tablet   Commonly known as:  OS-PANFILO 500 mg [place]. Ca        Dose:  600 mg   Take 600 mg by mouth 2 times daily   Refills:  0       CIALIS PO        Dose:  10 mg   Take 10 mg by mouth daily   Refills:  0       CLONAZEPAM PO        Dose:  2 mg   Take 2 mg by mouth At Bedtime   Refills:  0       CRESTOR PO        Dose:  5 mg   Take 5 mg by mouth   Refills:  0       FLUCONAZOLE PO        Dose:  200 mg   Take 200 mg by mouth daily   Refills:  0       LEVEMIR FLEXPEN/FLEXTOUCH 100 UNIT/ML injection   Generic drug:  insulin detemir        Dose:  10 Units   Inject 10 Units Subcutaneous daily   Refills:  0       MAGNESIUM OXIDE PO        Dose:  800 mg   Take 800 mg by mouth daily   Refills:  0       metoprolol 25 MG 24 hr tablet   Commonly known as:  TOPROL-XL   Used for:  Pulmonary hypertension (H)        Dose:  25 mg   Take 1 tablet (25 mg) by mouth daily   Quantity:  90 tablet   Refills:  3       moxifloxacin 400 MG tablet   Commonly known as:  AVELOX        Dose:  400 mg   Take 400 mg by mouth daily   Refills:  0       NIFEDICAL XL 30 MG 24 hr tablet   Generic drug:  NIFEdipine ER osmotic        Dose:  30 mg   Take 30 mg by mouth daily   Refills:  0       NovoLOG FLEXPEN 100 UNIT/ML injection   Generic drug:  insulin aspart        Inject Subcutaneous daily as needed for high blood sugar   Refills:  0       PREDNISONE PO        Dose:  5 mg    Take 5 mg by mouth every other day   Refills:  0       PROTONIX PO        Dose:  40 mg   Take 40 mg by mouth   Refills:  0       SIROLIMUS PO        Dose:  0.5 mg   Take 0.5 mg by mouth daily .5mg daily except for Friday takes 1 mg   Refills:  0       valGANciclovir 450 MG tablet   Commonly known as:  VALCYTE        Dose:  500 mg   Take 500 mg by mouth every 48 hours as needed   Refills:  0       VITAMIN D (CHOLECALCIFEROL) PO        Dose:  2000 Units   Take 2,000 Units by mouth daily   Refills:  0                Protect others around you: Learn how to safely use, store and throw away your medicines at www.disposemymeds.org.         Follow-ups after your visit        Your next 10 appointments already scheduled     Apr 11, 2017  8:30 AM CDT   Heart Cath Right Heart Cath with [id number]   [facility] ([facility])    [address] [facility] [address]   [phone]            Apr 11, 2017 12:00 PM CDT   (Arrive by 11:45 AM)   RETURN PRIMARY PULMONARY with Dk Quan MD   [facility] ([facility])    [address] [place] [address]  3rd [facility] [address]   [phone]               Care Instructions        Further instructions from your care team       [facility]                        Interventional Cardiology  Discharge Instructions   Post Right Heart Cath      AFTER YOU GO HOME:    DO drink plenty of fluids    DO resume your regular diet and medications unless otherwise instructed by your Primary Physician    Do Not scrub the procedure site vigorously    No lotion or powder to the puncture site for 3 days    CALL YOUR PRIMARY PHYSICIAN IF: You may resume all normal activity.  Monitor neck site for bleeding, swelling, or voice changes. If you notice bleeding or swelling immediately apply pressure to the site and call number below to speak with Cardiology Fellow.  If you experience any  "changes in your breathing you should call your doctor immediately or come to the closest Emergency Department.  Do not drive yourself.    ADDITIONAL INSTRUCTIONS: Medications: You are to resume all home medications including anticoagulation therapy unless otherwise advised by your primary cardiologist or nurse coordinator.    Follow Up: Per your primary cardiology team    If you have any questions or concerns regarding your procedure site please call [phone] at anytime and ask for Cardiology Fellow on call.  They are available 24 hours a day.  You may also contact the Cardiology Clinic after hours number at [phone].                                                       Telephone Numbers [phone] Monday-Friday 8:00 am to 4:30 pm    [phone] After 4:30 pm Monday-Friday, Weekends & Holidays  Ask for Interventional Cardiologist on call. Someone is on call 24 hours/day   [facility] toll free number [phone] Monday-Friday 8:00 am to 4:30 pm   [facility] Emergency Dept [phone]                    Additional Information About Your Visit        [employer] Information     [employer] lets you send messages to your doctor, view your test results, renew your prescriptions, schedule appointments and more. To sign up, go to [email or web address] . Click on \"Log in\" on the left side of the screen, which will take you to the Welcome page. Then click on \"Sign up Now\" on the right side of the page.     You will be asked to enter the access code listed below, as well as some personal information. Please follow the directions to create your username and password.     Your access code is: [email or web address]  Expires: 2017 10:56 AM     Your access code will  in 90 days. If you need help or a new code, please call your [facility]3] or [phone].        Care EveryWhere ID     This is your Care EveryWhere ID. This could be used by other organizations to access your [place] medical records  [id number]   "       Primary Care Provider Office Phone # Fax #    Adilson Nicholson [phone]      Thank you!     Thank you for choosing [facility] for your care. Our goal is always to provide you with excellent care. Hearing back from our patients is one way we can continue to improve our services. Please take a few minutes to complete the written survey that you may receive in the mail after you visit with us. Thank you!             Medication List: This is a list of all your medications and when to take them. Check marks below indicate your daily home schedule. Keep this list as a reference.      Medications           Morning Afternoon Evening Bedtime As Needed    ACTIGALL PO   Take 300 mg by mouth 3 times daily                                atovaquone 750 MG/5ML suspension   Commonly known as:  MEPRON   Take 1,500 mg by mouth daily                                calcium carbonate 500 MG tablet   Commonly known as:  OS-PANFILO 500 mg [place]. Ca   Take 600 mg by mouth 2 times daily                                CIALIS PO   Take 10 mg by mouth daily                                CLONAZEPAM PO   Take 2 mg by mouth At Bedtime                                CRESTOR PO   Take 5 mg by mouth                                FLUCONAZOLE PO   Take 200 mg by mouth daily                                LEVEMIR FLEXPEN/FLEXTOUCH 100 UNIT/ML injection   Inject 10 Units Subcutaneous daily   Generic drug:  insulin detemir                                MAGNESIUM OXIDE PO   Take 800 mg by mouth daily                                metoprolol 25 MG 24 hr tablet   Commonly known as:  TOPROL-XL   Take 1 tablet (25 mg) by mouth daily                                moxifloxacin 400 MG tablet   Commonly known as:  AVELOX   Take 400 mg by mouth daily                                NIFEDICAL XL 30 MG 24 hr tablet   Take 30 mg by mouth daily   Generic drug:  NIFEdipine ER osmotic                                NovoLOG FLEXPEN 100 UNIT/ML  injection   Inject Subcutaneous daily as needed for high blood sugar   Generic drug:  insulin aspart                                PREDNISONE PO   Take 5 mg by mouth every other day                                PROTONIX PO   Take 40 mg by mouth                                SIROLIMUS PO   Take 0.5 mg by mouth daily .5mg daily except for Friday takes 1 mg                                valGANciclovir 450 MG tablet   Commonly known as:  VALCYTE   Take 500 mg by mouth every 48 hours as needed                                VITAMIN D (CHOLECALCIFEROL) PO   Take 2,000 Units by mouth daily                                   Discharge Medications  1. Depakote  mg po qam  and 750 mg po qhs  2.Haldol 5 mg po qam and 10 mg po qhs  3.Trazodone 200 mg po qhs  4.Artane 2 mg po q 12 hrs

## 2017-08-14 ENCOUNTER — TRANSFERRED RECORDS (OUTPATIENT)
Dept: HEALTH INFORMATION MANAGEMENT | Facility: CLINIC | Age: 52
End: 2017-08-14

## 2017-08-17 ENCOUNTER — PRE VISIT (OUTPATIENT)
Dept: CARDIOLOGY | Facility: CLINIC | Age: 52
End: 2017-08-17

## 2017-08-17 DIAGNOSIS — I27.20 PULMONARY HYPERTENSION (H): ICD-10-CM

## 2017-08-17 NOTE — TELEPHONE ENCOUNTER
Return Pulmonary Hypertension Patient Form       Patient Name: Olivier Gómez Age: 52M [birth year] MRN: [id number]   Todays Provider: Dr. Quan    Appt: 8/22/2017             PH Medications: Adcirca 40 mg Daily, Flolan 20 ng/kg/min (at goal)               Todays Testing: N/A                  Patient Update: Pt was last seen on 7/19/17 by Dr. Quan, at that time we requested follow up in 6 weeks with an Echo.  Since his last visit, he is returning early because he would like to fly to [place] for a trip.  He also had a run of A flutter during cardiac rehab with symptoms of palpitations during the Cool down phase. [facility]  MD aware and notified.    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

## 2017-08-22 ENCOUNTER — OFFICE VISIT (OUTPATIENT)
Dept: CARDIOLOGY | Facility: CLINIC | Age: 52
End: 2017-08-22
Attending: INTERNAL MEDICINE
Payer: COMMERCIAL

## 2017-08-22 VITALS
HEART RATE: 90 BPM | DIASTOLIC BLOOD PRESSURE: 71 MMHG | SYSTOLIC BLOOD PRESSURE: 107 MMHG | OXYGEN SATURATION: 96 % | WEIGHT: 253.6 LBS | HEIGHT: 77 IN | BODY MASS INDEX: 29.94 KG/M2

## 2017-08-22 DIAGNOSIS — I27.20 PULMONARY HYPERTENSION (H): ICD-10-CM

## 2017-08-22 DIAGNOSIS — R06.09 DYSPNEA ON EXERTION: ICD-10-CM

## 2017-08-22 DIAGNOSIS — I27.0 PRIMARY PULMONARY HYPERTENSION (H): ICD-10-CM

## 2017-08-22 DIAGNOSIS — I27.0 PRIMARY PULMONARY HYPERTENSION (H): Primary | ICD-10-CM

## 2017-08-22 LAB
ALBUMIN SERPL-MCNC: 4.2 G/DL (ref 3.4–5)
ALP SERPL-CCNC: 54 U/L (ref 40–150)
ALT SERPL W P-5'-P-CCNC: 60 U/L (ref 0–70)
ANION GAP SERPL CALCULATED.3IONS-SCNC: 8 MMOL/L (ref 3–14)
AST SERPL W P-5'-P-CCNC: 33 U/L (ref 0–45)
BASOPHILS # BLD AUTO: 0 10E9/L (ref 0–0.2)
BASOPHILS NFR BLD AUTO: 0.2 %
BILIRUB SERPL-MCNC: 0.7 MG/DL (ref 0.2–1.3)
BUN SERPL-MCNC: 31 MG/DL (ref 7–30)
CALCIUM SERPL-MCNC: 9.5 MG/DL (ref 8.5–10.1)
CHLORIDE SERPL-SCNC: 103 MMOL/L (ref 94–109)
CHOLEST SERPL-MCNC: 177 MG/DL
CO2 SERPL-SCNC: 27 MMOL/L (ref 20–32)
CREAT SERPL-MCNC: 1.97 MG/DL (ref 0.66–1.25)
CRP SERPL-MCNC: <2.9 MG/L (ref 0–8)
DIFFERENTIAL METHOD BLD: ABNORMAL
EOSINOPHIL # BLD AUTO: 0 10E9/L (ref 0–0.7)
EOSINOPHIL NFR BLD AUTO: 0 %
ERYTHROCYTE [DISTWIDTH] IN BLOOD BY AUTOMATED COUNT: 21.5 % (ref 10–15)
GFR SERPL CREATININE-BSD FRML MDRD: 36 ML/MIN/1.7M2
GLUCOSE SERPL-MCNC: 110 MG/DL (ref 70–99)
HCT VFR BLD AUTO: 45.1 % (ref 40–53)
HDLC SERPL-MCNC: 113 MG/DL
HGB BLD-MCNC: 14.6 G/DL (ref 13.3–17.7)
IMM GRANULOCYTES # BLD: 0.1 10E9/L (ref 0–0.4)
IMM GRANULOCYTES NFR BLD: 0.7 %
LDLC SERPL CALC-MCNC: 40 MG/DL
LYMPHOCYTES # BLD AUTO: 1.2 10E9/L (ref 0.8–5.3)
LYMPHOCYTES NFR BLD AUTO: 12.4 %
MCH RBC QN AUTO: 29.9 PG (ref 26.5–33)
MCHC RBC AUTO-ENTMCNC: 32.4 G/DL (ref 31.5–36.5)
MCV RBC AUTO: 92 FL (ref 78–100)
MONOCYTES # BLD AUTO: 0.6 10E9/L (ref 0–1.3)
MONOCYTES NFR BLD AUTO: 6.5 %
NEUTROPHILS # BLD AUTO: 7.9 10E9/L (ref 1.6–8.3)
NEUTROPHILS NFR BLD AUTO: 80.2 %
NONHDLC SERPL-MCNC: 64 MG/DL
NRBC # BLD AUTO: 0 10*3/UL
NRBC BLD AUTO-RTO: 0 /100
NT-PROBNP SERPL-MCNC: 3802 PG/ML (ref 0–125)
PLATELET # BLD AUTO: 126 10E9/L (ref 150–450)
POTASSIUM SERPL-SCNC: 4.3 MMOL/L (ref 3.4–5.3)
PROT SERPL-MCNC: 6.7 G/DL (ref 6.8–8.8)
RBC # BLD AUTO: 4.89 10E12/L (ref 4.4–5.9)
SODIUM SERPL-SCNC: 138 MMOL/L (ref 133–144)
TRIGL SERPL-MCNC: 117 MG/DL
WBC # BLD AUTO: 9.8 10E9/L (ref 4–11)

## 2017-08-22 PROCEDURE — 99213 OFFICE O/P EST LOW 20 MIN: CPT | Mod: ZP | Performed by: INTERNAL MEDICINE

## 2017-08-22 PROCEDURE — 80053 COMPREHEN METABOLIC PANEL: CPT | Performed by: INTERNAL MEDICINE

## 2017-08-22 PROCEDURE — 85025 COMPLETE CBC W/AUTO DIFF WBC: CPT | Performed by: INTERNAL MEDICINE

## 2017-08-22 PROCEDURE — 99212 OFFICE O/P EST SF 10 MIN: CPT | Mod: ZF

## 2017-08-22 PROCEDURE — 99213 OFFICE O/P EST LOW 20 MIN: CPT | Mod: ZF

## 2017-08-22 PROCEDURE — 86140 C-REACTIVE PROTEIN: CPT | Performed by: INTERNAL MEDICINE

## 2017-08-22 PROCEDURE — 83880 ASSAY OF NATRIURETIC PEPTIDE: CPT | Performed by: INTERNAL MEDICINE

## 2017-08-22 PROCEDURE — 36415 COLL VENOUS BLD VENIPUNCTURE: CPT | Performed by: INTERNAL MEDICINE

## 2017-08-22 PROCEDURE — 80061 LIPID PANEL: CPT | Performed by: INTERNAL MEDICINE

## 2017-08-22 RX ORDER — LIDOCAINE 40 MG/G
CREAM TOPICAL
Status: CANCELLED | OUTPATIENT
Start: 2017-08-22

## 2017-08-22 ASSESSMENT — PAIN SCALES - GENERAL: PAINLEVEL: NO PAIN (0)

## 2017-08-22 NOTE — NURSING NOTE
Diet: Patient instructed regarding a heart healthy diet, including discussion of reduced fat and sodium intake. Patient demonstrated understanding of this information and agreed to call with further questions or concerns.    Med Reconcile: Reviewed and verified all current medications with the patient. The updated medication list was printed and given to the patient.    Right Heart Catheterization: Patient was instructed regarding right heart catheterization, including discussion of the procedure, preparation, intra-procedural steps, and recovery at home. Patient demonstrated understanding of this information and agreed to call with further questions or concerns.    Medication Change: Patient was educated regarding prescribed medication change, including discussion of the indication, administration, side effects, and when to report to MD or RN. Patient demonstrated understanding of this information and agreed to call with further questions or concerns.

## 2017-08-22 NOTE — LETTER
8/22/2017      RE: Olivier Gómez  [address]  [place] [facility] [address]       Dear Colleague,    Thank you for the opportunity to participate in the care of your patient, Olivier Gómez, at the [facility] HEART [facility]. Please see a copy of my visit note below.    Dk Quan M.D.  Cardiovascular Medicine    I personally saw and examined this patient, discussed care with housestaff and other consultants, reviewed current laboratories and imaging studies, and conveyed impression and diagnostic/therapeutic plan to patient.    Problem List  1. History of AML  2. 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 Chronic parenteral prostacyclin    History    Generally doing well but continue to complain about flushing an headaches with prostacyclin.  No infectious symptoms.  No site problems.    REVIEW of SYMPTOMS    Constitutional: without fever, chills, night sweats.  Weight is down  HEENT: without dry eyes, dry mouth, sinusitis, corryza, visual changes but marked flushing  Endocrine: without polyuria, polydypsia, polyphagia, heat or cold intolerance, changing mental status  Cardiology: without chest pain, tightness, heaviness, pressure, paroxysmal dyspnea, orthopnea, palpitation, pre-syncope or syncope or device discharge (if present)  Pulmonary: without asthma, wheezing, cough, hemoptysis  GI: without nausea, emesis, jaundice, pain, hematemesis, melena  : without frequency, urgency, hematuria, stones, pain, abnormal bleeding, frequency, urgency  Neurologic: without TIA, CVA, trauma, seizure  Dermatologic: without lesions, abrasion rash,  Scleroderma   Orthopedic/Rheum: without significant joint pain, impairment, limb, polyserositis, ulceration, Raynauds  Heme: without mass, bruising, frequent infection, anemia  Psychiatric: without substance abuse, hallucination, medication,  But situational depression    Exercise tolerance: improving      Objective  BP  "107/71 (BP Location: Left arm, Patient Position: Chair, Cuff Size: Adult Large)  Pulse 90  Ht 1.956 m (6' 5\")  Wt 115 kg (253 lb 9.6 oz)  SpO2 96%  BMI 30.07 kg/m2   Constitutional: alert, oriented, normal gait and station, normal mentation.  Oral: moist mucous membrans  Lymph: without pathologic adenopathy  Chest: clear to ausculation and percussion  Cor: No evidence of left or right ventricular activity.  Rhythm is regular.  S1 normal, S2 split physiologically. Murmurs are not present  Abdomen: without tenderness, rebound, guarding, masses, ascites  Extremities: Edema not present  Neuro: no focal defects, normal mentation  Skin: without open lesions  Psych: oriented, verbal, mental status in tact    Wt Readings from Last 5 Encounters:   08/22/17 115 kg (253 lb 9.6 oz)   07/19/17 115.3 kg (254 lb 1.6 oz)   06/23/17 117.3 kg (258 lb 9.6 oz)   06/07/17 122 kg (269 lb)   05/15/17 124.7 kg (275 lb)       Meds  Current Outpatient Prescriptions   Medication     glycine diluent 32.175 mL with epoprostenol 247.5 mcg infusion     ruxolitinib (JAKAFI) 5 MG TABS tablet CHEMO     tadalafil, PAH, (ADCIRCA) 20 MG TABS     furosemide (LASIX) 40 MG tablet     VALACYCLOVIR HCL PO     Specialty Vitamins Products (MAGNESIUM PLUS PROTEIN) 133 MG tablet     insulin aspart (NOVOLOG FLEXPEN) 100 UNIT/ML injection     insulin detemir (LEVEMIR FLEXPEN/FLEXTOUCH) 100 UNIT/ML injection     PREDNISONE PO     digoxin (LANOXIN) 125 MCG tablet     blood glucose monitoring (ONE TOUCH ULTRA) test strip     Penicillin V Potassium (PEN-VEE K OR)     fluticasone (FLOVENT HFA) 220 MCG/ACT Inhaler     FLUCONAZOLE PO     Ursodiol (ACTIGALL PO)     VITAMIN D, CHOLECALCIFEROL, PO     CLONAZEPAM PO     calcium carbonate (OS-PANFILO 500 MG [place]. CA) 500 MG tablet     Pantoprazole Sodium (PROTONIX PO)     Rosuvastatin Calcium (CRESTOR PO)     atovaquone (MEPRON) 750 MG/5ML suspension     No current facility-administered medications for this visit.  "     Labs  pending    Assessment/Plan     1. Reduce Flolan dose increase to once per week  2. RTC for right heart catherization and echocardiogram  3. Reduce prednisone to 15mgs/day  4. Continue current medical regimen      It is my impression that both the prostacyclin and the ruxolitinib are working well and patient is markedly improved.     Dk Quan MD

## 2017-08-22 NOTE — PROGRESS NOTES
"Dk Quan M.D.  Cardiovascular Medicine    I personally saw and examined this patient, discussed care with housestaff and other consultants, reviewed current laboratories and imaging studies, and conveyed impression and diagnostic/therapeutic plan to patient.    Problem List  1. History of AML  2. 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 Chronic parenteral prostacyclin    History    Generally doing well but continue to complain about flushing an headaches with prostacyclin.  No infectious symptoms.  No site problems.    REVIEW of SYMPTOMS    Constitutional: without fever, chills, night sweats.  Weight is down  HEENT: without dry eyes, dry mouth, sinusitis, corryza, visual changes but marked flushing  Endocrine: without polyuria, polydypsia, polyphagia, heat or cold intolerance, changing mental status  Cardiology: without chest pain, tightness, heaviness, pressure, paroxysmal dyspnea, orthopnea, palpitation, pre-syncope or syncope or device discharge (if present)  Pulmonary: without asthma, wheezing, cough, hemoptysis  GI: without nausea, emesis, jaundice, pain, hematemesis, melena  : without frequency, urgency, hematuria, stones, pain, abnormal bleeding, frequency, urgency  Neurologic: without TIA, CVA, trauma, seizure  Dermatologic: without lesions, abrasion rash,  Scleroderma   Orthopedic/Rheum: without significant joint pain, impairment, limb, polyserositis, ulceration, Raynauds  Heme: without mass, bruising, frequent infection, anemia  Psychiatric: without substance abuse, hallucination, medication,  But situational depression    Exercise tolerance: improving      Objective  /71 (BP Location: Left arm, Patient Position: Chair, Cuff Size: Adult Large)  Pulse 90  Ht 1.956 m (6' 5\")  Wt 115 kg (253 lb 9.6 oz)  SpO2 96%  BMI 30.07 kg/m2   Constitutional: alert, oriented, normal gait and station, normal mentation.  Oral: moist mucous membrans  Lymph: without pathologic " adenopathy  Chest: clear to ausculation and percussion  Cor: No evidence of left or right ventricular activity.  Rhythm is regular.  S1 normal, S2 split physiologically. Murmurs are not present  Abdomen: without tenderness, rebound, guarding, masses, ascites  Extremities: Edema not present  Neuro: no focal defects, normal mentation  Skin: without open lesions  Psych: oriented, verbal, mental status in tact    Wt Readings from Last 5 Encounters:   08/22/17 115 kg (253 lb 9.6 oz)   07/19/17 115.3 kg (254 lb 1.6 oz)   06/23/17 117.3 kg (258 lb 9.6 oz)   06/07/17 122 kg (269 lb)   05/15/17 124.7 kg (275 lb)       Meds  Current Outpatient Prescriptions   Medication     glycine diluent 32.175 mL with epoprostenol 247.5 mcg infusion     ruxolitinib (JAKAFI) 5 MG TABS tablet CHEMO     tadalafil, PAH, (ADCIRCA) 20 MG TABS     furosemide (LASIX) 40 MG tablet     VALACYCLOVIR HCL PO     Specialty Vitamins Products (MAGNESIUM PLUS PROTEIN) 133 MG tablet     insulin aspart (NOVOLOG FLEXPEN) 100 UNIT/ML injection     insulin detemir (LEVEMIR FLEXPEN/FLEXTOUCH) 100 UNIT/ML injection     PREDNISONE PO     digoxin (LANOXIN) 125 MCG tablet     blood glucose monitoring (ONE TOUCH ULTRA) test strip     Penicillin V Potassium (PEN-VEE K OR)     fluticasone (FLOVENT HFA) 220 MCG/ACT Inhaler     FLUCONAZOLE PO     Ursodiol (ACTIGALL PO)     VITAMIN D, CHOLECALCIFEROL, PO     CLONAZEPAM PO     calcium carbonate (OS-PANFILO 500 MG [place]. CA) 500 MG tablet     Pantoprazole Sodium (PROTONIX PO)     Rosuvastatin Calcium (CRESTOR PO)     atovaquone (MEPRON) 750 MG/5ML suspension     No current facility-administered medications for this visit.      Labs  pending    Assessment/Plan     1. Reduce Flolan dose increase to once per week  2. RTC for right heart catherization and echocardiogram  3. Reduce prednisone to 15mgs/day  4. Continue current medical regimen      It is my impression that both the prostacyclin and the ruxolitinib are working well and  patient is markedly improved.

## 2017-08-22 NOTE — PATIENT INSTRUCTIONS
1.  Reduce dosage increase to once per week  2.  Laboratories today  3.  RTC for right heart catherization, echocardiogram.    Check-In  Time Check-In Location Estimated Length Procedure   Name        [facility]  waiting room 60-90 minutes Right Heart Catheterization**     Procedure Preparations & Instructions     This is an invasive procedure that DOES require preparation:    - Nothing to eat for 6 hours   - You may have clear liquids up until the time of your procedure  - A ride should be arranged for you in the instance you are unable to drive home, however you should be able to function as you normally would after the procedure     *For Patients with Diabetes: ? DO NOT take any oral diabetic medication, short-acting diabetes medications/insulin, humalog or regular insulin the morning of your test  ? Take   dose of long-acting insulin (Lantus, Levemir) the day of your test  ? Remember to  bring your glucometer and insulin with you to take after your test if needed     *For Patients on anticoagulants: ? Your Coumadin Clinic will give you instructions on medication adjustments or bridging prior to the procedure        Check-In  Time Check-In Location Estimated Length Procedure   Name         60 minutes Echocardiogram (Echo)**   Procedure Preparations & Instructions     This is a non-invasive procedure and does NOT require any preparation         Contact Marlys with questions.  Marlys Anderson, RN  Care Coordinator  Pulmonary Hypertension  [facility]iovascular [facility]  (P)[phone]  (F)[phone]

## 2017-08-22 NOTE — NURSING NOTE
Chief Complaint   Patient presents with     Follow Up For     Return for PH F/U     Vitals were taken and medications were reconciled.    Marilyn Brenner CMA     12:31 PM

## 2017-08-22 NOTE — LETTER
August 25, 2017       TO: Olivier Gómez  [address]  [place] [address]       Dear Mr. Gómez,    We are writing to inform you of your test results.    Your test results fall within the expected range(s) or remain unchanged from previous results.  Please continue with current treatment plan.    Resulted Orders   CBC with platelets differential   Result Value Ref Range    WBC 9.8 4.0 - 11.0 10e9/L    RBC Count 4.89 4.4 - 5.9 10e12/L    Hemoglobin 14.6 13.3 - 17.7 g/dL    Hematocrit 45.1 40.0 - 53.0 %    MCV 92 78 - 100 fl    MCH 29.9 26.5 - 33.0 pg    MCHC 32.4 31.5 - 36.5 g/dL    RDW 21.5 (H) 10.0 - 15.0 %    Platelet Count 126 (L) 150 - 450 10e9/L    Diff Method Automated Method     % Neutrophils 80.2 %    % Lymphocytes 12.4 %    % Monocytes 6.5 %    % Eosinophils 0.0 %    % Basophils 0.2 %    % Immature Granulocytes 0.7 %    Nucleated RBCs 0 0 /100    Absolute Neutrophil 7.9 1.6 - 8.3 10e9/L    Absolute Lymphocytes 1.2 0.8 - 5.3 10e9/L    Absolute Monocytes 0.6 0.0 - 1.3 10e9/L    Absolute Eosinophils 0.0 0.0 - 0.7 10e9/L    Absolute Basophils 0.0 0.0 - 0.2 10e9/L    Abs Immature Granulocytes 0.1 0 - 0.4 10e9/L    Absolute Nucleated RBC 0.0        Dk Quan MD

## 2017-08-22 NOTE — MR AVS SNAPSHOT
After Visit Summary   8/22/2017    Olivier Gómez    MRN: [id number]           Patient Information     Date Of Birth          1965        Visit Information        Provider Department      8/22/2017 1:00 PM Dk Quan MD [facility]        Today's Diagnoses     Primary pulmonary hypertension (H)    -  1      Care Instructions    1.  Reduce dosage increase to once per week  2.  Laboratories today  3.  RTC for right heart catherization, echocardiogram.    Check-In  Time Check-In Location Estimated Length Procedure   Name        [facility] [place]  waiting room 60-90 minutes Right Heart Catheterization**     Procedure Preparations & Instructions     This is an invasive procedure that DOES require preparation:    - Nothing to eat for 6 hours   - You may have clear liquids up until the time of your procedure  - A ride should be arranged for you in the instance you are unable to drive home, however you should be able to function as you normally would after the procedure     *For Patients with Diabetes: ? DO NOT take any oral diabetic medication, short-acting diabetes medications/insulin, humalog or regular insulin the morning of your test  ? Take   dose of long-acting insulin (Lantus, Levemir) the day of your test  ? Remember to  bring your glucometer and insulin with you to take after your test if needed     *For Patients on anticoagulants: ? Your Coumadin Clinic will give you instructions on medication adjustments or bridging prior to the procedure        Check-In  Time Check-In Location Estimated Length Procedure   Name         60 minutes Echocardiogram (Echo)**   Procedure Preparations & Instructions     This is a non-invasive procedure and does NOT require any preparation         Contact Marlys with questions.  Marlys Anderson, RN  Care Coordinator  Pulmonary Hypertension  [facility]  Cardiovascular Clinic  (P)[phone]  (F)[phone]          Follow-ups after  your visit        Your next 10 appointments already scheduled     Sep 12, 2017  7:30 AM CDT   Ech Complete [facility] [id number]   [facility]    [address] [id number] [phone]           1. Please bring or wear a comfortable two-piece outfit. 2. You may eat, drink and take your normal medicines. 3. For any questions that cannot be answered, please contact the ordering physician            Sep 12, 2017  9:00 AM CDT   Procedure - 2.5 hour with U2A ROOM 6   [address] [facility] ([facility])    [address] [address]               Sep 12, 2017 10:00 AM CDT   Heart Cath Right Heart Cath with [id number]   [facility]    [address] [id number] [phone]            Sep 12, 2017 12:00 PM CDT   Lab with  LAB    Health Lab ([facility])    [address] [place] [address]  [address] [facility] [id number] [phone]            Sep 12, 2017 12:30 PM CDT   (Arrive by 12:15 PM)   RETURN PRIMARY PULMONARY with Dk Quan MD   [facility] ([facility])    [address] [place] [address]  [address] [facility] [id number] [phone]              Future tests that were ordered for you today     Open Future Orders        Priority Expected Expires Ordered    Heart Cath Right heart cath Routine  8/22/2018 8/22/2017    Comprehensive metabolic panel Routine  9/21/2017 8/22/2017    CRP inflammation Routine  9/21/2017 8/22/2017            Who to contact     If you have questions or need follow up information about today's clinic visit or your schedule please contact [facility] directly at [phone].  Normal or non-critical lab and imaging results will be communicated to you by MyChart, letter or phone within 4 business days  "after the clinic has received the results. If you do not hear from us within 7 days, please contact the clinic through [employer] or phone. If you have a critical or abnormal lab result, we will notify you by phone as soon as possible.  Submit refill requests through [employer] or call your pharmacy and they will forward the refill request to us. Please allow 3 business days for your refill to be completed.          Additional Information About Your Visit        [employer] Information     [employer] lets you send messages to your doctor, view your test results, renew your prescriptions, schedule appointments and more. To sign up, go to [email or web address] . Click on \"Log in\" on the left side of the screen, which will take you to the Welcome page. Then click on \"Sign up Now\" on the right side of the page.     You will be asked to enter the access code listed below, as well as some personal information. Please follow the directions to create your username and password.     Your access code is: [email or web address]  Expires: 10/17/2017  6:30 AM     Your access code will  in 90 days. If you need help or a new code, please call your [facility] clinic or [phone].        Care EveryWhere ID     This is your Care EveryWhere ID. This could be used by other organizations to access your [facility] medical records  [id number]        Your Vitals Were     Pulse Height Pulse Oximetry BMI (Body Mass Index)          90 1.956 m (6' 5\") 96% 30.07 kg/m2         Blood Pressure from Last 3 Encounters:   17 107/71   17 97/67   17 104/70    Weight from Last 3 Encounters:   17 115 kg (253 lb 9.6 oz)   17 115.3 kg (254 lb 1.6 oz)   17 117.3 kg (258 lb 9.6 oz)              We Performed the Following     CBC with platelets differential          Today's Medication Changes          These changes are accurate as of: 17  2:30 PM.  If you have any questions, ask your nurse or doctor.               These medicines " have changed or have updated prescriptions.        Dose/Directions    furosemide 40 MG tablet   Commonly known as:  LASIX   This may have changed:    - how much to take  - additional instructions   Used for:  Pulmonary hypertension (H)        Dose:  40 mg   Take 1 tablet (40 mg) by mouth daily   Quantity:  30 tablet   Refills:  0                Primary Care Provider Office Phone # Fax #    Adelso Delgado [phone]       [facility] [address]  [place] [address]        Equal Access to Services     DIEUDONNE RILEY : Hadii aad ku hadasho Soomaali, waaxda luqadaha, qaybta kaalmada adeegyada, waxay idiin hayaan adeeg kharash lacarmita . So [facility] [place] [phone].    ATENCIÓN: Si habla español, tiene a garza disposición servicios gratuitos de asistencia lingüística. [facility] [phone].    We comply with applicable federal civil rights laws and [place] laws. We do not discriminate on the basis of race, color, national origin, age, disability sex, sexual orientation or gender identity.            Thank you!     Thank you for choosing [facility]  for your care. Our goal is always to provide you with excellent care. Hearing back from our patients is one way we can continue to improve our services. Please take a few minutes to complete the written survey that you may receive in the mail after your visit with us. Thank you!             Your Updated Medication List - Protect others around you: Learn how to safely use, store and throw away your medicines at www.disposemymeds.org.          This list is accurate as of: 8/22/17  2:30 PM.  Always use your most recent med list.                   Brand Name Dispense Instructions for use Diagnosis    ACTIGALL PO      Take 300 mg by mouth 3 times daily        atovaquone 750 MG/5ML suspension    MEPRON     Take 1,500 mg by mouth daily        blood glucose monitoring test strip    ONE TOUCH ULTRA    100 strip    Use to test blood sugars 4 times  daily or as directed.    Steroid-induced diabetes mellitus (H)       calcium carbonate 1250 MG tablet    OS-PANFILO 500 mg [place]. Ca     Take 600 mg by mouth 2 times daily        CLONAZEPAM PO      Take 1 mg by mouth At Bedtime        CRESTOR PO      Take 5 mg by mouth daily        digoxin 125 MCG tablet    LANOXIN    90 tablet    Take 1 tablet (125 mcg) by mouth daily    Pulmonary hypertension (H)       FLUCONAZOLE PO      Take 200 mg by mouth daily        fluticasone 220 MCG/ACT Inhaler    FLOVENT HFA     Inhale 2 puffs into the lungs 2 times daily        furosemide 40 MG tablet    LASIX    30 tablet    Take 1 tablet (40 mg) by mouth daily    Pulmonary hypertension (H)       glycine diluent 32.175 mL with epoprostenol 247.5 mcg infusion      Inject 2,434 ng/min into the vein continuous At Goal    Pulmonary hypertension (H)       LEVEMIR FLEXPEN/FLEXTOUCH 100 UNIT/ML injection   Generic drug:  insulin detemir      Inject 10 Units Subcutaneous every morning        magnesium plus protein 133 MG tablet      Take 266 mg by mouth daily (2 tablets)        NovoLOG FLEXPEN 100 UNIT/ML injection   Generic drug:  insulin aspart      Inject 5 Units Subcutaneous 3 times daily (with meals) Plus SS        PEN-VEE K OR      Take 500 mg by mouth 2 times daily        PREDNISONE PO      Take 20 mg by mouth daily        PROTONIX PO      Take 40 mg by mouth daily        ruxolitinib 5 MG Tabs tablet CHEMO    JAKAFI    60 tablet    Take 1 tablet (5 mg) by mouth 2 times daily    Pericardial effusion, Pulmonary hypertension (H), Anfyf-jztqxw-huhb disease (H)       tadalafil (PAH) 20 MG Tabs    ADCIRCA    60 tablet    Take 2 tablets (40 mg) by mouth daily    Pulmonary hypertension (H)       VALACYCLOVIR HCL PO      Take 500 mg by mouth every other day        VITAMIN D (CHOLECALCIFEROL) PO      Take 2,000 Units by mouth 3 times daily

## 2017-09-11 DIAGNOSIS — I27.20 PULMONARY HYPERTENSION (H): ICD-10-CM

## 2017-09-11 DIAGNOSIS — R06.09 DYSPNEA ON EXERTION: ICD-10-CM

## 2017-09-11 LAB
ANION GAP SERPL CALCULATED.3IONS-SCNC: 8 MMOL/L (ref 3–14)
BUN SERPL-MCNC: 31 MG/DL (ref 7–30)
CALCIUM SERPL-MCNC: 8.8 MG/DL (ref 8.5–10.1)
CHLORIDE SERPL-SCNC: 103 MMOL/L (ref 94–109)
CO2 SERPL-SCNC: 26 MMOL/L (ref 20–32)
CREAT SERPL-MCNC: 1.92 MG/DL (ref 0.66–1.25)
ERYTHROCYTE [DISTWIDTH] IN BLOOD BY AUTOMATED COUNT: 19.7 % (ref 10–15)
GFR SERPL CREATININE-BSD FRML MDRD: 37 ML/MIN/1.7M2
GLUCOSE SERPL-MCNC: 87 MG/DL (ref 70–99)
HCT VFR BLD AUTO: 40.2 % (ref 40–53)
HGB BLD-MCNC: 13.2 G/DL (ref 13.3–17.7)
MCH RBC QN AUTO: 30.3 PG (ref 26.5–33)
MCHC RBC AUTO-ENTMCNC: 32.8 G/DL (ref 31.5–36.5)
MCV RBC AUTO: 92 FL (ref 78–100)
NT-PROBNP SERPL-MCNC: 3630 PG/ML (ref 0–125)
PLATELET # BLD AUTO: 116 10E9/L (ref 150–450)
POTASSIUM SERPL-SCNC: 4.1 MMOL/L (ref 3.4–5.3)
RBC # BLD AUTO: 4.36 10E12/L (ref 4.4–5.9)
SODIUM SERPL-SCNC: 137 MMOL/L (ref 133–144)
WBC # BLD AUTO: 9 10E9/L (ref 4–11)

## 2017-09-11 NOTE — PROGRESS NOTES
Dk Quan M.D.  Cardiovascular Medicine    I personally saw and examined this patient, discussed care with housestaff and other consultants, reviewed current laboratories and imaging studies, and conveyed impression and diagnostic/therapeutic plan to patient.    Problem List  1. History of AML  2. 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 Chronic parenteral prostacyclin    History    Patient returns for follow-up with increasing exercise tolerance, no pre-syncope/syncope or symptoms of right heart failure.      REVIEW of SYMPTOMS    Constitutional: without fever, chills, night sweats.  Weight is down  HEENT: without dry eyes, dry mouth, sinusitis, corryza, visual changes but marked flushing  Endocrine: without polyuria, polydypsia, polyphagia, heat or cold intolerance, changing mental status  Cardiology: without chest pain, tightness, heaviness, pressure, paroxysmal dyspnea, orthopnea, palpitation, pre-syncope or syncope or device discharge (if present)  Pulmonary: without asthma, wheezing, cough, hemoptysis  GI: without nausea, emesis, jaundice, pain, hematemesis, melena  : without frequency, urgency, hematuria, stones, pain, abnormal bleeding, frequency, urgency  Neurologic: without TIA, CVA, trauma, seizure  Dermatologic: without lesions, abrasion rash,  Scleroderma   Orthopedic/Rheum: without significant joint pain, impairment, limb, polyserositis, ulceration, Raynauds  Heme: without mass, bruising, frequent infection, anemia  Psychiatric: without substance abuse, hallucination, medication,  But situational depression    Exercise tolerance: improving      Objective  There were no vitals taken for this visit.   Constitutional: alert, oriented, normal gait and station, normal mentation.  Oral: moist mucous membrans  Lymph: without pathologic adenopathy  Chest: clear to ausculation and percussion  Cor: No evidence of left or right ventricular activity.  Rhythm is regular.  S1 normal, S2 split  physiologically. Murmurs are not present  Abdomen: without tenderness, rebound, guarding, masses, ascites  Extremities: Edema not present  Neuro: no focal defects, normal mentation  Skin: without open lesions  Psych: oriented, verbal, mental status in tact    Wt Readings from Last 5 Encounters:   08/22/17 115 kg (253 lb 9.6 oz)   07/19/17 115.3 kg (254 lb 1.6 oz)   06/23/17 117.3 kg (258 lb 9.6 oz)   06/07/17 122 kg (269 lb)   05/15/17 124.7 kg (275 lb)       Meds  Current Outpatient Prescriptions   Medication     glycine diluent 32.175 mL with epoprostenol 247.5 mcg infusion     ruxolitinib (JAKAFI) 5 MG TABS tablet CHEMO     tadalafil, PAH, (ADCIRCA) 20 MG TABS     furosemide (LASIX) 40 MG tablet     VALACYCLOVIR HCL PO     Specialty Vitamins Products (MAGNESIUM PLUS PROTEIN) 133 MG tablet     insulin aspart (NOVOLOG FLEXPEN) 100 UNIT/ML injection     insulin detemir (LEVEMIR FLEXPEN/FLEXTOUCH) 100 UNIT/ML injection     PREDNISONE PO     digoxin (LANOXIN) 125 MCG tablet     blood glucose monitoring (ONE TOUCH ULTRA) test strip     Penicillin V Potassium (PEN-VEE K OR)     fluticasone (FLOVENT HFA) 220 MCG/ACT Inhaler     FLUCONAZOLE PO     Ursodiol (ACTIGALL PO)     VITAMIN D, CHOLECALCIFEROL, PO     CLONAZEPAM PO     calcium carbonate (OS-PANFILO 500 MG [place]. [place]) 500 MG tablet     Pantoprazole Sodium (PROTONIX PO)     Rosuvastatin Calcium (CRESTOR PO)     atovaquone (MEPRON) 750 MG/5ML suspension     No current facility-administered medications for this visit.      Labs  Results for BENEDICT ESTES (MRN [id number]) as of 9/12/2017 12:10   Ref. Range 9/11/2017 13:18 9/12/2017 07:52 9/12/2017 08:45 9/12/2017 09:54 9/12/2017 10:11   Sodium Latest Ref Range: 133 - 144 mmol/L 137       Potassium Latest Ref Range: 3.4 - 5.3 mmol/L 4.1       Chloride Latest Ref Range: 94 - 109 mmol/L 103       Carbon Dioxide Latest Ref Range: 20 - 32 mmol/L 26       Urea Nitrogen Latest Ref Range: 7 - 30 mg/dL 31 (H)       Creatinine Latest  Ref Range: 0.66 - 1.25 mg/dL 1.92 (H)       GFR Estimate Latest Ref Range: >60 mL/min/1.7m2 37 (L)       GFR Estimate If Black Latest Ref Range: >60 mL/min/1.7m2 44 (L)       Calcium Latest Ref Range: 8.5 - 10.1 mg/dL 8.8       Anion Gap Latest Ref Range: 3 - 14 mmol/L 8       N-Terminal Pro Bnp Latest Ref Range: 0 - 125 pg/mL 3630 (H)       Glucose Latest Ref Range: 70 - 99 mg/dL 87  74 109 (H)    WBC Latest Ref Range: 4.0 - 11.0 10e9/L 9.0       Hemoglobin Latest Ref Range: 13.3 - 17.7 g/dL 13.2 (L)       Hematocrit Latest Ref Range: 40.0 - 53.0 % 40.2       Platelet Count Latest Ref Range: 150 - 450 10e9/L 116 (L)       RBC Count Latest Ref Range: 4.4 - 5.9 10e12/L 4.36 (L)       MCV Latest Ref Range: 78 - 100 fl 92       MCH Latest Ref Range: 26.5 - 33.0 pg 30.3       MCHC Latest Ref Range: 31.5 - 36.5 g/dL 32.8       RDW Latest Ref Range: 10.0 - 15.0 % 19.7 (H)       ECHO LIMITED Unknown  Rpt      HEART CATH RIGHT HEART CATH Unknown     Attch   Results for BENEDICT ESTES (MRN [id number]) as of 9/12/2017 12:10   Ref. 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  He is tolerating parenteral prostanoids and there is marked reduction in PVR this catherization.  Cardiac output substantially increased and may need to blunt this.        1. Reduce Flolan dose increase to once per week  2. RTC  8 weeks  3. Reduce prednisone to 12.55mgs/day  4. Continue current medical regimen with up-titration to 30ngs/kg/min    It is my impression that both the prostacyclin and the ruxolitinib are working well and patient is markedly improved.

## 2017-09-12 ENCOUNTER — PRE VISIT (OUTPATIENT)
Dept: CARDIOLOGY | Facility: CLINIC | Age: 52
End: 2017-09-12

## 2017-09-12 ENCOUNTER — HOSPITAL ENCOUNTER (OUTPATIENT)
Dept: CARDIOLOGY | Facility: CLINIC | Age: 52
End: 2017-09-12
Attending: INTERNAL MEDICINE
Payer: COMMERCIAL

## 2017-09-12 ENCOUNTER — HOSPITAL ENCOUNTER (OUTPATIENT)
Facility: CLINIC | Age: 52
Discharge: HOME | End: 2017-09-12
Attending: INTERNAL MEDICINE | Admitting: INTERNAL MEDICINE
Payer: COMMERCIAL

## 2017-09-12 ENCOUNTER — APPOINTMENT (OUTPATIENT)
Dept: MEDSURG UNIT | Facility: CLINIC | Age: 52
End: 2017-09-12
Attending: INTERNAL MEDICINE
Payer: COMMERCIAL

## 2017-09-12 VITALS
WEIGHT: 250.7 LBS | DIASTOLIC BLOOD PRESSURE: 79 MMHG | HEIGHT: 77 IN | SYSTOLIC BLOOD PRESSURE: 124 MMHG | HEART RATE: 80 BPM | BODY MASS INDEX: 29.6 KG/M2 | OXYGEN SATURATION: 97 %

## 2017-09-12 VITALS
TEMPERATURE: 98.1 F | SYSTOLIC BLOOD PRESSURE: 124 MMHG | HEIGHT: 77 IN | HEART RATE: 80 BPM | RESPIRATION RATE: 20 BRPM | OXYGEN SATURATION: 97 % | DIASTOLIC BLOOD PRESSURE: 79 MMHG | BODY MASS INDEX: 29.52 KG/M2 | WEIGHT: 250 LBS

## 2017-09-12 DIAGNOSIS — I27.20 PULMONARY HYPERTENSION (H): ICD-10-CM

## 2017-09-12 DIAGNOSIS — I27.20 PULMONARY HYPERTENSION (H): Primary | ICD-10-CM

## 2017-09-12 DIAGNOSIS — I27.0 PRIMARY PULMONARY HYPERTENSION (H): ICD-10-CM

## 2017-09-12 LAB
GLUCOSE BLDC GLUCOMTR-MCNC: 109 MG/DL (ref 70–99)
GLUCOSE BLDC GLUCOMTR-MCNC: 74 MG/DL (ref 70–99)

## 2017-09-12 PROCEDURE — 93451 RIGHT HEART CATH: CPT

## 2017-09-12 PROCEDURE — 40000166 ZZH STATISTIC PP CARE STAGE 1

## 2017-09-12 PROCEDURE — 82962 GLUCOSE BLOOD TEST: CPT

## 2017-09-12 PROCEDURE — 27210807 ZZH SHEATH CR6

## 2017-09-12 PROCEDURE — 93321 DOPPLER ECHO F-UP/LMTD STD: CPT | Mod: 26 | Performed by: INTERNAL MEDICINE

## 2017-09-12 PROCEDURE — 99212 OFFICE O/P EST SF 10 MIN: CPT | Mod: 25

## 2017-09-12 PROCEDURE — 99212 OFFICE O/P EST SF 10 MIN: CPT | Mod: ZF

## 2017-09-12 PROCEDURE — 27210982 ZZH KIT RT HC TOTES DISP CR7

## 2017-09-12 PROCEDURE — 93308 TTE F-UP OR LMTD: CPT

## 2017-09-12 PROCEDURE — 93308 TTE F-UP OR LMTD: CPT | Mod: 26 | Performed by: INTERNAL MEDICINE

## 2017-09-12 PROCEDURE — 4A023N6 MEASUREMENT OF CARDIAC SAMPLING AND PRESSURE, RIGHT HEART, PERCUTANEOUS APPROACH: ICD-10-PCS | Performed by: INTERNAL MEDICINE

## 2017-09-12 PROCEDURE — 93325 DOPPLER ECHO COLOR FLOW MAPG: CPT | Mod: 26 | Performed by: INTERNAL MEDICINE

## 2017-09-12 PROCEDURE — 93451 RIGHT HEART CATH: CPT | Mod: 26 | Performed by: INTERNAL MEDICINE

## 2017-09-12 PROCEDURE — 27210787 ZZH MANIFOLD CR2

## 2017-09-12 PROCEDURE — 99213 OFFICE O/P EST LOW 20 MIN: CPT | Mod: 25 | Performed by: INTERNAL MEDICINE

## 2017-09-12 PROCEDURE — 27211181 ZZH BALLOON TIP PRESSURE CR5

## 2017-09-12 RX ORDER — LIDOCAINE 40 MG/G
CREAM TOPICAL
Status: COMPLETED | OUTPATIENT
Start: 2017-09-12 | End: 2017-09-12

## 2017-09-12 RX ORDER — IOPAMIDOL 755 MG/ML
10 INJECTION, SOLUTION INTRAVASCULAR ONCE
Status: COMPLETED | OUTPATIENT
Start: 2017-09-12 | End: 2017-09-12

## 2017-09-12 RX ORDER — PREDNISONE 2.5 MG/1
12.5 TABLET ORAL DAILY
Qty: 90 TABLET | Refills: 3 | Status: SHIPPED | OUTPATIENT
Start: 2017-09-12 | End: 2017-12-27

## 2017-09-12 RX ADMIN — IOPAMIDOL 10 ML: 755 INJECTION, SOLUTION INTRAVASCULAR at 10:15

## 2017-09-12 RX ADMIN — LIDOCAINE: 40 CREAM TOPICAL at 08:33

## 2017-09-12 ASSESSMENT — PAIN SCALES - GENERAL: PAINLEVEL: NO PAIN (0)

## 2017-09-12 NOTE — TELEPHONE ENCOUNTER
Return Pulmonary Hypertension Patient Form       Patient Name: Olivier Gómez Age: 52M [birth year] MRN: [id number]   Todays Provider: Dr. Dk Quan   Appt: 9/12/2017             PH Medications: Adcirca 40 mg Daily, Flolan 23 ng/kg/min (25 ng/kg/min goal)              Todays Testing: Echo and RHC, labs completed yesterday               Patient Update: Pt was last seen on 8/22/17 by Dr. Quan, at that time we decreased his flolan to once a week, RHC and echo with 4 week follow up.    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

## 2017-09-12 NOTE — PATIENT INSTRUCTIONS
Medication Changes:  Decrease Prednisone to 12.5 mg Daily    Increase Flolan (epoprostenol sodium) by 1 ng/kg/min weekly to a goal of 30 ng/kg/min    Follow up Appointment Information:  8 weeks with Dr. Quan    We are located on the third [facility] on the [facility].  Our address is     [address] [place] [address] on [address]rd [place] [address]      Thank you for allowing us to be a part of your care here at the [facility]    If you have questions or concerns please contact us at:    Marlys Anderson RN, BSN    Kai Gant ([facility]  Nurse Coordinator     Clinic   Pulmonary Hypertension   Pulmonary Hypertension  [facility]  (P)[phone]    (P) [phone]        (F)[phone]                ** Please note that you will NOT receive a reminder call regarding your scheduled testing, reminder calls are for provider appointments only.  If you are scheduled for testing within the [facility] system you may receive a call regarding pre-registration for billing purposes only.**     Remember to weigh yourself daily after voiding and before you consume any food or beverages and log the numbers.  If you have gained/lost 2 pounds overnight or 5 pounds in a week contact us immediately for medication adjustments or further instructions.  **Please call us immediately if you have any syncope, chest pain, edema, or decline in your functional status.

## 2017-09-12 NOTE — PROGRESS NOTES
Patient prepped for RH procedure.  Denies pain.  H & P current.  Consent needed.  Wife with him.  Labs current.

## 2017-09-12 NOTE — DISCHARGE INSTRUCTIONS
[facility]                        Interventional Cardiology  Discharge Instructions   Post Right Heart Cath      AFTER YOU GO HOME:    DO drink plenty of fluids    DO resume your regular diet and medications unless otherwise instructed by your Primary Physician    Do Not scrub the procedure site vigorously    No lotion or powder to the puncture site for 3 days    CALL YOUR PRIMARY PHYSICIAN IF: You may resume all normal activity.  Monitor neck site for bleeding, swelling, or voice changes. If you notice bleeding or swelling immediately apply pressure to the site and call number below to speak with Cardiology Fellow.  If you experience any changes in your breathing you should call your doctor immediately or come to the closest Emergency Department.  Do not drive yourself.    ADDITIONAL INSTRUCTIONS: Medications: You are to resume all home medications including anticoagulation therapy unless otherwise advised by your primary cardiologist or nurse coordinator.    Follow Up: Per your primary cardiology team    If you have any questions or concerns regarding your procedure site please call [phone] at anytime and ask for Cardiology Fellow on call.  They are available 24 hours a day.  You may also contact the Cardiology Clinic after hours number at [phone].                                                       Telephone Numbers [phone] Monday-Friday 8:00 am to 4:30 pm    [phone] After 4:30 pm Monday-Friday, Weekends & Holidays  Ask for Interventional Cardiologist on call. Someone is on call 24 hours/day   [facility] toll free number [phone] Monday-Friday 8:00 am to 4:30 pm   [facility] Emergency Dept [phone]

## 2017-09-12 NOTE — LETTER
9/12/2017      RE: Olivier Gómez  [address]  [facility] [address]       Dear Colleague,    Thank you for the opportunity to participate in the care of your patient, Olivier Gómez, at the [facility] HEART [facility]. Please see a copy of my visit note below.    Dk Quan M.D.  Cardiovascular Medicine    I personally saw and examined this patient, discussed care with housestaff and other consultants, reviewed current laboratories and imaging studies, and conveyed impression and diagnostic/therapeutic plan to patient.    Problem List  1. History of AML  2. 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 Chronic parenteral prostacyclin    History    Patient returns for follow-up with increasing exercise tolerance, no pre-syncope/syncope or symptoms of right heart failure.      REVIEW of SYMPTOMS    Constitutional: without fever, chills, night sweats.  Weight is down  HEENT: without dry eyes, dry mouth, sinusitis, corryza, visual changes but marked flushing  Endocrine: without polyuria, polydypsia, polyphagia, heat or cold intolerance, changing mental status  Cardiology: without chest pain, tightness, heaviness, pressure, paroxysmal dyspnea, orthopnea, palpitation, pre-syncope or syncope or device discharge (if present)  Pulmonary: without asthma, wheezing, cough, hemoptysis  GI: without nausea, emesis, jaundice, pain, hematemesis, melena  : without frequency, urgency, hematuria, stones, pain, abnormal bleeding, frequency, urgency  Neurologic: without TIA, CVA, trauma, seizure  Dermatologic: without lesions, abrasion rash,  Scleroderma   Orthopedic/Rheum: without significant joint pain, impairment, limb, polyserositis, ulceration, Raynauds  Heme: without mass, bruising, frequent infection, anemia  Psychiatric: without substance abuse, hallucination, medication,  But situational depression    Exercise tolerance: improving      Objective  There were no  vitals taken for this visit.   Constitutional: alert, oriented, normal gait and station, normal mentation.  Oral: moist mucous membrans  Lymph: without pathologic adenopathy  Chest: clear to ausculation and percussion  Cor: No evidence of left or right ventricular activity.  Rhythm is regular.  S1 normal, S2 split physiologically. Murmurs are not present  Abdomen: without tenderness, rebound, guarding, masses, ascites  Extremities: Edema not present  Neuro: no focal defects, normal mentation  Skin: without open lesions  Psych: oriented, verbal, mental status in tact    Wt Readings from Last 5 Encounters:   08/22/17 115 kg (253 lb 9.6 oz)   07/19/17 115.3 kg (254 lb 1.6 oz)   06/23/17 117.3 kg (258 lb 9.6 oz)   06/07/17 122 kg (269 lb)   05/15/17 124.7 kg (275 lb)       Meds  Current Outpatient Prescriptions   Medication     glycine diluent 32.175 mL with epoprostenol 247.5 mcg infusion     ruxolitinib (JAKAFI) 5 MG TABS tablet CHEMO     tadalafil, PAH, (ADCIRCA) 20 MG TABS     furosemide (LASIX) 40 MG tablet     VALACYCLOVIR HCL PO     Specialty Vitamins Products (MAGNESIUM PLUS PROTEIN) 133 MG tablet     insulin aspart (NOVOLOG FLEXPEN) 100 UNIT/ML injection     insulin detemir (LEVEMIR FLEXPEN/FLEXTOUCH) 100 UNIT/ML injection     PREDNISONE PO     digoxin (LANOXIN) 125 MCG tablet     blood glucose monitoring (ONE TOUCH ULTRA) test strip     Penicillin V Potassium (PEN-VEE K OR)     fluticasone (FLOVENT HFA) 220 MCG/ACT Inhaler     FLUCONAZOLE PO     Ursodiol (ACTIGALL PO)     VITAMIN D, CHOLECALCIFEROL, PO     CLONAZEPAM PO     calcium carbonate (OS-PANFILO 500 MG [place]. [place]) 500 MG tablet     Pantoprazole Sodium (PROTONIX PO)     Rosuvastatin Calcium (CRESTOR PO)     atovaquone (MEPRON) 750 MG/5ML suspension     No current facility-administered medications for this visit.      Labs  Results for BENEDICT ESTES (MRN [id number]) as of 9/12/2017 12:10   Ref. Range 9/11/2017 13:18 9/12/2017 07:52 9/12/2017 08:45 9/12/2017  09:54 9/12/2017 10:11   Sodium Latest Ref Range: 133 - 144 mmol/L 137       Potassium Latest Ref Range: 3.4 - 5.3 mmol/L 4.1       Chloride Latest Ref Range: 94 - 109 mmol/L 103       Carbon Dioxide Latest Ref Range: 20 - 32 mmol/L 26       Urea Nitrogen Latest Ref Range: 7 - 30 mg/dL 31 (H)       Creatinine Latest Ref Range: 0.66 - 1.25 mg/dL 1.92 (H)       GFR Estimate Latest Ref Range: >60 mL/min/1.7m2 37 (L)       GFR Estimate If Black Latest Ref Range: >60 mL/min/1.7m2 44 (L)       Calcium Latest Ref Range: 8.5 - 10.1 mg/dL 8.8       Anion Gap Latest Ref Range: 3 - 14 mmol/L 8       N-Terminal Pro Bnp Latest Ref Range: 0 - 125 pg/mL 3630 (H)       Glucose Latest Ref Range: 70 - 99 mg/dL 87  74 109 (H)    WBC Latest Ref Range: 4.0 - 11.0 10e9/L 9.0       Hemoglobin Latest Ref Range: 13.3 - 17.7 g/dL 13.2 (L)       Hematocrit Latest Ref Range: 40.0 - 53.0 % 40.2       Platelet Count Latest Ref Range: 150 - 450 10e9/L 116 (L)       RBC Count Latest Ref Range: 4.4 - 5.9 10e12/L 4.36 (L)       MCV Latest Ref Range: 78 - 100 fl 92       MCH Latest Ref Range: 26.5 - 33.0 pg 30.3       MCHC Latest Ref Range: 31.5 - 36.5 g/dL 32.8       RDW Latest Ref Range: 10.0 - 15.0 % 19.7 (H)       ECHO LIMITED Unknown  Rpt      HEART CATH RIGHT HEART CATH Unknown     Attch   Results for BENEDICT ESTES (MRN [id number]) as of 9/12/2017 12:10   Ref. 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  He is tolerating parenteral prostanoids and there is marked reduction in PVR this catherization.  Cardiac output substantially increased and may need to blunt this.        1. Reduce Flolan dose increase to once per week  2. RTC  8 weeks  3. Reduce prednisone to 12.55mgs/day  4. Continue current medical regimen with  up-titration to 30ngs/kg/min    It is my impression that both the prostacyclin and the ruxolitinib are working well and patient is markedly improved.     Please do not hesitate to contact me if you have any questions/concerns.     Sincerely,     Dk Quan MD

## 2017-09-12 NOTE — NURSING NOTE
Chief Complaint   Patient presents with     Follow Up For     Return for PH F/U with [facility] and Echo prior.     Vitals were taken and medications were reconciled.  Stone Chavira, SHEAA  10:49 AM

## 2017-09-12 NOTE — IP AVS SNAPSHOT
[address] [address]                                       After Visit Summary   9/12/2017    Olivier Gómez    MRN: [id number]           After Visit Summary Signature Page     I have received my discharge instructions, and my questions have been answered. I have discussed any challenges I see with this plan with the nurse or doctor.    ..........................................................................................................................................  Patient/Patient Representative Signature      ..........................................................................................................................................  Patient Representative Print Name and Relationship to Patient    ..................................................               ................................................  Date                                            Time    ..........................................................................................................................................  Reviewed by Signature/Title    ...................................................              ..............................................  Date                                                            Time

## 2017-09-12 NOTE — IP AVS SNAPSHOT
MRN:[id number]                      After Visit Summary   9/12/2017    Olivier Gómez    MRN: [id number]           Visit Information        Department      9/12/2017  7:51 AM [address] [facility]          Review of your medicines      UNREVIEWED medicines. Ask your doctor about these medicines        Dose / Directions    ACTIGALL PO        Dose:  300 mg   Take 300 mg by mouth 3 times daily   Refills:  0       atovaquone 750 MG/5ML suspension   Commonly known as:  MEPRON        Dose:  1500 mg   Take 1,500 mg by mouth daily   Refills:  0       calcium carbonate 1250 MG tablet   Commonly known as:  OS-PANFILO 500 mg [place]. [place]        Dose:  600 mg   Take 600 mg by mouth 2 times daily   Refills:  0       CLONAZEPAM PO        Dose:  1 mg   Take 1 mg by mouth At Bedtime   Refills:  0       CRESTOR PO        Dose:  5 mg   Take 5 mg by mouth daily   Refills:  0       digoxin 125 MCG tablet   Commonly known as:  LANOXIN   Used for:  Pulmonary hypertension (H)        Dose:  125 mcg   Take 1 tablet (125 mcg) by mouth daily   Quantity:  90 tablet   Refills:  3       FLUCONAZOLE PO        Dose:  200 mg   Take 200 mg by mouth daily   Refills:  0       fluticasone 220 MCG/ACT Inhaler   Commonly known as:  FLOVENT HFA        Dose:  2 puff   Inhale 2 puffs into the lungs 2 times daily   Refills:  0       furosemide 40 MG tablet   Commonly known as:  LASIX   Used for:  Pulmonary hypertension (H)        Dose:  40 mg   Take 1 tablet (40 mg) by mouth daily   Quantity:  30 tablet   Refills:  0       glycine diluent 32.175 mL with epoprostenol 247.5 mcg infusion   Used for:  Pulmonary hypertension (H)        Dose:  20 ng/kg/min   Inject 2,434 ng/min into the vein continuous At Goal   Refills:  0       LEVEMIR FLEXPEN/FLEXTOUCH 100 UNIT/ML injection   Generic drug:  insulin detemir        Dose:  10 Units   Inject 10 Units Subcutaneous every morning   Refills:  0       magnesium plus protein 133 MG tablet        Dose:  266 mg    Take 266 mg by mouth daily (2 tablets)   Refills:  0       NovoLOG FLEXPEN 100 UNIT/ML injection   Generic drug:  insulin aspart        Dose:  5 Units   Inject 5 Units Subcutaneous 3 times daily (with meals) Plus SS   Refills:  0       PEN-VEE K OR        Dose:  500 mg   Take 500 mg by mouth 2 times daily   Refills:  0       PREDNISONE PO        Dose:  20 mg   Take 20 mg by mouth daily   Refills:  0       PROTONIX PO        Dose:  40 mg   Take 40 mg by mouth daily   Refills:  0       ruxolitinib 5 MG Tabs tablet CHEMO   Commonly known as:  JAKAFI   Used for:  Pericardial effusion, Pulmonary hypertension (H), Dqeen-urqwey-hevn disease (H)        Dose:  5 mg   Take 1 tablet (5 mg) by mouth 2 times daily   Quantity:  60 tablet   Refills:  0       tadalafil (PAH) 20 MG Tabs   Commonly known as:  ADCIRCA   Used for:  Pulmonary hypertension (H)        Dose:  40 mg   Take 2 tablets (40 mg) by mouth daily   Quantity:  60 tablet   Refills:  11       VALACYCLOVIR HCL PO        Dose:  500 mg   Take 500 mg by mouth every other day   Refills:  0       VITAMIN D (CHOLECALCIFEROL) PO        Dose:  2000 Units   Take 2,000 Units by mouth 3 times daily   Refills:  0         CONTINUE these medicines which have NOT CHANGED        Dose / Directions    blood glucose monitoring test strip   Commonly known as:  ONE TOUCH ULTRA   Used for:  Steroid-induced diabetes mellitus (H)        Use to test blood sugars 4 times daily or as directed.   Quantity:  100 strip   Refills:  11                Protect others around you: Learn how to safely use, store and throw away your medicines at www.disposemymeds.org.         Follow-ups after your visit        Your next 10 appointments already scheduled     Sep 12, 2017 10:00 AM CDT   Heart Cath Right Heart [id number]   [facility])    [address] [address]   [phone]            Sep 12, 2017 12:30 PM  CDT   (Arrive by 12:15 PM)   RETURN PRIMARY PULMONARY with Dk Quan MD   [facility] ([facility])    [address]  [address] [facility] [address] [phone]               Care Instructions        Further instructions from your care team       [facility]                        Interventional Cardiology  Discharge Instructions   Post Right Heart Cath      AFTER YOU GO HOME:    DO drink plenty of fluids    DO resume your regular diet and medications unless otherwise instructed by your Primary Physician    Do Not scrub the procedure site vigorously    No lotion or powder to the puncture site for 3 days    CALL YOUR PRIMARY PHYSICIAN IF: You may resume all normal activity.  Monitor neck site for bleeding, swelling, or voice changes. If you notice bleeding or swelling immediately apply pressure to the site and call number below to speak with Cardiology Fellow.  If you experience any changes in your breathing you should call your doctor immediately or come to the closest Emergency Department.  Do not drive yourself.    ADDITIONAL INSTRUCTIONS: Medications: You are to resume all home medications including anticoagulation therapy unless otherwise advised by your primary cardiologist or nurse coordinator.    Follow Up: Per your primary cardiology team    If you have any questions or concerns regarding your procedure site please call [phone] at anytime and ask for Cardiology Fellow on call.  They are available 24 hours a day.  You may also contact the Cardiology Clinic after hours number at [phone].                                                       Telephone Numbers [phone] Monday-Friday 8:00 am to 4:30 pm    [phone] After 4:30 pm Monday-Friday, Weekends & Holidays  Ask for Interventional Cardiologist on call. Someone is on call 24 hours/day   [facility] toll free number [phone] Monday-Friday 8:00 am to  "4:30 pm   [facility] Emergency Dept [phone]                    Additional Information About Your Visit        MyChart Information     [employer] lets you send messages to your doctor, view your test results, renew your prescriptions, schedule appointments and more. To sign up, go to [email or web address] . Click on \"Log in\" on the left side of the screen, which will take you to the Welcome page. Then click on \"Sign up Now\" on the right side of the page.     You will be asked to enter the access code listed below, as well as some personal information. Please follow the directions to create your username and password.     Your access code is: [email or web address]  Expires: 10/17/2017  6:30 AM     Your access code will  in 90 days. If you need help or a new code, please call your [facility] clinic or [phone].        Care EveryWhere ID     This is your Care EveryWhere ID. This could be used by other organizations to access your [facility] medical records  [id number]        Your Vitals Were     Blood Pressure Pulse Temperature Respirations Height Weight    122/69 (BP Location: Right arm) 83 98.1  F (36.7  C) (Oral) 20 1.956 m (6' 5\") 113.4 kg (250 lb)    Pulse Oximetry BMI (Body Mass Index)                97% 29.65 kg/m2           Primary Care Provider Office Phone # Fax #    Adelso Delgado [phone]      Equal Access to Services     DIEUDONNE RILEY AH: Hadii aad ku hadasho Soomaali, waaxda luqadaha, qaybta kaalmada adeegyada, maría segal. So [place] [phone].    ATENCIÓN: Si habla español, tiene a garza disposición servicios gratuitos de asistencia lingüística. Jair al [phone].    We comply with applicable federal civil rights laws and [place] laws. We do not discriminate on the basis of race, color, national origin, age, disability sex, sexual orientation or gender identity.            Thank you!     Thank you for choosing [facility] for your care. Our goal is always to provide " you with excellent care. Hearing back from our patients is one way we can continue to improve our services. Please take a few minutes to complete the written survey that you may receive in the mail after you visit with us. Thank you!             Medication List: This is a list of all your medications and when to take them. Check marks below indicate your daily home schedule. Keep this list as a reference.      Medications           Morning Afternoon Evening Bedtime As Needed    ACTIGALL PO   Take 300 mg by mouth 3 times daily                                atovaquone 750 MG/5ML suspension   Commonly known as:  MEPRON   Take 1,500 mg by mouth daily                                blood glucose monitoring test strip   Commonly known as:  ONE TOUCH ULTRA   Use to test blood sugars 4 times daily or as directed.                                calcium carbonate 1250 MG tablet   Commonly known as:  OS-PANFILO 500 mg [place]. Ca   Take 600 mg by mouth 2 times daily                                CLONAZEPAM PO   Take 1 mg by mouth At Bedtime                                CRESTOR PO   Take 5 mg by mouth daily                                digoxin 125 MCG tablet   Commonly known as:  LANOXIN   Take 1 tablet (125 mcg) by mouth daily                                FLUCONAZOLE PO   Take 200 mg by mouth daily                                fluticasone 220 MCG/ACT Inhaler   Commonly known as:  FLOVENT HFA   Inhale 2 puffs into the lungs 2 times daily                                furosemide 40 MG tablet   Commonly known as:  LASIX   Take 1 tablet (40 mg) by mouth daily                                glycine diluent 32.175 mL with epoprostenol 247.5 mcg infusion   Inject 2,434 ng/min into the vein continuous At Goal                                LEVEMIR FLEXPEN/FLEXTOUCH 100 UNIT/ML injection   Inject 10 Units Subcutaneous every morning   Generic drug:  insulin detemir                                magnesium plus protein 133 MG tablet    Take 266 mg by mouth daily (2 tablets)                                NovoLOG FLEXPEN 100 UNIT/ML injection   Inject 5 Units Subcutaneous 3 times daily (with meals) Plus SS   Generic drug:  insulin aspart                                PEN-VEE K OR   Take 500 mg by mouth 2 times daily                                PREDNISONE PO   Take 20 mg by mouth daily                                PROTONIX PO   Take 40 mg by mouth daily                                ruxolitinib 5 MG Tabs tablet CHEMO   Commonly known as:  JAKAFI   Take 1 tablet (5 mg) by mouth 2 times daily                                tadalafil (PAH) 20 MG Tabs   Commonly known as:  ADCIRCA   Take 2 tablets (40 mg) by mouth daily                                VALACYCLOVIR HCL PO   Take 500 mg by mouth every other day                                VITAMIN D (CHOLECALCIFEROL) PO   Take 2,000 Units by mouth 3 times daily

## 2017-09-12 NOTE — NURSING NOTE
Med Reconcile: Reviewed and verified all current medications with the patient. The updated medication list was printed and given to the patient.  Return Appointment: Patient given instructions regarding scheduling next clinic visit. Patient demonstrated understanding of this information and agreed to call with further questions or concerns.  Medication Change: Patient was educated regarding prescribed medication change, including discussion of the indication, administration, side effects, and when to report to MD or RN. Patient demonstrated understanding of this information and agreed to call with further questions or concerns.  Patient stated he understood all health information given and agreed to call with further questions or concerns.     Medication Changes:  Decrease Prednisone to 12.5 mg Daily    Increase Flolan (epoprostenol sodium) by 1 ng/kg/min weekly to a goal of 30 ng/kg/min    Follow up Appointment Information:  8 weeks with Dr. Quan

## 2017-09-12 NOTE — MR AVS SNAPSHOT
After Visit Summary   9/12/2017    Olivier Gómez    MRN: [id number]           Patient Information     Date Of Birth          1965        Visit Information        Provider Department      9/12/2017 12:30 PM Dk Quan MD [facility]        Today's Diagnoses     Pulmonary hypertension (H)    -  1      Care Instructions    Medication Changes:  Decrease Prednisone to 12.5 mg Daily    Increase Flolan (epoprostenol sodium) by 1 ng/kg/min weekly to a goal of 30 ng/kg/min    Follow up Appointment Information:  8 weeks with Dr. Quan    We are located on the third floor of the [facility] on the [facility].  Our address is     [address] [place] [address] on 3rd [place] [address]      Thank you for allowing us to be a part of your care here at the [facility]    If you have questions or concerns please contact us at:    Marlys Anderson RN, BSN    Kai Gant (Schedule,P.A.)  Nurse Coordinator     Clinic   Pulmonary Hypertension   Pulmonary Hypertension  [facility]  (P)[phone]    (P) [phone]        (F)[phone]                ** Please note that you will NOT receive a reminder call regarding your scheduled testing, reminder calls are for provider appointments only.  If you are scheduled for testing within the [facility] system you may receive a call regarding pre-registration for billing purposes only.**     Remember to weigh yourself daily after voiding and before you consume any food or beverages and log the numbers.  If you have gained/lost 2 pounds overnight or 5 pounds in a week contact us immediately for medication adjustments or further instructions.  **Please call us immediately if you have any syncope, chest pain, edema, or decline in your functional status.          Follow-ups after your visit       "  Follow-up notes from your care team     Return in about 8 weeks (around 11/8/2017) for with Avelina, Return PH, with, Labs.      Your next 10 appointments already scheduled     Dec 06, 2017  1:30 PM CST   Lab with  LAB   [facility] ([facility])    [address]  1st Floor  [facility] [address] [phone]            Dec 06, 2017  2:00 PM CST   (Arrive by 1:45 PM)   RETURN PRIMARY PULMONARY with Dk Quan MD   [facility] ([facility])    [address]  3rd Floor  [facility] [address] [phone]              Who to contact     If you have questions or need follow up information about today's clinic visit or your schedule please contact [facility] directly at [phone].  Normal or non-critical lab and imaging results will be communicated to you by MyChart, letter or phone within 4 business days after the clinic has received the results. If you do not hear from us within 7 days, please contact the clinic through [employer] or phone. If you have a critical or abnormal lab result, we will notify you by phone as soon as possible.  Submit refill requests through [employer] or call your pharmacy and they will forward the refill request to us. Please allow 3 business days for your refill to be completed.          Additional Information About Your Visit        MyChart Information     [employer] lets you send messages to your doctor, view your test results, renew your prescriptions, schedule appointments and more. To sign up, go to [email or web address] . Click on \"Log in\" on the left side of the screen, which will take you to the Welcome page. Then click on \"Sign up Now\" on the right side of the page.     You will be asked to enter the access code listed below, as well as some personal information. Please follow the directions to create your username and password.     Your access code is: [email or web address]  Expires: " "10/17/2017  6:30 AM     Your access code will  in 90 days. If you need help or a new code, please call your [facility] or [phone].        Care EveryWhere ID     This is your Care EveryWhere ID. This could be used by other organizations to access your [facility] medical records  [id number]        Your Vitals Were     Pulse Height Pulse Oximetry BMI (Body Mass Index)          80 1.956 m (6' 5\") 97% 29.73 kg/m2         Blood Pressure from Last 3 Encounters:   17 124/79   17 124/79   17 107/71    Weight from Last 3 Encounters:   17 113.7 kg (250 lb 11.2 oz)   17 113.4 kg (250 lb)   17 115 kg (253 lb 9.6 oz)              Today, you had the following     No orders found for display         Today's Medication Changes      Notice     This visit is during an admission. Changes to the med list made in this visit will be reflected in the After Visit Summary of the admission.             Primary Care Provider Office Phone # Fax #    Adelso Delgado [phone]       [facility] [address]  [place] [address]        Equal Access to Services     DIEUDONNE RILEY : Hadii wing ku hadasho Soomaali, waaxda luqadaha, qaybta kaalmada adeegyada, waxay kellen peterson . So [facility] [place] [phone].    ATENCIÓN: Si habla español, tiene a garza disposición servicios gratuitos de asistencia lingüística. Llame al [phone].    We comply with applicable federal civil rights laws and [place] laws. We do not discriminate on the basis of race, color, national origin, age, disability sex, sexual orientation or gender identity.            Thank you!     Thank you for choosing [facility]  for your care. Our goal is always to provide you with excellent care. Hearing back from our patients is one way we can continue to improve our services. Please take a few minutes to complete the written survey that you may receive in the mail after your " visit with us. Thank you!             Your Updated Medication List - Protect others around you: Learn how to safely use, store and throw away your medicines at www.disposemymeds.org.      Notice     This visit is during an admission. Changes to the med list made in this visit will be reflected in the After Visit Summary of the admission.

## 2017-09-12 NOTE — PROGRESS NOTES
Returned from [facility] at 1013.  Patient tolerated recovery stage well. VSS, RIJV site clean/dry/intact, no hematoma, and denies pain. Patient tolerated PO fluids. Teaching was done and discharge instructions were given. Patient ambulated.   Patient discharged from the hospital via ambulatory to home with his wife.

## 2017-09-12 NOTE — PROCEDURES
CARDIAC CATH REPORT:     PROCEDURES PERFORMED:   Anterograde Right internal jugular access  Right Heart Catheterization    PHYSICIANS:  Attending Physician: Lula Fall MD  Interventional Cardiology Fellow: Roland Walters MD  Cardiology Fellow: Emory Witt MD    INDICATION:  Olivier Gómez is a 52 year old male with primary pulmonary arterial hypertension, on epoprostenol.  He has been referred for a right heart catheterization by Dr. Quan.    DESCRIPTION:  1. Consent obtained with discussion of risks.  All questions were answered.  2. Sterile prep and procedure.  3. Location with Sheaths:   Rt IJ  7 Fr 10 cm [short]  4. Access: Local anesthetic with lidocaine.  A micropuncture 21 guage needle with ultrasound guidance was used to establish vascular access using a modified Seldinger technique.  Due to difficulty passing wire, intravenous contrast used to evaluate right internal jugular and superior vena cava.  No stenosis or blockage noted. Jtip wire substituted in, advanced easily.   5. Diagnostic Catheters:   7 Fr  [place] Chris  6. Estimated blood loss: < 25 ml    MEDICATIONS:  The procedure was performed under local sedation 1% lidocaine.  Heart rate, BP, respiration, oxygen saturation and patient responses were monitored throughout the procedure with the assistance of the RN under my supervision.    Procedures:    HEMODYNAMICS:  BSA 2.48  1. HR 80 bpm  2. /76/92 mmHg  3. RA 8/6/6   4. RV 90/15  5. PA 90/30/50   6. PCW 14   7. PA sat 75.3%   8. PCW sat could not be evaluated, poor samples  9. Hgb 12.6 g/dL   10. Carter CO 8.67   11. Carter CI 3.52   12. TD CO 6.55   13. TD CI 2.66   14. PVR 4.15   15. TPR 5.77  16. SVR 9.92    Sheath Removal:  The right internal jugular sheath was manually removed in the cardiac catheterization laboratory.    Contrast: Isovue, 10 ml     Fluoroscopy Time: 5.4 min    COMPLICATIONS:  1. None    SUMMARY:   >> Upper limit normal RA pressures, elevated RV pressures  >> Normal  left sided filling pressures.  >> Moderate pulmonary artery hypertension, mPAP 50  >> Normal cardiac output, 8.67 L/min with index 3.52 L/min/m2    PLAN:   >> Antiplatelet Therapy: None  >> Continued medical management and lifestyle modification for cardiovascular risk factor optimization.   >> Discharge today per protocol   >> follow up with Dr. Quan regarding epoprostenol dosing    The attending interventional cardiologist was present and supervised all critical aspects the procedure.    Findings discussed with Dr. Fall and Dr. Quan.    See CVIS report for final draft.    Emory Witt   Cardiology Fellow    Lula Fall   Cardiology Staff

## 2017-10-03 RX ORDER — VALACYCLOVIR HYDROCHLORIDE 500 MG/1
500 TABLET, FILM COATED ORAL EVERY OTHER DAY
Qty: 14 TABLET | OUTPATIENT
Start: 2017-10-03

## 2017-11-17 ENCOUNTER — PRE VISIT (OUTPATIENT)
Dept: CARDIOLOGY | Facility: CLINIC | Age: 52
End: 2017-11-17

## 2017-12-27 DIAGNOSIS — I27.20 PULMONARY HYPERTENSION (H): ICD-10-CM

## 2017-12-28 RX ORDER — PREDNISONE 2.5 MG/1
TABLET ORAL
Qty: 270 TABLET | Refills: 3 | Status: SHIPPED | OUTPATIENT
Start: 2017-12-28 | End: 2019-01-02

## 2017-12-29 ENCOUNTER — CARE COORDINATION (OUTPATIENT)
Dept: CARDIOLOGY | Facility: CLINIC | Age: 52
End: 2017-12-29

## 2017-12-29 NOTE — PROGRESS NOTES
Name: Olivier Gómez   Rx #: [id number]    : 1965   ID: [id number]     Flolan IV    Use (per 100 mL diluent)        Dosing Weight: 121.7 kg   Date Rate  (mL per 24 hours) Reservoir  Volume Dose  (ng per kg per min) Concentration  (ng per mL) 1.5 mg vials 0.5 mg vials    90 95 23 45,000 3 0    93  24 45,000 3 0    73  25 60,000 4 0    76  26 60,000 4 0    79 84 27 60,000 4 0    82 87 28 60,000 4 0    85 90 29 60,000 4 0   New Goal 88  30 60,000 4 0   Please note goal dose has been increased to 30 ng/kg/ min                                                                                           Have patient increase dose by 1 ng per kg per min every 7 day(s) as tolerated ;   goal = 30 ng/kg/ min.

## 2018-01-14 NOTE — PROGRESS NOTES
Dk Quan M.D.  Cardiovascular Medicine    I personally saw and examined this patient, discussed care with housestaff and other consultants, reviewed current laboratories and imaging studies, and conveyed impression and diagnostic/therapeutic plan to patient.    Nathan Richardson MD    [address] [place] [address] [phone] (Fax)    Musa Martin MD (Mar. 02, 2016 - Present)  [phone] (Work)  [phone] (Fax)  [address] [place] [address]    Adelso Delgado M.D.  [place]    Magdaleno Elder M.D.  [place]      Problem List  1. History of AML  2. 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 Chronic parenteral prostacyclin  5. Steroid dependence (treatment of PH)    History    Patient returns for follow-up of pulmonary hypertension associated with previous BMT for AML.  He is currently on prostacyclin at 30ngs/kg/minute.  He is exercising regularly.  He has no symptoms or findings of right heart failure. He has no signs of opportunistic infection: fever, chills, cough, headaches, nausea.  He has no symptoms from reducing steroid dosage        REVIEW of SYMPTOMS    Constitutional: without fever, chills, night sweats.  Weight is down  HEENT: without dry eyes, dry mouth, sinusitis, corryza, visual changes but marked flushing  Endocrine: without polyuria, polydypsia, polyphagia, heat or cold intolerance, changing mental status  Cardiology: without chest pain, tightness, heaviness, pressure, paroxysmal dyspnea, orthopnea, palpitation, pre-syncope or syncope or device discharge (if present)  Pulmonary: without asthma, wheezing, cough, hemoptysis  GI: without nausea, emesis, jaundice, pain, hematemesis, melena  : without frequency, urgency, hematuria, stones, pain, abnormal bleeding, frequency, urgency  Neurologic: without TIA, CVA, trauma, seizure  Dermatologic: without lesions, abrasion rash,  Scleroderma   Orthopedic/Rheum: without  significant joint pain, impairment, limb, polyserositis, ulceration, Raynauds  Heme: without mass, bruising, frequent infection, anemia  Psychiatric: without substance abuse, hallucination, medication,  But situational depression    Exercise tolerance: improving      Objective  There were no vitals taken for this visit.   Constitutional: alert, oriented, normal gait and station, normal mentation.  Oral: moist mucous membrans  Lymph: without pathologic adenopathy  Chest: clear to ausculation and percussion  Cor: No evidence of left or right ventricular activity.  Rhythm is regular.  S1 normal, S2 split physiologically. Murmurs are not present  Abdomen: without tenderness, rebound, guarding, masses, ascites  Extremities: Edema not present  Neuro: no focal defects, normal mentation  Skin: without open lesions  Psych: oriented, verbal, mental status in tact      Meds  Current Outpatient Prescriptions   Medication     epoprostenol (FLOLAN) 1.5 mg injection     PREDNISONE PO     predniSONE (DELTASONE) 2.5 MG tablet     glycine diluent 32.175 mL with epoprostenol 247.5 mcg infusion     ruxolitinib (JAKAFI) 5 MG TABS tablet CHEMO     tadalafil, PAH, (ADCIRCA) 20 MG TABS     furosemide (LASIX) 40 MG tablet     VALACYCLOVIR HCL PO     Specialty Vitamins Products (MAGNESIUM PLUS PROTEIN) 133 MG tablet     digoxin (LANOXIN) 125 MCG tablet     Penicillin V Potassium (PEN-VEE K OR)     fluticasone (FLOVENT HFA) 220 MCG/ACT Inhaler     FLUCONAZOLE PO     Ursodiol (ACTIGALL PO)     VITAMIN D, CHOLECALCIFEROL, PO     CLONAZEPAM PO     calcium carbonate (OS-PANFILO 500 MG [place]. [place]) 500 MG tablet     Pantoprazole Sodium (PROTONIX PO)     Rosuvastatin Calcium (CRESTOR PO)     atovaquone (MEPRON) 750 MG/5ML suspension     blood glucose monitoring (ONE TOUCH ULTRA) test strip     No current facility-administered medications for this visit.        Labs      Results for BENEDICT ESTES (MRN [id number]) as of 9/12/2017 12:10   Ref. Range  9/11/2017 13:18 9/12/2017 07:52 9/12/2017 08:45 9/12/2017 09:54 9/12/2017 10:11   Sodium Latest Ref Range: 133 - 144 mmol/L 137       Potassium Latest Ref Range: 3.4 - 5.3 mmol/L 4.1       Chloride Latest Ref Range: 94 - 109 mmol/L 103       Carbon Dioxide Latest Ref Range: 20 - 32 mmol/L 26       Urea Nitrogen Latest Ref Range: 7 - 30 mg/dL 31 (H)       Creatinine Latest Ref Range: 0.66 - 1.25 mg/dL 1.92 (H)       GFR Estimate Latest Ref Range: >60 mL/min/1.7m2 37 (L)       GFR Estimate If Black Latest Ref Range: >60 mL/min/1.7m2 44 (L)       Calcium Latest Ref Range: 8.5 - 10.1 mg/dL 8.8       Anion Gap Latest Ref Range: 3 - 14 mmol/L 8       N-Terminal Pro Bnp Latest Ref Range: 0 - 125 pg/mL 3630 (H)       Glucose Latest Ref Range: 70 - 99 mg/dL 87  74 109 (H)    WBC Latest Ref Range: 4.0 - 11.0 10e9/L 9.0       Hemoglobin Latest Ref Range: 13.3 - 17.7 g/dL 13.2 (L)       Hematocrit Latest Ref Range: 40.0 - 53.0 % 40.2       Platelet Count Latest Ref Range: 150 - 450 10e9/L 116 (L)       RBC Count Latest Ref Range: 4.4 - 5.9 10e12/L 4.36 (L)       MCV Latest Ref Range: 78 - 100 fl 92       MCH Latest Ref Range: 26.5 - 33.0 pg 30.3       MCHC Latest Ref Range: 31.5 - 36.5 g/dL 32.8       RDW Latest Ref Range: 10.0 - 15.0 % 19.7 (H)       ECHO LIMITED Unknown  Rpt      HEART CATH RIGHT HEART CATH Unknown     Attch   Results for BENEDICT ESTES (MRN [id number]) as of 9/12/2017 12:10   Ref. Range 6/21/2017 18:30 6/22/2017 06:52 6/23/2017 06:37 8/22/2017 14:45 9/11/2017 13:18   Creatinine Latest Ref Range: 0.66 - 1.25 mg/dL 1.75 (H) 1.83 (H) 1.56 (H) 1.97 (H) 1.92 (H)     Assessment/Plan   1. Reduce furosemide to every other day.  Watch weight and call if increasing or edema  2. Decrease prednisone to 5mgs/day.  You may notice fatigue.  Call if not feeling well.   3. Right heart catherization last week of April 25 (Wednesday) and echocardiogram  4. Basic metabolic panel and BNP in 10 ways to assess response to  furosemide    CC: Doctors above

## 2018-01-16 ENCOUNTER — OFFICE VISIT (OUTPATIENT)
Dept: CARDIOLOGY | Facility: CLINIC | Age: 53
End: 2018-01-16
Payer: COMMERCIAL

## 2018-01-16 VITALS
DIASTOLIC BLOOD PRESSURE: 70 MMHG | OXYGEN SATURATION: 97 % | SYSTOLIC BLOOD PRESSURE: 116 MMHG | HEIGHT: 77 IN | BODY MASS INDEX: 29.76 KG/M2 | HEART RATE: 88 BPM | WEIGHT: 252 LBS

## 2018-01-16 DIAGNOSIS — I27.20: Primary | ICD-10-CM

## 2018-01-16 DIAGNOSIS — I27.20 PULMONARY HYPERTENSION (H): ICD-10-CM

## 2018-01-16 PROCEDURE — 99214 OFFICE O/P EST MOD 30 MIN: CPT | Performed by: INTERNAL MEDICINE

## 2018-01-16 RX ORDER — SODIUM CHLORIDE 9 MG/ML
1000 INJECTION, SOLUTION INTRAVENOUS CONTINUOUS
Status: CANCELLED | OUTPATIENT
Start: 2018-01-16

## 2018-01-16 RX ORDER — POTASSIUM CHLORIDE 1500 MG/1
20 TABLET, EXTENDED RELEASE ORAL
Status: CANCELLED | OUTPATIENT
Start: 2018-01-16

## 2018-01-16 RX ORDER — EPOPROSTENOL 1.5 MG/10ML
INJECTION, POWDER, LYOPHILIZED, FOR SOLUTION INTRAVENOUS
COMMUNITY
End: 2018-09-13

## 2018-01-16 RX ORDER — LIDOCAINE 40 MG/G
CREAM TOPICAL
Status: CANCELLED | OUTPATIENT
Start: 2018-01-16

## 2018-01-16 NOTE — NURSING NOTE
Right Heart Catheterization: Patient was instructed regarding right heart catheterization, including discussion of the procedure, preparation, intra-procedural steps, and recovery at home. Patient demonstrated understanding of this information and agreed to call with further questions or concerns.  Med Reconcile: Reviewed and verified all current medications with the patient. The updated medication list was printed and given to the patient.  Diet: Patient instructed regarding a heart healthy diet, including discussion of reduced fat and sodium intake. Patient demonstrated understanding of this information and agreed to call with further questions or concerns.  Return Appointment: Patient given instructions regarding scheduling next clinic visit. Patient demonstrated understanding of this information and agreed to call with further questions or concerns.  Patient stated he understood all health information given and agreed to call with further questions or concerns.     Medication Changes:  No medication changes at this time. Please continue current medication regiment.      Patient Instructions:    Check-In  Time Check-In Location Estimated Length Procedure   Name        [facility]  waiting room 60-90 minutes Right Heart Catheterization**     Procedure Preparations & Instructions     This is an invasive procedure that DOES require preparation:    - Nothing to eat for 6 hours   - You may have clear liquids up until the time of your procedure  - A ride should be arranged for you in the instance you are unable to drive home, however you should be able to function as you normally would after the procedure     *For Patients with Diabetes: ? DO NOT take any oral diabetic medication, short-acting diabetes medications/insulin, humalog or regular insulin the morning of your test  ? Take   dose of long-acting insulin (Lantus, Levemir) the day of your test  ? Remember to  bring your glucometer and insulin with you to take after your  test if needed     *For Patients on anticoagulants: ? Your Coumadin Clinic will give you instructions on medication adjustments or bridging prior to the procedure      Follow up Appointment Information:  April 25th 2018 at 3:00 with Dr. [facility]an [facility] [address] [place] [address]  You will have the Right heart catheterization prior to this appt.  Kai will call you this the time.

## 2018-01-16 NOTE — MR AVS SNAPSHOT
After Visit Summary   1/16/2018    Olivier Gómez    MRN: [id number]           Patient Information     Date Of Birth          1965        Visit Information        Provider Department      1/16/2018 8:30 AM Dk Quan MD [facility]        Today's Diagnoses     Episodic pulmonary hypertension    -  1    Pulmonary hypertension          Care Instructions    Medication Changes:  No medication changes at this time. Please continue current medication regiment.      Patient Instructions:    Check-In  Time Check-In Location Estimated Length Procedure   Name        [facility]  waiting room 60-90 minutes Right Heart Catheterization**     Procedure Preparations & Instructions     This is an invasive procedure that DOES require preparation:    - Nothing to eat for 6 hours   - You may have clear liquids up until the time of your procedure  - A ride should be arranged for you in the instance you are unable to drive home, however you should be able to function as you normally would after the procedure     *For Patients with Diabetes: ? DO NOT take any oral diabetic medication, short-acting diabetes medications/insulin, humalog or regular insulin the morning of your test  ? Take   dose of long-acting insulin (Lantus, Levemir) the day of your test  ? Remember to  bring your glucometer and insulin with you to take after your test if needed     *For Patients on anticoagulants: ? Your Coumadin Clinic will give you instructions on medication adjustments or bridging prior to the procedure      Follow up Appointment Information:  April 25th 2018 at 3:00 with Dr. [facility] [address] [place] [address]  You will have the Right heart catheterization prior to this appt.  Kai will call you this the time.    For scheduling at [facility] please call [phone]  For scheduling at the [facility] please call [phone]  We are located on the second floor [address]  at the [facility].  Our address is     [facility] [address],   [address]  [place]  [address]    Thank you for allowing us to be a part of your care here at the [facility]    If you have questions or concerns please contact us at:    Marlys Anderson RN      Nurse Coordinator       Pulmonary Hypertension     [facility]   [phone]  (F)[phone]    ** Please note that you will NOT receive a reminder call regarding your scheduled testing, reminder calls are for provider appointments only.  If you are scheduled for testing within the [facility] system you may receive a call regarding pre-registration for billing purposes only.**     Remember to weigh yourself daily after voiding and before you consume any food or beverages and log the numbers.  If you have gained/lost 2 pounds overnight or 5 pounds in a week contact us immediately for medication adjustments or further instructions.    Support Group:  Pulmonary Hypertension Association  Https://www.phassociation.org/  **Look at the Events Tab** They even have Support Groups that you can call into    [facility] PH Support Group  First Saturday of the Month from 1-3 PM   Location: [address] [place] [address]  Leader: Frandy Pritchard  Phone: [phone]  Email: [email or web address]          Follow-ups after your visit        Your next 10 appointments already scheduled     Apr 25, 2018  3:00 PM CDT   (Arrive by 2:45 PM)   RETURN PRIMARY PULMONARY with Dk Quan MD   [facility] ([facility])    [address] [place] [address]  [address]  [facility] [address] [phone]              Future tests that were ordered for you today     Open Future Orders        Priority Expected Expires Ordered    Heart Cath Right heart cath Routine  1/16/2019 1/16/2018            Who to contact     If you have questions or need follow up information about today's clinic visit or  "your schedule please contact [facility] directly at [phone].  Normal or non-critical lab and imaging results will be communicated to you by MyChart, letter or phone within 4 business days after the clinic has received the results. If you do not hear from us within 7 days, please contact the clinic through [employer] or phone. If you have a critical or abnormal lab result, we will notify you by phone as soon as possible.  Submit refill requests through [email or web address] or call your pharmacy and they will forward the refill request to us. Please allow 3 business days for your refill to be completed.          Additional Information About Your Visit        [employer] Information     [email or web address] lets you send messages to your doctor, view your test results, renew your prescriptions, schedule appointments and more. To sign up, go to [email or web address] . Click on \"Log in\" on the left side of the screen, which will take you to the Welcome page. Then click on \"Sign up Now\" on the right side of the page.     You will be asked to enter the access code listed below, as well as some personal information. Please follow the directions to create your username and password.     Your access code is: [email or web address]  Expires: 2018  6:31 AM     Your access code will  in 90 days. If you need help or a new code, please call your [place] clinic or [phone].        Care EveryWhere ID     This is your Care EveryWhere ID. This could be used by other organizations to access your [place] medical records  [id number]        Your Vitals Were     Pulse Height Pulse Oximetry BMI (Body Mass Index)          88 1.956 m (6' 5.01\") 97% 29.88 kg/m2         Blood Pressure from Last 3 Encounters:   18 116/70   17 124/79   17 124/79    Weight from Last 3 Encounters:   18 114.3 kg (252 lb)   17 113.7 kg (250 lb 11.2 oz)   17 113.4 kg (250 lb)                 Today's Medication " Changes          These changes are accurate as of: 1/16/18  9:35 AM.  If you have any questions, ask your nurse or doctor.               These medicines have changed or have updated prescriptions.        Dose/Directions    furosemide 40 MG tablet   Commonly known as:  LASIX   This may have changed:    - how much to take  - additional instructions   Used for:  Pulmonary hypertension        Dose:  40 mg   Take 1 tablet (40 mg) by mouth daily   Quantity:  30 tablet   Refills:  0                Primary Care Provider Office Phone # Fax #    Adelso Delgado [phone]       [facility] [address]  [place] [address]        Equal Access to Services     : Hadii aad ku hadasho Soomaali, waaxda luqadaha, qaybta kaalmada adeegyada, maría peterson . So [facility] [phone].    ATENCIÓN: Si habla español, tiene a garza disposición servicios gratuitos de asistencia lingüística. Ll[facility] [phone].    We comply with applicable federal civil rights laws and [place] laws. We do not discriminate on the basis of race, color, national origin, age, disability, sex, sexual orientation, or gender identity.            Thank you!     Thank you for choosing [facility]  for your care. Our goal is always to provide you with excellent care. Hearing back from our patients is one way we can continue to improve our services. Please take a few minutes to complete the written survey that you may receive in the mail after your visit with us. Thank you!             Your Updated Medication List - Protect others around you: Learn how to safely use, store and throw away your medicines at www.disposemymeds.org.          This list is accurate as of: 1/16/18  9:35 AM.  Always use your most recent med list.                   Brand Name Dispense Instructions for use Diagnosis    ACTIGALL PO      Take 300 mg by mouth 3 times daily         atovaquone 750 MG/5ML suspension    MEPRON     Take 1,500 mg by mouth daily        blood glucose monitoring test strip    ONETOUCH ULTRA    100 strip    Use to test blood sugars 4 times daily or as directed.    Steroid-induced diabetes mellitus (H)       calcium carbonate 1250 MG tablet    OS-PANFILO 500 mg [place]. [place]     Take 600 mg by mouth 2 times daily        CLONAZEPAM PO      Take 1 mg by mouth At Bedtime        CRESTOR PO      Take 5 mg by mouth daily        digoxin 125 MCG tablet    LANOXIN    90 tablet    Take 1 tablet (125 mcg) by mouth daily    Pulmonary hypertension       FLOLAN 1.5 mg injection   Generic drug:  epoprostenol           FLUCONAZOLE PO      Take 200 mg by mouth daily        fluticasone 220 MCG/ACT Inhaler    FLOVENT HFA     Inhale 2 puffs into the lungs 2 times daily        furosemide 40 MG tablet    LASIX    30 tablet    Take 1 tablet (40 mg) by mouth daily    Pulmonary hypertension       glycine diluent 32.175 mL with epoprostenol 247.5 mcg infusion      Inject 2,799.1 ng/min into the vein continuous Goal 30 ng/kg/min    Pulmonary hypertension       magnesium plus protein 133 MG tablet      Take 266 mg by mouth daily (2 tablets)        PEN-VEE K OR      Take 500 mg by mouth 2 times daily        * PREDNISONE PO      Taking 7.5mg        * predniSONE 2.5 MG tablet    DELTASONE    270 tablet    Take 7.5 mg daily (3 tablets = 7.5 mg daily)    Pulmonary hypertension       PROTONIX PO      Take 40 mg by mouth daily        ruxolitinib 5 MG Tabs tablet CHEMO    JAKAFI    60 tablet    Take 1 tablet (5 mg) by mouth 2 times daily    Pericardial effusion, Pulmonary hypertension, Hvzww-mjwvlm-udph disease (H)       tadalafil (PAH) 20 MG Tabs    ADCIRCA    60 tablet    Take 2 tablets (40 mg) by mouth daily    Pulmonary hypertension       VALACYCLOVIR HCL PO      Take 500 mg by mouth every other day        VITAMIN D (CHOLECALCIFEROL) PO      Take 2,000 Units by mouth 3 times daily        * Notice:  This list  has 2 medication(s) that are the same as other medications prescribed for you. Read the directions carefully, and ask your doctor or other care provider to review them with you.

## 2018-01-16 NOTE — PATIENT INSTRUCTIONS
Medication Changes:  No medication changes at this time. Please continue current medication regiment.      Patient Instructions:    Check-In  Time Check-In Location Estimated Length Procedure   Name        [facility]  waiting room 60-90 minutes Right Heart Catheterization**     Procedure Preparations & Instructions     This is an invasive procedure that DOES require preparation:    - Nothing to eat for 6 hours   - You may have clear liquids up until the time of your procedure  - A ride should be arranged for you in the instance you are unable to drive home, however you should be able to function as you normally would after the procedure     *For Patients with Diabetes: ? DO NOT take any oral diabetic medication, short-acting diabetes medications/insulin, humalog or regular insulin the morning of your test  ? Take   dose of long-acting insulin (Lantus, Levemir) the day of your test  ? Remember to  bring your glucometer and insulin with you to take after your test if needed     *For Patients on anticoagulants: ? Your Coumadin Clinic will give you instructions on medication adjustments or bridging prior to the procedure      Follow up Appointment Information:  April 25th 2018 at 3:00 with Dr. [facility]an [facility] [address] [place] [address]  You will have the Right heart catheterization prior to this appt.  Kai will call you this the time.    For scheduling at [facility] please call [phone]  For scheduling at the [facility] please call [phone]  We are located on the second floor [address] at the [facility].  Our address is     [address] [place] [address],   [address]  [place]  [address]    Thank you for allowing us to be a part of your care here at the [facility]    If you have questions or concerns please contact us at:    Marlys Anderson, JANUSZ      Nurse Coordinator       Pulmonary Hypertension     [facility]   [phone])[phone]  (F)[phone]    **  Please note that you will NOT receive a reminder call regarding your scheduled testing, reminder calls are for provider appointments only.  If you are scheduled for testing within the [facility] system you may receive a call regarding pre-registration for billing purposes only.**     Remember to weigh yourself daily after voiding and before you consume any food or beverages and log the numbers.  If you have gained/lost 2 pounds overnight or 5 pounds in a week contact us immediately for medication adjustments or further instructions.    Support Group:  Pulmonary Hypertension Association  Https://www.phassociation.org/  **Look at the Events Tab** They even have Support Groups that you can call into    [facility] PH Support Group  First Saturday of the Month from 1-3 PM   Location: [address] [place] [address]  Leader: Frandy Pritchard  Phone: [phone]  Email: [email or web address]

## 2018-01-16 NOTE — LETTER
1/16/2018    Adelso Delgado  [facility] [address]  [place] [address]    RE: Olivier AGUDELO Rico       Dear Colleague,    I had the pleasure of seeing Olivier AGUDELO Rico in the [facility].    Dk Quan M.D.  Cardiovascular Medicine    I personally saw and examined this patient, discussed care with housestaff and other consultants, reviewed current laboratories and imaging studies, and conveyed impression and diagnostic/therapeutic plan to patient.    Nathan Richardson MD    [address] [place] [address] [phone] (Fax)    Musa Martin MD (Mar. 02, 2016 - Present)  [phone] (Work)  [phone] (Fax)  [address] [place] [address]    Adelso Delgado M.D.  [place]    Magdaleno Elder M.D.  [place]      Problem List  1. History of AML  2. 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 Chronic parenteral prostacyclin  5. Steroid dependence (treatment of PH)    History    Patient returns for follow-up of pulmonary hypertension associated with previous BMT for AML.  He is currently on prostacyclin at 30ngs/kg/minute.  He is exercising regularly.  He has no symptoms or findings of right heart failure. He has no signs of opportunistic infection: fever, chills, cough, headaches, nausea.  He has no symptoms from reducing steroid dosage        REVIEW of SYMPTOMS    Constitutional: without fever, chills, night sweats.  Weight is down  HEENT: without dry eyes, dry mouth, sinusitis, corryza, visual changes but marked flushing  Endocrine: without polyuria, polydypsia, polyphagia, heat or cold intolerance, changing mental status  Cardiology: without chest pain, tightness, heaviness, pressure, paroxysmal dyspnea, orthopnea, palpitation, pre-syncope or syncope or device discharge (if present)  Pulmonary: without asthma, wheezing, cough, hemoptysis  GI: without nausea, emesis, jaundice, pain, hematemesis,  melena  : without frequency, urgency, hematuria, stones, pain, abnormal bleeding, frequency, urgency  Neurologic: without TIA, CVA, trauma, seizure  Dermatologic: without lesions, abrasion rash,  Scleroderma   Orthopedic/Rheum: without significant joint pain, impairment, limb, polyserositis, ulceration, Raynauds  Heme: without mass, bruising, frequent infection, anemia  Psychiatric: without substance abuse, hallucination, medication,  But situational depression    Exercise tolerance: improving      Objective  There were no vitals taken for this visit.   Constitutional: alert, oriented, normal gait and station, normal mentation.  Oral: moist mucous membrans  Lymph: without pathologic adenopathy  Chest: clear to ausculation and percussion  Cor: No evidence of left or right ventricular activity.  Rhythm is regular.  S1 normal, S2 split physiologically. Murmurs are not present  Abdomen: without tenderness, rebound, guarding, masses, ascites  Extremities: Edema not present  Neuro: no focal defects, normal mentation  Skin: without open lesions  Psych: oriented, verbal, mental status in tact      Meds  Current Outpatient Prescriptions   Medication     epoprostenol (FLOLAN) 1.5 mg injection     PREDNISONE PO     predniSONE (DELTASONE) 2.5 MG tablet     glycine diluent 32.175 mL with epoprostenol 247.5 mcg infusion     ruxolitinib (JAKAFI) 5 MG TABS tablet CHEMO     tadalafil, PAH, (ADCIRCA) 20 MG TABS     furosemide (LASIX) 40 MG tablet     VALACYCLOVIR HCL PO     Specialty Vitamins Products (MAGNESIUM PLUS PROTEIN) 133 MG tablet     digoxin (LANOXIN) 125 MCG tablet     Penicillin V Potassium (PEN-VEE K OR)     fluticasone (FLOVENT HFA) 220 MCG/ACT Inhaler     FLUCONAZOLE PO     Ursodiol (ACTIGALL PO)     VITAMIN D, CHOLECALCIFEROL, PO     CLONAZEPAM PO     calcium carbonate (OS-PANFILO 500 MG [place]. [place]) 500 MG tablet     Pantoprazole Sodium (PROTONIX PO)     Rosuvastatin Calcium (CRESTOR PO)     atovaquone (MEPRON) 750 MG/5ML  suspension     blood glucose monitoring (ONE TOUCH ULTRA) test strip     No current facility-administered medications for this visit.        Labs      Results for BENEDICT ESTES (MRN [id number]) as of 9/12/2017 12:10   Ref. Range 9/11/2017 13:18 9/12/2017 07:52 9/12/2017 08:45 9/12/2017 09:54 9/12/2017 10:11   Sodium Latest Ref Range: 133 - 144 mmol/L 137       Potassium Latest Ref Range: 3.4 - 5.3 mmol/L 4.1       Chloride Latest Ref Range: 94 - 109 mmol/L 103       Carbon Dioxide Latest Ref Range: 20 - 32 mmol/L 26       Urea Nitrogen Latest Ref Range: 7 - 30 mg/dL 31 (H)       Creatinine Latest Ref Range: 0.66 - 1.25 mg/dL 1.92 (H)       GFR Estimate Latest Ref Range: >60 mL/min/1.7m2 37 (L)       GFR Estimate If Black Latest Ref Range: >60 mL/min/1.7m2 44 (L)       Calcium Latest Ref Range: 8.5 - 10.1 mg/dL 8.8       Anion Gap Latest Ref Range: 3 - 14 mmol/L 8       N-Terminal Pro Bnp Latest Ref Range: 0 - 125 pg/mL 3630 (H)       Glucose Latest Ref Range: 70 - 99 mg/dL 87  74 109 (H)    WBC Latest Ref Range: 4.0 - 11.0 10e9/L 9.0       Hemoglobin Latest Ref Range: 13.3 - 17.7 g/dL 13.2 (L)       Hematocrit Latest Ref Range: 40.0 - 53.0 % 40.2       Platelet Count Latest Ref Range: 150 - 450 10e9/L 116 (L)       RBC Count Latest Ref Range: 4.4 - 5.9 10e12/L 4.36 (L)       MCV Latest Ref Range: 78 - 100 fl 92       MCH Latest Ref Range: 26.5 - 33.0 pg 30.3       MCHC Latest Ref Range: 31.5 - 36.5 g/dL 32.8       RDW Latest Ref Range: 10.0 - 15.0 % 19.7 (H)       ECHO LIMITED Unknown  Rpt      HEART CATH RIGHT HEART CATH Unknown     Attch   Results for BENEDICT ESTES (MRN [id number]) as of 9/12/2017 12:10   Ref. Range 6/21/2017 18:30 6/22/2017 06:52 6/23/2017 06:37 8/22/2017 14:45 9/11/2017 13:18   Creatinine Latest Ref Range: 0.66 - 1.25 mg/dL 1.75 (H) 1.83 (H) 1.56 (H) 1.97 (H) 1.92 (H)     Assessment/Plan   1. Reduce furosemide to every other day.  Watch weight and call if increasing or edema  2. Decrease  prednisone to 5mgs/day.  You may notice fatigue.  Call if not feeling well.   3. Right heart catherization last week of April 25 (Wednesday) and echocardiogram  4. Basic metabolic panel and BNP in 10 ways to assess response to furosemide    CC: Doctors above                Thank you for allowing me to participate in the care of your patient.    Sincerely,     Dk Quan MD     [facility]

## 2018-03-13 ENCOUNTER — HOSPITAL ENCOUNTER (OUTPATIENT)
Facility: CLINIC | Age: 53
End: 2018-03-13
Attending: INTERNAL MEDICINE | Admitting: INTERNAL MEDICINE

## 2018-03-20 ENCOUNTER — CARE COORDINATION (OUTPATIENT)
Dept: CARDIOLOGY | Facility: CLINIC | Age: 53
End: 2018-03-20

## 2018-03-20 NOTE — PROGRESS NOTES
Dose Sheet    Name: Olivier Gómez   Rx #: [id number]    : 1965   ID: [id number]     Flolan IV    Use (per 100 mL diluent)        Dosing Weight: 121.7 kg   Date Rate  (mL per 24 hours) Reservoir  Volume Dose  (ng per kg per min) Concentration  (ng per mL) 1.5 mg vials 0.5 mg vials    90 95 23 45,000 3 0    93  24 45,000 3 0    73  25 60,000 4 0    76  26 60,000 4 0    79 84 27 60,000 4 0    82 87 28 60,000 4 0    85 90 29 60,000 4 0   New Goal 88  30 60,000 4 0   Please note goal dose has been increased to 30 ng/kg/ min                                                                                           Have patient increase dose by 1 ng per kg per min every 7 day(s) as tolerated ;   goal = 30 ng/kg/ min.

## 2018-04-24 DIAGNOSIS — R06.09 DYSPNEA ON EXERTION: ICD-10-CM

## 2018-04-24 DIAGNOSIS — I27.20 PULMONARY HYPERTENSION (H): ICD-10-CM

## 2018-06-06 ENCOUNTER — APPOINTMENT (OUTPATIENT)
Dept: CARDIOLOGY | Facility: CLINIC | Age: 53
End: 2018-06-06
Attending: INTERNAL MEDICINE
Payer: COMMERCIAL

## 2018-06-06 ENCOUNTER — HOSPITAL ENCOUNTER (OUTPATIENT)
Facility: CLINIC | Age: 53
Discharge: HOME | End: 2018-06-06
Attending: INTERNAL MEDICINE | Admitting: INTERNAL MEDICINE
Payer: COMMERCIAL

## 2018-06-06 ENCOUNTER — RADIANT APPOINTMENT (OUTPATIENT)
Dept: CARDIOLOGY | Facility: CLINIC | Age: 53
End: 2018-06-06
Payer: COMMERCIAL

## 2018-06-06 ENCOUNTER — APPOINTMENT (OUTPATIENT)
Dept: MEDSURG UNIT | Facility: CLINIC | Age: 53
End: 2018-06-06
Attending: INTERNAL MEDICINE
Payer: COMMERCIAL

## 2018-06-06 VITALS
RESPIRATION RATE: 16 BRPM | BODY MASS INDEX: 29.05 KG/M2 | TEMPERATURE: 98.1 F | SYSTOLIC BLOOD PRESSURE: 127 MMHG | OXYGEN SATURATION: 96 % | WEIGHT: 246 LBS | HEIGHT: 77 IN | DIASTOLIC BLOOD PRESSURE: 81 MMHG

## 2018-06-06 DIAGNOSIS — I27.20 PULMONARY HYPERTENSION (H): ICD-10-CM

## 2018-06-06 DIAGNOSIS — R06.09 DYSPNEA ON EXERTION: ICD-10-CM

## 2018-06-06 LAB
6 MIN WALK (FT): 1460 FT
6 MIN WALK (M): 445 M
ANION GAP SERPL CALCULATED.3IONS-SCNC: 9 MMOL/L (ref 3–14)
BUN SERPL-MCNC: 20 MG/DL (ref 7–30)
CALCIUM SERPL-MCNC: 9.2 MG/DL (ref 8.5–10.1)
CHLORIDE SERPL-SCNC: 102 MMOL/L (ref 94–109)
CO2 SERPL-SCNC: 24 MMOL/L (ref 20–32)
CREAT SERPL-MCNC: 1.67 MG/DL (ref 0.66–1.25)
ERYTHROCYTE [DISTWIDTH] IN BLOOD BY AUTOMATED COUNT: 17.7 % (ref 10–15)
GFR SERPL CREATININE-BSD FRML MDRD: 43 ML/MIN/1.7M2
GLUCOSE SERPL-MCNC: 97 MG/DL (ref 70–99)
HCT VFR BLD AUTO: 40.1 % (ref 40–53)
HGB BLD-MCNC: 13.6 G/DL (ref 13.3–17.7)
MCH RBC QN AUTO: 30 PG (ref 26.5–33)
MCHC RBC AUTO-ENTMCNC: 33.9 G/DL (ref 31.5–36.5)
MCV RBC AUTO: 89 FL (ref 78–100)
NT-PROBNP SERPL-MCNC: 340 PG/ML (ref 0–125)
PLATELET # BLD AUTO: 249 10E9/L (ref 150–450)
POTASSIUM SERPL-SCNC: 3.7 MMOL/L (ref 3.4–5.3)
RBC # BLD AUTO: 4.53 10E12/L (ref 4.4–5.9)
SODIUM SERPL-SCNC: 135 MMOL/L (ref 133–144)
WBC # BLD AUTO: 7.8 10E9/L (ref 4–11)

## 2018-06-06 PROCEDURE — 27211181 ZZH BALLOON TIP PRESSURE CR5

## 2018-06-06 PROCEDURE — 40000166 ZZH STATISTIC PP CARE STAGE 1

## 2018-06-06 PROCEDURE — 93451 RIGHT HEART CATH: CPT

## 2018-06-06 PROCEDURE — 93451 RIGHT HEART CATH: CPT | Mod: 26 | Performed by: INTERNAL MEDICINE

## 2018-06-06 PROCEDURE — 27210788 ZZH MANIFOLD CR3

## 2018-06-06 PROCEDURE — 27210806 ZZH SHEATH CR5

## 2018-06-06 PROCEDURE — 27210982 ZZH KIT RT HC TOTES DISP CR7

## 2018-06-06 PROCEDURE — 25000125 ZZHC RX 250: Performed by: INTERNAL MEDICINE

## 2018-06-06 RX ORDER — LIDOCAINE 40 MG/G
CREAM TOPICAL
Status: COMPLETED | OUTPATIENT
Start: 2018-06-06 | End: 2018-06-06

## 2018-06-06 RX ADMIN — LIDOCAINE: 40 CREAM TOPICAL at 10:31

## 2018-06-06 NOTE — PROGRESS NOTES
Pt has returned to unit 2a s/p RHC. Dr Carrero at bedside updating pt and pts spouse. Right neck site CDI. VSS.   1230 Pt declined wheelchair and ambulated off unit with steady gait, with spouse.

## 2018-06-06 NOTE — PROCEDURES
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    After informing the benefits and risks, an informed consent was obtained. Right neck was prepped and draped in the usual sterile fashion and infiltrated with a 2% lidocaine. 7 Fr sheath was placed in the right internal jugular vein using modified Seldinger technique over a micropuncture wire with ultrasound guidance. RHC was performed using a 7 Fr. Josephine Chris catheter. Intracardiac pressures, oxygen saturation, and cardiac output were obtained.      Results (pressures in mmHg)  RA: 5/6/4  RV 35/4  PA 35/12/25;  PA Sat 77.1%  PCWP -/-/5;  PCW Sat -%  Carter CO/CI 7.3/3.0 L/min; TD CO 7.6/3.1 L/min  PVR 2.74 quiñonez; TPR 3.43 quiñonez  Hb 13.0 g/dL  NIBP 114/69/84   dynes*sec/cm^5    Complications: None   Blood loss: Minimal blood loss    Conclusions:  1. Normal right and left sided filling pressures.  2. Normal to mildly elevated pulmonary artery pressures.  3. Normal cardiac output.    Recommendations:  1. Continued medical management for cardiovascular risk factor optimization.  2. Results have been communicated to referring provider. Plan per primary provider.      Morales Monte MD  Advanced Heart Failure/Heart Transplant Fellow  [facility] Division of Cardiology   [phone]

## 2018-06-06 NOTE — IP AVS SNAPSHOT
[address] [address]                                       After Visit Summary   6/6/2018    Olivier Gómez    MRN: [id number]           After Visit Summary Signature Page     I have received my discharge instructions, and my questions have been answered. I have discussed any challenges I see with this plan with the nurse or doctor.    ..........................................................................................................................................  Patient/Patient Representative Signature      ..........................................................................................................................................  Patient Representative Print Name and Relationship to Patient    ..................................................               ................................................  Date                                            Time    ..........................................................................................................................................  Reviewed by Signature/Title    ...................................................              ..............................................  Date                                                            Time

## 2018-06-06 NOTE — DISCHARGE INSTRUCTIONS
[facility]                        Interventional Cardiology  Discharge Instructions   Post Right Heart Cath      AFTER YOU GO HOME:    DO drink plenty of fluids    DO resume your regular diet and medications unless otherwise instructed by your Primary Physician    Do Not scrub the procedure site vigorously    No lotion or powder to the puncture site for 3 days    CALL YOUR PRIMARY PHYSICIAN IF: You may resume all normal activity.  Monitor neck site for bleeding, swelling, or voice changes. If you notice bleeding or swelling immediately apply pressure to the site and call number below to speak with Cardiology Fellow.  If you experience any changes in your breathing you should call your doctor immediately or come to the closest Emergency Department.  Do not drive yourself.    ADDITIONAL INSTRUCTIONS: Medications: You are to resume all home medications including anticoagulation therapy unless otherwise advised by your primary cardiologist or nurse coordinator.    Follow Up: Per your primary cardiology team    If you have any questions or concerns regarding your procedure site please call [phone] at anytime and ask for Cardiology Fellow on call.  They are available 24 hours a day.  You may also contact the Cardiology Clinic after hours number at [phone].                                                       Telephone Numbers [phone] Monday-Friday 8:00 am to 4:30 pm    [phone] After 4:30 pm Monday-Friday, Weekends & Holidays  Ask for Interventional Cardiologist on call. Someone is on call 24 hours/day   [facility] toll free number [phone] Monday-Friday 8:00 am to 4:30 pm   [facility] Emergency Dept [phone]

## 2018-06-06 NOTE — PROGRESS NOTES
Pt arrives to 2a, with spouse, for RHC. Discharge instructions reviewed with pt and spouse pre procedure, both verbalize understanding. Consent needs to be completed.

## 2018-06-06 NOTE — IP AVS SNAPSHOT
MRN:[id number]                      After Visit Summary   6/6/2018    Olivier Gómez    MRN: [id number]           Visit Information        Department      6/6/2018  9:27 AM [address] [facility]          Review of your medicines      UNREVIEWED medicines. Ask your doctor about these medicines        Dose / Directions    ACTIGALL PO        Dose:  300 mg   Take 300 mg by mouth 3 times daily   Refills:  0       atovaquone 750 MG/5ML suspension   Commonly known as:  MEPRON        Dose:  1500 mg   Take 1,500 mg by mouth daily   Refills:  0       calcium carbonate 500 MG tablet   Commonly known as:  OS-PANFILO 500 mg [place]. Ca        Dose:  600 mg   Take 600 mg by mouth 2 times daily   Refills:  0       CLONAZEPAM PO        Dose:  1 mg   Take 1 mg by mouth At Bedtime   Refills:  0       CRESTOR PO        Dose:  5 mg   Take 5 mg by mouth daily   Refills:  0       digoxin 125 MCG tablet   Commonly known as:  LANOXIN   Used for:  Pulmonary hypertension        Dose:  125 mcg   Take 1 tablet (125 mcg) by mouth daily   Quantity:  90 tablet   Refills:  3       FLOLAN 1.5 mg injection   Generic drug:  epoprostenol        Refills:  0       FLUCONAZOLE PO        Dose:  200 mg   Take 200 mg by mouth daily   Refills:  0       fluticasone 220 MCG/ACT Inhaler   Commonly known as:  FLOVENT HFA        Dose:  2 puff   Inhale 2 puffs into the lungs 2 times daily   Refills:  0       furosemide 40 MG tablet   Commonly known as:  LASIX   Used for:  Pulmonary hypertension        Dose:  40 mg   Take 1 tablet (40 mg) by mouth daily   Quantity:  30 tablet   Refills:  0       glycine diluent 32.175 mL with epoprostenol 247.5 mcg infusion   Used for:  Pulmonary hypertension        Dose:  23 ng/kg/min   Inject 2,799.1 ng/min into the vein continuous Goal 30 ng/kg/min   Refills:  0       magnesium plus protein 133 MG tablet        Dose:  266 mg   Take 266 mg by mouth daily (2 tablets)   Refills:  0       PEN-VEE K OR        Dose:  500 mg    Take 500 mg by mouth 2 times daily   Refills:  0       * PREDNISONE PO        Taking 7.5mg   Refills:  0       * predniSONE 2.5 MG tablet   Commonly known as:  DELTASONE   Used for:  Pulmonary hypertension        Take 7.5 mg daily (3 tablets = 7.5 mg daily)   Quantity:  270 tablet   Refills:  3       PROTONIX PO        Dose:  40 mg   Take 40 mg by mouth daily   Refills:  0       ruxolitinib 5 MG Tabs tablet CHEMO   Commonly known as:  JAKAFI   Used for:  Pericardial effusion, Pulmonary hypertension, Vkslv-orirqo-imag disease (H)        Dose:  5 mg   Take 1 tablet (5 mg) by mouth 2 times daily   Quantity:  60 tablet   Refills:  0       tadalafil (PAH) 20 MG Tabs   Commonly known as:  ADCIRCA   Used for:  Pulmonary hypertension        Dose:  40 mg   Take 2 tablets (40 mg) by mouth daily   Quantity:  60 tablet   Refills:  11       VALACYCLOVIR HCL PO        Dose:  500 mg   Take 500 mg by mouth every other day   Refills:  0       VITAMIN D (CHOLECALCIFEROL) PO        Dose:  2000 Units   Take 2,000 Units by mouth 3 times daily   Refills:  0       * Notice:  This list has 2 medication(s) that are the same as other medications prescribed for you. Read the directions carefully, and ask your doctor or other care provider to review them with you.      CONTINUE these medicines which have NOT CHANGED        Dose / Directions    blood glucose monitoring test strip   Commonly known as:  ONETOUCH ULTRA   Used for:  Steroid-induced diabetes mellitus (H)        Use to test blood sugars 4 times daily or as directed.   Quantity:  100 strip   Refills:  11                Protect others around you: Learn how to safely use, store and throw away your medicines at www.disposemymeds.org.         Follow-ups after your visit        Your next 10 appointments already scheduled     Jun 06, 2018 10:30 AM CDT   Procedure - 2.5 hour with U2A ROOM 17   [address] [facility] ([facility] [place])    [address] [place] [address]  [facility] [address]               Jun 06, 2018 11:30 AM CDT   Heart Cath Right Heart Cath with UUHCVR3   [facility] [place],  Heart Cath Lab ([facility])    [address] [address]   [phone]            Jun 27, 2018 12:00 PM CDT   Lab with UC LAB   [facility] ([facility])    [address]  1st Floor  [facility] [address] [phone]            Jun 27, 2018 12:30 PM CDT   (Arrive by 12:15 PM)   RETURN PRIMARY PULMONARY with Dk Quan MD   [facility] ([facility])    [address]  [address]  [facility] [address] [phone]               Care Instructions        Further instructions from your care team       [facility]                        Interventional Cardiology  Discharge Instructions   Post Right Heart Cath      AFTER YOU GO HOME:    DO drink plenty of fluids    DO resume your regular diet and medications unless otherwise instructed by your Primary Physician    Do Not scrub the procedure site vigorously    No lotion or powder to the puncture site for 3 days    CALL YOUR PRIMARY PHYSICIAN IF: You may resume all normal activity.  Monitor neck site for bleeding, swelling, or voice changes. If you notice bleeding or swelling immediately apply pressure to the site and call number below to speak with Cardiology Fellow.  If you experience any changes in your breathing you should call your doctor immediately or come to the closest Emergency Department.  Do not drive yourself.    ADDITIONAL INSTRUCTIONS: Medications: You are to resume all home medications including anticoagulation therapy unless otherwise advised by your primary cardiologist or nurse coordinator.    Follow Up: Per your primary cardiology team    If you have any questions or concerns regarding your procedure site please call [phone] at anytime and ask for Cardiology  "Fellow on call.  They are available 24 hours a day.  You may also contact the Cardiology Clinic after hours number at [phone].                                                       Telephone Numbers [phone] Monday-Friday 8:00 am to 4:30 pm    [phone] After 4:30 pm Monday-Friday, Weekends & Holidays  Ask for Interventional Cardiologist on call. Someone is on call 24 hours/day   [facility] toll free number [phone] Monday-Friday 8:00 am to 4:30 pm   [facility] Emergency Dept [phone]                    Additional Information About Your Visit        [employer] Information     [employer] lets you send messages to your doctor, view your test results, renew your prescriptions, schedule appointments and more. To sign up, go to [email or web address] . Click on \"Log in\" on the left side of the screen, which will take you to the Welcome page. Then click on \"Sign up Now\" on the right side of the page.     You will be asked to enter the access code listed below, as well as some personal information. Please follow the directions to create your username and password.     Your access code is: [email or web address]  Expires: 2018  6:31 AM     Your access code will  in 90 days. If you need help or a new code, please call your [place] clinic or [phone].        Care EveryWhere ID     This is your Care EveryWhere ID. This could be used by other organizations to access your [place] medical records  [id number]         Primary Care Provider Office Phone # Fax #    Adelso Delgado [phone]      Equal Access to Services     DIEUDONNE RILEY : Hadreinaldo Elliott, ariel rich, maría pickering. So [facility] [phone].    ATENCIÓN: Si habla español, tiene a garza disposición servicios gratuitos de asistencia lingüística. Llame al [phone].    We comply with applicable federal civil rights laws and [place] laws. We do not discriminate " on the basis of race, color, national origin, age, disability, sex, sexual orientation, or gender identity.            Thank you!     Thank you for choosing [facility] for your care. Our goal is always to provide you with excellent care. Hearing back from our patients is one way we can continue to improve our services. Please take a few minutes to complete the written survey that you may receive in the mail after you visit with us. Thank you!             Medication List: This is a list of all your medications and when to take them. Check marks below indicate your daily home schedule. Keep this list as a reference.      Medications           Morning Afternoon Evening Bedtime As Needed    ACTIGALL PO   Take 300 mg by mouth 3 times daily                                atovaquone 750 MG/5ML suspension   Commonly known as:  MEPRON   Take 1,500 mg by mouth daily                                blood glucose monitoring test strip   Commonly known as:  ONETOUCH ULTRA   Use to test blood sugars 4 times daily or as directed.                                calcium carbonate 500 MG tablet   Commonly known as:  OS-PANFILO 500 mg [place]. Ca   Take 600 mg by mouth 2 times daily                                CLONAZEPAM PO   Take 1 mg by mouth At Bedtime                                CRESTOR PO   Take 5 mg by mouth daily                                digoxin 125 MCG tablet   Commonly known as:  LANOXIN   Take 1 tablet (125 mcg) by mouth daily                                FLOLAN 1.5 mg injection   Generic drug:  epoprostenol                                FLUCONAZOLE PO   Take 200 mg by mouth daily                                fluticasone 220 MCG/ACT Inhaler   Commonly known as:  FLOVENT HFA   Inhale 2 puffs into the lungs 2 times daily                                furosemide 40 MG tablet   Commonly known as:  LASIX   Take 1 tablet (40 mg) by mouth daily                                glycine diluent 32.175 mL with epoprostenol 247.5  mcg infusion   Inject 2,799.1 ng/min into the vein continuous Goal 30 ng/kg/min                                magnesium plus protein 133 MG tablet   Take 266 mg by mouth daily (2 tablets)                                PEN-VEE K OR   Take 500 mg by mouth 2 times daily                                * PREDNISONE PO   Taking 7.5mg                                * predniSONE 2.5 MG tablet   Commonly known as:  DELTASONE   Take 7.5 mg daily (3 tablets = 7.5 mg daily)                                PROTONIX PO   Take 40 mg by mouth daily                                ruxolitinib 5 MG Tabs tablet CHEMO   Commonly known as:  JAKAFI   Take 1 tablet (5 mg) by mouth 2 times daily                                tadalafil (PAH) 20 MG Tabs   Commonly known as:  ADCIRCA   Take 2 tablets (40 mg) by mouth daily                                VALACYCLOVIR HCL PO   Take 500 mg by mouth every other day                                VITAMIN D (CHOLECALCIFEROL) PO   Take 2,000 Units by mouth 3 times daily                                * Notice:  This list has 2 medication(s) that are the same as other medications prescribed for you. Read the directions carefully, and ask your doctor or other care provider to review them with you.

## 2018-06-07 ENCOUNTER — TELEPHONE (OUTPATIENT)
Dept: CARDIOLOGY | Facility: CLINIC | Age: 53
End: 2018-06-07

## 2018-06-07 NOTE — TELEPHONE ENCOUNTER
Prior Authorization Approval    Authorization Effective Date: 6/7/2018  Authorization Expiration Date: 1/31/2019  Medication: Adcirca 20 MG - Approved  Approved Dose/Quantity: 60 Tabs/30 Days  Reference #: [id number]   Insurance Company: [facility] - Phone [phone] Fax [phone]  Which Pharm[facility] is filling the prescription (Not needed for infusion/clinic administered[facility] [address]  Pharmacy Notified: Yes

## 2018-06-07 NOTE — TELEPHONE ENCOUNTER
PA Initiation    Medication: Adcirca 20 MG  Insurance Company: [facility] - Phone [phone] Fax [phone]  Pharmacy Filling the Rx: [facility] [address]  Filling Pharmacy Phone: [phone]  Filling Pharmacy Fax: [phone]  Start Date: 6/7/2018    Urgent prior authorization request has been faxed to [facility] for review. Clinical documentation was included with request.

## 2018-06-11 DIAGNOSIS — I27.20 PULMONARY HYPERTENSION (H): ICD-10-CM

## 2018-06-11 RX ORDER — TADALAFIL 20 MG/1
40 TABLET ORAL DAILY
Qty: 60 TABLET | Refills: 11 | Status: SHIPPED | OUTPATIENT
Start: 2018-06-11 | End: 2019-01-07

## 2018-06-22 DIAGNOSIS — I27.20 PULMONARY HYPERTENSION (H): ICD-10-CM

## 2018-06-22 DIAGNOSIS — R06.09 DYSPNEA ON EXERTION: ICD-10-CM

## 2018-06-27 ENCOUNTER — OFFICE VISIT (OUTPATIENT)
Dept: CARDIOLOGY | Facility: CLINIC | Age: 53
End: 2018-06-27
Attending: INTERNAL MEDICINE
Payer: COMMERCIAL

## 2018-06-27 VITALS
HEART RATE: 83 BPM | SYSTOLIC BLOOD PRESSURE: 104 MMHG | OXYGEN SATURATION: 97 % | WEIGHT: 254.8 LBS | DIASTOLIC BLOOD PRESSURE: 69 MMHG | BODY MASS INDEX: 30.09 KG/M2 | HEIGHT: 77 IN

## 2018-06-27 DIAGNOSIS — R06.02 SOB (SHORTNESS OF BREATH): ICD-10-CM

## 2018-06-27 DIAGNOSIS — R06.09 DYSPNEA ON EXERTION: ICD-10-CM

## 2018-06-27 DIAGNOSIS — I27.0 PRIMARY PULMONARY HYPERTENSION (H): Primary | ICD-10-CM

## 2018-06-27 DIAGNOSIS — I27.20 PULMONARY HYPERTENSION (H): ICD-10-CM

## 2018-06-27 LAB
ANION GAP SERPL CALCULATED.3IONS-SCNC: 9 MMOL/L (ref 3–14)
BUN SERPL-MCNC: 19 MG/DL (ref 7–30)
CALCIUM SERPL-MCNC: 9 MG/DL (ref 8.5–10.1)
CHLORIDE SERPL-SCNC: 104 MMOL/L (ref 94–109)
CO2 SERPL-SCNC: 25 MMOL/L (ref 20–32)
CREAT SERPL-MCNC: 1.52 MG/DL (ref 0.66–1.25)
ERYTHROCYTE [DISTWIDTH] IN BLOOD BY AUTOMATED COUNT: 17.1 % (ref 10–15)
GFR SERPL CREATININE-BSD FRML MDRD: 48 ML/MIN/1.7M2
GLUCOSE SERPL-MCNC: 107 MG/DL (ref 70–99)
HCT VFR BLD AUTO: 40.7 % (ref 40–53)
HGB BLD-MCNC: 13.7 G/DL (ref 13.3–17.7)
MCH RBC QN AUTO: 30.4 PG (ref 26.5–33)
MCHC RBC AUTO-ENTMCNC: 33.7 G/DL (ref 31.5–36.5)
MCV RBC AUTO: 90 FL (ref 78–100)
NT-PROBNP SERPL-MCNC: 335 PG/ML (ref 0–125)
PLATELET # BLD AUTO: 214 10E9/L (ref 150–450)
POTASSIUM SERPL-SCNC: 4 MMOL/L (ref 3.4–5.3)
RBC # BLD AUTO: 4.5 10E12/L (ref 4.4–5.9)
SODIUM SERPL-SCNC: 138 MMOL/L (ref 133–144)
WBC # BLD AUTO: 7.4 10E9/L (ref 4–11)

## 2018-06-27 PROCEDURE — G0463 HOSPITAL OUTPT CLINIC VISIT: HCPCS | Mod: ZF

## 2018-06-27 PROCEDURE — 80048 BASIC METABOLIC PNL TOTAL CA: CPT | Performed by: INTERNAL MEDICINE

## 2018-06-27 PROCEDURE — 36415 COLL VENOUS BLD VENIPUNCTURE: CPT | Performed by: INTERNAL MEDICINE

## 2018-06-27 PROCEDURE — 85027 COMPLETE CBC AUTOMATED: CPT | Performed by: INTERNAL MEDICINE

## 2018-06-27 PROCEDURE — 83880 ASSAY OF NATRIURETIC PEPTIDE: CPT | Performed by: INTERNAL MEDICINE

## 2018-06-27 PROCEDURE — 99214 OFFICE O/P EST MOD 30 MIN: CPT | Mod: ZP | Performed by: INTERNAL MEDICINE

## 2018-06-27 RX ORDER — LIDOCAINE 40 MG/G
CREAM TOPICAL
Status: CANCELLED | OUTPATIENT
Start: 2018-06-27

## 2018-06-27 ASSESSMENT — PAIN SCALES - GENERAL: PAINLEVEL: NO PAIN (0)

## 2018-06-27 NOTE — MR AVS SNAPSHOT
After Visit Summary   6/27/2018    Olivier Gómez    MRN: [id number]           Patient Information     Date Of Birth          1965        Visit Information        Provider Department      6/27/2018 12:30 PM Dk Quan MD [facility]        Care Instructions    Medication Changes:   -None today    Patient Instructions:  -Continue staying active and eat a heart healthy diet.  -Right Heart Catheterization in 6 months (expect a call around November to schedule)    Check-In  Time Check-In Location Estimated Length Procedure   Name        [facility]  waiting room 60-90 minutes Right Heart Catheterization**     Procedure Preparations & Instructions     This is an invasive procedure that DOES require preparation:    - Nothing to eat for 6 hours   - You may have clear liquids up until the time of your procedure  - A ride should be arranged for you in the instance you are unable to drive home, however you should be able to function as you normally would after the procedure       Follow up Appointment Information:  -follow up with Dr. Quan in 6 months with RHC prior    Results:  Component      Latest Ref Rng & Units 6/27/2018   Sodium      133 - 144 mmol/L 138   Potassium      3.4 - 5.3 mmol/L 4.0   Chloride      94 - 109 mmol/L 104   Carbon Dioxide      20 - 32 mmol/L 25   Anion Gap      3 - 14 mmol/L 9   Glucose      70 - 99 mg/dL 107 (H)   Urea Nitrogen      7 - 30 mg/dL 19   Creatinine      0.66 - 1.25 mg/dL 1.52 (H)   GFR Estimate      >60 mL/min/1.7m2 48 (L)   GFR Estimate If Black      >60 mL/min/1.7m2 58 (L)   Calcium      8.5 - 10.1 mg/dL 9.0   WBC      4.0 - 11.0 10e9/L 7.4   RBC Count      4.4 - 5.9 10e12/L 4.50   Hemoglobin      13.3 - 17.7 g/dL 13.7   Hematocrit      40.0 - 53.0 % 40.7   MCV      78 - 100 fl 90   MCH      26.5 - 33.0 pg 30.4   MCHC      31.5 - 36.5 g/dL 33.7   RDW      10.0 - 15.0 % 17.1 (H)   Platelet Count      150 - 450 10e9/L 214   N-Terminal Pro Bnp      0 -  125 pg/mL 335 (H)     We are located on the third floor of the [facility] on the [facility].  Our address is     [address] [place] [address] on 3rd [place] [address]      Thank you for allowing us to be a part of your care here at the [facility]    If you have questions or concerns please contact us at:    Marlys Anderson RN, BSN    Kai Gant (Schedule,P.A.)  Nurse Coordinator     Clinic   Pulmonary Hypertension   Pulmonary Hypertension  [facility]  (P)[phone]    (P) [phone]        (F)[phone]                ** Please note that you will NOT receive a reminder call regarding your scheduled testing, reminder calls are for provider appointments only.  If you are scheduled for testing within the [facility] system you may receive a call regarding pre-registration for billing purposes only.**     Remember to weigh yourself daily after voiding and before you consume any food or beverages and log the numbers.  If you have gained/lost 2 pounds overnight or 5 pounds in a week contact us immediately for medication adjustments or further instructions.  **Please call us immediately if you have any syncope, chest pain, edema, or decline in your functional status.    Support Group:  Pulmonary Hypertension Association  Https://www.phassociation.org/  **Look at the Events Tab** They even have Support Groups that you can call into    [facility] PH Support Group  First Saturday of the Month from 1-3 PM   Location: [address] [place] [address]  Leader: Frandy Pritchard  Phone: [phone]  Email: [email or web address]              Follow-ups after your visit        Who to contact     If you have questions or need follow up information about today's clinic visit or your schedule please contact [facility] directly at [phone].  Normal  "or non-critical lab and imaging results will be communicated to you by [employer], letter or phone within 4 business days after the clinic has received the results. If you do not hear from us within 7 days, please contact the clinic through [employer] or phone. If you have a critical or abnormal lab result, we will notify you by phone as soon as possible.  Submit refill requests through [employer] or call your pharmacy and they will forward the refill request to us. Please allow 3 business days for your refill to be completed.          Additional Information About Your Visit        [employer] Information     [employer] gives you secure access to your electronic health record. If you see a primary care provider, you can also send messages to your care team and make appointments. If you have questions, please call your primary care clinic.  If you do not have a primary care provider, please call [phone] and they will assist you.        Care EveryWhere ID     This is your Care EveryWhere ID. This could be used by other organizations to access your [place] medical records  [id number]        Your Vitals Were     Pulse Height Pulse Oximetry BMI (Body Mass Index)          83 1.956 m (6' 5\") 97% 30.21 kg/m2         Blood Pressure from Last 3 Encounters:   06/27/18 104/69   06/06/18 127/81   01/16/18 116/70    Weight from Last 3 Encounters:   06/27/18 115.6 kg (254 lb 12.8 oz)   06/06/18 111.6 kg (246 lb)   01/16/18 114.3 kg (252 lb)              Today, you had the following     No orders found for display         Today's Medication Changes          These changes are accurate as of 6/27/18  1:48 PM.  If you have any questions, ask your nurse or doctor.               These medicines have changed or have updated prescriptions.        Dose/Directions    furosemide 40 MG tablet   Commonly known as:  LASIX   This may have changed:    - how much to take  - additional instructions   Used for:  Pulmonary hypertension        Dose:  40 mg "   Take 1 tablet (40 mg) by mouth daily   Quantity:  30 tablet   Refills:  0                Primary Care Provider Office Phone # Fax #    Adelso Delgado [phone]       [facility] [address]  [place] [address]        Equal Access to Services     DIEUDONNE RILEY : Hadii aad ku hadasho Soomaali, waaxda luqadaha, qaybta kaalmada adeegyada, waxay idiin hayaan adetae bambiashley lacarmita . So [facility] [phone].    ATENCIÓN: Si habla español, tiene a garza disposición servicios gratuitos de asistencia lingüística. Jair al [phone].    We comply with applicable federal civil rights laws and [place] laws. We do not discriminate on the basis of race, color, national origin, age, disability, sex, sexual orientation, or gender identity.            Thank you!     Thank you for choosing [facility]  for your care. Our goal is always to provide you with excellent care. Hearing back from our patients is one way we can continue to improve our services. Please take a few minutes to complete the written survey that you may receive in the mail after your visit with us. Thank you!             Your Updated Medication List - Protect others around you: Learn how to safely use, store and throw away your medicines at www.disposemymeds.org.          This list is accurate as of 6/27/18  1:48 PM.  Always use your most recent med list.                   Brand Name Dispense Instructions for use Diagnosis    ACTIGALL PO      Take 300 mg by mouth 3 times daily        atovaquone 750 MG/5ML suspension    MEPRON     Take 1,500 mg by mouth daily        blood glucose monitoring test strip    ONETOUCH ULTRA    100 strip    Use to test blood sugars 4 times daily or as directed.    Steroid-induced diabetes mellitus (H)       calcium carbonate 500 MG tablet    OS-PANFILO 500 mg [place]. Ca     Take 600 mg by mouth 2 times daily        CLONAZEPAM PO      Take 1 mg by mouth At Bedtime        CRESTOR PO      Take 5 mg by  mouth daily        digoxin 125 MCG tablet    LANOXIN    90 tablet    Take 1 tablet (125 mcg) by mouth daily    Pulmonary hypertension       FLOLAN 1.5 mg injection   Generic drug:  epoprostenol           FLUCONAZOLE PO      Take 200 mg by mouth daily        fluticasone 220 MCG/ACT Inhaler    FLOVENT HFA     Inhale 2 puffs into the lungs 2 times daily        furosemide 40 MG tablet    LASIX    30 tablet    Take 1 tablet (40 mg) by mouth daily    Pulmonary hypertension       glycine diluent 32.175 mL with epoprostenol 247.5 mcg infusion      Inject 2,799.1 ng/min into the vein continuous Goal 30 ng/kg/min    Pulmonary hypertension       magnesium plus protein 133 MG tablet      Take 266 mg by mouth daily (2 tablets)        PEN-VEE K OR      Take 500 mg by mouth 2 times daily        predniSONE 2.5 MG tablet    DELTASONE    270 tablet    Take 7.5 mg daily (3 tablets = 7.5 mg daily)    Pulmonary hypertension       PROTONIX PO      Take 40 mg by mouth daily        ruxolitinib 5 MG Tabs tablet CHEMO    JAKAFI    60 tablet    Take 1 tablet (5 mg) by mouth 2 times daily    Pericardial effusion, Pulmonary hypertension, Buiqx-qtjabe-idwh disease (H)       tadalafil (PAH) 20 MG Tabs    ADCIRCA    60 tablet    Take 2 tablets (40 mg) by mouth daily    Pulmonary hypertension       VALACYCLOVIR HCL PO      Take 500 mg by mouth every other day        VITAMIN D (CHOLECALCIFEROL) PO      Take 2,000 Units by mouth 3 times daily

## 2018-06-27 NOTE — PATIENT INSTRUCTIONS
Medication Changes:   -None today    Patient Instructions:  -Continue staying active and eat a heart healthy diet.  -Right Heart Catheterization in 6 months (expect a call around November to schedule)    Check-In  Time Check-In Location Estimated Length Procedure   Name        [facility]  waiting room 60-90 minutes Right Heart Catheterization**     Procedure Preparations & Instructions     This is an invasive procedure that DOES require preparation:    - Nothing to eat for 6 hours   - You may have clear liquids up until the time of your procedure  - A ride should be arranged for you in the instance you are unable to drive home, however you should be able to function as you normally would after the procedure       Follow up Appointment Information:  -follow up with Dr. Quan in 6 months with [facility] prior    Results:  Component      Latest Ref Rng & Units 6/27/2018   Sodium      133 - 144 mmol/L 138   Potassium      3.4 - 5.3 mmol/L 4.0   Chloride      94 - 109 mmol/L 104   Carbon Dioxide      20 - 32 mmol/L 25   Anion Gap      3 - 14 mmol/L 9   Glucose      70 - 99 mg/dL 107 (H)   Urea Nitrogen      7 - 30 mg/dL 19   Creatinine      0.66 - 1.25 mg/dL 1.52 (H)   GFR Estimate      >60 mL/min/1.7m2 48 (L)   GFR Estimate If Black      >60 mL/min/1.7m2 58 (L)   Calcium      8.5 - 10.1 mg/dL 9.0   WBC      4.0 - 11.0 10e9/L 7.4   RBC Count      4.4 - 5.9 10e12/L 4.50   Hemoglobin      13.3 - 17.7 g/dL 13.7   Hematocrit      40.0 - 53.0 % 40.7   MCV      78 - 100 fl 90   MCH      26.5 - 33.0 pg 30.4   MCHC      31.5 - 36.5 g/dL 33.7   RDW      10.0 - 15.0 % 17.1 (H)   Platelet Count      150 - 450 10e9/L 214   N-Terminal Pro Bnp      0 - 125 pg/mL 335 (H)     We are located on the third floor of the [facility] ([facility] on the [facility].  Our address is     [address] [place] [address] on [address]rd [place] [address]      Thank you for allowing us to be a part of your care here at the  [facility]    If you have questions or concerns please contact us at:    Marlys Anderson RN, BSN    Kai Gant (Schedule,P.A.)  Nurse Coordinator     Clinic   Pulmonary Hypertension   Pulmonary Hypertension  [facility]  [phone])[phone]    (P) [phone]        (F)[phone]                ** Please note that you will NOT receive a reminder call regarding your scheduled testing, reminder calls are for provider appointments only.  If you are scheduled for testing within the [employer] system you may receive a call regarding pre-registration for billing purposes only.**     Remember to weigh yourself daily after voiding and before you consume any food or beverages and log the numbers.  If you have gained/lost 2 pounds overnight or 5 pounds in a week contact us immediately for medication adjustments or further instructions.  **Please call us immediately if you have any syncope, chest pain, edema, or decline in your functional status.    Support Group:  Pulmonary Hypertension Association  [email or web address].phassociation.org/  **Look at the Events Tab** They even have Support Groups that you can call into    [facility] PH Support Group  First Saturday of the Month from 1-3 PM   Location: [address] [place] [id number]  Leader: Frandy Pritchard  Phone: [phone]  Email: [email or web address]

## 2018-06-27 NOTE — PROGRESS NOTES
Nathan Richardson MD    [address] [place] [address] [phone] (Fax)    Musa Martin MD  [phone] (Work)  [phone] (Fax)  [address] [place] [address]    Adelso Delgado M.D.  [facility]    Magdaleno Elder M.D.  [facility]      Problem List  1. History of AML  2. 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 Chronic parenteral prostacyclin  5. Steroid dependence (treatment of PH)    Name: BENEDICT ESTES  MRN: [id number]  : 1965  Study Date: 2018 08:43 AM  Age: 53 yrs  Gender: Male  Patient Location: [facility]  Reason For Study: Pulmonary hypertension, Dyspnea on exertion  Ordering Physician: PILI SEGURA  Referring Physician: PILI SEGURA  Performed By: Lien Cadena RDCS     BSA: 2.5 m2  Height: 77 in  Weight: 252 lb  HR: 91  BP: 116/70 mmHg  _____________________________________________________________________________  __        Procedure  Echocardiogram with two-dimensional, color and spectral Doppler performed. The  patient's rhythm is normal sinus.  _____________________________________________________________________________  __        Interpretation Summary  Left ventricular function, chamber size, wall motion, and wall thickness are  normal.The EF is 55-60%.  Right ventricular function, chamber size, wall motion, and thickness are  normal.  TAPSE 2.2 cm, S' 14 cm/s, RVFAC 48%  Pulmonary artery systolic pressure cannot be assessed. PAAT 109 msec,  suggesting normal PVR. There is no notching on the RVOT pulse Doppler tracing.  The inferior vena cava was normal in size with preserved respiratory  variability. Estimated mean right atrial pressure is 3 mmHg.     This study was compared with the study from 17: RV size has decreased and  RV function appears improved. Pericardial effusion and TR have decreased.  Comparison of estimated PA pressure is difficult due to incomplete TR  envelope  on today's study.  _____________________________________________________________________________  __        Left Ventricle  Left ventricular function, chamber size, wall motion, and wall thickness are  normal.The EF is 55-60%. Left ventricular diastolic function is indeterminate.     Right Ventricle  Right ventricular function, chamber size, wall motion, and thickness are  normal. TAPSE 2.2 cm, S' 14 cm/s, RVFAC 48%.     Atria  Both atria appear normal. The atrial septum is intact as assessed by color  Doppler .     Mitral Valve  The mitral valve is normal. Trace mitral insufficiency is present.        Aortic Valve  Aortic valve is normal in structure and function.     Tricuspid Valve  The tricuspid valve is normal. Mild tricuspid insufficiency is present.  Pulmonary artery systolic pressure cannot be assessed. The peak velocity of  the tricuspid regurgitant jet is not obtainable.     Pulmonic Valve  The pulmonic valve is normal. Trace pulmonic insufficiency is present.     Vessels  The pulmonary artery cannot be assessed. The aorta root is normal. The  thoracic aorta is normal. The inferior vena cava was normal in size with  preserved respiratory variability. Sinuses of Valsalva 4.1 cm (1.6 cm/m2),  ascending aorta 3.9 cm (1.6 cm/m2.) These measurements are within normal  limits when indexed to body surface area. Estimated mean right atrial pressure  is 3 mmHg.     Pericardium  Trivial pericardial effusion is present.        Compared to Previous Study  This study was compared with the study from 9/12/17 . Pericardial effusion and  TR have decreased. Comparison of estimated PA pressure is difficult due to  incomplete TR envelope on today's study.  _____________________________________________________________________________  __  MMode/2D Measurements & Calculations  IVSd: 1.2 cm     LVIDd: 4.4 cm  LVIDs: 2.9 cm  LVPWd: 1.2 cm  FS: 35.7 %  LV mass(C)d: 196.2 grams  LV mass(C)dI: 79.5 grams/m2  asc Aorta  Diam: 3.9 cm  LVOT diam: 2.8 cm  LVOT area: 6.1 cm2  LA Volume (BP): 66.4 ml  LA Volume Index (BP): 26.9 ml/m2  RWT: 0.55           Doppler Measurements & Calculations  MV E max nieves: 74.7 cm/sec  MV A max nieves: 100.9 cm/sec  MV E/A: 0.74  MV dec slope: 684.3 cm/sec2  MV dec time: 0.11 sec  PA V2 max: 94.1 cm/sec  PA max PG: 3.5 mmHg  PA acc time: 0.11 sec  E/E' av.5  Lateral E/e': 7.3  Medial E/e': 9.7    RA: 5/6/4  RV 35/4  PA ;  PA Sat 77.1%  PCWP -/-/5;  PCW Sat -%  Carter CO/CI 7.3/3.0 L/min; TD CO 7.6/3.1 L/min  PVR 2.74 quiñonez; TPR 3.43 quiñonez  Hb 13.0 g/dL  NIBP 114/69/84   dynes*sec/cm^5    Current Outpatient Prescriptions   Medication     atovaquone (MEPRON) 750 MG/5ML suspension     calcium carbonate (OS-PANFILO 500 MG Ewiiaapaayp. CA) 500 MG tablet     CLONAZEPAM PO     digoxin (LANOXIN) 125 MCG tablet     epoprostenol (FLOLAN) 1.5 mg injection     FLUCONAZOLE PO     furosemide (LASIX) 40 MG tablet     glycine diluent 32.175 mL with epoprostenol 247.5 mcg infusion     Pantoprazole Sodium (PROTONIX PO)     Penicillin V Potassium (PEN-VEE K OR)     predniSONE (DELTASONE) 2.5 MG tablet     Rosuvastatin Calcium (CRESTOR PO)     ruxolitinib (JAKAFI) 5 MG TABS tablet CHEMO     Specialty Vitamins Products (MAGNESIUM PLUS PROTEIN) 133 MG tablet     tadalafil, PAH, (ADCIRCA) 20 MG TABS     Ursodiol (ACTIGALL PO)     VALACYCLOVIR HCL PO     VITAMIN D, CHOLECALCIFEROL, PO     blood glucose monitoring (ONE TOUCH ULTRA) test strip     fluticasone (FLOVENT HFA) 220 MCG/ACT Inhaler     No current facility-administered medications for this visit.      Wt Readings from Last 24 Encounters:   18 115.6 kg (254 lb 12.8 oz)   18 111.6 kg (246 lb)   18 114.3 kg (252 lb)   17 113.7 kg (250 lb 11.2 oz)   17 113.4 kg (250 lb)   17 115 kg (253 lb 9.6 oz)   17 115.3 kg (254 lb 1.6 oz)   17 117.3 kg (258 lb 9.6 oz)   17 122 kg (269 lb)   05/15/17 124.7 kg (275 lb)   17 125.1 kg  (275 lb 11.2 oz)   04/11/17 129.5 kg (285 lb 8 oz)   04/12/16 133.8 kg (294 lb 15.6 oz)   08/26/15 133.8 kg (294 lb 15.6 oz)   08/13/15 132.5 kg (292 lb)   07/14/15 127 kg (280 lb)   07/14/15 131.7 kg (290 lb 5.5 oz)     Results for BENEDICT ESTES (MRN [id number]) as of 6/27/2018 13:04   Ref. Range 6/7/2017 10:42 8/22/2017 14:45 9/11/2017 13:18 6/6/2018 08:07 6/27/2018 11:49   N-Terminal Pro Bnp Latest Ref Range: 0 - 125 pg/mL 8023 (H) 3802 (H) 3630 (H) 340 (H) 335 (H)     Results for BENEDICT ESTES (MRN [id number]) as of 6/27/2018 13:04   Ref. Range 6/6/2018 12:22 6/27/2018 11:49   Sodium Latest Ref Range: 133 - 144 mmol/L  138   Potassium Latest Ref Range: 3.4 - 5.3 mmol/L  4.0   Chloride Latest Ref Range: 94 - 109 mmol/L  104   Carbon Dioxide Latest Ref Range: 20 - 32 mmol/L  25   Urea Nitrogen Latest Ref Range: 7 - 30 mg/dL  19   Creatinine Latest Ref Range: 0.66 - 1.25 mg/dL  1.52 (H)   GFR Estimate Latest Ref Range: >60 mL/min/1.7m2  48 (L)   GFR Estimate If Black Latest Ref Range: >60 mL/min/1.7m2  58 (L)   Calcium Latest Ref Range: 8.5 - 10.1 mg/dL  9.0   Anion Gap Latest Ref Range: 3 - 14 mmol/L  9   N-Terminal Pro Bnp Latest Ref Range: 0 - 125 pg/mL  335 (H)   Glucose Latest Ref Range: 70 - 99 mg/dL  107 (H)   WBC Latest Ref Range: 4.0 - 11.0 10e9/L  7.4   Hemoglobin Latest Ref Range: 13.3 - 17.7 g/dL  13.7   Hematocrit Latest Ref Range: 40.0 - 53.0 %  40.7   Platelet Count Latest Ref Range: 150 - 450 10e9/L  214   RBC Count Latest Ref Range: 4.4 - 5.9 10e12/L  4.50   MCV Latest Ref Range: 78 - 100 fl  90   MCH Latest Ref Range: 26.5 - 33.0 pg  30.4   MCHC Latest Ref Range: 31.5 - 36.5 g/dL  33.7   RDW Latest Ref Range: 10.0 - 15.0 %  17.1 (H)   HEART CATH RIGHT HEART CATH Unknown Attch

## 2018-06-27 NOTE — NURSING NOTE
Right Heart Catheterization: Patient was instructed regarding right heart catheterization, including discussion of the procedure, preparation, intra-procedural steps, and recovery at home. Patient demonstrated understanding of this information and agreed to call with further questions or concerns.    Med Reconcile: Reviewed and verified all current medications with the patient. The updated medication list was printed and given to the patient.    Labs: Patient was given results of the laboratory testing obtained today. Patient demonstrated understanding of this information and agreed to call with further questions or concerns.     Diet: Patient instructed regarding a heart healthy diet, including discussion of reduced fat and sodium intake. Patient demonstrated understanding of this information and agreed to call with further questions or concerns.    Return Appointment: Patient given instructions regarding scheduling next clinic visit. Patient demonstrated understanding of this information and agreed to call with further questions or concerns.    Patient stated he understood all health information given and agreed to call with further questions or concerns.    Medication Changes:   -None today    Patient Instructions:  -Continue staying active and eat a heart healthy diet.  -Right Heart Catheterization in 6 months (expect a call around November to schedule)    Check-In  Time Check-In Location Estimated Length Procedure   Name        [facility]  waiting room 60-90 minutes Right Heart Catheterization**     Procedure Preparations & Instructions     This is an invasive procedure that DOES require preparation:    - Nothing to eat for 6 hours   - You may have clear liquids up until the time of your procedure  - A ride should be arranged for you in the instance you are unable to drive home, however you should be able to function as you normally would after the procedure       Follow up Appointment Information:  -follow up with  Dr. Quan in 6 months with [facility] prior    Results:  Component      Latest Ref Rng & Units 6/27/2018   Sodium      133 - 144 mmol/L 138   Potassium      3.4 - 5.3 mmol/L 4.0   Chloride      94 - 109 mmol/L 104   Carbon Dioxide      20 - 32 mmol/L 25   Anion Gap      3 - 14 mmol/L 9   Glucose      70 - 99 mg/dL 107 (H)   Urea Nitrogen      7 - 30 mg/dL 19   Creatinine      0.66 - 1.25 mg/dL 1.52 (H)   GFR Estimate      >60 mL/min/1.7m2 48 (L)   GFR Estimate If Black      >60 mL/min/1.7m2 58 (L)   Calcium      8.5 - 10.1 mg/dL 9.0   WBC      4.0 - 11.0 10e9/L 7.4   RBC Count      4.4 - 5.9 10e12/L 4.50   Hemoglobin      13.3 - 17.7 g/dL 13.7   Hematocrit      40.0 - 53.0 % 40.7   MCV      78 - 100 fl 90   MCH      26.5 - 33.0 pg 30.4   MCHC      31.5 - 36.5 g/dL 33.7   RDW      10.0 - 15.0 % 17.1 (H)   Platelet Count      150 - 450 10e9/L 214   N-Terminal Pro Bnp      0 - 125 pg/mL 335 (H)     *staff message was routed to Kai to schedule [facility]/office visit in December.  Per patient request, he was provided with copies of both his most recent Echo & [facility] reports. Ana Ashraf RN on 6/27/2018 at 2:01 PM

## 2018-06-27 NOTE — LETTER
2018      RE: Benedict Gómez  [address]  [facility] [address]       Dear Colleague,    Thank you for the opportunity to participate in the care of your patient, Benedict Gómez, at the [facility] HEART [facility]. Please see a copy of my visit note below.    Nathan Richardson MD    [address] [place] [address] [phone] (Fax)    Musa Martin MD  [phone] (Work)  [phone] (Fax)  [address] [place] [address]    Adelso Delgado M.D.  [facility]    Magdaleno Elder M.D.  [facility]      Problem List  1. History of AML  2. 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 Chronic parenteral prostacyclin  5. Steroid dependence (treatment of PH)    Name: BENEDICT GÓMEZ  MRN: [id number]  : 1965  Study Date: 2018 08:43 AM  Age: 53 yrs  Gender: Male  [place] [facility] [place]  Reason For Study: Pulmonary hypertension, Dyspnea on exertion  Ordering Physician: PILI SEGURA  Referring Physician: PILI SEGURA  Performed By: Lien Cadena RDCS     BSA: 2.5 m2  Height: 77 in  Weight: 252 lb  HR: 91  BP: 116/70 mmHg  _____________________________________________________________________________  __        Procedure  Echocardiogram with two-dimensional, color and spectral Doppler performed. The  patient's rhythm is normal sinus.  _____________________________________________________________________________  __        Interpretation Summary  Left ventricular function, chamber size, wall motion, and wall thickness are  normal.The EF is 55-60%.  Right ventricular function, chamber size, wall motion, and thickness are  normal.  TAPSE 2.2 cm, S' 14 cm/s, RVFAC 48%  Pulmonary artery systolic pressure cannot be assessed. PAAT 109 msec,  suggesting normal PVR. There is no notching on the RVOT pulse Doppler tracing.  The inferior vena cava was normal in size with  preserved respiratory  variability. Estimated mean right atrial pressure is 3 mmHg.     This study was compared with the study from 9/12/17: RV size has decreased and  RV function appears improved. Pericardial effusion and TR have decreased.  Comparison of estimated PA pressure is difficult due to incomplete TR envelope  on today's study.  _____________________________________________________________________________  __        Left Ventricle  Left ventricular function, chamber size, wall motion, and wall thickness are  normal.The EF is 55-60%. Left ventricular diastolic function is indeterminate.     Right Ventricle  Right ventricular function, chamber size, wall motion, and thickness are  normal. TAPSE 2.2 cm, S' 14 cm/s, RVFAC 48%.     Atria  Both atria appear normal. The atrial septum is intact as assessed by color  Doppler .     Mitral Valve  The mitral valve is normal. Trace mitral insufficiency is present.        Aortic Valve  Aortic valve is normal in structure and function.     Tricuspid Valve  The tricuspid valve is normal. Mild tricuspid insufficiency is present.  Pulmonary artery systolic pressure cannot be assessed. The peak velocity of  the tricuspid regurgitant jet is not obtainable.     Pulmonic Valve  The pulmonic valve is normal. Trace pulmonic insufficiency is present.     Vessels  The pulmonary artery cannot be assessed. The aorta root is normal. The  thoracic aorta is normal. The inferior vena cava was normal in size with  preserved respiratory variability. Sinuses of Valsalva 4.1 cm (1.6 cm/m2),  ascending aorta 3.9 cm (1.6 cm/m2.) These measurements are within normal  limits when indexed to body surface area. Estimated mean right atrial pressure  is 3 mmHg.     Pericardium  Trivial pericardial effusion is present.        Compared to Previous Study  This study was compared with the study from 9/12/17 . Pericardial effusion and  TR have decreased. Comparison of estimated PA pressure is difficult  due to  incomplete TR envelope on today's study.  _____________________________________________________________________________  __  MMode/2D Measurements & Calculations  IVSd: 1.2 cm     LVIDd: 4.4 cm  LVIDs: 2.9 cm  LVPWd: 1.2 cm  FS: 35.7 %  LV mass(C)d: 196.2 grams  LV mass(C)dI: 79.5 grams/m2  asc Aorta Diam: 3.9 cm  LVOT diam: 2.8 cm  LVOT area: 6.1 cm2  LA Volume (BP): 66.4 ml  LA Volume Index (BP): 26.9 ml/m2  RWT: 0.55           Doppler Measurements & Calculations  MV E max nieves: 74.7 cm/sec  MV A max nieves: 100.9 cm/sec  MV E/A: 0.74  MV dec slope: 684.3 cm/sec2  MV dec time: 0.11 sec  PA V2 max: 94.1 cm/sec  PA max PG: 3.5 mmHg  PA acc time: 0.11 sec  E/E' av.5  Lateral E/e': 7.3  Medial E/e': 9.7    RA: 5/6/4  RV 35/4  PA 35/12/25;  PA Sat 77.1%  PCWP -/-/5;  PCW Sat -%  Carter CO/CI 7.3/3.0 L/min; TD CO 7.6/3.1 L/min  PVR 2.74 quiñonez; TPR 3.43 quiñonez  Hb 13.0 g/dL  NIBP 114/69/84   dynes*sec/cm^5    Current Outpatient Prescriptions   Medication     atovaquone (MEPRON) 750 MG/5ML suspension     calcium carbonate (OS-PANFILO 500 MG [place]. [place]) 500 MG tablet     CLONAZEPAM PO     digoxin (LANOXIN) 125 MCG tablet     epoprostenol (FLOLAN) 1.5 mg injection     FLUCONAZOLE PO     furosemide (LASIX) 40 MG tablet     glycine diluent 32.175 mL with epoprostenol 247.5 mcg infusion     Pantoprazole Sodium (PROTONIX PO)     Penicillin V Potassium (PEN-VEE K OR)     predniSONE (DELTASONE) 2.5 MG tablet     Rosuvastatin Calcium (CRESTOR PO)     ruxolitinib (JAKAFI) 5 MG TABS tablet CHEMO     Specialty Vitamins Products (MAGNESIUM PLUS PROTEIN) 133 MG tablet     tadalafil, PAH, (ADCIRCA) 20 MG TABS     Ursodiol (ACTIGALL PO)     VALACYCLOVIR HCL PO     VITAMIN D, CHOLECALCIFEROL, PO     blood glucose monitoring (ONE TOUCH ULTRA) test strip     fluticasone (FLOVENT HFA) 220 MCG/ACT Inhaler     No current facility-administered medications for this visit.      Wt Readings from Last 24 Encounters:   18 115.6 kg (254 lb 12.8  oz)   06/06/18 111.6 kg (246 lb)   01/16/18 114.3 kg (252 lb)   09/12/17 113.7 kg (250 lb 11.2 oz)   09/12/17 113.4 kg (250 lb)   08/22/17 115 kg (253 lb 9.6 oz)   07/19/17 115.3 kg (254 lb 1.6 oz)   06/23/17 117.3 kg (258 lb 9.6 oz)   06/07/17 122 kg (269 lb)   05/15/17 124.7 kg (275 lb)   05/01/17 125.1 kg (275 lb 11.2 oz)   04/11/17 129.5 kg (285 lb 8 oz)   04/12/16 133.8 kg (294 lb 15.6 oz)   08/26/15 133.8 kg (294 lb 15.6 oz)   08/13/15 132.5 kg (292 lb)   07/14/15 127 kg (280 lb)   07/14/15 131.7 kg (290 lb 5.5 oz)     Results for BENEDICT ESTES (MRN [id number]) as of 6/27/2018 13:04   Ref. Range 6/7/2017 10:42 8/22/2017 14:45 9/11/2017 13:18 6/6/2018 08:07 6/27/2018 11:49   N-Terminal Pro Bnp Latest Ref Range: 0 - 125 pg/mL 8023 (H) 3802 (H) 3630 (H) 340 (H) 335 (H)     Results for BENEDICT ESTES (MRN [id number]) as of 6/27/2018 13:04   Ref. Range 6/6/2018 12:22 6/27/2018 11:49   Sodium Latest Ref Range: 133 - 144 mmol/L  138   Potassium Latest Ref Range: 3.4 - 5.3 mmol/L  4.0   Chloride Latest Ref Range: 94 - 109 mmol/L  104   Carbon Dioxide Latest Ref Range: 20 - 32 mmol/L  25   Urea Nitrogen Latest Ref Range: 7 - 30 mg/dL  19   Creatinine Latest Ref Range: 0.66 - 1.25 mg/dL  1.52 (H)   GFR Estimate Latest Ref Range: >60 mL/min/1.7m2  48 (L)   GFR Estimate If Black Latest Ref Range: >60 mL/min/1.7m2  58 (L)   Calcium Latest Ref Range: 8.5 - 10.1 mg/dL  9.0   Anion Gap Latest Ref Range: 3 - 14 mmol/L  9   N-Terminal Pro Bnp Latest Ref Range: 0 - 125 pg/mL  335 (H)   Glucose Latest Ref Range: 70 - 99 mg/dL  107 (H)   WBC Latest Ref Range: 4.0 - 11.0 10e9/L  7.4   Hemoglobin Latest Ref Range: 13.3 - 17.7 g/dL  13.7   Hematocrit Latest Ref Range: 40.0 - 53.0 %  40.7   Platelet Count Latest Ref Range: 150 - 450 10e9/L  214   RBC Count Latest Ref Range: 4.4 - 5.9 10e12/L  4.50   MCV Latest Ref Range: 78 - 100 fl  90   MCH Latest Ref Range: 26.5 - 33.0 pg  30.4   MCHC Latest Ref Range: 31.5 - 36.5 g/dL  33.7   RDW  Latest Ref Range: 10.0 - 15.0 %  17.1 (H)   HEART CATH RIGHT HEART CATH Unknown [facility]              Nathan Richardson MD    [address] [place] [address] [phone] (Fax)    Musa Martin MD  [phone] (Work)  [phone] (Fax)  [address] [place] [address]    Adelso Delgado M.D.  [facility]    Magdaleno Elder M.D.  [facility]      Problem List  1. History of AML  2. 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 Chronic parenteral prostacyclin  5. Steroid dependence (treatment of PH)        Name: BENEDICT ESTES  MRN: [id number]  : 1965  Study Date: 2018 08:43 AM  Age: 53 yrs  Gender: Male  Patient Location: [facility] [place]  Reason For Study: Pulmonary hypertension, Dyspnea on exertion  Ordering Physician: PILI SEGURA  Referring Physician: PILI SEGURA  Performed By: Lien Cadena RDCS     BSA: 2.5 m2  Height: 77 in  Weight: 252 lb  HR: 91  BP: 116/70 mmHg  _____________________________________________________________________________  __        Procedure  Echocardiogram with two-dimensional, color and spectral Doppler performed. The  patient's rhythm is normal sinus.  _____________________________________________________________________________  __        Interpretation Summary  Left ventricular function, chamber size, wall motion, and wall thickness are  normal.The EF is 55-60%.  Right ventricular function, chamber size, wall motion, and thickness are  normal.  TAPSE 2.2 cm, S' 14 cm/s, RVFAC 48%  Pulmonary artery systolic pressure cannot be assessed. PAAT 109 msec,  suggesting normal PVR. There is no notching on the RVOT pulse Doppler tracing.  The inferior vena cava was normal in size with preserved respiratory  variability. Estimated mean right atrial pressure is 3 mmHg.     This study was compared with the study from 17: RV size has decreased and  RV function appears improved. Pericardial effusion  and TR have decreased.  Comparison of estimated PA pressure is difficult due to incomplete TR envelope  on today's study.  _____________________________________________________________________________  __        Left Ventricle  Left ventricular function, chamber size, wall motion, and wall thickness are  normal.The EF is 55-60%. Left ventricular diastolic function is indeterminate.     Right Ventricle  Right ventricular function, chamber size, wall motion, and thickness are  normal. TAPSE 2.2 cm, S' 14 cm/s, RVFAC 48%.     Atria  Both atria appear normal. The atrial septum is intact as assessed by color  Doppler .     Mitral Valve  The mitral valve is normal. Trace mitral insufficiency is present.        Aortic Valve  Aortic valve is normal in structure and function.     Tricuspid Valve  The tricuspid valve is normal. Mild tricuspid insufficiency is present.  Pulmonary artery systolic pressure cannot be assessed. The peak velocity of  the tricuspid regurgitant jet is not obtainable.     Pulmonic Valve  The pulmonic valve is normal. Trace pulmonic insufficiency is present.     Vessels  The pulmonary artery cannot be assessed. The aorta root is normal. The  thoracic aorta is normal. The inferior vena cava was normal in size with  preserved respiratory variability. Sinuses of Valsalva 4.1 cm (1.6 cm/m2),  ascending aorta 3.9 cm (1.6 cm/m2.) These measurements are within normal  limits when indexed to body surface area. Estimated mean right atrial pressure  is 3 mmHg.     Pericardium  Trivial pericardial effusion is present.        Compared to Previous Study  This study was compared with the study from 9/12/17 . Pericardial effusion and  TR have decreased. Comparison of estimated PA pressure is difficult due to  incomplete TR envelope on today's study.  _____________________________________________________________________________  __  MMode/2D Measurements & Calculations  IVSd: 1.2 cm     LVIDd: 4.4 cm  LVIDs: 2.9  cm  LVPWd: 1.2 cm  FS: 35.7 %  LV mass(C)d: 196.2 grams  LV mass(C)dI: 79.5 grams/m2  asc Aorta Diam: 3.9 cm  LVOT diam: 2.8 cm  LVOT area: 6.1 cm2  LA Volume (BP): 66.4 ml  LA Volume Index (BP): 26.9 ml/m2  RWT: 0.55           Doppler Measurements & Calculations  MV E max nieves: 74.7 cm/sec  MV A max nieves: 100.9 cm/sec  MV E/A: 0.74  MV dec slope: 684.3 cm/sec2  MV dec time: 0.11 sec  PA V2 max: 94.1 cm/sec  PA max PG: 3.5 mmHg  PA acc time: 0.11 sec  E/E' av.5  Lateral E/e': 7.3  Medial E/e': 9.7    RA: 5/6/4  RV 35/4  PA 35/12/25;  PA Sat 77.1%  PCWP -/-/5;  PCW Sat -%  Carter CO/CI 7.3/3.0 L/min; TD CO 7.6/3.1 L/min  PVR 2.74 quiñonez; TPR 3.43 quiñonez  Hb 13.0 g/dL  NIBP 114/69/84   dynes*sec/cm^5    Current Outpatient Prescriptions   Medication     atovaquone (MEPRON) 750 MG/5ML suspension     calcium carbonate (OS-PANFILO 500 MG [place]. [place]) 500 MG tablet     CLONAZEPAM PO     digoxin (LANOXIN) 125 MCG tablet     epoprostenol (FLOLAN) 1.5 mg injection     FLUCONAZOLE PO     furosemide (LASIX) 40 MG tablet     glycine diluent 32.175 mL with epoprostenol 247.5 mcg infusion     Pantoprazole Sodium (PROTONIX PO)     Penicillin V Potassium (PEN-VEE K OR)     predniSONE (DELTASONE) 2.5 MG tablet     Rosuvastatin Calcium (CRESTOR PO)     ruxolitinib (JAKAFI) 5 MG TABS tablet CHEMO     Specialty Vitamins Products (MAGNESIUM PLUS PROTEIN) 133 MG tablet     tadalafil, PAH, (ADCIRCA) 20 MG TABS     Ursodiol (ACTIGALL PO)     VALACYCLOVIR HCL PO     VITAMIN D, CHOLECALCIFEROL, PO     blood glucose monitoring (ONE TOUCH ULTRA) test strip     fluticasone (FLOVENT HFA) 220 MCG/ACT Inhaler     No current facility-administered medications for this visit.      Wt Readings from Last 24 Encounters:   18 115.6 kg (254 lb 12.8 oz)   18 111.6 kg (246 lb)   18 114.3 kg (252 lb)   17 113.7 kg (250 lb 11.2 oz)   17 113.4 kg (250 lb)   17 115 kg (253 lb 9.6 oz)   17 115.3 kg (254 lb 1.6 oz)   17  117.3 kg (258 lb 9.6 oz)   06/07/17 122 kg (269 lb)   05/15/17 124.7 kg (275 lb)   05/01/17 125.1 kg (275 lb 11.2 oz)   04/11/17 129.5 kg (285 lb 8 oz)   04/12/16 133.8 kg (294 lb 15.6 oz)   08/26/15 133.8 kg (294 lb 15.6 oz)   08/13/15 132.5 kg (292 lb)   07/14/15 127 kg (280 lb)   07/14/15 131.7 kg (290 lb 5.5 oz)     Results for BENEDICT ESTES (MRN [id number]) as of 6/27/2018 13:04   Ref. Range 6/7/2017 10:42 8/22/2017 14:45 9/11/2017 13:18 6/6/2018 08:07 6/27/2018 11:49   N-Terminal Pro Bnp Latest Ref Range: 0 - 125 pg/mL 8023 (H) 3802 (H) 3630 (H) 340 (H) 335 (H)     Results for BENEDICT ESTES (MRN [id number]) as of 6/27/2018 13:04   Ref. Range 6/6/2018 12:22 6/27/2018 11:49   Sodium Latest Ref Range: 133 - 144 mmol/L  138   Potassium Latest Ref Range: 3.4 - 5.3 mmol/L  4.0   Chloride Latest Ref Range: 94 - 109 mmol/L  104   Carbon Dioxide Latest Ref Range: 20 - 32 mmol/L  25   Urea Nitrogen Latest Ref Range: 7 - 30 mg/dL  19   Creatinine Latest Ref Range: 0.66 - 1.25 mg/dL  1.52 (H)   GFR Estimate Latest Ref Range: >60 mL/min/1.7m2  48 (L)   GFR Estimate If Black Latest Ref Range: >60 mL/min/1.7m2  58 (L)   Calcium Latest Ref Range: 8.5 - 10.1 mg/dL  9.0   Anion Gap Latest Ref Range: 3 - 14 mmol/L  9   N-Terminal Pro Bnp Latest Ref Range: 0 - 125 pg/mL  335 (H)   Glucose Latest Ref Range: 70 - 99 mg/dL  107 (H)   WBC Latest Ref Range: 4.0 - 11.0 10e9/L  7.4   Hemoglobin Latest Ref Range: 13.3 - 17.7 g/dL  13.7   Hematocrit Latest Ref Range: 40.0 - 53.0 %  40.7   Platelet Count Latest Ref Range: 150 - 450 10e9/L  214   RBC Count Latest Ref Range: 4.4 - 5.9 10e12/L  4.50   MCV Latest Ref Range: 78 - 100 fl  90   MCH Latest Ref Range: 26.5 - 33.0 pg  30.4   MCHC Latest Ref Range: 31.5 - 36.5 g/dL  33.7   RDW Latest Ref Range: 10.0 - 15.0 %  17.1 (H)   HEART CATH RIGHT [facility]    [address] [place] [address] [phone] (Fax)    Musa BALL  Mario GARRETT  [phone] (Work)  [phone] (Fax)  [address] [place] [address]    Adelso Delgado M.D.  [facility]    Magdaleno Elder M.D.  [facility]      Problem List  1. History of AML  2. 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  4. Chronic parenteral prostacyclin  5. 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  Both objectively and functionally there has been a striking improvement such that his PA pressures have normalized and his right ventricular function has also normalized!.  Please see below.      He is currently maintained on both prostacyclin and the Jakafi.      REVIEW of SYMPTOMS    Constitutional: without fever, chills, night sweats.  Weight is ___  HEENT: without dry eyes, dry mouth, sinusitis, corryza, visual changes  Endocrine: without polyuria, polydypsia, polyphagia, heat or cold intolerance, changing mental status  Cardiology: without chest pain, tightness, heaviness, pressure, paroxysmal dyspnea, orthopnea, palpitation, pre-syncope or syncope or device discharge (if present)  Pulmonary: without asthma, wheezing, cough, hemoptysis  GI: without nausea, emesis, jaundice, pain, hematemesis, melena  : without frequency, urgency, hematuria, stones, pain, abnormal bleeding, frequency, urgency  Neurologic: without TIA, CVA, trauma, seizure  Dermatologic: without lesions, abrasion rash,   Orthopedic/Rheum: without significant joint pain, impairment, limb, polyserositis, ulceration, Raynauds  Heme: without mass, bruising, frequent infection, anemia  Psychiatric: without substance abuse, hallucination, medication, depression    Exercise tolerance: much improved    Current Outpatient Prescriptions   Medication     atovaquone (MEPRON) 750 MG/5ML suspension     calcium carbonate (OS-PANFILO 500 MG [place]. [place]) 500 MG tablet     CLONAZEPAM PO     digoxin  (LANOXIN) 125 MCG tablet     epoprostenol (FLOLAN) 1.5 mg injection     FLUCONAZOLE PO     furosemide (LASIX) 40 MG tablet     glycine diluent 32.175 mL with epoprostenol 247.5 mcg infusion     Pantoprazole Sodium (PROTONIX PO)     Penicillin V Potassium (PEN-VEE K OR)     predniSONE (DELTASONE) 2.5 MG tablet     Rosuvastatin Calcium (CRESTOR PO)     ruxolitinib (JAKAFI) 5 MG TABS tablet CHEMO     Specialty Vitamins Products (MAGNESIUM PLUS PROTEIN) 133 MG tablet     tadalafil, PAH, (ADCIRCA) 20 MG TABS     Ursodiol (ACTIGALL PO)     VALACYCLOVIR HCL PO     VITAMIN D, CHOLECALCIFEROL, PO     blood glucose monitoring (ONE TOUCH ULTRA) test strip     fluticasone (FLOVENT HFA) 220 MCG/ACT Inhaler     No current facility-administered medications for this visit.      Constitutional: alert, oriented, normal gait and station, normal mentation.  Oral: moist mucous membrans  Lymph: without pathologic adenopathy  Chest: clear to ausculation and percussion  Cor: No evidence of left or right ventricular activity.  Rhythm is regular.  S1 normal, S2 split physiologically. Murmurs are not present  Abdomen: without tenderness, rebound, guarding, masses, ascites  Extremities: Edema not present, mild sclerodema changes dorsum.extensor wrist and hand  Neuro: no focal defects, normal mentation  Skin: without open lesions  Psych: oriented, verbal, mental status in tact  Name: BENEDICT ESTES  MRN: [id number]  : 1965  Study Date: 2018 08:43 AM  Age: 53 yrs  Gender: Male  [place] [facility] [place]  Reason For Study: Pulmonary hypertension, Dyspnea on exertion  Ordering Physician: PILI SEGURA  Referring Physician: PILI SEGURA  Performed By: Lien Cadena LANDY     BSA: 2.5 m2  Height: 77 in  Weight: 252 lb  HR: 91  BP: 116/70 mmHg  _____________________________________________________________________________  __        Procedure  Echocardiogram with two-dimensional, color and spectral Doppler performed.  The  patient's rhythm is normal sinus.  _____________________________________________________________________________  __        Interpretation Summary  Left ventricular function, chamber size, wall motion, and wall thickness are  normal.The EF is 55-60%.  Right ventricular function, chamber size, wall motion, and thickness are  normal.  TAPSE 2.2 cm, S' 14 cm/s, RVFAC 48%  Pulmonary artery systolic pressure cannot be assessed. PAAT 109 msec,  suggesting normal PVR. There is no notching on the RVOT pulse Doppler tracing.  The inferior vena cava was normal in size with preserved respiratory  variability. Estimated mean right atrial pressure is 3 mmHg.     This study was compared with the study from 9/12/17: RV size has decreased and  RV function appears improved. Pericardial effusion and TR have decreased.  Comparison of estimated PA pressure is difficult due to incomplete TR envelope  on today's study.  _____________________________________________________________________________  __        Left Ventricle  Left ventricular function, chamber size, wall motion, and wall thickness are  normal.The EF is 55-60%. Left ventricular diastolic function is indeterminate.     Right Ventricle  Right ventricular function, chamber size, wall motion, and thickness are  normal. TAPSE 2.2 cm, S' 14 cm/s, RVFAC 48%.     Atria  Both atria appear normal. The atrial septum is intact as assessed by color  Doppler .     Mitral Valve  The mitral valve is normal. Trace mitral insufficiency is present.        Aortic Valve  Aortic valve is normal in structure and function.     Tricuspid Valve  The tricuspid valve is normal. Mild tricuspid insufficiency is present.  Pulmonary artery systolic pressure cannot be assessed. The peak velocity of  the tricuspid regurgitant jet is not obtainable.     Pulmonic Valve  The pulmonic valve is normal. Trace pulmonic insufficiency is present.     Vessels  The pulmonary artery cannot be assessed. The aorta  root is normal. The  thoracic aorta is normal. The inferior vena cava was normal in size with  preserved respiratory variability. Sinuses of Valsalva 4.1 cm (1.6 cm/m2),  ascending aorta 3.9 cm (1.6 cm/m2.) These measurements are within normal  limits when indexed to body surface area. Estimated mean right atrial pressure  is 3 mmHg.     Pericardium  Trivial pericardial effusion is present.        Compared to Previous Study  This study was compared with the study from 17 . Pericardial effusion and  TR have decreased. Comparison of estimated PA pressure is difficult due to  incomplete TR envelope on today's study.  _____________________________________________________________________________  __  MMode/2D Measurements & Calculations  IVSd: 1.2 cm     LVIDd: 4.4 cm  LVIDs: 2.9 cm  LVPWd: 1.2 cm  FS: 35.7 %  LV mass(C)d: 196.2 grams  LV mass(C)dI: 79.5 grams/m2  asc Aorta Diam: 3.9 cm  LVOT diam: 2.8 cm  LVOT area: 6.1 cm2  LA Volume (BP): 66.4 ml  LA Volume Index (BP): 26.9 ml/m2  RWT: 0.55           Doppler Measurements & Calculations  MV E max nieves: 74.7 cm/sec  MV A max nieves: 100.9 cm/sec  MV E/A: 0.74  MV dec slope: 684.3 cm/sec2  MV dec time: 0.11 sec  PA V2 max: 94.1 cm/sec  PA max PG: 3.5 mmHg  PA acc time: 0.11 sec  E/E' av.5  Lateral E/e': 7.3  Medial E/e': 9.7    RA: 5/6/4  RV 35/4  PA ;  PA Sat 77.1%  PCWP -/-/5;  PCW Sat -%  Carter CO/CI 7.3/3.0 L/min; TD CO 7.6/3.1 L/min  PVR 2.74 quiñonez; TPR 3.43 quiñonez  Hb 13.0 g/dL  NIBP 114/69/84   dynes*sec/cm^5    Current Outpatient Prescriptions   Medication     atovaquone (MEPRON) 750 MG/5ML suspension     calcium carbonate (OS-PANFILO 500 MG [place]. [place]) 500 MG tablet     CLONAZEPAM PO     digoxin (LANOXIN) 125 MCG tablet     epoprostenol (FLOLAN) 1.5 mg injection     FLUCONAZOLE PO     furosemide (LASIX) 40 MG tablet     glycine diluent 32.175 mL with epoprostenol 247.5 mcg infusion     Pantoprazole Sodium (PROTONIX PO)     Penicillin V Potassium (PEN-VEE K  OR)     predniSONE (DELTASONE) 2.5 MG tablet     Rosuvastatin Calcium (CRESTOR PO)     ruxolitinib (JAKAFI) 5 MG TABS tablet CHEMO     Specialty Vitamins Products (MAGNESIUM PLUS PROTEIN) 133 MG tablet     tadalafil, PAH, (ADCIRCA) 20 MG TABS     Ursodiol (ACTIGALL PO)     VALACYCLOVIR HCL PO     VITAMIN D, CHOLECALCIFEROL, PO     blood glucose monitoring (ONE TOUCH ULTRA) test strip     fluticasone (FLOVENT HFA) 220 MCG/ACT Inhaler     No current facility-administered medications for this visit.      Wt Readings from Last 24 Encounters:   06/27/18 115.6 kg (254 lb 12.8 oz)   06/06/18 111.6 kg (246 lb)   01/16/18 114.3 kg (252 lb)   09/12/17 113.7 kg (250 lb 11.2 oz)   09/12/17 113.4 kg (250 lb)   08/22/17 115 kg (253 lb 9.6 oz)   07/19/17 115.3 kg (254 lb 1.6 oz)   06/23/17 117.3 kg (258 lb 9.6 oz)   06/07/17 122 kg (269 lb)   05/15/17 124.7 kg (275 lb)   05/01/17 125.1 kg (275 lb 11.2 oz)   04/11/17 129.5 kg (285 lb 8 oz)   04/12/16 133.8 kg (294 lb 15.6 oz)   08/26/15 133.8 kg (294 lb 15.6 oz)   08/13/15 132.5 kg (292 lb)   07/14/15 127 kg (280 lb)   07/14/15 131.7 kg (290 lb 5.5 oz)     Results for BENEDICT ESTES (MRN [id number]) as of 6/27/2018 13:04   Ref. Range 6/7/2017 10:42 8/22/2017 14:45 9/11/2017 13:18 6/6/2018 08:07 6/27/2018 11:49   N-Terminal Pro Bnp Latest Ref Range: 0 - 125 pg/mL 8023 (H) 3802 (H) 3630 (H) 340 (H) 335 (H)     Results for BENEDICT ESTES (MRN [id number]) as of 6/27/2018 13:04   Ref. Range 6/6/2018 12:22 6/27/2018 11:49   Sodium Latest Ref Range: 133 - 144 mmol/L  138   Potassium Latest Ref Range: 3.4 - 5.3 mmol/L  4.0   Chloride Latest Ref Range: 94 - 109 mmol/L  104   Carbon Dioxide Latest Ref Range: 20 - 32 mmol/L  25   Urea Nitrogen Latest Ref Range: 7 - 30 mg/dL  19   Creatinine Latest Ref Range: 0.66 - 1.25 mg/dL  1.52 (H)   GFR Estimate Latest Ref Range: >60 mL/min/1.7m2  48 (L)   GFR Estimate If Black Latest Ref Range: >60 mL/min/1.7m2  58 (L)   Calcium Latest Ref Range: 8.5 -  10.1 mg/dL  9.0   Anion Gap Latest Ref Range: 3 - 14 mmol/L  9   N-Terminal Pro Bnp Latest Ref Range: 0 - 125 pg/mL  335 (H)   Glucose Latest Ref Range: 70 - 99 mg/dL  107 (H)   WBC Latest Ref Range: 4.0 - 11.0 10e9/L  7.4   Hemoglobin Latest Ref Range: 13.3 - 17.7 g/dL  13.7   Hematocrit Latest Ref Range: 40.0 - 53.0 %  40.7   Platelet Count Latest Ref Range: 150 - 450 10e9/L  214   RBC Count Latest Ref Range: 4.4 - 5.9 10e12/L  4.50   MCV Latest Ref Range: 78 - 100 fl  90   MCH Latest Ref Range: 26.5 - 33.0 pg  30.4   MCHC Latest Ref Range: 31.5 - 36.5 g/dL  33.7   RDW Latest Ref Range: 10.0 - 15.0 %  17.1 (H)   HEART CATH RIGHT HEART CATH Unknown Attch        In summary we have seen rather astonishing changes in the hemodynamics and RV function. I know Nathan jeansho that he has developed scleroderma, but we have never seen this type of response in patients with scleroderma.  As we have postulated and  discussed JANUS KINASE plays a role in PAH and perhaps this dramatic effect represents synergy between the drugs.    I have proposed that we leave everything as is for now and after the first of the year consider weaning off of parenteral prostanoids in favor of an oral prostacyclin receptor agonist.      Olivier has told me that you are not happy with the Janus Kinase inhibitor.  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      In any case the response is dramatic and objective, and I suppose this is all that counts.      Dk Quan M.D.  Professor of Medicine and Surgery  [facility]      CC: doctors above

## 2018-06-27 NOTE — NURSING NOTE
Chief Complaint   Patient presents with     Follow Up For     Return for PH F/U pt had Right heart catheterization, Labs, 6 muinute walk test, Echo on 6/6       Vitals were taken and medications were reconciled.  Grace Pulido MA    12:23 PM

## 2018-06-28 NOTE — PROGRESS NOTES
Nathan Richardson MD    [address], [place] [id number] [phone] (Fax)    Musa Martin MD  [phone] (Work)  [phone] (Fax)  [address] [place] [address]    Adelso Delgado M.D.  [facility]    Magdaleno Elder M.D.  [facility]      Problem List  1. History of AML  2. 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  4. Chronic parenteral prostacyclin  5. 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  Both objectively and functionally there has been a striking improvement such that his PA pressures have normalized and his right ventricular function has also normalized!.  Please see below.      He is currently maintained on both prostacyclin and the Jakafi.      REVIEW of SYMPTOMS    Constitutional: without fever, chills, night sweats.  Weight is ___  HEENT: without dry eyes, dry mouth, sinusitis, corryza, visual changes  Endocrine: without polyuria, polydypsia, polyphagia, heat or cold intolerance, changing mental status  Cardiology: without chest pain, tightness, heaviness, pressure, paroxysmal dyspnea, orthopnea, palpitation, pre-syncope or syncope or device discharge (if present)  Pulmonary: without asthma, wheezing, cough, hemoptysis  GI: without nausea, emesis, jaundice, pain, hematemesis, melena  : without frequency, urgency, hematuria, stones, pain, abnormal bleeding, frequency, urgency  Neurologic: without TIA, CVA, trauma, seizure  Dermatologic: without lesions, abrasion rash,   Orthopedic/Rheum: without significant joint pain, impairment, limb, polyserositis, ulceration, Raynauds  Heme: without mass, bruising, frequent infection, anemia  Psychiatric: without substance abuse, hallucination, medication, depression    Exercise tolerance: much improved    Current Outpatient Prescriptions   Medication     atovaquone  (MEPRON) 750 MG/5ML suspension     calcium carbonate (OS-PANFILO 500 MG [place]. [place]) 500 MG tablet     CLONAZEPAM PO     digoxin (LANOXIN) 125 MCG tablet     epoprostenol (FLOLAN) 1.5 mg injection     FLUCONAZOLE PO     furosemide (LASIX) 40 MG tablet     glycine diluent 32.175 mL with epoprostenol 247.5 mcg infusion     Pantoprazole Sodium (PROTONIX PO)     Penicillin V Potassium (PEN-VEE K OR)     predniSONE (DELTASONE) 2.5 MG tablet     Rosuvastatin Calcium (CRESTOR PO)     ruxolitinib (JAKAFI) 5 MG TABS tablet CHEMO     Specialty Vitamins Products (MAGNESIUM PLUS PROTEIN) 133 MG tablet     tadalafil, PAH, (ADCIRCA) 20 MG TABS     Ursodiol (ACTIGALL PO)     VALACYCLOVIR HCL PO     VITAMIN D, CHOLECALCIFEROL, PO     blood glucose monitoring (ONE TOUCH ULTRA) test strip     fluticasone (FLOVENT HFA) 220 MCG/ACT Inhaler     No current facility-administered medications for this visit.      Constitutional: alert, oriented, normal gait and station, normal mentation.  Oral: moist mucous membrans  Lymph: without pathologic adenopathy  Chest: clear to ausculation and percussion  Cor: No evidence of left or right ventricular activity.  Rhythm is regular.  S1 normal, S2 split physiologically. Murmurs are not present  Abdomen: without tenderness, rebound, guarding, masses, ascites  Extremities: Edema not present, mild sclerodema changes dorsum.extensor wrist and hand  Neuro: no focal defects, normal mentation  Skin: without open lesions  Psych: oriented, verbal, mental status in tact  Name: BENEDICT ESTES  MRN: [id number]  : 1965  Study Date: 2018 08:43 AM  Age: 53 yrs  Gender: Male  Patient Location: [facility] [place]  Reason For Study: Pulmonary hypertension, Dyspnea on exertion  Ordering Physician: PILI SEGURA  Referring Physician: PILI SEGURA  Performed By: Lien Cadena RDCS     BSA: 2.5 m2  Height: 77 in  Weight: 252 lb  HR: 91  BP: 116/70  mmHg  _____________________________________________________________________________  __        Procedure  Echocardiogram with two-dimensional, color and spectral Doppler performed. The  patient's rhythm is normal sinus.  _____________________________________________________________________________  __        Interpretation Summary  Left ventricular function, chamber size, wall motion, and wall thickness are  normal.The EF is 55-60%.  Right ventricular function, chamber size, wall motion, and thickness are  normal.  TAPSE 2.2 cm, S' 14 cm/s, RVFAC 48%  Pulmonary artery systolic pressure cannot be assessed. PAAT 109 msec,  suggesting normal PVR. There is no notching on the RVOT pulse Doppler tracing.  The inferior vena cava was normal in size with preserved respiratory  variability. Estimated mean right atrial pressure is 3 mmHg.     This study was compared with the study from 9/12/17: RV size has decreased and  RV function appears improved. Pericardial effusion and TR have decreased.  Comparison of estimated PA pressure is difficult due to incomplete TR envelope  on today's study.  _____________________________________________________________________________  __        Left Ventricle  Left ventricular function, chamber size, wall motion, and wall thickness are  normal.The EF is 55-60%. Left ventricular diastolic function is indeterminate.     Right Ventricle  Right ventricular function, chamber size, wall motion, and thickness are  normal. TAPSE 2.2 cm, S' 14 cm/s, RVFAC 48%.     Atria  Both atria appear normal. The atrial septum is intact as assessed by color  Doppler .     Mitral Valve  The mitral valve is normal. Trace mitral insufficiency is present.        Aortic Valve  Aortic valve is normal in structure and function.     Tricuspid Valve  The tricuspid valve is normal. Mild tricuspid insufficiency is present.  Pulmonary artery systolic pressure cannot be assessed. The peak velocity of  the tricuspid regurgitant  jet is not obtainable.     Pulmonic Valve  The pulmonic valve is normal. Trace pulmonic insufficiency is present.     Vessels  The pulmonary artery cannot be assessed. The aorta root is normal. The  thoracic aorta is normal. The inferior vena cava was normal in size with  preserved respiratory variability. Sinuses of Valsalva 4.1 cm (1.6 cm/m2),  ascending aorta 3.9 cm (1.6 cm/m2.) These measurements are within normal  limits when indexed to body surface area. Estimated mean right atrial pressure  is 3 mmHg.     Pericardium  Trivial pericardial effusion is present.        Compared to Previous Study  This study was compared with the study from 17 . Pericardial effusion and  TR have decreased. Comparison of estimated PA pressure is difficult due to  incomplete TR envelope on today's study.  _____________________________________________________________________________  __  MMode/2D Measurements & Calculations  IVSd: 1.2 cm     LVIDd: 4.4 cm  LVIDs: 2.9 cm  LVPWd: 1.2 cm  FS: 35.7 %  LV mass(C)d: 196.2 grams  LV mass(C)dI: 79.5 grams/m2  asc Aorta Diam: 3.9 cm  LVOT diam: 2.8 cm  LVOT area: 6.1 cm2  LA Volume (BP): 66.4 ml  LA Volume Index (BP): 26.9 ml/m2  RWT: 0.55           Doppler Measurements & Calculations  MV E max nieves: 74.7 cm/sec  MV A max nieves: 100.9 cm/sec  MV E/A: 0.74  MV dec slope: 684.3 cm/sec2  MV dec time: 0.11 sec  PA V2 max: 94.1 cm/sec  PA max PG: 3.5 mmHg  PA acc time: 0.11 sec  E/E' av.5  Lateral E/e': 7.3  Medial E/e': 9.7    RA: 5/6/4  RV 35/4  PA 35//;  PA Sat 77.1%  PCWP -/-/5;  PCW Sat -%  Carter CO/CI 7.3/3.0 L/min; TD CO 7.6/3.1 L/min  PVR 2.74 quiñonez; TPR 3.43 quiñonez  Hb 13.0 g/dL  NIBP 114/69/84   dynes*sec/cm^5    Current Outpatient Prescriptions   Medication     atovaquone (MEPRON) 750 MG/5ML suspension     calcium carbonate (OS-PANFILO 500 MG [place]. [place]) 500 MG tablet     CLONAZEPAM PO     digoxin (LANOXIN) 125 MCG tablet     epoprostenol (FLOLAN) 1.5 mg injection     FLUCONAZOLE PO      furosemide (LASIX) 40 MG tablet     glycine diluent 32.175 mL with epoprostenol 247.5 mcg infusion     Pantoprazole Sodium (PROTONIX PO)     Penicillin V Potassium (PEN-VEE K OR)     predniSONE (DELTASONE) 2.5 MG tablet     Rosuvastatin Calcium (CRESTOR PO)     ruxolitinib (JAKAFI) 5 MG TABS tablet CHEMO     Specialty Vitamins Products (MAGNESIUM PLUS PROTEIN) 133 MG tablet     tadalafil, PAH, (ADCIRCA) 20 MG TABS     Ursodiol (ACTIGALL PO)     VALACYCLOVIR HCL PO     VITAMIN D, CHOLECALCIFEROL, PO     blood glucose monitoring (ONE TOUCH ULTRA) test strip     fluticasone (FLOVENT HFA) 220 MCG/ACT Inhaler     No current facility-administered medications for this visit.      Wt Readings from Last 24 Encounters:   06/27/18 115.6 kg (254 lb 12.8 oz)   06/06/18 111.6 kg (246 lb)   01/16/18 114.3 kg (252 lb)   09/12/17 113.7 kg (250 lb 11.2 oz)   09/12/17 113.4 kg (250 lb)   08/22/17 115 kg (253 lb 9.6 oz)   07/19/17 115.3 kg (254 lb 1.6 oz)   06/23/17 117.3 kg (258 lb 9.6 oz)   06/07/17 122 kg (269 lb)   05/15/17 124.7 kg (275 lb)   05/01/17 125.1 kg (275 lb 11.2 oz)   04/11/17 129.5 kg (285 lb 8 oz)   04/12/16 133.8 kg (294 lb 15.6 oz)   08/26/15 133.8 kg (294 lb 15.6 oz)   08/13/15 132.5 kg (292 lb)   07/14/15 127 kg (280 lb)   07/14/15 131.7 kg (290 lb 5.5 oz)     Results for BENEDICT ESTES (MRN [id number]) as of 6/27/2018 13:04   Ref. Range 6/7/2017 10:42 8/22/2017 14:45 9/11/2017 13:18 6/6/2018 08:07 6/27/2018 11:49   N-Terminal Pro Bnp Latest Ref Range: 0 - 125 pg/mL 8023 (H) 3802 (H) 3630 (H) 340 (H) 335 (H)     Results for BENEDICT ESTES (MRN [id number]) as of 6/27/2018 13:04   Ref. Range 6/6/2018 12:22 6/27/2018 11:49   Sodium Latest Ref Range: 133 - 144 mmol/L  138   Potassium Latest Ref Range: 3.4 - 5.3 mmol/L  4.0   Chloride Latest Ref Range: 94 - 109 mmol/L  104   Carbon Dioxide Latest Ref Range: 20 - 32 mmol/L  25   Urea Nitrogen Latest Ref Range: 7 - 30 mg/dL  19   Creatinine Latest Ref Range: 0.66 -  1.25 mg/dL  1.52 (H)   GFR Estimate Latest Ref Range: >60 mL/min/1.7m2  48 (L)   GFR Estimate If Black Latest Ref Range: >60 mL/min/1.7m2  58 (L)   Calcium Latest Ref Range: 8.5 - 10.1 mg/dL  9.0   Anion Gap Latest Ref Range: 3 - 14 mmol/L  9   N-Terminal Pro Bnp Latest Ref Range: 0 - 125 pg/mL  335 (H)   Glucose Latest Ref Range: 70 - 99 mg/dL  107 (H)   WBC Latest Ref Range: 4.0 - 11.0 10e9/L  7.4   Hemoglobin Latest Ref Range: 13.3 - 17.7 g/dL  13.7   Hematocrit Latest Ref Range: 40.0 - 53.0 %  40.7   Platelet Count Latest Ref Range: 150 - 450 10e9/L  214   RBC Count Latest Ref Range: 4.4 - 5.9 10e12/L  4.50   MCV Latest Ref Range: 78 - 100 fl  90   MCH Latest Ref Range: 26.5 - 33.0 pg  30.4   MCHC Latest Ref Range: 31.5 - 36.5 g/dL  33.7   RDW Latest Ref Range: 10.0 - 15.0 %  17.1 (H)   HEART CATH RIGHT HEART CATH Unknown Attch        In summary we have seen rather astonishing changes in the hemodynamics and RV function. I know Nathan partida that he has developed scleroderma, but we have never seen this type of response in patients with scleroderma.  As we have postulated and  discussed JANUS KINASE plays a role in PAH and perhaps this dramatic effect represents synergy between the drugs.    I have proposed that we leave everything as is for now and after the first of the year consider weaning off of parenteral prostanoids in favor of an oral prostacyclin receptor agonist.      Olivier has told me that you are not happy with the Janus Kinase inhibitor.  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      In any case the response is dramatic and objective, and I suppose this is all that counts.      Dk Quan M.D.  Professor of Medicine and Surgery  [facility]      CC: doctors above

## 2018-06-28 NOTE — PROGRESS NOTES
Nathan Richardson MD    [address] [place] [address] [phone] (Fax)    Musa Martin MD  [phone] (Work)  [phone] (Fax)  [address] [place] [address]    Adelso Delgado M.D.  [facility]    Magdaleno Elder M.D.  [facility]      Problem List  1. History of AML  2. 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 Chronic parenteral prostacyclin  5. Steroid dependence (treatment of PH)        Name: BENEDICT SETES  MRN: [id number]  : 1965  Study Date: 2018 08:43 AM  Age: 53 yrs  Gender: Male  Patient Location: [facility] [place]  Reason For Study: Pulmonary hypertension, Dyspnea on exertion  Ordering Physician: PILI SEGURA  Referring Physician: PILI SEGURA  Performed By: Lien Cadena RDCS     BSA: 2.5 m2  Height: 77 in  Weight: 252 lb  HR: 91  BP: 116/70 mmHg  _____________________________________________________________________________  __        Procedure  Echocardiogram with two-dimensional, color and spectral Doppler performed. The  patient's rhythm is normal sinus.  _____________________________________________________________________________  __        Interpretation Summary  Left ventricular function, chamber size, wall motion, and wall thickness are  normal.The EF is 55-60%.  Right ventricular function, chamber size, wall motion, and thickness are  normal.  TAPSE 2.2 cm, S' 14 cm/s, RVFAC 48%  Pulmonary artery systolic pressure cannot be assessed. PAAT 109 msec,  suggesting normal PVR. There is no notching on the RVOT pulse Doppler tracing.  The inferior vena cava was normal in size with preserved respiratory  variability. Estimated mean right atrial pressure is 3 mmHg.     This study was compared with the study from 17: RV size has decreased and  RV function appears improved. Pericardial effusion and TR have decreased.  Comparison of estimated PA pressure is difficult due to incomplete  TR envelope  on today's study.  _____________________________________________________________________________  __        Left Ventricle  Left ventricular function, chamber size, wall motion, and wall thickness are  normal.The EF is 55-60%. Left ventricular diastolic function is indeterminate.     Right Ventricle  Right ventricular function, chamber size, wall motion, and thickness are  normal. TAPSE 2.2 cm, S' 14 cm/s, RVFAC 48%.     Atria  Both atria appear normal. The atrial septum is intact as assessed by color  Doppler .     Mitral Valve  The mitral valve is normal. Trace mitral insufficiency is present.        Aortic Valve  Aortic valve is normal in structure and function.     Tricuspid Valve  The tricuspid valve is normal. Mild tricuspid insufficiency is present.  Pulmonary artery systolic pressure cannot be assessed. The peak velocity of  the tricuspid regurgitant jet is not obtainable.     Pulmonic Valve  The pulmonic valve is normal. Trace pulmonic insufficiency is present.     Vessels  The pulmonary artery cannot be assessed. The aorta root is normal. The  thoracic aorta is normal. The inferior vena cava was normal in size with  preserved respiratory variability. Sinuses of Valsalva 4.1 cm (1.6 cm/m2),  ascending aorta 3.9 cm (1.6 cm/m2.) These measurements are within normal  limits when indexed to body surface area. Estimated mean right atrial pressure  is 3 mmHg.     Pericardium  Trivial pericardial effusion is present.        Compared to Previous Study  This study was compared with the study from 9/12/17 . Pericardial effusion and  TR have decreased. Comparison of estimated PA pressure is difficult due to  incomplete TR envelope on today's study.  _____________________________________________________________________________  __  MMode/2D Measurements & Calculations  IVSd: 1.2 cm     LVIDd: 4.4 cm  LVIDs: 2.9 cm  LVPWd: 1.2 cm  FS: 35.7 %  LV mass(C)d: 196.2 grams  LV mass(C)dI: 79.5 grams/m2  asc Aorta  Diam: 3.9 cm  LVOT diam: 2.8 cm  LVOT area: 6.1 cm2  LA Volume (BP): 66.4 ml  LA Volume Index (BP): 26.9 ml/m2  RWT: 0.55           Doppler Measurements & Calculations  MV E max nieves: 74.7 cm/sec  MV A max nieves: 100.9 cm/sec  MV E/A: 0.74  MV dec slope: 684.3 cm/sec2  MV dec time: 0.11 sec  PA V2 max: 94.1 cm/sec  PA max PG: 3.5 mmHg  PA acc time: 0.11 sec  E/E' av.5  Lateral E/e': 7.3  Medial E/e': 9.7    RA: 5/6/4  RV 35/4  PA ;  PA Sat 77.1%  PCWP -/-/5;  PCW Sat -%  Carter CO/CI 7.3/3.0 L/min; TD CO 7.6/3.1 L/min  PVR 2.74 quiñonez; TPR 3.43 quiñonez  Hb 13.0 g/dL  NIBP 114/69/84   dynes*sec/cm^5    Current Outpatient Prescriptions   Medication     atovaquone (MEPRON) 750 MG/5ML suspension     calcium carbonate (OS-PANFILO 500 MG [place]. [place]) 500 MG tablet     CLONAZEPAM PO     digoxin (LANOXIN) 125 MCG tablet     epoprostenol (FLOLAN) 1.5 mg injection     FLUCONAZOLE PO     furosemide (LASIX) 40 MG tablet     glycine diluent 32.175 mL with epoprostenol 247.5 mcg infusion     Pantoprazole Sodium (PROTONIX PO)     Penicillin V Potassium (PEN-VEE K OR)     predniSONE (DELTASONE) 2.5 MG tablet     Rosuvastatin Calcium (CRESTOR PO)     ruxolitinib (JAKAFI) 5 MG TABS tablet CHEMO     Specialty Vitamins Products (MAGNESIUM PLUS PROTEIN) 133 MG tablet     tadalafil, PAH, (ADCIRCA) 20 MG TABS     Ursodiol (ACTIGALL PO)     VALACYCLOVIR HCL PO     VITAMIN D, CHOLECALCIFEROL, PO     blood glucose monitoring (ONE TOUCH ULTRA) test strip     fluticasone (FLOVENT HFA) 220 MCG/ACT Inhaler     No current facility-administered medications for this visit.      Wt Readings from Last 24 Encounters:   18 115.6 kg (254 lb 12.8 oz)   18 111.6 kg (246 lb)   18 114.3 kg (252 lb)   17 113.7 kg (250 lb 11.2 oz)   17 113.4 kg (250 lb)   17 115 kg (253 lb 9.6 oz)   17 115.3 kg (254 lb 1.6 oz)   17 117.3 kg (258 lb 9.6 oz)   17 122 kg (269 lb)   05/15/17 124.7 kg (275 lb)   17 125.1 kg  (275 lb 11.2 oz)   04/11/17 129.5 kg (285 lb 8 oz)   04/12/16 133.8 kg (294 lb 15.6 oz)   08/26/15 133.8 kg (294 lb 15.6 oz)   08/13/15 132.5 kg (292 lb)   07/14/15 127 kg (280 lb)   07/14/15 131.7 kg (290 lb 5.5 oz)     Results for BENEDICT ESTES (MRN [id number]) as of 6/27/2018 13:04   Ref. Range 6/7/2017 10:42 8/22/2017 14:45 9/11/2017 13:18 6/6/2018 08:07 6/27/2018 11:49   N-Terminal Pro Bnp Latest Ref Range: 0 - 125 pg/mL 8023 (H) 3802 (H) 3630 (H) 340 (H) 335 (H)     Results for BENEDICT ESTES (MRN [id number]) as of 6/27/2018 13:04   Ref. Range 6/6/2018 12:22 6/27/2018 11:49   Sodium Latest Ref Range: 133 - 144 mmol/L  138   Potassium Latest Ref Range: 3.4 - 5.3 mmol/L  4.0   Chloride Latest Ref Range: 94 - 109 mmol/L  104   Carbon Dioxide Latest Ref Range: 20 - 32 mmol/L  25   Urea Nitrogen Latest Ref Range: 7 - 30 mg/dL  19   Creatinine Latest Ref Range: 0.66 - 1.25 mg/dL  1.52 (H)   GFR Estimate Latest Ref Range: >60 mL/min/1.7m2  48 (L)   GFR Estimate If Black Latest Ref Range: >60 mL/min/1.7m2  58 (L)   Calcium Latest Ref Range: 8.5 - 10.1 mg/dL  9.0   Anion Gap Latest Ref Range: 3 - 14 mmol/L  9   N-Terminal Pro Bnp Latest Ref Range: 0 - 125 pg/mL  335 (H)   Glucose Latest Ref Range: 70 - 99 mg/dL  107 (H)   WBC Latest Ref Range: 4.0 - 11.0 10e9/L  7.4   Hemoglobin Latest Ref Range: 13.3 - 17.7 g/dL  13.7   Hematocrit Latest Ref Range: 40.0 - 53.0 %  40.7   Platelet Count Latest Ref Range: 150 - 450 10e9/L  214   RBC Count Latest Ref Range: 4.4 - 5.9 10e12/L  4.50   MCV Latest Ref Range: 78 - 100 fl  90   MCH Latest Ref Range: 26.5 - 33.0 pg  30.4   MCHC Latest Ref Range: 31.5 - 36.5 g/dL  33.7   RDW Latest Ref Range: 10.0 - 15.0 %  17.1 (H)   HEART CATH RIGHT HEART CATH Unknown Attch

## 2018-07-08 ENCOUNTER — TRANSFERRED RECORDS (OUTPATIENT)
Dept: HEALTH INFORMATION MANAGEMENT | Facility: CLINIC | Age: 53
End: 2018-07-08

## 2018-07-09 ENCOUNTER — TRANSFERRED RECORDS (OUTPATIENT)
Dept: HEALTH INFORMATION MANAGEMENT | Facility: CLINIC | Age: 53
End: 2018-07-09

## 2018-07-27 LAB — FIO2-PRE: 21 %

## 2018-08-10 ENCOUNTER — TELEPHONE (OUTPATIENT)
Dept: CARDIOLOGY | Facility: CLINIC | Age: 53
End: 2018-08-10

## 2018-08-10 NOTE — TELEPHONE ENCOUNTER
Patient left a  stating he needs a note form Dr. Quan stating he still needs to take his PH meds.  ------------------  Spoke to wife who states that [facility] sent pt a letter stating they require an office visit note confirming that the treatment he is receiving (Flolan) is helping and he needs to remain on it.  Danielle provided me with e fax number and member ID to be included on documentation.    Fax [phone]  Member ID #[id number]    Advised Danielle I would fax that today.  Wife verbalized understanding, agreed with plan and denied any further questions. Ana Ashraf RN on 8/10/2018 at 12:05 PM  -----------------------  Office visit note dated 6/27/18 was faxed to the above number. Ana Ashraf RN on 8/10/2018 at 12:50 PM

## 2018-09-13 DIAGNOSIS — I27.20 PULMONARY HYPERTENSION (H): Primary | ICD-10-CM

## 2018-09-13 RX ORDER — EPOPROSTENOL 1.5 MG/10ML
INJECTION, POWDER, LYOPHILIZED, FOR SOLUTION INTRAVENOUS
Qty: 1 EACH | Refills: 11 | Status: SHIPPED | OUTPATIENT
Start: 2018-09-13 | End: 2018-09-13

## 2018-09-13 NOTE — TELEPHONE ENCOUNTER
Wife called to say that [facility] to [facility] for [facility] weeks to renew patient's Rx for his Flolan.  Advised wife I would call them and give the order. Wife verbalized understanding, agreed with plan and denied any further questions. Ana Ashraf RN on 9/13/2018 at 3:02 PM

## 2018-09-13 NOTE — TELEPHONE ENCOUNTER
Sent [facility]X for Flolan for 1 year's worth of refills at current rate of 30ng/kg/min. Ana Ashraf RN on 9/13/2018 at 4:27 PM  --------------------------  Called [facility] and gave TORB to Louise Pharmacist for a refill of patient's Flolan at same dose for 1 year.  Ana Ashraf RN on 9/13/2018 at 4:33 PM

## 2018-10-10 ENCOUNTER — MEDICAL CORRESPONDENCE (OUTPATIENT)
Dept: HEALTH INFORMATION MANAGEMENT | Facility: CLINIC | Age: 53
End: 2018-10-10

## 2018-10-10 ENCOUNTER — OFFICE VISIT (OUTPATIENT)
Dept: CARDIOLOGY | Facility: CLINIC | Age: 53
End: 2018-10-10
Attending: INTERNAL MEDICINE
Payer: COMMERCIAL

## 2018-10-10 VITALS
SYSTOLIC BLOOD PRESSURE: 96 MMHG | HEART RATE: 89 BPM | HEIGHT: 77 IN | DIASTOLIC BLOOD PRESSURE: 65 MMHG | WEIGHT: 260 LBS | OXYGEN SATURATION: 91 % | BODY MASS INDEX: 30.7 KG/M2

## 2018-10-10 DIAGNOSIS — I27.20 PULMONARY HYPERTENSION (H): Primary | ICD-10-CM

## 2018-10-10 PROCEDURE — G0463 HOSPITAL OUTPT CLINIC VISIT: HCPCS | Mod: ZF

## 2018-10-10 PROCEDURE — 99214 OFFICE O/P EST MOD 30 MIN: CPT | Mod: ZP | Performed by: INTERNAL MEDICINE

## 2018-10-10 ASSESSMENT — PAIN SCALES - GENERAL: PAINLEVEL: NO PAIN (0)

## 2018-10-10 NOTE — LETTER
10/10/2018      RE: Benedict Gmóez  [address]  [facility] [address]       Dear Colleague,    Thank you for the opportunity to participate in the care of your patient, Benedict Gómez, at the [facility]. Please see a copy of my visit note below.      Nathan Richardson MD    [address] [place] [address] [phone] (Fax)    Musa Martin MD  [phone] (Work)  [phone] (Fax)  [address] [place] [address]    Adelso Delgado M.D.  [facility]    Magdaleno Elder M.D.  [facility]      Problem List  1. History of AML  2. 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  4. Chronic parenteral prostacyclin  5. 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  As you are familiar with his past medical history,  I shall not review it.  When most recently seen there was a striking and almost unbelievable improvement in his symptoms as well as the objective measures of his pulmonary hypertension:      RA: 56/4  RV 35/4  PA 35/12/25;  PA Sat 77.1%  PCWP -/-/5;  PCW Sat -%  Carter CO/CI 7.3/3.0 L/min; TD CO 7.6/3.1 L/min  PVR 2.74 quiñonez; TPR 3.43 quiñonez  Hb 13.0 g/dL  NIBP 114/69/84   dynes*sec/cm^5    Name: BENEDICT GÓMEZ  MRN: [id number]  : 1965  Study Date: 2018 08:43 AM  Age: 53 yrs  Gender: Male  [place] [facility] [place]  Reason For Study: Pulmonary hypertension, Dyspnea on exertion  Ordering Physician: PILI SEGURA  Referring Physician: PILI SEGURA  Performed By: Lien Cadena RDCS     BSA: 2.5 m2  Height: 77 in  Weight: 252 lb  HR: 91  BP: 116/70 mmHg  _____________________________________________________________________________  __        Procedure  Echocardiogram with two-dimensional, color and spectral Doppler performed. The  patient's rhythm is normal  sinus.  _____________________________________________________________________________  __        Interpretation Summary  Left ventricular function, chamber size, wall motion, and wall thickness are  normal.The EF is 55-60%.  Right ventricular function, chamber size, wall motion, and thickness are  normal.  TAPSE 2.2 cm, S' 14 cm/s, RVFAC 48%  Pulmonary artery systolic pressure cannot be assessed. PAAT 109 msec,  suggesting normal PVR. There is no notching on the RVOT pulse Doppler tracing.  The inferior vena cava was normal in size with preserved respiratory  variability. Estimated mean right atrial pressure is 3 mmHg.     This study was compared with the study from 9/12/17: RV size has decreased andRV function appears improved. Pericardial effusion and TR have decreased.  Comparison of estimated PA pressure is difficult due to incomplete TR envelopeon today's study.  _____________________________________________________________________________  __        Left Ventricle  Left ventricular function, chamber size, wall motion, and wall thickness are  normal.The EF is 55-60%. Left ventricular diastolic function is indeterminate.     Right Ventricle  Right ventricular function, chamber size, wall motion, and thickness are  normal. TAPSE 2.2 cm, S' 14 cm/s, RVFAC 48%.     Atria  Both atria appear normal. The atrial septum is intact as assessed by color  Doppler .     Mitral Valve  The mitral valve is normal. Trace mitral insufficiency is present.        Aortic Valve  Aortic valve is normal in structure and function.     Tricuspid Valve  The tricuspid valve is normal. Mild tricuspid insufficiency is present.  Pulmonary artery systolic pressure cannot be assessed. The peak velocity of  the tricuspid regurgitant jet is not obtainable.     Pulmonic Valve  The pulmonic valve is normal. Trace pulmonic insufficiency is present.     Vessels  The pulmonary artery cannot be assessed. The aorta root is normal. The  thoracic aorta is  normal. The inferior vena cava was normal in size with  preserved respiratory variability. Sinuses of Valsalva 4.1 cm (1.6 cm/m2),  ascending aorta 3.9 cm (1.6 cm/m2.) These measurements are within normal  limits when indexed to body surface area. Estimated mean right atrial pressure  is 3 mmHg.     Pericardium  Trivial pericardial effusion is present.        Compared to Previous Study  This study was compared with the study from 17 . Pericardial effusion and  TR have decreased. Comparison of estimated PA pressure is difficult due to  incomplete TR envelope on today's study.  _____________________________________________________________________________  __  MMode/2D Measurements & Calculations  IVSd: 1.2 cm     LVIDd: 4.4 cm  LVIDs: 2.9 cm  LVPWd: 1.2 cm  FS: 35.7 %  LV mass(C)d: 196.2 grams  LV mass(C)dI: 79.5 grams/m2  asc Aorta Diam: 3.9 cm  LVOT diam: 2.8 cm  LVOT area: 6.1 cm2  LA Volume (BP): 66.4 ml  LA Volume Index (BP): 26.9 ml/m2  RWT: 0.55           Doppler Measurements & Calculations  MV E max nieves: 74.7 cm/sec  MV A max nieves: 100.9 cm/sec  MV E/A: 0.74  MV dec slope: 684.3 cm/sec2  MV dec time: 0.11 sec  PA V2 max: 94.1 cm/sec  PA max PG: 3.5 mmHg  PA acc time: 0.11 sec  E/E' av.5  Lateral E/e': 7.3  Medial E/e': 9.7     _____________________________________________________________________________      As noted, these results were astonishing.  However, we tempered are optimism and continued his medicitons:    Current Outpatient Prescriptions   Medication     atovaquone (MEPRON) 750 MG/5ML suspension     calcium carbonate (OS-PANFILO 500 MG [place]. [place]) 500 MG tablet     CLONAZEPAM PO     digoxin (LANOXIN) 125 MCG tablet     FLUCONAZOLE PO     furosemide (LASIX) 40 MG tablet     glycine diluent 32.175 mL with epoprostenol 247.5 mcg infusion     Pantoprazole Sodium (PROTONIX PO)     Penicillin V Potassium (PEN-VEE K OR)     predniSONE (DELTASONE) 2.5 MG tablet     Rosuvastatin Calcium (CRESTOR PO)      ruxolitinib (JAKAFI) 5 MG TABS tablet CHEMO     Specialty Vitamins Products (MAGNESIUM PLUS PROTEIN) 133 MG tablet     tadalafil, PAH, (ADCIRCA) 20 MG TABS     Ursodiol (ACTIGALL PO)     VALACYCLOVIR HCL PO     VITAMIN D, CHOLECALCIFEROL, PO     No current facility-administered medications for this visit.        Today, he returned for follow-up.  His improvement noted in June has been maintained and functionally he continues to demonstrate improvement.      He is very eager to be off of parenteral medications.  We discussed in detail both the risk and benefits of discontinuing parenteral prostanoids.  We have not found that oral Remodulin in predictably effective and therefore discussed with him the use of selexipag which we have found to be more effective and dependable.      We would recommend that he be hospitalized for this transition to ensure that we do not see a precipitous return to the previous hemodynamics and would suggest that he be cathed immediately following the transition as well as at three months following the conversion to a completely oral regimen.      He and his wife understand that we are in completely uncharted territory with the response being so striking.  This also raises the question regarding the Jakafi, however, will leave this question open to the input of the BMT group in [place].      All of the 30 minute visit was spent in counseling.      Dk PAREDES; Doctors above as well as patient at his home      Please do not hesitate to contact me if you have any questions/concerns.     Sincerely,     Dk Quan MD

## 2018-10-10 NOTE — NURSING NOTE
Right Heart Catheterization: Patient was instructed regarding right heart catheterization, including discussion of the procedure, preparation, intra-procedural steps, and recovery at home. Patient demonstrated understanding of this information and agreed to call with further questions or concerns.  Med Reconcile: Reviewed and verified all current medications with the patient. The updated medication list was printed and given to the patient.  New Medication: Patient was educated regarding newly prescribed medication, including discussion of  the indication, administration, side effects, and when to report to MD or RN. Patient demonstrated understanding of this information and agreed to call with further questions or concerns.  Diet: Patient instructed regarding a heart healthy diet, including discussion of reduced fat and sodium intake. Patient demonstrated understanding of this information and agreed to call with further questions or concerns.  Return Appointment: Patient given instructions regarding scheduling next clinic visit. Patient demonstrated understanding of this information and agreed to call with further questions or concerns.  Patient stated he understood all health information given and agreed to call with further questions or concerns.     I will follow up with patient regarding the dates of possible admission.    Medication Changes:   We will decrease your Flolan (epoprostenol sodium) by 1 ng/kg/min twice a week to a lowest dose of 20-22 ng/kg/min.  After your decrease we will admit your for transition from IV Flolan (epoprostenol sodium) to Uptravi (selexipag).   We will schedule a Right heart catheterization prior to your admission to assess your Pulmonary Hypertension.    Patient Instructions:  1. Continue staying active and eat a heart healthy diet.    2. Please keep current list of medications with you at all times.    3. Remember to weigh yourself daily after voiding and before you consume any  food or beverages and log the numbers.  If you have gained/lost 2 pounds overnight or 5 pounds in a week contact us immediately for medication adjustments or further instructions.    4. **Please call us immediately if you have any syncope, chest pain, edema, or decline in your functional status.      Check-In  Time Check-In Location Estimated Length Procedure   Name        [facility]  waiting room 60-90 minutes Right Heart Catheterization**     Procedure Preparations & Instructions     This is an invasive procedure that DOES require preparation:    - Nothing to eat for 6 hours   - You may have clear liquids up until the time of your procedure  - A ride should be arranged for you in the instance you are unable to drive home, however you should be able to function as you normally would after the procedure     *For Patients with Diabetes: ? DO NOT take any oral diabetic medication, short-acting diabetes medications/insulin, humalog or regular insulin the morning of your test  ? Take   dose of long-acting insulin (Lantus, Levemir) the day of your test  ? Remember to  bring your glucometer and insulin with you to take after your test if needed     *For Patients on anticoagulants: ? Your Coumadin Clinic will give you instructions on medication adjustments or bridging prior to the procedure      Follow up Appointment Information:  We will follow up with you after your transition.

## 2018-10-10 NOTE — PATIENT INSTRUCTIONS
Medication Changes:   We will decrease your Flolan (epoprostenol sodium) by 1 ng/kg/min twice a week to a lowest dose of 20-22 ng/kg/min.  After your decrease we will admit your for transition from IV Flolan (epoprostenol sodium) to Uptravi (selexipag).   We will schedule a Right heart catheterization prior to your admission to assess your Pulmonary Hypertension.    Patient Instructions:  1. Continue staying active and eat a heart healthy diet.    2. Please keep current list of medications with you at all times.    3. Remember to weigh yourself daily after voiding and before you consume any food or beverages and log the numbers.  If you have gained/lost 2 pounds overnight or 5 pounds in a week contact us immediately for medication adjustments or further instructions.    4. **Please call us immediately if you have any syncope, chest pain, edema, or decline in your functional status.      Check-In  Time Check-In Location Estimated Length Procedure   Name        [facility]  waiting room 60-90 minutes Right Heart Catheterization**     Procedure Preparations & Instructions     This is an invasive procedure that DOES require preparation:    - Nothing to eat for 6 hours   - You may have clear liquids up until the time of your procedure  - A ride should be arranged for you in the instance you are unable to drive home, however you should be able to function as you normally would after the procedure     *For Patients with Diabetes: ? DO NOT take any oral diabetic medication, short-acting diabetes medications/insulin, humalog or regular insulin the morning of your test  ? Take   dose of long-acting insulin (Lantus, Levemir) the day of your test  ? Remember to  bring your glucometer and insulin with you to take after your test if needed     *For Patients on anticoagulants: ? Your Coumadin Clinic will give you instructions on medication adjustments or bridging prior to the procedure      Follow up Appointment Information:  We will  follow up with you after your transition.     We are located on the third floor of the [facility] on the [facility].  Our address is     [address] [place] [address] [place] [address] [place] [address]      Thank you for allowing us to be a part of your care here at the [facility]    If you have questions or concerns please contact us at:    Marlys Anderson RN, BSN    Kai Gant (Schedule,Prior Auth)  Nurse Coordinator     Clinic   Pulmonary Hypertension   Pulmonary Hypertension  [facility]  [phone]    (P) [phone]        (F)[phone]                ** Please note that you will NOT receive a reminder call regarding your scheduled testing, reminder calls are for provider appointments only.  If you are scheduled for testing within the [employer] system you may receive a call regarding pre-registration for billing purposes only.**     Support Group:  Pulmonary Hypertension Association  Https://www.phassociation.org/  **Look at the Events Tab** They even have Support Groups that you can call into    [facility] PH Support Group  First Saturday of the Month from 1-3 PM   Location: [address] [place] [address]  Leader: Frandy Pritchard  Phone: [phone]  Email: [email or web address]

## 2018-10-10 NOTE — NURSING NOTE
Chief Complaint   Patient presents with     Follow Up For     Return for PH F/U (following up to talk about stopping the flolan)     Medications reviewed and vitals performed.  Nydia Snowden CMA

## 2018-10-10 NOTE — MR AVS SNAPSHOT
After Visit Summary   10/10/2018    Olivier Gómez    MRN: [id number]           Patient Information     Date Of Birth          1965        Visit Information        Provider Department      10/10/2018 8:00 AM Dk Quan MD [facility]        Care Instructions    Medication Changes:   We will decrease your Flolan (epoprostenol sodium) by 1 ng/kg/min twice a week to a lowest dose of 20-22 ng/kg/min.  After your decrease we will admit your for transition from IV Flolan (epoprostenol sodium) to Uptravi (selexipag).   We will schedule a Right heart catheterization prior to your admission to assess your Pulmonary Hypertension.    Patient Instructions:  1. Continue staying active and eat a heart healthy diet.    2. Please keep current list of medications with you at all times.    3. Remember to weigh yourself daily after voiding and before you consume any food or beverages and log the numbers.  If you have gained/lost 2 pounds overnight or 5 pounds in a week contact us immediately for medication adjustments or further instructions.    4. **Please call us immediately if you have any syncope, chest pain, edema, or decline in your functional status.      Check-In  Time Check-In Location Estimated Length Procedure   Name        [facility]  waiting room 60-90 minutes Right Heart Catheterization**     Procedure Preparations & Instructions     This is an invasive procedure that DOES require preparation:    - Nothing to eat for 6 hours   - You may have clear liquids up until the time of your procedure  - A ride should be arranged for you in the instance you are unable to drive home, however you should be able to function as you normally would after the procedure     *For Patients with Diabetes: ? DO NOT take any oral diabetic medication, short-acting diabetes medications/insulin, humalog or regular insulin the morning of your test  ? Take   dose of long-acting insulin (Lantus, Levemir) the day of your  test  ? Remember to  bring your glucometer and insulin with you to take after your test if needed     *For Patients on anticoagulants: ? Your Coumadin Clinic will give you instructions on medication adjustments or bridging prior to the procedure      Follow up Appointment Information:  We will follow up with you after your transition.     We are located on the third floor of the [facility] ([facility] on the [facility].  Our address is     [address] [place] [address] [place] [address] [place] [address]      Thank you for allowing us to be a part of your care here at the [facility]    If you have questions or concerns please contact us at:    Marlys Anderson, RN, BSN    Kai Gant (Schedule,Prior Auth)  Nurse Coordinator     Clinic   Pulmonary Hypertension   Pulmonary Hypertension  [facility]  [phone])[phone]    (P) [phone]        (F)[phone]                ** Please note that you will NOT receive a reminder call regarding your scheduled testing, reminder calls are for provider appointments only.  If you are scheduled for testing within the [employer] system you may receive a call regarding pre-registration for billing purposes only.**     Support Group:  Pulmonary Hypertension Association  Https://www.phassociation.org/  **Look at the Events Tab** They even have Support Groups that you can call into    [facility] PH Support Group  First Saturday of the Month from 1-3 PM   Location: [address] [place] [address]  Leader: Frandy Pritchard  Phone: [phone]  Email: [email or web address]          Follow-ups after your visit        Who to contact     If you have questions or need follow up information about today's clinic visit or your schedule please contact [facility] directly at [phone].  Normal or non-critical lab and imaging results will be  "communicated to you by Joocehart, letter or phone within 4 business days after the clinic has received the results. If you do not hear from us within 7 days, please contact the clinic through [employer] or phone. If you have a critical or abnormal lab result, we will notify you by phone as soon as possible.  Submit refill requests through [employer] or call your pharmacy and they will forward the refill request to us. Please allow 3 business days for your refill to be completed.          Additional Information About Your Visit        [employer] Information     [employer] gives you secure access to your electronic health record. If you see a primary care provider, you can also send messages to your care team and make appointments. If you have questions, please call your primary care clinic.  If you do not have a primary care provider, please call [phone] and they will assist you.        Care EveryWhere ID     This is your Care EveryWhere ID. This could be used by other organizations to access your [facility] medical records  [id number]        Your Vitals Were     Pulse Height Pulse Oximetry BMI (Body Mass Index)          89 1.956 m (6' 5\") 91% 30.83 kg/m2         Blood Pressure from Last 3 Encounters:   10/10/18 96/65   06/27/18 104/69   06/06/18 127/81    Weight from Last 3 Encounters:   10/10/18 117.9 kg (260 lb)   06/27/18 115.6 kg (254 lb 12.8 oz)   06/06/18 111.6 kg (246 lb)              Today, you had the following     No orders found for display         Today's Medication Changes          These changes are accurate as of 10/10/18  6:16 PM.  If you have any questions, ask your nurse or doctor.               These medicines have changed or have updated prescriptions.        Dose/Directions    furosemide 40 MG tablet   Commonly known as:  LASIX   This may have changed:    - how much to take  - additional instructions   Used for:  Pulmonary hypertension (H)        Dose:  40 mg   Take 1 tablet (40 mg) by mouth daily "   Quantity:  30 tablet   Refills:  0       predniSONE 2.5 MG tablet   Commonly known as:  DELTASONE   This may have changed:    - how much to take  - when to take this  - additional instructions   Used for:  Pulmonary hypertension (H)        Take 7.5 mg daily (3 tablets = 7.5 mg daily)   Quantity:  270 tablet   Refills:  3                Primary Care Provider Office Phone # Fax #    Adelso Delgado [phone]       [facility] [address]  [place] [address]        Equal Access to Services     DIEUDONNE RILEY AH: Hadii aad ku hadasho Soomaali, waaxda luqadaha, qaybta kaalmada adeegyada, waxay idiin hayaan adeeg selam peterson . So [facility] [phone].    ATENCIÓN: Si habla español, tiene a garza disposición servicios gratuitos de asistencia lingüística. [facility] [phone].    We comply with applicable federal civil rights laws and [place] laws. We do not discriminate on the basis of race, color, national origin, age, disability, sex, sexual orientation, or gender identity.            Thank you!     Thank you for choosing [facility]  for your care. Our goal is always to provide you with excellent care. Hearing back from our patients is one way we can continue to improve our services. Please take a few minutes to complete the written survey that you may receive in the mail after your visit with us. Thank you!             Your Updated Medication List - Protect others around you: Learn how to safely use, store and throw away your medicines at www.disposemymeds.org.          This list is accurate as of 10/10/18  6:16 PM.  Always use your most recent med list.                   Brand Name Dispense Instructions for use Diagnosis    ACTIGALL PO      Take 300 mg by mouth 3 times daily        atovaquone 750 MG/5ML suspension    MEPRON     Take 1,500 mg by mouth daily        calcium carbonate 500 mg (elemental) 500 MG tablet    OS-PANFILO     Take 600 mg by mouth 2 times daily         CLONAZEPAM PO      Take 1 mg by mouth At Bedtime        CRESTOR PO      Take 5 mg by mouth daily        digoxin 125 MCG tablet    LANOXIN    90 tablet    Take 1 tablet (125 mcg) by mouth daily    Pulmonary hypertension (H)       FLUCONAZOLE PO      Take 200 mg by mouth daily        furosemide 40 MG tablet    LASIX    30 tablet    Take 1 tablet (40 mg) by mouth daily    Pulmonary hypertension (H)       glycine diluent 32.175 mL with epoprostenol 247.5 mcg infusion     1 each    Inject 2,799.1 ng/min into the vein continuous Goal 30 ng/kg/min    Pulmonary hypertension (H)       magnesium plus protein 133 MG tablet      Take 266 mg by mouth daily (2 tablets)        PEN-VEE K OR      Take 500 mg by mouth 2 times daily        predniSONE 2.5 MG tablet    DELTASONE    270 tablet    Take 7.5 mg daily (3 tablets = 7.5 mg daily)    Pulmonary hypertension (H)       PROTONIX PO      Take 40 mg by mouth daily        ruxolitinib 5 MG Tabs tablet CHEMO    JAKAFI    60 tablet    Take 1 tablet (5 mg) by mouth 2 times daily    Pericardial effusion, Pulmonary hypertension (H), Taiwx-ubushz-itxa disease (H)       tadalafil (PAH) 20 MG Tabs    ADCIRCA    60 tablet    Take 2 tablets (40 mg) by mouth daily    Pulmonary hypertension (H)       VALACYCLOVIR HCL PO      Take 500 mg by mouth every other day        VITAMIN D (CHOLECALCIFEROL) PO      Take 2,000 Units by mouth 3 times daily

## 2018-10-26 ENCOUNTER — TELEPHONE (OUTPATIENT)
Dept: CARDIOLOGY | Facility: CLINIC | Age: 53
End: 2018-10-26

## 2018-10-26 NOTE — TELEPHONE ENCOUNTER
I called [facility] and spoke with Louise Pharmacist and gave the order to decrease Flolan (epoprostenol sodium) starting the week of November 5th by 1 ng/kg/min weekly until the pt is admitted on January 17th after his Right heart catheterization.  He will then be transitioned off of the Flolan (epoprostenol sodium) and on to Uptravi (selexipag). Marlys Anderson RN on 10/26/2018 at 4:02 PM    ----- Message -----     From: Marlys Anderson RN     Sent: 10/17/2018   3:50 PM       To: Kai Gant    Start Decreasing Flolan (epoprostenol sodium) Week of Nov 5th.    He will admitt Jan 17th after Right heart catheterization, Needs to be scheduled.    Marlys Anderson RN

## 2018-10-29 NOTE — PROGRESS NOTES
Nathan Richardson MD    [address] [place] [address] [phone] (Fax)    Musa Martin MD  [phone] (Work)  [phone] (Fax)  [address] [place] [address]    Adelso Delgado M.D.  [facility]    Magdaleno Elder M.D.  [facility]      Problem List  1. History of AML  2. 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  4. Chronic parenteral prostacyclin  5. 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  As you are familiar with his past medical history,  I shall not review it.  When most recently seen there was a striking and almost unbelievable improvement in his symptoms as well as the objective measures of his pulmonary hypertension:      RA:   RV 35/4  PA 35;  PA Sat 77.1%  PCWP -/-/5;  PCW Sat -%  Carter CO/CI 7.3/3.0 L/min; TD CO 7.6/3.1 L/min  PVR 2.74 quiñonez; TPR 3.43 quiñonez  Hb 13.0 g/dL  NIBP 114/69/84   dynes*sec/cm^5    Name: BENEDICT ESTES  MRN: [id number]  : 1965  Study Date: 2018 08:43 AM  Age: 53 yrs  Gender: Male  Patient Location: [facility]  Reason For Study: Pulmonary hypertension, Dyspnea on exertion  Ordering Physician: PILI SEGURA  Referring Physician: PILI SEGURA  Performed By: Lien Cadena RDCS     BSA: 2.5 m2  Height: 77 in  Weight: 252 lb  HR: 91  BP: 116/70 mmHg  _____________________________________________________________________________  __        Procedure  Echocardiogram with two-dimensional, color and spectral Doppler performed. The  patient's rhythm is normal sinus.  _____________________________________________________________________________  __        Interpretation Summary  Left ventricular function, chamber size, wall motion, and wall thickness are  normal.The EF is 55-60%.  Right ventricular function, chamber size, wall motion, and thickness are  normal.  TAPSE 2.2 cm, S' 14 cm/s,  RVFAC 48%  Pulmonary artery systolic pressure cannot be assessed. PAAT 109 msec,  suggesting normal PVR. There is no notching on the RVOT pulse Doppler tracing.  The inferior vena cava was normal in size with preserved respiratory  variability. Estimated mean right atrial pressure is 3 mmHg.     This study was compared with the study from 9/12/17: RV size has decreased andRV function appears improved. Pericardial effusion and TR have decreased.  Comparison of estimated PA pressure is difficult due to incomplete TR envelopeon today's study.  _____________________________________________________________________________  __        Left Ventricle  Left ventricular function, chamber size, wall motion, and wall thickness are  normal.The EF is 55-60%. Left ventricular diastolic function is indeterminate.     Right Ventricle  Right ventricular function, chamber size, wall motion, and thickness are  normal. TAPSE 2.2 cm, S' 14 cm/s, RVFAC 48%.     Atria  Both atria appear normal. The atrial septum is intact as assessed by color  Doppler .     Mitral Valve  The mitral valve is normal. Trace mitral insufficiency is present.        Aortic Valve  Aortic valve is normal in structure and function.     Tricuspid Valve  The tricuspid valve is normal. Mild tricuspid insufficiency is present.  Pulmonary artery systolic pressure cannot be assessed. The peak velocity of  the tricuspid regurgitant jet is not obtainable.     Pulmonic Valve  The pulmonic valve is normal. Trace pulmonic insufficiency is present.     Vessels  The pulmonary artery cannot be assessed. The aorta root is normal. The  thoracic aorta is normal. The inferior vena cava was normal in size with  preserved respiratory variability. Sinuses of Valsalva 4.1 cm (1.6 cm/m2),  ascending aorta 3.9 cm (1.6 cm/m2.) These measurements are within normal  limits when indexed to body surface area. Estimated mean right atrial pressure  is 3 mmHg.     Pericardium  Trivial pericardial  effusion is present.        Compared to Previous Study  This study was compared with the study from 17 . Pericardial effusion and  TR have decreased. Comparison of estimated PA pressure is difficult due to  incomplete TR envelope on today's study.  _____________________________________________________________________________  __  MMode/2D Measurements & Calculations  IVSd: 1.2 cm     LVIDd: 4.4 cm  LVIDs: 2.9 cm  LVPWd: 1.2 cm  FS: 35.7 %  LV mass(C)d: 196.2 grams  LV mass(C)dI: 79.5 grams/m2  asc Aorta Diam: 3.9 cm  LVOT diam: 2.8 cm  LVOT area: 6.1 cm2  LA Volume (BP): 66.4 ml  LA Volume Index (BP): 26.9 ml/m2  RWT: 0.55           Doppler Measurements & Calculations  MV E max nieves: 74.7 cm/sec  MV A max nieves: 100.9 cm/sec  MV E/A: 0.74  MV dec slope: 684.3 cm/sec2  MV dec time: 0.11 sec  PA V2 max: 94.1 cm/sec  PA max PG: 3.5 mmHg  PA acc time: 0.11 sec  E/E' av.5  Lateral E/e': 7.3  Medial E/e': 9.7     _____________________________________________________________________________      As noted, these results were astonishing.  However, we tempered are optimism and continued his medicitons:    Current Outpatient Prescriptions   Medication     atovaquone (MEPRON) 750 MG/5ML suspension     calcium carbonate (OS-PANFILO 500 MG [place]. [place]) 500 MG tablet     CLONAZEPAM PO     digoxin (LANOXIN) 125 MCG tablet     FLUCONAZOLE PO     furosemide (LASIX) 40 MG tablet     glycine diluent 32.175 mL with epoprostenol 247.5 mcg infusion     Pantoprazole Sodium (PROTONIX PO)     Penicillin V Potassium (PEN-VEE K OR)     predniSONE (DELTASONE) 2.5 MG tablet     Rosuvastatin Calcium (CRESTOR PO)     ruxolitinib (JAKAFI) 5 MG TABS tablet CHEMO     Specialty Vitamins Products (MAGNESIUM PLUS PROTEIN) 133 MG tablet     tadalafil, PAH, (ADCIRCA) 20 MG TABS     Ursodiol (ACTIGALL PO)     VALACYCLOVIR HCL PO     VITAMIN D, CHOLECALCIFEROL, PO     No current facility-administered medications for this visit.        Today, he returned for  follow-up.  His improvement noted in June has been maintained and functionally he continues to demonstrate improvement.      He is very eager to be off of parenteral medications.  We discussed in detail both the risk and benefits of discontinuing parenteral prostanoids.  We have not found that oral Remodulin in predictably effective and therefore discussed with him the use of selexipag which we have found to be more effective and dependable.      We would recommend that he be hospitalized for this transition to ensure that we do not see a precipitous return to the previous hemodynamics and would suggest that he be cathed immediately following the transition as well as at three months following the conversion to a completely oral regimen.      He and his wife understand that we are in completely uncharted territory with the response being so striking.  This also raises the question regarding the Jakafi, however, will leave this question open to the input of the BMT group in [place].      All of the 30 minute visit was spent in counseling.      Dk PAREDES; Doctors above as well as patient at his home

## 2018-11-29 ENCOUNTER — CARE COORDINATION (OUTPATIENT)
Dept: CARDIOLOGY | Facility: CLINIC | Age: 53
End: 2018-11-29

## 2018-11-29 NOTE — TELEPHONE ENCOUNTER
Flolan (epoprostenol sodium) Dose Sheet    Name: Olivier Gómez   Rx #: [id number]    : 1965   ID: [id number]     Flolan IV    Use (per 100 mL diluent)        Dosing Weight: 121.7 kg   Date Rate  (mL per 24 hours) Reservoir  Volume Dose  (ng per kg per min) Concentration  (ng per mL) 1.5 mg vials 0.5 mg vials   10/26/18 88 93 30 60,000 4 0   18 85 90 29 60,000 4 0   18 82 87 28 60,000 4 0   18 79 84 27 60,000 4 0   18 76 81 26 60,000 4 0   18 73  25 60,000 4 0   12/10/18 93  24 45,000 3 0   18 90  23 45,000 3 0   18 86 91 22 45,000 3 0   18 82 87 21 45,000 3 0   19 78 83 20 45,000 3 0   19 74 79 19 45,000 3 0   Decrease dose by 1 ng per kg per min every 7 days; plan to admit to hospital 19 for final transition to [facility]

## 2018-11-29 NOTE — PROGRESS NOTES
I spoke with pt wife and she stated that he is not experiencing any side effects from the down titrations of the Flolan (epoprostenol sodium) in preparations for the Transitions to Uptravi (selexipag). Marlys Anderson RN on 11/29/2018 at 11:32 AM

## 2018-12-12 ENCOUNTER — TELEPHONE (OUTPATIENT)
Dept: CARDIOLOGY | Facility: CLINIC | Age: 53
End: 2018-12-12

## 2018-12-12 NOTE — TELEPHONE ENCOUNTER
[facility] Call Center    Phone Message    May a detailed message be left on voicemail: yes    Reason for Call: Other: Matthew calling from [facility] to see if the refill requests sent on 11/23 for ruxolitinib (JAKAFI) and FLOLAN had been received. She had a couple of questions and wanted to receive a call back about them. Please give Matthew a call back at [phone]     Action Taken: Message routed to:  [facility] ([facility] Cardiology

## 2018-12-12 NOTE — TELEPHONE ENCOUNTER
I called Matthew back and let her know that the Flolan (epoprostenol sodium) would need to come from a Specialty pharmacy that is able to dispense this medication.  I also asked her to follow up with Dr. Brennan's office for the Ivisi. Marlys Anderson RN on 12/12/2018 at 3:16 PM

## 2018-12-13 ENCOUNTER — HOSPITAL ENCOUNTER (OUTPATIENT)
Age: 53
End: 2018-12-13
Payer: COMMERCIAL

## 2018-12-13 DIAGNOSIS — I27.20 PULMONARY HTN (H): Primary | ICD-10-CM

## 2018-12-13 RX ORDER — LIDOCAINE 40 MG/G
CREAM TOPICAL
Status: CANCELLED | OUTPATIENT
Start: 2018-12-13

## 2019-01-02 DIAGNOSIS — I27.20 PULMONARY HYPERTENSION (H): Primary | ICD-10-CM

## 2019-01-07 ENCOUNTER — TELEPHONE (OUTPATIENT)
Dept: CARDIOLOGY | Facility: CLINIC | Age: 54
End: 2019-01-07

## 2019-01-07 DIAGNOSIS — I27.20 PULMONARY HYPERTENSION (H): ICD-10-CM

## 2019-01-07 RX ORDER — TADALAFIL 20 MG/1
40 TABLET ORAL DAILY
Qty: 60 TABLET | Refills: 11 | Status: SHIPPED | OUTPATIENT
Start: 2019-01-07 | End: 2020-02-05

## 2019-01-07 NOTE — TELEPHONE ENCOUNTER
PA Initiation    Medication: Uptravi 200 mcg  Insurance Company: [employer] - Phone [phone] Fax [phone]  Pharmacy Filling the Rx: [facility] Becky DEANNA, [address]  Start Date: 1/7/2019    Urgent request has been faxed to [facility] for expedited review. Additionally, hub forms were completed and faxed to [facility] for expedited processing.

## 2019-01-09 NOTE — TELEPHONE ENCOUNTER
Prior Authorization Approval    Authorization Effective Date: 1/9/2019  Authorization Expiration Date: 4/8/2019  Medication: Uptravi 200 mcg  Approved Dose/Quantity: Starter/Titration Packs  Reference #: [id number]   Insurance Company: [facility] - Phone [phone] Fax [phone]  Which Pharmacy is filling the prescription (Not needed for infusion/clinic administered): [address]    *Contacted [facility] for update. Advised that PA has been approved for starter and titration packs until 4/8/19. Plan requested end date for Flolan authorization. As the plan is to begin the transition on 1/17/19, requested 1/25/19 for Flolan auth end date. Plan can be contacted should Flolan need to be re-authorized.    *Contacted [employer] to check the status of Uptravi enrollment forms which were faxed on 1/7/19. Per [employer], forms have not been received. Forms have been re-faxed with updated PA approval information. [employer] is aware forms are being sent and will expedite processing.

## 2019-01-11 RX ORDER — PREDNISONE 2.5 MG/1
7.5 TABLET ORAL DAILY
Qty: 180 TABLET | Refills: 0 | Status: SHIPPED | OUTPATIENT
Start: 2019-01-11 | End: 2019-05-15

## 2019-01-11 NOTE — TELEPHONE ENCOUNTER
Received notification that [facility] will be unable to service Uptravi rx and that enrollment has been urgently transferred to [facility] for expedited processing. Provided update to Danielle who will await call from [facility]. Advised Danielle to request  copay card to assist with Uptravi copay.

## 2019-01-11 NOTE — TELEPHONE ENCOUNTER
Medication Refill double check:    Last office visit was on 10/10/18 with .    Follow up was recommended for unclear.    Any additional encounters with changes to requested med? no    Authorizing provider is: Dr. Quan    Refill was approved.     Additional orders/notes: patient is transitioning from Flolan to Uptravi.  He is scheduled to be admitted for RHC 1/17/19.  I will refill for 2 months to give us time to figure out follow up after cath.      Ana Ashraf RN on 1/11/2019 at 4:37 PM

## 2019-01-16 PROBLEM — J96.01 ACUTE RESPIRATORY FAILURE WITH HYPOXIA (H): Status: RESOLVED | Noted: 2017-06-23 | Resolved: 2019-01-16

## 2019-01-16 PROBLEM — I27.20 PULMONARY HYPERTENSION (H): Chronic | Status: ACTIVE | Noted: 2017-05-15

## 2019-01-16 PROBLEM — I27.20: Status: RESOLVED | Noted: 2017-06-16 | Resolved: 2019-01-16

## 2019-01-17 ENCOUNTER — TELEPHONE (OUTPATIENT)
Dept: CARDIOLOGY | Facility: CLINIC | Age: 54
End: 2019-01-17

## 2019-01-17 ENCOUNTER — APPOINTMENT (OUTPATIENT)
Dept: MEDSURG UNIT | Facility: CLINIC | Age: 54
End: 2019-01-17
Attending: INTERNAL MEDICINE
Payer: COMMERCIAL

## 2019-01-17 ENCOUNTER — APPOINTMENT (OUTPATIENT)
Dept: LAB | Facility: CLINIC | Age: 54
End: 2019-01-17
Attending: INTERNAL MEDICINE
Payer: COMMERCIAL

## 2019-01-17 ENCOUNTER — OFFICE VISIT (OUTPATIENT)
Dept: CARDIOLOGY | Facility: CLINIC | Age: 54
End: 2019-01-17
Attending: INTERNAL MEDICINE
Payer: COMMERCIAL

## 2019-01-17 ENCOUNTER — HOSPITAL ENCOUNTER (OUTPATIENT)
Facility: CLINIC | Age: 54
Discharge: HOME | End: 2019-01-17
Attending: INTERNAL MEDICINE | Admitting: INTERNAL MEDICINE
Payer: COMMERCIAL

## 2019-01-17 VITALS
SYSTOLIC BLOOD PRESSURE: 108 MMHG | OXYGEN SATURATION: 96 % | DIASTOLIC BLOOD PRESSURE: 71 MMHG | RESPIRATION RATE: 18 BRPM

## 2019-01-17 VITALS
OXYGEN SATURATION: 96 % | SYSTOLIC BLOOD PRESSURE: 108 MMHG | DIASTOLIC BLOOD PRESSURE: 71 MMHG | RESPIRATION RATE: 18 BRPM | TEMPERATURE: 98.2 F

## 2019-01-17 DIAGNOSIS — Z53.9 ERRONEOUS ENCOUNTER--DISREGARD: Primary | ICD-10-CM

## 2019-01-17 DIAGNOSIS — I51.89 OTHER ILL-DEFINED HEART DISEASES: ICD-10-CM

## 2019-01-17 DIAGNOSIS — I27.0 PRIMARY PULMONARY HYPERTENSION (H): ICD-10-CM

## 2019-01-17 DIAGNOSIS — I27.20 PULMONARY HTN (H): ICD-10-CM

## 2019-01-17 DIAGNOSIS — I73.00 RAYNAUD'S DISEASE WITHOUT GANGRENE: ICD-10-CM

## 2019-01-17 DIAGNOSIS — I27.20 PULMONARY HYPERTENSION (H): Primary | Chronic | ICD-10-CM

## 2019-01-17 DIAGNOSIS — R06.02 SOB (SHORTNESS OF BREATH): ICD-10-CM

## 2019-01-17 LAB
ALBUMIN SERPL-MCNC: 3.8 G/DL (ref 3.4–5)
ALP SERPL-CCNC: 84 U/L (ref 40–150)
ALT SERPL W P-5'-P-CCNC: 37 U/L (ref 0–70)
ANION GAP SERPL CALCULATED.3IONS-SCNC: 8 MMOL/L (ref 3–14)
AST SERPL W P-5'-P-CCNC: 25 U/L (ref 0–45)
BILIRUB SERPL-MCNC: 0.5 MG/DL (ref 0.2–1.3)
BUN SERPL-MCNC: 16 MG/DL (ref 7–30)
CALCIUM SERPL-MCNC: 8.7 MG/DL (ref 8.5–10.1)
CHLORIDE SERPL-SCNC: 107 MMOL/L (ref 94–109)
CO2 SERPL-SCNC: 25 MMOL/L (ref 20–32)
CREAT SERPL-MCNC: 1.63 MG/DL (ref 0.66–1.25)
ERYTHROCYTE [DISTWIDTH] IN BLOOD BY AUTOMATED COUNT: 17.6 % (ref 10–15)
GFR SERPL CREATININE-BSD FRML MDRD: 47 ML/MIN/{1.73_M2}
GLUCOSE SERPL-MCNC: 96 MG/DL (ref 70–99)
HCT VFR BLD AUTO: 39.5 % (ref 40–53)
HGB BLD-MCNC: 12.7 G/DL (ref 13.3–17.7)
MCH RBC QN AUTO: 29.3 PG (ref 26.5–33)
MCHC RBC AUTO-ENTMCNC: 32.2 G/DL (ref 31.5–36.5)
MCV RBC AUTO: 91 FL (ref 78–100)
NT-PROBNP SERPL-MCNC: 144 PG/ML (ref 0–125)
PLATELET # BLD AUTO: 285 10E9/L (ref 150–450)
POTASSIUM SERPL-SCNC: 4 MMOL/L (ref 3.4–5.3)
PROT SERPL-MCNC: 6.9 G/DL (ref 6.8–8.8)
RBC # BLD AUTO: 4.33 10E12/L (ref 4.4–5.9)
SODIUM SERPL-SCNC: 140 MMOL/L (ref 133–144)
WBC # BLD AUTO: 6.5 10E9/L (ref 4–11)

## 2019-01-17 PROCEDURE — 80053 COMPREHEN METABOLIC PANEL: CPT | Performed by: INTERNAL MEDICINE

## 2019-01-17 PROCEDURE — 40000166 ZZH STATISTIC PP CARE STAGE 1

## 2019-01-17 PROCEDURE — 25000125 ZZHC RX 250: Performed by: INTERNAL MEDICINE

## 2019-01-17 PROCEDURE — 27210794 ZZH OR GENERAL SUPPLY STERILE: Performed by: INTERNAL MEDICINE

## 2019-01-17 PROCEDURE — 85027 COMPLETE CBC AUTOMATED: CPT | Performed by: INTERNAL MEDICINE

## 2019-01-17 PROCEDURE — G0463 HOSPITAL OUTPT CLINIC VISIT: HCPCS | Mod: 25,ZF

## 2019-01-17 PROCEDURE — 83880 ASSAY OF NATRIURETIC PEPTIDE: CPT | Performed by: INTERNAL MEDICINE

## 2019-01-17 PROCEDURE — 99214 OFFICE O/P EST MOD 30 MIN: CPT | Mod: ZP | Performed by: INTERNAL MEDICINE

## 2019-01-17 PROCEDURE — 93451 RIGHT HEART CATH: CPT | Mod: 26 | Performed by: INTERNAL MEDICINE

## 2019-01-17 PROCEDURE — 36415 COLL VENOUS BLD VENIPUNCTURE: CPT | Performed by: INTERNAL MEDICINE

## 2019-01-17 PROCEDURE — 93451 RIGHT HEART CATH: CPT | Performed by: INTERNAL MEDICINE

## 2019-01-17 RX ORDER — LIDOCAINE 40 MG/G
CREAM TOPICAL
Status: CANCELLED | OUTPATIENT
Start: 2019-01-17

## 2019-01-17 RX ORDER — LIDOCAINE 40 MG/G
CREAM TOPICAL
Status: COMPLETED | OUTPATIENT
Start: 2019-01-17 | End: 2019-01-17

## 2019-01-17 RX ADMIN — LIDOCAINE: 40 CREAM TOPICAL at 08:40

## 2019-01-17 NOTE — PROGRESS NOTES
The patient returns for follow-up with right heart catherization continuing to demonstrate nornal PA pressures despite dose reduction of prostanoid.   ROS:  Constitutional, neuro, endocrine, gastrointestinal, genitourinary and psychiatric systems are otherwise negative.      Current Outpatient Medications   Medication     atovaquone (MEPRON) 750 MG/5ML suspension     calcium carbonate (OS-PANFILO 500 MG [place]. [place]) 500 MG tablet     CLONAZEPAM PO     digoxin (LANOXIN) 125 MCG tablet     FLUCONAZOLE PO     furosemide (LASIX) 40 MG tablet     glycine diluent 32.175 mL with epoprostenol 247.5 mcg infusion     Pantoprazole Sodium (PROTONIX PO)     Penicillin V Potassium (PEN-VEE K OR)     predniSONE (DELTASONE) 2.5 MG tablet     Rosuvastatin Calcium (CRESTOR PO)     ruxolitinib (JAKAFI) 5 MG TABS tablet CHEMO     Specialty Vitamins Products (MAGNESIUM PLUS PROTEIN) 133 MG tablet     tadalafil, PAH, (ADCIRCA) 20 MG TABS     Ursodiol (ACTIGALL PO)     VALACYCLOVIR HCL PO     VITAMIN D, CHOLECALCIFEROL, PO     No current facility-administered medications for this visit.        Results for MEERABENEDICT (MRN [id number]) as of 1/17/2019 11:46   Ref. Range 7/9/2018 00:00 1/17/2019 07:42   Sodium Latest Ref Range: 133 - 144 mmol/L  140   Potassium Latest Ref Range: 3.4 - 5.3 mmol/L  4.0   Chloride Latest Ref Range: 94 - 109 mmol/L  107   Carbon Dioxide Latest Ref Range: 20 - 32 mmol/L  25   Urea Nitrogen Latest Ref Range: 7 - 30 mg/dL  16   Creatinine Latest Ref Range: 0.66 - 1.25 mg/dL  1.63 (H)   GFR Estimate Latest Ref Range: >60 mL/min/1.73_m2  47 (L)   GFR Estimate If Black Latest Ref Range: >60 mL/min/1.73_m2  54 (L)   Calcium Latest Ref Range: 8.5 - 10.1 mg/dL  8.7   Anion Gap Latest Ref Range: 3 - 14 mmol/L  8   Albumin Latest Ref Range: 3.4 - 5.0 g/dL  3.8   Protein Total Latest Ref Range: 6.8 - 8.8 g/dL  6.9   Bilirubin Total Latest Ref Range: 0.2 - 1.3 mg/dL  0.5   Alkaline Phosphatase Latest Ref Range: 40 - 150 U/L  84    ALT Latest Ref Range: 0 - 70 U/L  37   AST Latest Ref Range: 0 - 45 U/L  25   N-Terminal Pro Bnp Latest Ref Range: 0 - 125 pg/mL  144 (H)   Glucose Latest Ref Range: 70 - 99 mg/dL  96   WBC Latest Ref Range: 4.0 - 11.0 10e9/L  6.5   Hemoglobin Latest Ref Range: 13.3 - 17.7 g/dL  12.7 (L)   Hematocrit Latest Ref Range: 40.0 - 53.0 %  39.5 (L)   Platelet Count Latest Ref Range: 150 - 450 10e9/L  285   RBC Count Latest Ref Range: 4.4 - 5.9 10e12/L  4.33 (L)   MCV Latest Ref Range: 78 - 100 fl  91   MCH Latest Ref Range: 26.5 - 33.0 pg  29.3   MCHC Latest Ref Range: 31.5 - 36.5 g/dL  32.2   RDW Latest Ref Range: 10.0 - 15.0 %  17.6 (H)         Taper Flolan by 2ngs/kg/minute every week until at 10ngs/kg/minute.  At this point will probably discontinue Flolan and pump but not the Queen    Starting next week when at 16ngs/kg/minute of Flolan.  start selexipag, 200mgs BID and increase by 200mg per week assuming no evidence of too much prostacyclin (headache, nausea, diarrhea, feeling like passing out or passing out).    Return in middle of March for echocardiogram and right heart catherization    Could he come off of Mepron?

## 2019-01-17 NOTE — PROGRESS NOTES
Patient is ready for the procedure, here for RHC for eval of his PH. Flolan infusing continuously.

## 2019-01-17 NOTE — IP AVS SNAPSHOT
[facility], [facility],  Heart Cath Lab  [address] [address]  Phone:  [phone]                                    After Visit Summary   1/17/2019    Olivier Gómez    MRN: [id number]           After Visit Summary Signature Page    I have received my discharge instructions, and my questions have been answered. I have discussed any challenges I see with this plan with the nurse or doctor.    ..........................................................................................................................................  Patient/Patient Representative Signature      ..........................................................................................................................................  Patient Representative Print Name and Relationship to Patient    ..................................................               ................................................  Date                                   Time    ..........................................................................................................................................  Reviewed by Signature/Title    ...................................................              ..............................................  Date                                               Time          22EPIC Rev 08/18

## 2019-01-17 NOTE — PATIENT INSTRUCTIONS
Flolan taper:    Taper Flolan by 2ngs/kg/minute every week until at 10ngs/kg/minute.  At this point will probably discontinue Flolan and pump but not the Queen    Starting next week when at 16ngs/kg/minute of Flolan.  start selexipag, 200mgs BID and increase by 200mg per week assuming no evidence of too much prostacyclin (headache, nausea, diarrhea, feeling like passing out or passing out).

## 2019-01-17 NOTE — LETTER
1/17/2019      RE: Benedict Estes  [address]  [place] [address]       Dear Colleague,    Thank you for the opportunity to participate in the care of your patient, Benedict Estes, at the [facility]. Please see a copy of my visit note below.    The patient returns for follow-up with right heart catherization continuing to demonstrate nornal PA pressures despite dose reduction of prostanoid.   ROS:  Constitutional, neuro, endocrine, gastrointestinal, genitourinary and psychiatric systems are otherwise negative.      Current Outpatient Medications   Medication     atovaquone (MEPRON) 750 MG/5ML suspension     calcium carbonate (OS-PANFILO 500 MG [place]. CA) 500 MG tablet     CLONAZEPAM PO     digoxin (LANOXIN) 125 MCG tablet     FLUCONAZOLE PO     furosemide (LASIX) 40 MG tablet     glycine diluent 32.175 mL with epoprostenol 247.5 mcg infusion     Pantoprazole Sodium (PROTONIX PO)     Penicillin V Potassium (PEN-VEE K OR)     predniSONE (DELTASONE) 2.5 MG tablet     Rosuvastatin Calcium (CRESTOR PO)     ruxolitinib (JAKAFI) 5 MG TABS tablet CHEMO     Specialty Vitamins Products (MAGNESIUM PLUS PROTEIN) 133 MG tablet     tadalafil, PAH, (ADCIRCA) 20 MG TABS     Ursodiol (ACTIGALL PO)     VALACYCLOVIR HCL PO     VITAMIN D, CHOLECALCIFEROL, PO     No current facility-administered medications for this visit.        Results for BENEDICT ESTES (MRN [id number]) as of 1/17/2019 11:46   Ref. Range 7/9/2018 00:00 1/17/2019 07:42   Sodium Latest Ref Range: 133 - 144 mmol/L  140   Potassium Latest Ref Range: 3.4 - 5.3 mmol/L  4.0   Chloride Latest Ref Range: 94 - 109 mmol/L  107   Carbon Dioxide Latest Ref Range: 20 - 32 mmol/L  25   Urea Nitrogen Latest Ref Range: 7 - 30 mg/dL  16   Creatinine Latest Ref Range: 0.66 - 1.25 mg/dL  1.63 (H)   GFR Estimate Latest Ref Range: >60 mL/min/1.73_m2  47 (L)   GFR Estimate If Black Latest Ref Range: >60 mL/min/1.73_m2  54 (L)    Calcium Latest Ref Range: 8.5 - 10.1 mg/dL  8.7   Anion Gap Latest Ref Range: 3 - 14 mmol/L  8   Albumin Latest Ref Range: 3.4 - 5.0 g/dL  3.8   Protein Total Latest Ref Range: 6.8 - 8.8 g/dL  6.9   Bilirubin Total Latest Ref Range: 0.2 - 1.3 mg/dL  0.5   Alkaline Phosphatase Latest Ref Range: 40 - 150 U/L  84   ALT Latest Ref Range: 0 - 70 U/L  37   AST Latest Ref Range: 0 - 45 U/L  25   N-Terminal Pro Bnp Latest Ref Range: 0 - 125 pg/mL  144 (H)   Glucose Latest Ref Range: 70 - 99 mg/dL  96   WBC Latest Ref Range: 4.0 - 11.0 10e9/L  6.5   Hemoglobin Latest Ref Range: 13.3 - 17.7 g/dL  12.7 (L)   Hematocrit Latest Ref Range: 40.0 - 53.0 %  39.5 (L)   Platelet Count Latest Ref Range: 150 - 450 10e9/L  285   RBC Count Latest Ref Range: 4.4 - 5.9 10e12/L  4.33 (L)   MCV Latest Ref Range: 78 - 100 fl  91   MCH Latest Ref Range: 26.5 - 33.0 pg  29.3   MCHC Latest Ref Range: 31.5 - 36.5 g/dL  32.2   RDW Latest Ref Range: 10.0 - 15.0 %  17.6 (H)         Taper Flolan by 2ngs/kg/minute every week until at 10ngs/kg/minute.  At this point will probably discontinue Flolan and pump but not the Queen    Starting next week when at 16ngs/kg/minute of Flolan.  start selexipag, 200mgs BID and increase by 200mg per week assuming no evidence of too much prostacyclin (headache, nausea, diarrhea, feeling like passing out or passing out).    Return in middle of March for echocardiogram and right heart catherization    Could he come off of Mepron?      Dk Quan MD

## 2019-01-17 NOTE — DISCHARGE INSTRUCTIONS
[facility]                        Interventional Cardiology  Discharge Instructions   Post Right Heart Cath      AFTER YOU GO HOME:    DO drink plenty of fluids    DO resume your regular diet and medications unless otherwise instructed by your Primary Physician    Do Not scrub the procedure site vigorously    No lotion or powder to the puncture site for 3 days    CALL YOUR PRIMARY PHYSICIAN IF: You may resume all normal activity.  Monitor neck site for bleeding, swelling, or voice changes. If you notice bleeding or swelling immediately apply pressure to the site and call number below to speak with Cardiology Fellow.  If you experience any changes in your breathing you should call your doctor immediately or come to the closest Emergency Department.  Do not drive yourself.    ADDITIONAL INSTRUCTIONS: Medications: You are to resume all home medications including anticoagulation therapy unless otherwise advised by your primary cardiologist or nurse coordinator.    Follow Up: Per your primary cardiology team    If you have any questions or concerns regarding your procedure site please call [phone] at anytime and ask for Cardiology Fellow on call.  They are available 24 hours a day.  You may also contact the Cardiology Clinic after hours number at [phone].                                                       Telephone Numbers [phone] Monday-Friday 8:00 am to 4:30 pm    [phone] After 4:30 pm Monday-Friday, Weekends & Holidays  Ask for Interventional Cardiologist on call. Someone is on call 24 hours/day   [facility] toll free number [phone] Monday-Friday 8:00 am to 4:30 pm   [facility] Emergency Dept [phone]

## 2019-01-17 NOTE — TELEPHONE ENCOUNTER
Dr. Quan called and gave the following plan via TORB:    1. Call [facility] to confirm current dose of Flolan is 20ng/kg/min.    2. Decrease Flolan dose today to 18ng/kg/min, then by 2ng/kg/min once weekly.    3. Once dose is at 16ng/kg/min, patient is to start Uptravi 200mcg BID and titrate up once weekly.    4. Flolan can be discontinued once the dose reaches 10ng/kg/min.  The Queen catheter must remain in place for at least 1 month.    5. Continue Uptravi up titration, call with any sx.  If patient has any SOB, syncope, etc he will have to come back to the [facility].    6. Return Mid March for another C with Dr. Lundy and appt with Dr. Quan.    Ana Ashraf, RN on 1/17/2019 at 2:45 PM

## 2019-01-17 NOTE — PROGRESS NOTES
Patient returned to the unit after having a right heart cath. Wife in room. Patient is to go to clinic today to see Dr. Quan. Discharge instructions given to patient R internal jugular site is dry and intact. Left unit ambulatory to the shuttle.

## 2019-01-17 NOTE — PROGRESS NOTES
[facility] Cardiac Catheterization Lab  Date: 01/17/2019    History of Present Illness:  Mr. 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 Per clinic notation, the patient wishes to be off parenteral medications and has been titrating off Flolan for the last 2 months.  He presents today for both RHC and admission as he will start ruxolitinib. The procedural details of the right heart catheterization were discussed in detail with the patient.  Risks and benefits were discussed; discussion included possible allergy to contrast dye in addition to:    Benefit: greater hemodynamic understanding of the patient's right heart/pulmonary arteries.  Risks:  - Radiation exposure (X-ray)   (100.0% of patients)  - Bleeding (femoral, retroperitoneal)  (up to 7.0% of patients)  - Transient/Permanent arrhythmia  (up to ~3.0% of patients)  - Dissection (coronaries, great vessels)  (1-3.0% of patients)  - Infection     (<1.0% of patients)  - Emergent open heart surgery  (<0.1% of patients)  - CVA (ischemic)    (~0.1% of patients)  - MI      (~0.1% of patients)  - Death     (<0.1% of patients)    Physical Exam:  Gen: alert and oriented x4, in no acute distress  CV: regular rate/rhythm  Pulm: CTAB  LE: no edema, pain, swelling    Assessment: 53 year old male presenting for RHC. Patient expressed understanding and agrees to move forward with the procedure at this point in time.  All questions were answered.    Plan: proceed with RHC.      Nica Almazan PA-C  [facility]

## 2019-01-17 NOTE — TELEPHONE ENCOUNTER
Called [facility] pharmacist Lisa and gave the following TORB:    Patient will begin Uptravi 200mcg BID next week and continue to uptitrate per our usual protocol of increases by 200mcg weekly to a goal dose of 1600mcg BID. Ana Ashraf RN on 1/17/2019 at 5:01 PM

## 2019-01-17 NOTE — TELEPHONE ENCOUNTER
Spoke with Louisepayal Soriano Pharmacist @ [facility].  Her documentation shows they last spoke with patient on 1/11/19 and he was then on 23ng/kg/min.  Based on his dosing schedule, he should be at 19ng/kg/min currently.    Gave TORB to Louise for the following;    1. Decrease Flolan dose today to 18ng/kg/min, then by 2ng/kg/min once weekly.    2. Once dose is at 16ng/kg/min, patient is to start Uptravi 200mcg BID and titrate up once weekly.    3. Flolan can be discontinued once the dose reaches 10ng/kg/min.  The Queen catheter must remain in place for at least 1 month.    4. patient to flush Queen Catheter once daily with 10ml NS until pulled out.    Ana Ashraf RN on 1/17/2019 at 4:47 PM  -------------------------------  Orders for repeat RHC in March are already placed. Ana Ashraf RN on 1/17/2019 at 4:51 PM

## 2019-01-17 NOTE — PROCEDURES
CARDIAC CATH REPORT:   PROCEDURES PERFORMED:   Right Heart Catheterization    PHYSICIANS:  Attending Physician: Nikhil Dominguez MD  Interventional Cardiology Fellow: None  Cardiology Fellow: None    INDICATION:  Olivier Gómez is a 53 year old male with pulmonary hypertension and AML who presents on an elective basis.    DESCRIPTION:  1. Consent obtained with discussion of risks.  All questions were answered.  2. Sterile prep and procedure.  3. Location with Sheaths:   Rt IJ  7 Fr 10 cm [short]  4. Access: Local anesthetic with lidocaine.  A standard 18 guage needle with ultrasound guidance was used to establish vascular access using a modified Seldinger technique.  5. Diagnostic Catheters:   7 Fr  Kenvir Chris  6. Guiding Catheters:  None  6. Estimated blood loss: < 5 ml    MEDICATIONS:  Heart rate, BP, respiration, oxygen saturation and patient responses were monitored throughout the procedure with the assistance of the RN under my supervision.    Procedures:    HEMODYNAMICS:  BSA 2.5  1. HR 77  bpm  2. /78/98 mmHg  3. RA 5/3/3  4. RV 33/5  5. PA 33/15/24  6. PCW 15/15/13  7. PA sat 75.4%   8. PCW sat n/a  9. Hgb 12.6g/dL   10. Carter CO 6.7   11. Carter CI 2.7  12. TD CO 8.4   13. TD CI 3.4   14. PVR 1.6     Sheath Removal:  The venous sheath was manually removed in the cardiac catheterization laboratory.    Fluoroscopy Time: 0.3 min    COMPLICATIONS:  1. None    SUMMARY:   >> Normal right sided filling pressures.  >> Normal left sided filling pressures.  >> Normal PA pressures  >> Normal cardiac output, 6.7 L/min with index 2.7 L/min/m2    PLAN:   >> Bedrest per protocol.  >> Continued medical management and lifestyle modification for cardiovascular risk factor optimization.     The attending interventional cardiologist was present and supervised all critical aspects the procedure.    Findings discussed with Dr. Quan.    See CVIS report for final draft.    Nikhil Dominguez MD, PhD  Interventional/Critical Care  Cardiology  [phone]    January 17, 2019

## 2019-01-17 NOTE — IP AVS SNAPSHOT
MRN:[id number]                      After Visit Summary   1/17/2019    Olivier Gómez    MRN: [id number]           Visit Information        Department      1/17/2019  7:17 AM [facility], [place],  Heart Cath Lab          Review of your medicines      UNREVIEWED medicines. Ask your doctor about these medicines       Dose / Directions   ACTIGALL PO      Dose:  300 mg  Take 300 mg by mouth 3 times daily  Refills:  0     atovaquone 750 MG/5ML suspension  Commonly known as:  MEPRON      Dose:  1500 mg  Take 1,500 mg by mouth daily  Refills:  0     calcium carbonate 500 MG tablet  Commonly known as:  OS-PANFILO      Dose:  600 mg  Take 600 mg by mouth 2 times daily  Refills:  0     CLONAZEPAM PO      Dose:  1 mg  Take 1 mg by mouth At Bedtime  Refills:  0     CRESTOR PO      Dose:  5 mg  Take 5 mg by mouth daily  Refills:  0     digoxin 125 MCG tablet  Commonly known as:  LANOXIN  Used for:  Pulmonary hypertension (H)      Dose:  125 mcg  Take 1 tablet (125 mcg) by mouth daily  Quantity:  90 tablet  Refills:  3     FLUCONAZOLE PO      Dose:  200 mg  Take 200 mg by mouth daily  Refills:  0     furosemide 40 MG tablet  Commonly known as:  LASIX  Used for:  Pulmonary hypertension (H)      Dose:  40 mg  Take 1 tablet (40 mg) by mouth daily  Quantity:  30 tablet  Refills:  0     glycine diluent 32.175 mL with epoprostenol 247.5 mcg infusion  Used for:  Pulmonary hypertension (H)      Dose:  23 ng/kg/min  Inject 2,799.1 ng/min into the vein continuous Goal 30 ng/kg/min  Quantity:  1 each  Refills:  11     magnesium plus protein 133 MG tablet      Dose:  266 mg  Take 266 mg by mouth daily (2 tablets)  Refills:  0     PEN-VEE K OR      Dose:  500 mg  Take 500 mg by mouth 2 times daily  Refills:  0     predniSONE 2.5 MG tablet  Commonly known as:  DELTASONE  Used for:  Pulmonary hypertension (H)      Dose:  7.5 mg  Take 7.5 mg by mouth daily (3 tablets = 7.5 mg daily).  Quantity:  180 tablet  Refills:  0     PROTONIX PO       Dose:  40 mg  Take 40 mg by mouth daily  Refills:  0     ruxolitinib 5 MG Tabs tablet CHEMO  Commonly known as:  JAKAFI  Used for:  Pericardial effusion, Pulmonary hypertension (H), Efjpk-yviusj-whig disease (H)      Dose:  5 mg  Take 1 tablet (5 mg) by mouth 2 times daily  Quantity:  60 tablet  Refills:  0     tadalafil (PAH) 20 MG Tabs  Commonly known as:  ADCIRCA  Used for:  Pulmonary hypertension (H)      Dose:  40 mg  Take 2 tablets (40 mg) by mouth daily  Quantity:  60 tablet  Refills:  11     VALACYCLOVIR HCL PO      Dose:  500 mg  Take 500 mg by mouth every other day  Refills:  0     VITAMIN D (CHOLECALCIFEROL) PO      Dose:  2000 Units  Take 2,000 Units by mouth 3 times daily  Refills:  0              Protect others around you: Learn how to safely use, store and throw away your medicines at www.disposemymeds.org.       Follow-ups after your visit       Care Instructions       Further instructions from your care team       [facility]                        Interventional Cardiology  Discharge Instructions   Post Right Heart Cath      AFTER YOU GO HOME:    DO drink plenty of fluids    DO resume your regular diet and medications unless otherwise instructed by your Primary Physician    Do Not scrub the procedure site vigorously    No lotion or powder to the puncture site for 3 days    CALL YOUR PRIMARY PHYSICIAN IF: You may resume all normal activity.  Monitor neck site for bleeding, swelling, or voice changes. If you notice bleeding or swelling immediately apply pressure to the site and call number below to speak with Cardiology Fellow.  If you experience any changes in your breathing you should call your doctor immediately or come to the closest Emergency Department.  Do not drive yourself.    ADDITIONAL INSTRUCTIONS: Medications: You are to resume all home medications including anticoagulation therapy unless otherwise advised by your primary cardiologist or nurse coordinator.    Follow  Up: Per your primary cardiology team    If you have any questions or concerns regarding your procedure site please call [phone] at anytime and ask for Cardiology Fellow on call.  They are available 24 hours a day.  You may also contact the Cardiology Clinic after hours number at [phone].                                                       Telephone Numbers [phone] Monday-Friday 8:00 am to 4:30 pm    [phone] After 4:30 pm Monday-Friday, Weekends & Holidays  Ask for Interventional Cardiologist on call. Someone is on call 24 hours/day   [facility] toll free number [phone] Monday-Friday 8:00 am to 4:30 pm   [facility] Emergency Dept [phone]                   Additional Information About Your Visit       MyChart Information    [employer] gives you secure access to your electronic health record. If you see a primary care provider, you can also send messages to your care team and make appointments. If you have questions, please call your primary care clinic.  If you do not have a primary care provider, please call [phone] and they will assist you.       Care EveryWhere ID    This is your Care EveryWhere ID. This could be used by other organizations to access your [place] medical records  [id number]       Your Vitals Were     Blood Pressure   106/68 (BP Location: Left arm)    Temperature   98.2  F (36.8  C) (Tympanic)    Respirations   20    Pulse Oximetry   100%           Primary Care Provider Office Phone # Fax #    Adelso Delgado [phone]      Equal Access to Services    DIEUDONNE RILEY : Hadii aad ku hadasho Soomaali, waaxda luqadaha, qaybta kaalmada adeegyada, waxay goldenin alek peterson . So [place] [phone].    ATENCIÓN: Si habla español, tiene a garza disposición servicios gratuitos de asistencia lingüística. Llame al [phone].    We comply with applicable federal civil rights laws and [place] laws. We do not discriminate on the basis of race,  color, national origin, age, disability, sex, sexual orientation, or gender identity.           Thank you!    Thank you for choosing [facility] for your care. Our goal is always to provide you with excellent care. Hearing back from our patients is one way we can continue to improve our services. Please take a few minutes to complete the written survey that you may receive in the mail after you visit with us. Thank you!            Medication List      ASK your doctor about these medications          Morning Afternoon Evening Bedtime As Needed    ACTIGALL PO  INSTRUCTIONS:  Take 300 mg by mouth 3 times daily                     atovaquone 750 MG/5ML suspension  Also known as:  MEPRON  INSTRUCTIONS:  Take 1,500 mg by mouth daily                     calcium carbonate 500 MG tablet  Also known as:  OS-PANFILO  INSTRUCTIONS:  Take 600 mg by mouth 2 times daily                     CLONAZEPAM PO  INSTRUCTIONS:  Take 1 mg by mouth At Bedtime                     CRESTOR PO  INSTRUCTIONS:  Take 5 mg by mouth daily                     digoxin 125 MCG tablet  Also known as:  LANOXIN  INSTRUCTIONS:  Take 1 tablet (125 mcg) by mouth daily                     FLUCONAZOLE PO  INSTRUCTIONS:  Take 200 mg by mouth daily                     furosemide 40 MG tablet  Also known as:  LASIX  INSTRUCTIONS:  Take 1 tablet (40 mg) by mouth daily                     glycine diluent 32.175 mL with epoprostenol 247.5 mcg infusion  INSTRUCTIONS:  Inject 2,799.1 ng/min into the vein continuous Goal 30 ng/kg/min                     magnesium plus protein 133 MG tablet  INSTRUCTIONS:  Take 266 mg by mouth daily (2 tablets)                     PEN-VEE K OR  INSTRUCTIONS:  Take 500 mg by mouth 2 times daily                     predniSONE 2.5 MG tablet  Also known as:  DELTASONE  INSTRUCTIONS:  Take 7.5 mg by mouth daily (3 tablets = 7.5 mg daily).                     PROTONIX PO  INSTRUCTIONS:  Take 40 mg by mouth daily                     ruxolitinib 5 MG  Tabs tablet CHEMO  Also known as:  JAKAFI  INSTRUCTIONS:  Take 1 tablet (5 mg) by mouth 2 times daily                     tadalafil (PAH) 20 MG Tabs  Also known as:  ADCIRCA  INSTRUCTIONS:  Take 2 tablets (40 mg) by mouth daily                     VALACYCLOVIR HCL PO  INSTRUCTIONS:  Take 500 mg by mouth every other day                     VITAMIN D (CHOLECALCIFEROL) PO  INSTRUCTIONS:  Take 2,000 Units by mouth 3 times daily

## 2019-01-18 NOTE — TELEPHONE ENCOUNTER
Received call from pt's wife, Danielle, to discuss specialty pharmacies that will be servicing Flolan ([facility]) and Uptravi ([facility]) for updated plan of transition. Danielle states that she is being told the  requires the Flolan to now be filled by [facility]. As Flolan is a limited distribution medication, contacted  and requested that rx remain with [facility] for optimal continuity of care. After discussion with the pharmacist, [facility] has been approved to continue servicing the Flolan and the authorization for coverage has been extended to 3/1/19.

## 2019-01-18 NOTE — TELEPHONE ENCOUNTER
Called patient and verified he had the titration orders from the Flolan to Uptravi.  He believes he does, but asked that I send them again via [employer] to make sure.  Agreed with plan and advised him that if he had questions he could call or [employer] message me back. .Patient verbalized understanding, agreed with plan and denied any further questions. Ana Ashraf RN on 1/18/2019 at 8:32 AM

## 2019-01-24 ENCOUNTER — CARE COORDINATION (OUTPATIENT)
Dept: CARDIOLOGY | Facility: CLINIC | Age: 54
End: 2019-01-24

## 2019-01-24 NOTE — PROGRESS NOTES
I spoke to Dr. Quan regarding pt transition from Flolan (epoprostenol sodium) to Uptravi (selexipag).  He would like the patient to continue to decrease the flolan by 2 ng/kg/min weekly to 10 ng/kg/min and then at that time start the Uptravi (selexipag) 200 mcg twice daily.  At that time Dr. Quan would him complete a Right heart catheterization and follow up with him in clinic to develop a plan on how to proceed.  I called [facility] and updated them with the plan as well as [facility]. Marlys Anderson RN on 1/24/2019 at 1:36 PM

## 2019-01-29 ENCOUNTER — TELEPHONE (OUTPATIENT)
Dept: CARDIOLOGY | Facility: CLINIC | Age: 54
End: 2019-01-29

## 2019-01-29 NOTE — TELEPHONE ENCOUNTER
PA Initiation    Medication: Adcirca 20 MG  Insurance Company: [facility] - Phone [phone] Fax [phone]  Pharmacy Filling the Rx: [facility] - [place] - [address]  Filling Pharmacy Phone: [phone]  Filling Pharmacy Fax: [phone]  Start Date: 1/29/2019    Urgent renewal request has been faxed to [facility]. Recent clinical documentation has been included with request.

## 2019-02-01 NOTE — TELEPHONE ENCOUNTER
Prior Authorization Approval    Authorization Effective Date: 2/1/2019  Authorization Expiration Date: 1/30/2020  Medication: Adcirca 20 MG - Approved  Approved Dose/Quantity: 60 Tabs/30 Days  Insurance Company: [facility] - Phone [phone] Fax [phone]  Which Pharmacy is filling the prescription (Not needed for infusion/clinic administered): [facility] - [address]    *Received call from [facility] to notify office of approval. Letter to be faxed to office.

## 2019-02-05 ENCOUNTER — CARE COORDINATION (OUTPATIENT)
Dept: CARDIOLOGY | Facility: CLINIC | Age: 54
End: 2019-02-05

## 2019-02-05 NOTE — PROGRESS NOTES
Pt is calling in with questions regarding his Flolan (epoprostenol sodium) to Uptravi (selexipag) Transition.  He will be at 12 ng/kg/min tomorrow and will decrease again to 10 ng/kg/min on the 19th.  He is also scheudled for a Right heart catheterization at that time.  He is wondering if he is suppose to start the Uptravi (selexipag) at that time as well as keep on the Flolan (epoprostenol sodium) 10 ng/kg/min.  I will follow up with Dk Quan MD regarding this.  He is also wondering about follow up.  Marlys Anderson RN on 2/5/2019 at 5:51 PM    I spoke with Dr. Quan regarding patients transition plan and he would like patient to stay at 10 ng/kg/min of the Flolan (epoprostenol sodium).  He will then have the Right heart catheterization on the 19th of Feb.  After the Right heart catheterization he will start the Uptravi (selexipag) 200 mcg twice daily as well as continue the Flolan (epoprostenol sodium) for 4-5 days.  At that time we will decide how to proceed once we see how he reacts to the medication.  I called pt and left a voicemail on patients home phone.  Marlys Anderson RN on 2/6/2019 at 4:41 PM    I spoke with patient regarding above information. Patient verbalized understanding of above plan Patient to call with any further questions or concerns. Marlys Anderson RN on 2/7/2019 at 5:07 PM    Dr. Quan spoke with wife in more detail regarding the above plan. Marlys Anderson RN on 2/8/2019 at 6:09 PM

## 2019-02-07 DIAGNOSIS — I27.20 PULMONARY HYPERTENSION (H): Primary | ICD-10-CM

## 2019-02-08 RX ORDER — FUROSEMIDE 20 MG
TABLET ORAL
Qty: 90 TABLET | Refills: 3 | Status: SHIPPED | OUTPATIENT
Start: 2019-02-08 | End: 2019-12-03

## 2019-02-19 ENCOUNTER — HOSPITAL ENCOUNTER (OUTPATIENT)
Facility: CLINIC | Age: 54
Discharge: HOME | End: 2019-02-19
Attending: INTERNAL MEDICINE | Admitting: INTERNAL MEDICINE
Payer: COMMERCIAL

## 2019-02-19 ENCOUNTER — APPOINTMENT (OUTPATIENT)
Dept: MEDSURG UNIT | Facility: CLINIC | Age: 54
End: 2019-02-19
Attending: INTERNAL MEDICINE
Payer: COMMERCIAL

## 2019-02-19 ENCOUNTER — APPOINTMENT (OUTPATIENT)
Dept: LAB | Facility: CLINIC | Age: 54
End: 2019-02-19
Attending: INTERNAL MEDICINE
Payer: COMMERCIAL

## 2019-02-19 ENCOUNTER — CARE COORDINATION (OUTPATIENT)
Dept: CARDIOLOGY | Facility: CLINIC | Age: 54
End: 2019-02-19

## 2019-02-19 VITALS
OXYGEN SATURATION: 97 % | HEART RATE: 65 BPM | WEIGHT: 250 LBS | DIASTOLIC BLOOD PRESSURE: 74 MMHG | HEIGHT: 77 IN | RESPIRATION RATE: 16 BRPM | BODY MASS INDEX: 29.52 KG/M2 | TEMPERATURE: 98.5 F | SYSTOLIC BLOOD PRESSURE: 121 MMHG

## 2019-02-19 DIAGNOSIS — I73.00 RAYNAUD'S SYNDROME: ICD-10-CM

## 2019-02-19 DIAGNOSIS — I51.89 OTHER ILL-DEFINED HEART DISEASES: ICD-10-CM

## 2019-02-19 DIAGNOSIS — R06.09 DYSPNEA ON EXERTION: ICD-10-CM

## 2019-02-19 DIAGNOSIS — I27.20 PULMONARY HYPERTENSION (H): Primary | Chronic | ICD-10-CM

## 2019-02-19 DIAGNOSIS — I27.20 PULMONARY HYPERTENSION (H): ICD-10-CM

## 2019-02-19 DIAGNOSIS — I73.00 RAYNAUD'S DISEASE WITHOUT GANGRENE: ICD-10-CM

## 2019-02-19 LAB
ANION GAP SERPL CALCULATED.3IONS-SCNC: 5 MMOL/L (ref 3–14)
BASOPHILS # BLD AUTO: 0 10E9/L (ref 0–0.2)
BASOPHILS NFR BLD AUTO: 0.4 %
BUN SERPL-MCNC: 17 MG/DL (ref 7–30)
CALCIUM SERPL-MCNC: 8.8 MG/DL (ref 8.5–10.1)
CHLORIDE SERPL-SCNC: 105 MMOL/L (ref 94–109)
CO2 SERPL-SCNC: 29 MMOL/L (ref 20–32)
CREAT SERPL-MCNC: 1.7 MG/DL (ref 0.66–1.25)
DIFFERENTIAL METHOD BLD: ABNORMAL
EOSINOPHIL # BLD AUTO: 0.1 10E9/L (ref 0–0.7)
EOSINOPHIL NFR BLD AUTO: 0.7 %
ERYTHROCYTE [DISTWIDTH] IN BLOOD BY AUTOMATED COUNT: 18.1 % (ref 10–15)
GFR SERPL CREATININE-BSD FRML MDRD: 44 ML/MIN/{1.73_M2}
GLUCOSE SERPL-MCNC: 98 MG/DL (ref 70–99)
HCT VFR BLD AUTO: 40.4 % (ref 40–53)
HGB BLD-MCNC: 13.1 G/DL (ref 13.3–17.7)
IMM GRANULOCYTES # BLD: 0 10E9/L (ref 0–0.4)
IMM GRANULOCYTES NFR BLD: 0.2 %
LYMPHOCYTES # BLD AUTO: 2.4 10E9/L (ref 0.8–5.3)
LYMPHOCYTES NFR BLD AUTO: 29 %
MCH RBC QN AUTO: 29.9 PG (ref 26.5–33)
MCHC RBC AUTO-ENTMCNC: 32.4 G/DL (ref 31.5–36.5)
MCV RBC AUTO: 92 FL (ref 78–100)
MONOCYTES # BLD AUTO: 1.1 10E9/L (ref 0–1.3)
MONOCYTES NFR BLD AUTO: 13.1 %
NEUTROPHILS # BLD AUTO: 4.7 10E9/L (ref 1.6–8.3)
NEUTROPHILS NFR BLD AUTO: 56.6 %
NRBC # BLD AUTO: 0 10*3/UL
NRBC BLD AUTO-RTO: 0 /100
PLATELET # BLD AUTO: 311 10E9/L (ref 150–450)
POTASSIUM SERPL-SCNC: 3.9 MMOL/L (ref 3.4–5.3)
RBC # BLD AUTO: 4.38 10E12/L (ref 4.4–5.9)
SODIUM SERPL-SCNC: 139 MMOL/L (ref 133–144)
WBC # BLD AUTO: 8.3 10E9/L (ref 4–11)

## 2019-02-19 PROCEDURE — 80048 BASIC METABOLIC PNL TOTAL CA: CPT | Performed by: INTERNAL MEDICINE

## 2019-02-19 PROCEDURE — 36415 COLL VENOUS BLD VENIPUNCTURE: CPT | Performed by: INTERNAL MEDICINE

## 2019-02-19 PROCEDURE — 40000166 ZZH STATISTIC PP CARE STAGE 1

## 2019-02-19 PROCEDURE — 93451 RIGHT HEART CATH: CPT | Mod: 26 | Performed by: INTERNAL MEDICINE

## 2019-02-19 PROCEDURE — 85025 COMPLETE CBC W/AUTO DIFF WBC: CPT | Performed by: INTERNAL MEDICINE

## 2019-02-19 PROCEDURE — 93451 RIGHT HEART CATH: CPT | Performed by: INTERNAL MEDICINE

## 2019-02-19 PROCEDURE — 27210794 ZZH OR GENERAL SUPPLY STERILE: Performed by: INTERNAL MEDICINE

## 2019-02-19 PROCEDURE — 25000125 ZZHC RX 250: Performed by: INTERNAL MEDICINE

## 2019-02-19 RX ORDER — LIDOCAINE HYDROCHLORIDE 10 MG/ML
INJECTION, SOLUTION EPIDURAL; INFILTRATION; INTRACAUDAL; PERINEURAL
Status: DISCONTINUED | OUTPATIENT
Start: 2019-02-19 | End: 2019-02-19 | Stop reason: HOSPADM

## 2019-02-19 RX ORDER — LIDOCAINE 40 MG/G
CREAM TOPICAL
Status: DISCONTINUED | OUTPATIENT
Start: 2019-02-19 | End: 2019-02-19 | Stop reason: HOSPADM

## 2019-02-19 ASSESSMENT — MIFFLIN-ST. JEOR: SCORE: 2091.37

## 2019-02-19 NOTE — PROGRESS NOTES
I spoke with Dr. Quan regarding patients plan for transition off of Flolan (epoprostenol sodium) and on to Uptravi (selexipag).  Pt will start flolan tonight 200 mcg twice daily and increase weekly to a goal of 800 mcg twice daily.  At that time we will complete a Right heart catheterization on 3/20 and decide how to proceed.  I have communicated this plan with the pt and the specialty pharmacies. Marlys Anderson RN on 2/19/2019 at 4:17 PM    Pt is scheduled for his Right heart catheterization on 3/20 with a 7AM check in.  I called pt and reviewed appts and procedure with pt. Marlys Anderson RN on 2/21/2019 at 1:56 PM

## 2019-02-19 NOTE — DISCHARGE INSTRUCTIONS
[facility]                        Interventional Cardiology  Discharge Instructions   Post Right Heart Cath      AFTER YOU GO HOME:    DO drink plenty of fluids    DO resume your regular diet and medications unless otherwise instructed by your Primary Physician    Do Not scrub the procedure site vigorously    No lotion or powder to the puncture site for 3 days    CALL YOUR PRIMARY PHYSICIAN IF: You may resume all normal activity.  Monitor neck site for bleeding, swelling, or voice changes. If you notice bleeding or swelling immediately apply pressure to the site and call number below to speak with Cardiology Fellow.  If you experience any changes in your breathing you should call your doctor immediately or come to the closest Emergency Department.  Do not drive yourself.    ADDITIONAL INSTRUCTIONS: Medications: You are to resume all home medications including anticoagulation therapy unless otherwise advised by your primary cardiologist or nurse coordinator.    Follow Up: Per your primary cardiology team    If you have any questions or concerns regarding your procedure site please call [phone] at anytime and ask for Cardiology Fellow on call.  They are available 24 hours a day.  You may also contact the Cardiology Clinic after hours number at [phone].                                                       Telephone Numbers [phone] Monday-Friday 8:00 am to 4:30 pm    [phone] After 4:30 pm Monday-Friday, Weekends & Holidays  Ask for Interventional Cardiologist on call. Someone is on call 24 hours/day   [facility] toll free number [phone] Monday-Friday 8:00 am to 4:30 pm   [facility] Emergency Dept [phone]

## 2019-02-19 NOTE — IP AVS SNAPSHOT
[address] [address]                                    After Visit Summary   2/19/2019    Olivier Gómez    MRN: [id number]           After Visit Summary Signature Page    I have received my discharge instructions, and my questions have been answered. I have discussed any challenges I see with this plan with the nurse or doctor.    ..........................................................................................................................................  Patient/Patient Representative Signature      ..........................................................................................................................................  Patient Representative Print Name and Relationship to Patient    ..................................................               ................................................  Date                                   Time    ..........................................................................................................................................  Reviewed by Signature/Title    ...................................................              ..............................................  Date                                               Time          22EPIC Rev 08/18

## 2019-02-19 NOTE — IP AVS SNAPSHOT
MRN:[id number]                      After Visit Summary   2/19/2019    Olivier Gómez    MRN: [id number]           Visit Information        Department      2/19/2019  9:19 AM [address] [facility]          Review of your medicines      UNREVIEWED medicines. Ask your doctor about these medicines       Dose / Directions   ACTIGALL PO      Dose:  300 mg  Take 300 mg by mouth 3 times daily  Refills:  0     atovaquone 750 MG/5ML suspension  Commonly known as:  MEPRON      Dose:  1500 mg  Take 1,500 mg by mouth daily  Refills:  0     calcium carbonate 500 MG tablet  Commonly known as:  OS-PANFILO      Dose:  600 mg  Take 600 mg by mouth 2 times daily  Refills:  0     CLONAZEPAM PO      Dose:  1 mg  Take 1 mg by mouth At Bedtime  Refills:  0     CRESTOR PO      Dose:  5 mg  Take 5 mg by mouth daily  Refills:  0     digoxin 125 MCG tablet  Commonly known as:  LANOXIN  Used for:  Pulmonary hypertension (H)      Dose:  125 mcg  Take 1 tablet (125 mcg) by mouth daily  Quantity:  90 tablet  Refills:  3     FLUCONAZOLE PO      Dose:  200 mg  Take 200 mg by mouth daily  Refills:  0     * furosemide 40 MG tablet  Commonly known as:  LASIX  Used for:  Pulmonary hypertension (H)      Dose:  40 mg  Take 1 tablet (40 mg) by mouth daily  Quantity:  30 tablet  Refills:  0     * furosemide 20 MG tablet  Commonly known as:  LASIX  Used for:  Pulmonary hypertension (H)      TAKE 1 TABLET (20 MG) by MOUTH 1 TIME PER DAY]  Quantity:  90 tablet  Refills:  3     glycine diluent 32.175 mL with epoprostenol 247.5 mcg infusion  Used for:  Pulmonary hypertension (H)      Dose:  23 ng/kg/min  Inject 2,799.1 ng/min into the vein continuous Goal 30 ng/kg/min  Quantity:  1 each  Refills:  11     magnesium plus protein 133 MG tablet      Dose:  266 mg  Take 266 mg by mouth daily (2 tablets)  Refills:  0     PEN-VEE K OR      Dose:  500 mg  Take 500 mg by mouth 2 times daily  Refills:  0     predniSONE 2.5 MG tablet  Commonly known as:   DELTASONE  Used for:  Pulmonary hypertension (H)      Dose:  7.5 mg  Take 7.5 mg by mouth daily (3 tablets = 7.5 mg daily).  Quantity:  180 tablet  Refills:  0     PROTONIX PO      Dose:  40 mg  Take 40 mg by mouth daily  Refills:  0     ruxolitinib 5 MG Tabs tablet CHEMO  Commonly known as:  JAKAFI  Used for:  Pericardial effusion, Pulmonary hypertension (H), Rdqfy-tnodwp-pmpt disease (H)      Dose:  5 mg  Take 1 tablet (5 mg) by mouth 2 times daily  Quantity:  60 tablet  Refills:  0     tadalafil (PAH) 20 MG Tabs  Commonly known as:  ADCIRCA  Used for:  Pulmonary hypertension (H)      Dose:  40 mg  Take 2 tablets (40 mg) by mouth daily  Quantity:  60 tablet  Refills:  11     VALACYCLOVIR HCL PO      Dose:  500 mg  Take 500 mg by mouth every other day  Refills:  0     VITAMIN D (CHOLECALCIFEROL) PO      Dose:  2000 Units  Take 2,000 Units by mouth 3 times daily  Refills:  0         * This list has 2 medication(s) that are the same as other medications prescribed for you. Read the directions carefully, and ask your doctor or other care provider to review them with you.                  Protect others around you: Learn how to safely use, store and throw away your medicines at www.disposemymeds.org.       Follow-ups after your visit       Your next 10 appointments already scheduled    Feb 19, 2019  Procedure with John Titus MD  [facility]  [facility] ([facility]) [address] [place] [id number]  [phone]   The [facility] is located on the corner of [address] and [facility] on the [facility]. It is easily accessible from virtually any point in the [facility] area, via [address] and [address].   Mar 20, 2019 11:30 AM CDT  Echo Complete with FARIDA  [facility] ([facility]) [address] [address] [facility] [id number] [phone]   1. Please  bring or wear a comfortable two-piece outfit. 2. You may eat, drink and take your normal medicines. 3. For any questions that cannot be answered, please contact the ordering physician      Care Instructions       Further instructions from your care team       [facility]                        Interventional Cardiology  Discharge Instructions   Post Right Heart Cath      AFTER YOU GO HOME:    DO drink plenty of fluids    DO resume your regular diet and medications unless otherwise instructed by your Primary Physician    Do Not scrub the procedure site vigorously    No lotion or powder to the puncture site for 3 days    CALL YOUR PRIMARY PHYSICIAN IF: You may resume all normal activity.  Monitor neck site for bleeding, swelling, or voice changes. If you notice bleeding or swelling immediately apply pressure to the site and call number below to speak with Cardiology Fellow.  If you experience any changes in your breathing you should call your doctor immediately or come to the closest Emergency Department.  Do not drive yourself.    ADDITIONAL INSTRUCTIONS: Medications: You are to resume all home medications including anticoagulation therapy unless otherwise advised by your primary cardiologist or nurse coordinator.    Follow Up: Per your primary cardiology team    If you have any questions or concerns regarding your procedure site please call [phone] at anytime and ask for Cardiology Fellow on call.  They are available 24 hours a day.  You may also contact the Cardiology Clinic after hours number at [phone].                                                       Telephone Numbers [phone] Monday-Friday 8:00 am to 4:30 pm    [phone] After 4:30 pm Monday-Friday, Weekends & Holidays  Ask for Interventional Cardiologist on call. Someone is on call 24 hours/day   [facility] toll free number [phone] Monday-Friday 8:00 am to 4:30 pm   [facility] Emergency Dept [phone]        "            Additional Information About Your Visit       MyChart Information    [employer] gives you secure access to your electronic health record. If you see a primary care provider, you can also send messages to your care team and make appointments. If you have questions, please call your primary care clinic.  If you do not have a primary care provider, please call [phone] and they will assist you.       Care EveryWhere ID    This is your Care EveryWhere ID. This could be used by other organizations to access your [facility] medical records  [id number]       Your Vitals Were     Blood Pressure   113/69          Pulse   64          Temperature   98.3  F (36.8  C)          Respirations   16          Height   1.956 m (6' 5\")             Weight   113.4 kg (250 lb)    Pulse Oximetry   98%    BMI (Body Mass Index)   29.65 kg/m           Primary Care Provider Office Phone # Fax #    Adelso Delgado [phone]      Equal Access to Services    [facility]: Hadii aad ku hadasho Soomaali, waaxda luqadaha, qaybta kaalmada adeegyada, waxay goldenin haytonin michael peterson . So [facility] [phone].    ATENCIÓN: Si habla español, tiene a garza disposición servicios gratuitos de asistencia lingüística. Jair al [phone].    We comply with applicable federal civil rights laws and [place] laws. We do not discriminate on the basis of race, color, national origin, age, disability, sex, sexual orientation, or gender identity.           Thank you!    Thank you for choosing [facility] for your care. Our goal is always to provide you with excellent care. Hearing back from our patients is one way we can continue to improve our services. Please take a few minutes to complete the written survey that you may receive in the mail after you visit with us. Thank you!            Medication List      ASK your doctor about these medications          Morning Afternoon Evening Bedtime As Needed    ACTIGALL PO  INSTRUCTIONS:  Take " 300 mg by mouth 3 times daily                     atovaquone 750 MG/5ML suspension  Also known as:  MEPRON  INSTRUCTIONS:  Take 1,500 mg by mouth daily                     calcium carbonate 500 MG tablet  Also known as:  OS-PANFILO  INSTRUCTIONS:  Take 600 mg by mouth 2 times daily                     CLONAZEPAM PO  INSTRUCTIONS:  Take 1 mg by mouth At Bedtime                     CRESTOR PO  INSTRUCTIONS:  Take 5 mg by mouth daily                     digoxin 125 MCG tablet  Also known as:  LANOXIN  INSTRUCTIONS:  Take 1 tablet (125 mcg) by mouth daily                     FLUCONAZOLE PO  INSTRUCTIONS:  Take 200 mg by mouth daily                     * furosemide 40 MG tablet  Also known as:  LASIX  INSTRUCTIONS:  Take 1 tablet (40 mg) by mouth daily                     * furosemide 20 MG tablet  Also known as:  LASIX  INSTRUCTIONS:  TAKE 1 TABLET (20 MG) by MOUTH 1 TIME PER DAY]                     glycine diluent 32.175 mL with epoprostenol 247.5 mcg infusion  INSTRUCTIONS:  Inject 2,799.1 ng/min into the vein continuous Goal 30 ng/kg/min                     magnesium plus protein 133 MG tablet  INSTRUCTIONS:  Take 266 mg by mouth daily (2 tablets)                     PEN-VEE K OR  INSTRUCTIONS:  Take 500 mg by mouth 2 times daily                     predniSONE 2.5 MG tablet  Also known as:  DELTASONE  INSTRUCTIONS:  Take 7.5 mg by mouth daily (3 tablets = 7.5 mg daily).                     PROTONIX PO  INSTRUCTIONS:  Take 40 mg by mouth daily                     ruxolitinib 5 MG Tabs tablet CHEMO  Also known as:  JAKAFI  INSTRUCTIONS:  Take 1 tablet (5 mg) by mouth 2 times daily                     tadalafil (PAH) 20 MG Tabs  Also known as:  ADCIRCA  INSTRUCTIONS:  Take 2 tablets (40 mg) by mouth daily                     VALACYCLOVIR HCL PO  INSTRUCTIONS:  Take 500 mg by mouth every other day                     VITAMIN D (CHOLECALCIFEROL) PO  INSTRUCTIONS:  Take 2,000 Units by mouth 3 times daily                         * This list has 2 medication(s) that are the same as other medications prescribed for you. Read the directions carefully, and ask your doctor or other care provider to review them with you.

## 2019-02-19 NOTE — PROGRESS NOTES
Returned from CCL s/p RHC.  VSS.  Denies pain.  RIJ site covered with a bandaid, CDI.  Reviewed discharge instructions with patient and his wife.  Discharged to [facility] Waiting Room with wife.

## 2019-02-19 NOTE — PROGRESS NOTES
Arrived from home for a RHC in hopes of having Flolan infusion discontinued.  VSS.  Denies pain.  Consent obtained.  Ready for procedure.

## 2019-02-19 NOTE — PROCEDURES
PROCEDURES PERFORMED:   Right Heart Catheterization    PHYSICIANS:  Attending Physician: John Titus MD  Interventional Cardiology Fellow: None  Cardiology Fellow: Reji Lieberman MD    INDICATION:  Olivier Gómez is a 53 year old male with pHTN and AML, who presents on an elective basis. The clinical presentation was Pericardial Disease, Other:  evaluation of pHTN.      DESCRIPTION:  1. Consent obtained with discussion of risks.  All questions were answered.  2. Sterile prep and procedure.  3. Location with Sheaths:   Rt IJ  7 Fr 10 cm [short]  4. Access: Local anesthetic with lidocaine.  A standard 18 guage needle with ultrasound guidance was used to establish vascular access using a modified Seldinger technique.  5. Diagnostic Catheters:   7 Fr  [place] Chris  6. Guiding Catheters:  None  6. Estimated blood loss: < 5 ml    MEDICATIONS:  The procedure was performed under no sedation.    Heart rate, BP, respiration, oxygen saturation and patient responses were monitored throughout the procedure with the assistance of the RN under my supervision.    Procedures:    HEMODYNAMICS:  BSA 2.48  1. HR 61 bpm  2. /77/99 mmHg  3. RA 5/4/2   4. RV 55/1  5. PA 53/22/35   6. PCW -/-/9   7. PA sat 75.9%   8. PCW sat 98%  9. Hgb 12.4 g/dL   10. Carter CO 7.2   11. Carter CI 2.9   12. TD CO 6.7   13. TD CI 2.7   14. PVR 3.6        Sheath Removal:  The RIJ sheath was manually removed in the cardiac catheterization laboratory.    Contrast: Isovue, 0 ml     Fluoroscopy Time: 0.7 min    COMPLICATIONS:  1. None    SUMMARY:   >> Normal right sided filling pressures.  >> Normal left sided filling pressures.  >> Moderate pulmonary artery hypertension  >> Normal cardiac output, 7.2 L/min with index 2.9 L/min/m2    PLAN:   >> Bedrest per protocol.  >>. Discharge today per protocol   >> Dr. Quan to talk to patient in 2 A    The attending interventional cardiologist was present and supervised all critical aspects the  procedure.    Findings discussed with Dr. Quan.    See CVIS report for final draft.    Reji Lieberman MD   Cardiology Fellow    John Titus MD   Cardiology Staff      I was present for the entire procedure.     John Titus M.D.  Interventional Cardiology  [facility]  Pager: [phone]

## 2019-02-21 RX ORDER — LIDOCAINE 40 MG/G
CREAM TOPICAL
Status: CANCELLED | OUTPATIENT
Start: 2019-02-21

## 2019-02-27 ENCOUNTER — PATIENT OUTREACH (OUTPATIENT)
Dept: CARDIOLOGY | Facility: CLINIC | Age: 54
End: 2019-02-27

## 2019-02-27 ENCOUNTER — DOCUMENTATION ONLY (OUTPATIENT)
Dept: CARE COORDINATION | Facility: CLINIC | Age: 54
End: 2019-02-27

## 2019-02-27 DIAGNOSIS — I27.20 PULMONARY HYPERTENSION (H): Primary | ICD-10-CM

## 2019-02-27 NOTE — PROGRESS NOTES
"I called pt to follow up on his uptitration of the Uptravi (selexipag).  He increased last night to the 400 mcg twice daily.  He stated that he is not having any side effects and is feeling \"really good\".  I will follow up with pt in one week to see how he is doing when he increases to the 600 mcg twice daily. Marlys Anderson RN on 2/27/2019 at 11:42 AM  "

## 2019-03-05 ENCOUNTER — TELEPHONE (OUTPATIENT)
Dept: CARDIOLOGY | Facility: CLINIC | Age: 54
End: 2019-03-05

## 2019-03-05 ENCOUNTER — PATIENT OUTREACH (OUTPATIENT)
Dept: CARDIOLOGY | Facility: CLINIC | Age: 54
End: 2019-03-05

## 2019-03-05 DIAGNOSIS — I27.20 PULMONARY HYPERTENSION (H): Primary | ICD-10-CM

## 2019-03-05 NOTE — PROGRESS NOTES
Pt had a visit with Harrison Nurse from [facility] regarding increase of Uptravi (selexipag).    The patient just got back from vacation this am and states that he is a little tired from the vacation and travel.   The patient has a SPO2 95 % on room air while resting  The patient started taking Uptravi  200 mcg BID with his Flolan on February 18th at Dr. Short office, he titrated up to 400 mcg BID on February 25th, with only 1 episode of a slight headache on day 2, since then no problems, he will titrate up to 600 mcg on March 5th. Said he hasn t noticed a change in anything, thought he would have lots os side effects but that s not happening yet. He states that he was upset that his pressures went slightly up when he was at [facility] office on February 18th. Other than up titrating on his Uptravi, he denies any other medication changes or hospitalizations since last visit.  He understands that we will meet weekly to report to Dr. Quan. He understands to call Dr. Quan if he gets side effects that last through out the week, or seem intolerable. The visit was uneventful.   Current Vital Signs Weight 250, Respirations 20, Temperature 99.6 and I checked that twice, Pulse 82, B/P 128/82, Lung Sounds Clear in all Fields, Denies any side effects at this time

## 2019-03-05 NOTE — TELEPHONE ENCOUNTER
Received call from Danielle, regarding the prior authorization for IV Flolan. Danielle states she was informed that the authorization  on 3/1/19 and requests the PA be extended so that the next supply can be ordered from [facility]. Contacted [facility] and requested extended coverage for Flolan. Insurance rep discussed pt's case with the pharmacist and approved extended dates of coverage until 5/3/19. Pt's wife was informed of approval and will contact [facility] to schedule next delivery.    Kai Gant  Clinic   Pulmonary Hypertension  [facility]  (P) [phone]

## 2019-03-15 ENCOUNTER — PATIENT OUTREACH (OUTPATIENT)
Dept: CARDIOLOGY | Facility: CLINIC | Age: 54
End: 2019-03-15

## 2019-03-15 DIAGNOSIS — I27.20 PULMONARY HYPERTENSION (H): Primary | ICD-10-CM

## 2019-03-15 NOTE — PROGRESS NOTES
Received message from Harrison Tello from [facility]:  I had a follow up visit with Olivier and his Wife today, he is doing pretty well, minor side effects on this titration.  Patient will titrate up to 800 tomorrow, Is currently at 600 mcg BID today. Patient will see  next Wednesday, scheduled Cardiac Cath, and is supposed to tweet Flolan. Titrated up last Tuesday to 600 mcg BID and got a bad headache and body aches  on day 1 of titration, much like titrating up with Flolan.   Patient has a SPO2 of 97 % on room air while resting.   Patient denies any medication changes or hospitalizations since last visit. Patient states that his highest dose of Flolan was 30 ng/kg/min, states he has been compliant on all of his medications. He asked me to call the pharmacy to get an idea what a ng of Flolan is equivalent to a mcg of Uptravi. Pharmacist said they is no true conversation, but a ball park would be 2 ng s roughly equal to 200 mcg of Flolan. Patient is anxious to what his medications will be adjusted to next week on Wednesday March 20th.  Current vital signs Weight 250, Respirations 20, Temperature 98.7, Pulse 62 and regular, B/P 130/84, lung sounds clear in all fields.Patient was wondering if you could ask Dr. Dixon if he is going to stay the night Wednesday in the hospital.  __________________________________    I let the pt know that it would be difficult to say if he would be staying the night in the hospital on Wednesday until we see what the Right heart catheterization shows.  Pt states that he hopes the medication is working, he only has a slight headache for a day after the increase.   Patient verbalized understanding of above plan, Patient to call with any further questions or concerns.  Marlys Anderson RN on 3/15/2019 at 8:16 AM

## 2019-03-20 ENCOUNTER — HOSPITAL ENCOUNTER (OUTPATIENT)
Facility: CLINIC | Age: 54
Discharge: HOME | End: 2019-03-20
Attending: INTERNAL MEDICINE | Admitting: INTERNAL MEDICINE
Payer: COMMERCIAL

## 2019-03-20 ENCOUNTER — OFFICE VISIT (OUTPATIENT)
Dept: CARDIOLOGY | Facility: CLINIC | Age: 54
End: 2019-03-20
Attending: INTERNAL MEDICINE
Payer: COMMERCIAL

## 2019-03-20 ENCOUNTER — HOSPITAL ENCOUNTER (OUTPATIENT)
Dept: CARDIOLOGY | Facility: CLINIC | Age: 54
End: 2019-03-20
Attending: INTERNAL MEDICINE | Admitting: INTERNAL MEDICINE
Payer: COMMERCIAL

## 2019-03-20 ENCOUNTER — APPOINTMENT (OUTPATIENT)
Dept: LAB | Facility: CLINIC | Age: 54
End: 2019-03-20
Attending: INTERNAL MEDICINE
Payer: COMMERCIAL

## 2019-03-20 ENCOUNTER — APPOINTMENT (OUTPATIENT)
Dept: MEDSURG UNIT | Facility: CLINIC | Age: 54
End: 2019-03-20
Attending: INTERNAL MEDICINE
Payer: COMMERCIAL

## 2019-03-20 VITALS
DIASTOLIC BLOOD PRESSURE: 83 MMHG | BODY MASS INDEX: 29.96 KG/M2 | HEART RATE: 89 BPM | SYSTOLIC BLOOD PRESSURE: 128 MMHG | WEIGHT: 253.7 LBS | HEIGHT: 77 IN | OXYGEN SATURATION: 98 %

## 2019-03-20 VITALS
DIASTOLIC BLOOD PRESSURE: 74 MMHG | TEMPERATURE: 98.6 F | OXYGEN SATURATION: 93 % | RESPIRATION RATE: 18 BRPM | SYSTOLIC BLOOD PRESSURE: 112 MMHG | HEART RATE: 79 BPM

## 2019-03-20 DIAGNOSIS — I73.00 RAYNAUD'S DISEASE WITHOUT GANGRENE: ICD-10-CM

## 2019-03-20 DIAGNOSIS — I27.20 PULMONARY HYPERTENSION (H): Chronic | ICD-10-CM

## 2019-03-20 DIAGNOSIS — I27.20 PULMONARY HYPERTENSION (H): Primary | Chronic | ICD-10-CM

## 2019-03-20 DIAGNOSIS — I51.89 OTHER ILL-DEFINED HEART DISEASES: ICD-10-CM

## 2019-03-20 DIAGNOSIS — R06.09 DYSPNEA ON EXERTION: ICD-10-CM

## 2019-03-20 DIAGNOSIS — I27.20 PULMONARY HYPERTENSION (H): ICD-10-CM

## 2019-03-20 LAB
ANION GAP SERPL CALCULATED.3IONS-SCNC: 9 MMOL/L (ref 3–14)
BUN SERPL-MCNC: 26 MG/DL (ref 7–30)
CALCIUM SERPL-MCNC: 9.2 MG/DL (ref 8.5–10.1)
CHLORIDE SERPL-SCNC: 104 MMOL/L (ref 94–109)
CO2 SERPL-SCNC: 26 MMOL/L (ref 20–32)
CREAT SERPL-MCNC: 1.63 MG/DL (ref 0.66–1.25)
ERYTHROCYTE [DISTWIDTH] IN BLOOD BY AUTOMATED COUNT: 18.3 % (ref 10–15)
GFR SERPL CREATININE-BSD FRML MDRD: 47 ML/MIN/{1.73_M2}
GLUCOSE SERPL-MCNC: 94 MG/DL (ref 70–99)
HCT VFR BLD AUTO: 40.1 % (ref 40–53)
HGB BLD-MCNC: 12.9 G/DL (ref 13.3–17.7)
MCH RBC QN AUTO: 29.7 PG (ref 26.5–33)
MCHC RBC AUTO-ENTMCNC: 32.2 G/DL (ref 31.5–36.5)
MCV RBC AUTO: 92 FL (ref 78–100)
NT-PROBNP SERPL-MCNC: 118 PG/ML (ref 0–125)
PLATELET # BLD AUTO: 380 10E9/L (ref 150–450)
POTASSIUM SERPL-SCNC: 3.6 MMOL/L (ref 3.4–5.3)
RBC # BLD AUTO: 4.35 10E12/L (ref 4.4–5.9)
SODIUM SERPL-SCNC: 139 MMOL/L (ref 133–144)
WBC # BLD AUTO: 8.6 10E9/L (ref 4–11)

## 2019-03-20 PROCEDURE — 85027 COMPLETE CBC AUTOMATED: CPT | Performed by: INTERNAL MEDICINE

## 2019-03-20 PROCEDURE — 93451 RIGHT HEART CATH: CPT | Performed by: INTERNAL MEDICINE

## 2019-03-20 PROCEDURE — G0463 HOSPITAL OUTPT CLINIC VISIT: HCPCS | Mod: 25,ZF

## 2019-03-20 PROCEDURE — 83880 ASSAY OF NATRIURETIC PEPTIDE: CPT | Performed by: INTERNAL MEDICINE

## 2019-03-20 PROCEDURE — 99215 OFFICE O/P EST HI 40 MIN: CPT | Mod: ZP | Performed by: INTERNAL MEDICINE

## 2019-03-20 PROCEDURE — 36415 COLL VENOUS BLD VENIPUNCTURE: CPT | Performed by: INTERNAL MEDICINE

## 2019-03-20 PROCEDURE — 80048 BASIC METABOLIC PNL TOTAL CA: CPT | Performed by: INTERNAL MEDICINE

## 2019-03-20 PROCEDURE — 25000125 ZZHC RX 250: Performed by: INTERNAL MEDICINE

## 2019-03-20 PROCEDURE — 93306 TTE W/DOPPLER COMPLETE: CPT | Mod: 26 | Performed by: INTERNAL MEDICINE

## 2019-03-20 PROCEDURE — 93451 RIGHT HEART CATH: CPT | Mod: 26 | Performed by: INTERNAL MEDICINE

## 2019-03-20 PROCEDURE — 40000166 ZZH STATISTIC PP CARE STAGE 1

## 2019-03-20 PROCEDURE — 93306 TTE W/DOPPLER COMPLETE: CPT

## 2019-03-20 PROCEDURE — 27210794 ZZH OR GENERAL SUPPLY STERILE: Performed by: INTERNAL MEDICINE

## 2019-03-20 RX ORDER — LIDOCAINE 40 MG/G
CREAM TOPICAL
Status: CANCELLED | OUTPATIENT
Start: 2019-03-20

## 2019-03-20 RX ORDER — LIDOCAINE 40 MG/G
CREAM TOPICAL
Status: COMPLETED | OUTPATIENT
Start: 2019-03-20 | End: 2019-03-20

## 2019-03-20 RX ADMIN — LIDOCAINE: 40 CREAM TOPICAL at 08:17

## 2019-03-20 ASSESSMENT — MIFFLIN-ST. JEOR: SCORE: 2108.16

## 2019-03-20 ASSESSMENT — PAIN SCALES - GENERAL: PAINLEVEL: NO PAIN (0)

## 2019-03-20 NOTE — Clinical Note
Potential access sites were evaluated for patency using ultrasound.   The right jugular vein was selected. Access was obtained under with Sonosite guidance using a standard 18 guage needle with direct visualization of needle entry.

## 2019-03-20 NOTE — IP AVS SNAPSHOT
[address] [address]                                    After Visit Summary   3/20/2019    Olivier Gómez    MRN: [id number]           After Visit Summary Signature Page    I have received my discharge instructions, and my questions have been answered. I have discussed any challenges I see with this plan with the nurse or doctor.    ..........................................................................................................................................  Patient/Patient Representative Signature      ..........................................................................................................................................  Patient Representative Print Name and Relationship to Patient    ..................................................               ................................................  Date                                   Time    ..........................................................................................................................................  Reviewed by Signature/Title    ...................................................              ..............................................  Date                                               Time          22EPIC Rev 08/18

## 2019-03-20 NOTE — LETTER
3/20/2019      RE: Olivier Gómez  [address]  [place] [address]       Dear Colleague,    Thank you for the opportunity to participate in the care of your patient, Olivier Gómez, at the [facility]. Please see a copy of my visit note below.    See dictated note    Service Date: 03/20/2019      1.  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   2.  Severe pulmonary hypertension, previously treated with chronic parenteral prostacyclin.   3.  History of remote right heart failure and right ventricular dysfunction.      Dear Doctors:      I write to you regarding Olivier Gómez.  I had the opportunity to see Mr. Gómez today for followup.  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  I was asked to see him a number of years ago because of evidence of pulmonary hypertension, the etiology of which was and remains unclear.  It was, however, my impression that this likely represented a graph pnxjv-ltsfte-zzfd disease, people more knowledgeable than I were circumspect classifying this as the etiology.  In any case, we treated him with chronic oral therapy for several years and he did reasonably well; however, returned of approximately a year ago with severe pulmonary hypertension with a pulmonary artery systolic pressure of 125 and evidence of right heart failure.  In view of the severity of his pulmonary hypertension, we elected to treat him with chronic parenteral prostacyclin.  We also used oral medications.  Because of our suspicion that this could be a manifestation of wcjwi-dlubdm-fsct disease, we also were able to obtain a DELLA/STAT inhibitor.      His response to the combined therapy has been astonishing to us.  In general, we see mild to moderate improvements in the hemodynamics measured when on chronic parenteral  prostacyclin and it is not uncommon for us to see no improvement in impaired right ventricular function despite implementation of otherwise effective therapy.  In Mr. Gómez's case, his hemodynamics as reflected in right heart catheterization completely normalized as did the right ventricular function.  Both Mr. Gómez and my group were gratified.  As you all know, chronic parenteral prostacyclin is an inconvenience to say the least.  He therefore wanted to see if we could ultimately get him off of prostacyclin.      We gradually weaned his prostacyclin and performed intermittent recurrent right heart catheterization, which showed that despite reductions in the dosage of the chronic prostacyclin, his pressures remained essentially normal.      He wanted to come off entirely of pulmonary hypertensive medications; however, I thought that we should not confuse luck with intelligence.  I proposed to him to continue the DELLA/STAT inhibitor while initiating the oral prostacyclin receptor agonist selexipag.  We did this.  We weaned down his prostacyclin to 10 mL/kg/min, which is a virtual homeopathic dose.  He underwent repeat right heart catheterization today which demonstrated persistent improvements in the pulmonary artery pressures.  His thermodilution cardiac output was high at 8.1, likely because of the medication as well as his size.  His pulmonary vascular resistance was normal at 2.21.  His right ventricular stroke work was within normal limits.  His right atrial pressure was 3.  His right ventricular pressure was 37/3.  His pulmonary capillary wedge pressure was 4.  His pulmonary artery pressure while on selexipag 1000 b.i.d. and Remodulin 10 ng/kg/min was 37/15 with a mean of 23.      As stated above, this is in an astonishing result.  We have therefore instructed him that he may have the Queen catheter discontinued and we will continue to follow him closely on medical management consisting of the DELLA/STAT  inhibitor and the prostacyclin agonist.  He was instructed to call us immediately should he experience a deterioration in his condition as characterized by decreasing exercise tolerance, increasing fatigue, exertional syncope, presyncope or symptoms of right heart failure.  He will return to see me in 6 weeks, sooner should he not be doing well.      Thank you very much for allowing us to participate in his care.        Pili Quan MD, Professor of Cardiovascular Medicine and Surgery Division of Cardiology Department of Medicine [facility]         PILI QUAN MD             D: 2019   T: 2019   MT: GABY      Name:     BENEDICT ESTES   MRN:      [id number]        Account:      [id number]   :      1965           Service Date: 2019      Document: [id number]       cc: Manuel Richardson MD       Copy for Provider        Adelso Martin MD      Please do not hesitate to contact me if you have any questions/concerns.     Sincerely,     Pili Quan MD

## 2019-03-20 NOTE — PATIENT INSTRUCTIONS
Medication Changes:   Today we stopped the Flolan (epoprostenol sodium).      You will need to flush your site every day.   Flush with Normal Saline 5-10 ml first (If you have any resistance pull back)   Then Flush with Heparin 3 ml   That's it! :)    We will continue to increase your Uptravi (selexipag) either weekly or every 2 weeks depending on how you are feeling.    Patient Instructions:  1. Continue staying active and eat a heart healthy diet.    2. Please keep current list of medications with you at all times.    3. Remember to weigh yourself daily after voiding and before you consume any food or beverages and log the numbers.  If you have gained/lost 2 pounds overnight or 5 pounds in a week contact us immediately for medication adjustments or further instructions.    4. **Please call us immediately if you have any syncope (fainting or passing out), chest pain, edema (swelling or weight gain), or decline in your functional status (general decline in how you are feeling).    Follow up Appointment Information:  You will need to call us every 3 days and let me know how you are feeing.    6 weeks Right heart catheterization and Echocardiogram    Results:  At this appointment we reviewed you Right heart catheterization, Echocardiogram and Labs  We are located on the third floor of the [facility] [place] [facility].  Our address is     [address] [place] [address] on 3rd Floor   [place] [id number]      Thank you for allowing us to be a part of your care here at the [facility]    If you have questions or concerns please contact us at:    Marlys Anderson, RN, BSN    Kai Gant (Schedule,Prior Auth)  Nurse Coordinator     Clinic   Pulmonary Hypertension   Pulmonary Hypertension  [facility]  (P)[phone]    (P)  [phone]        (F)[phone]                ** Please note that you will NOT receive a reminder call regarding your scheduled testing, reminder calls are for provider appointments only.  If you are scheduled for testing within the [facility] system you may receive a call regarding pre-registration for billing purposes only.**     Support Group:  Pulmonary Hypertension Association  Https://www.phassociation.org/  **Look at the Events Tab** They even have Support Groups that you can call into    [facility] PH Support Group  Second Saturday of the Month from 1-3 PM   Location: [address] [place] [address]  Leader: Sofia Hussein  Phone: [phone] or [phone]  Email: [email or web address]

## 2019-03-20 NOTE — PROGRESS NOTES
0820 Pt on 2a prepped and ready for RHC. Lidocaine applied to right neck. Family at bedside. Pt consented.

## 2019-03-20 NOTE — NURSING NOTE
Chief Complaint   Patient presents with     Follow Up     Return for PH F/U     Vitals were taken and medications were reconciled.     Grace Pulido MA    11:11 AM

## 2019-03-20 NOTE — DISCHARGE INSTRUCTIONS
[facility]                        Interventional Cardiology  Discharge Instructions   Post Right Heart Cath      AFTER YOU GO HOME:    DO drink plenty of fluids    DO resume your regular diet and medications unless otherwise instructed by your Primary Physician    Do Not scrub the procedure site vigorously    No lotion or powder to the puncture site for 3 days    CALL YOUR PRIMARY PHYSICIAN IF: You may resume all normal activity.  Monitor neck site for bleeding, swelling, or voice changes. If you notice bleeding or swelling immediately apply pressure to the site and call number below to speak with Cardiology Fellow.  If you experience any changes in your breathing you should call your doctor immediately or come to the closest Emergency Department.  Do not drive yourself.    ADDITIONAL INSTRUCTIONS: Medications: You are to resume all home medications including anticoagulation therapy unless otherwise advised by your primary cardiologist or nurse coordinator.    Follow Up: Per your primary cardiology team    If you have any questions or concerns regarding your procedure site please call [phone] at anytime and ask for Cardiology Fellow on call.  They are available 24 hours a day.  You may also contact the Cardiology Clinic after hours number at [phone].                                                       Telephone Numbers [phone] Monday-Friday 8:00 am to 4:30 pm    [phone] After 4:30 pm Monday-Friday, Weekends & Holidays  Ask for Interventional Cardiologist on call. Someone is on call 24 hours/day   [facility] toll free number [phone] Monday-Friday 8:00 am to 4:30 pm   [facility] Emergency Dept [phone]

## 2019-03-20 NOTE — IP AVS SNAPSHOT
MRN:[id number]                      After Visit Summary   3/20/2019    Olivier Gómez    MRN: [id number]           Visit Information        Department      3/20/2019  6:48 AM [address] [facility]          Review of your medicines      UNREVIEWED medicines. Ask your doctor about these medicines       Dose / Directions   ACTIGALL PO      Dose:  300 mg  Take 300 mg by mouth 3 times daily  Refills:  0     atovaquone 750 MG/5ML suspension  Commonly known as:  MEPRON      Dose:  1500 mg  Take 1,500 mg by mouth daily  Refills:  0     calcium carbonate 500 MG tablet  Commonly known as:  OS-PANFILO      Dose:  600 mg  Take 600 mg by mouth 2 times daily  Refills:  0     CLONAZEPAM PO      Dose:  1 mg  Take 1 mg by mouth At Bedtime  Refills:  0     CRESTOR PO      Dose:  5 mg  Take 5 mg by mouth daily  Refills:  0     digoxin 125 MCG tablet  Commonly known as:  LANOXIN  Used for:  Pulmonary hypertension (H)      Dose:  125 mcg  Take 1 tablet (125 mcg) by mouth daily  Quantity:  90 tablet  Refills:  3     FLUCONAZOLE PO      Dose:  200 mg  Take 200 mg by mouth daily  Refills:  0     * furosemide 40 MG tablet  Commonly known as:  LASIX  Used for:  Pulmonary hypertension (H)      Dose:  40 mg  Take 1 tablet (40 mg) by mouth daily  Quantity:  30 tablet  Refills:  0     * furosemide 20 MG tablet  Commonly known as:  LASIX  Used for:  Pulmonary hypertension (H)      TAKE 1 TABLET (20 MG) by MOUTH 1 TIME PER DAY]  Quantity:  90 tablet  Refills:  3     glycine diluent 32.175 mL with epoprostenol 247.5 mcg infusion  Used for:  Pulmonary hypertension (H)      Dose:  23 ng/kg/min  Inject 2,799.1 ng/min into the vein continuous Goal 30 ng/kg/min  Quantity:  1 each  Refills:  11     magnesium plus protein 133 MG tablet      Dose:  266 mg  Take 266 mg by mouth daily (2 tablets)  Refills:  0     PEN-VEE K OR      Dose:  500 mg  Take 500 mg by mouth 2 times daily  Refills:  0     predniSONE 2.5 MG tablet  Commonly known as:   DELTASONE  Used for:  Pulmonary hypertension (H)      Dose:  7.5 mg  Take 7.5 mg by mouth daily (3 tablets = 7.5 mg daily).  Quantity:  180 tablet  Refills:  0     PROTONIX PO      Dose:  40 mg  Take 40 mg by mouth daily  Refills:  0     ruxolitinib 5 MG Tabs tablet CHEMO  Commonly known as:  JAKAFI  Used for:  Pericardial effusion, Pulmonary hypertension (H), Wqjff-fgadfk-zguh disease (H)      Dose:  5 mg  Take 1 tablet (5 mg) by mouth 2 times daily  Quantity:  60 tablet  Refills:  0     tadalafil (PAH) 20 MG Tabs  Commonly known as:  ADCIRCA  Used for:  Pulmonary hypertension (H)      Dose:  40 mg  Take 2 tablets (40 mg) by mouth daily  Quantity:  60 tablet  Refills:  11     VALACYCLOVIR HCL PO      Dose:  500 mg  Take 500 mg by mouth every other day  Refills:  0     VITAMIN D (CHOLECALCIFEROL) PO      Dose:  2000 Units  Take 2,000 Units by mouth 3 times daily  Refills:  0         * This list has 2 medication(s) that are the same as other medications prescribed for you. Read the directions carefully, and ask your doctor or other care provider to review them with you.                  Protect others around you: Learn how to safely use, store and throw away your medicines at www.disposemymeds.org.       Follow-ups after your visit       Your next 10 appointments already scheduled    Mar 20, 2019  Procedure with Ramesh Carrero MD  [facility]  Heart Cath Lab ([facility]) [address] [place] [id number] [phone]   The [facility] is located on the corner of [facility] and [facility] on the [facility]. It is easily accessible from virtually any point in the [facility] area, via [address] and [address].   Mar 20, 2019 10:30 AM CDT  Echo Complete with USUSAN  [facility] Card[facility] Services ([facility]) [address] [facility] [id number] [phone]   1. Please bring or  wear a comfortable two-piece outfit. 2. You may eat, drink and take your normal medicines. 3. For any questions that cannot be answered, please contact the ordering physician   Mar 20, 2019 12:00 PM CDT  (Arrive by 11:45 AM)  RETURN PRIMARY PULMONARY with Dk Quan MD  [facility] ([facility]) [address] [place] [address]  [address] [place] [id number] [phone]      Care Instructions       Further instructions from your care team       [facility]                        Interventional Cardiology  Discharge Instructions   Post Right Heart Cath      AFTER YOU GO HOME:    DO drink plenty of fluids    DO resume your regular diet and medications unless otherwise instructed by your Primary Physician    Do Not scrub the procedure site vigorously    No lotion or powder to the puncture site for 3 days    CALL YOUR PRIMARY PHYSICIAN IF: You may resume all normal activity.  Monitor neck site for bleeding, swelling, or voice changes. If you notice bleeding or swelling immediately apply pressure to the site and call number below to speak with Cardiology Fellow.  If you experience any changes in your breathing you should call your doctor immediately or come to the closest Emergency Department.  Do not drive yourself.    ADDITIONAL INSTRUCTIONS: Medications: You are to resume all home medications including anticoagulation therapy unless otherwise advised by your primary cardiologist or nurse coordinator.    Follow Up: Per your primary cardiology team    If you have any questions or concerns regarding your procedure site please call [phone] at anytime and ask for Cardiology Fellow on call.  They are available 24 hours a day.  You may also contact the Cardiology Clinic after hours number at [phone].                                                       Telephone Numbers [phone] Monday-Friday 8:00 am to 4:30 pm    [phone] After 4:30  pm Monday-Friday, Weekends & Holidays  Ask for Interventional Cardiologist on call. Someone is on call 24 hours/day   [facility] toll free number [phone] Monday-Friday 8:00 am to 4:30 pm   [facility] Emergency Dept [phone]                   Additional Information About Your Visit       MyChart Information    [employer] gives you secure access to your electronic health record. If you see a primary care provider, you can also send messages to your care team and make appointments. If you have questions, please call your primary care clinic.  If you do not have a primary care provider, please call [phone] and they will assist you.       Care EveryWhere ID    This is your Care EveryWhere ID. This could be used by other organizations to access your [facility] medical records  [id number]       Your Vitals Were     Blood Pressure   120/70    Pulse   89    Temperature   98.6  F (37  C) (Oral)    Respirations   16    Pulse Oximetry   98%          Primary Care Provider Office Phone # Fax #    Adelso Delgado [phone]      Equal Access to Services    [facility]: Hadii aad ku hadasho Soomaali, waaxda luqadaha, qaybta kaalmada adeegyada, waxbetina foreman hayuday peterson . So [facility] [phone].    ATENCIÓN: Si habla español, tiene a garza disposición servicios gratuitos de asistencia lingüística. Llame al [phone].    We comply with applicable federal civil rights laws and [place] laws. We do not discriminate on the basis of race, color, national origin, age, disability, sex, sexual orientation, or gender identity.           Thank you!    Thank you for choosing [facility] for your care. Our goal is always to provide you with excellent care. Hearing back from our patients is one way we can continue to improve our services. Please take a few minutes to complete the written survey that you may receive in the mail after you visit with us. Thank you!            Medication List      ASK your doctor about  these medications          Morning Afternoon Evening Bedtime As Needed    ACTIGALL PO  INSTRUCTIONS:  Take 300 mg by mouth 3 times daily                     atovaquone 750 MG/5ML suspension  Also known as:  MEPRON  INSTRUCTIONS:  Take 1,500 mg by mouth daily                     calcium carbonate 500 MG tablet  Also known as:  OS-PANFILO  INSTRUCTIONS:  Take 600 mg by mouth 2 times daily                     CLONAZEPAM PO  INSTRUCTIONS:  Take 1 mg by mouth At Bedtime                     CRESTOR PO  INSTRUCTIONS:  Take 5 mg by mouth daily                     digoxin 125 MCG tablet  Also known as:  LANOXIN  INSTRUCTIONS:  Take 1 tablet (125 mcg) by mouth daily                     FLUCONAZOLE PO  INSTRUCTIONS:  Take 200 mg by mouth daily                     * furosemide 40 MG tablet  Also known as:  LASIX  INSTRUCTIONS:  Take 1 tablet (40 mg) by mouth daily                     * furosemide 20 MG tablet  Also known as:  LASIX  INSTRUCTIONS:  TAKE 1 TABLET (20 MG) by MOUTH 1 TIME PER DAY]                     glycine diluent 32.175 mL with epoprostenol 247.5 mcg infusion  INSTRUCTIONS:  Inject 2,799.1 ng/min into the vein continuous Goal 30 ng/kg/min                     magnesium plus protein 133 MG tablet  INSTRUCTIONS:  Take 266 mg by mouth daily (2 tablets)                     PEN-VEE K OR  INSTRUCTIONS:  Take 500 mg by mouth 2 times daily                     predniSONE 2.5 MG tablet  Also known as:  DELTASONE  INSTRUCTIONS:  Take 7.5 mg by mouth daily (3 tablets = 7.5 mg daily).                     PROTONIX PO  INSTRUCTIONS:  Take 40 mg by mouth daily                     ruxolitinib 5 MG Tabs tablet CHEMO  Also known as:  JAKAFI  INSTRUCTIONS:  Take 1 tablet (5 mg) by mouth 2 times daily                     tadalafil (PAH) 20 MG Tabs  Also known as:  ADCIRCA  INSTRUCTIONS:  Take 2 tablets (40 mg) by mouth daily                     VALACYCLOVIR HCL PO  INSTRUCTIONS:  Take 500 mg by mouth every other day                      VITAMIN D (CHOLECALCIFEROL) PO  INSTRUCTIONS:  Take 2,000 Units by mouth 3 times daily                        * This list has 2 medication(s) that are the same as other medications prescribed for you. Read the directions carefully, and ask your doctor or other care provider to review them with you.

## 2019-03-20 NOTE — PROGRESS NOTES
[facility]   Interventional Cardiology        Consenting/Education for Right Heart Catheterization     Patient understands due to their pulmonary hypertension we would like to perform right heart catheterization.  This procedure will be performed by Dr. Carrero.     Patient understands during the right heart catheterization, a fine tube (catheter) is put into the vein of the groin/neck.  It is carefully passed along until it reaches the heart and then goes up into the blood vessels of the lungs. This is done to measure a variety of pressures in your heart and can tell us how well the heart is filling and emptying, as well as monitor fluid status. This is usually painless. The doctor uses x-ray imaging to see the catheter. Afterwards, the catheter is removed, and incision site covered before leaving the cath lab. This procedure is done with no sedation, usually only requiring Lidocaine.     Patient also understands risks and complications of the procedure which include, but are not limited to bruising/swelling around the incision site, infection, bleeding, allergic reaction to local anesthetic, air embolism, arterial puncture, stroke, heart attack.       Patient verbalized understanding of risks and benefits of the right heart catheterization and has elected to proceed with right heart catheterization.       Gayle Heller, PILAR, CNP  [facility] Interventional Cardiology  [phone]

## 2019-03-21 ENCOUNTER — PATIENT OUTREACH (OUTPATIENT)
Dept: CARDIOLOGY | Facility: CLINIC | Age: 54
End: 2019-03-21

## 2019-03-21 DIAGNOSIS — I27.20 PULMONARY HYPERTENSION (H): Primary | ICD-10-CM

## 2019-03-21 NOTE — PROGRESS NOTES
Pt called in stating that he is feeling great with no side effects.  I spoke with Dr. Quan and he stated that as long as he feels good and does not have and symptoms of rebound Pulmonary Hypertension then he can have the line pulled in [place].  I notified the pt of this information.  I will follow up with the pt on Monday to see how he is feeling. Marlys Anderson RN on 3/21/2019 at 2:32 PM

## 2019-03-22 NOTE — PROGRESS NOTES
Service Date: 03/20/2019      1.  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   2.  Severe pulmonary hypertension, previously treated with chronic parenteral prostacyclin.   3.  History of remote right heart failure and right ventricular dysfunction.      Dear Doctors:      I write to you regarding Olivier Gómez.  I had the opportunity to see Mr. Gómez today for followup.  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  I was asked to see him a number of years ago because of evidence of pulmonary hypertension, the etiology of which was and remains unclear.  It was, however, my impression that this likely represented a graph zmkjz-ppojta-snwd disease, people more knowledgeable than I were circumspect classifying this as the etiology.  In any case, we treated him with chronic oral therapy for several years and he did reasonably well; however, returned of approximately a year ago with severe pulmonary hypertension with a pulmonary artery systolic pressure of 125 and evidence of right heart failure.  In view of the severity of his pulmonary hypertension, we elected to treat him with chronic parenteral prostacyclin.  We also used oral medications.  Because of our suspicion that this could be a manifestation of ybgke-pdegkl-ywkb disease, we also were able to obtain a DELLA/STAT inhibitor.      His response to the combined therapy has been astonishing to us.  In general, we see mild to moderate improvements in the hemodynamics measured when on chronic parenteral prostacyclin and it is not uncommon for us to see no improvement in impaired right ventricular function despite implementation of otherwise effective therapy.  In Mr. Gómez's case, his hemodynamics as reflected in right heart catheterization completely normalized as did the right ventricular function.  Both Mr. Gómez and my group were  gratified.  As you all know, chronic parenteral prostacyclin is an inconvenience to say the least.  He therefore wanted to see if we could ultimately get him off of prostacyclin.      We gradually weaned his prostacyclin and performed intermittent recurrent right heart catheterization, which showed that despite reductions in the dosage of the chronic prostacyclin, his pressures remained essentially normal.      He wanted to come off entirely of pulmonary hypertensive medications; however, I thought that we should not confuse luck with intelligence.  I proposed to him to continue the DELLA/STAT inhibitor while initiating the oral prostacyclin receptor agonist selexipag.  We did this.  We weaned down his prostacyclin to 10 mL/kg/min, which is a virtual homeopathic dose.  He underwent repeat right heart catheterization today which demonstrated persistent improvements in the pulmonary artery pressures.  His thermodilution cardiac output was high at 8.1, likely because of the medication as well as his size.  His pulmonary vascular resistance was normal at 2.21.  His right ventricular stroke work was within normal limits.  His right atrial pressure was 3.  His right ventricular pressure was 37/3.  His pulmonary capillary wedge pressure was 4.  His pulmonary artery pressure while on selexipag 1000 b.i.d. and Remodulin 10 ng/kg/min was 37/15 with a mean of 23.      As stated above, this is in an astonishing result.  We have therefore instructed him that he may have the Queen catheter discontinued and we will continue to follow him closely on medical management consisting of the DELLA/STAT inhibitor and the prostacyclin agonist.  He was instructed to call us immediately should he experience a deterioration in his condition as characterized by decreasing exercise tolerance, increasing fatigue, exertional syncope, presyncope or symptoms of right heart failure.  He will return to see me in 6 weeks, sooner should he not be doing  well.      Thank you very much for allowing us to participate in his care.        Pili Quan MD, Professor of Cardiovascular Medicine and Surgery Division of Cardiology Department of Medicine [facility]         PILI QUAN MD             D: 2019   T: 2019   MT: GABY      Name:     BENEDICT ESTES   MRN:      [id number]        Account:      [id number]   :      1965           Service Date: 2019      Document: [id number]       cc: Manuel Richardson MD       Copy for Provider        Adelso Martin MD

## 2019-03-26 ENCOUNTER — PATIENT OUTREACH (OUTPATIENT)
Dept: CARDIOLOGY | Facility: CLINIC | Age: 54
End: 2019-03-26

## 2019-03-26 DIAGNOSIS — I27.20 PULMONARY HYPERTENSION (H): Primary | ICD-10-CM

## 2019-03-26 NOTE — PROGRESS NOTES
I called to follow up with pt.  He is now taking 1200 mcg twice daily of the Uptravi (selexipag).  Pt stated that he feels fine but is coming down with a cold.  He also stated that he had his central line pulled today. Marlys Anderson RN on 3/26/2019 at 4:00 PM    Also received a message from Harrison Nurse with [facility]:    I was able to see Olivier today with his wife.   And he is doing very well.  Patient was [facility] last week on Wednesday March 20th, for another right heart cath, and to D/C his Flolan administration. Right cath pressure down to 22. He titrated up to 1200 mcg BID today, slight headache today, slight muscle cramps with titrations also, Queen is being pulled today.   Patient has a SPO2 of 96 % on room air while resting. Headache during titrations and minor body aches are his only c/o side effects.He states that exercising on treadmill lessens his body aches. He states nervousness in getting his Queen pulled today.  Current Vital Signs Weight 248, Respirations 18, Temperature 99.3, Pulse 78, B/P 110/68, Lung Sounds Clear in all Fields, States that he feels like he is getting a cold.

## 2019-03-29 ENCOUNTER — PATIENT OUTREACH (OUTPATIENT)
Dept: CARDIOLOGY | Facility: CLINIC | Age: 54
End: 2019-03-29

## 2019-03-29 DIAGNOSIS — I27.20 PULMONARY HYPERTENSION (H): Primary | ICD-10-CM

## 2019-03-29 NOTE — PROGRESS NOTES
I called pt to follow up on how he is feeling.  He states his heart feels fine.  He stated that the side effects are getting worse but are subsiding as the days go on.  I will follow up with pt next week. Marlys Anderson RN on 3/29/2019 at 3:10 PM

## 2019-04-02 ENCOUNTER — PATIENT OUTREACH (OUTPATIENT)
Dept: CARDIOLOGY | Facility: CLINIC | Age: 54
End: 2019-04-02

## 2019-04-02 DIAGNOSIS — I27.20 PULMONARY HYPERTENSION (H): Primary | ICD-10-CM

## 2019-04-02 NOTE — PROGRESS NOTES
Received message from Harrison Tello from [facility]:     I was able to see Olivier Gómez today, and he is doing pretty well.      The patient states his cold is gone lasted 3 days, no congestion.  The patient will titrate up to 1400 mcg BID tonight. Currently at 1200 mcg BID  The patient has a SPO2 of 96 % on room air while resting   Patient has an another heart cath scheduled April 25th to check post Queen pulling and D/C of Flolan.  The patient states days 1-3 of titrations he notices side effects, headache 7,  1-10 scale, body aches mostly in the joints , gets better with movement. And this last titration as noticed first bite jaw pain, he states feels like when he was tirating up on Flolan, states days 4-7 side effects much better but still present.  Current Vital Signs Weight 248, Pulse 64, Respirations 18, Temperature 99.3, B/P 112/68, Lung Sounds Clear in all Fields. Patient states next week he will be in [place] to follow up with his bone marrow MD.

## 2019-04-04 ENCOUNTER — PATIENT OUTREACH (OUTPATIENT)
Dept: CARDIOLOGY | Facility: CLINIC | Age: 54
End: 2019-04-04

## 2019-04-04 DIAGNOSIS — I27.20 PULMONARY HYPERTENSION (H): Primary | ICD-10-CM

## 2019-04-04 NOTE — PROGRESS NOTES
Called pt to follow up on how he is feeling on the 1400 mcg twice daily of the Uptravi (selexipag).  I was unable to reach him and left a voicemail requesting a call back. Marlys Anderson RN on 4/4/2019 at 10:45 AM

## 2019-04-08 NOTE — TELEPHONE ENCOUNTER
As pt is still completing Uptravi titration and current authorization is expiring, contacted [facility] to request extension of current PA. Authorization for Uptravi titration pack has been extended until 4/20/19.

## 2019-04-09 ENCOUNTER — PATIENT OUTREACH (OUTPATIENT)
Dept: CARDIOLOGY | Facility: CLINIC | Age: 54
End: 2019-04-09

## 2019-04-09 DIAGNOSIS — I27.20 PULMONARY HYPERTENSION (H): Primary | ICD-10-CM

## 2019-04-09 NOTE — PROGRESS NOTES
I called pt to follow up on how he is feeling since titrating up to the max of the Uptravi (selexipag) 1600 mcg twice daily.  I was unable to reach him and left him a message to call back. Marlys Anderson RN on 4/9/2019 at 2:21 PM     Pt called back and he feels fine on the 1600 twice daily of the Uptravi (selexipag).  He also stated that he was at [facility] for his BMT follow up and they change his prednisone to 2.5 mg every other day and he wanted to make sure that this was ok with us. I will follow up with Dr. Quan and get back with the patient. Marlys Anderson RN on 4/10/2019 at 8:47 AM     I spoke with Dr. Quan and he said that he was ok to change the prednisone.  He also stated that his doctor stopped his Ursodiol.  He states that he feels good. Marlys Anderson RN on 4/11/2019 at 11:39 AM

## 2019-04-18 ENCOUNTER — TELEPHONE (OUTPATIENT)
Dept: CARDIOLOGY | Facility: CLINIC | Age: 54
End: 2019-04-18

## 2019-04-18 NOTE — TELEPHONE ENCOUNTER
PA Initiation    Medication: Uptravi [address] [facility]  Insurance Company: [facility] - Phone [phone] Fax [phone]  Pharmacy Filling the Rx: [facility] - [place], [place] - [address]  Filling Pharmacy Phone: [phone]  Filling Pharmacy Fax:    Start Date: 4/18/2019    Renewal request and clinical documentation have been faxed to [facility] for review.

## 2019-04-23 NOTE — TELEPHONE ENCOUNTER
Prior Authorization Approval    Authorization Effective Date: 4/19/2019  Authorization Expiration Date: 4/19/2020  Medication: Uptravi 1600 mcg  Approved Dose/Quantity: 60 Tabs/30 Days  Reference #: [id number]   Insurance Company: [facility] - Phone [phone] Fax [phone]  Which Pharmacy is filling the prescription (Not needed for infusion/clinic administered): [facility] - [place], [place] - [address] [place] [address]  Pharmacy Notified: Yes  Patient Notified: Yes

## 2019-04-25 ENCOUNTER — PATIENT OUTREACH (OUTPATIENT)
Dept: CARDIOLOGY | Facility: CLINIC | Age: 54
End: 2019-04-25

## 2019-04-25 DIAGNOSIS — I27.20 PULMONARY HYPERTENSION (H): Primary | ICD-10-CM

## 2019-04-25 NOTE — PROGRESS NOTES
Received message from Harrison manzano from [facility]:     Chirag Frankel,    Please let Dr. Dixon know I had an appointment with Olivier Gómez Yesterday, and he is doing very well.  Patient has a SPO2 of 96% on room air while resting, he states an appointment In [place] for bone marrow last week went great. May 15th next right heart cath with Dr. Lozano , 4 Right heart caths  in 3 months   Titrated up Tuesday April 9th to 1600 mcg BID ,  patient states all side effects he currently is having are all getting better, (first bite jaw pain, and joint pains).  He states that this is how his Flolan titrations went, eventually he states that he didn t have any side effects with Flolan.   Patient states that he continues to exercise on an elliptical  for 40 minutes increasing difficulty with problems. States he is going to start each day exercising to minimize his joint pain, pain primary in his legs.  Patient denies any medication changes or hospitalizations for difficulty breathing since last visit.   He states that he has been 100 % compliant in taking all of his medications.   Current Vital Signs, Weight 254, up 6 pounds no edema noted in ankles , jewelry doesn t show any tightness, states that he has been eating well, Easter holiday gained some weight he states that when he gets up to 255 he cuts back on his eating, pulse 66 regular, Temperature 98.3 F, Respirations 18, B/P 122/76 , lung Sounds Clear in all Fields.     Hope all is well with you, have a great day.    Warm Regards     Harrison Parra RN CNSS

## 2019-05-08 DIAGNOSIS — I27.20 PULMONARY HYPERTENSION (H): ICD-10-CM

## 2019-05-08 DIAGNOSIS — R06.09 DYSPNEA ON EXERTION: ICD-10-CM

## 2019-05-15 ENCOUNTER — HOSPITAL ENCOUNTER (OUTPATIENT)
Facility: CLINIC | Age: 54
Discharge: HOME | End: 2019-05-15
Attending: INTERNAL MEDICINE | Admitting: INTERNAL MEDICINE
Payer: COMMERCIAL

## 2019-05-15 ENCOUNTER — HOSPITAL ENCOUNTER (OUTPATIENT)
Dept: CARDIOLOGY | Facility: CLINIC | Age: 54
Discharge: HOME | End: 2019-05-15
Attending: INTERNAL MEDICINE | Admitting: INTERNAL MEDICINE
Payer: COMMERCIAL

## 2019-05-15 ENCOUNTER — APPOINTMENT (OUTPATIENT)
Dept: LAB | Facility: CLINIC | Age: 54
End: 2019-05-15
Attending: INTERNAL MEDICINE
Payer: COMMERCIAL

## 2019-05-15 ENCOUNTER — OFFICE VISIT (OUTPATIENT)
Dept: CARDIOLOGY | Facility: CLINIC | Age: 54
End: 2019-05-15
Attending: INTERNAL MEDICINE
Payer: COMMERCIAL

## 2019-05-15 ENCOUNTER — APPOINTMENT (OUTPATIENT)
Dept: MEDSURG UNIT | Facility: CLINIC | Age: 54
End: 2019-05-15
Attending: INTERNAL MEDICINE
Payer: COMMERCIAL

## 2019-05-15 VITALS
OXYGEN SATURATION: 96 % | DIASTOLIC BLOOD PRESSURE: 72 MMHG | HEART RATE: 82 BPM | HEIGHT: 77 IN | WEIGHT: 258 LBS | BODY MASS INDEX: 30.46 KG/M2 | SYSTOLIC BLOOD PRESSURE: 111 MMHG

## 2019-05-15 VITALS
TEMPERATURE: 98.1 F | DIASTOLIC BLOOD PRESSURE: 64 MMHG | RESPIRATION RATE: 16 BRPM | HEART RATE: 88 BPM | OXYGEN SATURATION: 98 % | SYSTOLIC BLOOD PRESSURE: 114 MMHG

## 2019-05-15 DIAGNOSIS — I27.20 PULMONARY HYPERTENSION (H): Primary | ICD-10-CM

## 2019-05-15 DIAGNOSIS — I27.20 PULMONARY HYPERTENSION (H): ICD-10-CM

## 2019-05-15 DIAGNOSIS — R06.09 DYSPNEA ON EXERTION: ICD-10-CM

## 2019-05-15 DIAGNOSIS — R06.02 SOB (SHORTNESS OF BREATH): ICD-10-CM

## 2019-05-15 DIAGNOSIS — I27.20 PULMONARY HYPERTENSION (H): Chronic | ICD-10-CM

## 2019-05-15 LAB
ANION GAP SERPL CALCULATED.3IONS-SCNC: 8 MMOL/L (ref 3–14)
BUN SERPL-MCNC: 20 MG/DL (ref 7–30)
CALCIUM SERPL-MCNC: 9 MG/DL (ref 8.5–10.1)
CHLORIDE SERPL-SCNC: 104 MMOL/L (ref 94–109)
CO2 SERPL-SCNC: 25 MMOL/L (ref 20–32)
CREAT SERPL-MCNC: 1.74 MG/DL (ref 0.66–1.25)
ERYTHROCYTE [DISTWIDTH] IN BLOOD BY AUTOMATED COUNT: 17.5 % (ref 10–15)
GFR SERPL CREATININE-BSD FRML MDRD: 43 ML/MIN/{1.73_M2}
GLUCOSE SERPL-MCNC: 105 MG/DL (ref 70–99)
HCT VFR BLD AUTO: 39.9 % (ref 40–53)
HGB BLD-MCNC: 12.5 G/DL (ref 13.3–17.7)
MCH RBC QN AUTO: 29.5 PG (ref 26.5–33)
MCHC RBC AUTO-ENTMCNC: 31.3 G/DL (ref 31.5–36.5)
MCV RBC AUTO: 94 FL (ref 78–100)
NT-PROBNP SERPL-MCNC: 330 PG/ML (ref 0–125)
PLATELET # BLD AUTO: 426 10E9/L (ref 150–450)
POTASSIUM SERPL-SCNC: 4.3 MMOL/L (ref 3.4–5.3)
RBC # BLD AUTO: 4.24 10E12/L (ref 4.4–5.9)
SODIUM SERPL-SCNC: 137 MMOL/L (ref 133–144)
WBC # BLD AUTO: 8.4 10E9/L (ref 4–11)

## 2019-05-15 PROCEDURE — 40000166 ZZH STATISTIC PP CARE STAGE 1

## 2019-05-15 PROCEDURE — 99214 OFFICE O/P EST MOD 30 MIN: CPT | Mod: 25 | Performed by: INTERNAL MEDICINE

## 2019-05-15 PROCEDURE — G0463 HOSPITAL OUTPT CLINIC VISIT: HCPCS | Mod: 25,ZF

## 2019-05-15 PROCEDURE — 80048 BASIC METABOLIC PNL TOTAL CA: CPT | Performed by: INTERNAL MEDICINE

## 2019-05-15 PROCEDURE — 85027 COMPLETE CBC AUTOMATED: CPT | Performed by: INTERNAL MEDICINE

## 2019-05-15 PROCEDURE — 93306 TTE W/DOPPLER COMPLETE: CPT

## 2019-05-15 PROCEDURE — 93451 RIGHT HEART CATH: CPT | Mod: 26 | Performed by: INTERNAL MEDICINE

## 2019-05-15 PROCEDURE — 27210794 ZZH OR GENERAL SUPPLY STERILE: Performed by: INTERNAL MEDICINE

## 2019-05-15 PROCEDURE — 36415 COLL VENOUS BLD VENIPUNCTURE: CPT | Performed by: INTERNAL MEDICINE

## 2019-05-15 PROCEDURE — 25000125 ZZHC RX 250: Performed by: INTERNAL MEDICINE

## 2019-05-15 PROCEDURE — C1894 INTRO/SHEATH, NON-LASER: HCPCS | Performed by: INTERNAL MEDICINE

## 2019-05-15 PROCEDURE — 93451 RIGHT HEART CATH: CPT | Performed by: INTERNAL MEDICINE

## 2019-05-15 PROCEDURE — 83880 ASSAY OF NATRIURETIC PEPTIDE: CPT | Performed by: INTERNAL MEDICINE

## 2019-05-15 PROCEDURE — 27210788 ZZH MANIFOLD CR3: Performed by: INTERNAL MEDICINE

## 2019-05-15 PROCEDURE — 93306 TTE W/DOPPLER COMPLETE: CPT | Mod: 26 | Performed by: INTERNAL MEDICINE

## 2019-05-15 RX ORDER — LIDOCAINE 40 MG/G
CREAM TOPICAL
Status: COMPLETED | OUTPATIENT
Start: 2019-05-15 | End: 2019-05-15

## 2019-05-15 RX ORDER — LIDOCAINE 40 MG/G
CREAM TOPICAL
Status: CANCELLED | OUTPATIENT
Start: 2019-05-15

## 2019-05-15 RX ORDER — PREDNISONE 5 MG/1
5 TABLET ORAL EVERY OTHER DAY
Qty: 45 TABLET | Refills: 3 | Status: SHIPPED | OUTPATIENT
Start: 2019-05-15 | End: 2020-06-30

## 2019-05-15 RX ADMIN — LIDOCAINE: 40 CREAM TOPICAL at 08:13

## 2019-05-15 ASSESSMENT — PAIN SCALES - GENERAL: PAINLEVEL: NO PAIN (0)

## 2019-05-15 ASSESSMENT — MIFFLIN-ST. JEOR: SCORE: 2122.66

## 2019-05-15 NOTE — NURSING NOTE
Procedures and/or Testing: Patient given instructions regarding  Echocardiogram, . Discussed purpose, preparation, procedure and when to expect results reported back to the patient. Patient demonstrated understanding of this information and agreed to call with further questions or concerns.  Right Heart Catheterization: Patient was instructed regarding right heart catheterization, including discussion of the procedure, preparation, intra-procedural steps, and recovery at home. Patient demonstrated understanding of this information and agreed to call with further questions or concerns.  Med Reconcile: Reviewed and verified all current medications with the patient. The updated medication list was printed and given to the patient.  Diet: Patient instructed regarding a heart healthy diet, including discussion of reduced fat and sodium intake. Patient demonstrated understanding of this information and agreed to call with further questions or concerns.  Return Appointment: Patient given instructions regarding scheduling next clinic visit. Patient demonstrated understanding of this information and agreed to call with further questions or concerns.  Patient stated he understood all health information given and agreed to call with further questions or concerns.     Medication Changes:   Increase your Prednisone to 5 mg every other day.    Patient Instructions:  1. Continue staying active and eat a heart healthy diet.    2. Please keep current list of medications with you at all times.    3. Remember to weigh yourself daily after voiding and before you consume any food or beverages and log the numbers.  If you have gained 2 pounds overnight or 5 pounds in a week contact us immediately for medication adjustments or further instructions.    4. **Please call us immediately if you have any syncope (fainting or passing out), chest pain, edema (swelling or weight gain), or decline in your functional status (general decline in how  you are feeling).      Check-In  Time Check-In Location Estimated Length Procedure   Name     September   [facility]  waiting room 60-90 minutes Right Heart Catheterization**     Procedure Preparations & Instructions     This is an invasive procedure that DOES require preparation:    - Nothing to eat for 6 hours   - You may have clear liquids up until the time of your procedure  - A ride should be arranged for you in the instance you are unable to drive home, however you should be able to function as you normally would after the procedure     *For Patients with Diabetes: ? DO NOT take any oral diabetic medication, short-acting diabetes medications/insulin, humalog or regular insulin the morning of your test  ? Take   dose of long-acting insulin (Lantus, Levemir) the day of your test  ? Remember to  bring your glucometer and insulin with you to take after your test if needed     *For Patients on anticoagulants: ? Your Coumadin Clinic will give you instructions on medication adjustments or bridging prior to the procedure        Check-In  Time Check-In Location Estimated Length Procedure   Name     September    60 minutes Echocardiogram (Echo)**   Procedure Preparations & Instructions     This is a non-invasive procedure and does NOT require any preparation       Follow up Appointment Information:  September follow up with Echocardiogram and Right heart catheterization    Monthly CRP and N-Terminal Pro BNP    Results:  At this appointment we reviewed your Right heart catheterization, Echocardiogram and Labs  Component      Latest Ref Rng & Units 5/15/2019   Sodium      133 - 144 mmol/L 137   Potassium      3.4 - 5.3 mmol/L 4.3   Chloride      94 - 109 mmol/L 104   Carbon Dioxide      20 - 32 mmol/L 25   Anion Gap      3 - 14 mmol/L 8   Glucose      70 - 99 mg/dL 105 (H)   Urea Nitrogen      7 - 30 mg/dL 20   Creatinine      0.66 - 1.25 mg/dL 1.74 (H)   GFR Estimate      >60 mL/min/1.73:m2 43 (L)   GFR Estimate If Black       >60 mL/min/1.73:m2 50 (L)   Calcium      8.5 - 10.1 mg/dL 9.0   WBC      4.0 - 11.0 10e9/L 8.4   RBC Count      4.4 - 5.9 10e12/L 4.24 (L)   Hemoglobin      13.3 - 17.7 g/dL 12.5 (L)   Hematocrit      40.0 - 53.0 % 39.9 (L)   MCV      78 - 100 fl 94   MCH      26.5 - 33.0 pg 29.5   MCHC      31.5 - 36.5 g/dL 31.3 (L)   RDW      10.0 - 15.0 % 17.5 (H)   Platelet Count      150 - 450 10e9/L 426   N-Terminal Pro Bnp      0 - 125 pg/mL 330 (H)

## 2019-05-15 NOTE — NURSING NOTE
Chief Complaint   Patient presents with     Follow Up      Return for PH F/U with Right heart catheterization, Echocardiogram and Lab (pt has been on max dose since 4/9)     Vitals were taken and medications were reconciled.     Debbie Adam CMA    11:54 AM

## 2019-05-15 NOTE — IP AVS SNAPSHOT
[address] [address]                                    After Visit Summary   5/15/2019    Olivier Gómez    MRN: [id number]           After Visit Summary Signature Page    I have received my discharge instructions, and my questions have been answered. I have discussed any challenges I see with this plan with the nurse or doctor.    ..........................................................................................................................................  Patient/Patient Representative Signature      ..........................................................................................................................................  Patient Representative Print Name and Relationship to Patient    ..................................................               ................................................  Date                                   Time    ..........................................................................................................................................  Reviewed by Signature/Title    ...................................................              ..............................................  Date                                               Time          22EPIC Rev 08/18

## 2019-05-15 NOTE — LETTER
5/15/2019      RE: Benedict Estes  [address]  [place] [address]       Dear Colleague,    Thank you for the opportunity to participate in the care of your patient, Benedict Estes, at the [facility]. Please see a copy of my visit note below.          The patient returns for follow-up of pulmonary hypertension.  There is no interim history of chest pain, tightness, paroxysmal nocturnal dyspnea, orthopnea, peripheral edema, palpitation, pre-syncope, syncope, device discharge.  Exercise tolerance is stable.  The patient is attempting to exercise regularly and following a sodium restricted, calorically appropriate diet.  Medications are reviewed and the patient is taking medications as prescribed.  The patient is generally sleeping well.  Prednisone has been tapered to 2.5mgs every other day.      Wt Readings from Last 24 Encounters:   05/15/19 117 kg (258 lb)   19 115.1 kg (253 lb 11.2 oz)   19 113.4 kg (250 lb)   10/10/18 117.9 kg (260 lb)   18 115.6 kg (254 lb 12.8 oz)   18 111.6 kg (246 lb)   18 114.3 kg (252 lb)   17 113.7 kg (250 lb 11.2 oz)   17 113.4 kg (250 lb)   17 115 kg (253 lb 9.6 oz)   17 115.3 kg (254 lb 1.6 oz)   17 117.3 kg (258 lb 9.6 oz)   17 122 kg (269 lb)   05/15/17 124.7 kg (275 lb)   17 125.1 kg (275 lb 11.2 oz)   17 129.5 kg (285 lb 8 oz)   16 133.8 kg (294 lb 15.6 oz)   08/26/15 133.8 kg (294 lb 15.6 oz)   08/13/15 132.5 kg (292 lb)   07/14/15 127 kg (280 lb)   07/14/15 131.7 kg (290 lb 5.5 oz)     Name: BENEDICT ESTES  MRN: [id number]  : 1965  Study Date: 05/15/2019 10:10 AM  Age: 54 yrs  Gender: Male  Patient [facility]: [facility]  Reason For Study: Pulmonary hypertension (H)  Ordering Physician: PILI SEGURA  Referring Physician: PILI SEGURA  Performed By: Marcial Wolff RDCS     BSA: 2.5 m2  Height: 77 in  Weight: 253  lb  BP: 114/64 mmHg  _____________________________________________________________________________  __        Procedure  Echocardiogram with two-dimensional, color and spectral Doppler performed.  _____________________________________________________________________________  __        Interpretation Summary  Mildly dilated RV with normal RV function. Flattened septum is consistent with  right ventricular pressure overload. The pulmonary artery systolic pressure is  unable to be assessed, however PA-AT is shortened, 85 msec, suggesting  increased pulmonary vasculature resistance.  Global and regional left ventricular function is normal with an EF of 55-60%.  The inferior vena cava is normal.  No pericardial effusion is present.     Compared with 3/20/19: There has been no change.  _____________________________________________________________________________  __        Left Ventricle  Global and regional left ventricular function is normal with an EF of 55-60%.  Left ventricular wall thickness is normal. Left ventricular size is normal.  Left ventricular diastolic function is not assessable. Flattened septum is  consistent with right ventricular pressure overload. No regional wall motion  abnormalities are seen.     Right Ventricle  Global right ventricular function is normal. Mild right ventricular dilation  is present. TAPSE 27 mm; S' 15 cm/sec, RVFAC 37%.     Atria  The atria cannot be assessed.     Mitral Valve  The mitral valve is normal.        Aortic Valve  Aortic valve is normal in structure and function. The aortic valve is  tricuspid.     Tricuspid Valve  The tricuspid valve is normal. Trace tricuspid insufficiency is present.  Pulmonary artery systolic pressure cannot be assessed.     Pulmonic Valve  The pulmonic valve is normal. Pulmonary valve aceleration time is estimated at  85 msec.     Vessels  The inferior vena cava is normal. The aorta root cannot be assessed. IVC  diameter <2.1 cm collapsing >50%  with sniff suggests a normal RA pressure of 3  mmHg.     Pericardium  No pericardial effusion is present.        Compared to Previous Study  This study was compared with the study from 3/20/19 . There has been no  change.     Attestation  I have personally viewed the imaging and agree with the interpretation and  report as documented by the fellow, Fahad Loaiza, and/or edited by me.  _____________________________________________________________________________  __     MMode/2D Measurements & Calculations  RVDd: 5.0 cm  TAPSE: 2.7 cm        Doppler Measurements & Calculations  PA acc time: 0.09 sec        Current Outpatient Medications   Medication     atovaquone (MEPRON) 750 MG/5ML suspension     calcium carbonate (OS-PANFILO 500 MG [place]. CA) 500 MG tablet     CLONAZEPAM PO     digoxin (LANOXIN) 125 MCG tablet     FLUCONAZOLE PO     furosemide (LASIX) 20 MG tablet     furosemide (LASIX) 40 MG tablet     Pantoprazole Sodium (PROTONIX PO)     Penicillin V Potassium (PEN-VEE K OR)     predniSONE (DELTASONE) 2.5 MG tablet     Rosuvastatin Calcium (CRESTOR PO)     ruxolitinib (JAKAFI) 5 MG TABS tablet CHEMO     selexipag (UPTRAVI) 1600 MCG tablet     Specialty Vitamins Products (MAGNESIUM PLUS PROTEIN) 133 MG tablet     tadalafil, PAH, (ADCIRCA) 20 MG TABS     Ursodiol (ACTIGALL PO)     VALACYCLOVIR HCL PO     VITAMIN D, CHOLECALCIFEROL, PO     No current facility-administered medications for this visit.        Results for BENEDICT ESTES (MRN [id number]) as of 5/15/2019 12:59   Ref. Range 3/20/2019 09:18 3/20/2019 10:57 5/15/2019 07:06 5/15/2019 09:10   Sodium Latest Ref Range: 133 - 144 mmol/L   137    Potassium Latest Ref Range: 3.4 - 5.3 mmol/L   4.3    Chloride Latest Ref Range: 94 - 109 mmol/L   104    Carbon Dioxide Latest Ref Range: 20 - 32 mmol/L   25    Urea Nitrogen Latest Ref Range: 7 - 30 mg/dL   20    Creatinine Latest Ref Range: 0.66 - 1.25 mg/dL   1.74 (H)    GFR Estimate Latest Ref Range: >60 mL/min/1.73_m2   43  "(L)    GFR Estimate If Black Latest Ref Range: >60 mL/min/1.73_m2   50 (L)    Calcium Latest Ref Range: 8.5 - 10.1 mg/dL   9.0    Anion Gap Latest Ref Range: 3 - 14 mmol/L   8    N-Terminal Pro Bnp Latest Ref Range: 0 - 125 pg/mL   330 (H)    Glucose Latest Ref Range: 70 - 99 mg/dL   105 (H)    WBC Latest Ref Range: 4.0 - 11.0 10e9/L   8.4    Hemoglobin Latest Ref Range: 13.3 - 17.7 g/dL   12.5 (L)    Hematocrit Latest Ref Range: 40.0 - 53.0 %   39.9 (L)    Platelet Count Latest Ref Range: 150 - 450 10e9/L   426    RBC Count Latest Ref Range: 4.4 - 5.9 10e12/L   4.24 (L)    MCV Latest Ref Range: 78 - 100 fl   94    MCH Latest Ref Range: 26.5 - 33.0 pg   29.5    MCHC Latest Ref Range: 31.5 - 36.5 g/dL   31.3 (L)    RDW Latest Ref Range: 10.0 - 15.0 %   17.5 (H)    ECHOCARDIOGRAM COMPLETE Unknown  Rpt     Results for BENEDICT ESTES (MRN [id number]) as of 5/15/2019 12:59   Ref. Range 6/27/2018 11:49 1/17/2019 07:42 2/19/2019 09:38 3/20/2019 07:37 5/15/2019 07:06   Creatinine Latest Ref Range: 0.66 - 1.25 mg/dL 1.52 (H) 1.63 (H) 1.70 (H) 1.63 (H) 1.74 (H)   Results for BENEDICT ESTES (MRN [id number]) as of 5/15/2019 12:59   Ref. Range 6/6/2018 08:07 6/27/2018 11:49 1/17/2019 07:42 3/20/2019 07:37 5/15/2019 07:06   N-Terminal Pro Bnp Latest Ref Range: 0 - 125 pg/mL 340 (H) 335 (H) 144 (H) 118 330 (H)       Change prednisone to 5mgs every other day  Ursodiol was discontinued.     I would like to hold his Ursodiol discontinuance and increase his prednisone to 5QOD.  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  I think we should keep as much as possible stable/unchanged.  His PA pressure is somewhat higher today, and I am not sure where this is going.      I would like to have laboratories drawn at six week intervals to include BNP, CRPinfl,       CC:  Magdaleno Elder M.D. Shell Martin M.D.   M.D. [facility]  Answers for HPI/ROS submitted by the " patient on 5/15/2019   General Symptoms: No  Skin Symptoms: No  HENT Symptoms: No  EYE SYMPTOMS: No  HEART SYMPTOMS: No  LUNG SYMPTOMS: No  INTESTINAL SYMPTOMS: No  URINARY SYMPTOMS: No  REPRODUCTIVE SYMPTOMS: No  SKELETAL SYMPTOMS: No  BLOOD SYMPTOMS: No  NERVOUS SYSTEM SYMPTOMS: No  MENTAL HEALTH SYMPTOMS: No      Please do not hesitate to contact me if you have any questions/concerns.     Sincerely,     Dk Quan MD

## 2019-05-15 NOTE — DISCHARGE INSTRUCTIONS
[facility]                        Interventional Cardiology  Discharge Instructions   Post Right Heart Cath      AFTER YOU GO HOME:    DO drink plenty of fluids    DO resume your regular diet and medications unless otherwise instructed by your Primary Physician    Do Not scrub the procedure site vigorously    No lotion or powder to the puncture site for 3 days    CALL YOUR PRIMARY PHYSICIAN IF: You may resume all normal activity.  Monitor neck site for bleeding, swelling, or voice changes. If you notice bleeding or swelling immediately apply pressure to the site and call number below to speak with Cardiology Fellow.  If you experience any changes in your breathing you should call your doctor immediately or come to the closest Emergency Department.  Do not drive yourself.    ADDITIONAL INSTRUCTIONS: Medications: You are to resume all home medications including anticoagulation therapy unless otherwise advised by your primary cardiologist or nurse coordinator.    Follow Up: Per your primary cardiology team    If you have any questions or concerns regarding your procedure site please call [phone] at anytime and ask for Cardiology Fellow on call.  They are available 24 hours a day.  You may also contact the Cardiology Clinic after hours number at [phone].                                                       Telephone Numbers [phone] Monday-Friday 8:00 am to 4:30 pm    [phone] After 4:30 pm Monday-Friday, Weekends & Holidays  Ask for Interventional Cardiologist on call. Someone is on call 24 hours/day   [facility] toll free number [phone] Monday-Friday 8:00 am to 4:30 pm   [facility] Emergency Dept [phone]

## 2019-05-15 NOTE — IP AVS SNAPSHOT
MRN:[id number]                      After Visit Summary   5/15/2019    Olivier Gómez    MRN: [id number]           Visit Information        Department      5/15/2019  6:45 AM [address] [facility]          Review of your medicines      UNREVIEWED medicines. Ask your doctor about these medicines       Dose / Directions   ACTIGALL PO      Dose:  300 mg  Take 300 mg by mouth 3 times daily  Refills:  0     atovaquone 750 MG/5ML suspension  Commonly known as:  MEPRON      Dose:  1500 mg  Take 1,500 mg by mouth daily  Refills:  0     calcium carbonate 500 MG tablet  Commonly known as:  OS-PANFILO      Dose:  600 mg  Take 600 mg by mouth 2 times daily  Refills:  0     CLONAZEPAM PO      Dose:  1 mg  Take 1 mg by mouth At Bedtime  Refills:  0     CRESTOR PO      Dose:  5 mg  Take 5 mg by mouth daily  Refills:  0     digoxin 125 MCG tablet  Commonly known as:  LANOXIN  Used for:  Pulmonary hypertension (H)      Dose:  125 mcg  Take 1 tablet (125 mcg) by mouth daily  Quantity:  90 tablet  Refills:  3     FLUCONAZOLE PO      Dose:  200 mg  Take 200 mg by mouth daily  Refills:  0     * furosemide 40 MG tablet  Commonly known as:  LASIX  Used for:  Pulmonary hypertension (H)      Dose:  40 mg  Take 1 tablet (40 mg) by mouth daily  Quantity:  30 tablet  Refills:  0     * furosemide 20 MG tablet  Commonly known as:  LASIX  Used for:  Pulmonary hypertension (H)      TAKE 1 TABLET (20 MG) by MOUTH 1 TIME PER DAY]  Quantity:  90 tablet  Refills:  3     magnesium plus protein 133 MG tablet      Dose:  266 mg  Take 266 mg by mouth daily (2 tablets)  Refills:  0     PEN-VEE K OR      Dose:  500 mg  Take 500 mg by mouth 2 times daily  Refills:  0     predniSONE 2.5 MG tablet  Commonly known as:  DELTASONE  Used for:  Pulmonary hypertension (H)      Dose:  7.5 mg  Take 7.5 mg by mouth daily (3 tablets = 7.5 mg daily).  Quantity:  180 tablet  Refills:  0     PROTONIX PO      Dose:  40 mg  Take 40 mg by mouth daily  Refills:   0     ruxolitinib 5 MG Tabs tablet CHEMO  Commonly known as:  JAKAFI  Used for:  Pericardial effusion, Pulmonary hypertension (H), Fukmb-jcjups-zrka disease (H)      Dose:  5 mg  Take 1 tablet (5 mg) by mouth 2 times daily  Quantity:  60 tablet  Refills:  0     selexipag 1600 MCG tablet  Commonly known as:  UPTRAVI  Used for:  Pulmonary hypertension (H)      Dose:  1600 mcg  Take 1 tablet (1,600 mcg) by mouth every 12 hours  Quantity:  60 tablet  Refills:  11     tadalafil (PAH) 20 MG Tabs  Commonly known as:  ADCIRCA  Used for:  Pulmonary hypertension (H)      Dose:  40 mg  Take 2 tablets (40 mg) by mouth daily  Quantity:  60 tablet  Refills:  11     VALACYCLOVIR HCL PO      Dose:  500 mg  Take 500 mg by mouth every other day  Refills:  0     VITAMIN D (CHOLECALCIFEROL) PO      Dose:  2000 Units  Take 2,000 Units by mouth 3 times daily  Refills:  0         * This list has 2 medication(s) that are the same as other medications prescribed for you. Read the directions carefully, and ask your doctor or other care provider to review them with you.                  Protect others around you: Learn how to safely use, store and throw away your medicines at www.disposemymeds.org.       Follow-ups after your visit       Your next 10 appointments already scheduled    May 15, 2019  Procedure with Nik[facility]y MD  [facility] ([facility]) [address] [place] [id number]  [phone]   The [facility] is located on the corner of [facility] [address] [facility] and [facility] on the [facility]. It is easily accessible from virtually any point in the [facility] area, via [address] and [address].   May 15, 2019 10:30 AM CDT  Echo Complete with UURSULAR2  [facility] ([facility]) [address] [facility] [address] [facility] [facility] [id number] [phone]   1. Please bring or wear a comfortable  two-piece outfit.  2. You may eat, drink and take your normal medicines.  3. For any questions that cannot be answered, please contact the ordering physician     May 15, 2019 12:00 PM CDT  (Arrive by 11:45 AM)  RETURN PRIMARY PULMONARY with Dk Quan MD  [facility] ([facility] [place]) [address]  [address] [place] [id number] [phone]         Care Instructions       Further instructions from your care team       [facility]                        Interventional Cardiology  Discharge Instructions   Post Right Heart Cath      AFTER YOU GO HOME:    DO drink plenty of fluids    DO resume your regular diet and medications unless otherwise instructed by your Primary Physician    Do Not scrub the procedure site vigorously    No lotion or powder to the puncture site for 3 days    CALL YOUR PRIMARY PHYSICIAN IF: You may resume all normal activity.  Monitor neck site for bleeding, swelling, or voice changes. If you notice bleeding or swelling immediately apply pressure to the site and call number below to speak with Cardiology Fellow.  If you experience any changes in your breathing you should call your doctor immediately or come to the closest Emergency Department.  Do not drive yourself.    ADDITIONAL INSTRUCTIONS: Medications: You are to resume all home medications including anticoagulation therapy unless otherwise advised by your primary cardiologist or nurse coordinator.    Follow Up: Per your primary cardiology team    If you have any questions or concerns regarding your procedure site please call [phone] at anytime and ask for Cardiology Fellow on call.  They are available 24 hours a day.  You may also contact the Cardiology Clinic after hours number at [phone].                                                       Telephone Numbers [phone] Monday-Friday 8:00 am to 4:30 pm    [phone] After 4:30 pm  Monday-Friday, Weekends & Holidays  Ask for Interventional Cardiologist on call. Someone is on call 24 hours/day   [facility] toll free number [phone] Monday-Friday 8:00 am to 4:30 pm   [facility] Emergency Dept [phone]                   Additional Information About Your Visit       MyChart Information    [facility] gives you secure access to your electronic health record. If you see a primary care provider, you can also send messages to your care team and make appointments. If you have questions, please call your primary care clinic.  If you do not have a primary care provider, please call [phone] and they will assist you.       Care EveryWhere ID    This is your Care EveryWhere ID. This could be used by other organizations to access your [facility] medical records  [id number]       Your Vitals Were     Blood Pressure   103/74 (BP Location: Right arm, Cuff Size: Adult Large)    Pulse   89    Temperature   98.1  F (36.7  C) (Oral)    Respirations   16    Pulse Oximetry   99%          Primary Care Provider Office Phone # Fax #    Adelso Delgado [phone]      Equal Access to Services    DIEUDONNE RILEY : Hadii wing ku hadasho Sogerhardali, waaxda luqadaha, qaybta kaalmada juan, maría segal. So [facility] [phone].    ATENCIÓN: Si habla español, tiene a garza disposición servicios gratuitos de asistencia lingüística. Rayame al [phone].    We comply with applicable federal civil rights laws and [place] laws. We do not discriminate on the basis of race, color, national origin, age, disability, sex, sexual orientation, or gender identity.           Thank you!    Thank you for choosing [facility] for your care. Our goal is always to provide you with excellent care. Hearing back from our patients is one way we can continue to improve our services. Please take a few minutes to complete the written survey that you may receive in the mail after you visit with us. Thank you!             Medication List      ASK your doctor about these medications          Morning Afternoon Evening Bedtime As Needed    ACTIGALL PO  INSTRUCTIONS:  Take 300 mg by mouth 3 times daily                     atovaquone 750 MG/5ML suspension  Also known as:  MEPRON  INSTRUCTIONS:  Take 1,500 mg by mouth daily                     calcium carbonate 500 MG tablet  Also known as:  OS-PANFILO  INSTRUCTIONS:  Take 600 mg by mouth 2 times daily                     CLONAZEPAM PO  INSTRUCTIONS:  Take 1 mg by mouth At Bedtime                     CRESTOR PO  INSTRUCTIONS:  Take 5 mg by mouth daily                     digoxin 125 MCG tablet  Also known as:  LANOXIN  INSTRUCTIONS:  Take 1 tablet (125 mcg) by mouth daily                     FLUCONAZOLE PO  INSTRUCTIONS:  Take 200 mg by mouth daily                     * furosemide 40 MG tablet  Also known as:  LASIX  INSTRUCTIONS:  Take 1 tablet (40 mg) by mouth daily                     * furosemide 20 MG tablet  Also known as:  LASIX  INSTRUCTIONS:  TAKE 1 TABLET (20 MG) by MOUTH 1 TIME PER DAY]                     magnesium plus protein 133 MG tablet  INSTRUCTIONS:  Take 266 mg by mouth daily (2 tablets)                     PEN-VEE K OR  INSTRUCTIONS:  Take 500 mg by mouth 2 times daily                     predniSONE 2.5 MG tablet  Also known as:  DELTASONE  INSTRUCTIONS:  Take 7.5 mg by mouth daily (3 tablets = 7.5 mg daily).                     PROTONIX PO  INSTRUCTIONS:  Take 40 mg by mouth daily                     ruxolitinib 5 MG Tabs tablet CHEMO  Also known as:  JAKAFI  INSTRUCTIONS:  Take 1 tablet (5 mg) by mouth 2 times daily                     selexipag 1600 MCG tablet  Also known as:  UPTRAVI  INSTRUCTIONS:  Take 1 tablet (1,600 mcg) by mouth every 12 hours  Doctor's comments:  Specialty Pharmacy - [facility]                     tadalafil (PAH) 20 MG Tabs  Also known as:  ADCIRCA  INSTRUCTIONS:  Take 2 tablets (40 mg) by mouth daily                     VALACYCLOVIR HCL  PO  INSTRUCTIONS:  Take 500 mg by mouth every other day                     VITAMIN D (CHOLECALCIFEROL) PO  INSTRUCTIONS:  Take 2,000 Units by mouth 3 times daily                        * This list has 2 medication(s) that are the same as other medications prescribed for you. Read the directions carefully, and ask your doctor or other care provider to review them with you.

## 2019-05-15 NOTE — PROGRESS NOTES
[facility]   Interventional Cardiology        Consenting/Education for Right Heart Catheterization     Patient understands due to their history of pulmonary hypertension we would like to perform right heart catheterization.  This procedure will be performed by Dr. Lundy.     Patient understands during the right heart catheterization, a fine tube (catheter) is put into the vein of the groin/neck.  It is carefully passed along until it reaches the heart and then goes up into the blood vessels of the lungs. This is done to measure a variety of pressures in your heart and can tell us how well the heart is filling and emptying, as well as monitor fluid status. This is usually painless. The doctor uses x-ray imaging to see the catheter. Afterwards, the catheter is removed, and incision site covered before leaving the cath lab. This procedure is done with no sedation, usually only requiring Lidocaine.     Patient also understands risks and complications of the procedure which include, but are not limited to bruising/swelling around the incision site, infection, bleeding, allergic reaction to local anesthetic, air embolism, arterial puncture, stroke, heart attack.       Patient verbalized understanding of risks and benefits of the right heart catheterization and has elected to proceed with right heart catheterization.         Gayle Heller, PILAR, CNP  [facility] Interventional Cardiology  [phone]

## 2019-05-15 NOTE — PROGRESS NOTES
The patient returns for follow-up of pulmonary hypertension.  There is no interim history of chest pain, tightness, paroxysmal nocturnal dyspnea, orthopnea, peripheral edema, palpitation, pre-syncope, syncope, device discharge.  Exercise tolerance is stable.  The patient is attempting to exercise regularly and following a sodium restricted, calorically appropriate diet.  Medications are reviewed and the patient is taking medications as prescribed.  The patient is generally sleeping well.  Prednisone has been tapered to 2.5mgs every other day.      Wt Readings from Last 24 Encounters:   05/15/19 117 kg (258 lb)   19 115.1 kg (253 lb 11.2 oz)   19 113.4 kg (250 lb)   10/10/18 117.9 kg (260 lb)   18 115.6 kg (254 lb 12.8 oz)   18 111.6 kg (246 lb)   18 114.3 kg (252 lb)   17 113.7 kg (250 lb 11.2 oz)   17 113.4 kg (250 lb)   17 115 kg (253 lb 9.6 oz)   17 115.3 kg (254 lb 1.6 oz)   17 117.3 kg (258 lb 9.6 oz)   17 122 kg (269 lb)   05/15/17 124.7 kg (275 lb)   17 125.1 kg (275 lb 11.2 oz)   17 129.5 kg (285 lb 8 oz)   16 133.8 kg (294 lb 15.6 oz)   08/26/15 133.8 kg (294 lb 15.6 oz)   08/13/15 132.5 kg (292 lb)   07/14/15 127 kg (280 lb)   07/14/15 131.7 kg (290 lb 5.5 oz)     Name: BENEDICT ESTES  MRN: [id number]  : 1965  Study Date: 05/15/2019 10:10 AM  Age: 54 yrs  Gender: Male  Patient Location: [facility]  Reason For Study: Pulmonary hypertension (H)  Ordering Physician: PILI SEGURA  Referring Physician: PILI SEGURA  Performed By: Marcial Wolff RDCS     BSA: 2.5 m2  Height: 77 in  Weight: 253 lb  BP: 114/64 mmHg  _____________________________________________________________________________  __        Procedure  Echocardiogram with two-dimensional, color and spectral Doppler performed.  _____________________________________________________________________________  __        Interpretation Summary  Mildly  dilated RV with normal RV function. Flattened septum is consistent with  right ventricular pressure overload. The pulmonary artery systolic pressure is  unable to be assessed, however PA-AT is shortened, 85 msec, suggesting  increased pulmonary vasculature resistance.  Global and regional left ventricular function is normal with an EF of 55-60%.  The inferior vena cava is normal.  No pericardial effusion is present.     Compared with 3/20/19: There has been no change.  _____________________________________________________________________________  __        Left Ventricle  Global and regional left ventricular function is normal with an EF of 55-60%.  Left ventricular wall thickness is normal. Left ventricular size is normal.  Left ventricular diastolic function is not assessable. Flattened septum is  consistent with right ventricular pressure overload. No regional wall motion  abnormalities are seen.     Right Ventricle  Global right ventricular function is normal. Mild right ventricular dilation  is present. TAPSE 27 mm; S' 15 cm/sec, RVFAC 37%.     Atria  The atria cannot be assessed.     Mitral Valve  The mitral valve is normal.        Aortic Valve  Aortic valve is normal in structure and function. The aortic valve is  tricuspid.     Tricuspid Valve  The tricuspid valve is normal. Trace tricuspid insufficiency is present.  Pulmonary artery systolic pressure cannot be assessed.     Pulmonic Valve  The pulmonic valve is normal. Pulmonary valve aceleration time is estimated at  85 msec.     Vessels  The inferior vena cava is normal. The aorta root cannot be assessed. IVC  diameter <2.1 cm collapsing >50% with sniff suggests a normal RA pressure of 3  mmHg.     Pericardium  No pericardial effusion is present.        Compared to Previous Study  This study was compared with the study from 3/20/19 . There has been no  change.     Attestation  I have personally viewed the imaging and agree with the interpretation  and  report as documented by the fellow, Fahad Loaiza, and/or edited by me.  _____________________________________________________________________________  __     MMode/2D Measurements & Calculations  RVDd: 5.0 cm  TAPSE: 2.7 cm        Doppler Measurements & Calculations  PA acc time: 0.09 sec        Current Outpatient Medications   Medication     atovaquone (MEPRON) 750 MG/5ML suspension     calcium carbonate (OS-PANFILO 500 MG [place]. [place]) 500 MG tablet     CLONAZEPAM PO     digoxin (LANOXIN) 125 MCG tablet     FLUCONAZOLE PO     furosemide (LASIX) 20 MG tablet     furosemide (LASIX) 40 MG tablet     Pantoprazole Sodium (PROTONIX PO)     Penicillin V Potassium (PEN-VEE K OR)     predniSONE (DELTASONE) 2.5 MG tablet     Rosuvastatin Calcium (CRESTOR PO)     ruxolitinib (JAKAFI) 5 MG TABS tablet CHEMO     selexipag (UPTRAVI) 1600 MCG tablet     Specialty Vitamins Products (MAGNESIUM PLUS PROTEIN) 133 MG tablet     tadalafil, PAH, (ADCIRCA) 20 MG TABS     Ursodiol (ACTIGALL PO)     VALACYCLOVIR HCL PO     VITAMIN D, CHOLECALCIFEROL, PO     No current facility-administered medications for this visit.        Results for MEERA BENEDICT MAGNUS (MRN [id number]) as of 5/15/2019 12:59   Ref. Range 3/20/2019 09:18 3/20/2019 10:57 5/15/2019 07:06 5/15/2019 09:10   Sodium Latest Ref Range: 133 - 144 mmol/L   137    Potassium Latest Ref Range: 3.4 - 5.3 mmol/L   4.3    Chloride Latest Ref Range: 94 - 109 mmol/L   104    Carbon Dioxide Latest Ref Range: 20 - 32 mmol/L   25    Urea Nitrogen Latest Ref Range: 7 - 30 mg/dL   20    Creatinine Latest Ref Range: 0.66 - 1.25 mg/dL   1.74 (H)    GFR Estimate Latest Ref Range: >60 mL/min/1.73_m2   43 (L)    GFR Estimate If Black Latest Ref Range: >60 mL/min/1.73_m2   50 (L)    Calcium Latest Ref Range: 8.5 - 10.1 mg/dL   9.0    Anion Gap Latest Ref Range: 3 - 14 mmol/L   8    N-Terminal Pro Bnp Latest Ref Range: 0 - 125 pg/mL   330 (H)    Glucose Latest Ref Range: 70 - 99 mg/dL   105 (H)    WBC Latest Ref  "Range: 4.0 - 11.0 10e9/L   8.4    Hemoglobin Latest Ref Range: 13.3 - 17.7 g/dL   12.5 (L)    Hematocrit Latest Ref Range: 40.0 - 53.0 %   39.9 (L)    Platelet Count Latest Ref Range: 150 - 450 10e9/L   426    RBC Count Latest Ref Range: 4.4 - 5.9 10e12/L   4.24 (L)    MCV Latest Ref Range: 78 - 100 fl   94    MCH Latest Ref Range: 26.5 - 33.0 pg   29.5    MCHC Latest Ref Range: 31.5 - 36.5 g/dL   31.3 (L)    RDW Latest Ref Range: 10.0 - 15.0 %   17.5 (H)    ECHOCARDIOGRAM COMPLETE Unknown  Rpt     Results for BENEDICT ESTES (MRN [id number]) as of 5/15/2019 12:59   Ref. Range 6/27/2018 11:49 1/17/2019 07:42 2/19/2019 09:38 3/20/2019 07:37 5/15/2019 07:06   Creatinine Latest Ref Range: 0.66 - 1.25 mg/dL 1.52 (H) 1.63 (H) 1.70 (H) 1.63 (H) 1.74 (H)   Results for BENEDICT ESTES (MRN [id number]) as of 5/15/2019 12:59   Ref. Range 6/6/2018 08:07 6/27/2018 11:49 1/17/2019 07:42 3/20/2019 07:37 5/15/2019 07:06   N-Terminal Pro Bnp Latest Ref Range: 0 - 125 pg/mL 340 (H) 335 (H) 144 (H) 118 330 (H)       Change prednisone to 5mgs every other day  Ursodiol was discontinued.     I would like to hold his Ursodiol discontinuance and increase his prednisone to 5QOD.  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  I think we should keep as much as possible stable/unchanged.  His PA pressure is somewhat higher today, and I am not sure where this is going.      I would like to have laboratories drawn at six week intervals to include BNP, CRPinfl,       CC:  Magdaleno Elder M.D. Shell Martin M.D.   [facility]  Answers for HPI/ROS submitted by the patient on 5/15/2019   General Symptoms: No  Skin Symptoms: No  HENT Symptoms: No  EYE SYMPTOMS: No  HEART SYMPTOMS: No  LUNG SYMPTOMS: No  INTESTINAL SYMPTOMS: No  URINARY SYMPTOMS: No  REPRODUCTIVE SYMPTOMS: No  SKELETAL SYMPTOMS: No  BLOOD SYMPTOMS: No  NERVOUS SYSTEM SYMPTOMS: No  MENTAL HEALTH SYMPTOMS: No    "

## 2019-05-15 NOTE — PROGRESS NOTES
Pt arrived to floor with RN s/p RONALDO. VSS. RIJ site CDI, no hematoma. Discharge instructions reviewed with patient and his wife.

## 2019-05-15 NOTE — PATIENT INSTRUCTIONS
Medication Changes:   Increase your Prednisone to 5 mg every other day.    Patient Instructions:  1. Continue staying active and eat a heart healthy diet.    2. Please keep current list of medications with you at all times.    3. Remember to weigh yourself daily after voiding and before you consume any food or beverages and log the numbers.  If you have gained 2 pounds overnight or 5 pounds in a week contact us immediately for medication adjustments or further instructions.    4. **Please call us immediately if you have any syncope (fainting or passing out), chest pain, edema (swelling or weight gain), or decline in your functional status (general decline in how you are feeling).      Check-In  Time Check-In Location Estimated Length Procedure   Name     September   GOLD  waiting room 60-90 minutes Right Heart Catheterization**     Procedure Preparations & Instructions     This is an invasive procedure that DOES require preparation:    - Nothing to eat for 6 hours   - You may have clear liquids up until the time of your procedure  - A ride should be arranged for you in the instance you are unable to drive home, however you should be able to function as you normally would after the procedure     *For Patients with Diabetes: ? DO NOT take any oral diabetic medication, short-acting diabetes medications/insulin, humalog or regular insulin the morning of your test  ? Take   dose of long-acting insulin (Lantus, Levemir) the day of your test  ? Remember to  bring your glucometer and insulin with you to take after your test if needed     *For Patients on anticoagulants: ? Your Coumadin Clinic will give you instructions on medication adjustments or bridging prior to the procedure        Check-In  Time Check-In Location Estimated Length Procedure   Name     September 60 minutes Echocardiogram (Echo)**   Procedure Preparations & Instructions     This is a non-invasive procedure and does NOT require any preparation        Follow up Appointment Information:  September follow up with Echocardiogram and Right heart catheterization    Monthly CRP and N-Terminal Pro BNP    Results:  At this appointment we reviewed your Right heart catheterization, Echocardiogram and Labs  Component      Latest Ref Rng & Units 5/15/2019   Sodium      133 - 144 mmol/L 137   Potassium      3.4 - 5.3 mmol/L 4.3   Chloride      94 - 109 mmol/L 104   Carbon Dioxide      20 - 32 mmol/L 25   Anion Gap      3 - 14 mmol/L 8   Glucose      70 - 99 mg/dL 105 (H)   Urea Nitrogen      7 - 30 mg/dL 20   Creatinine      0.66 - 1.25 mg/dL 1.74 (H)   GFR Estimate      >60 mL/min/1.73:m2 43 (L)   GFR Estimate If Black      >60 mL/min/1.73:m2 50 (L)   Calcium      8.5 - 10.1 mg/dL 9.0   WBC      4.0 - 11.0 10e9/L 8.4   RBC Count      4.4 - 5.9 10e12/L 4.24 (L)   Hemoglobin      13.3 - 17.7 g/dL 12.5 (L)   Hematocrit      40.0 - 53.0 % 39.9 (L)   MCV      78 - 100 fl 94   MCH      26.5 - 33.0 pg 29.5   MCHC      31.5 - 36.5 g/dL 31.3 (L)   RDW      10.0 - 15.0 % 17.5 (H)   Platelet Count      150 - 450 10e9/L 426   N-Terminal Pro Bnp      0 - 125 pg/mL 330 (H)     We are located on the third floor of the [facility] ([facility]) on the [facility].  Our address is     [address] [place] [address] [place] [address] [place] [address]      Thank you for allowing us to be a part of your care here at the [facility]    If you have questions or concerns please contact us at:    Marlys Anderson, RN, BSN    Kai Gant (Schedule,Prior Auth)  Nurse Coordinator     Clinic   Pulmonary Hypertension   Pulmonary Hypertension  [facility]  (P)[phone]    (P) [phone]        (F)[phone]                ** Please note that you will NOT receive a reminder call regarding your scheduled testing, reminder calls are for provider appointments  only.  If you are scheduled for testing within the [employer] system you may receive a call regarding pre-registration for billing purposes only.**     Support Group:  Pulmonary Hypertension Association  Https://www.phassociation.org/  **Look at the Events Tab** They even have Support Groups that you can call into    [facility] PH Support Group  Second Saturday of the Month from 1-3 PM   Location: [address] [place] [address]  Leader: Sfoia Hussein  Phone: [phone] or [phone]  Email: [email or web address]

## 2019-06-03 DIAGNOSIS — E78.5 DYSLIPIDEMIA: Primary | ICD-10-CM

## 2019-06-05 RX ORDER — ROSUVASTATIN CALCIUM 5 MG/1
5 TABLET, COATED ORAL DAILY
Qty: 90 TABLET | Refills: 3 | Status: CANCELLED | OUTPATIENT
Start: 2019-06-05

## 2019-06-05 NOTE — TELEPHONE ENCOUNTER
rosuvastatin (CRESTOR) 5 MG tablet  Last Written Prescription Date:  5/6/2019 per pharmacy  Last Fill Quantity: 90,   # refills: unknown  Last Office Visit : 5/15/2019  Future Office visit:  None    Routing refill request to provider for review/approval because:  Medication is reported/historical  LDL  Done at MD Rapp 4/7/2019=260

## 2019-06-07 DIAGNOSIS — I27.20 PULMONARY HTN (H): Primary | ICD-10-CM

## 2019-06-11 RX ORDER — ROSUVASTATIN CALCIUM 5 MG/1
5 TABLET, COATED ORAL DAILY
Qty: 90 TABLET | Refills: 3 | Status: SHIPPED | OUTPATIENT
Start: 2019-06-11 | End: 2020-06-30

## 2019-06-11 NOTE — TELEPHONE ENCOUNTER
Per charting;    Medication is reported/historical  LDL  Done at [place] [facility] 4/7/2019=260    [place].  Will pend medication approval until I can review with MD. .Ana Ashraf RN on 6/11/2019 at 10:53 AM  -------------------------------------  Reviewed with Dr. Quan who wants patient to have a fasting lipid profile to double check.  Another provider already approved this medication.  Ana Ashraf RN on 6/20/2019 at 4:00 PM    See Separate Encounter.

## 2019-06-13 ENCOUNTER — PATIENT OUTREACH (OUTPATIENT)
Dept: CARDIOLOGY | Facility: CLINIC | Age: 54
End: 2019-06-13

## 2019-06-13 DIAGNOSIS — I27.20 PULMONARY HTN (H): Primary | ICD-10-CM

## 2019-06-13 NOTE — PROGRESS NOTES
Pt had his monthly labs drawn.  I called the lab because they terrie a BNP and not a N-Terminal Pro BNP.  They will call back with the results.  Marlys Anderson RN on 6/13/2019 at 2:12 PM

## 2019-06-20 ENCOUNTER — TELEPHONE (OUTPATIENT)
Dept: CARDIOLOGY | Facility: CLINIC | Age: 54
End: 2019-06-20

## 2019-06-20 NOTE — TELEPHONE ENCOUNTER
Reviewed with Dr. Quan patient's most recent LDL =260 in April 2019.  Dr. Quan wants patient to have a fasting lipid profile to double check values before changing lipid medication.    Called patient and discussed scenario and recent values.  patient states he has been on this med (Crestor 5mg) for a long time and he was only put on it because when he had to begin anti-rejection meds he was advised they could make his cholesterol climb.    patient agreed to have a fasting lipid profile drawn locally at [facility] in [place].  Patient asked that I also e-mail him a copy of the lab oder, as the clinic often misplaces them.  Agreed with plan and order was placed and faxed to [phone]. Ana Ashraf RN on 6/20/2019 at 4:23 PM      Patient's e-mail is [email or web address]

## 2019-07-01 ENCOUNTER — TELEPHONE (OUTPATIENT)
Dept: CARDIOLOGY | Facility: CLINIC | Age: 54
End: 2019-07-01

## 2019-07-01 NOTE — TELEPHONE ENCOUNTER
[facility] Call Center    Phone Message    May a detailed message be left on voicemail: yes    Reason for Call: Other: Dooley calling from [facility] in [place], she said they received orders for pt for lipid testing but they do not do that test there, she said they can do a lipid panel with calculated LDH but not the other lipid that was on the order for pt, she is wondering if they want her to just perform the Lipid panel they can do there or another kind of test, please call to discuss further     Action Taken: Message routed to:  [facility] Cardiology

## 2019-07-01 NOTE — TELEPHONE ENCOUNTER
[facility] Call Center    Phone Message    May a detailed message be left on voicemail: yes    Reason for Call: Other: Dooley from [employer] called to follow up on her previous message. Please give her a call back.      Action Taken: Message routed to:  [facility] Cardiology

## 2019-07-01 NOTE — TELEPHONE ENCOUNTER
I called Dooley and let her know that the calculated LDH would be fine.  They will fax results once the test is complete. Marlys Anderson RN on 7/1/2019 at 3:33 PM

## 2019-07-03 DIAGNOSIS — I27.20 PULMONARY HYPERTENSION (H): ICD-10-CM

## 2019-07-03 LAB
CHOLEST SERPL-MCNC: 151 MG/DL
HDLC SERPL-MCNC: 66 MG/DL
LDLC SERPL CALC-MCNC: 58 MG/DL
TRIGL SERPL-MCNC: 136 MG/DL

## 2019-07-07 NOTE — TELEPHONE ENCOUNTER
digoxin (LANOXIN) tablet 125 mcg     Last Written Prescription Date:  5/16/17  Last Fill Quantity: 90,   # refills: 3  Last Office Visit : 5/15/19  Future Office visit:  none    Routing refill request to provider for review/approval because:  Dx Pulmonary HTN  Lab past due- Digoxin level  Abnormal lab-Creatinine.

## 2019-07-08 RX ORDER — DIGOXIN 125 MCG
125 TABLET ORAL DAILY
Qty: 90 TABLET | Refills: 3 | Status: SHIPPED | OUTPATIENT
Start: 2019-07-08 | End: 2020-08-11

## 2019-07-25 ENCOUNTER — TELEPHONE (OUTPATIENT)
Dept: CARDIOLOGY | Facility: CLINIC | Age: 54
End: 2019-07-25

## 2019-07-25 NOTE — TELEPHONE ENCOUNTER
"Patient called to say that when he saw Dr. Quan last, they discussed the possability of increasing Uptravi if his Pro-BNP would start to rise.  It was understood this would be off label use.    Patient states he just had a blood test with his PMD and his Pro-BNP has risen to 550 with last time being 360.  He would like me to discuss with Dr. Quan and see if he should increase his dose of Uptravi per their previous discussion?      Patient denies any swelling, sob, cough or decrease in appetite.  He admits to weighing himself daily, with today's weight being 256lbs, but doesn't know his \"dry or baseline\" weight.  Patient states he has been between 250-256 lbs for the past couple years, and he has been eating more lately so he attributes it to that.    patient currently does not have a follow up visit schedule, but was advised to have a RHC in September which has not been scheduled yet either.  Advised patient I would call Dr. Quan to discuss, then call him back.  Patient verbalized understanding, agreed with plan and denied any further questions. Ana Ashraf RN on 7/25/2019 at 2:08 PM    ------------------------------------------  Discussed with Dr. Quan who recommend patient have the Pr-BNP rechecked again in 2 weeks.  Agreed with plan.  -----------------------------------------  LM for patient advising him of Dr. Quan's recommendations.  Asked him to call us back with th results, or have the clinic fax them to us.  Left my direct dial number for call back.  Ana Ashraf RN on 7/25/2019 at 2:56 PM  -----------------------------------------  Patient called back and we reviewed Dr. Quan's recommendations.  Patient verbalized understanding, agreed with plan and denied any further questions. Ana Ashraf RN on 7/26/2019 at 8:54 PM        "

## 2019-07-26 ENCOUNTER — EXTERNAL ORDER RESULTS (OUTPATIENT)
Dept: CARDIOLOGY | Facility: CLINIC | Age: 54
End: 2019-07-26

## 2019-07-26 LAB
CRP SERPL HS-MCNC: <5 MG/L (ref 0–9.9)
Lab: 552

## 2019-08-26 PROBLEM — G47.33 OBSTRUCTIVE SLEEP APNEA: Status: ACTIVE | Noted: 2017-04-25

## 2019-08-26 PROBLEM — I27.20 PULMONARY HTN (H): Status: RESOLVED | Noted: 2018-12-13 | Resolved: 2019-08-26

## 2019-08-26 PROBLEM — J44.9 CHRONIC OBSTRUCTIVE PULMONARY DISEASE (H): Status: ACTIVE | Noted: 2017-04-25

## 2019-08-26 PROBLEM — T38.0X5A STEROID-INDUCED DIABETES MELLITUS (H): Chronic | Status: ACTIVE | Noted: 2017-04-29

## 2019-08-26 PROBLEM — E09.9 STEROID-INDUCED DIABETES MELLITUS (H): Chronic | Status: ACTIVE | Noted: 2017-04-29

## 2019-08-26 PROBLEM — I51.89 OTHER ILL-DEFINED HEART DISEASES: Status: RESOLVED | Noted: 2019-01-17 | Resolved: 2019-08-26

## 2019-08-27 ENCOUNTER — HOSPITAL ENCOUNTER (OUTPATIENT)
Facility: CLINIC | Age: 54
Discharge: HOME | End: 2019-08-27
Attending: INTERNAL MEDICINE | Admitting: INTERNAL MEDICINE
Payer: COMMERCIAL

## 2019-08-27 ENCOUNTER — APPOINTMENT (OUTPATIENT)
Dept: MEDSURG UNIT | Facility: CLINIC | Age: 54
End: 2019-08-27
Attending: INTERNAL MEDICINE
Payer: COMMERCIAL

## 2019-08-27 ENCOUNTER — HOSPITAL ENCOUNTER (OUTPATIENT)
Dept: CARDIOLOGY | Facility: CLINIC | Age: 54
End: 2019-08-27
Attending: INTERNAL MEDICINE | Admitting: INTERNAL MEDICINE
Payer: COMMERCIAL

## 2019-08-27 ENCOUNTER — OFFICE VISIT (OUTPATIENT)
Dept: CARDIOLOGY | Facility: CLINIC | Age: 54
End: 2019-08-27
Attending: INTERNAL MEDICINE
Payer: COMMERCIAL

## 2019-08-27 VITALS
HEART RATE: 88 BPM | SYSTOLIC BLOOD PRESSURE: 110 MMHG | TEMPERATURE: 98 F | RESPIRATION RATE: 16 BRPM | OXYGEN SATURATION: 98 % | DIASTOLIC BLOOD PRESSURE: 78 MMHG

## 2019-08-27 VITALS
DIASTOLIC BLOOD PRESSURE: 68 MMHG | OXYGEN SATURATION: 98 % | HEART RATE: 83 BPM | WEIGHT: 254.6 LBS | HEIGHT: 77 IN | BODY MASS INDEX: 30.06 KG/M2 | SYSTOLIC BLOOD PRESSURE: 99 MMHG

## 2019-08-27 DIAGNOSIS — R06.02 SOB (SHORTNESS OF BREATH): ICD-10-CM

## 2019-08-27 DIAGNOSIS — I27.20 PULMONARY HYPERTENSION (H): ICD-10-CM

## 2019-08-27 LAB
ALBUMIN SERPL-MCNC: 3.9 G/DL (ref 3.4–5)
ALP SERPL-CCNC: 67 U/L (ref 40–150)
ALT SERPL W P-5'-P-CCNC: 32 U/L (ref 0–70)
ANION GAP SERPL CALCULATED.3IONS-SCNC: 5 MMOL/L (ref 3–14)
AST SERPL W P-5'-P-CCNC: 26 U/L (ref 0–45)
BILIRUB SERPL-MCNC: 0.6 MG/DL (ref 0.2–1.3)
BUN SERPL-MCNC: 18 MG/DL (ref 7–30)
CALCIUM SERPL-MCNC: 9.4 MG/DL (ref 8.5–10.1)
CHLORIDE SERPL-SCNC: 107 MMOL/L (ref 94–109)
CHOLEST SERPL-MCNC: 150 MG/DL
CO2 SERPL-SCNC: 28 MMOL/L (ref 20–32)
CREAT SERPL-MCNC: 1.73 MG/DL (ref 0.66–1.25)
CRP SERPL-MCNC: <2.9 MG/L (ref 0–8)
ERYTHROCYTE [DISTWIDTH] IN BLOOD BY AUTOMATED COUNT: 18 % (ref 10–15)
GFR SERPL CREATININE-BSD FRML MDRD: 43 ML/MIN/{1.73_M2}
GLUCOSE SERPL-MCNC: 106 MG/DL (ref 70–99)
HCT VFR BLD AUTO: 39.6 % (ref 40–53)
HDLC SERPL-MCNC: 75 MG/DL
HGB BLD-MCNC: 12.5 G/DL (ref 13.3–17.7)
LDLC SERPL CALC-MCNC: 48 MG/DL
MCH RBC QN AUTO: 28.7 PG (ref 26.5–33)
MCHC RBC AUTO-ENTMCNC: 31.6 G/DL (ref 31.5–36.5)
MCV RBC AUTO: 91 FL (ref 78–100)
NONHDLC SERPL-MCNC: 75 MG/DL
NT-PROBNP SERPL-MCNC: 346 PG/ML (ref 0–125)
PLATELET # BLD AUTO: 438 10E9/L (ref 150–450)
POTASSIUM SERPL-SCNC: 3.9 MMOL/L (ref 3.4–5.3)
PROT SERPL-MCNC: 7 G/DL (ref 6.8–8.8)
RBC # BLD AUTO: 4.36 10E12/L (ref 4.4–5.9)
SODIUM SERPL-SCNC: 140 MMOL/L (ref 133–144)
TRIGL SERPL-MCNC: 134 MG/DL
WBC # BLD AUTO: 6.4 10E9/L (ref 4–11)

## 2019-08-27 PROCEDURE — 93451 RIGHT HEART CATH: CPT | Mod: 26 | Performed by: INTERNAL MEDICINE

## 2019-08-27 PROCEDURE — 93451 RIGHT HEART CATH: CPT | Performed by: INTERNAL MEDICINE

## 2019-08-27 PROCEDURE — 25000125 ZZHC RX 250: Performed by: INTERNAL MEDICINE

## 2019-08-27 PROCEDURE — 83880 ASSAY OF NATRIURETIC PEPTIDE: CPT | Performed by: INTERNAL MEDICINE

## 2019-08-27 PROCEDURE — 80061 LIPID PANEL: CPT | Performed by: INTERNAL MEDICINE

## 2019-08-27 PROCEDURE — 27210794 ZZH OR GENERAL SUPPLY STERILE: Performed by: INTERNAL MEDICINE

## 2019-08-27 PROCEDURE — 80053 COMPREHEN METABOLIC PANEL: CPT | Performed by: INTERNAL MEDICINE

## 2019-08-27 PROCEDURE — 93306 TTE W/DOPPLER COMPLETE: CPT

## 2019-08-27 PROCEDURE — 93306 TTE W/DOPPLER COMPLETE: CPT | Mod: 26 | Performed by: INTERNAL MEDICINE

## 2019-08-27 PROCEDURE — G0463 HOSPITAL OUTPT CLINIC VISIT: HCPCS | Mod: ZF

## 2019-08-27 PROCEDURE — 36415 COLL VENOUS BLD VENIPUNCTURE: CPT | Performed by: INTERNAL MEDICINE

## 2019-08-27 PROCEDURE — 40000166 ZZH STATISTIC PP CARE STAGE 1

## 2019-08-27 PROCEDURE — 86140 C-REACTIVE PROTEIN: CPT | Performed by: INTERNAL MEDICINE

## 2019-08-27 PROCEDURE — 85027 COMPLETE CBC AUTOMATED: CPT | Performed by: INTERNAL MEDICINE

## 2019-08-27 PROCEDURE — 99214 OFFICE O/P EST MOD 30 MIN: CPT | Mod: ZP | Performed by: INTERNAL MEDICINE

## 2019-08-27 RX ORDER — LIDOCAINE 40 MG/G
CREAM TOPICAL
Status: DISCONTINUED | OUTPATIENT
Start: 2019-08-27 | End: 2019-09-02 | Stop reason: HOSPADM

## 2019-08-27 RX ORDER — LIDOCAINE 40 MG/G
CREAM TOPICAL
Status: CANCELLED | OUTPATIENT
Start: 2019-08-27

## 2019-08-27 ASSESSMENT — MIFFLIN-ST. JEOR: SCORE: 2107.24

## 2019-08-27 ASSESSMENT — PAIN SCALES - GENERAL: PAINLEVEL: NO PAIN (0)

## 2019-08-27 NOTE — IP AVS SNAPSHOT
MRN:[id number]                      After Visit Summary   8/27/2019    Olivier Gómez    MRN: [id number]           Visit Information        Department      8/27/2019 11:16 AM [facility], [place],  Heart Cath Lab          Review of your medicines      UNREVIEWED medicines. Ask your doctor about these medicines       Dose / Directions   ACTIGALL PO      Dose:  300 mg  Take 300 mg by mouth 3 times daily  Refills:  0     atovaquone 750 MG/5ML suspension  Commonly known as:  MEPRON      Dose:  1500 mg  Take 1,500 mg by mouth daily  Refills:  0     calcium carbonate 500 MG tablet  Commonly known as:  OS-PANFILO      Dose:  600 mg  Take 600 mg by mouth 2 times daily  Refills:  0     CLONAZEPAM PO      Dose:  1 mg  Take 1 mg by mouth At Bedtime  Refills:  0     digoxin 125 MCG tablet  Commonly known as:  LANOXIN  Used for:  Pulmonary hypertension (H)      Dose:  125 mcg  Take 1 tablet (125 mcg) by mouth daily  Quantity:  90 tablet  Refills:  3     FLUCONAZOLE PO      Dose:  200 mg  Take 200 mg by mouth daily  Refills:  0     * furosemide 40 MG tablet  Commonly known as:  LASIX  Used for:  Pulmonary hypertension (H)      Dose:  40 mg  Take 1 tablet (40 mg) by mouth daily  Quantity:  30 tablet  Refills:  0     * furosemide 20 MG tablet  Commonly known as:  LASIX  Used for:  Pulmonary hypertension (H)      TAKE 1 TABLET (20 MG) by MOUTH 1 TIME PER DAY]  Quantity:  90 tablet  Refills:  3     magnesium plus protein 133 MG tablet      Dose:  266 mg  Take 266 mg by mouth daily (2 tablets)  Refills:  0     PEN-VEE K OR      Dose:  500 mg  Take 500 mg by mouth 2 times daily  Refills:  0     predniSONE 5 MG tablet  Commonly known as:  DELTASONE  Used for:  Pulmonary hypertension (H)      Dose:  5 mg  Take 5 mg by mouth every other day (3 tablets = 7.5 mg daily).  Quantity:  45 tablet  Refills:  3     PROTONIX PO      Dose:  40 mg  Take 40 mg by mouth daily  Refills:  0     rosuvastatin 5 MG tablet  Commonly known as:   CRESTOR  Used for:  Pulmonary HTN (H)      Dose:  5 mg  Take 1 tablet (5 mg) by mouth daily  Quantity:  90 tablet  Refills:  3     ruxolitinib 5 MG Tabs tablet CHEMO  Commonly known as:  JAKAFI  Used for:  Pericardial effusion, Pulmonary hypertension (H), Gbbqy-nlxkcx-kkuy disease (H)      Dose:  5 mg  Take 1 tablet (5 mg) by mouth 2 times daily  Quantity:  60 tablet  Refills:  0     selexipag 1600 MCG tablet  Commonly known as:  UPTRAVI  Used for:  Pulmonary hypertension (H)      Dose:  1600 mcg  Take 1 tablet (1,600 mcg) by mouth every 12 hours  Quantity:  60 tablet  Refills:  11     tadalafil (PAH) 20 MG Tabs  Commonly known as:  ADCIRCA  Used for:  Pulmonary hypertension (H)      Dose:  40 mg  Take 2 tablets (40 mg) by mouth daily  Quantity:  60 tablet  Refills:  11     VALACYCLOVIR HCL PO      Dose:  500 mg  Take 500 mg by mouth every other day  Refills:  0     VITAMIN D (CHOLECALCIFEROL) PO      Dose:  2000 Units  Take 2,000 Units by mouth 3 times daily  Refills:  0         * This list has 2 medication(s) that are the same as other medications prescribed for you. Read the directions carefully, and ask your doctor or other care provider to review them with you.                  Protect others around you: Learn how to safely use, store and throw away your medicines at www.disposemymeds.org.       Follow-ups after your visit       Your next 10 appointments already scheduled    Aug 27, 2019  3:30 PM CDT  (Arrive by 3:15 PM)  RETURN PRIMARY PULMONARY with Dk Quan MD  [facility] [place] [address] [place] [address]  [address] [place] [id number] [phone]         Care Instructions       Further instructions from your care team       [facility]                        Interventional Cardiology  Discharge Instructions   Post Right Heart Cath      AFTER YOU GO HOME:    DO drink plenty of fluids    DO resume your regular diet and medications  unless otherwise instructed by your Primary Physician    Do Not scrub the procedure site vigorously    No lotion or powder to the puncture site for 3 days    CALL YOUR PRIMARY PHYSICIAN IF: You may resume all normal activity.  Monitor neck site for bleeding, swelling, or voice changes. If you notice bleeding or swelling immediately apply pressure to the site and call number below to speak with Cardiology Fellow.  If you experience any changes in your breathing you should call your doctor immediately or come to the closest Emergency Department.  Do not drive yourself.    ADDITIONAL INSTRUCTIONS: Medications: You are to resume all home medications including anticoagulation therapy unless otherwise advised by your primary cardiologist or nurse coordinator.    Follow Up: Per your primary cardiology team    If you have any questions or concerns regarding your procedure site please call [phone] at anytime and ask for Cardiology Fellow on call.  They are available 24 hours a day.  You may also contact the Cardiology Clinic after hours number at [phone].                                                       Telephone Numbers [phone] Monday-Friday 8:00 am to 4:30 pm    [phone] After 4:30 pm Monday-Friday, Weekends & Holidays  Ask for Interventional Cardiologist on call. Someone is on call 24 hours/day   [facility] toll free number [phone] Monday-Friday 8:00 am to 4:30 pm   [facility] Emergency Dept [phone]                   Additional Information About Your Visit       [employer] Information    [employer] gives you secure access to your electronic health record. If you see a primary care provider, you can also send messages to your care team and make appointments. If you have questions, please call your primary care clinic.  If you do not have a primary care provider, please call [phone] and they will assist you.       Care EveryWhere ID    This is your Care EveryWhere ID. This could be used  by other organizations to access your [facility] medical records  [id number]       Your Vitals Were     Blood Pressure   108/67 (BP Location: Right arm)    Pulse   87    Temperature   98  F (36.7  C) (Oral)    Respirations   18    Pulse Oximetry   100%          Primary Care Provider Office Phone # Fax #    Adelso Delgado [phone]      Equal Access to Services    [facility]: Hadii aad ku hadasho Soomaali, waaxda luqadaha, qaybta kaalmada adeegyada, waxay idiin haytonin adetae doss lajorgen . So [facility] [phone].    ATENCIÓN: Si habla español, tiene a garza disposición servicios gratuitos de asistencia lingüística. Jair al [phone].    We comply with applicable federal civil rights laws and [place] laws. We do not discriminate on the basis of race, color, national origin, age, disability, sex, sexual orientation, or gender identity.           Thank you!    Thank you for choosing [facility] for your care. Our goal is always to provide you with excellent care. Hearing back from our patients is one way we can continue to improve our services. Please take a few minutes to complete the written survey that you may receive in the mail after you visit with us. Thank you!            Medication List      ASK your doctor about these medications          Morning Afternoon Evening Bedtime As Needed    ACTIGALL PO  INSTRUCTIONS:  Take 300 mg by mouth 3 times daily                     atovaquone 750 MG/5ML suspension  Also known as:  MEPRON  INSTRUCTIONS:  Take 1,500 mg by mouth daily                     calcium carbonate 500 MG tablet  Also known as:  OS-PANFILO  INSTRUCTIONS:  Take 600 mg by mouth 2 times daily                     CLONAZEPAM PO  INSTRUCTIONS:  Take 1 mg by mouth At Bedtime                     digoxin 125 MCG tablet  Also known as:  LANOXIN  INSTRUCTIONS:  Take 1 tablet (125 mcg) by mouth daily                     FLUCONAZOLE PO  INSTRUCTIONS:  Take 200 mg by mouth daily                     *  furosemide 40 MG tablet  Also known as:  LASIX  INSTRUCTIONS:  Take 1 tablet (40 mg) by mouth daily                     * furosemide 20 MG tablet  Also known as:  LASIX  INSTRUCTIONS:  TAKE 1 TABLET (20 MG) by MOUTH 1 TIME PER DAY]                     magnesium plus protein 133 MG tablet  INSTRUCTIONS:  Take 266 mg by mouth daily (2 tablets)                     PEN-VEE K OR  INSTRUCTIONS:  Take 500 mg by mouth 2 times daily                     predniSONE 5 MG tablet  Also known as:  DELTASONE  INSTRUCTIONS:  Take 5 mg by mouth every other day (3 tablets = 7.5 mg daily).  Doctor's comments:  Please include the quantity of tablets and (strength) per dose in sig.                      PROTONIX PO  INSTRUCTIONS:  Take 40 mg by mouth daily                     rosuvastatin 5 MG tablet  Also known as:  CRESTOR  INSTRUCTIONS:  Take 1 tablet (5 mg) by mouth daily                     ruxolitinib 5 MG Tabs tablet CHEMO  Also known as:  JAKAFI  INSTRUCTIONS:  Take 1 tablet (5 mg) by mouth 2 times daily                     selexipag 1600 MCG tablet  Also known as:  UPTRAVI  INSTRUCTIONS:  Take 1 tablet (1,600 mcg) by mouth every 12 hours  Doctor's comments:  [facility]                     tadalafil (PAH) 20 MG Tabs  Also known as:  ADCIRCA  INSTRUCTIONS:  Take 2 tablets (40 mg) by mouth daily                     VALACYCLOVIR HCL PO  INSTRUCTIONS:  Take 500 mg by mouth every other day                     VITAMIN D (CHOLECALCIFEROL) PO  INSTRUCTIONS:  Take 2,000 Units by mouth 3 times daily                        * This list has 2 medication(s) that are the same as other medications prescribed for you. Read the directions carefully, and ask your doctor or other care provider to review them with you.

## 2019-08-27 NOTE — NURSING NOTE
Chief Complaint   Patient presents with     Follow Up      Return for PH F/U with Right heart catheterization, Echo and Lab (pt has been on max dose since 4/9, Monthly N-Terminal Pro BNP/CRP shows N-Terminal Pro BNP is trending up)     Vitals were taken and medications reconciled.     Hammad Mirza CMA  3:33 PM

## 2019-08-27 NOTE — IP AVS SNAPSHOT
[facility], [facility],  Heart Cath Lab  [address] [facility] [address]  Phone:  [phone]                                    After Visit Summary   8/27/2019    Olivier Gómez    MRN: [id number]           After Visit Summary Signature Page    I have received my discharge instructions, and my questions have been answered. I have discussed any challenges I see with this plan with the nurse or doctor.    ..........................................................................................................................................  Patient/Patient Representative Signature      ..........................................................................................................................................  Patient Representative Print Name and Relationship to Patient    ..................................................               ................................................  Date                                   Time    ..........................................................................................................................................  Reviewed by Signature/Title    ...................................................              ..............................................  Date                                               Time          22EPIC Rev 08/18

## 2019-08-27 NOTE — PLAN OF CARE
HISTORY OF PRESENT ILLNESS:    Cristy Bailey is a 70 year old female who is seen in follow up for left JENAE, DOS 2/12/19, Dr. Chavo Rodriguez..  Present symptoms: Patient reports no pain in her left hip today, pain is located in the left lower leg and foot. Along anterior lateral lower leg and bottom of foot. Current pain level: 5/10. She notes that she has been working on ROM, and walking more. She has been able to walk to the sink, and down the hallway with home physical therapist in walker. She states it takes a lot of effort to walk, but she is trying. She denies any recent drainage from her incision. She states that Leo, nor her nurses have made comments regarding it.  She is concerned about her recent INR of 2.1. She stated she looked at her results via [employer] and noticed they were high and does not know what to do regarding it. She states she takes her Coumadin at bedtime daily.   Currently taking Oxycodone at bedtime only, 20 tabs in 2 weeks, and when traveling. She otherwise can manage with Tylenol throughout the day.   Denies Chest pain, Calve pain, Fever, Chills.    Current Treatment: home care therapy, Oxycodone, Neurontin.    PHYSICAL EXAM:  /62 (BP Location: Right arm, Patient Position: Chair, Cuff Size: Adult Large)   There is no height or weight on file to calculate BMI.   GENERAL APPEARANCE: healthy, alert and no distress   PSYCH:  mentation appears normal and affect normal/bright    MSK:  Left: Hip .  Ambulates: present sin WC.  Incision clean and dry, scab now present at sections, healing.  No incisional erythema.   No Ecchymosis.  No calve pain on palpation.  Edema Moderate foot through thigh bilaterally.  CMS: joesph incisional numbness, otherwise grossly intact.  AROM Ankle, D flexion -10, P flexion 45.  PROM ankle D flexion 20.  Strength at ankle, D flexion 4-/5, P flexion normal.      IMAGING INTERPRETATION: none today.     ASSESSMENT:  Cristy Bailey is a 70 year old female S/P left JENAE, DOS  Prep complete for RHC/ Biopsy. Wife at bedside.   2/12/19, Dr. Chavo Rodriguez.    Improving functionally.  Unknown nerve pain, however right leg pain resolved post operatively.    PLAN:  - Continue PT, walker, work on exercises for left ankle.  - Medications: refill oxycodone.  - Daily incision checks, or every other day.      Return to clinic 4, weeks.    Donal Grover PA-C    Dept. Orthopedic Surgery  [facility]   4/17/2019

## 2019-08-27 NOTE — DISCHARGE INSTRUCTIONS
[facility]                        Interventional Cardiology  Discharge Instructions   Post Right Heart Cath      AFTER YOU GO HOME:    DO drink plenty of fluids    DO resume your regular diet and medications unless otherwise instructed by your Primary Physician    Do Not scrub the procedure site vigorously    No lotion or powder to the puncture site for 3 days    CALL YOUR PRIMARY PHYSICIAN IF: You may resume all normal activity.  Monitor neck site for bleeding, swelling, or voice changes. If you notice bleeding or swelling immediately apply pressure to the site and call number below to speak with Cardiology Fellow.  If you experience any changes in your breathing you should call your doctor immediately or come to the closest Emergency Department.  Do not drive yourself.    ADDITIONAL INSTRUCTIONS: Medications: You are to resume all home medications including anticoagulation therapy unless otherwise advised by your primary cardiologist or nurse coordinator.    Follow Up: Per your primary cardiology team    If you have any questions or concerns regarding your procedure site please call [phone] at anytime and ask for Cardiology Fellow on call.  They are available 24 hours a day.  You may also contact the Cardiology Clinic after hours number at [phone].                                                       Telephone Numbers [phone] Monday-Friday 8:00 am to 4:30 pm    [phone] After 4:30 pm Monday-Friday, Weekends & Holidays  Ask for Interventional Cardiologist on call. Someone is on call 24 hours/day   [facility] toll free number [phone] Monday-Friday 8:00 am to 4:30 pm   [facility] Emergency Dept [phone]

## 2019-08-27 NOTE — PLAN OF CARE
Pt arrived to floor with RN s/p RHC/Biopsy. VSS. RIJ site CDI, no hematoma. Discharge instructions reviewed with patient and his wife. Escorted to lobby for transport home.

## 2019-08-27 NOTE — PATIENT INSTRUCTIONS
Medication Changes:  - No medication changes at this time. Continue current regimen.     Patient Instructions:  1. Continue staying active and eat a heart healthy diet.    2. Please keep current list of medications with you at all times.    3. Remember to weigh yourself daily after voiding and before you consume any food or beverages and log the numbers.  If you have gained 2 pounds overnight or 5 pounds in a week contact us immediately for medication adjustments or further instructions.    4. **Please call us immediately if you have any syncope (fainting or passing out), chest pain, edema (swelling or weight gain), or decline in your functional status (general decline in how you are feeling).    Follow up Appointment Information:  - Right heart cath in September  - Follow up with Dr. Quan after the procedure    Results:  Component      Latest Ref Rng & Units 8/27/2019   Sodium      133 - 144 mmol/L 140   Potassium      3.4 - 5.3 mmol/L 3.9   Chloride      94 - 109 mmol/L 107   Carbon Dioxide      20 - 32 mmol/L 28   Anion Gap      3 - 14 mmol/L 5   Glucose      70 - 99 mg/dL 106 (H)   Urea Nitrogen      7 - 30 mg/dL 18   Creatinine      0.66 - 1.25 mg/dL 1.73 (H)   GFR Estimate      >60 mL/min/1.73:m2 43 (L)   GFR Estimate If [place]      >60 mL/min/1.73:m2 50 (L)   Calcium      8.5 - 10.1 mg/dL 9.4   Bilirubin Total      0.2 - 1.3 mg/dL 0.6   Albumin      3.4 - 5.0 g/dL 3.9   Protein Total      6.8 - 8.8 g/dL 7.0   Alkaline Phosphatase      40 - 150 U/L 67   ALT      0 - 70 U/L 32   AST      0 - 45 U/L 26   WBC      4.0 - 11.0 10e9/L 6.4   RBC Count      4.4 - 5.9 10e12/L 4.36 (L)   Hemoglobin      13.3 - 17.7 g/dL 12.5 (L)   Hematocrit      40.0 - 53.0 % 39.6 (L)   MCV      78 - 100 fl 91   MCH      26.5 - 33.0 pg 28.7   MCHC      31.5 - 36.5 g/dL 31.6   RDW      10.0 - 15.0 % 18.0 (H)   Platelet Count      150 - 450 10e9/L 438   Cholesterol      <200 mg/dL 150   Triglycerides      <150 mg/dL 134   HDL  Cholesterol      >39 mg/dL 75   LDL Cholesterol Calculated      <100 mg/dL 48   Non HDL Cholesterol      <130 mg/dL 75   CRP Inflammation      0.0 - 8.0 mg/L <2.9   N-Terminal Pro Bnp      0 - 125 pg/mL 346 (H)     We are located on the third floor of [facility] on the [facility].  Our address is     [address] [place] [address] on [address]rd [place] [address]    Thank you for allowing us to be a part of your care here at the [facility]    If you have questions or concerns please contact us at:    Roberto Erickson, RN, BSN   Verna Nguyen (Schedule,Prior Auth)  Nurse Coordinator     Clinic   Pulmonary Hypertension   Pulmonary Hypertension  [facility]  (Phone)[phone]    (Phone) [phone]        (Fax) [phone]    ** Please note that you will NOT receive a reminder call regarding your scheduled testing, reminder calls are for provider appointments only.  If you are scheduled for testing within the [employer] system you may receive a call regarding pre-registration for billing purposes only.**     Remember to weigh yourself daily after voiding and before you consume any food or beverages and log the numbers.  If you have gained/lost 2 pounds overnight or 5 pounds in a week contact us immediately for medication adjustments or further instructions.   **Please call us immediately if you have any syncope, chest pain, edema, or decline in your functional status.    Support Group:  Pulmonary Hypertension Association  Https://www.phassociation.org/  **Look at the Events Tab** They even have Support Groups that you can call into    [facility] PH Support Group  Second Saturday of the Month from 1-3 PM   Location: [address] [place] [id number]  Leader: Sofia Durand and Ya Hussein  Phone: [phone] or [phone]  Email: [email or web address]

## 2019-08-27 NOTE — H&P
[facility]  Date: 08/27/2019    HPI: Mr. Gómez is a 54 year old male with PMH of scleroderma, pHTN, AML, CKD stage II and HTN whom presents for RHC as a part of routine monitoring.  He denies recent fever, chills, nausea, chest pain, worsening SOB, cough, abdominal pain, new LE pain/swelling.  In fact, became a grandfather for the second time overnight.  The procedural details of the right heart catheterization were discussed in detail with the patient.  Risks and benefits were discussed; discussion included possible allergy to contrast dye in addition to:    Benefit: greater hemodynamic understanding of the patient's right heart/pulmonary arteries.  Risks:  - Radiation exposure (X-ray)   (100.0% of patients)  - Bleeding (femoral, retroperitoneal)  (up to 7.0% of patients)  - Transient/Permanent arrhythmia  (up to ~3.0% of patients)  - Dissection (coronaries, great vessels)  (1-3.0% of patients)  - Infection     (<1.0% of patients)  - Emergent open heart surgery  (<0.1% of patients)  - CVA (ischemic)    (~0.1% of patients)  - MI      (~0.1% of patients)  - Death     (<0.1% of patients)    Family, social and surgical histories reviewed.  These were non-contributory.    Physical Exam:  Gen: in no apparent distress  CV: regular rate/rhythm  Pulm: faint inspiratory rales at bases  Abd: soft, non-distended  Skin: no apparent breakdown  Neuro: alert and oriented.  Moves extremities symmetrically    Assessment and Plan: 54 year old male with known pHTN. Patient expressed understanding and agrees to move forward with the procedure at this point in time.  All questions were answered. Proceed with RHC.        Nica Almazan PA-C

## 2019-09-04 NOTE — PROGRESS NOTES
The patient returns for follow-up of pulmonary hypertension.  There is no interim history of chest pain, tightness, paroxysmal nocturnal dyspnea, orthopnea, peripheral edema, palpitation, pre-syncope, syncope, device discharge.  Exercise tolerance is stable.  The patient is attempting to exercise regularly and following a sodium restricted, calorically appropriate diet.  Medications are reviewed and the patient is taking medications as prescribed.  The patient is generally sleeping well. The patient feels better than he has for the last 10 years.      Constitutional: alert, oriented, normal gait and station, normal mentation.  Oral: moist mucous membranss  Lymph: without pathologic adenopathy  Chest: clear to ausculation and percussion  Cor: No evidence of left or right ventricular activity.  Rhythm is regular.  S1 normal, S2 split physiologically. Murmurs are not present  Abdomen: without tenderness, rebound, guarding, masses, ascites  Extremities: Edema not present  Neuro: no focal defects, normal mentation  Skin: without open lesions  Psych: oriented, verbal, mental status in tact    Wt Readings from Last 24 Encounters:   19 115.5 kg (254 lb 9.6 oz)   05/15/19 117 kg (258 lb)   19 115.1 kg (253 lb 11.2 oz)   19 113.4 kg (250 lb)   10/10/18 117.9 kg (260 lb)   18 115.6 kg (254 lb 12.8 oz)   18 111.6 kg (246 lb)   18 114.3 kg (252 lb)   17 113.7 kg (250 lb 11.2 oz)   17 113.4 kg (250 lb)   17 115 kg (253 lb 9.6 oz)   17 115.3 kg (254 lb 1.6 oz)   17 117.3 kg (258 lb 9.6 oz)   17 122 kg (269 lb)   05/15/17 124.7 kg (275 lb)   17 125.1 kg (275 lb 11.2 oz)   17 129.5 kg (285 lb 8 oz)   16 133.8 kg (294 lb 15.6 oz)   08/26/15 133.8 kg (294 lb 15.6 oz)   08/13/15 132.5 kg (292 lb)   07/14/15 127 kg (280 lb)   07/14/15 131.7 kg (290 lb 5.5 oz)     Name: BENEDICT ESETS  MRN: [id number]  : 1965  Study Date: 05/15/2019  10:10 AM  Age: 54 yrs  Gender: Male  Patient Location: [facility]  Reason For Study: Pulmonary hypertension (H)  Ordering Physician: PILI SEGURA  Referring Physician: PILI SEGURA  Performed By: Marcial Wolff RDCS     BSA: 2.5 m2  Height: 77 in  Weight: 253 lb  BP: 114/64 mmHg  _____________________________________________________________________________  __        Procedure  Echocardiogram with two-dimensional, color and spectral Doppler performed.  _____________________________________________________________________________  __        Interpretation Summary  Mildly dilated RV with normal RV function. Flattened septum is consistent with  right ventricular pressure overload. The pulmonary artery systolic pressure is  unable to be assessed, however PA-AT is shortened, 85 msec, suggesting  increased pulmonary vasculature resistance.  Global and regional left ventricular function is normal with an EF of 55-60%.  The inferior vena cava is normal.  No pericardial effusion is present.     Compared with 3/20/19: There has been no change.  _____________________________________________________________________________  __        Left Ventricle  Global and regional left ventricular function is normal with an EF of 55-60%.  Left ventricular wall thickness is normal. Left ventricular size is normal.  Left ventricular diastolic function is not assessable. Flattened septum is  consistent with right ventricular pressure overload. No regional wall motion  abnormalities are seen.     Right Ventricle  Global right ventricular function is normal. Mild right ventricular dilation  is present. TAPSE 27 mm; S' 15 cm/sec, RVFAC 37%.     Atria  The atria cannot be assessed.     Mitral Valve  The mitral valve is normal.        Aortic Valve  Aortic valve is normal in structure and function. The aortic valve is  tricuspid.     Tricuspid Valve  The tricuspid valve is normal. Trace tricuspid insufficiency is present.  Pulmonary artery  systolic pressure cannot be assessed.     Pulmonic Valve  The pulmonic valve is normal. Pulmonary valve aceleration time is estimated at  85 msec.     Vessels  The inferior vena cava is normal. The aorta root cannot be assessed. IVC  diameter <2.1 cm collapsing >50% with sniff suggests a normal RA pressure of 3  mmHg.     Pericardium  No pericardial effusion is present.        Compared to Previous Study  This study was compared with the study from 3/20/19 . There has been no  change.     Attestation  I have personally viewed the imaging and agree with the interpretation and  report as documented by the fellow, Fahad Loaiza, and/or edited by me.  _____________________________________________________________________________  __     MMode/2D Measurements & Calculations  RVDd: 5.0 cm  TAPSE: 2.7 cm        Doppler Measurements & Calculations  PA acc time: 0.09 sec        Catherization 08/2019  Conclusion       Normal cardiac output level.    Left sided filling pressures are normal.    Right sided filling pressures are normal.    Mild elevated Pulmonary Hypertension.    PVR 4.2 STEPHENS    Carter CO 5.71 L/min, Carter CI 2.7 L/min/m2    Hemodynamic data has been modified in Epic per physician review.     RA 5, PA 60/20 with mean of 32, PCP 8 Cardiac output 5,71 PVR 4.2    Previous catherization          Current Outpatient Medications   Medication     atovaquone (MEPRON) 750 MG/5ML suspension     calcium carbonate (OS-PANFILO 500 MG Teller. CA) 500 MG tablet     CLONAZEPAM PO     digoxin (LANOXIN) 125 MCG tablet     FLUCONAZOLE PO     furosemide (LASIX) 20 MG tablet     Pantoprazole Sodium (PROTONIX PO)     Penicillin V Potassium (PEN-VEE K OR)     predniSONE (DELTASONE) 5 MG tablet     rosuvastatin (CRESTOR) 5 MG tablet     ruxolitinib (JAKAFI) 5 MG TABS tablet CHEMO     selexipag (UPTRAVI) 1600 MCG tablet     Specialty Vitamins Products (MAGNESIUM PLUS PROTEIN) 133 MG tablet     tadalafil, PAH, (ADCIRCA) 20 MG TABS     Ursodiol (ACTIGALL  PO)     VALACYCLOVIR HCL PO     VITAMIN D, CHOLECALCIFEROL, PO     No current facility-administered medications for this visit.      Results for BENEDICT ESTES (MRN [id number]) as of 9/4/2019 17:28   Ref. Range 7/24/2019 00:00 7/25/2019 00:00 8/27/2019 10:36   Sodium Latest Ref Range: 133 - 144 mmol/L   140   Potassium Latest Ref Range: 3.4 - 5.3 mmol/L   3.9   Chloride Latest Ref Range: 94 - 109 mmol/L   107   Carbon Dioxide Latest Ref Range: 20 - 32 mmol/L   28   Urea Nitrogen Latest Ref Range: 7 - 30 mg/dL   18   Creatinine Latest Ref Range: 0.66 - 1.25 mg/dL   1.73 (H)   GFR Estimate Latest Ref Range: >60 mL/min/1.73_m2   43 (L)   GFR Estimate If [place] Latest Ref Range: >60 mL/min/1.73_m2   50 (L)   Calcium Latest Ref Range: 8.5 - 10.1 mg/dL   9.4   Anion Gap Latest Ref Range: 3 - 14 mmol/L   5   Albumin Latest Ref Range: 3.4 - 5.0 g/dL   3.9   Protein Total Latest Ref Range: 6.8 - 8.8 g/dL   7.0   Bilirubin Total Latest Ref Range: 0.2 - 1.3 mg/dL   0.6   Alkaline Phosphatase Latest Ref Range: 40 - 150 U/L   67   ALT Latest Ref Range: 0 - 70 U/L   32   AST Latest Ref Range: 0 - 45 U/L   26   Pro-BNP - Historical Unknown  552 (H)    Cholesterol Latest Ref Range: <200 mg/dL   150   CRP Inflammation Latest Ref Range: 0.0 - 8.0 mg/L   <2.9   HDL Cholesterol Latest Ref Range: >39 mg/dL   75   LDL Cholesterol Calculated Latest Ref Range: <100 mg/dL   48   Non HDL Cholesterol Latest Ref Range: <130 mg/dL   75   N-Terminal Pro Bnp Latest Ref Range: 0 - 125 pg/mL   346 (H)   Triglycerides Latest Ref Range: <150 mg/dL   134   Glucose Latest Ref Range: 70 - 99 mg/dL   106 (H)   WBC Latest Ref Range: 4.0 - 11.0 10e9/L   6.4   Hemoglobin Latest Ref Range: 13.3 - 17.7 g/dL   12.5 (L)   Hematocrit Latest Ref Range: 40.0 - 53.0 %   39.6 (L)   Platelet Count Latest Ref Range: 150 - 450 10e9/L   438   RBC Count Latest Ref Range: 4.4 - 5.9 10e12/L   4.36 (L)   MCV Latest Ref Range: 78 - 100 fl   91   MCH Latest Ref Range: 26.5 -  33.0 pg   28.7   MCHC Latest Ref Range: 31.5 - 36.5 g/dL   31.6   RDW Latest Ref Range: 10.0 - 15.0 %   18.0 (H)   C-Reactive Protein High Sensitivity Latest Ref Range: 0.0 - 9.9 mg/L <5.0               CC:  GABINO Chery M.D.   [facility]

## 2019-09-06 ENCOUNTER — TELEPHONE (OUTPATIENT)
Dept: CARDIOLOGY | Facility: CLINIC | Age: 54
End: 2019-09-06

## 2019-09-06 NOTE — TELEPHONE ENCOUNTER
"Spoke with Dr. Quan regarding the N-Terminal Pro BNP results.  Avelina stated that this was acceptable as their machine may be calibrated different from ours causing an increase.  I sent message to the patient. Marlys Anderson RN on 9/20/2019 at 6:28 AM    ________________________________________________    Pt called to update me with his BNP results. Draw from 9/17 resulted a BNP of 427. Pt states that he is feeling well and even went hiking a few days before his lab draw. I told the patient I will pass along the results to Dr. Ling and will update the patient if his plan of care changes. Chantel Erickson RN on 9/18/2019 at 11:40 AM  ________________________________________________________    Pt stated that he has been \"under the weather\" with the stomach flu this week and has not been feeling well enough to get his BNP drawn. He stated that he was worried this might skew his results. Pt verbalized that he is feeling better now and will have his labs drawn next Tuesday. Chantel Erickson RN on 9/12/2019 at 10:03 AM  ________________________________________________    I called pt and was unable to reach him regarding the repeat labs.  I left a message requesting that he complete this or fax the results.  He is going to get the labs next week. Marlys Anderson RN on 9/6/2019 at 1:41 PM    ----- Message from Ana Ashraf RN sent at 7/26/2019  8:55 PM CDT -----  Regarding: F/U  Patient  Is supposed to have a repeat Pro-BNP drawn this week to see if it's still climbing, or if the last one was an outlier.  See previous phone encounter for details.    -Ana    "

## 2019-10-01 ENCOUNTER — HEALTH MAINTENANCE LETTER (OUTPATIENT)
Age: 54
End: 2019-10-01

## 2019-11-07 ENCOUNTER — TELEPHONE (OUTPATIENT)
Dept: CARDIOLOGY | Facility: CLINIC | Age: 54
End: 2019-11-07

## 2019-11-07 NOTE — TELEPHONE ENCOUNTER
Pts wife called stating that he was just diagnosed with prostate cancer.  He is going to [facility] for a clinic visit and second opinion and per the Doctor in [place] the Surgery would be no sooner than January.  The pt is scheduled for a Right heart catheterization in December here.      Dr. Quan is aware of the situation and stated that surgery is ok as long as his pressures are under control. Marlys Anderson RN on 11/7/2019 at 2:16 PM

## 2019-11-14 NOTE — TELEPHONE ENCOUNTER
Pt contacted the office. Scheduled right heart catheterization procedure and follow-up with Dr. Quan.    Verna Nguyen  Clinic   Pulmonary Hypertension  [facility]  (p) [phone]

## 2019-12-02 ENCOUNTER — HOSPITAL ENCOUNTER (OUTPATIENT)
Facility: CLINIC | Age: 54
Discharge: HOME | End: 2019-12-02
Attending: INTERNAL MEDICINE | Admitting: INTERNAL MEDICINE
Payer: COMMERCIAL

## 2019-12-02 ENCOUNTER — APPOINTMENT (OUTPATIENT)
Dept: LAB | Facility: CLINIC | Age: 54
End: 2019-12-02
Attending: INTERNAL MEDICINE
Payer: COMMERCIAL

## 2019-12-02 ENCOUNTER — APPOINTMENT (OUTPATIENT)
Dept: MEDSURG UNIT | Facility: CLINIC | Age: 54
End: 2019-12-02
Attending: INTERNAL MEDICINE
Payer: COMMERCIAL

## 2019-12-02 ENCOUNTER — OFFICE VISIT (OUTPATIENT)
Dept: CARDIOLOGY | Facility: CLINIC | Age: 54
End: 2019-12-02
Attending: INTERNAL MEDICINE
Payer: COMMERCIAL

## 2019-12-02 ENCOUNTER — HOSPITAL ENCOUNTER (OUTPATIENT)
Facility: CLINIC | Age: 54
End: 2019-12-02
Payer: COMMERCIAL

## 2019-12-02 VITALS
OXYGEN SATURATION: 98 % | SYSTOLIC BLOOD PRESSURE: 125 MMHG | WEIGHT: 250 LBS | HEIGHT: 77 IN | DIASTOLIC BLOOD PRESSURE: 73 MMHG | BODY MASS INDEX: 29.52 KG/M2 | TEMPERATURE: 97.7 F | RESPIRATION RATE: 20 BRPM

## 2019-12-02 VITALS
BODY MASS INDEX: 30 KG/M2 | OXYGEN SATURATION: 98 % | DIASTOLIC BLOOD PRESSURE: 66 MMHG | WEIGHT: 254.1 LBS | HEIGHT: 77 IN | HEART RATE: 67 BPM | SYSTOLIC BLOOD PRESSURE: 100 MMHG

## 2019-12-02 DIAGNOSIS — R06.02 SOB (SHORTNESS OF BREATH): ICD-10-CM

## 2019-12-02 DIAGNOSIS — I27.20 PULMONARY HYPERTENSION (H): ICD-10-CM

## 2019-12-02 DIAGNOSIS — I27.20 PULMONARY HYPERTENSION (H): Primary | ICD-10-CM

## 2019-12-02 LAB
ANION GAP SERPL CALCULATED.3IONS-SCNC: 6 MMOL/L (ref 3–14)
BASOPHILS # BLD AUTO: 0 10E9/L (ref 0–0.2)
BASOPHILS NFR BLD AUTO: 0.4 %
BUN SERPL-MCNC: 22 MG/DL (ref 7–30)
CALCIUM SERPL-MCNC: 9 MG/DL (ref 8.5–10.1)
CHLORIDE SERPL-SCNC: 107 MMOL/L (ref 94–109)
CO2 SERPL-SCNC: 26 MMOL/L (ref 20–32)
CREAT SERPL-MCNC: 1.71 MG/DL (ref 0.66–1.25)
DIFFERENTIAL METHOD BLD: ABNORMAL
EOSINOPHIL # BLD AUTO: 0.1 10E9/L (ref 0–0.7)
EOSINOPHIL NFR BLD AUTO: 0.8 %
ERYTHROCYTE [DISTWIDTH] IN BLOOD BY AUTOMATED COUNT: 18 % (ref 10–15)
GFR SERPL CREATININE-BSD FRML MDRD: 44 ML/MIN/{1.73_M2}
GLUCOSE SERPL-MCNC: 102 MG/DL (ref 70–99)
HCT VFR BLD AUTO: 38 % (ref 40–53)
HGB BLD-MCNC: 12.3 G/DL (ref 13.3–17.7)
IMM GRANULOCYTES # BLD: 0 10E9/L (ref 0–0.4)
IMM GRANULOCYTES NFR BLD: 0.3 %
LYMPHOCYTES # BLD AUTO: 2.8 10E9/L (ref 0.8–5.3)
LYMPHOCYTES NFR BLD AUTO: 38.7 %
MCH RBC QN AUTO: 29.2 PG (ref 26.5–33)
MCHC RBC AUTO-ENTMCNC: 32.4 G/DL (ref 31.5–36.5)
MCV RBC AUTO: 90 FL (ref 78–100)
MONOCYTES # BLD AUTO: 1.3 10E9/L (ref 0–1.3)
MONOCYTES NFR BLD AUTO: 17.6 %
NEUTROPHILS # BLD AUTO: 3.1 10E9/L (ref 1.6–8.3)
NEUTROPHILS NFR BLD AUTO: 42.2 %
NRBC # BLD AUTO: 0 10*3/UL
NRBC BLD AUTO-RTO: 0 /100
PLATELET # BLD AUTO: 389 10E9/L (ref 150–450)
POTASSIUM SERPL-SCNC: 3.7 MMOL/L (ref 3.4–5.3)
RBC # BLD AUTO: 4.21 10E12/L (ref 4.4–5.9)
SODIUM SERPL-SCNC: 139 MMOL/L (ref 133–144)
WBC # BLD AUTO: 7.2 10E9/L (ref 4–11)

## 2019-12-02 PROCEDURE — 93451 RIGHT HEART CATH: CPT | Mod: 26 | Performed by: INTERNAL MEDICINE

## 2019-12-02 PROCEDURE — 99212 OFFICE O/P EST SF 10 MIN: CPT | Mod: ZP | Performed by: INTERNAL MEDICINE

## 2019-12-02 PROCEDURE — 27210794 ZZH OR GENERAL SUPPLY STERILE: Performed by: INTERNAL MEDICINE

## 2019-12-02 PROCEDURE — 85025 COMPLETE CBC W/AUTO DIFF WBC: CPT | Performed by: INTERNAL MEDICINE

## 2019-12-02 PROCEDURE — 40000166 ZZH STATISTIC PP CARE STAGE 1

## 2019-12-02 PROCEDURE — 36415 COLL VENOUS BLD VENIPUNCTURE: CPT | Performed by: INTERNAL MEDICINE

## 2019-12-02 PROCEDURE — 25000125 ZZHC RX 250: Performed by: INTERNAL MEDICINE

## 2019-12-02 PROCEDURE — 80048 BASIC METABOLIC PNL TOTAL CA: CPT | Performed by: INTERNAL MEDICINE

## 2019-12-02 PROCEDURE — 93451 RIGHT HEART CATH: CPT | Performed by: INTERNAL MEDICINE

## 2019-12-02 PROCEDURE — G0463 HOSPITAL OUTPT CLINIC VISIT: HCPCS | Mod: ZF

## 2019-12-02 RX ORDER — LIDOCAINE 40 MG/G
CREAM TOPICAL
Status: DISCONTINUED | OUTPATIENT
Start: 2019-12-02 | End: 2019-12-02 | Stop reason: HOSPADM

## 2019-12-02 RX ORDER — PREDNISOLONE 5 MG/1
5 TABLET ORAL DAILY
Status: ON HOLD | COMMUNITY
End: 2022-03-30

## 2019-12-02 RX ORDER — LIDOCAINE 40 MG/G
CREAM TOPICAL
Status: CANCELLED | OUTPATIENT
Start: 2019-12-02

## 2019-12-02 ASSESSMENT — MIFFLIN-ST. JEOR
SCORE: 2086.37
SCORE: 2104.97

## 2019-12-02 ASSESSMENT — PAIN SCALES - GENERAL: PAINLEVEL: NO PAIN (0)

## 2019-12-02 NOTE — IP AVS SNAPSHOT
MRN:[id number]                      After Visit Summary   12/2/2019    Olivier Gómez    MRN: [id number]           Visit Information        Department      12/2/2019  7:44 AM [address] [facility]          Review of your medicines      UNREVIEWED medicines. Ask your doctor about these medicines       Dose / Directions   ACTIGALL PO      Dose:  300 mg  Take 300 mg by mouth 2 times daily  Refills:  0     atovaquone 750 MG/5ML suspension  Commonly known as:  MEPRON      Dose:  1,500 mg  Take 1,500 mg by mouth daily  Refills:  0     calcium carbonate 500 MG tablet  Commonly known as:  OS-PANFILO      Dose:  600 mg  Take 600 mg by mouth 2 times daily  Refills:  0     CLONAZEPAM PO      Dose:  1 mg  Take 1 mg by mouth At Bedtime  Refills:  0     digoxin 125 MCG tablet  Commonly known as:  LANOXIN  Used for:  Pulmonary hypertension (H)      Dose:  125 mcg  Take 1 tablet (125 mcg) by mouth daily  Quantity:  90 tablet  Refills:  3     FLUCONAZOLE PO      Dose:  200 mg  Take 200 mg by mouth daily  Refills:  0     furosemide 20 MG tablet  Commonly known as:  LASIX  Used for:  Pulmonary hypertension (H)      TAKE 1 TABLET (20 MG) by MOUTH 1 TIME PER DAY]  Quantity:  90 tablet  Refills:  3     magnesium plus protein 133 MG tablet      Dose:  266 mg  Take 266 mg by mouth daily (2 tablets)  Refills:  0     PEN-VEE K OR      Dose:  500 mg  Take 500 mg by mouth 2 times daily  Refills:  0     predniSONE 5 MG tablet  Commonly known as:  DELTASONE  Used for:  Pulmonary hypertension (H)      Dose:  5 mg  Take 5 mg by mouth every other day (3 tablets = 7.5 mg daily).  Quantity:  45 tablet  Refills:  3     PROTONIX PO      Dose:  40 mg  Take 40 mg by mouth daily  Refills:  0     rosuvastatin 5 MG tablet  Commonly known as:  CRESTOR  Used for:  Pulmonary HTN (H)      Dose:  5 mg  Take 1 tablet (5 mg) by mouth daily  Quantity:  90 tablet  Refills:  3     ruxolitinib 5 MG Tabs tablet  Commonly known as:  JAKAFI  Used for:   Pericardial effusion, Pulmonary hypertension (H), Coyyd-hmcsey-yioq disease (H)      Dose:  5 mg  Take 1 tablet (5 mg) by mouth 2 times daily  Quantity:  60 tablet  Refills:  0     selexipag 1600 MCG tablet  Commonly known as:  UPTRAVI  Used for:  Pulmonary hypertension (H)      Dose:  1,600 mcg  Take 1 tablet (1,600 mcg) by mouth every 12 hours  Quantity:  60 tablet  Refills:  11     tadalafil (PAH) 20 MG Tabs  Commonly known as:  ADCIRCA  Used for:  Pulmonary hypertension (H)      Dose:  40 mg  Take 2 tablets (40 mg) by mouth daily  Quantity:  60 tablet  Refills:  11     VALACYCLOVIR HCL PO      Dose:  500 mg  Take 500 mg by mouth every other day  Refills:  0     VITAMIN D (CHOLECALCIFEROL) PO      Dose:  2,000 Units  Take 2,000 Units by mouth daily  Refills:  0              Protect others around you: Learn how to safely use, store and throw away your medicines at www.disposemymeds.org.       Follow-ups after your visit       Your next 10 appointments already scheduled    Dec 02, 2019  9:00 AM CST  Procedure - 2.5 hour with U2A ROOM 5  [address] [facility] ([facility]) [address] [address]      Dec 02, 2019  Procedure with Darshan Cunningham MD  [facility], [facility],  Heart Cath Lab ([facility]) [address] [address] [phone]   The [facility] is located on the corner of [address] and [address] on the [facility]. It is easily accessible from virtually any point in the [facility] [place] area, via [address].   Dec 02, 2019  1:00 PM CST  (Arrive by 12:45 PM)  RETURN PRIMARY PULMONARY with Dk Quan MD  [facility] ([facility]) [address]  [address] [place] [address] [phone]         Care Instructions       Further instructions from your care team        [facility]                        Interventional Cardiology  Discharge Instructions   Post Right Heart Cath      AFTER YOU GO HOME:    DO drink plenty of fluids    DO resume your regular diet and medications unless otherwise instructed by your Primary Physician    Do Not scrub the procedure site vigorously    No lotion or powder to the puncture site for 3 days    CALL YOUR PRIMARY PHYSICIAN IF: You may resume all normal activity.  Monitor neck site for bleeding, swelling, or voice changes. If you notice bleeding or swelling immediately apply pressure to the site and call number below to speak with Cardiology Fellow.  If you experience any changes in your breathing you should call your doctor immediately or come to the closest Emergency Department.  Do not drive yourself.    ADDITIONAL INSTRUCTIONS: Medications: You are to resume all home medications including anticoagulation therapy unless otherwise advised by your primary cardiologist or nurse coordinator.    Follow Up: Per your primary cardiology team    If you have any questions or concerns regarding your procedure site please call [phone] at anytime and ask for Cardiology Fellow on call.  They are available 24 hours a day.  You may also contact the Cardiology Clinic after hours number at [phone].                                                       Telephone Numbers [phone] Monday-Friday 8:00 am to 4:30 pm    [phone] After 4:30 pm Monday-Friday, Weekends & Holidays  Ask for Interventional Cardiologist on call. Someone is on call 24 hours/day   [facility] toll free number [phone] Monday-Friday 8:00 am to 4:30 pm   [facility] Emergency Dept [phone]                   Additional Information About Your Visit       [facility] Information    [facility] gives you secure access to your electronic health record. If you see a primary care provider, you can also send messages to your care team and make appointments. If you  have questions, please call your primary care clinic.  If you do not have a primary care provider, please call [phone] and they will assist you.       Care EveryWhere ID    This is your Care EveryWhere ID. This could be used by other organizations to access your [facility] medical records  [id number]        Primary Care Provider Office Phone # Fax #    Adelso Delgado [phone]      Equal Access to Services    AIDEE RILEY : Hadii aad ku hadasho Soomaali, waaxda luqadaha, qaybta kaalmada adeegyada, waxay goldenin haytonin adetae carrerogodwinashley peterson . So [facility] [phone].    ATENCIÓN: Si habla esppietro, tiene a garza disposición servicios gratuitos de asistencia lingüística. Rayame al [phone].    We comply with applicable federal and state civil rights laws, including the [place] Human Rights Act. We do not discriminate on the basis of race, color, creed, Denominational, national origin, marital status, age, disability, sex, sexual orientation, or gender identity.       Thank you!    Thank you for choosing [facility] for your care. Our goal is always to provide you with excellent care. Hearing back from our patients is one way we can continue to improve our services. Please take a few minutes to complete the written survey that you may receive in the mail after you visit with us. Thank you!            Medication List      ASK your doctor about these medications          Morning Afternoon Evening Bedtime As Needed    ACTIGALL PO  INSTRUCTIONS:  Take 300 mg by mouth 2 times daily                     atovaquone 750 MG/5ML suspension  Also known as:  MEPRON  INSTRUCTIONS:  Take 1,500 mg by mouth daily                     calcium carbonate 500 MG tablet  Also known as:  OS-PANFILO  INSTRUCTIONS:  Take 600 mg by mouth 2 times daily                     CLONAZEPAM PO  INSTRUCTIONS:  Take 1 mg by mouth At Bedtime                     digoxin 125 MCG tablet  Also known as:  LANOXIN  INSTRUCTIONS:  Take 1 tablet (125 mcg) by  mouth daily                     FLUCONAZOLE PO  INSTRUCTIONS:  Take 200 mg by mouth daily                     furosemide 20 MG tablet  Also known as:  LASIX  INSTRUCTIONS:  TAKE 1 TABLET (20 MG) by MOUTH 1 TIME PER DAY]                     magnesium plus protein 133 MG tablet  INSTRUCTIONS:  Take 266 mg by mouth daily (2 tablets)                     PEN-VEE K OR  INSTRUCTIONS:  Take 500 mg by mouth 2 times daily                     predniSONE 5 MG tablet  Also known as:  DELTASONE  INSTRUCTIONS:  Take 5 mg by mouth every other day (3 tablets = 7.5 mg daily).  Doctor's comments:  Please include the quantity of tablets and (strength) per dose in sig.                      PROTONIX PO  INSTRUCTIONS:  Take 40 mg by mouth daily                     rosuvastatin 5 MG tablet  Also known as:  CRESTOR  INSTRUCTIONS:  Take 1 tablet (5 mg) by mouth daily                     ruxolitinib 5 MG Tabs tablet  Also known as:  JAKAFI  INSTRUCTIONS:  Take 1 tablet (5 mg) by mouth 2 times daily                     selexipag 1600 MCG tablet  Also known as:  UPTRAVI  INSTRUCTIONS:  Take 1 tablet (1,600 mcg) by mouth every 12 hours  Doctor's comments:  [facility]                     tadalafil (PAH) 20 MG Tabs  Also known as:  ADCIRCA  INSTRUCTIONS:  Take 2 tablets (40 mg) by mouth daily                     VALACYCLOVIR HCL PO  INSTRUCTIONS:  Take 500 mg by mouth every other day                     VITAMIN D (CHOLECALCIFEROL) PO  INSTRUCTIONS:  Take 2,000 Units by mouth daily

## 2019-12-02 NOTE — NURSING NOTE
Right Heart Catheterization: Patient was instructed regarding right heart catheterization, including discussion of the procedure, preparation, intra-procedural steps, and recovery at home. Patient demonstrated understanding of this information and agreed to call with further questions or concerns.    Labs: Patient was given results of the laboratory testing obtained today. Patient demonstrated understanding of this information and agreed to call with further questions or concerns.     Diet: Patient instructed regarding a heart healthy diet, including discussion of reduced fat and sodium intake. Patient demonstrated understanding of this information and agreed to call with further questions or concerns.    Return Appointment: Patient given instructions regarding scheduling next clinic visit. Patient demonstrated understanding of this information and agreed to call with further questions or concerns.    Patient stated he understood all health information given and agreed to call with further questions or concerns.    Medication Changes:  No medication changes at this time. Please continue current medication regiment.      Patient Instructions:  1. Continue staying active and eat a heart healthy diet.    2. Please keep current list of medications with you at all times.    3. Remember to weigh yourself daily after voiding and before you consume any food or beverages and log the numbers.  If you have gained 2 pounds overnight or 5 pounds in a week contact us immediately for medication adjustments or further instructions.    4. **Please call us immediately if you have any syncope (fainting or passing out), chest pain, edema (swelling or weight gain), or decline in your functional status (general decline in how you are feeling).      Check-In  Time Check-In Location Estimated Length Procedure   Name     May 2020   [facility]  waiting room 60-90 minutes Right Heart Catheterization**     Procedure Preparations & Instructions      This is an invasive procedure that DOES require preparation:    - Nothing to eat for 6 hours   - You may have clear liquids up until the time of your procedure  - A ride should be arranged for you in the instance you are unable to drive home, however you should be able to function as you normally would after the procedure     Follow up Appointment Information:  -follow up with Dr. Quan in May 2020 with labs & RHC prior    Results:  Component      Latest Ref Rng & Units 12/2/2019   WBC      4.0 - 11.0 10e9/L 7.2   RBC Count      4.4 - 5.9 10e12/L 4.21 (L)   Hemoglobin      13.3 - 17.7 g/dL 12.3 (L)   Hematocrit      40.0 - 53.0 % 38.0 (L)   MCV      78 - 100 fl 90   MCH      26.5 - 33.0 pg 29.2   MCHC      31.5 - 36.5 g/dL 32.4   RDW      10.0 - 15.0 % 18.0 (H)   Platelet Count      150 - 450 10e9/L 389   Diff Method       Automated Method   % Neutrophils      % 42.2   % Lymphocytes      % 38.7   % Monocytes      % 17.6   % Eosinophils      % 0.8   % Basophils      % 0.4   % Immature Granulocytes      % 0.3   Nucleated RBCs      0 /100 0   Absolute Neutrophil      1.6 - 8.3 10e9/L 3.1   Absolute Lymphocytes      0.8 - 5.3 10e9/L 2.8   Absolute Monocytes      0.0 - 1.3 10e9/L 1.3   Absolute Eosinophils      0.0 - 0.7 10e9/L 0.1   Absolute Basophils      0.0 - 0.2 10e9/L 0.0   Abs Immature Granulocytes      0 - 0.4 10e9/L 0.0   Absolute Nucleated RBC       0.0   Sodium      133 - 144 mmol/L 139   Potassium      3.4 - 5.3 mmol/L 3.7   Chloride      94 - 109 mmol/L 107   Carbon Dioxide      20 - 32 mmol/L 26   Anion Gap      3 - 14 mmol/L 6   Glucose      70 - 99 mg/dL 102 (H)   Urea Nitrogen      7 - 30 mg/dL 22   Creatinine      0.66 - 1.25 mg/dL 1.71 (H)   GFR Estimate      >60 mL/min/1.73:m2 44 (L)   GFR Estimate If Black      >60 mL/min/1.73:m2 51 (L)   Calcium      8.5 - 10.1 mg/dL 9.0     **Patient advised to bring his Adcirca and Uptravi with his to [facility], just in case they don't stock his medication.  Patient advised that we will call in March to schedule his RHC and follow up appointment. Delayed staff message sent to Karie Erickson RN on 12/2/2019 at 1:40 PM

## 2019-12-02 NOTE — PROGRESS NOTES
Pt discharged post right heart cath with wife at bedside. Ambulates steady on feet with no complaints of pain or dizziness. Right neck site appears clean, dry, intact, and soft. Discharge teaching completed with copy sent with patient. Pt denies food or water at this time.

## 2019-12-02 NOTE — DISCHARGE INSTRUCTIONS
[facility]                        Interventional Cardiology  Discharge Instructions   Post Right Heart Cath      AFTER YOU GO HOME:    DO drink plenty of fluids    DO resume your regular diet and medications unless otherwise instructed by your Primary Physician    Do Not scrub the procedure site vigorously    No lotion or powder to the puncture site for 3 days    CALL YOUR PRIMARY PHYSICIAN IF: You may resume all normal activity.  Monitor neck site for bleeding, swelling, or voice changes. If you notice bleeding or swelling immediately apply pressure to the site and call number below to speak with Cardiology Fellow.  If you experience any changes in your breathing you should call your doctor immediately or come to the closest Emergency Department.  Do not drive yourself.    ADDITIONAL INSTRUCTIONS: Medications: You are to resume all home medications including anticoagulation therapy unless otherwise advised by your primary cardiologist or nurse coordinator.    Follow Up: Per your primary cardiology team    If you have any questions or concerns regarding your procedure site please call [phone] at anytime and ask for Cardiology Fellow on call.  They are available 24 hours a day.  You may also contact the Cardiology Clinic after hours number at [phone].                                                       Telephone Numbers [phone] Monday-Friday 8:00 am to 4:30 pm    [phone] After 4:30 pm Monday-Friday, Weekends & Holidays  Ask for Interventional Cardiologist on call. Someone is on call 24 hours/day   [facility] toll free number [phone] Monday-Friday 8:00 am to 4:30 pm   [facility] Emergency Dept [phone]

## 2019-12-02 NOTE — NURSING NOTE
Chief Complaint   Patient presents with     Follow Up      Return for PH F/U after Right heart catheterization (surgery scheduled 12/8)     Vitals were taken and medications reconciled.     Hammad Mirza CMA  12:28 PM

## 2019-12-02 NOTE — IP AVS SNAPSHOT
[address] [address]                                    After Visit Summary   12/2/2019    Olivier Gómez    MRN: [id number]           After Visit Summary Signature Page    I have received my discharge instructions, and my questions have been answered. I have discussed any challenges I see with this plan with the nurse or doctor.    ..........................................................................................................................................  Patient/Patient Representative Signature      ..........................................................................................................................................  Patient Representative Print Name and Relationship to Patient    ..................................................               ................................................  Date                                   Time    ..........................................................................................................................................  Reviewed by Signature/Title    ...................................................              ..............................................  Date                                               Time          22EPIC Rev 08/18

## 2019-12-02 NOTE — LETTER
"12/2/2019      RE: Olivier Gómez  [address]  [place] [address]       Dear Colleague,    Thank you for the opportunity to participate in the care of your patient, Olivier Gómez, at the [facility] at [facility]. Please see a copy of my visit note below.          Dk Quan M.D.  Cardiovascular Medicine    I personally saw and examined this patient, discussed care with housestaff and other consultants, reviewed current laboratories and imaging studies, and conveyed impression and diagnostic/therapeutic plan to patient.    Nathan Richardson MD    [address] [place] [address] [phone] (Fax)     Musa Martin MD  [phone] (Work)  [phone] (Fax)  [address] [place] [address]     Adelso Delgado M.D.  [facility]     Magdaleno Elder M.D.  [facility]        Problem List  1. History of AML  2. 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  4. Chronic parenteral prostacyclin  5. Steroid dependence (treatment of PH)    Problem List      History              RA  A-wave: 6 mmHg  V-wave: 5 mmHg  Mean: 5 mmHg   HR: 63 bpm  RV  Systolic: 50 mmHg  End Diastolic: 5 mmHg  HR: 61 bpm    PA  Systolic:50 mmHg  Diasotlic: 23 mmHg  Mean: 32 mmHg  HR: 69 bpm      PCW  A-wave: 11 mmHg  V-wave: 7 mmHg  Mean: 8 mmHg  HR: 77 bpm      Cardiac Output  CO Carter: 7.68 L/min  CI Carter: 3.14 L/min/m2  CO TD: 5.3 L/min  CI TD: 2.16 L/min/m2    Vitals  BP: 117 mmHg / 73 mmHg  SpO2:   PA Stat: 76.6 %      Objective  /66 (BP Location: Right arm, Patient Position: Chair, Cuff Size: Adult Large)   Pulse 67   Ht 1.956 m (6' 5\")   Wt 115.3 kg (254 lb 1.6 oz)   SpO2 98%   BMI 30.13 kg/m       Wt Readings from Last 5 Encounters:   12/02/19 115.3 kg (254 lb 1.6 oz)   12/02/19 113.4 kg (250 lb)   08/27/19 115.5 kg (254 lb 9.6 oz)   05/15/19 117 kg (258 lb)   03/20/19 115.1 kg (253 lb 11.2 oz) "       Meds  Current Outpatient Medications   Medication     atovaquone (MEPRON) 750 MG/5ML suspension     calcium carbonate (OS-PANFILO 500 MG [place]. CA) 500 MG tablet     CLONAZEPAM PO     digoxin (LANOXIN) 125 MCG tablet     FLUCONAZOLE PO     furosemide (LASIX) 20 MG tablet     Pantoprazole Sodium (PROTONIX PO)     Penicillin V Potassium (PEN-VEE K OR)     prednisoLONE 5 MG tablet     predniSONE (DELTASONE) 5 MG tablet     rosuvastatin (CRESTOR) 5 MG tablet     ruxolitinib (JAKAFI) 5 MG TABS tablet CHEMO     selexipag (UPTRAVI) 1600 MCG tablet     Specialty Vitamins Products (MAGNESIUM PLUS PROTEIN) 133 MG tablet     tadalafil, PAH, (ADCIRCA) 20 MG TABS     Ursodiol (ACTIGALL PO)     VALACYCLOVIR HCL PO     VITAMIN D, CHOLECALCIFEROL, PO     No current facility-administered medications for this visit.        Labs    Results for BENEDICT ESTES (MRN [id number]) as of 12/2/2019 12:57   Ref. Range 8/27/2019 13:14 12/2/2019 07:58 12/2/2019 10:29   Sodium Latest Ref Range: 133 - 144 mmol/L  139    Potassium Latest Ref Range: 3.4 - 5.3 mmol/L  3.7    Chloride Latest Ref Range: 94 - 109 mmol/L  107    Carbon Dioxide Latest Ref Range: 20 - 32 mmol/L  26    Urea Nitrogen Latest Ref Range: 7 - 30 mg/dL  22    Creatinine Latest Ref Range: 0.66 - 1.25 mg/dL  1.71 (H)    GFR Estimate Latest Ref Range: >60 mL/min/1.73_m2  44 (L)    GFR Estimate If Black Latest Ref Range: >60 mL/min/1.73_m2  51 (L)    Calcium Latest Ref Range: 8.5 - 10.1 mg/dL  9.0    Anion Gap Latest Ref Range: 3 - 14 mmol/L  6    Glucose Latest Ref Range: 70 - 99 mg/dL  102 (H)    WBC Latest Ref Range: 4.0 - 11.0 10e9/L  7.2    Hemoglobin Latest Ref Range: 13.3 - 17.7 g/dL  12.3 (L)    Hematocrit Latest Ref Range: 40.0 - 53.0 %  38.0 (L)    Platelet Count Latest Ref Range: 150 - 450 10e9/L  389    RBC Count Latest Ref Range: 4.4 - 5.9 10e12/L  4.21 (L)    MCV Latest Ref Range: 78 - 100 fl  90    MCH Latest Ref Range: 26.5 - 33.0 pg  29.2    MCHC Latest Ref Range:  31.5 - 36.5 g/dL  32.4    RDW Latest Ref Range: 10.0 - 15.0 %  18.0 (H)    Diff Method Unknown  Automated Method    % Neutrophils Latest Units: %  42.2    % Lymphocytes Latest Units: %  38.7    % Monocytes Latest Units: %  17.6    % Eosinophils Latest Units: %  0.8    % Basophils Latest Units: %  0.4    % Immature Granulocytes Latest Units: %  0.3    Nucleated RBCs Latest Ref Range: 0 /100  0    Absolute Neutrophil Latest Ref Range: 1.6 - 8.3 10e9/L  3.1    Absolute Lymphocytes Latest Ref Range: 0.8 - 5.3 10e9/L  2.8    Absolute Monocytes Latest Ref Range: 0.0 - 1.3 10e9/L  1.3    Absolute Eosinophils Latest Ref Range: 0.0 - 0.7 10e9/L  0.1    Absolute Basophils Latest Ref Range: 0.0 - 0.2 10e9/L  0.0    Abs Immature Granulocytes Latest Ref Range: 0 - 0.4 10e9/L  0.0    Absolute Nucleated RBC Unknown  0.0    CV CARDIAC CATHERIZATION Unknown Attch  Attch     Imaging   Name: BENEDICT ESTES  MRN: [id number]  : 1965  Study Date: 2019 10:32 AM  Age: 54 yrs  Gender: Male  Patient Location: [facility]  Reason For Study: Pulmonary hypertension, SOB (shortness of breath)  Ordering Physician: PILI SEGURA  Referring Physician: PILI SEGURA  Performed By: Callie Pham RD     BSA: 2.5 m2  Height: 77 in  Weight: 258 lb  HR: 68  BP: 117/77 mmHg  _____________________________________________________________________________  __        Procedure  Echocardiogram with two-dimensional, color and spectral Doppler performed.  _____________________________________________________________________________  __        Interpretation Summary  Left ventricular function is normal.The EF is 60-65%.  Mild right ventricular dilation is present. Global right ventricular function  is mildly reduced.  Estimated pulmonary artery systolic pressure is 77 mmHg consistent with  pulmonary hypertension.  The inferior vena cava was normal in size with preserved respiratory  variability.  No pericardial effusion is  present.  _____________________________________________________________________________  __        Left Ventricle  Left ventricular size is normal. Left ventricular wall thickness is normal.  Left ventricular function is normal.The EF is 60-65%. Left ventricular  diastolic function is not assessable. Flattened septum is consistent with  right ventricular pressure and volume overload.     Right Ventricle  Mild right ventricular dilation is present. Global right ventricular function  is mildly reduced.     Atria  The left atrium appears normal. Mild right atrial enlargement is present.     Mitral Valve  The mitral valve is normal.        Aortic Valve  Aortic valve is normal in structure and function.     Tricuspid Valve  Mild tricuspid insufficiency is present. Estimated pulmonary artery systolic  pressure is 74 mmHg plus right atrial pressure.     Pulmonic Valve  Mild pulmonic insufficiency is present.     Vessels  The aorta root is normal. The inferior vena cava was normal in size with  preserved respiratory variability.     Pericardium  No pericardial effusion is present.        Compared to Previous Study  This study was compared with the study from 5.15.19 . PA is obtainable and  there is PH.  _____________________________________________________________________________  __  MMode/2D Measurements & Calculations  RVDd: 4.7 cm  IVSd: 1.4 cm     LVIDd: 5.3 cm  LVIDs: 3.2 cm  LVPWd: 0.93 cm  FS: 39.7 %  LV mass(C)d: 241.5 grams  LV mass(C)dI: 96.9 grams/m2  asc Aorta Diam: 3.6 cm  LVOT diam: 2.5 cm  LVOT area: 4.8 cm2  RWT: 0.35  TAPSE: 3.3 cm        Doppler Measurements & Calculations  TV max P.7 mmHg  PA V2 max: 89.4 cm/sec  PA max PG: 3.2 mmHg  PA acc time: 0.11 sec  TR max nieves: 422.7 cm/sec  TR max P.5 mmHg    Assessment/Plan     Please do not hesitate to contact me if you have any questions/concerns.     Sincerely,     Dk Quan MD

## 2019-12-02 NOTE — PROGRESS NOTES
"      Dk Quan M.D.  Cardiovascular Medicine    I personally saw and examined this patient, discussed care with housestaff and other consultants, reviewed current laboratories and imaging studies, and conveyed impression and diagnostic/therapeutic plan to patient.    Nathan Richardson MD    [address] [place] [address] [phone] (Fax)     Musa Martin MD  [phone] (Work)  [phone] (Fax)  [address] [place] [address]     Adelso Delgado M.D.  [facility]     Magdaleno Elder M.D.  [facility]        Problem List  1. History of AML  2. 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  4. Chronic parenteral prostacyclin  5. Steroid dependence (treatment of PH)    Problem List      History              RA  A-wave: 6 mmHg  V-wave: 5 mmHg  Mean: 5 mmHg   HR: 63 bpm  RV  Systolic: 50 mmHg  End Diastolic: 5 mmHg  HR: 61 bpm    PA  Systolic:50 mmHg  Diasotlic: 23 mmHg  Mean: 32 mmHg  HR: 69 bpm      PCW  A-wave: 11 mmHg  V-wave: 7 mmHg  Mean: 8 mmHg  HR: 77 bpm      Cardiac Output  CO Carter: 7.68 L/min  CI Carter: 3.14 L/min/m2  CO TD: 5.3 L/min  CI TD: 2.16 L/min/m2    Vitals  BP: 117 mmHg / 73 mmHg  SpO2:   PA Stat: 76.6 %      Objective  /66 (BP Location: Right arm, Patient Position: Chair, Cuff Size: Adult Large)   Pulse 67   Ht 1.956 m (6' 5\")   Wt 115.3 kg (254 lb 1.6 oz)   SpO2 98%   BMI 30.13 kg/m      Wt Readings from Last 5 Encounters:   12/02/19 115.3 kg (254 lb 1.6 oz)   12/02/19 113.4 kg (250 lb)   08/27/19 115.5 kg (254 lb 9.6 oz)   05/15/19 117 kg (258 lb)   03/20/19 115.1 kg (253 lb 11.2 oz)       Meds  Current Outpatient Medications   Medication     atovaquone (MEPRON) 750 MG/5ML suspension     calcium carbonate (OS-PANFILO 500 MG [place]. CA) 500 MG tablet     CLONAZEPAM PO     digoxin (LANOXIN) 125 MCG tablet     FLUCONAZOLE PO     furosemide (LASIX) 20 MG tablet     Pantoprazole Sodium (PROTONIX PO)     " Penicillin V Potassium (PEN-VEE K OR)     prednisoLONE 5 MG tablet     predniSONE (DELTASONE) 5 MG tablet     rosuvastatin (CRESTOR) 5 MG tablet     ruxolitinib (JAKAFI) 5 MG TABS tablet CHEMO     selexipag (UPTRAVI) 1600 MCG tablet     Specialty Vitamins Products (MAGNESIUM PLUS PROTEIN) 133 MG tablet     tadalafil, PAH, (ADCIRCA) 20 MG TABS     Ursodiol (ACTIGALL PO)     VALACYCLOVIR HCL PO     VITAMIN D, CHOLECALCIFEROL, PO     No current facility-administered medications for this visit.        Labs    Results for BENEDICT ESTES (MRN [id number]) as of 12/2/2019 12:57   Ref. Range 8/27/2019 13:14 12/2/2019 07:58 12/2/2019 10:29   Sodium Latest Ref Range: 133 - 144 mmol/L  139    Potassium Latest Ref Range: 3.4 - 5.3 mmol/L  3.7    Chloride Latest Ref Range: 94 - 109 mmol/L  107    Carbon Dioxide Latest Ref Range: 20 - 32 mmol/L  26    Urea Nitrogen Latest Ref Range: 7 - 30 mg/dL  22    Creatinine Latest Ref Range: 0.66 - 1.25 mg/dL  1.71 (H)    GFR Estimate Latest Ref Range: >60 mL/min/1.73_m2  44 (L)    GFR Estimate If Black Latest Ref Range: >60 mL/min/1.73_m2  51 (L)    Calcium Latest Ref Range: 8.5 - 10.1 mg/dL  9.0    Anion Gap Latest Ref Range: 3 - 14 mmol/L  6    Glucose Latest Ref Range: 70 - 99 mg/dL  102 (H)    WBC Latest Ref Range: 4.0 - 11.0 10e9/L  7.2    Hemoglobin Latest Ref Range: 13.3 - 17.7 g/dL  12.3 (L)    Hematocrit Latest Ref Range: 40.0 - 53.0 %  38.0 (L)    Platelet Count Latest Ref Range: 150 - 450 10e9/L  389    RBC Count Latest Ref Range: 4.4 - 5.9 10e12/L  4.21 (L)    MCV Latest Ref Range: 78 - 100 fl  90    MCH Latest Ref Range: 26.5 - 33.0 pg  29.2    MCHC Latest Ref Range: 31.5 - 36.5 g/dL  32.4    RDW Latest Ref Range: 10.0 - 15.0 %  18.0 (H)    Diff Method Unknown  Automated Method    % Neutrophils Latest Units: %  42.2    % Lymphocytes Latest Units: %  38.7    % Monocytes Latest Units: %  17.6    % Eosinophils Latest Units: %  0.8    % Basophils Latest Units: %  0.4    % Immature  Granulocytes Latest Units: %  0.3    Nucleated RBCs Latest Ref Range: 0 /100  0    Absolute Neutrophil Latest Ref Range: 1.6 - 8.3 10e9/L  3.1    Absolute Lymphocytes Latest Ref Range: 0.8 - 5.3 10e9/L  2.8    Absolute Monocytes Latest Ref Range: 0.0 - 1.3 10e9/L  1.3    Absolute Eosinophils Latest Ref Range: 0.0 - 0.7 10e9/L  0.1    Absolute Basophils Latest Ref Range: 0.0 - 0.2 10e9/L  0.0    Abs Immature Granulocytes Latest Ref Range: 0 - 0.4 10e9/L  0.0    Absolute Nucleated RBC Unknown  0.0    CV CARDIAC CATHERIZATION Unknown Attch  Attch     Imaging   Name: BENEDICT ESTES  MRN: [id number]  : 1965  Study Date: 2019 10:32 AM  Age: 54 yrs  Gender: Male  Patient Location: [facility]  Reason For Study: Pulmonary hypertension, SOB (shortness of breath)  Ordering Physician: PILI SEGURA  Referring Physician: PILI SEGURA  Performed By: Callie Pham RDCS     BSA: 2.5 m2  Height: 77 in  Weight: 258 lb  HR: 68  BP: 117/77 mmHg  _____________________________________________________________________________  __        Procedure  Echocardiogram with two-dimensional, color and spectral Doppler performed.  _____________________________________________________________________________  __        Interpretation Summary  Left ventricular function is normal.The EF is 60-65%.  Mild right ventricular dilation is present. Global right ventricular function  is mildly reduced.  Estimated pulmonary artery systolic pressure is 77 mmHg consistent with  pulmonary hypertension.  The inferior vena cava was normal in size with preserved respiratory  variability.  No pericardial effusion is present.  _____________________________________________________________________________  __        Left Ventricle  Left ventricular size is normal. Left ventricular wall thickness is normal.  Left ventricular function is normal.The EF is 60-65%. Left ventricular  diastolic function is not assessable. Flattened septum is consistent  with  right ventricular pressure and volume overload.     Right Ventricle  Mild right ventricular dilation is present. Global right ventricular function  is mildly reduced.     Atria  The left atrium appears normal. Mild right atrial enlargement is present.     Mitral Valve  The mitral valve is normal.        Aortic Valve  Aortic valve is normal in structure and function.     Tricuspid Valve  Mild tricuspid insufficiency is present. Estimated pulmonary artery systolic  pressure is 74 mmHg plus right atrial pressure.     Pulmonic Valve  Mild pulmonic insufficiency is present.     Vessels  The aorta root is normal. The inferior vena cava was normal in size with  preserved respiratory variability.     Pericardium  No pericardial effusion is present.        Compared to Previous Study  This study was compared with the study from 5.15.19 . PA is obtainable and  there is PH.  _____________________________________________________________________________  __  MMode/2D Measurements & Calculations  RVDd: 4.7 cm  IVSd: 1.4 cm     LVIDd: 5.3 cm  LVIDs: 3.2 cm  LVPWd: 0.93 cm  FS: 39.7 %  LV mass(C)d: 241.5 grams  LV mass(C)dI: 96.9 grams/m2  asc Aorta Diam: 3.6 cm  LVOT diam: 2.5 cm  LVOT area: 4.8 cm2  RWT: 0.35  TAPSE: 3.3 cm        Doppler Measurements & Calculations  TV max P.7 mmHg  PA V2 max: 89.4 cm/sec  PA max PG: 3.2 mmHg  PA acc time: 0.11 sec  TR max nieves: 422.7 cm/sec  TR max P.5 mmHg    Assessment/Plan

## 2019-12-02 NOTE — PROGRESS NOTES
[facility]   Interventional Cardiology        Consenting for Right Heart Catheterization    Patient understands during the portion for right heart catheterization a fine tube (catheter) is put into the vein of the groin/neck.  It is carefully passed along until it reaches the heart and then goes up into the blood vessels of the lungs. This is done to measure a variety of pressures in your heart and can tell us how well the heart is filling and emptying, as well as monitor fluid status.     Patient also understands risks and complications of the procedure which include, but are not limited to bruising/swelling around the incision site, infection, bleeding, allergic reaction to local anesthetic, air embolism, arterial puncture, stroke, heart attack.      Patient verbalized understanding of risks and benefits of the right heart catheterization and has elected to proceed with the procedure.       Marcin San PA-C  [facility] Cardiology Team

## 2019-12-02 NOTE — PATIENT INSTRUCTIONS
Medication Changes:  No medication changes at this time. Please continue current medication regiment.      Patient Instructions:  1. Continue staying active and eat a heart healthy diet.    2. Please keep current list of medications with you at all times.    3. Remember to weigh yourself daily after voiding and before you consume any food or beverages and log the numbers.  If you have gained 2 pounds overnight or 5 pounds in a week contact us immediately for medication adjustments or further instructions.    4. **Please call us immediately if you have any syncope (fainting or passing out), chest pain, edema (swelling or weight gain), or decline in your functional status (general decline in how you are feeling).      Check-In  Time Check-In Location Estimated Length Procedure   Name     May 2020   [facility]  waiting room 60-90 minutes Right Heart Catheterization**     Procedure Preparations & Instructions     This is an invasive procedure that DOES require preparation:    - Nothing to eat for 6 hours   - You may have clear liquids up until the time of your procedure  - A ride should be arranged for you in the instance you are unable to drive home, however you should be able to function as you normally would after the procedure     Follow up Appointment Information:  -follow up with Dr. Quan in May 2020 with labs & RHC prior    Results:  Component      Latest Ref Rng & Units 12/2/2019   WBC      4.0 - 11.0 10e9/L 7.2   RBC Count      4.4 - 5.9 10e12/L 4.21 (L)   Hemoglobin      13.3 - 17.7 g/dL 12.3 (L)   Hematocrit      40.0 - 53.0 % 38.0 (L)   MCV      78 - 100 fl 90   MCH      26.5 - 33.0 pg 29.2   MCHC      31.5 - 36.5 g/dL 32.4   RDW      10.0 - 15.0 % 18.0 (H)   Platelet Count      150 - 450 10e9/L 389   Diff Method       Automated Method   % Neutrophils      % 42.2   % Lymphocytes      % 38.7   % Monocytes      % 17.6   % Eosinophils      % 0.8   % Basophils      % 0.4   % Immature Granulocytes      % 0.3    Nucleated RBCs      0 /100 0   Absolute Neutrophil      1.6 - 8.3 10e9/L 3.1   Absolute Lymphocytes      0.8 - 5.3 10e9/L 2.8   Absolute Monocytes      0.0 - 1.3 10e9/L 1.3   Absolute Eosinophils      0.0 - 0.7 10e9/L 0.1   Absolute Basophils      0.0 - 0.2 10e9/L 0.0   Abs Immature Granulocytes      0 - 0.4 10e9/L 0.0   Absolute Nucleated RBC       0.0   Sodium      133 - 144 mmol/L 139   Potassium      3.4 - 5.3 mmol/L 3.7   Chloride      94 - 109 mmol/L 107   Carbon Dioxide      20 - 32 mmol/L 26   Anion Gap      3 - 14 mmol/L 6   Glucose      70 - 99 mg/dL 102 (H)   Urea Nitrogen      7 - 30 mg/dL 22   Creatinine      0.66 - 1.25 mg/dL 1.71 (H)   GFR Estimate      >60 mL/min/1.73:m2 44 (L)   GFR Estimate If Black      >60 mL/min/1.73:m2 51 (L)   Calcium      8.5 - 10.1 mg/dL 9.0     We are located on the third floor of the [facility] [place] [facility].  Our address is     [address] [place] [address] [place] [address] [place] [address]    Thank you for allowing us to be a part of your care here at the [facility]    If you have questions or concerns please contact us at:    Roberto Erickson RN, BSN   Verna Nguyen (Schedule,Prior Auth)  Nurse Coordinator     Clinic   Pulmonary Hypertension   Pulmonary Hypertension  [facility]  [facility]  (Phone)[phone]    (Phone) [phone]        (Fax) [phone]    ** Please note that you will NOT receive a reminder call regarding your scheduled testing, reminder calls are for provider appointments only.  If you are scheduled for testing within the [employer] system you may receive a call regarding pre-registration for billing purposes only.**     Remember to weigh yourself daily after voiding and before you consume any food or beverages and log the numbers.  If you have gained/lost 2 pounds overnight or 5 pounds in a week contact  us immediately for medication adjustments or further instructions.   **Please call us immediately if you have any syncope, chest pain, edema, or decline in your functional status.    Support Group:  Pulmonary Hypertension Association  Https://www.phassociation.org/  **Look at the Events Tab** They even have Support Groups that you can call into    [facility] Support Group  Second Saturday of the Month from 1-3 PM   Location: [address] [place] [id number]  Leader: Sofia Durand and Ya Hussein  Phone: [phone] or [phone]  Email: [email or web address]

## 2019-12-03 DIAGNOSIS — I27.20 PULMONARY HYPERTENSION (H): Primary | ICD-10-CM

## 2019-12-04 RX ORDER — FUROSEMIDE 20 MG
20 TABLET ORAL DAILY
Qty: 90 TABLET | Refills: 3 | Status: SHIPPED | OUTPATIENT
Start: 2019-12-04 | End: 2020-11-15

## 2019-12-16 ENCOUNTER — TELEPHONE (OUTPATIENT)
Dept: CARDIOLOGY | Facility: CLINIC | Age: 54
End: 2019-12-16

## 2019-12-16 NOTE — TELEPHONE ENCOUNTER
Patient called asking to have Echo, RHC, and last OV notes faxed over to [facility] ATTN: Flory at (f) [phone] prior to surgery 12/16. Faxed over results as requested. Chantel Erickson RN on 12/16/2019 at 1:03 PM

## 2020-02-05 DIAGNOSIS — I27.20 PULMONARY HYPERTENSION (H): ICD-10-CM

## 2020-02-05 RX ORDER — TADALAFIL 20 MG/1
40 TABLET ORAL DAILY
Qty: 60 TABLET | Refills: 11 | Status: SHIPPED | OUTPATIENT
Start: 2020-02-05 | End: 2021-02-04

## 2020-02-05 NOTE — TELEPHONE ENCOUNTER
[facility] Call Center    Phone Message    May a detailed message be left on voicemail: yes     Reason for Call: Other: Pt's pharmacy called to follow up on the status of this refill request. They are needing this refilled ASAP.      Action Taken: Message routed to:  [facility] ([facility]): Cardiology    Travel Screening: Not Applicable

## 2020-02-05 NOTE — TELEPHONE ENCOUNTER
[facility] Call Center    Phone Message    May a detailed message be left on voicemail: yes     Reason for Call: Other: Pts wife called. States they need this filled ASAP as they are leaving for vacation on Friday. It is a long drive for her to get into the pharmacy,, and she is there right now wondering if someone can call this in. Please call her to discuss if this cannot be done.      Action Taken: Message routed to:  [facility] ([facility]): CVC, Med refills    Travel Screening: Not Applicable

## 2020-02-11 ENCOUNTER — TELEPHONE (OUTPATIENT)
Dept: CARDIOLOGY | Facility: CLINIC | Age: 55
End: 2020-02-11

## 2020-02-11 NOTE — TELEPHONE ENCOUNTER
PA Initiation    Medication: Adcirca 20mg  Insurance Company: [facility] - Phone [phone] Fax [phone]  Pharmacy Filling the Rx: [facility] [place] - [address]  Start Date: 2/11/2020    *Prior authorization completed and submitted through CoverMyMeds for expedited review along with right heart catheterization and clinic note.

## 2020-02-17 NOTE — TELEPHONE ENCOUNTER
Prior Authorization Approval    Authorization Effective Date: 2/11/2020  Authorization Expiration Date: 2/11/2021  Medication: Tadalafil 20mg - Approved  Approved Dose/Quantity: 60 tablets/30 days  Reference #: [id number]   Insurance Company: [employer] [facility] - Phone [phone] Fax [phone]  Which Pharmacy is filling the prescription (Not needed for infusion/clinic administered): [facility] [place] - [address]  Pharmacy Notified:  Yes  ----------------------------  Insurance: [facility]  BIN: N/A  PCN: N/A  ID#: [id number]  GRP#: N/[id number]    *Contacted [facility] at [phone] to request an update with regards to the pt's prior authorization approval. Spoke w/ pharmacist clinician Sindi who confirmed that the pt's prior authorization has been approved from 02/11/2020 through 02/11/2021, auth # [id number].     *Prior authorization was faxed to [facility] @ [phone].     Verna Nguyen  Clinic   Pulmonary Hypertension  [facility]  (p) [phone]

## 2020-03-30 ENCOUNTER — TRANSFERRED RECORDS (OUTPATIENT)
Dept: HEALTH INFORMATION MANAGEMENT | Facility: CLINIC | Age: 55
End: 2020-03-30

## 2020-03-31 ENCOUNTER — TELEPHONE (OUTPATIENT)
Dept: CARDIOLOGY | Facility: CLINIC | Age: 55
End: 2020-03-31

## 2020-03-31 NOTE — TELEPHONE ENCOUNTER
PA Initiation    Medication: Uptravi 1600mcg  Insurance Company: [facility] - Phone [phone] Fax [phone]  Pharmacy Filling the Rx: [facility] - [place], [place] - [facility] [address]  Start Date: 3/31/2020    *Prior authorization completed and submitted through CoverMyMeds for expedited review along with right heart catheterization report and clinic note.

## 2020-04-06 NOTE — TELEPHONE ENCOUNTER
Prior Authorization Approval    Authorization Effective Date: 3/31/2020  Authorization Expiration Date:  Indefinite  Medication: Uptravi 1600mcg - Approved  Approved Dose/Quantity: 60 tablets/30 days  Reference #: [id number]   Insurance Company: [employer] [facility] - Phone [phone] Fax [phone]  Which Pharmacy is filling the prescription (Not needed for infusion/clinic administered): [facility] - [place], [place] - [address] [place] [address]  Pharmacy Notified: Yes  ------------------------------------  Insurance: [employer]  BIN: N/A  PCN: N/A  ID#: [id number]  GRP#: N/A     **Contacted [facility] and spoke with Sindi. Sindi stated that the prior authorization was approved on 3/31/2020 and will continue to be approved as along as the pt fills the medication. Sindi stated that it will not have an expiration date. Sindi provided authorization approval number [id number].     Verna Nguyen  Clinic   Pulmonary Hypertension  [facility]  (P) [phone]

## 2020-05-06 ENCOUNTER — TELEPHONE (OUTPATIENT)
Dept: CARDIOLOGY | Facility: CLINIC | Age: 55
End: 2020-05-06

## 2020-05-06 NOTE — TELEPHONE ENCOUNTER
Received a call back from Ying and he is scheduled for a Right heart catheterization in [place] on 5/12/20.  She will fax us the results once they are available.  Marlys Anderson RN on 5/11/2020 at 9:29 AM    I spoke with Dr. Quan and he stated that he would like this pt to have a Right heart catheterization locally with Dr. Polo in [place].  I called Ying and left a message that we would like a Right heart catheterization prior to his appointment on 5/27.  Marlys Anderson RN on 5/6/2020 at 1:13 PM

## 2020-05-12 ENCOUNTER — TRANSFERRED RECORDS (OUTPATIENT)
Dept: HEALTH INFORMATION MANAGEMENT | Facility: CLINIC | Age: 55
End: 2020-05-12

## 2020-05-14 ENCOUNTER — PATIENT OUTREACH (OUTPATIENT)
Dept: CARDIOLOGY | Facility: CLINIC | Age: 55
End: 2020-05-14

## 2020-05-14 ENCOUNTER — TELEPHONE (OUTPATIENT)
Dept: CARDIOLOGY | Facility: CLINIC | Age: 55
End: 2020-05-14

## 2020-05-14 NOTE — TELEPHONE ENCOUNTER
I conducted a video visit with patient for follow-up of recently obtained data noted below.  The video visit was conducted with his permission from his home and my office.  The visit lasted 35 minutes of which > 50 % involved coordination of care and counseling  Time noon to 12:35   He has advance pulmonary hypertension treated with oral agents.  He had a syncope episode with exertion of running up stairs.  He subsequently underwent catherization as noted below that demonstrated a marked increase in the patients pulmonary artery pressure and mild decrease in RV function by echocardiography, despite medications reviewed and noted below.  His interim history revealed radical prostatectomy performed with laparascope.  Had post op difficulty taking deep breath but no history hemoptysis and or leg swelling.  Approximately 8-10 weeks ago had a several week illness characterized by cough, non-productive, myalgias, low grade fevers which took several weeks to go away.      The patient returns for follow-up of PAH.  There is no interim history of chest pain, tightness, paroxysmal nocturnal dyspnea, orthopnea, peripheral edema, palpitation, pre-syncope, butsyncope,   Exercise tolerance is stable.  The patient is attempting to exercise regularly and following a sodium restricted, calorically appropriate diet.  Medications are reviewed and the patient is taking medications as prescribed.  The patient is generally sleeping well.     Plan:  1. VQ lung scan, non-contrast chest CT, COVID serology, crpinflammation, ESR, , IL^   2. Start Opsumit  3. RHC catherization 12 weeks post starting medication, sooner if not doing well  4. Consider off -label PPAR G  5. Limit ETOH to two oz/day  6. Avoid heavy exetion                    Current Outpatient Medications   Medication     atovaquone (MEPRON) 750 MG/5ML suspension     calcium carbonate (OS-PANFILO 500 MG [place]. CA) 500 MG tablet     CLONAZEPAM PO     digoxin (LANOXIN) 125 MCG tablet      FLUCONAZOLE PO     furosemide (LASIX) 20 MG tablet     Pantoprazole Sodium (PROTONIX PO)     Penicillin V Potassium (PEN-VEE K OR)     prednisoLONE 5 MG tablet     predniSONE (DELTASONE) 5 MG tablet     rosuvastatin (CRESTOR) 5 MG tablet     ruxolitinib (JAKAFI) 5 MG TABS tablet CHEMO     selexipag (UPTRAVI) 1600 MCG tablet     Specialty Vitamins Products (MAGNESIUM PLUS PROTEIN) 133 MG tablet     tadalafil, PAH, (ADCIRCA) 20 MG TABS     Ursodiol (ACTIGALL PO)     VALACYCLOVIR HCL PO     VITAMIN D, CHOLECALCIFEROL, PO     No current facility-administered medications for this visit.                    Laboratories      :      Echocardiogram    Name: BENEDICT ESTES  Study Date: 2020  MRN: [id number]  : 1965  Gender: M  Account Number: [id number]  Reason for Study: Pulmonary Hypertension  Patient Location: Adult Echo Lab  Height: 193cm  Weight: 117.9kg  Patient Header: BSA: 2.48 m2  Study Location: [facility]      Interp Summary Dilated aortic root measures 4.2 cm.  Normal left ventricular chamber size.  Mild left ventricular hypertrophy.  Normal LV systolic function.  Ejection Fraction = 60-65%.  Moderately enlarged right ventricular chamber size.  Mildly decreased right ventricular systolic function.  There is severe pulmonary hypertension.  RV/RA peak instantaneous systolic gradient = 75mmHg.     Procedures The study performed was a(n) complete 2-D echo with M-Mode, color and spectral Doppler.  The study was performed by [facility].  The study quality was diagnostic.  The study was performed in the Echo Lab.     AO and PA Dilated aortic root measures 4.2 cm.     Atria No interatrial shunt visualized by color doppler.  No interatrial septal aneurysm.     Aortic Valve No aortic regurgitation.  No hemodynamically significant valvular aortic stenosis by doppler.  Trileaflet AoV without calcification or restricted leaflet mobility     IVC/PulmVeins IVC diameter and collapse do  not fit into a predicted pattern which suggests an intermediate RA  pressure of 8 mmHg.     Atria Normal left atrial chamber size.     Left Ventricle The LV EF is based on visual estimate.  Normal LV systolic function.  Ejection Fraction = 60-65%.  Normal left ventricular chamber size.  Mild left ventricular hypertrophy.  No regional wall motion abnormalities noted.     Mitral Valve Trace mitral regurgitation.  No mitral valve stenosis.  Normal mitral valve morphology and leaflet mobility.     Pericardial Pleural Effusion No pericardial effusion.     Pulmonary Valve Trace pulmonic valvular regurgitation.  No pulmonic valve stenosis by doppler.  Probably normal pulmonic valve.     Atria Normal right atrial chamber size.     Right Ventricle Mildly decreased right ventricular systolic function.  Moderately enlarged right ventricular chamber size.     Technical Comments Technically limited images due to lung interference.     Tricuspid Valve Mild tricuspid regurgitation by color Doppler.  There is severe pulmonary hypertension.  RV/RA peak instantaneous systolic gradient = 75mmHg.  No tricuspid stenosis.  Probably normal tricuspid valve morphology.     MMODE 2D MEASUREMENTS CALCULATIONS IVSd: 1.1cm  IVSs: 1.7cm  LVIDd: 4.8cm  LVIDs: 3.4cm  LVPWd: 1.7cm  LVPWs: 1.8cm  Ao root diam: 4.2cm  Ao root area: 14.1cm^2  ACS: 2.4cm  LA dimension: 3.9cm  asc Aorta Diam: 3.8cm  LVOT diam: 2.7cm  LVOT area: 5.7cm^2  LVOT area(traced): 5.7cm^2  EDV(MOD-sp4): 97.3ml  ESV(MOD-sp4): 34.1ml  EF(MOD-sp4): 65.0%  LVLd ap2: 9.6cm  EDV(MOD-sp2): 129ml  ESV(MOD-sp2): 64.1ml  EF(MOD-sp2): 50.3%     Time Measurements Aortic R-R: 0.9sec  Aortic HR: 66BPM     DOPPLER MEASUREMENTS CALCULATIONS MV A dur: 0.13sec  MV E max nieves: 84.4cm/sec  MV A max nieves: 60.8cm/sec  MV E/A: 1.4  MV dec time: 0.2sec  Ao V2 max: 102.6cm/sec  Ao max P.2mmHg  MYKE(V,A): 5.0cm^2  MYKE(V,D): 5.0cm^2  LV V1 max PG: 3.2mmHg  LV V1 mean P.6mmHg  LV V1 max:  89.1cm/sec  LV V1 VTI: 17.5cm  CO(LVOT): 6.6l/min  CI(LVOT): 2.7l/min/m^2  SV(LVOT): 100.8ml  TV V2 max: 55.3cm/sec  TV max P.2mmHg  PA V2 max: 84.2cm/sec  PA acc time: 0.1sec  PI end-d nguyễn: 193.4cm/sec  TR max nguyễn: 433.0cm/sec  Pulm Sys Nguyễn: 41.3cm/sec  Pulm Pena Nguyễn: 35.4cm/sec  Pulm S/D: 1.2          Catherization:  NAME:BENEDICT ESTES  :1965  MRN:[id number]  Date of Service:2020  Order #: [id number]    Procedure Name    Code    By    CATH RIGHT HEART CATH    [id number]    Caroline Elder MD    Case Status: Elective    IMPRESSION:  Severe PAH  Normal CO  5.7 woods units    RECOMMENDATIONS:  Medical therapy    INDICATIONS:  Pulmonary Hypertension          RIGHT HEART MEASUREMENTS:  Label    % O2    Pres/Loc    Time    AIR REST  FA    95    PV    13:38:22  PA    62    PA    13:44:59  Type    SV    CO (l/m)    CI (l/m/    HR    Time    AIR REST  Thermal    93.00    6.42    2.58    69    13:35:45  Carter    99.00    6.83    2.74    69    13:35:45  PRESSURES:  Time    AIR REST  RA    (7)  SV    13:35:44  RV  90/0,  13      13:36:06  PW  18/18  (15)  PV    13:36:51  PA  93/35  (54)  PA    13:37:14    PROCEDURE NOTES:    LOCAL ANESTHETIC:  Local anesthetic to right jugular region with Lidocaine 1%  PROCEDURAL APPROACH:  Access: 5 Fr Sheath was inserted into the right internal jugular vein  Access: 7 Fr Sheath was exchanged in the right internal jugular vein  POST SHEATH STATUS:  Pulled manual compression for 20 mins right internal jugular  POST SITE STATUS:  Hemostasis obtained - Right neck - tegaderm applied    DIAGNOSTIC EQUIPMENT:  SinglePlatform 5 Fr Micro Stick Stiff Nitinol - Qty: 1  Each Part #: [id number]  Oscor 7 Fr. Adelante - Qty: 1  Each Part #: [id number]  Stoddard [place]-Chris TD 7 Fr - Qty: 1  Each Part #: [id number]  Bracco Acist Angio Kit - Qty: 1  Part #: [id number]  CiafoDoist Acist Manifold Kit - Qty: 1  Part #: [id number]    CATHETERS    [place]  [place]     Inserted to PA - PCW - RA -  RV  removed    MEDICATIONS:    POST PROCEDURE NOTES:  Operative Date: 5/12/2020  Postoperative Diagnosis: Severe PAH  Normal CO  5.7 woods units  Surgeon: Caroline Elder MD  Findings:  Estimated Blood Loss :  Negligible  Specimens/Disposition of :  None    The procedure was performed using moderate conscious sedation. The patient was  monitored utilizing continuous pulse oximetry, continuous telemetry and  intermittent blood pressure measurements throughout the procedure without  notable aberration in vitals.  Please see the patient's procedure log for  further information and moderate sedation times.    Start time: 5/12/2020 1:28:41 PM  Stop time:  5/12/2020 1:43:01 PM    Signed By Caroline Elder MD On 5/13/2020 09:27:

## 2020-05-15 NOTE — TELEPHONE ENCOUNTER
PA Initiation    Medication: Opsumit 10mg  Insurance Company: [facility] - Phone [phone] Fax [phone]  Pharmacy Filling the Rx: [facility] - [place], [place] - [address] [place] [address]  Start Date: 5/14/2020    **Received a call from Dr. Quan stating that he would like to begin the pt on a third therapy due to the pt's most recent right heart catheterization. Dr. Quan requested a prior authorization be completed for Opsumit.     *Prior authorization completed and submitted through CoverMyMeds for expedited review along with right heart catheterization report and clinic note.

## 2020-05-18 ENCOUNTER — MEDICAL CORRESPONDENCE (OUTPATIENT)
Dept: HEALTH INFORMATION MANAGEMENT | Facility: CLINIC | Age: 55
End: 2020-05-18

## 2020-05-18 NOTE — TELEPHONE ENCOUNTER
Prior Authorization Approval    Authorization Effective Date: 5/15/2020  Authorization Expiration Date:  Indefinite  Medication: Opsumit 10mg - Approved  Approved Dose/Quantity: 30 tablets/30 days  Reference #: [id number]   Insurance Company: [facility] [employer] [facility] - Phone [phone] Fax [phone]  Which Pharmacy is filling the prescription (Not needed for infusion/clinic administered): [facility] - [place], [place] - [address] [place] [address]  Pharmacy Notified:  Yes  Patient Notified:  Yes  ----------------------------  Insurance: [facility]  BIN: [id number]  PCN: [id number]  ID#: [id number]  GRP#: N/A

## 2020-05-18 NOTE — TELEPHONE ENCOUNTER
**Discussed the enrollment form with the pt over the phone. Stated pt should hear from [facility] first as pt will be starting on Opsumit Voucher Program. Stated pt should receive the medication by Thursday. Stated to pt that once pt has started on Opsumit Voucher Program, he will then hear from [facility] to discuss shipment of the medication. Stated that there may be a co-pay. Pt stated he does not normally have a co-pay for his medication. Pt verbalized understanding of the information provided and agreed to plan.     *Opsumit enrollment form completed and submitted through [employer] portal for review. Prior authorization sent separately by fax to [employer].    Opsumit Voucher Program was requested.    Verna Nguyen  Clinic   Pulmonary Hypertension  [facility]  (P) [phone]

## 2020-05-18 NOTE — TELEPHONE ENCOUNTER
Contacted [facility] to follow-up on pt's referral. Spoke with rSidhar who stated that it has been received but pt cannot begin on OVP as pt previously used OVP. Asked Sridhar if pt can be bridged. Sridhar stated pt cannot be bridged as there is a valid prior authorization on file. Requested Sridhar urgent expedite the pt's case to [facility] for processing.    Sent a message to V[facility] liaison, requesting a confirmation of the referral as well as urgent expediting the pt's referral.   --------------------------  Contacted pt's wife, Danielle, to discuss the above (as Danielle was the one to initially contact the office to discuss the start of Opsumit). Stated to Danielle that pt cannot begin OVP because he previously completed it in 2017, but the case has been requested to be urgent expedited and should be sent to [facility] today. Danielle asked if the medication will get shipped to their home without consent. Stated to Danielle that it will not as the Specialty Pharmacy needs verbal agreement on the cost of co-pay. Stated to Danielle a follow-up will be made again upon receipt from [facility], Danielle agreed to plan and had no further questions to this regard.    Danielle asked if the pt should take the medication in the morning or at night. Danielle states pt already takes Tadalafil at night. Stated to Danielle that pt should take it in the morning since he takes Tadalafil at night already. Danielle agreed to plan and had no further questions to this regard.    Verna Nguyen  Clinic   Pulmonary Hypertension  [facility]  (P) [phone]

## 2020-05-20 ENCOUNTER — TRANSFERRED RECORDS (OUTPATIENT)
Dept: HEALTH INFORMATION MANAGEMENT | Facility: CLINIC | Age: 55
End: 2020-05-20

## 2020-05-20 NOTE — TELEPHONE ENCOUNTER
"Per [facility]:    \"Per our pa team:    PA is approved and per test claim, it s paying with a $0 co-pay.    We are currently trying to set up delivery. If you could have the pt also call into the pharmacy please. - [phone]\"    Attempted to contact Danielle to provide an update. Unable to reach. Left a voicemail informing her that [facility] will be contacting the pt today to schedule a shipment of the medication. Requested if pt did not hear from [facility] to contact them directly at [phone], with a reminder to ask what the co-pay is.     Received an updated message from [facility] that the Opsumit has been scheduled for delivery on May 21.     No further follow-up completed and encounter closed.    Verna Nguyen  Clinic   Pulmonary Hypertension  [facility]  (P) [phone]  "

## 2020-05-27 ENCOUNTER — VIRTUAL VISIT (OUTPATIENT)
Dept: CARDIOLOGY | Facility: CLINIC | Age: 55
End: 2020-05-27
Attending: INTERNAL MEDICINE
Payer: COMMERCIAL

## 2020-05-27 DIAGNOSIS — R06.02 SOB (SHORTNESS OF BREATH): ICD-10-CM

## 2020-05-27 DIAGNOSIS — I27.20 PULMONARY HYPERTENSION (H): ICD-10-CM

## 2020-05-27 PROCEDURE — 99215 OFFICE O/P EST HI 40 MIN: CPT | Mod: 95 | Performed by: INTERNAL MEDICINE

## 2020-05-27 NOTE — PATIENT INSTRUCTIONS
Staff message sent to Verna to schedule patient when cath lab schedule is available for August. Labs faxed to (f) [phone] for 1 month recheck. Chantel Erickson RN on 5/27/2020 at 10:31 AM

## 2020-05-27 NOTE — PROGRESS NOTES
Impression:  1. Pulmonary hypertension  2. Bone marrow transplantation  3. Recent exertional syncope  4. Three drug therapy for PAH - PDE V, Uptravi and recent initiation of Opsumit  5. IL-6 elevated    Video Visit    I performed a video visit with this patient utilizing [employer].  The visit was performed with the patient's approval and cooperation.  The video visit conditions were necessitated by the pandemic and the need for the patient to minimize public exposure in view of complex, multi-organ disease which enhances COVID risk. Duration 30 minutes 8:15-8:45 Patient at home, I at home    I reviewed the patient's interim history, medications, any current laboratories or imaging studies, conducted review of systems and performed an examination with the context of the patients illness and the video visit conditions.          Plan:  1. Covid precautions reviewed: mask, symptoms, social distancing, COVID symptoms  2. Continue careful monitoring of diet, fluid and blood sugar  3. Continue exercise program  4. Call immediately if changing symptoms or deterioration in your condition  5. Continue Opsumit  6. Call if recurrent symptoms of exercise associated syncope, progressive shortness of breath, exercise intolerance or signs of right heart failure  7. Laboratories one month: BNP, IL-6, BMP  8. RTC here 8-12 weeks with 6 minute walk, echocardiogram and right heart catherization        Interim history:    Imaging studies from  reviewed and no evidence of pulmonary emboli or intra-pulmonary pathology.  ETOH intake zero.  Exercise regularly with arms and legs  X 45 minutes without intolerance, pre-syncope or syncope.  Tolerating initiation of medications.      Medications:  Current Outpatient Medications   Medication     atovaquone (MEPRON) 750 MG/5ML suspension     calcium carbonate (OS-PANFILO 500 MG [place]. [place]) 500 MG tablet     CLONAZEPAM PO     digoxin (LANOXIN) 125 MCG tablet     FLUCONAZOLE PO     furosemide (LASIX) 20  MG tablet     Pantoprazole Sodium (PROTONIX PO)     Penicillin V Potassium (PEN-VEE K OR)     prednisoLONE 5 MG tablet     predniSONE (DELTASONE) 5 MG tablet     rosuvastatin (CRESTOR) 5 MG tablet     ruxolitinib (JAKAFI) 5 MG TABS tablet CHEMO     selexipag (UPTRAVI) 1600 MCG tablet     Specialty Vitamins Products (MAGNESIUM PLUS PROTEIN) 133 MG tablet     tadalafil, PAH, (ADCIRCA) 20 MG TABS     Ursodiol (ACTIGALL PO)     VALACYCLOVIR HCL PO     VITAMIN D, CHOLECALCIFEROL, PO     No current facility-administered medications for this visit.        Review of symptoms:  Weight:   Temperature: no fever, chills, night sweats  HEENT with/without diplopia, loss of vision, difficulty swollowing difficulties  Chest: with/without shortness of breath, shortness of breath with exertion, cough, hemoptysis  Cardiac: with/without exertional shortness of breath, chest pain, palpitation, pre-syncope or syncope  Abdomen: nausea, emesis, melena, jaundice, hematochezia, diarrhea, pain  Skin: without rash or open lesions  Neuro: without dizziness, syncope, seizure, focal neurologic deficit,   Ortho: without joint pain, stiffness, new limitation of motion  Psychiatric: without depression, suicidal ideation,   : without frequency, urgency, pain hematuria    Physical Examination:    Alert, oriented, fluent speech, moves all extremities, JVP, abdomen without obvious ascites, peripheral edma, skin without rash

## 2020-05-27 NOTE — LETTER
5/27/2020      RE: Olivier Gómez  [address]  [place] [address]       Dear Colleague,    Thank you for the opportunity to participate in the care of your patient, Olivier Gómez, at the [facility]. Please see a copy of my visit note below.    Impression:  1. Pulmonary hypertension  2. Bone marrow transplantation  3. Recent exertional syncope  4. Three drug therapy for PAH - PDE V, Uptravi and recent initiation of Opsumit  5. IL-6 elevated    Video Visit    I performed a video visit with this patient utilizing D[employer]ity.  The visit was performed with the patient's approval and cooperation.  The video visit conditions were necessitated by the pandemic and the need for the patient to minimize public exposure in view of complex, multi-organ disease which enhances COVID risk. Duration 30 minutes 8:15-8:45    I reviewed the patient's interim history, medications, any current laboratories or imaging studies, conducted review of systems and performed an examination with the context of the patients illness and the video visit conditions.          Plan:  1. Covid precautions reviewed: mask, symptoms, social distancing, COVID symptoms  2. Continue careful monitoring of diet, fluid and blood sugar  3. Continue exercise program  4. Call immediately if changing symptoms or deterioration in your condition  5. Continue Opsumit  6. Call if recurrent symptoms of exercise associated syncope, progressive shortness of breath, exercise intolerance or signs of right heart failure  7. Laboratories one month: BNP, IL-6, BMP  8. RTC here 8-12 weeks with 6 minute walk, echocardiogram and right heart catherization        Interim history:    Imaging studies from  reviewed and no evidence of pulmonary emboli or intra-pulmonary pathology.  ETOH intake zero.  Exercise regularly with arms and legs  X 45 minutes without intolerance, pre-syncope or syncope.  Tolerating  initiation of medications.      Medications:  Current Outpatient Medications   Medication     atovaquone (MEPRON) 750 MG/5ML suspension     calcium carbonate (OS-PANFILO 500 MG [place]. [place]) 500 MG tablet     CLONAZEPAM PO     digoxin (LANOXIN) 125 MCG tablet     FLUCONAZOLE PO     furosemide (LASIX) 20 MG tablet     Pantoprazole Sodium (PROTONIX PO)     Penicillin V Potassium (PEN-VEE K OR)     prednisoLONE 5 MG tablet     predniSONE (DELTASONE) 5 MG tablet     rosuvastatin (CRESTOR) 5 MG tablet     ruxolitinib (JAKAFI) 5 MG TABS tablet CHEMO     selexipag (UPTRAVI) 1600 MCG tablet     Specialty Vitamins Products (MAGNESIUM PLUS PROTEIN) 133 MG tablet     tadalafil, PAH, (ADCIRCA) 20 MG TABS     Ursodiol (ACTIGALL PO)     VALACYCLOVIR HCL PO     VITAMIN D, CHOLECALCIFEROL, PO     No current facility-administered medications for this visit.        Review of symptoms:  Weight:   Temperature: no fever, chills, night sweats  HEENT with/without diplopia, loss of vision, difficulty swollowing difficulties  Chest: with/without shortness of breath, shortness of breath with exertion, cough, hemoptysis  Cardiac: with/without exertional shortness of breath, chest pain, palpitation, pre-syncope or syncope  Abdomen: nausea, emesis, melena, jaundice, hematochezia, diarrhea, pain  Skin: without rash or open lesions  Neuro: without dizziness, syncope, seizure, focal neurologic deficit,   Ortho: without joint pain, stiffness, new limitation of motion  Psychiatric: without depression, suicidal ideation,   : without frequency, urgency, pain hematuria    Physical Examination:    Alert, oriented, fluent speech, moves all extremities, JVP, abdomen without obvious ascites, peripheral edma, skin without rash        Please do not hesitate to contact me if you have any questions/concerns.     Sincerely,     Dk Quan MD

## 2020-06-18 ENCOUNTER — TELEPHONE (OUTPATIENT)
Dept: CARDIOLOGY | Facility: CLINIC | Age: 55
End: 2020-06-18

## 2020-06-18 DIAGNOSIS — R06.02 SOB (SHORTNESS OF BREATH): ICD-10-CM

## 2020-06-18 DIAGNOSIS — I27.20 PULMONARY HYPERTENSION (H): Primary | ICD-10-CM

## 2020-06-18 NOTE — TELEPHONE ENCOUNTER
Reviewed results with Dr. Quan, who stated he will call the patient. Will follow up on Monday. Chantel Erickson RN on 6/19/2020 at 2:02 PM      I received a call from pt stating that he started the Opsumit (macitentan) and is feeling better but has noticed that his weight is up about 8 lbs.  I called Dr. Quan and he would like the pt to have labs drawn (CMP and N-Terminal Pro BNP) and an Echocardiogram tomorrow.  He also oked him to take lasix 40 mg in the AM.  I called the pt and Ying to discuss Dr. Short plan.  Orders have been sent to the pt.  Marlys Anderson RN on 6/18/2020 at 3:27 PM

## 2020-06-19 ENCOUNTER — OFFICE VISIT (OUTPATIENT)
Dept: CARDIOLOGY | Facility: CLINIC | Age: 55
End: 2020-06-19
Payer: COMMERCIAL

## 2020-06-19 ENCOUNTER — TRANSFERRED RECORDS (OUTPATIENT)
Dept: HEALTH INFORMATION MANAGEMENT | Facility: CLINIC | Age: 55
End: 2020-06-19

## 2020-06-19 DIAGNOSIS — I27.20 PULMONARY HYPERTENSION (H): Primary | ICD-10-CM

## 2020-06-19 DIAGNOSIS — R06.02 SOB (SHORTNESS OF BREATH): ICD-10-CM

## 2020-06-19 PROCEDURE — 99213 OFFICE O/P EST LOW 20 MIN: CPT | Mod: 95 | Performed by: INTERNAL MEDICINE

## 2020-06-19 RX ORDER — LIDOCAINE 40 MG/G
CREAM TOPICAL
Status: CANCELLED | OUTPATIENT
Start: 2020-06-19

## 2020-06-19 NOTE — LETTER
6/19/2020      RE: Olivier Gómez  [address]  [place] [address]       Dear Colleague,    Thank you for the opportunity to participate in the care of your patient, Olivier Gómez, at the [facility] at [facility]. Please see a copy of my visit note below.        Video Visit    I performed a video visit with this patient utilizing [employer].  The visit was performed with the patient's approval and cooperation.  The video visit conditions were necessitated by the pandemic and the need for the patient to minimize public exposure in view of complex, multi-organ disease which enhances COVID risk.  The duration 2:00-2:20    I reviewed the patient's interim history, medications, any current laboratories or imaging studies, conducted review of systems and performed an examination with the context of the patients illness and the video visit conditions.      The patient is seen for follow-up of weight gain of 6 pounds.  He is currently on 3 drug oral therapy on which he is no longer short of breath and has no exertional pre-syncope.  He has no edema.  The likely culprit for his weight gain is the recent addition of Opsumit.      :       Plan:  1. Covid precautions reviewed: mask, symptoms, social distancing, COVID symptoms  2. Continue careful monitoring of diet, fluid and blood sugar  3. Continue exercise program  4. Call immediately if changing symptoms or deterioration in your condition  5. Recheck BMP BNP on Monday  6. Take an addition 20mgs furosemide tomorrow    \    Medications:  Current Outpatient Medications   Medication     atovaquone (MEPRON) 750 MG/5ML suspension     calcium carbonate (OS-PANFILO 500 MG [place]. [place]) 500 MG tablet     CLONAZEPAM PO     digoxin (LANOXIN) 125 MCG tablet     FLUCONAZOLE PO     furosemide (LASIX) 20 MG tablet     Pantoprazole Sodium (PROTONIX PO)     Penicillin V Potassium (PEN-VEE K OR)     prednisoLONE 5 MG tablet     predniSONE  (DELTASONE) 5 MG tablet     rosuvastatin (CRESTOR) 5 MG tablet     ruxolitinib (JAKAFI) 5 MG TABS tablet CHEMO     selexipag (UPTRAVI) 1600 MCG tablet     Specialty Vitamins Products (MAGNESIUM PLUS PROTEIN) 133 MG tablet     tadalafil, PAH, (ADCIRCA) 20 MG TABS     Ursodiol (ACTIGALL PO)     VALACYCLOVIR HCL PO     VITAMIN D, CHOLECALCIFEROL, PO     No current facility-administered medications for this visit.        Review of symptoms:  Weight:   Temperature: no fever, chills, night sweats  HEENT with/without diplopia, loss of vision, difficulty swollowing difficulties  Chest: with/without shortness of breath, shortness of breath with exertion, cough, hemoptysis  Cardiac: with/without exertional shortness of breath, chest pain, palpitation, pre-syncope or syncope  Abdomen: nausea, emesis, melena, jaundice, hematochezia, diarrhea, pain  Skin: without rash or open lesions  Neuro: without dizziness, syncope, seizure, focal neurologic deficit,   Ortho: without joint pain, stiffness, new limitation of motion  Psychiatric: without depression, suicidal ideation,   : without frequency, urgency, pain hematuria    Physical Examination:    Alert, oriented, fluent speech, moves all extremities, JVP, abdomen without obvious ascites, peripheral edma, skin without rash    Imaging:  RV is no longer hypokinetic    Laboratories: reviewed and creatinine is stable.      Please do not hesitate to contact me if you have any questions/concerns.     Sincerely,     Dk Quan MD

## 2020-06-19 NOTE — PROGRESS NOTES
Video Visit    I performed a video visit with this patient utilizing [employer].  The visit was performed with the patient's approval and cooperation.  The video visit conditions were necessitated by the pandemic and the need for the patient to minimize public exposure in view of complex, multi-organ disease which enhances COVID risk.  The duration 2:00-2:20    I reviewed the patient's interim history, medications, any current laboratories or imaging studies, conducted review of systems and performed an examination with the context of the patients illness and the video visit conditions.      The patient is seen for follow-up of weight gain of 6 pounds.  He is currently on 3 drug oral therapy on which he is no longer short of breath and has no exertional pre-syncope.  He has no edema.  The likely culprit for his weight gain is the recent addition of Opsumit.      :       Plan:  1. Covid precautions reviewed: mask, symptoms, social distancing, COVID symptoms  2. Continue careful monitoring of diet, fluid and blood sugar  3. Continue exercise program  4. Call immediately if changing symptoms or deterioration in your condition  5. Recheck BMP BNP on Monday  6. Take an addition 20mgs furosemide tomorrow    \    Medications:  Current Outpatient Medications   Medication     atovaquone (MEPRON) 750 MG/5ML suspension     calcium carbonate (OS-PANFILO 500 MG [place]. [place]) 500 MG tablet     CLONAZEPAM PO     digoxin (LANOXIN) 125 MCG tablet     FLUCONAZOLE PO     furosemide (LASIX) 20 MG tablet     Pantoprazole Sodium (PROTONIX PO)     Penicillin V Potassium (PEN-VEE K OR)     prednisoLONE 5 MG tablet     predniSONE (DELTASONE) 5 MG tablet     rosuvastatin (CRESTOR) 5 MG tablet     ruxolitinib (JAKAFI) 5 MG TABS tablet CHEMO     selexipag (UPTRAVI) 1600 MCG tablet     Specialty Vitamins Products (MAGNESIUM PLUS PROTEIN) 133 MG tablet     tadalafil, PAH, (ADCIRCA) 20 MG TABS     Ursodiol (ACTIGALL PO)     VALACYCLOVIR HCL PO      VITAMIN D, CHOLECALCIFEROL, PO     No current facility-administered medications for this visit.        Review of symptoms:  Weight:   Temperature: no fever, chills, night sweats  HEENT with/without diplopia, loss of vision, difficulty swollowing difficulties  Chest: with/without shortness of breath, shortness of breath with exertion, cough, hemoptysis  Cardiac: with/without exertional shortness of breath, chest pain, palpitation, pre-syncope or syncope  Abdomen: nausea, emesis, melena, jaundice, hematochezia, diarrhea, pain  Skin: without rash or open lesions  Neuro: without dizziness, syncope, seizure, focal neurologic deficit,   Ortho: without joint pain, stiffness, new limitation of motion  Psychiatric: without depression, suicidal ideation,   : without frequency, urgency, pain hematuria    Physical Examination:    Alert, oriented, fluent speech, moves all extremities, JVP, abdomen without obvious ascites, peripheral edma, skin without rash    Imaging:  RV is no longer hypokinetic    Laboratories: reviewed and creatinine is stable.

## 2020-06-22 NOTE — NURSING NOTE
Refaxed BMP orders to (F) [phone]. Staff message sent to Verna to schedule patient for RHC. Chantel Erickson RN on 6/22/2020 at 2:56 PM

## 2020-06-23 ENCOUNTER — TRANSFERRED RECORDS (OUTPATIENT)
Dept: HEALTH INFORMATION MANAGEMENT | Facility: CLINIC | Age: 55
End: 2020-06-23

## 2020-06-25 DIAGNOSIS — I27.20 PULMONARY HYPERTENSION (H): ICD-10-CM

## 2020-06-25 DIAGNOSIS — I27.20 PULMONARY HTN (H): ICD-10-CM

## 2020-06-30 RX ORDER — PREDNISONE 2.5 MG/1
TABLET ORAL
Qty: 90 TABLET | Refills: 3 | Status: SHIPPED | OUTPATIENT
Start: 2020-06-30 | End: 2021-07-15

## 2020-06-30 RX ORDER — ROSUVASTATIN CALCIUM 5 MG/1
5 TABLET, COATED ORAL DAILY
Qty: 90 TABLET | Refills: 1 | Status: SHIPPED | OUTPATIENT
Start: 2020-06-30 | End: 2020-12-14

## 2020-06-30 NOTE — TELEPHONE ENCOUNTER
prednisone 2.5 mg tablet  Take 5 mg by mouth every other day (3 tablets = 7.5 mg daily).   Last Written Prescription Date:  5/15/2019  Last Fill Quantity: 45,   # refills: 3  Last Office Visit : 6/19/20  Future Office visit:  None    Routing refill request to provider for review/approval because:  Drug not on the [facility] or [facility] refill protocol or controlled substance  Rosuvastatin refilled per protocol LDL 8/27/2019

## 2020-07-17 DIAGNOSIS — Z11.59 ENCOUNTER FOR SCREENING FOR OTHER VIRAL DISEASES: Primary | ICD-10-CM

## 2020-08-03 ENCOUNTER — DOCUMENTATION ONLY (OUTPATIENT)
Dept: CARE COORDINATION | Facility: CLINIC | Age: 55
End: 2020-08-03

## 2020-08-05 ENCOUNTER — TELEPHONE (OUTPATIENT)
Dept: CARDIOLOGY | Facility: CLINIC | Age: 55
End: 2020-08-05

## 2020-08-05 NOTE — TELEPHONE ENCOUNTER
PA Initiation    Medication: Uptravi 1600mcg  Insurance Company: [facility] - Phone [phone] Fax [phone]  Pharmacy Filling the Rx: [facility] - [place], [place] - [address]  Start Date: 8/5/2020    *Prior authorization completed and submitted through CoverMyMeds for expedited review along with right heart catheterization report and clinic notes.

## 2020-08-06 NOTE — TELEPHONE ENCOUNTER
Prior Authorization Not Needed per Insurance    Medication: Uptravi 1600mcg - PA Not Needed  Insurance Company: [employer] - Phone [phone] Fax [phone]  Pharmacy Filling the Rx: [facility] - [place], [place] - [address]  Pharmacy Notified:  Yes  Patient Notified:  Yes  ----------------------------  Insurance: [facility]  BIN: [id number]  PCN: [id number]  ID#: [id number]  GRP#: N/[id number]    **Contacted [facility] to follow-up on the pt's prior authorization request. Spoke with Elsy who stated that the pt has been approved to receive the medication indefinitely, and was approved on 3/31/2020, PA#: [id number].     *Provided this information to [facility].    **Contacted the pt's wife, Danielle with regards to the approval. Danielle stated that they sorted the issue out yesterday with [facility] already. Danielle verbalized that she had no further questions to this regard.    No further follow-up completed and encounter closed.    Venra Nguyen  Clinic   Pulmonary Hypertension  [facility]  (P) [phone]

## 2020-08-07 DIAGNOSIS — I27.20 PULMONARY HYPERTENSION (H): ICD-10-CM

## 2020-08-11 RX ORDER — DIGOXIN 125 MCG
125 TABLET ORAL DAILY
Qty: 90 TABLET | Refills: 0 | Status: SHIPPED | OUTPATIENT
Start: 2020-08-11 | End: 2020-11-15

## 2020-08-12 ENCOUNTER — TELEPHONE (OUTPATIENT)
Dept: CARDIOLOGY | Facility: CLINIC | Age: 55
End: 2020-08-12

## 2020-08-12 DIAGNOSIS — I27.20 PULMONARY HYPERTENSION (H): Primary | ICD-10-CM

## 2020-08-12 NOTE — TELEPHONE ENCOUNTER
Patient will have digoxin draw with upcoming RHC in September 2020. Chantel Erickson RN on 8/12/2020 at 8:56 AM

## 2020-08-12 NOTE — TELEPHONE ENCOUNTER
Faxed COVID orders to [facility] at (f) [phone]. Chantel Erickson RN on 8/12/2020 at 7:59 AM    [employer] message sent to patient regarding pre-procedure instructions and timing of COVID test prior to procedure date. Chantel Erickson RN on 8/12/2020 at 8:05 AM    ----- Message from Verna Nguyen sent at 7/17/2020 12:07 PM CDT -----  Regarding: COVID Orders  Hi Roberto,    I forgot to tell you that he needs his Covid orders sent to Dr. Elder in [place]. Could you help?    Thank you,  -Verna

## 2020-09-01 ENCOUNTER — TELEPHONE (OUTPATIENT)
Dept: CARDIOLOGY | Facility: CLINIC | Age: 55
End: 2020-09-01

## 2020-09-01 NOTE — PROGRESS NOTES
.    September 2, 2020    Video visit: JORJE  Patient permission, yes  Patient location: home  Duration 40 minutes        1. Pulmonary hypertension  2. Bone marrow transplantation  3. Recent exertional syncope  4. Three drug therapy for PAH - PDE V, Uptravi and recent initiation of Opsumit  5. IL-6 elevated    Plan:    The patient is markedly functionally and clinically imporoved and will hold current course.  High cardiac output may be secondary to medications but will exclude other causes:  Check thiamine, TSH and IL^      The patient is markedly improved on multi-drug therapy.  He is able to ride the exercise bike x 60 minutes.  He has no exertional shortness of breath, PND, orthopnea, ankle edema, palpitation, pre-syncope or syncope.    Alert, oriented, fluent speech, no focal neuro deficits, no edema, normal gait and station, JVP 8,       Constitutional: weight loss, fever, chills, night sweats  HEENT: without visual changes, swallow difficulties  Pulmonary: without shortness of breath, cough, wheeze, hemoptysis  Cardiac: without chest pain, CORREA, PND, orthopnea, edema, palpitation, pre-syncope, syncope,  GI: without diarrhea, constipation, jaundice, melena, GERD, hematemesis  : without frequency, urgency, dysuria, hematuria  Skin: rash, bruise, open lesions  Neuro: without TIA, focal neurologic complaints, seizure, trauma  Ortho: without pain, swelling, mobility impairment  Endocrine: diabetes, thyroid, heat/cold intolerance, polyuria, polyphagia, change bowel habits.  Sleep:no JULISSA, periodic breathing, fatigue      Current Outpatient Medications   Medication     atovaquone (MEPRON) 750 MG/5ML suspension     calcium carbonate (OS-PANFILO 500 MG [place]. [place]) 500 MG tablet     CLONAZEPAM PO     digoxin (LANOXIN) 125 MCG tablet     FLUCONAZOLE PO     furosemide (LASIX) 20 MG tablet     Pantoprazole Sodium (PROTONIX PO)     Penicillin V Potassium (PEN-VEE K OR)     prednisoLONE 5 MG tablet     predniSONE (DELTASONE)  2.5 MG tablet     rosuvastatin (CRESTOR) 5 MG tablet     ruxolitinib (JAKAFI) 5 MG TABS tablet CHEMO     selexipag (UPTRAVI) 1600 MCG tablet     Specialty Vitamins Products (MAGNESIUM PLUS PROTEIN) 133 MG tablet     tadalafil, PAH, (ADCIRCA) 20 MG TABS     Ursodiol (ACTIGALL PO)     VALACYCLOVIR HCL PO     VITAMIN D, CHOLECALCIFEROL, PO     No current facility-administered medications for this visit.    Patient is also on Opsumit      Weight  2020  253  Wt Readings from Last 24 Encounters:   19 115.3 kg (254 lb 1.6 oz)   19 113.4 kg (250 lb)   19 115.5 kg (254 lb 9.6 oz)   05/15/19 117 kg (258 lb)   19 115.1 kg (253 lb 11.2 oz)   19 113.4 kg (250 lb)   10/10/18 117.9 kg (260 lb)   18 115.6 kg (254 lb 12.8 oz)   18 111.6 kg (246 lb)   18 114.3 kg (252 lb)   17 113.7 kg (250 lb 11.2 oz)   17 113.4 kg (250 lb)   17 115 kg (253 lb 9.6 oz)   17 115.3 kg (254 lb 1.6 oz)   17 117.3 kg (258 lb 9.6 oz)   17 122 kg (269 lb)   05/15/17 124.7 kg (275 lb)   17 125.1 kg (275 lb 11.2 oz)   17 129.5 kg (285 lb 8 oz)   16 133.8 kg (294 lb 15.6 oz)   08/26/15 133.8 kg (294 lb 15.6 oz)   08/13/15 132.5 kg (292 lb)   07/14/15 127 kg (280 lb)   07/14/15 131.7 kg (290 lb 5.5 oz)               Echocardiogram      2020      Name: BENEDICT SETES  MRN: [id number]  : 1965  Study Date: 2020 10:05 AM  Age: 55 yrs  Gender: Male  Patient Location: [facility]  Reason For Study: Pulmonary hypertension (H), SOB (shortness of breath)  Ordering Physician: PILI SEGURA  Referring Physician: PILI SEGURA  Performed By: Rosa Leal CS, T     BSA: 2.5 m2  Height: 77 in  Weight: 254 lb  BP: 125/56 mmHg  _____________________________________________________________________________  __        Procedure  Complete Portable Echo Adult.  _____________________________________________________________________________  __         Interpretation Summary  Left ventricular function, chamber size, wall motion, and wall thickness are  normal.The EF is 60-65%.  Mild right ventricular dilation is present. Global right ventricular function s normal.  IVC diameter <2.1 cm collapsing >50% with sniff suggests a normal RA pressure  of 3 mmHg.  Right ventricular systolic pressure is 42mmHg above the right atrial pressure.  No pericardial effusion is present.  Compared to prior, measured RVSP is lower, no other change.  _____________________________________________________________________________  __        Left Ventricle  Left ventricular function, chamber size, wall motion, and wall thickness are  normal.The EF is 60-65%. Left ventricular diastolic function is normal.  Abnormal non-specific septal motion is present.     Right Ventricle  Global right ventricular function is normal. Mild right ventricular dilation  is present.     Atria  Both atria appear normal.     Mitral Valve  The mitral valve is normal.        Aortic Valve  Aortic valve is normal in structure and function.     Tricuspid Valve  The tricuspid valve is normal. Trace tricuspid insufficiency is present. Right  ventricular systolic pressure is 42mmHg above the right atrial pressure.     Pulmonic Valve  The pulmonic valve is normal.     Vessels  Mild dilatation of the aorta is present. Sinuses of Valsalva 4.1 cm. Ascending  aorta 3.7 cm. IVC diameter <2.1 cm collapsing >50% with sniff suggests a  normal RA pressure of 3 mmHg.     Pericardium  No pericardial effusion is present.     _____________________________________________________________________________  __  MMode/2D Measurements & Calculations  IVSd: 0.92 cm  LVIDd: 5.5 cm  LVIDs: 3.4 cm  LVPWd: 1.2 cm  FS: 39.2 %     LV mass(C)d: 239.4 grams  LV mass(C)dI: 96.7 grams/m2  Ao root diam: 4.1 cm  asc Aorta Diam: 3.7 cm  LA Volume (BP): 79.0 ml  RWT: 0.45        Doppler Measurements & Calculations  MV E max nieves: 80.2 cm/sec  MV A max  nieves: 74.8 cm/sec  MV E/A: 1.1  MV dec time: 0.20 sec  PA acc time: 0.09 sec  PI end-d nieves: 113.1 cm/sec  TR max nieves: 300.7 cm/sec  TR max P.3 mmHg  E/E' av.4  Lateral E/e': 7.7  Medial E/e': 11.0                      Name: BENEDICT ESTES  Study Date: 2020  MRN: [id number]  : 1965  Gender: M  Account Number: [id number]  Reason for Study: Shortness of breath, Pulmonary artery hypertension  Patient Location: Adult Echo Lab  Height: 195.6cm  Weight: 117kg  Patient Header: BSA: 2.49 m2  Study Location: [facility]      Interp Summary LV Ejection Fraction = 62%.  Mild left ventricular hypertrophy.  Mildly decreased right ventricular systolic function.  Mildly enlarged right ventricular chamber size.  Normal left atrial chamber size.  Enlarged right atrial chamber size.  Mild tricuspid regurgitation by color Doppler.  There is severe pulmonary hypertension.  (Findings predict PA systolic pressure = 80mmHg).  Dilated aortic root measures 4.3 cm.  Diastolic parameters indicate normal LA pressure.  As compared to prior echo RV size appears improved.      Procedures The study performed was a(n) complete 2-D echo with M-Mode, color and spectral Doppler.  The study was performed by [facility].  The study quality was diagnostic.  The study was performed in the Echo Lab.     AO and PA Dilated aortic root measures 4.3 cm.     Aortic Valve No aortic regurgitation.  No hemodynamically significant valvular aortic stenosis by doppler.  The aortic valve structure is trileaflet.     IVC/PulmVeins IVC diameter < 2.1 cm and collapses >50% with a sniff suggests normal RA pressure of 3 mmHg.     Atria Normal left atrial chamber size.     Left Ventricle The LV EF is based on Biplane - Method of Discs.  Normal LV systolic function.  LV Ejection Fraction = 62%.  Diastolic parameters indicate normal LA pressure  Normal left ventricular chamber size.  Mild left ventricular hypertrophy.  Abnormal LV  septal wall motion - flattened in systole (consistent with RV pressure overload).     Mitral Valve Trace mitral regurgitation.  No mitral valve stenosis.     Pericardial Pleural Effusion No pericardial effusion.     Pulmonary Valve Trace pulmonic valvular regurgitation.  No pulmonic valve stenosis by doppler.  Pulmonic valve is not visualized.     Atria Enlarged right atrial chamber size.     Right Ventricle Mildly decreased right ventricular systolic function.  Mildly enlarged right ventricular chamber size.     Technical Comments Technically limited images due to lung interference.     Tricuspid Valve Mild tricuspid regurgitation by color Doppler.  There is severe pulmonary hypertension.  (Findings predict PA systolic pressure = 80mmHg).  No tricuspid stenosis.     MMODE 2D MEASUREMENTS CALCULATIONS IVSd: 1.2cm  IVSs: 1.9cm  LVIDd: 5.7cm  LVIDs: 2.6cm  LVPWd: 1.1cm  LVPWs: 2.0cm  Ao root diam: 4.3cm  Ao root area: 14.5cm^2  ACS: 2.6cm  LA dimension: 4.1cm  asc Aorta Diam: 4.0cm  LVOT diam: 2.7cm  LVOT area: 5.7cm^2  LVOT area(traced): 5.7cm^2  EDV(MOD-sp4): 124ml  ESV(MOD-sp4): 44ml  EF(MOD-sp4): 64.5%  LVLd ap2: 9.2cm  EDV(MOD-sp2): 149ml  ESV(MOD-sp2): 59ml  EF(MOD-sp2): 60.4%     Time Measurements Aortic R-R: 0.9sec  Aortic HR: 64BPM     DOPPLER MEASUREMENTS CALCULATIONS MV A dur: 0.15sec  MV E max nguyễn: 88.3cm/sec  MV A max nguyễn: 65.6cm/sec  MV E/A: 1.3  MV V2 max: 86.9cm/sec  MV max PG: 3.0mmHg  MV mean P.6mmHg  MV V2 VTI: 30.5cm  MVA(VTI): 3.3cm^2  MV dec time: 0.23sec  Ao V2 max: 129.5cm/sec  Ao max P.7mmHg  MYKE(V,A): 4.4cm^2  MYKE(V,D): 4.4cm^2  LV V1 max P.0mmHg  LV V1 mean P.5mmHg  LV V1 max: 100.3cm/sec  LV V1 VTI: 17.9cm  CO(LVOT): 6.5l/min  CI(LVOT): 2.6l/min/m^2  SV(LVOT): 101.4ml  TV V2 max: 55.9cm/sec  TV max P.3mmHg  PA V2 max: 60.2cm/sec  PA acc time: 0.06sec  TR max nguyễn: 439.8cm/sec  RVSP(TR): 80.4mmHg  RAP systole: 3mmHg  Pulm Sys Nguyễn: 38.2cm/sec  Pulm Pena Nguyễn:  43.6cm/sec  Pulm S/D: 0.9      Catherization    [facility] 9/2/2020    Pulmonary HTN   s/p Bone Marrow transplant     Post Procedure Diagnosis     Hyperdynamic cardiac output   Moderate PH; mPAP 31 mm Hg.       Conclusion       Right sided filling pressures are normal.    Left sided filling pressures are normal.    Mildly elevated PAP with normal PVR and compliance in the setting of high output state.    Hyperdynamic cardiac output level. Carter's cardiac output:10.1L/min; cardiac index 4L/min/m2.          Plan       Continued medical management and lifestyle modifications for cardiovascular risk factor optimizations.    Follow up with Dr. Quan.    Discharge today per protocol    Hemodynamics     Right Heart Pressures     Measured VO2 334 ml/min  Carter's cardiac output::10.1L/min; cardiac index 4L/min/m2.  Pulmonary vascular compliance - 4.8 ml/mmHg     Right sided filling pressures are normal.Left sided filling pressures are normal. Moderately elevated pulmonary artery hypertension.Hyperdynamic cardiac output level.    Pressures(don't display values >20,000) Phase: Baseline      Time  Systolic  Diastolic  Mean  A Wave  V Wave  EDP  Max dp/dt  HR    RA Pressures   8:45 AM    5 mmHg     9 mmHg     7 mmHg       77 bpm       RV Pressures   8:33 AM        624 mmHg/sec          8:46 AM  50 mmHg         7 mmHg      68 bpm       PA Pressures   8:47 AM  50 mmHg     20 mmHg     31 mmHg         80 bpm       PCW Pressures   8:47 AM    10 mmHg     15 mmHg     12 mmHg       70 bpm       Blood Flow Results Phase: Baseline      Time  Results  Indexed Values    QP   8:33 AM  10.2 L/min     4.12 L/min/m2       QS   8:33 AM  10.2 L/min     4.12 L/min/m2       Blood Oximetry Phase: Baseline      Time  Hb  SAT(%)  PO2  Content  PA Sat    PA   8:33 AM   72.7 %       72.7 %       Art   8:33 AM   95 %      13.95 mL/dL        Cardiac Output Phase: Baseline      Time  TDCO  TDCI  Carter C.O.  Carter C.I.  Carter HR    Cardiac Output Results   8:33 AM   10.46 L/min     4.23 L/min/m2     10.2 L/min     4.12 L/min/m2          8:49 AM  10.46 L/min           Resistance Results Phase: Baseline      Time  PVR  SVR  TPR  TVR  PVR/SVR  TPR/TVR    Resistance Results (Metric)   8:33 AM  164.71 dsc-5      243.14 dsc-5          Resistance Results (Wood)   8:33 AM  2.06 STEPHENS      3.04 STEPHENS          Stoke Volume Results Phase: Baseline      Time  RVSW  LVSW  RVSW-I  LVSW-I    Stroke Work Results   8:33 AM  45.07 gm*m                    [place] May 2020  RA  12/11  (7)   RV  90/0,  13      13:36:06  PW  18/18  (15)  PV    13:36:51  PA  93/35  (54)  PA    13:37:14

## 2020-09-02 ENCOUNTER — HOSPITAL ENCOUNTER (OUTPATIENT)
Facility: CLINIC | Age: 55
Discharge: HOME | End: 2020-09-02
Attending: INTERNAL MEDICINE | Admitting: INTERNAL MEDICINE
Payer: COMMERCIAL

## 2020-09-02 ENCOUNTER — APPOINTMENT (OUTPATIENT)
Dept: MEDSURG UNIT | Facility: CLINIC | Age: 55
End: 2020-09-02
Attending: INTERNAL MEDICINE
Payer: COMMERCIAL

## 2020-09-02 ENCOUNTER — VIRTUAL VISIT (OUTPATIENT)
Dept: CARDIOLOGY | Facility: CLINIC | Age: 55
End: 2020-09-02
Attending: INTERNAL MEDICINE
Payer: COMMERCIAL

## 2020-09-02 VITALS
DIASTOLIC BLOOD PRESSURE: 56 MMHG | HEART RATE: 63 BPM | TEMPERATURE: 98.6 F | RESPIRATION RATE: 18 BRPM | SYSTOLIC BLOOD PRESSURE: 125 MMHG | OXYGEN SATURATION: 97 %

## 2020-09-02 DIAGNOSIS — R06.02 SOB (SHORTNESS OF BREATH): ICD-10-CM

## 2020-09-02 DIAGNOSIS — I27.20 PULMONARY HYPERTENSION (H): ICD-10-CM

## 2020-09-02 DIAGNOSIS — I27.20 PULMONARY HYPERTENSION (H): Primary | ICD-10-CM

## 2020-09-02 LAB
ALBUMIN SERPL-MCNC: 3.7 G/DL (ref 3.4–5)
ALP SERPL-CCNC: 64 U/L (ref 40–150)
ALT SERPL W P-5'-P-CCNC: 24 U/L (ref 0–70)
ANION GAP SERPL CALCULATED.3IONS-SCNC: 6 MMOL/L (ref 3–14)
AST SERPL W P-5'-P-CCNC: 26 U/L (ref 0–45)
BILIRUB SERPL-MCNC: 0.4 MG/DL (ref 0.2–1.3)
BUN SERPL-MCNC: 18 MG/DL (ref 7–30)
CALCIUM SERPL-MCNC: 9.1 MG/DL (ref 8.5–10.1)
CHLORIDE SERPL-SCNC: 107 MMOL/L (ref 94–109)
CO2 SERPL-SCNC: 27 MMOL/L (ref 20–32)
CREAT SERPL-MCNC: 1.72 MG/DL (ref 0.66–1.25)
DIGOXIN SERPL-MCNC: 1.3 UG/L (ref 0.5–2)
ERYTHROCYTE [DISTWIDTH] IN BLOOD BY AUTOMATED COUNT: 20 % (ref 10–15)
GFR SERPL CREATININE-BSD FRML MDRD: 43 ML/MIN/{1.73_M2}
GLUCOSE SERPL-MCNC: 94 MG/DL (ref 70–99)
HCT VFR BLD AUTO: 32.9 % (ref 40–53)
HGB BLD-MCNC: 10.4 G/DL (ref 13.3–17.7)
MCH RBC QN AUTO: 27.4 PG (ref 26.5–33)
MCHC RBC AUTO-ENTMCNC: 31.6 G/DL (ref 31.5–36.5)
MCV RBC AUTO: 87 FL (ref 78–100)
NT-PROBNP SERPL-MCNC: 253 PG/ML (ref 0–125)
PLATELET # BLD AUTO: 436 10E9/L (ref 150–450)
POTASSIUM SERPL-SCNC: 3.7 MMOL/L (ref 3.4–5.3)
PROT SERPL-MCNC: 6.7 G/DL (ref 6.8–8.8)
RBC # BLD AUTO: 3.79 10E12/L (ref 4.4–5.9)
SODIUM SERPL-SCNC: 140 MMOL/L (ref 133–144)
WBC # BLD AUTO: 8.2 10E9/L (ref 4–11)

## 2020-09-02 PROCEDURE — 99215 OFFICE O/P EST HI 40 MIN: CPT | Mod: 95 | Performed by: INTERNAL MEDICINE

## 2020-09-02 PROCEDURE — 83880 ASSAY OF NATRIURETIC PEPTIDE: CPT | Performed by: INTERNAL MEDICINE

## 2020-09-02 PROCEDURE — 93451 RIGHT HEART CATH: CPT | Mod: 26 | Performed by: INTERNAL MEDICINE

## 2020-09-02 PROCEDURE — 25000125 ZZHC RX 250: Performed by: INTERNAL MEDICINE

## 2020-09-02 PROCEDURE — 80053 COMPREHEN METABOLIC PANEL: CPT | Performed by: INTERNAL MEDICINE

## 2020-09-02 PROCEDURE — 93306 TTE W/DOPPLER COMPLETE: CPT | Mod: 26 | Performed by: INTERNAL MEDICINE

## 2020-09-02 PROCEDURE — 80162 ASSAY OF DIGOXIN TOTAL: CPT | Performed by: INTERNAL MEDICINE

## 2020-09-02 PROCEDURE — 84439 ASSAY OF FREE THYROXINE: CPT | Mod: GZ | Performed by: INTERNAL MEDICINE

## 2020-09-02 PROCEDURE — 84443 ASSAY THYROID STIM HORMONE: CPT | Mod: GZ | Performed by: INTERNAL MEDICINE

## 2020-09-02 PROCEDURE — 85027 COMPLETE CBC AUTOMATED: CPT | Performed by: INTERNAL MEDICINE

## 2020-09-02 PROCEDURE — 36415 COLL VENOUS BLD VENIPUNCTURE: CPT | Performed by: INTERNAL MEDICINE

## 2020-09-02 PROCEDURE — 93306 TTE W/DOPPLER COMPLETE: CPT

## 2020-09-02 PROCEDURE — 27210794 ZZH OR GENERAL SUPPLY STERILE: Performed by: INTERNAL MEDICINE

## 2020-09-02 PROCEDURE — 93451 RIGHT HEART CATH: CPT | Performed by: INTERNAL MEDICINE

## 2020-09-02 PROCEDURE — 27210788 ZZH MANIFOLD CR3: Performed by: INTERNAL MEDICINE

## 2020-09-02 PROCEDURE — 40000166 ZZH STATISTIC PP CARE STAGE 1

## 2020-09-02 PROCEDURE — C1894 INTRO/SHEATH, NON-LASER: HCPCS | Performed by: INTERNAL MEDICINE

## 2020-09-02 RX ORDER — MACITENTAN 10 MG/1
TABLET, FILM COATED ORAL DAILY
COMMUNITY
Start: 2020-08-10 | End: 2021-04-21

## 2020-09-02 RX ORDER — LIDOCAINE 40 MG/G
CREAM TOPICAL
Status: COMPLETED | OUTPATIENT
Start: 2020-09-02 | End: 2020-09-02

## 2020-09-02 RX ADMIN — LIDOCAINE: 40 CREAM TOPICAL at 07:23

## 2020-09-02 NOTE — LETTER
9/2/2020      RE: Olivier Gómez  [address]  [place] [address]       Dear Colleague,    Thank you for the opportunity to participate in the care of your patient, Olivier Gómez, at the [facility] HEART [facility]. Please see a copy of my visit note below.    .    September 2, 2020    Video visit: FACETIME  Patient permission, yes  Patient location: home  Duration 40 minutes        1. Pulmonary hypertension  2. Bone marrow transplantation  3. Recent exertional syncope  4. Three drug therapy for PAH - PDE V, Uptravi and recent initiation of Opsumit  5. IL-6 elevated    Plan:    The patient is markedly functionally and clinically imporoved and will hold current course.  High cardiac output may be secondary to medications but will exclude other causes:  Check thiamine, TSH and IL^      The patient is markedly improved on multi-drug therapy.  He is able to ride the exercise bike x 60 minutes.  He has no exertional shortness of breath, PND, orthopnea, ankle edema, palpitation, pre-syncope or syncope.    Alert, oriented, fluent speech, no focal neuro deficits, no edema, normal gait and station, JVP 8,       Constitutional: weight loss, fever, chills, night sweats  HEENT: without visual changes, swallow difficulties  Pulmonary: without shortness of breath, cough, wheeze, hemoptysis  Cardiac: without chest pain, CORREA, PND, orthopnea, edema, palpitation, pre-syncope, syncope,  GI: without diarrhea, constipation, jaundice, melena, GERD, hematemesis  : without frequency, urgency, dysuria, hematuria  Skin: rash, bruise, open lesions  Neuro: without TIA, focal neurologic complaints, seizure, trauma  Ortho: without pain, swelling, mobility impairment  Endocrine: diabetes, thyroid, heat/cold intolerance, polyuria, polyphagia, change bowel habits.  Sleep:no JULISSA, periodic breathing, fatigue      Current Outpatient Medications   Medication     atovaquone (MEPRON) 750 MG/5ML  suspension     calcium carbonate (OS-PANFILO 500 MG [place]. CA) 500 MG tablet     CLONAZEPAM PO     digoxin (LANOXIN) 125 MCG tablet     FLUCONAZOLE PO     furosemide (LASIX) 20 MG tablet     Pantoprazole Sodium (PROTONIX PO)     Penicillin V Potassium (PEN-VEE K OR)     prednisoLONE 5 MG tablet     predniSONE (DELTASONE) 2.5 MG tablet     rosuvastatin (CRESTOR) 5 MG tablet     ruxolitinib (JAKAFI) 5 MG TABS tablet CHEMO     selexipag (UPTRAVI) 1600 MCG tablet     Specialty Vitamins Products (MAGNESIUM PLUS PROTEIN) 133 MG tablet     tadalafil, PAH, (ADCIRCA) 20 MG TABS     Ursodiol (ACTIGALL PO)     VALACYCLOVIR HCL PO     VITAMIN D, CHOLECALCIFEROL, PO     No current facility-administered medications for this visit.    Patient is also on Opsumit      Weight  2020  253  Wt Readings from Last 24 Encounters:   19 115.3 kg (254 lb 1.6 oz)   19 113.4 kg (250 lb)   19 115.5 kg (254 lb 9.6 oz)   05/15/19 117 kg (258 lb)   19 115.1 kg (253 lb 11.2 oz)   19 113.4 kg (250 lb)   10/10/18 117.9 kg (260 lb)   18 115.6 kg (254 lb 12.8 oz)   18 111.6 kg (246 lb)   18 114.3 kg (252 lb)   17 113.7 kg (250 lb 11.2 oz)   17 113.4 kg (250 lb)   17 115 kg (253 lb 9.6 oz)   17 115.3 kg (254 lb 1.6 oz)   17 117.3 kg (258 lb 9.6 oz)   17 122 kg (269 lb)   05/15/17 124.7 kg (275 lb)   17 125.1 kg (275 lb 11.2 oz)   17 129.5 kg (285 lb 8 oz)   16 133.8 kg (294 lb 15.6 oz)   08/26/15 133.8 kg (294 lb 15.6 oz)   08/13/15 132.5 kg (292 lb)   07/14/15 127 kg (280 lb)   07/14/15 131.7 kg (290 lb 5.5 oz)       Echocardiogram      2020      Name: BENEDICT ESTES  MRN: [id number]  : 1965  Study Date: 2020 10:05 AM  Age: 55 yrs  Gender: Male  Patient Location: [facility]  Reason For Study: Pulmonary hypertension (H), SOB (shortness of breath)  Ordering Physician: PILI SEGURA  Referring Physician: PILI SEGURA  By: Rosa Leal, RDCS, RVT     BSA: 2.5 m2  Height: 77 in  Weight: 254 lb  BP: 125/56 mmHg  _____________________________________________________________________________  __        Procedure  Complete Portable Echo Adult.  _____________________________________________________________________________  __        Interpretation Summary  Left ventricular function, chamber size, wall motion, and wall thickness are  normal.The EF is 60-65%.  Mild right ventricular dilation is present. Global right ventricular function s normal.  IVC diameter <2.1 cm collapsing >50% with sniff suggests a normal RA pressure  of 3 mmHg.  Right ventricular systolic pressure is 42mmHg above the right atrial pressure.  No pericardial effusion is present.  Compared to prior, measured RVSP is lower, no other change.  _____________________________________________________________________________  __        Left Ventricle  Left ventricular function, chamber size, wall motion, and wall thickness are  normal.The EF is 60-65%. Left ventricular diastolic function is normal.  Abnormal non-specific septal motion is present.     Right Ventricle  Global right ventricular function is normal. Mild right ventricular dilation  is present.     Atria  Both atria appear normal.     Mitral Valve  The mitral valve is normal.        Aortic Valve  Aortic valve is normal in structure and function.     Tricuspid Valve  The tricuspid valve is normal. Trace tricuspid insufficiency is present. Right  ventricular systolic pressure is 42mmHg above the right atrial pressure.     Pulmonic Valve  The pulmonic valve is normal.     Vessels  Mild dilatation of the aorta is present. Sinuses of Valsalva 4.1 cm. Ascending  aorta 3.7 cm. IVC diameter <2.1 cm collapsing >50% with sniff suggests a  normal RA pressure of 3 mmHg.     Pericardium  No pericardial effusion is present.     _____________________________________________________________________________  __  MMode/2D  Measurements & Calculations  IVSd: 0.92 cm  LVIDd: 5.5 cm  LVIDs: 3.4 cm  LVPWd: 1.2 cm  FS: 39.2 %     LV mass(C)d: 239.4 grams  LV mass(C)dI: 96.7 grams/m2  Ao root diam: 4.1 cm  asc Aorta Diam: 3.7 cm  LA Volume (BP): 79.0 ml  RWT: 0.45        Doppler Measurements & Calculations  MV E max nieves: 80.2 cm/sec  MV A max nieves: 74.8 cm/sec  MV E/A: 1.1  MV dec time: 0.20 sec  PA acc time: 0.09 sec  PI end-d nieves: 113.1 cm/sec  TR max nieves: 300.7 cm/sec  TR max P.3 mmHg  E/E' av.4  Lateral E/e': 7.7  Medial E/e': 11.0      Name: BENEDICT ESTES  Study Date: 2020  MRN: [id number]  : 1965  Gender: M  Account Number: [id number]  Reason for Study: Shortness of breath, Pulmonary artery hypertension  Patient Location: Adult Echo Lab  Height: 195.6cm  Weight: 117kg  Patient Header: BSA: 2.49 m2  Study Location: [facility]      Interp Summary LV Ejection Fraction = 62%.  Mild left ventricular hypertrophy.  Mildly decreased right ventricular systolic function.  Mildly enlarged right ventricular chamber size.  Normal left atrial chamber size.  Enlarged right atrial chamber size.  Mild tricuspid regurgitation by color Doppler.  There is severe pulmonary hypertension.  (Findings predict PA systolic pressure = 80mmHg).  Dilated aortic root measures 4.3 cm.  Diastolic parameters indicate normal LA pressure.  As compared to prior echo RV size appears improved.      Procedures The study performed was a(n) complete 2-D echo with M-Mode, color and spectral Doppler.  The study was performed by [facility].  The study quality was diagnostic.  The study was performed in the Echo Lab.     AO and PA Dilated aortic root measures 4.3 cm.     Aortic Valve No aortic regurgitation.  No hemodynamically significant valvular aortic stenosis by doppler.  The aortic valve structure is trileaflet.     IVC/PulmVeins IVC diameter < 2.1 cm and collapses >50% with a sniff suggests normal RA pressure of 3 mmHg.      Atria Normal left atrial chamber size.     Left Ventricle The LV EF is based on Biplane - Method of Discs.  Normal LV systolic function.  LV Ejection Fraction = 62%.  Diastolic parameters indicate normal LA pressure  Normal left ventricular chamber size.  Mild left ventricular hypertrophy.  Abnormal LV septal wall motion - flattened in systole (consistent with RV pressure overload).     Mitral Valve Trace mitral regurgitation.  No mitral valve stenosis.     Pericardial Pleural Effusion No pericardial effusion.     Pulmonary Valve Trace pulmonic valvular regurgitation.  No pulmonic valve stenosis by doppler.  Pulmonic valve is not visualized.     Atria Enlarged right atrial chamber size.     Right Ventricle Mildly decreased right ventricular systolic function.  Mildly enlarged right ventricular chamber size.     Technical Comments Technically limited images due to lung interference.     Tricuspid Valve Mild tricuspid regurgitation by color Doppler.  There is severe pulmonary hypertension.  (Findings predict PA systolic pressure = 80mmHg).  No tricuspid stenosis.     MMODE 2D MEASUREMENTS CALCULATIONS IVSd: 1.2cm  IVSs: 1.9cm  LVIDd: 5.7cm  LVIDs: 2.6cm  LVPWd: 1.1cm  LVPWs: 2.0cm  Ao root diam: 4.3cm  Ao root area: 14.5cm^2  ACS: 2.6cm  LA dimension: 4.1cm  asc Aorta Diam: 4.0cm  LVOT diam: 2.7cm  LVOT area: 5.7cm^2  LVOT area(traced): 5.7cm^2  EDV(MOD-sp4): 124ml  ESV(MOD-sp4): 44ml  EF(MOD-sp4): 64.5%  LVLd ap2: 9.2cm  EDV(MOD-sp2): 149ml  ESV(MOD-sp2): 59ml  EF(MOD-sp2): 60.4%     Time Measurements Aortic R-R: 0.9sec  Aortic HR: 64BPM     DOPPLER MEASUREMENTS CALCULATIONS MV A dur: 0.15sec  MV E max nieves: 88.3cm/sec  MV A max nieves: 65.6cm/sec  MV E/A: 1.3  MV V2 max: 86.9cm/sec  MV max PG: 3.0mmHg  MV mean P.6mmHg  MV V2 VTI: 30.5cm  MVA(VTI): 3.3cm^2  MV dec time: 0.23sec  Ao V2 max: 129.5cm/sec  Ao max P.7mmHg  MYKE(V,A): 4.4cm^2  MYKE(V,D): 4.4cm^2  LV V1 max P.0mmHg  LV V1 mean P.5mmHg  LV V1  max: 100.3cm/sec  LV V1 VTI: 17.9cm  CO(LVOT): 6.5l/min  CI(LVOT): 2.6l/min/m^2  SV(LVOT): 101.4ml  TV V2 max: 55.9cm/sec  TV max P.3mmHg  PA V2 max: 60.2cm/sec  PA acc time: 0.06sec  TR max nguyễn: 439.8cm/sec  RVSP(TR): 80.4mmHg  RAP systole: 3mmHg  Pulm Sys Nguyễn: 38.2cm/sec  Pulm Pena Nguyễn: 43.6cm/sec  Pulm S/D: 0.9      Catherization    [facility] 2020    Pulmonary HTN   s/p Bone Marrow transplant     Post Procedure Diagnosis     Hyperdynamic cardiac output   Moderate PH; mPAP 31 mm Hg.       Conclusion       Right sided filling pressures are normal.    Left sided filling pressures are normal.    Mildly elevated PAP with normal PVR and compliance in the setting of high output state.    Hyperdynamic cardiac output level. Carter's cardiac output:10.1L/min; cardiac index 4L/min/m2.          Plan       Continued medical management and lifestyle modifications for cardiovascular risk factor optimizations.    Follow up with Dr. Quan.    Discharge today per protocol    Hemodynamics     Right Heart Pressures     Measured VO2 334 ml/min  Carter's cardiac output::10.1L/min; cardiac index 4L/min/m2.  Pulmonary vascular compliance - 4.8 ml/mmHg     Right sided filling pressures are normal.Left sided filling pressures are normal. Moderately elevated pulmonary artery hypertension.Hyperdynamic cardiac output level.    Pressures(don't display values >20,000) Phase: Baseline      Time  Systolic  Diastolic  Mean  A Wave  V Wave  EDP  Max dp/dt  HR    RA Pressures   8:45 AM    5 mmHg     9 mmHg     7 mmHg       77 bpm       RV Pressures   8:33 AM        624 mmHg/sec          8:46 AM  50 mmHg         7 mmHg      68 bpm       PA Pressures   8:47 AM  50 mmHg     20 mmHg     31 mmHg         80 bpm       PCW Pressures   8:47 AM    10 mmHg     15 mmHg     12 mmHg       70 bpm       Blood Flow Results Phase: Baseline      Time  Results  Indexed Values    QP   8:33 AM  10.2 L/min     4.12 L/min/m2       QS   8:33 AM  10.2 L/min     4.12  "L/min/m2       Blood Oximetry Phase: Baseline      Time  Hb  SAT(%)  PO2  Content  PA Sat    PA   8:33 AM   72.7 %       72.7 %       Art   8:33 AM   95 %      13.95 mL/dL        Cardiac Output Phase: Baseline      Time  TDCO  TDCI  Carter C.O.  Carter C.I.  Carter HR    Cardiac Output Results   8:33 AM  10.46 L/min     4.23 L/min/m2     10.2 L/min     4.12 L/min/m2          8:49 AM  10.46 L/min           Resistance Results Phase: Baseline      Time  PVR  SVR  TPR  TVR  PVR/SVR  TPR/TVR    Resistance Results (Metric)   8:33 AM  164.71 dsc-5      243.14 dsc-5          Resistance Results (Wood)   8:33 AM  2.06 STEPHENS      3.04 STEPHENS          Stoke Volume Results Phase: Baseline      Time  RVSW  LVSW  RVSW-I  LVSW-I    Stroke Work Results   8:33 AM  45.07 gm*m              [place] May 2020  RA  12/11  (7)   RV  90/0,  13      13:36:06  PW  18/18  (15)  PV    13:36:51  PA  93/35  (54)  PA    13:37:14      Olivier Gómez is a 55 year old male who is being evaluated via a billable video visit.      The patient has been notified of following:     \"This video visit will be conducted via a call between you and your physician/provider. We have found that certain health care needs can be provided without the need for an in-person physical exam.  This service lets us provide the care you need with a video conversation.  If a prescription is necessary we can send it directly to your pharmacy.  If lab work is needed we can place an order for that and you can then stop by our lab to have the test done at a later time.    Video visits are billed at different rates depending on your insurance coverage.  Please reach out to your insurance provider with any questions.    If during the course of the call the physician/provider feels a video visit is not appropriate, you will not be charged for this service.\"    Patient has given verbal consent for Video visit? Yes  How would you like to obtain your AVS? MyChart   If you are dropped from the video " "visit, the video invite should be resent to: Text to cell phone: [phone]  Will anyone else be joining your video visit? No      Vitals - Patient Reported  Systolic (Patient Reported): 105  Diastolic (Patient Reported): 59  Weight (Patient Reported): 120.2 kg (265 lb)  Height (Patient Reported): 195.6 cm (6' 5\")  BMI (Based on Pt Reported Ht/Wt): 31.42  Pain Score: No Pain (0)      Please do not hesitate to contact me if you have any questions/concerns.     Sincerely,     Dk Quan MD    "

## 2020-09-02 NOTE — IP AVS SNAPSHOT
[address] [address]                                    After Visit Summary   9/2/2020    Olivier Gómez    MRN: [id number]           After Visit Summary Signature Page    I have received my discharge instructions, and my questions have been answered. I have discussed any challenges I see with this plan with the nurse or doctor.    ..........................................................................................................................................  Patient/Patient Representative Signature      ..........................................................................................................................................  Patient Representative Print Name and Relationship to Patient    ..................................................               ................................................  Date                                   Time    ..........................................................................................................................................  Reviewed by Signature/Title    ...................................................              ..............................................  Date                                               Time          22EPIC Rev 08/18

## 2020-09-02 NOTE — PROGRESS NOTES
Pt arrived on 2a post RHC. VSS Ra. Dressing c/d/i. No pain. Pt declines food at this time. Echo to be done at bedside.

## 2020-09-02 NOTE — PRE-PROCEDURE
[facility]   Interventional Cardiology        Consenting/Education for Right Heart Catheterization     Patient understands as a part of routine surveillance for pulmonary hypertension, we would like to perform a right heart catheterization.  This procedure will be performed by Dr. Negrete    Patient understands during the portion for right heart catheterization a fine tube (catheter) is put into the vein of the groin/neck.  It is carefully passed along until it reaches the heart and then goes up into the blood vessels of the lungs. This is done to measure a variety of pressures in your heart and can tell us how well the heart is filling and emptying, as well as monitor fluid status.    Patient also understands risks and complications of the procedure which include, but are not limited to bruising/swelling around the incision site, infection, bleeding, or allergic reaction to local anesthetic.    Patient verbalized understanding of risks and benefits of the right heart catheterization and has elected to proceed with the procedure.     PILAR Han, CNP  [facility] Interventional Cardiology  [phone]

## 2020-09-02 NOTE — PROGRESS NOTES
"Olivier Gómez is a 55 year old male who is being evaluated via a billable video visit.      The patient has been notified of following:     \"This video visit will be conducted via a call between you and your physician/provider. We have found that certain health care needs can be provided without the need for an in-person physical exam.  This service lets us provide the care you need with a video conversation.  If a prescription is necessary we can send it directly to your pharmacy.  If lab work is needed we can place an order for that and you can then stop by our lab to have the test done at a later time.    Video visits are billed at different rates depending on your insurance coverage.  Please reach out to your insurance provider with any questions.    If during the course of the call the physician/provider feels a video visit is not appropriate, you will not be charged for this service.\"    Patient has given verbal consent for Video visit? Yes  How would you like to obtain your AVS? MyChart   If you are dropped from the video visit, the video invite should be resent to: Text to cell phone: [phone]  Will anyone else be joining your video visit? No      Vitals - Patient Reported  Systolic (Patient Reported): 105  Diastolic (Patient Reported): 59  Weight (Patient Reported): 120.2 kg (265 lb)  Height (Patient Reported): 195.6 cm (6' 5\")  BMI (Based on Pt Reported Ht/Wt): 31.42  Pain Score: No Pain (0)       "

## 2020-09-02 NOTE — IP AVS SNAPSHOT
MRN:[id number]                      After Visit Summary   9/2/2020    Olivier Gómez    MRN: [id number]           Visit Information        Department      9/2/2020  6:37 AM [address] [facility]          Review of your medicines      UNREVIEWED medicines. Ask your doctor about these medicines       Dose / Directions   ACTIGALL PO      Dose:  300 mg  Take 300 mg by mouth 2 times daily  Refills:  0     atovaquone 750 MG/5ML suspension  Commonly known as:  MEPRON      Dose:  1,500 mg  Take 1,500 mg by mouth daily  Refills:  0     calcium carbonate 500 MG tablet  Commonly known as:  OS-PANFILO      Dose:  600 mg  Take 600 mg by mouth 2 times daily  Refills:  0     CLONAZEPAM PO      Dose:  1 mg  Take 1 mg by mouth At Bedtime  Refills:  0     digoxin 125 MCG tablet  Commonly known as:  LANOXIN  Used for:  Pulmonary hypertension (H)      Dose:  125 mcg  Take 1 tablet (125 mcg) by mouth daily  Quantity:  90 tablet  Refills:  0     FLUCONAZOLE PO      Dose:  200 mg  Take 200 mg by mouth daily  Refills:  0     furosemide 20 MG tablet  Commonly known as:  LASIX  Used for:  Pulmonary hypertension (H)      Dose:  20 mg  Take 1 tablet (20 mg) by mouth daily  Quantity:  90 tablet  Refills:  3     magnesium plus protein 133 MG tablet      Dose:  266 mg  Take 266 mg by mouth daily (2 tablets)  Refills:  0     PEN-VEE K OR      Dose:  500 mg  Take 500 mg by mouth 2 times daily  Refills:  0     prednisoLONE 5 MG tablet      Dose:  5 mg  Take 5 mg by mouth daily  Refills:  0     predniSONE 2.5 MG tablet  Commonly known as:  DELTASONE  Used for:  Pulmonary hypertension (H)      Take two tablets (5 mg) by mouth every other day.  Quantity:  90 tablet  Refills:  3     PROTONIX PO      Dose:  40 mg  Take 40 mg by mouth daily  Refills:  0     rosuvastatin 5 MG tablet  Commonly known as:  CRESTOR  Used for:  Pulmonary HTN (H)      Dose:  5 mg  Take 1 tablet (5 mg) by mouth daily  Quantity:  90 tablet  Refills:  1     ruxolitinib  5 MG Tabs tablet  Commonly known as:  Jakafi  Used for:  Pericardial effusion, Pulmonary hypertension (H), Wndoi-pkftgq-dfps disease (H)      Dose:  5 mg  Take 1 tablet (5 mg) by mouth 2 times daily  Quantity:  60 tablet  Refills:  0     selexipag 1600 MCG tablet  Commonly known as:  UPTRAVI  Used for:  Pulmonary hypertension (H)      Dose:  1,600 mcg  Take 1 tablet (1,600 mcg) by mouth every 12 hours  Quantity:  60 tablet  Refills:  11     tadalafil (PAH) 20 MG Tabs  Commonly known as:  Adcirca  Used for:  Pulmonary hypertension (H)      Dose:  40 mg  Take 2 tablets (40 mg) by mouth daily  Quantity:  60 tablet  Refills:  11     VALACYCLOVIR HCL PO      Dose:  500 mg  Take 500 mg by mouth every other day  Refills:  0     VITAMIN D (CHOLECALCIFEROL) PO      Dose:  2,000 Units  Take 2,000 Units by mouth daily  Refills:  0              Protect others around you: Learn how to safely use, store and throw away your medicines at www.disposemymeds.org.       Follow-ups after your visit       Your next 10 appointments already scheduled    Sep 02, 2020  Procedure with Nikkie Lundy MD  [facility] ([facility]) [address] [place] [id number] [phone]   The [facility] is located on the corner of [facility] and [facility] on the [facility]. It is easily accessible from virtually any point in the [facility] area, via [address] and [address].   Sep 02, 2020  2:30 PM CDT  Echo Complete with UURSULAR2  [facility] ([facility]) [address] [facility] [id number] [phone]   1. Please bring or wear a comfortable two-piece outfit.  2. You may eat, drink and take your normal medicines.  3. Please do not apply perfumes or lotions on the day of your exam.   4. For any questions that cannot be answered, please contact  the ordering physician     Sep 02, 2020  4:00 PM CDT  (Arrive by 3:45 PM)  Video Visit with Dk Quan MD  [facility] [address] [address] [phone]   [facility]  Note: this is not an onsite visit; there is no need to come to the facility.  Please have a list of all current medications available for appointment.         Care Instructions       Further instructions from your care team       [facility]                        Interventional Cardiology  Discharge Instructions   Post Right Heart Cath      AFTER YOU GO HOME:    DO drink plenty of fluids    DO resume your regular diet and medications unless otherwise instructed by your Primary Physician    Do Not scrub the procedure site vigorously    No lotion or powder to the puncture site for 3 days    CALL YOUR PRIMARY PHYSICIAN IF: You may resume all normal activity.  Monitor neck site for bleeding, swelling, or voice changes. If you notice bleeding or swelling immediately apply pressure to the site and call number below to speak with Cardiology Fellow.  If you experience any changes in your breathing you should call your doctor immediately or come to the closest Emergency Department.  Do not drive yourself.    ADDITIONAL INSTRUCTIONS: Medications: You are to resume all home medications including anticoagulation therapy unless otherwise advised by your primary cardiologist or nurse coordinator.    Follow Up: Per your primary cardiology team    If you have any questions or concerns regarding your procedure site please call [phone] at anytime and ask for Cardiology Fellow on call.  They are available 24 hours a day.  You may also contact the Cardiology Clinic after hours number at [phone].                                                       Telephone Numbers [phone] Monday-Friday 8:00 am to 4:30 pm    [phone] After  4:30 pm Monday-Friday, Weekends & Holidays  Ask for Interventional Cardiologist on call. Someone is on call 24 hours/day   [facility] toll free number [phone] Monday-Friday 8:00 am to 4:30 pm   [facility] Emergency Dept [phone]                   Additional Information About Your Visit       MyChart Information    [facility] gives you secure access to your electronic health record. If you see a primary care provider, you can also send messages to your care team and make appointments. If you have questions, please call your primary care clinic.  If you do not have a primary care provider, please call [phone] and they will assist you.       Care EveryWhere ID    This is your Care EveryWhere ID. This could be used by other organizations to access your [facility] medical records  [id number]       Your Vitals Were  Most recent update: 9/2/2020  7:14 AM    Blood Pressure   103/55 (BP Location: Right arm)    Pulse   74    Temperature   98.6  F (37  C) (Oral)    Respirations   16    Pulse Oximetry   99%          Primary Care Provider Office Phone # Fax #    Adelso Delgado [phone]      Equal Access to Services    [facility] AH: Hadii aad ku hadasho Soomaali, waaxda luqadaha, qaybta kaalmada adeegyada, maría loomisn michael peterson . So [facility] [phone].    ATENCIÓN: Si habla español, tiene a garza disposición servicios gratuitos de asistencia lingüística. Llame al [phone].    We comply with applicable federal and state civil rights laws, including the [place] Human Rights Act. We do not discriminate on the basis of race, color, creed, Christianity, national origin, marital status, age, disability, sex, sexual orientation, or gender identity.       Thank you!    Thank you for choosing [facility] for your care. Our goal is always to provide you with excellent care. Hearing back from our patients is one way we can continue to improve our services. Please take a few minutes to complete the written  survey that you may receive in the mail after you visit with us. Thank you!            Medication List      ASK your doctor about these medications          Morning Afternoon Evening Bedtime As Needed    ACTIGALL PO  INSTRUCTIONS:  Take 300 mg by mouth 2 times daily                     atovaquone 750 MG/5ML suspension  Also known as:  MEPRON  INSTRUCTIONS:  Take 1,500 mg by mouth daily                     calcium carbonate 500 MG tablet  Also known as:  OS-PANFILO  INSTRUCTIONS:  Take 600 mg by mouth 2 times daily                     CLONAZEPAM PO  INSTRUCTIONS:  Take 1 mg by mouth At Bedtime                     digoxin 125 MCG tablet  Also known as:  LANOXIN  INSTRUCTIONS:  Take 1 tablet (125 mcg) by mouth daily                     FLUCONAZOLE PO  INSTRUCTIONS:  Take 200 mg by mouth daily                     furosemide 20 MG tablet  Also known as:  LASIX  INSTRUCTIONS:  Take 1 tablet (20 mg) by mouth daily  Doctor's comments:  This prescription was filled on 12/3/2019. Any refills authorized will be placed on file.                     magnesium plus protein 133 MG tablet  INSTRUCTIONS:  Take 266 mg by mouth daily (2 tablets)                     PEN-VEE K OR  INSTRUCTIONS:  Take 500 mg by mouth 2 times daily                     prednisoLONE 5 MG tablet  INSTRUCTIONS:  Take 5 mg by mouth daily                     predniSONE 2.5 MG tablet  Also known as:  DELTASONE  INSTRUCTIONS:  Take two tablets (5 mg) by mouth every other day.                     PROTONIX PO  INSTRUCTIONS:  Take 40 mg by mouth daily                     rosuvastatin 5 MG tablet  Also known as:  CRESTOR  INSTRUCTIONS:  Take 1 tablet (5 mg) by mouth daily                     ruxolitinib 5 MG Tabs tablet  Also known as:  Jakafi  INSTRUCTIONS:  Take 1 tablet (5 mg) by mouth 2 times daily                     selexipag 1600 MCG tablet  Also known as:  UPTRAVI  INSTRUCTIONS:  Take 1 tablet (1,600 mcg) by mouth every 12 hours  Doctor's comments:  Specialty  Pharmacy - [facility]                     tadalafil (PAH) 20 MG Tabs  Also known as:  Adcirca  INSTRUCTIONS:  Take 2 tablets (40 mg) by mouth daily                     VALACYCLOVIR HCL PO  INSTRUCTIONS:  Take 500 mg by mouth every other day                     VITAMIN D (CHOLECALCIFEROL) PO  INSTRUCTIONS:  Take 2,000 Units by mouth daily

## 2020-09-03 DIAGNOSIS — R06.02 SOB (SHORTNESS OF BREATH): ICD-10-CM

## 2020-09-03 DIAGNOSIS — I27.20 PULMONARY HYPERTENSION (H): Primary | ICD-10-CM

## 2020-09-03 LAB
T4 FREE SERPL-MCNC: 1.09 NG/DL (ref 0.76–1.46)
TSH SERPL DL<=0.005 MIU/L-ACNC: 4.98 MU/L (ref 0.4–4)

## 2020-09-15 DIAGNOSIS — I27.20 PULMONARY HYPERTENSION (H): ICD-10-CM

## 2020-09-17 RX ORDER — DIGOXIN 125 MCG
125 TABLET ORAL DAILY
Qty: 90 TABLET | Refills: 0 | OUTPATIENT
Start: 2020-09-17

## 2020-11-11 DIAGNOSIS — I27.20 PULMONARY HYPERTENSION (H): ICD-10-CM

## 2020-11-15 NOTE — TELEPHONE ENCOUNTER
digoxin 125 mcg (0.125 mg) tablet      Last Written Prescription Date:  8-11-20  Last Fill Quantity: 90,   # refills: 0  Last Office Visit : 9-2-20  Future Office visit:  none    Routing refill request to provider for review/approval because:  Associated diagnosis not on protocol             Pulmonary hypertension (H)  Abnormal lab;  OUTSIDE 10-27-20, not order/reviewed by Card  Creatinine 0.50 - 1.30 mg/dL 1.80High         furosemide 20 mg tablet  Last Written Prescription Date:  12-4-19  Last Fill Quantity: 90,   # refills: 3      Routing refill request to provider for review/approval because:  Associated diagnosis not on protocol             Pulmonary hypertension (H)  Abnormal lab;  OUTSIDE 10-27-20, not order/reviewed by Card  Creatinine 0.50 - 1.30 mg/dL 1.80High

## 2020-11-16 RX ORDER — DIGOXIN 125 MCG
125 TABLET ORAL DAILY
Qty: 90 TABLET | Refills: 3 | Status: SHIPPED | OUTPATIENT
Start: 2020-11-16 | End: 2022-01-25

## 2020-11-16 RX ORDER — FUROSEMIDE 20 MG
20 TABLET ORAL DAILY
Qty: 90 TABLET | Refills: 3 | Status: SHIPPED | OUTPATIENT
Start: 2020-11-16 | End: 2021-06-18

## 2020-11-16 NOTE — NURSING NOTE
"Orders placed for March F/U and patient marked \"ready for checkout.\" Chantel Erickson RN on 11/16/2020 at 12:40 PM    "

## 2020-12-10 DIAGNOSIS — I27.20 PULMONARY HTN (H): ICD-10-CM

## 2020-12-10 DIAGNOSIS — E78.5 DYSLIPIDEMIA: Primary | ICD-10-CM

## 2020-12-14 NOTE — TELEPHONE ENCOUNTER
rosuvastatin (CRESTOR) 5 MG tablet   Take 1 tablet (5 mg) by mouth daily      Last Written Prescription Date:  6/30/20  Last Fill Quantity: 90,   # refills: 1  Last Office Visit : 9/2/20  Future Office visit:  none    Routing refill request to provider for review/approval because:  Overdue LDL and abnormal creatinine (outside lab 10/27/20)  Creatinine 0.50 - 1.30 mg/dL 1.80High        Clinic notified.

## 2020-12-15 RX ORDER — ROSUVASTATIN CALCIUM 5 MG/1
5 TABLET, COATED ORAL DAILY
Qty: 90 TABLET | Refills: 0 | Status: SHIPPED | OUTPATIENT
Start: 2020-12-15 | End: 2021-03-12

## 2020-12-15 NOTE — TELEPHONE ENCOUNTER
Medication Refill double check:    Last virtual visit was on 9/2/20 with .    Follow up was recommended for March '21.    Any additional encounters with changes to requested med? no    Authorizing provider is: Dr. Quan    Limited refill was approved.     Additional orders/notes:   patient is VERY overdue for Lipid panel.  Order placed.    Ana Ashraf RN on 12/15/2020 at 9:01 AM

## 2020-12-17 ENCOUNTER — TELEPHONE (OUTPATIENT)
Dept: CARDIOLOGY | Facility: CLINIC | Age: 55
End: 2020-12-17

## 2021-01-15 ENCOUNTER — HEALTH MAINTENANCE LETTER (OUTPATIENT)
Age: 56
End: 2021-01-15

## 2021-01-25 ENCOUNTER — OFFICE VISIT (OUTPATIENT)
Dept: CARDIOLOGY | Facility: CLINIC | Age: 56
End: 2021-01-25
Payer: COMMERCIAL

## 2021-01-25 DIAGNOSIS — I27.20 PULMONARY HTN (H): Primary | ICD-10-CM

## 2021-01-25 DIAGNOSIS — N18.4 CKD (CHRONIC KIDNEY DISEASE) STAGE 4, GFR 15-29 ML/MIN (H): ICD-10-CM

## 2021-01-25 PROCEDURE — 99214 OFFICE O/P EST MOD 30 MIN: CPT | Mod: 95 | Performed by: INTERNAL MEDICINE

## 2021-01-25 NOTE — PROGRESS NOTES
Video visit: FACETIME  Patient permission, yes  Patient location: home  Duration 30 minutes 11:30-prisca Elder M.D.  Adelso Delgado M.D.    Suggest:  1. Early COVID vaccine (bone marrow transplant, pulmonary hypetension, immunosuppression, elevate BMI)  2. Echocardiogram with doppler in March  3. March Laboratories: CBC, CMP, BNP  4. COVID precautions  5. Videovisit March after echo and laboratories           1. Pulmonary hypertension  2. Bone marrow transplantation  3. Remote exertional syncope  4. Three drug therapy for PAH - PDE V, Uptravi and recent initiation of Opsumit  5. IL-6 elevated  6. DELLA-Stat inhibitor    The patient returns for follow-up of PAH.  There is no interim history of chest pain, tightness, paroxysmal nocturnal dyspnea, orthopnea, peripheral edema, palpitation, pre-syncope, syncope, .  Exercise tolerance is stable.  The patient is attempting to exercise regularly and following a sodium restricted, calorically appropriate diet.  Medications are reviewed and the patient is taking medications as prescribed.  The patient is generally sleeping well.     Constitutional: weight loss, fever, chills, night sweats  HEENT: without visual changes, swallow difficulties  Pulmonary: without shortness of breath, cough, wheeze, hemoptysis  Cardiac: without chest pain, CORREA, PND, orthopnea, edema, palpitation, pre-syncope, syncope,  GI: without diarrhea, constipation, jaundice, melena, GERD, hematemesis  : without frequency, urgency, dysuria, hematuria  Skin: rash, bruise, open lesions  Neuro: without TIA, focal neurologic complaints, seizure, trauma  Ortho: without pain, swelling, mobility impairment  Endocrine: diabetes, thyroid, heat/cold intolerance, polyuria, polyphagia, change bowel habits.  Sleep:no JULISSA, periodic breathing, fatigue  Other: 45 minute bicycle training per day    Current Outpatient Medications   Medication     atovaquone (MEPRON) 750 MG/5ML suspension     calcium carbonate  (OS-PANFILO 500 MG [place]. [place]) 500 MG tablet     CLONAZEPAM PO     digoxin (LANOXIN) 125 MCG tablet     FLUCONAZOLE PO     furosemide (LASIX) 20 MG tablet     OPSUMIT 10 MG tablet     Pantoprazole Sodium (PROTONIX PO)     Penicillin V Potassium (PEN-VEE K OR)     prednisoLONE 5 MG tablet     predniSONE (DELTASONE) 2.5 MG tablet     rosuvastatin (CRESTOR) 5 MG tablet     ruxolitinib (JAKAFI) 5 MG TABS tablet CHEMO     selexipag (UPTRAVI) 1600 MCG tablet     Specialty Vitamins Products (MAGNESIUM PLUS PROTEIN) 133 MG tablet     tadalafil, PAH, (ADCIRCA) 20 MG TABS     Ursodiol (ACTIGALL PO)     VALACYCLOVIR HCL PO     VITAMIN D, CHOLECALCIFEROL, PO     No current facility-administered medications for this visit.        Name: BENEDICT ESTES  MRN: [id number]  : 1965  Study Date: 2020 10:05 AM  Age: 55 yrs  Gender: Male  Patient Location: [facility]  Reason For Study: Pulmonary hypertension (H), SOB (shortness of breath)  Ordering Physician: PILI SEGURA  Referring Physician: PILI SEGURA  Performed By: Rosa Leal RDCS, OLIVERIO     BSA: 2.5 m2  Height: 77 in  Weight: 254 lb  BP: 125/56 mmHg  _____________________________________________________________________________  __        Procedure  Complete Portable Echo Adult.  _____________________________________________________________________________  __        Interpretation Summary  Left ventricular function, chamber size, wall motion, and wall thickness are  normal.The EF is 60-65%.  Mild right ventricular dilation is present. Global right ventricular function s normal.  IVC diameter <2.1 cm collapsing >50% with sniff suggests a normal RA pressure  of 3 mmHg.  Right ventricular systolic pressure is 42mmHg above the right atrial pressure.  No pericardial effusion is present.  Compared to prior, measured RVSP is lower, no other change.  _____________________________________________________________________________  __        Left Ventricle  Left ventricular  function, chamber size, wall motion, and wall thickness are  normal.The EF is 60-65%. Left ventricular diastolic function is normal.  Abnormal non-specific septal motion is present.     Right Ventricle  Global right ventricular function is normal. Mild right ventricular dilation  is present.     Atria  Both atria appear normal.     Mitral Valve  The mitral valve is normal.        Aortic Valve  Aortic valve is normal in structure and function.     Tricuspid Valve  The tricuspid valve is normal. Trace tricuspid insufficiency is present. Right  ventricular systolic pressure is 42mmHg above the right atrial pressure.     Pulmonic Valve  The pulmonic valve is normal.     Vessels  Mild dilatation of the aorta is present. Sinuses of Valsalva 4.1 cm. Ascending  aorta 3.7 cm. IVC diameter <2.1 cm collapsing >50% with sniff suggests a  normal RA pressure of 3 mmHg.     Pericardium  No pericardial effusion is present.     _____________________________________________________________________________  __  MMode/2D Measurements & Calculations  IVSd: 0.92 cm  LVIDd: 5.5 cm  LVIDs: 3.4 cm  LVPWd: 1.2 cm  FS: 39.2 %     LV mass(C)d: 239.4 grams  LV mass(C)dI: 96.7 grams/m2  Ao root diam: 4.1 cm  asc Aorta Diam: 3.7 cm  LA Volume (BP): 79.0 ml  RWT: 0.45        Doppler Measurements & Calculations  MV E max nieves: 80.2 cm/sec  MV A max nieves: 74.8 cm/sec  MV E/A: 1.1  MV dec time: 0.20 sec  PA acc time: 0.09 sec  PI end-d nieves: 113.1 cm/sec  TR max nieves: 300.7 cm/sec  TR max P.3 mmHg  E/E' av.4  Lateral E/e': 7.7  Medial E/e': 11.0    CC: doctors above

## 2021-01-25 NOTE — LETTER
1/25/2021      RE: Olivier Gómez  [address]  [place] [address]       Dear Colleague,    Thank you for the opportunity to participate in the care of your patient, Olivier Gómez, at the [facility] HEART [facility]. Please see a copy of my visit note below.        Video visit: FACETIME  Patient permission, yes  Patient location: home  Duration 30 minutes 11:30-noon    Caroline Elder M.D.  Adelso Delgado M.D.    Suggest:  1. Early COVID vaccine (bone marrow transplant, pulmonary hypetension, immunosuppression, elevate BMI)  2. Echocardiogram with doppler in March  3. March Laboratories: CBC, CMP, BNP  4. COVID precautions  5. Videovisit March after echo and laboratories           1. Pulmonary hypertension  2. Bone marrow transplantation  3. Remote exertional syncope  4. Three drug therapy for PAH - PDE V, Uptravi and recent initiation of Opsumit  5. IL-6 elevated  6. DELLA-Stat inhibitor    The patient returns for follow-up of PAH.  There is no interim history of chest pain, tightness, paroxysmal nocturnal dyspnea, orthopnea, peripheral edema, palpitation, pre-syncope, syncope, .  Exercise tolerance is stable.  The patient is attempting to exercise regularly and following a sodium restricted, calorically appropriate diet.  Medications are reviewed and the patient is taking medications as prescribed.  The patient is generally sleeping well.     Constitutional: weight loss, fever, chills, night sweats  HEENT: without visual changes, swallow difficulties  Pulmonary: without shortness of breath, cough, wheeze, hemoptysis  Cardiac: without chest pain, CORREA, PND, orthopnea, edema, palpitation, pre-syncope, syncope,  GI: without diarrhea, constipation, jaundice, melena, GERD, hematemesis  : without frequency, urgency, dysuria, hematuria  Skin: rash, bruise, open lesions  Neuro: without TIA, focal neurologic complaints, seizure, trauma  Ortho: without  pain, swelling, mobility impairment  Endocrine: diabetes, thyroid, heat/cold intolerance, polyuria, polyphagia, change bowel habits.  Sleep:no JULISSA, periodic breathing, fatigue  Other: 45 minute bicycle training per day    Current Outpatient Medications   Medication     atovaquone (MEPRON) 750 MG/5ML suspension     calcium carbonate (OS-PANFILO 500 MG [place]. [place]) 500 MG tablet     CLONAZEPAM PO     digoxin (LANOXIN) 125 MCG tablet     FLUCONAZOLE PO     furosemide (LASIX) 20 MG tablet     OPSUMIT 10 MG tablet     Pantoprazole Sodium (PROTONIX PO)     Penicillin V Potassium (PEN-VEE K OR)     prednisoLONE 5 MG tablet     predniSONE (DELTASONE) 2.5 MG tablet     rosuvastatin (CRESTOR) 5 MG tablet     ruxolitinib (JAKAFI) 5 MG TABS tablet CHEMO     selexipag (UPTRAVI) 1600 MCG tablet     Specialty Vitamins Products (MAGNESIUM PLUS PROTEIN) 133 MG tablet     tadalafil, PAH, (ADCIRCA) 20 MG TABS     Ursodiol (ACTIGALL PO)     VALACYCLOVIR HCL PO     VITAMIN D, CHOLECALCIFEROL, PO     No current facility-administered medications for this visit.        Name: BENEDICT ESTES  MRN: [id number]  : 1965  Study Date: 2020 10:05 AM  Age: 55 yrs  Gender: Male  Patient Location: [facility]  Reason For Study: Pulmonary hypertension (H), SOB (shortness of breath)  Ordering Physician: PILI SEGURA  Referring Physician: PILI SEGURA  Performed By: Rosa Leal RDCS, T     BSA: 2.5 m2  Height: 77 in  Weight: 254 lb  BP: 125/56 mmHg  _____________________________________________________________________________  __        Procedure  Complete Portable Echo Adult.  _____________________________________________________________________________  __        Interpretation Summary  Left ventricular function, chamber size, wall motion, and wall thickness are  normal.The EF is 60-65%.  Mild right ventricular dilation is present. Global right ventricular function s normal.  IVC diameter <2.1 cm collapsing >50% with sniff suggests a  normal RA pressure  of 3 mmHg.  Right ventricular systolic pressure is 42mmHg above the right atrial pressure.  No pericardial effusion is present.  Compared to prior, measured RVSP is lower, no other change.  _____________________________________________________________________________  __        Left Ventricle  Left ventricular function, chamber size, wall motion, and wall thickness are  normal.The EF is 60-65%. Left ventricular diastolic function is normal.  Abnormal non-specific septal motion is present.     Right Ventricle  Global right ventricular function is normal. Mild right ventricular dilation  is present.     Atria  Both atria appear normal.     Mitral Valve  The mitral valve is normal.        Aortic Valve  Aortic valve is normal in structure and function.     Tricuspid Valve  The tricuspid valve is normal. Trace tricuspid insufficiency is present. Right  ventricular systolic pressure is 42mmHg above the right atrial pressure.     Pulmonic Valve  The pulmonic valve is normal.     Vessels  Mild dilatation of the aorta is present. Sinuses of Valsalva 4.1 cm. Ascending  aorta 3.7 cm. IVC diameter <2.1 cm collapsing >50% with sniff suggests a  normal RA pressure of 3 mmHg.     Pericardium  No pericardial effusion is present.     _____________________________________________________________________________  __  MMode/2D Measurements & Calculations  IVSd: 0.92 cm  LVIDd: 5.5 cm  LVIDs: 3.4 cm  LVPWd: 1.2 cm  FS: 39.2 %     LV mass(C)d: 239.4 grams  LV mass(C)dI: 96.7 grams/m2  Ao root diam: 4.1 cm  asc Aorta Diam: 3.7 cm  LA Volume (BP): 79.0 ml  RWT: 0.45        Doppler Measurements & Calculations  MV E max nieves: 80.2 cm/sec  MV A max nieves: 74.8 cm/sec  MV E/A: 1.1  MV dec time: 0.20 sec  PA acc time: 0.09 sec  PI end-d nieves: 113.1 cm/sec  TR max nieves: 300.7 cm/sec  TR max P.3 mmHg  E/E' av.4  Lateral E/e': 7.7  Medial E/e': 11.0    CC: doctors above            Please do not hesitate to contact me if you  have any questions/concerns.     Sincerely,     Dk Quan MD

## 2021-01-28 ENCOUNTER — TELEPHONE (OUTPATIENT)
Dept: CARDIOLOGY | Facility: CLINIC | Age: 56
End: 2021-01-28

## 2021-01-28 NOTE — TELEPHONE ENCOUNTER
PA Initiation    Medication: Tadalafil 20mg  Insurance Company: [facility] - Phone [phone] Fax [phone]  Pharmacy Filling the Rx[facility] [place] [address]  Start Date: 1/28/2021    *Prior authorization completed and submitted through CoverMyMeds for expedited review along with right heart catheterization report.

## 2021-02-01 DIAGNOSIS — I27.20 PULMONARY HYPERTENSION (H): ICD-10-CM

## 2021-02-04 RX ORDER — TADALAFIL 20 MG/1
40 TABLET ORAL DAILY
Qty: 60 TABLET | Refills: 11 | Status: SHIPPED | OUTPATIENT
Start: 2021-02-04 | End: 2022-02-07

## 2021-02-04 NOTE — TELEPHONE ENCOUNTER
MEDICATION APPEAL APPROVED    Medication: Tadalafil 20mg - Approved  Authorization Effective Date: 1/29/2021  Authorization Expiration Date:  Indefinite  Approved Dose/Quantity: 60 tablets/30 days  Reference #: [id number]   Insurance Company: [employer] [employer] [facility] - Phone [phone] Fax [phone]  Which Pharmacy is filling the prescription (Not needed for infusion/clinic administered): [facility] - [place], [place] - [address]  ----------------------------  Insurance: [employer]  BIN: [id number]  PCN: [id number]  ID#: [id number]  GRP#: N/A

## 2021-02-04 NOTE — TELEPHONE ENCOUNTER
Medication Refill double check:    Last virtual visit was on 1/25/21 with .    Follow up was recommended for March.    Any additional encounters with changes to requested med? no    Authorizing provider is: Dr. Quan    Refill was approved.     Additional orders/notes:       Ana Ashraf RN on 2/4/2021 at 11:47 AM

## 2021-02-04 NOTE — TELEPHONE ENCOUNTER
Medication Appeal Initiation    We have initiated an appeal for the requested medication:  Medication: Tadalafil 20mg - DENIED  Appeal Start Date:  1/29/2021  Insurance Company: [employer] - Phone [phone] Fax [phone]  Comments:   Completed Appeal Form and faxed to [facility] along with letter of medical necessity, right heart catheterization report and most recent clinic note for expedited review.

## 2021-02-04 NOTE — TELEPHONE ENCOUNTER
PRIOR AUTHORIZATION DENIED    Medication: Tadalafil 20mg - DENIED  Denial Date: 1/29/2021  Denial Rational: *See below*  Appeal Information: Will require a letter of medical necessity. Once completed will fax to [facility] for expedited review.

## 2021-03-01 DIAGNOSIS — I27.20 PULMONARY HYPERTENSION (H): ICD-10-CM

## 2021-03-10 DIAGNOSIS — I27.20 PULMONARY HTN (H): ICD-10-CM

## 2021-03-10 DIAGNOSIS — E78.5 DYSLIPIDEMIA: ICD-10-CM

## 2021-03-12 NOTE — TELEPHONE ENCOUNTER
rosuvastatin 5 mg tablet      Last Written Prescription Date:  12-15-20  Last Fill Quantity: 90,   # refills: 0  Last Office Visit : 1-25-21  Future Office visit:  none    Routing refill request to provider for review/approval because:  Overdue LDL, previous 90 day khalida TODD

## 2021-03-16 RX ORDER — ROSUVASTATIN CALCIUM 5 MG/1
5 TABLET, COATED ORAL DAILY
Qty: 90 TABLET | Refills: 0 | Status: SHIPPED | OUTPATIENT
Start: 2021-03-16 | End: 2021-03-18

## 2021-03-16 NOTE — TELEPHONE ENCOUNTER
Medication Refill double check:    Last Virtual visit was on 1/25/21 with .    Follow up was recommended for March '21.    Any additional encounters with changes to requested med? no    Authorizing provider is: Avelina    Limited refill was approved.     Additional orders/notes:   Verified Lipid panel was ordered, and sent msg to Roberto Erickson Rn for follow up.    Ana Ashraf RN on 3/16/2021 at 11:40 AM

## 2021-03-17 ENCOUNTER — TELEPHONE (OUTPATIENT)
Dept: CARDIOLOGY | Facility: CLINIC | Age: 56
End: 2021-03-17

## 2021-03-17 NOTE — TELEPHONE ENCOUNTER
Called and spoke with Aylin and she stated that they needed an updated order because they do not have a combination Lipid Pannel with Direct LDL.  I sent and updated order with the labs and separate and faxed to [phone].  Marlys Anderson RN on 3/17/2021 at 3:27 PM

## 2021-03-17 NOTE — TELEPHONE ENCOUNTER
[facility] Call Center    Phone Message    May a detailed message be left on voicemail: yes     Reason for Call: Other: aylin stated there were some labs faxed over to [facility] and aylin needs some clarification, please call Aylin at [phone] thank you      Action Taken: Message routed to:  [facility] heart    Travel Screening: Not Applicable

## 2021-03-17 NOTE — TELEPHONE ENCOUNTER
Spoke with patient and asked he get a lipid panel drawn; patient will go in the next 2 weeks when he's getting his other labs done. Chantel Erickson RN on 3/17/2021 at 9:50 AM    Lipid panel lab order faxed to (f) [phone]. Chantel Erickson RN on 3/17/2021 at 9:52 AM    Lab results in Care Everywhere. Chantel Erickson RN on 3/18/2021 at 10:43 AM

## 2021-03-18 ENCOUNTER — TRANSFERRED RECORDS (OUTPATIENT)
Dept: HEALTH INFORMATION MANAGEMENT | Facility: CLINIC | Age: 56
End: 2021-03-18

## 2021-03-18 DIAGNOSIS — I27.20 PULMONARY HTN (H): ICD-10-CM

## 2021-03-18 DIAGNOSIS — E78.5 DYSLIPIDEMIA: ICD-10-CM

## 2021-03-18 RX ORDER — ROSUVASTATIN CALCIUM 5 MG/1
5 TABLET, COATED ORAL DAILY
Qty: 90 TABLET | Refills: 3 | Status: SHIPPED | OUTPATIENT
Start: 2021-03-18

## 2021-03-25 ENCOUNTER — TELEPHONE (OUTPATIENT)
Dept: CARDIOLOGY | Facility: CLINIC | Age: 56
End: 2021-03-25

## 2021-03-25 NOTE — TELEPHONE ENCOUNTER
"Pt needs to schedule follow up with Dr. Quan in the Pulmonary Hypertension clinic.      The follow up request has been cancelled; can be found in the \"finalized requests\" tab in the patient's appointment desk.    Please reinstate request and link to appointment when scheduling.  "

## 2021-03-30 NOTE — PROGRESS NOTES
Discharge instructions given and pt voiced understanding. No scripts needed from pharmacy. Rt neck site is soft and flat. No hematoma. Up walking in room. No complaint of discomfort.  Adequate for discharge from this unit Discharged to home with family following echocardiogram.    Prior auth initiated for advair.

## 2021-04-19 DIAGNOSIS — I27.20 PULMONARY HYPERTENSION (H): Primary | ICD-10-CM

## 2021-04-20 ENCOUNTER — VIRTUAL VISIT (OUTPATIENT)
Dept: CARDIOLOGY | Facility: CLINIC | Age: 56
End: 2021-04-20
Attending: INTERNAL MEDICINE
Payer: COMMERCIAL

## 2021-04-20 DIAGNOSIS — R06.02 SOB (SHORTNESS OF BREATH): ICD-10-CM

## 2021-04-20 DIAGNOSIS — I27.20 PULMONARY HYPERTENSION (H): Primary | ICD-10-CM

## 2021-04-20 PROCEDURE — 99214 OFFICE O/P EST MOD 30 MIN: CPT | Mod: 95 | Performed by: INTERNAL MEDICINE

## 2021-04-20 NOTE — PROGRESS NOTES
Olivier Gómez is a 55 year old who is being evaluated via a billable video visit.      FaceTime Video visit x 30 minutes    Adelso Delgado M.D.  [place], [place]      Impression:  1. PAH  2. History of BMT for AML  3. History of prostate cancer  4. Anemia  5. High cardiac output  6. Prednisone dependence  7. Hyperlipidemia treated    Impression:    The patient's current medical regimen as outlined below has markedly and consistently improved his pulmonary hypertension.  When first seen by us the mean PA pressure was 65 (now 31)  He must stay on the current medical regimen as outlined below!    Plan:  1. Evaluate anemia and elevated LDH: iron, iron binding capacity, soluble transferrin receptor, CRP inflammation, blood smear to be examined by pathologist  2. The patient has high cardiac output, beyond what I would expect from anemia and size.  There is no previous history of shunt: TSH, free T4, free, T3, thiamine  3. Please schedule for RHC and echocardiogram in [facility]  In May  4. In view of autoimmune features, please obtain complement levels include C3, C4, C5a, and complement H. GENEVIEVE  5. May discontinue digoxin  6. Reduce Jakafi to 5mgs once daily in view of abnormal renal function        55 year old white male malpractice  seen for follow-up of severe pulmonary hypertension.  Patient has [facility]  His PAH has not necessarily been felt to be secondary to GVH.  He presented with severe, advanced PAH requiring parenteral prostanoids and generally normalized PA pressures on three drug therapy.  He wished to be weaned off of parenteral prostanoids and this was done carefully and sequentially and transitioned off of parenteral prostanoids.  However, experienced a severe increase in PA pressures, and we elected to continue his oral medical regimen but add a JANUS kinase inhibitor to cover the potential of GVH.  On this regimen he experienced marked  improvement as reflected in his most recent catheterization below.      He has had hallmarks of autoimmune disease. including skin changes that look in some ways like scleroderma, as well as irving titre GENEVIEVE.    Constitutional: without weight loss, fever, chills, night sweats  HEENT: without visual changes, swallow difficulties  Pulmonary: without shortness of breath, cough, wheeze, hemoptysis  Cardiac: without chest pain, CORREA, PND, orthopnea, edema, palpitation, pre-syncope, syncope,  GI: without diarrhea, + occasional constipation, jaundice, melena, GERD, hematemesis  : without frequency, urgency, dysuria, hematuria and recent follow up for prostate cancer negative by report  Skin: rash, bruise, open lesions but some skin thickening  Neuro: without TIA, focal neurologic complaints, seizure, trauma  Ortho: without pain, swelling, mobility impairment  Endocrine: diabetes, thyroid, heat/cold intolerance, polyuria, polyphagia, change bowel habits.  Sleep:no JULISSA, periodic breathing, fatigue  :  Medications    Current Outpatient Medications   Medication     atovaquone (MEPRON) 750 MG/5ML suspension     calcium carbonate (OS-PANFILO 500 MG [place]. [place]) 500 MG tablet     CLONAZEPAM PO     digoxin (LANOXIN) 125 MCG tablet     FLUCONAZOLE PO     furosemide (LASIX) 20 MG tablet     OPSUMIT 10 MG tablet     Pantoprazole Sodium (PROTONIX PO)     Penicillin V Potassium (PEN-VEE K OR)     predniSONE (DELTASONE) 2.5 MG tablet     rosuvastatin (CRESTOR) 5 MG tablet     ruxolitinib (JAKAFI) 5 MG TABS tablet CHEMO     selexipag (UPTRAVI) 1600 MCG tablet     Specialty Vitamins Products (MAGNESIUM PLUS PROTEIN) 133 MG tablet     tadalafil, PAH, 20 MG TABS     Ursodiol (ACTIGALL PO)     VALACYCLOVIR HCL PO     VITAMIN D, CHOLECALCIFEROL, PO     prednisoLONE 5 MG tablet     No current facility-administered medications for this visit.        Wt Readings from Last 24 Encounters:   12/02/19 115.3 kg (254 lb 1.6 oz)   12/02/19 113.4 kg (250 lb)    08/27/19 115.5 kg (254 lb 9.6 oz)   05/15/19 117 kg (258 lb)   03/20/19 115.1 kg (253 lb 11.2 oz)   02/19/19 113.4 kg (250 lb)   10/10/18 117.9 kg (260 lb)   06/27/18 115.6 kg (254 lb 12.8 oz)   06/06/18 111.6 kg (246 lb)   01/16/18 114.3 kg (252 lb)   09/12/17 113.7 kg (250 lb 11.2 oz)   09/12/17 113.4 kg (250 lb)   08/22/17 115 kg (253 lb 9.6 oz)   07/19/17 115.3 kg (254 lb 1.6 oz)   06/23/17 117.3 kg (258 lb 9.6 oz)   06/07/17 122 kg (269 lb)   05/15/17 124.7 kg (275 lb)   05/01/17 125.1 kg (275 lb 11.2 oz)   04/11/17 129.5 kg (285 lb 8 oz)   04/12/16 133.8 kg (294 lb 15.6 oz)   08/26/15 133.8 kg (294 lb 15.6 oz)   08/13/15 132.5 kg (292 lb)   07/14/15 127 kg (280 lb)   07/14/15 131.7 kg (290 lb 5.5 oz)       [facility] 3/2021      WBC 6900  HgB 10.8  Plt count 501,000  creratinine 1.9    Normal liver functions    Catheterization 09/2020      Right sided filling pressures are normal.    Left sided filling pressures are normal.    Mildly elevated PAP with normal PVR and compliance in the setting of high output state.    Hyperdynamic cardiac output level. Carter's cardiac output:10.1L/min; cardiac index 4L/min/m2.            Hemodynamics    Hemodynamics Measured VO2 334 ml/min  Carter's cardiac output::10.1L/min; cardiac index 4L/min/m2.  Pulmonary vascular compliance - 4.8 ml/mmHg     Right sided filling pressures are normal.Left sided filling pressures are normal. Moderately elevated pulmonary artery hypertension.Hyperdynamic cardiac output level.   Pressures Phase: Baseline     Time Systolic (mmHg) Diastolic (mmHg) Mean (mmHg) A Wave (mmHg) V Wave (mmHg) EDP (mmHg) Max dp/dt (mmHg/sec) HR (bpm) Content (mL/dL) SAT (%)   RA Pressures  8:45 AM   5    9    7      77        RV Pressures  8:33 AM       624           8:46 AM 50        7     68        PA Pressures  8:47 AM 50    20    31        80        PCW Pressures  8:47 AM   10    15    12      70        Blood Flow Results Phase:  Baseline     Time Results  Indexed Values (L/min/m2)   QP  8:33 AM 10.2 L/min    4.12      QS  8:33 AM 10.2 L/min    4.12      Blood Oximetry Phase: Baseline     Time Hb  SAT(%)  PO2  Content (mL/dL) PA Sat (%)   PA  8:33 AM  72.7 %      72.7      Art  8:33 AM  95 %     13.95       Cardiac Output Phase: Baseline     Time TDCO (L/min) TDCI (L/min/m2) Carter C.O. (L/min) Carter C.I. (L/min/m2) Carter HR (bpm)   Cardiac Output Results  8:33 AM 10.46    4.23    10.2    4.12         8:49 AM 10.46          Resistance Results Phase: Baseline     Time PVR  SVR  TPR  TVR  PVR/SVR  TPR/TVR    Resistance Results (Metric)  8:33 .71 dsc-5     243.14 dsc-5         Resistance Results (Wood)  8:33 AM 2.06 STEPHENS     3.04 STEPHENS         Stoke Volume Results Phase: Baseline     Time RVSW (gm*m) LVSW (gm*m) RVSW-I (gm*m/m2) LVSW-I (gm*m/m2)   Stroke Work Results  8:33 AM 45.07     18.21             Echcocardiogram  Name: BENEDICT ESTES  MRN: [id number]  : 1965  Study Date: 2020 10:05 AM  Age: 55 yrs  Gender: Male  Patient Location: [facility]  Reason For Study: Pulmonary hypertension (H), SOB (shortness of breath)  Ordering Physician: PILI SEGURA  Referring Physician: PILI SEGURA  Performed By: Rosa Lael RDCS, OLIVERIO     BSA: 2.5 m2  Height: 77 in  Weight: 254 lb  BP: 125/56 mmHg  _____________________________________________________________________________  __        Procedure  Complete Portable Echo Adult.  _____________________________________________________________________________  __        Interpretation Summary  Left ventricular function, chamber size, wall motion, and wall thickness are  normal.The EF is 60-65%.  Mild right ventricular dilation is present. Global right ventricular function  is normal.  IVC diameter <2.1 cm collapsing >50% with sniff suggests a normal RA pressure  of 3 mmHg.  Right ventricular systolic pressure is 42mmHg above the right atrial pressure.  No pericardial effusion is  present.  Compared to prior, measured RVSP is lower, no other change.  _____________________________________________________________________________  __        Left Ventricle  Left ventricular function, chamber size, wall motion, and wall thickness are  normal.The EF is 60-65%. Left ventricular diastolic function is normal.  Abnormal non-specific septal motion is present.     Right Ventricle  Global right ventricular function is normal. Mild right ventricular dilation  is present.     Atria  Both atria appear normal.     Mitral Valve  The mitral valve is normal.        Aortic Valve  Aortic valve is normal in structure and function.     Tricuspid Valve  The tricuspid valve is normal. Trace tricuspid insufficiency is present. Right  ventricular systolic pressure is 42mmHg above the right atrial pressure.     Pulmonic Valve  The pulmonic valve is normal.     Vessels  Mild dilatation of the aorta is present. Sinuses of Valsalva 4.1 cm. Ascending  aorta 3.7 cm. IVC diameter <2.1 cm collapsing >50% with sniff suggests a  normal RA pressure of 3 mmHg.     Pericardium  No pericardial effusion is present.     _____________________________________________________________________________  __  MMode/2D Measurements & Calculations  IVSd: 0.92 cm  LVIDd: 5.5 cm  LVIDs: 3.4 cm  LVPWd: 1.2 cm  FS: 39.2 %     LV mass(C)d: 239.4 grams  LV mass(C)dI: 96.7 grams/m2  Ao root diam: 4.1 cm  asc Aorta Diam: 3.7 cm  LA Volume (BP): 79.0 ml  RWT: 0.45        Doppler Measurements & Calculations  MV E max nieves: 80.2 cm/sec  MV A max nieves: 74.8 cm/sec  MV E/A: 1.1  MV dec time: 0.20 sec  PA acc time: 0.09 sec  PI end-d nieves: 113.1 cm/sec  TR max nieves: 300.7 cm/sec  TR max P.3 mmHg  E/E' av.4  Lateral E/e': 7.7  Medial E/e': 11.0

## 2021-04-20 NOTE — LETTER
4/20/2021      RE: Olivier Gómez  [address]  [place] [address]       Dear Colleague,    Thank you for the opportunity to participate in the care of your patient, Olivier Gómez, at the [facility] [place] [facility]. Please see a copy of my visit note below.    Olivier Gómez is a 55 year old who is being evaluated via a billable video visit.      FaceTime Video visit x 30 minutes    Adelso Delgado M.D.  [place], [place]      Impression:  1. PAH  2. History of BMT for AML  3. History of prostate cancer  4. Anemia  5. High cardiac output  6. Prednisone dependence  7. Hyperlipidemia treated    Impression:    The patient's current medical regimen as outlined below has markedly and consistently improved his pulmonary hypertension.  When first seen by us the mean PA pressure was 65 (now 31)  He must stay on the current medical regimen as outlined below!    Plan:  1. Evaluate anemia and elevated LDH: iron, iron binding capacity, soluble transferrin receptor, CRP inflammation, blood smear to be examined by pathologist  2. The patient has high cardiac output, beyond what I would expect from anemia and size.  There is no previous history of shunt: TSH, free T4, free, T3, thiamine  3. Please schedule for RHC and echocardiogram in [facility]  In May  4. In view of autoimmune features, please obtain complement levels include C3, C4, C5a, and complement H. GENEVIEVE  5. May discontinue digoxin  6. Reduce Jakafi to 5mgs once daily in view of abnormal renal function        55 year old white male malpractice  seen for follow-up of severe pulmonary hypertension.  Patient has previous history of AML treated at [facility] with BMT.  His PAH has not necessarily been felt to be secondary to GVH.  He presented with severe, advanced PAH requiring parenteral prostanoids and generally normalized PA pressures on three drug therapy.  He wished to be  weaned off of parenteral prostanoids and this was done carefully and sequentially and transitioned off of parenteral prostanoids.  However, experienced a severe increase in PA pressures, and we elected to continue his oral medical regimen but add a JANUS kinase inhibitor to cover the potential of GVH.  On this regimen he experienced marked improvement as reflected in his most recent catheterization below.      He has had hallmarks of autoimmune disease. including skin changes that look in some ways like scleroderma, as well as irving titre GENEVIEVE.    Constitutional: without weight loss, fever, chills, night sweats  HEENT: without visual changes, swallow difficulties  Pulmonary: without shortness of breath, cough, wheeze, hemoptysis  Cardiac: without chest pain, CORREA, PND, orthopnea, edema, palpitation, pre-syncope, syncope,  GI: without diarrhea, + occasional constipation, jaundice, melena, GERD, hematemesis  : without frequency, urgency, dysuria, hematuria and recent follow up for prostate cancer negative by report  Skin: rash, bruise, open lesions but some skin thickening  Neuro: without TIA, focal neurologic complaints, seizure, trauma  Ortho: without pain, swelling, mobility impairment  Endocrine: diabetes, thyroid, heat/cold intolerance, polyuria, polyphagia, change bowel habits.  Sleep:no JULISSA, periodic breathing, fatigue  :  Medications    Current Outpatient Medications   Medication     atovaquone (MEPRON) 750 MG/5ML suspension     calcium carbonate (OS-PANFILO 500 MG [place]. [place]) 500 MG tablet     CLONAZEPAM PO     digoxin (LANOXIN) 125 MCG tablet     FLUCONAZOLE PO     furosemide (LASIX) 20 MG tablet     OPSUMIT 10 MG tablet     Pantoprazole Sodium (PROTONIX PO)     Penicillin V Potassium (PEN-VEE K OR)     predniSONE (DELTASONE) 2.5 MG tablet     rosuvastatin (CRESTOR) 5 MG tablet     ruxolitinib (JAKAFI) 5 MG TABS tablet CHEMO     selexipag (UPTRAVI) 1600 MCG tablet     [employer] Vitamins Products (MAGNESIUM PLUS  PROTEIN) 133 MG tablet     tadalafil, PAH, 20 MG TABS     Ursodiol (ACTIGALL PO)     VALACYCLOVIR HCL PO     VITAMIN D, CHOLECALCIFEROL, PO     prednisoLONE 5 MG tablet     No current facility-administered medications for this visit.        Wt Readings from Last 24 Encounters:   12/02/19 115.3 kg (254 lb 1.6 oz)   12/02/19 113.4 kg (250 lb)   08/27/19 115.5 kg (254 lb 9.6 oz)   05/15/19 117 kg (258 lb)   03/20/19 115.1 kg (253 lb 11.2 oz)   02/19/19 113.4 kg (250 lb)   10/10/18 117.9 kg (260 lb)   06/27/18 115.6 kg (254 lb 12.8 oz)   06/06/18 111.6 kg (246 lb)   01/16/18 114.3 kg (252 lb)   09/12/17 113.7 kg (250 lb 11.2 oz)   09/12/17 113.4 kg (250 lb)   08/22/17 115 kg (253 lb 9.6 oz)   07/19/17 115.3 kg (254 lb 1.6 oz)   06/23/17 117.3 kg (258 lb 9.6 oz)   06/07/17 122 kg (269 lb)   05/15/17 124.7 kg (275 lb)   05/01/17 125.1 kg (275 lb 11.2 oz)   04/11/17 129.5 kg (285 lb 8 oz)   04/12/16 133.8 kg (294 lb 15.6 oz)   08/26/15 133.8 kg (294 lb 15.6 oz)   08/13/15 132.5 kg (292 lb)   07/14/15 127 kg (280 lb)   07/14/15 131.7 kg (290 lb 5.5 oz)       [facility] 3/2021      WBC 6900  HgB 10.8  Plt count 501,000  creratinine 1.9    Normal liver functions    Catheterization 09/2020      Right sided filling pressures are normal.    Left sided filling pressures are normal.    Mildly elevated PAP with normal PVR and compliance in the setting of high output state.    Hyperdynamic cardiac output level. Carter's cardiac output:10.1L/min; cardiac index 4L/min/m2.            Hemodynamics    Hemodynamics Measured VO2 334 ml/min  Carter's cardiac output::10.1L/min; cardiac index 4L/min/m2.  Pulmonary vascular compliance - 4.8 ml/mmHg     Right sided filling pressures are normal.Left sided filling pressures are normal. Moderately elevated pulmonary artery hypertension.Hyperdynamic cardiac output level.   Pressures Phase: Baseline     Time Systolic (mmHg) Diastolic (mmHg) Mean (mmHg) A Wave (mmHg) V Wave  (mmHg) EDP (mmHg) Max dp/dt (mmHg/sec) HR (bpm) Content (mL/dL) SAT (%)   RA Pressures  8:45 AM   5    9    7      77        RV Pressures  8:33 AM       624           8:46 AM 50        7     68        PA Pressures  8:47 AM 50    20    31        80        PCW Pressures  8:47 AM   10    15    12      70        Blood Flow Results Phase: Baseline     Time Results  Indexed Values (L/min/m2)   QP  8:33 AM 10.2 L/min    4.12      QS  8:33 AM 10.2 L/min    4.12      Blood Oximetry Phase: Baseline     Time Hb  SAT(%)  PO2  Content (mL/dL) PA Sat (%)   PA  8:33 AM  72.7 %      72.7      Art  8:33 AM  95 %     13.95       Cardiac Output Phase: Baseline     Time TDCO (L/min) TDCI (L/min/m2) Carter C.O. (L/min) Carter C.I. (L/min/m2) Carter HR (bpm)   Cardiac Output Results  8:33 AM 10.46    4.23    10.2    4.12         8:49 AM 10.46          Resistance Results Phase: Baseline     Time PVR  SVR  TPR  TVR  PVR/SVR  TPR/TVR    Resistance Results (Metric)  8:33 .71 dsc-5     243.14 dsc-5         Resistance Results (Wood)  8:33 AM 2.06 STEPHENS     3.04 STEPHENS         Stoke Volume Results Phase: Baseline     Time RVSW (gm*m) LVSW (gm*m) RVSW-I (gm*m/m2) LVSW-I (gm*m/m2)   Stroke Work Results  8:33 AM 45.07     18.21             Echcocardiogram  Name: BENEDICT ESTES  MRN: [id number]  : 1965  Study Date: 2020 10:05 AM  Age: 55 yrs  Gender: Male  Patient [facility]  Reason For Study: Pulmonary hypertension (H), SOB (shortness of breath)  Ordering Physician: PILI SEGURA  Referring Physician: PILI SEGURA  Performed By: Rosa Leal RDCS, RVT     BSA: 2.5 m2  Height: 77 in  Weight: 254 lb  BP: 125/56 mmHg  _____________________________________________________________________________  __        Procedure  Complete Portable Echo Adult.  _____________________________________________________________________________  __        Interpretation Summary  Left ventricular function, chamber size, wall motion, and wall  thickness are  normal.The EF is 60-65%.  Mild right ventricular dilation is present. Global right ventricular function  is normal.  IVC diameter <2.1 cm collapsing >50% with sniff suggests a normal RA pressure  of 3 mmHg.  Right ventricular systolic pressure is 42mmHg above the right atrial pressure.  No pericardial effusion is present.  Compared to prior, measured RVSP is lower, no other change.  _____________________________________________________________________________  __        Left Ventricle  Left ventricular function, chamber size, wall motion, and wall thickness are  normal.The EF is 60-65%. Left ventricular diastolic function is normal.  Abnormal non-specific septal motion is present.     Right Ventricle  Global right ventricular function is normal. Mild right ventricular dilation  is present.     Atria  Both atria appear normal.     Mitral Valve  The mitral valve is normal.        Aortic Valve  Aortic valve is normal in structure and function.     Tricuspid Valve  The tricuspid valve is normal. Trace tricuspid insufficiency is present. Right  ventricular systolic pressure is 42mmHg above the right atrial pressure.     Pulmonic Valve  The pulmonic valve is normal.     Vessels  Mild dilatation of the aorta is present. Sinuses of Valsalva 4.1 cm. Ascending  aorta 3.7 cm. IVC diameter <2.1 cm collapsing >50% with sniff suggests a  normal RA pressure of 3 mmHg.     Pericardium  No pericardial effusion is present.     _____________________________________________________________________________  __  MMode/2D Measurements & Calculations  IVSd: 0.92 cm  LVIDd: 5.5 cm  LVIDs: 3.4 cm  LVPWd: 1.2 cm  FS: 39.2 %     LV mass(C)d: 239.4 grams  LV mass(C)dI: 96.7 grams/m2  Ao root diam: 4.1 cm  asc Aorta Diam: 3.7 cm  LA Volume (BP): 79.0 ml  RWT: 0.45        Doppler Measurements & Calculations  MV E max nieves: 80.2 cm/sec  MV A max nieves: 74.8 cm/sec  MV E/A: 1.1  MV dec time: 0.20 sec  PA acc time: 0.09 sec  PI end-d  nieves: 113.1 cm/sec  TR max nieves: 300.7 cm/sec  TR max P.3 mmHg  E/E' av.4  Lateral E/e': 7.7  Medial E/e': 11.0           Please do not hesitate to contact me if you have any questions/concerns.     Sincerely,     Dk Quan MD

## 2021-04-21 PROBLEM — R06.02 SOB (SHORTNESS OF BREATH): Status: ACTIVE | Noted: 2019-05-15

## 2021-04-21 RX ORDER — MACITENTAN 10 MG/1
10 TABLET, FILM COATED ORAL DAILY
Qty: 30 TABLET | Refills: 11 | Status: SHIPPED | OUTPATIENT
Start: 2021-04-21 | End: 2022-04-06

## 2021-04-21 RX ORDER — LIDOCAINE 40 MG/G
CREAM TOPICAL
Status: CANCELLED | OUTPATIENT
Start: 2021-04-21

## 2021-04-21 NOTE — PATIENT INSTRUCTIONS
Medication Changes:  - STOP digoxin   - Decrease Jakafi to 5 mg daily    Patient Instructions:  1. Continue staying active and eat a heart healthy diet.    2. Please keep current list of medications with you at all times.    3. Remember to weigh yourself daily after voiding and before you consume any food or beverages and log the numbers.  If you have gained 2 pounds overnight or 5 pounds in a week contact us immediately for medication adjustments or further instructions.    4. **Please call us immediately if you have any syncope (fainting or passing out), chest pain, edema (swelling or weight gain), or decline in your functional status (general decline in how you are feeling).    Follow up Appointment Information:  - Please reach out your your primary care provider regarding your anemia and elevated LDH  - Right heart catheterization in May to follow up on pulmonary pressures  - Follow up with Dr. Anderson after testing to review results      Check-In  Time Check-In Location Estimated Length Procedure   Name     May 2021   [facility]  waiting room 60-90 minutes Right Heart Catheterization**     Procedure Preparations & Instructions     This is an invasive procedure that DOES require preparation:    - Nothing to eat for 6 hours   - You may have clear liquids up until the time of your procedure  - A ride should be arranged for you in the instance you are unable to drive home, however you should be able to function as you normally would after the procedure     *Mandatory COVID Testing:   Pt will need to complete a COVID test no sooner than 4 days prior to their procedure.      To schedule COVID testing Please call [phone]    If you want to complete this at an outside facility please call them to find out if they will have the results within the appropriate time frame and their fax number.  You will need to provide us with that information so we can send them the order.    They will need to fax the results to  [phone]    If you are running into and issues please call us.       We are located on the third floor of [facility] on the [facility].  Our address is     [address] [place] [address] on 3rd Floor   [place] [phone]    Thank you for allowing us to be a part of your care here at the [facility]    If you have questions or concerns please contact us at:    Roberto Erickson, RN, BSN   Verna Nguyen (Schedule,Prior Auth)  Nurse Coordinator     Clinic   Pulmonary Hypertension   Pulmonary Hypertension  [facility]  [facility]  (Phone)[phone]    (Phone) [phone]        (Fax) [phone]    ** Please note that you will NOT receive a reminder call regarding your scheduled testing, reminder calls are for provider appointments only.  If you are scheduled for testing within the [employer] system you may receive a call regarding pre-registration for billing purposes only.**     Remember to weigh yourself daily after voiding and before you consume any food or beverages and log the numbers.  If you have gained/lost 2 pounds overnight or 5 pounds in a week contact us immediately for medication adjustments or further instructions.   **Please call us immediately if you have any syncope, chest pain, edema, or decline in your functional status.    Support Group:  Pulmonary Hypertension Association  [email or web address].phassociation.org/  **Look at the Events Tab** They even have Support Groups that you can call into    [facility] Support Group  Second Saturday of the Month from 1-3 PM   Location: [address] [place] [address]  Leader: Sofia Durand and Ya Hussein  Phone: [phone] or [phone]  Email: [email or web address]

## 2021-04-21 NOTE — TELEPHONE ENCOUNTER
Medication Refill double check:    Last virtual visit was on 4/20/21 with .    Follow up was recommended for after RHC in May.    Any additional encounters with changes to requested med? No    Authorizing provider is: Dr. Quan    Refill was approved.     Additional orders/notes:       Ana Ashraf RN on 4/21/2021 at 10:33 AM

## 2021-04-21 NOTE — TELEPHONE ENCOUNTER
OPSUMIT 10 MG tablet      Last Written Prescription Date:  *Historical (patient reported)     Last Office Visit : 4/20/2021  Future Office visit:  none    Routing refill request to provider for review/approval because:  Medication is reported/historical  - pended Rx as requested

## 2021-04-21 NOTE — NURSING NOTE
Staff message sent to Verna to help schedule patient for RHC. Chantel Erickson RN on 4/21/2021 at 7:55 AM    MyChart message sent with pre-procedure instructions. Chantel Erickson RN on 4/26/2021 at 11:58 AM

## 2021-05-03 DIAGNOSIS — I27.20 PULMONARY HYPERTENSION (H): ICD-10-CM

## 2021-05-05 RX ORDER — MACITENTAN 10 MG/1
TABLET, FILM COATED ORAL
Qty: 30 TABLET | Refills: 6 | OUTPATIENT
Start: 2021-05-05

## 2021-05-06 DIAGNOSIS — Z11.59 ENCOUNTER FOR SCREENING FOR OTHER VIRAL DISEASES: ICD-10-CM

## 2021-05-17 ENCOUNTER — TRANSFERRED RECORDS (OUTPATIENT)
Dept: HEALTH INFORMATION MANAGEMENT | Facility: CLINIC | Age: 56
End: 2021-05-17

## 2021-05-17 ENCOUNTER — TELEPHONE (OUTPATIENT)
Dept: CARDIOLOGY | Facility: CLINIC | Age: 56
End: 2021-05-17

## 2021-05-17 NOTE — TELEPHONE ENCOUNTER
"Danielle calling regarding the below message for her . She reports that we are needing to rush a prior auth for the right heart catheter procedure. Please get this PA rushed to [employer]. Thank you!  ____________________________________    Email sent to Ana Holder with financial securing regarding urgent request for PA. Chantel Erickson RN on 5/17/2021 at 11:30 AM    Per Ana, \"No auth needed, and pts plan will pay in network benefits. The info she received is accurate. Pt is good to go.\" Chantel Erickson RN on 5/17/2021 at 1:31 PM    Unable to reach Danielle at number provided; LM and asked her to call me back to follow up on RHC PA. Chantel Erickson RN on 5/17/2021 at 1:33 PM        "

## 2021-05-17 NOTE — TELEPHONE ENCOUNTER
[facility] Call Center    Phone Message    May a detailed message be left on voicemail: yes     Reason for Call: Other: Pre-Authorization for appt for right heart cath- send to [facility]. If any questions please contact the utilization management for questions for [phone]. Thank you.     Action Taken: Message routed to:  [facility] cardio    Travel Screening: Not Applicable

## 2021-05-18 ENCOUNTER — TELEPHONE (OUTPATIENT)
Dept: CARDIOLOGY | Facility: CLINIC | Age: 56
End: 2021-05-18

## 2021-05-19 ENCOUNTER — VIRTUAL VISIT (OUTPATIENT)
Dept: CARDIOLOGY | Facility: CLINIC | Age: 56
End: 2021-05-19
Attending: INTERNAL MEDICINE
Payer: COMMERCIAL

## 2021-05-19 ENCOUNTER — APPOINTMENT (OUTPATIENT)
Dept: MEDSURG UNIT | Facility: CLINIC | Age: 56
End: 2021-05-19
Attending: INTERNAL MEDICINE
Payer: COMMERCIAL

## 2021-05-19 ENCOUNTER — HOSPITAL ENCOUNTER (OUTPATIENT)
Facility: CLINIC | Age: 56
Discharge: HOME | End: 2021-05-19
Attending: INTERNAL MEDICINE | Admitting: INTERNAL MEDICINE
Payer: COMMERCIAL

## 2021-05-19 ENCOUNTER — APPOINTMENT (OUTPATIENT)
Dept: LAB | Facility: CLINIC | Age: 56
End: 2021-05-19
Attending: INTERNAL MEDICINE
Payer: COMMERCIAL

## 2021-05-19 VITALS
BODY MASS INDEX: 30.46 KG/M2 | RESPIRATION RATE: 18 BRPM | DIASTOLIC BLOOD PRESSURE: 77 MMHG | OXYGEN SATURATION: 93 % | SYSTOLIC BLOOD PRESSURE: 123 MMHG | TEMPERATURE: 98.4 F | WEIGHT: 258 LBS | HEART RATE: 63 BPM | HEIGHT: 77 IN

## 2021-05-19 DIAGNOSIS — I27.20 PULMONARY HYPERTENSION (H): Primary | ICD-10-CM

## 2021-05-19 DIAGNOSIS — R06.02 SOB (SHORTNESS OF BREATH): ICD-10-CM

## 2021-05-19 DIAGNOSIS — I27.20 PULMONARY HYPERTENSION (H): ICD-10-CM

## 2021-05-19 LAB
ALBUMIN SERPL-MCNC: 3.9 G/DL (ref 3.4–5)
ALP SERPL-CCNC: 66 U/L (ref 40–150)
ALT SERPL W P-5'-P-CCNC: 30 U/L (ref 0–70)
ANION GAP SERPL CALCULATED.3IONS-SCNC: 6 MMOL/L (ref 3–14)
AST SERPL W P-5'-P-CCNC: 29 U/L (ref 0–45)
BASOPHILS # BLD AUTO: 0 10E9/L (ref 0–0.2)
BASOPHILS NFR BLD AUTO: 0.3 %
BILIRUB SERPL-MCNC: 0.5 MG/DL (ref 0.2–1.3)
BUN SERPL-MCNC: 23 MG/DL (ref 7–30)
CALCIUM SERPL-MCNC: 8.9 MG/DL (ref 8.5–10.1)
CHLORIDE SERPL-SCNC: 107 MMOL/L (ref 94–109)
CO2 SERPL-SCNC: 25 MMOL/L (ref 20–32)
CREAT SERPL-MCNC: 1.83 MG/DL (ref 0.66–1.25)
CRP SERPL HS-MCNC: 1.6 MG/L
DIFFERENTIAL METHOD BLD: ABNORMAL
EOSINOPHIL # BLD AUTO: 0.1 10E9/L (ref 0–0.7)
EOSINOPHIL NFR BLD AUTO: 1 %
ERYTHROCYTE [DISTWIDTH] IN BLOOD BY AUTOMATED COUNT: 19.1 % (ref 10–15)
GFR SERPL CREATININE-BSD FRML MDRD: 40 ML/MIN/{1.73_M2}
GLUCOSE SERPL-MCNC: 110 MG/DL (ref 70–99)
HCT VFR BLD AUTO: 33.6 % (ref 40–53)
HGB BLD-MCNC: 10.8 G/DL (ref 13.3–17.7)
IMM GRANULOCYTES # BLD: 0 10E9/L (ref 0–0.4)
IMM GRANULOCYTES NFR BLD: 0.6 %
INR PPP: 1.05 (ref 0.86–1.14)
LYMPHOCYTES # BLD AUTO: 1.3 10E9/L (ref 0.8–5.3)
LYMPHOCYTES NFR BLD AUTO: 18.7 %
MCH RBC QN AUTO: 27.6 PG (ref 26.5–33)
MCHC RBC AUTO-ENTMCNC: 32.1 G/DL (ref 31.5–36.5)
MCV RBC AUTO: 86 FL (ref 78–100)
MONOCYTES # BLD AUTO: 0.9 10E9/L (ref 0–1.3)
MONOCYTES NFR BLD AUTO: 13.2 %
NEUTROPHILS # BLD AUTO: 4.5 10E9/L (ref 1.6–8.3)
NEUTROPHILS NFR BLD AUTO: 66.2 %
NRBC # BLD AUTO: 0 10*3/UL
NRBC BLD AUTO-RTO: 0 /100
NT-PROBNP SERPL-MCNC: 373 PG/ML (ref 0–900)
PLATELET # BLD AUTO: 468 10E9/L (ref 150–450)
POTASSIUM SERPL-SCNC: 3.8 MMOL/L (ref 3.4–5.3)
PROT SERPL-MCNC: 6.7 G/DL (ref 6.8–8.8)
RBC # BLD AUTO: 3.91 10E12/L (ref 4.4–5.9)
SODIUM SERPL-SCNC: 138 MMOL/L (ref 133–144)
WBC # BLD AUTO: 6.7 10E9/L (ref 4–11)

## 2021-05-19 PROCEDURE — 999N000132 HC STATISTIC PP CARE STAGE 1

## 2021-05-19 PROCEDURE — 86141 C-REACTIVE PROTEIN HS: CPT | Performed by: INTERNAL MEDICINE

## 2021-05-19 PROCEDURE — 83880 ASSAY OF NATRIURETIC PEPTIDE: CPT | Performed by: INTERNAL MEDICINE

## 2021-05-19 PROCEDURE — 272N000001 HC OR GENERAL SUPPLY STERILE: Performed by: INTERNAL MEDICINE

## 2021-05-19 PROCEDURE — 36415 COLL VENOUS BLD VENIPUNCTURE: CPT | Performed by: INTERNAL MEDICINE

## 2021-05-19 PROCEDURE — 250N000009 HC RX 250: Performed by: INTERNAL MEDICINE

## 2021-05-19 PROCEDURE — 272N000002 HC OR SUPPLY OTHER OPNP: Performed by: INTERNAL MEDICINE

## 2021-05-19 PROCEDURE — 85610 PROTHROMBIN TIME: CPT | Performed by: INTERNAL MEDICINE

## 2021-05-19 PROCEDURE — 80053 COMPREHEN METABOLIC PANEL: CPT | Performed by: INTERNAL MEDICINE

## 2021-05-19 PROCEDURE — 93451 RIGHT HEART CATH: CPT | Performed by: INTERNAL MEDICINE

## 2021-05-19 PROCEDURE — 85025 COMPLETE CBC W/AUTO DIFF WBC: CPT | Performed by: INTERNAL MEDICINE

## 2021-05-19 PROCEDURE — 99213 OFFICE O/P EST LOW 20 MIN: CPT | Mod: 95 | Performed by: INTERNAL MEDICINE

## 2021-05-19 RX ORDER — LIDOCAINE 40 MG/G
CREAM TOPICAL
Status: COMPLETED | OUTPATIENT
Start: 2021-05-19 | End: 2021-05-19

## 2021-05-19 RX ADMIN — LIDOCAINE: 40 CREAM TOPICAL at 10:21

## 2021-05-19 ASSESSMENT — MIFFLIN-ST. JEOR: SCORE: 2112.78

## 2021-05-19 NOTE — LETTER
5/19/2021      RE: Olivier AGUDELO Rico  [address]  [place] [address]       Olivier is a 56 year old who is being evaluated via a billable video visit.      Impression:  1. PAH  2. History of bone marrow transplant  3. CKD  4. Thrombocytosis    Plan:  1. RTC 4 months as now normal PVR and PA pressure    The patient returns for follow-up of  PAH.  There is no interim history of chest pain, tightness, paroxysmal nocturnal dyspnea, orthopnea, peripheral edema, palpitation, pre-syncope, syncope, device discharge.  Exercise tolerance is stable.  The patient is attempting to exercise regularly and following a sodium restricted, calorically appropriate diet.  Medications are reviewed and the patient is taking medications as prescribed.  The patient is generally sleeping well.     Constitutional: weight loss, fever, chills, night sweats  HEENT: without visual changes, swallow difficulties  Pulmonary: without shortness of breath, cough, wheeze, hemoptysis  Cardiac: without chest pain, CORREA, PND, orthopnea, edema, palpitation, pre-syncope, syncope,  GI: without diarrhea, constipation, jaundice, melena, GERD, hematemesis  : without frequency, urgency, dysuria, hematuria  Skin: rash, bruise, open lesions  Neuro: without TIA, focal neurologic complaints, seizure, trauma  Ortho: without pain, swelling, mobility impairment  Endocrine: diabetes, thyroid, heat/cold intolerance, polyuria, polyphagia, change bowel habits.  Sleep:no JULISSA, periodic breathing, fatigue    Current Outpatient Medications   Medication     atovaquone (MEPRON) 750 MG/5ML suspension     calcium carbonate (OS-PANFILO 500 MG Resighini. CA) 500 MG tablet     CLONAZEPAM PO     digoxin (LANOXIN) 125 MCG tablet     FLUCONAZOLE PO     furosemide (LASIX) 20 MG tablet     OPSUMIT 10 MG tablet     Pantoprazole Sodium (PROTONIX PO)     Penicillin V Potassium (PEN-VEE K OR)     predniSONE (DELTASONE) 2.5 MG tablet     rosuvastatin (CRESTOR) 5 MG tablet     ruxolitinib  (JAKAFI) 5 MG TABS tablet CHEMO     selexipag (UPTRAVI) 1600 MCG tablet     Specialty Vitamins Products (MAGNESIUM PLUS PROTEIN) 133 MG tablet     tadalafil, PAH, 20 MG TABS     Ursodiol (ACTIGALL PO)     VALACYCLOVIR HCL PO     VITAMIN D, CHOLECALCIFEROL, PO     prednisoLONE 5 MG tablet     No current facility-administered medications for this visit.    Results for BENEDICT ESTES (MRN [id number]) as of 6/9/2021 13:13   Ref. Range 3/18/2021 00:00 3/18/2021 00:00 5/15/2021 08:40 5/19/2021 09:48   Sodium Latest Ref Range: 133 - 144 mmol/L    138   Potassium Latest Ref Range: 3.4 - 5.3 mmol/L    3.8   Chloride Latest Ref Range: 94 - 109 mmol/L    107   Carbon Dioxide Latest Ref Range: 20 - 32 mmol/L    25   Urea Nitrogen Latest Ref Range: 7 - 30 mg/dL    23   Creatinine Latest Ref Range: 0.66 - 1.25 mg/dL    1.83 (H)   GFR Estimate Latest Ref Range: >60 mL/min/1.73_m2    40 (L)   GFR Estimate If Black Latest Ref Range: >60 mL/min/1.73_m2    46 (L)   Calcium Latest Ref Range: 8.5 - 10.1 mg/dL    8.9   Anion Gap Latest Ref Range: 3 - 14 mmol/L    6   Albumin Latest Ref Range: 3.4 - 5.0 g/dL    3.9   Protein Total Latest Ref Range: 6.8 - 8.8 g/dL    6.7 (L)   Bilirubin Total Latest Ref Range: 0.2 - 1.3 mg/dL    0.5   Alkaline Phosphatase Latest Ref Range: 40 - 150 U/L    66   ALT Latest Ref Range: 0 - 70 U/L    30   AST Latest Ref Range: 0 - 45 U/L    29   N-Terminal Pro BNP Inpatient Latest Ref Range: 0 - 900 pg/mL    373   Glucose Latest Ref Range: 70 - 99 mg/dL    110 (H)   WBC Latest Ref Range: 4.0 - 11.0 10e9/L    6.7   Hemoglobin Latest Ref Range: 13.3 - 17.7 g/dL    10.8 (L)   Hematocrit Latest Ref Range: 40.0 - 53.0 %    33.6 (L)   Platelet Count Latest Ref Range: 150 - 450 10e9/L    468 (H)   RBC Count Latest Ref Range: 4.4 - 5.9 10e12/L    3.91 (L)   MCV Latest Ref Range: 78 - 100 fl    86   MCH Latest Ref Range: 26.5 - 33.0 pg    27.6   MCHC Latest Ref Range: 31.5 - 36.5 g/dL    32.1   RDW Latest Ref Range: 10.0  - 15.0 %    19.1 (H)   Diff Method Unknown    Automated Method   % Neutrophils Latest Units: %    66.2   % Lymphocytes Latest Units: %    18.7   % Monocytes Latest Units: %    13.2   % Eosinophils Latest Units: %    1.0   % Basophils Latest Units: %    0.3   % Immature Granulocytes Latest Units: %    0.6   Nucleated RBCs Latest Ref Range: 0 /100    0   Absolute Neutrophil Latest Ref Range: 1.6 - 8.3 10e9/L    4.5   Absolute Lymphocytes Latest Ref Range: 0.8 - 5.3 10e9/L    1.3   Absolute Monocytes Latest Ref Range: 0.0 - 1.3 10e9/L    0.9   Absolute Eosinophils Latest Ref Range: 0.0 - 0.7 10e9/L    0.1   Absolute Basophils Latest Ref Range: 0.0 - 0.2 10e9/L    0.0   Abs Immature Granulocytes Latest Ref Range: 0 - 0.4 10e9/L    0.0   Absolute Nucleated RBC Unknown    0.0   INR Latest Ref Range: 0.86 - 1.14     1.05   COVID-19 Virus by PCR (External Result) Latest Ref Range: Not Detected    Not Detected ((NONE))    CRP Cardiac Risk Latest Units: mg/L    1.6   LAB RESULT - HIM SCAN Unknown Attch Attch          Name: BENEDICT ESTES  MRN: [id number]  : 1965  Study Date: 2020 10:05 AM  Age: 55 yrs  Gender: Male  Patient [facility]: [facility]  Reason For Study: Pulmonary hypertension (H), SOB (shortness of breath)  Ordering Physician: PILI SEGURA  Referring Physician: PILI SEGURA  Performed By: Rosa Leal RDCS, RVT     BSA: 2.5 m2  Height: 77 in  Weight: 254 lb  BP: 125/56 mmHg  _____________________________________________________________________________  __        Procedure  Complete Portable Echo Adult.  _____________________________________________________________________________  __        Interpretation Summary  Left ventricular function, chamber size, wall motion, and wall thickness are  normal.The EF is 60-65%.  Mild right ventricular dilation is present. Global right ventricular function  is normal.  IVC diameter <2.1 cm collapsing >50% with sniff suggests a normal RA pressure  of 3  mmHg.  Right ventricular systolic pressure is 42mmHg above the right atrial pressure.  No pericardial effusion is present.  Compared to prior, measured RVSP is lower, no other change.  _____________________________________________________________________________  __        Left Ventricle  Left ventricular function, chamber size, wall motion, and wall thickness are  Hemodynamics    Hemodynamics RA: 5/8/5  RV: 44/5  PA: 44/20/28  PCWP: 10/9/7    PA Sat: 71.1%  Carter CO: 6.7 L/min  Carter CI: 2.68 L/min/m2    TD CO: 9.85 L/min  TD CI: 3.95 L/min/m2    PVR: 2.99 STEPHENS  TRP: 4.18 STEPHENS    Right sided filling pressures are normal. Left sided filling pressures are normal. Moderately elevated pulmonary artery hypertension. Left ventricular filling pressures are normal. Normal cardiac output level.   Pressures Phase: Baseline     Time Systolic (mmHg) Diastolic (mmHg) Mean (mmHg) A Wave (mmHg) V Wave (mmHg) EDP (mmHg) Max dp/dt (mmHg/sec) HR (bpm) Content (mL/dL) SAT (%)   RA Pressures 11:56 AM   5    5    8      66        RV Pressures 11:57 AM 44        5     75        PA Pressures 11:57 AM 44    20    28        69        PCW Pressures 11:59 AM   7    10    9      63         11:59 AM   8        67        Blood Flow Results Phase: Baseline     Time Results  Indexed Values (L/min/m2)   QP 11:44 AM 6.7 L/min    2.68       12:05 PM 7.52 L/min    3.01      QS 11:44 AM 6.7 L/min    2.68       12:05 PM 7.52 L/min    3.01      Blood Oximetry Phase: Baseline     Time Hb  SAT(%)  PO2  Content (mL/dL) PA Sat (%)   PA 11:44 AM  71.1 %      71.1       12:05 PM  71.1 %      71.1      Art 11:44 AM  100 %     14.69        12:05 PM  100 %     14.69       Cardiac Output Phase: Baseline     Time TDCO (L/min) TDCI (L/min/m2) Carter C.O. (L/min) Carter C.I. (L/min/m2) Carter HR (bpm)   Cardiac Output Results 11:44 AM 9.85    3.95    6.7    2.68        12:01 PM 9.85           12:05 PM   7.52    3.01       Resistance Results Phase: Baseline     Time PVR  SVR   TPR  TVR  PVR/SVR  TPR/TVR    Resistance Results (Metric) 11:44 .77 dsc-5     334.27 dsc-5         Resistance Results (Wood) 11:44 AM 2.99 STEPEHNS     4.18 STEPHENS         Stoke Volume Results Phase: Baseline     Time RVSW (gm*m) LVSW (gm*m) RVSW-I (gm*m/m2) LVSW-I (gm*m/m2)   Stroke Work Results 11:44 AM 30.37     12.17                 Dk Quan MD

## 2021-05-19 NOTE — IP AVS SNAPSHOT
After Visit Summary Template Not Found    This Print Group is only intended to be used in the After Visit Summary and can only be used in a report that uses a released After Visit Summary Template.                       MRN:[id number]                      After Visit Summary   5/19/2021    Olivier Gómez    MRN: [id number]           Visit Information        Department      5/19/2021  9:00 AM Formerly [facility] [address] [place]          Review of your medicines      UNREVIEWED medicines. Ask your doctor about these medicines       Dose / Directions   ACTIGALL PO      Dose: 300 mg  Take 300 mg by mouth 2 times daily  Refills: 0     atovaquone 750 MG/5ML suspension  Commonly known as: MEPRON      Dose: 1,500 mg  Take 1,500 mg by mouth daily  Refills: 0     calcium carbonate 500 MG tablet  Commonly known as: OS-PANFILO      Dose: 600 mg  Take 600 mg by mouth 2 times daily  Refills: 0     CLONAZEPAM PO      Dose: 1 mg  Take 1 mg by mouth At Bedtime  Refills: 0     digoxin 125 MCG tablet  Commonly known as: LANOXIN  Used for: Pulmonary hypertension (H)      Dose: 125 mcg  Take 1 tablet (125 mcg) by mouth daily  Quantity: 90 tablet  Refills: 3     FLUCONAZOLE PO      Dose: 200 mg  Take 200 mg by mouth daily  Refills: 0     furosemide 20 MG tablet  Commonly known as: LASIX  Used for: Pulmonary hypertension (H)      Dose: 20 mg  Take 1 tablet (20 mg) by mouth daily  Quantity: 90 tablet  Refills: 3     magnesium plus protein 133 MG tablet      Dose: 266 mg  Take 266 mg by mouth daily (2 tablets)  Refills: 0     Opsumit 10 MG tablet  Used for: Pulmonary hypertension (H)  Generic drug: macitentan      Dose: 10 mg  Take 1 tablet (10 mg) by mouth daily  Quantity: 30 tablet  Refills: 11     PEN-VEE K OR      Dose: 500 mg  Take 500 mg by mouth 2 times daily  Refills: 0     prednisoLONE 5 MG tablet      Dose: 5 mg  Take 5 mg by mouth daily  Refills: 0     predniSONE 2.5 MG tablet  Commonly known as: DELTASONE  Used for: Pulmonary  hypertension (H)      Take two tablets (5 mg) by mouth every other day.  Quantity: 90 tablet  Refills: 3     PROTONIX PO      Dose: 40 mg  Take 40 mg by mouth daily  Refills: 0     rosuvastatin 5 MG tablet  Commonly known as: CRESTOR  Used for: Pulmonary HTN (H), Dyslipidemia      Dose: 5 mg  Take 1 tablet (5 mg) by mouth daily  Quantity: 90 tablet  Refills: 3     ruxolitinib 5 MG Tabs tablet  Commonly known as: Jakafi  Used for: Pericardial effusion, Pulmonary hypertension (H), Tegvb-yyowrq-ilct disease (H)      Dose: 5 mg  Take 1 tablet (5 mg) by mouth 2 times daily  Quantity: 60 tablet  Refills: 0     selexipag 1600 MCG tablet  Commonly known as: UPTRAVI  Used for: Pulmonary hypertension (H)      Dose: 1,600 mcg  Take 1 tablet (1,600 mcg) by mouth every 12 hours  Quantity: 60 tablet  Refills: 11     tadalafil (PAH) 20 MG Tabs  Used for: Pulmonary hypertension (H)      Dose: 40 mg  Take 2 tablets (40 mg) by mouth daily  Quantity: 60 tablet  Refills: 11     VALACYCLOVIR HCL PO      Dose: 500 mg  Take 500 mg by mouth every other day  Refills: 0     VITAMIN D (CHOLECALCIFEROL) PO      Dose: 2,000 Units  Take 2,000 Units by mouth daily  Refills: 0              Protect others around you: Learn how to safely use, store and throw away your medicines at www.disposemymeds.org.       Follow-ups after your visit       Your next 10 appointments already scheduled    May 19, 2021  Procedure with Nikkie Lundy MD  [facility] ([facility] [place] ) [address] [address]  [phone]   The [facility] [place] is located on the corner of [address] and [address] on the [address]. It is easily accessible from virtually any point in the [facility] area, via [email or web address] and [email or web address].   May 19, 2021  3:30 PM  (Arrive  by 3:15 PM)  RETURN PRIMARY PULMONARY with Dk Quan MD  [facility] ([facility] ) [address] [address] [phone]         Care Instructions       Further instructions from your care team       [facility]                        Interventional Cardiology  Discharge Instructions   Post Right Heart Cath      AFTER YOU GO HOME:    DO drink plenty of fluids    DO resume your regular diet and medications unless otherwise instructed by your Primary Physician    Do Not scrub the procedure site vigorously    No lotion or powder to the puncture site for 3 days    CALL YOUR PRIMARY PHYSICIAN IF: You may resume all normal activity.  Monitor neck site for bleeding, swelling, or voice changes. If you notice bleeding or swelling immediately apply pressure to the site and call number below to speak with Cardiology Fellow.  If you experience any changes in your breathing you should call your doctor immediately or come to the closest Emergency Department.  Do not drive yourself.    ADDITIONAL INSTRUCTIONS: Medications: You are to resume all home medications including anticoagulation therapy unless otherwise advised by your primary cardiologist or nurse coordinator.    Follow Up: Per your primary cardiology team    If you have any questions or concerns regarding your procedure site please call [phone] at anytime and ask for Cardiology Fellow on call.  They are available 24 hours a day.  You may also contact the Cardiology Clinic after hours number at [phone].                                                       Telephone Numbers [phone] Monday-Friday 8:00 am to 4:30 pm    [phone] After 4:30 pm Monday-Friday, Weekends & Holidays  Ask for Interventional Cardiologist on call. Someone is on call 24 hours/day   [facility] toll free number [phone] Monday-Friday 8:00 am to 4:30 pm  "  [facility] Emergency Dept [phone]                   Additional Information About Your Visit       [employer] Information    Sendmybag gives you secure access to your electronic health record. If you see a primary care provider, you can also send messages to your care team and make appointments. If you have questions, please call your primary care clinic.  If you do not have a primary care provider, please call [phone] and they will assist you.       Care EveryWhere ID    This is your Care EveryWhere ID. This could be used by other organizations to access your [facility] medical records  [id number]       Your Vitals Were  Most recent update: 5/19/2021 10:17 AM    Blood Pressure   106/68 (BP Location: Right arm)          Pulse   77          Temperature   98.4  F (36.9  C) (Oral)          Respirations   18          Height   1.956 m (6' 5.01\")             Weight   117 kg (258 lb)    BMI (Body Mass Index)   30.59 kg/m           Primary Care Provider    Adelso Delgado      Equal Access to Services    [facility]: Hadii aad ku hadasho Soomaali, waaxda luqadaha, qaybta kaalmada adeegyada, waxay idiin hayaan adeeg kharaashley carbajaln . So [facility] [phone].    ATENCIÓN: Si habla español, tiene a garza disposición servicios gratuitos de asistencia lingüística. Llame al [phone].    We comply with applicable federal and state civil rights laws, including the [place] Human Rights Act. We do not discriminate on the basis of race, color, creed, Sabianism, national origin, marital status, age, disability, sex, sexual orientation, or gender identity.    If you would like an itemization of your charges they will now be available in Sendmybag 30 days after discharge. To access the itemized statements in Sendmybag go to billing/billing summary. From there select view account. There will be multiple tabs showing an overview of your account, detail, payments, and communications. From the communications tab you can see your monthly " statements, your itemized statements, and any billing letters generated for your account. If you do not have a [email or web address] account and need help getting access please contact [email or web address] support at [phone].  If you would prefer to have your itemized statements mailed please contact our automated itemized bill request line at [phone] option  2.       Thank you!    Thank you for choosing [facility] for your care. Our goal is always to provide you with excellent care. Hearing back from our patients is one way we can continue to improve our services. Please take a few minutes to complete the written survey that you may receive in the mail after you visit with us. Thank you!            Medication List      ASK your doctor about these medications          Morning Afternoon Evening Bedtime As Needed    ACTIGALL PO  INSTRUCTIONS: Take 300 mg by mouth 2 times daily                     atovaquone 750 MG/5ML suspension  Also known as: MEPRON  INSTRUCTIONS: Take 1,500 mg by mouth daily                     calcium carbonate 500 MG tablet  Also known as: OS-PANFILO  INSTRUCTIONS: Take 600 mg by mouth 2 times daily                     CLONAZEPAM PO  INSTRUCTIONS: Take 1 mg by mouth At Bedtime                     digoxin 125 MCG tablet  Also known as: LANOXIN  INSTRUCTIONS: Take 1 tablet (125 mcg) by mouth daily                     FLUCONAZOLE PO  INSTRUCTIONS: Take 200 mg by mouth daily                     furosemide 20 MG tablet  Also known as: LASIX  INSTRUCTIONS: Take 1 tablet (20 mg) by mouth daily                     magnesium plus protein 133 MG tablet  INSTRUCTIONS: Take 266 mg by mouth daily (2 tablets)                     Opsumit 10 MG tablet  INSTRUCTIONS: Take 1 tablet (10 mg) by mouth daily  Generic drug: macitentan                     PEN-VEE K OR  INSTRUCTIONS: Take 500 mg by mouth 2 times daily                     prednisoLONE 5 MG tablet  INSTRUCTIONS: Take 5 mg by mouth daily                     predniSONE 2.5 MG  tablet  Also known as: DELTASONE  INSTRUCTIONS: Take two tablets (5 mg) by mouth every other day.                     PROTONIX PO  INSTRUCTIONS: Take 40 mg by mouth daily                     rosuvastatin 5 MG tablet  Also known as: CRESTOR  INSTRUCTIONS: Take 1 tablet (5 mg) by mouth daily                     ruxolitinib 5 MG Tabs tablet  Also known as: Jakafi  INSTRUCTIONS: Take 1 tablet (5 mg) by mouth 2 times daily                     selexipag 1600 MCG tablet  Also known as: UPTRAVI  INSTRUCTIONS: Take 1 tablet (1,600 mcg) by mouth every 12 hours                     tadalafil (PAH) 20 MG Tabs  INSTRUCTIONS: Take 2 tablets (40 mg) by mouth daily                     VALACYCLOVIR HCL PO  INSTRUCTIONS: Take 500 mg by mouth every other day                     VITAMIN D (CHOLECALCIFEROL) PO  INSTRUCTIONS: Take 2,000 Units by mouth daily

## 2021-05-19 NOTE — PROGRESS NOTES
Olivier is a 56 year old who is being evaluated via a billable video visit.      Impression:  1. PAH  2. History of bone marrow transplant  3. CKD  4. Thrombocytosis    Plan:  1. RTC 4 months as now normal PVR and PA pressure    The patient returns for follow-up of  PAH.  There is no interim history of chest pain, tightness, paroxysmal nocturnal dyspnea, orthopnea, peripheral edema, palpitation, pre-syncope, syncope, device discharge.  Exercise tolerance is stable.  The patient is attempting to exercise regularly and following a sodium restricted, calorically appropriate diet.  Medications are reviewed and the patient is taking medications as prescribed.  The patient is generally sleeping well.     Constitutional: weight loss, fever, chills, night sweats  HEENT: without visual changes, swallow difficulties  Pulmonary: without shortness of breath, cough, wheeze, hemoptysis  Cardiac: without chest pain, CORREA, PND, orthopnea, edema, palpitation, pre-syncope, syncope,  GI: without diarrhea, constipation, jaundice, melena, GERD, hematemesis  : without frequency, urgency, dysuria, hematuria  Skin: rash, bruise, open lesions  Neuro: without TIA, focal neurologic complaints, seizure, trauma  Ortho: without pain, swelling, mobility impairment  Endocrine: diabetes, thyroid, heat/cold intolerance, polyuria, polyphagia, change bowel habits.  Sleep:no JULISSA, periodic breathing, fatigue    Current Outpatient Medications   Medication     atovaquone (MEPRON) 750 MG/5ML suspension     calcium carbonate (OS-PANFILO 500 MG [place]. [place]) 500 MG tablet     CLONAZEPAM PO     digoxin (LANOXIN) 125 MCG tablet     FLUCONAZOLE PO     furosemide (LASIX) 20 MG tablet     OPSUMIT 10 MG tablet     Pantoprazole Sodium (PROTONIX PO)     Penicillin V Potassium (PEN-VEE K OR)     predniSONE (DELTASONE) 2.5 MG tablet     rosuvastatin (CRESTOR) 5 MG tablet     ruxolitinib (JAKAFI) 5 MG TABS tablet CHEMO     selexipag (UPTRAVI) 1600 MCG tablet     Specialty  Vitamins Products (MAGNESIUM PLUS PROTEIN) 133 MG tablet     tadalafil, PAH, 20 MG TABS     Ursodiol (ACTIGALL PO)     VALACYCLOVIR HCL PO     VITAMIN D, CHOLECALCIFEROL, PO     prednisoLONE 5 MG tablet     No current facility-administered medications for this visit.    Results for BENEDICT ESTES (MRN [id number]) as of 6/9/2021 13:13   Ref. Range 3/18/2021 00:00 3/18/2021 00:00 5/15/2021 08:40 5/19/2021 09:48   Sodium Latest Ref Range: 133 - 144 mmol/L    138   Potassium Latest Ref Range: 3.4 - 5.3 mmol/L    3.8   Chloride Latest Ref Range: 94 - 109 mmol/L    107   Carbon Dioxide Latest Ref Range: 20 - 32 mmol/L    25   Urea Nitrogen Latest Ref Range: 7 - 30 mg/dL    23   Creatinine Latest Ref Range: 0.66 - 1.25 mg/dL    1.83 (H)   GFR Estimate Latest Ref Range: >60 mL/min/1.73_m2    40 (L)   GFR Estimate If Black Latest Ref Range: >60 mL/min/1.73_m2    46 (L)   Calcium Latest Ref Range: 8.5 - 10.1 mg/dL    8.9   Anion Gap Latest Ref Range: 3 - 14 mmol/L    6   Albumin Latest Ref Range: 3.4 - 5.0 g/dL    3.9   Protein Total Latest Ref Range: 6.8 - 8.8 g/dL    6.7 (L)   Bilirubin Total Latest Ref Range: 0.2 - 1.3 mg/dL    0.5   Alkaline Phosphatase Latest Ref Range: 40 - 150 U/L    66   ALT Latest Ref Range: 0 - 70 U/L    30   AST Latest Ref Range: 0 - 45 U/L    29   N-Terminal Pro BNP Inpatient Latest Ref Range: 0 - 900 pg/mL    373   Glucose Latest Ref Range: 70 - 99 mg/dL    110 (H)   WBC Latest Ref Range: 4.0 - 11.0 10e9/L    6.7   Hemoglobin Latest Ref Range: 13.3 - 17.7 g/dL    10.8 (L)   Hematocrit Latest Ref Range: 40.0 - 53.0 %    33.6 (L)   Platelet Count Latest Ref Range: 150 - 450 10e9/L    468 (H)   RBC Count Latest Ref Range: 4.4 - 5.9 10e12/L    3.91 (L)   MCV Latest Ref Range: 78 - 100 fl    86   MCH Latest Ref Range: 26.5 - 33.0 pg    27.6   MCHC Latest Ref Range: 31.5 - 36.5 g/dL    32.1   RDW Latest Ref Range: 10.0 - 15.0 %    19.1 (H)   Diff Method Unknown    Automated Method   % Neutrophils Latest  Units: %    66.2   % Lymphocytes Latest Units: %    18.7   % Monocytes Latest Units: %    13.2   % Eosinophils Latest Units: %    1.0   % Basophils Latest Units: %    0.3   % Immature Granulocytes Latest Units: %    0.6   Nucleated RBCs Latest Ref Range: 0 /100    0   Absolute Neutrophil Latest Ref Range: 1.6 - 8.3 10e9/L    4.5   Absolute Lymphocytes Latest Ref Range: 0.8 - 5.3 10e9/L    1.3   Absolute Monocytes Latest Ref Range: 0.0 - 1.3 10e9/L    0.9   Absolute Eosinophils Latest Ref Range: 0.0 - 0.7 10e9/L    0.1   Absolute Basophils Latest Ref Range: 0.0 - 0.2 10e9/L    0.0   Abs Immature Granulocytes Latest Ref Range: 0 - 0.4 10e9/L    0.0   Absolute Nucleated RBC Unknown    0.0   INR Latest Ref Range: 0.86 - 1.14     1.05   COVID-19 Virus by PCR (External Result) Latest Ref Range: Not Detected    Not Detected ((NONE))    CRP Cardiac Risk Latest Units: mg/L    1.6   LAB RESULT - HIM SCAN Unknown Attch Attch          Name: BENEDICT ESTES  MRN: [id number]  : 1965  Study Date: 2020 10:05 AM  Age: 55 yrs  Gender: Male  Patient Location: [facility]  Reason For Study: Pulmonary hypertension (H), SOB (shortness of breath)  Ordering Physician: PILI SEGURA  Referring Physician: PILI SEGURA  Performed By: Rosa Leal RDCS, OLIVERIO     BSA: 2.5 m2  Height: 77 in  Weight: 254 lb  BP: 125/56 mmHg  _____________________________________________________________________________  __        Procedure  Complete Portable Echo Adult.  _____________________________________________________________________________  __        Interpretation Summary  Left ventricular function, chamber size, wall motion, and wall thickness are  normal.The EF is 60-65%.  Mild right ventricular dilation is present. Global right ventricular function  is normal.  IVC diameter <2.1 cm collapsing >50% with sniff suggests a normal RA pressure  of 3 mmHg.  Right ventricular systolic pressure is 42mmHg above the right atrial pressure.  No  pericardial effusion is present.  Compared to prior, measured RVSP is lower, no other change.  _____________________________________________________________________________  __        Left Ventricle  Left ventricular function, chamber size, wall motion, and wall thickness are  Hemodynamics    Hemodynamics RA: 5/8/5  RV: 44/5  PA: 44/20/28  PCWP: 10/9/7    PA Sat: 71.1%  Carter CO: 6.7 L/min  Carter CI: 2.68 L/min/m2    TD CO: 9.85 L/min  TD CI: 3.95 L/min/m2    PVR: 2.99 STEPHENS  TRP: 4.18 STEPHENS    Right sided filling pressures are normal. Left sided filling pressures are normal. Moderately elevated pulmonary artery hypertension. Left ventricular filling pressures are normal. Normal cardiac output level.   Pressures Phase: Baseline     Time Systolic (mmHg) Diastolic (mmHg) Mean (mmHg) A Wave (mmHg) V Wave (mmHg) EDP (mmHg) Max dp/dt (mmHg/sec) HR (bpm) Content (mL/dL) SAT (%)   RA Pressures 11:56 AM   5    5    8      66        RV Pressures 11:57 AM 44        5     75        PA Pressures 11:57 AM 44    20    28        69        PCW Pressures 11:59 AM   7    10    9      63         11:59 AM   8        67        Blood Flow Results Phase: Baseline     Time Results  Indexed Values (L/min/m2)   QP 11:44 AM 6.7 L/min    2.68       12:05 PM 7.52 L/min    3.01      QS 11:44 AM 6.7 L/min    2.68       12:05 PM 7.52 L/min    3.01      Blood Oximetry Phase: Baseline     Time Hb  SAT(%)  PO2  Content (mL/dL) PA Sat (%)   PA 11:44 AM  71.1 %      71.1       12:05 PM  71.1 %      71.1      Art 11:44 AM  100 %     14.69        12:05 PM  100 %     14.69       Cardiac Output Phase: Baseline     Time TDCO (L/min) TDCI (L/min/m2) Carter C.O. (L/min) Carter C.I. (L/min/m2) Carter HR (bpm)   Cardiac Output Results 11:44 AM 9.85    3.95    6.7    2.68        12:01 PM 9.85           12:05 PM   7.52    3.01       Resistance Results Phase: Baseline     Time PVR  SVR  TPR  TVR  PVR/SVR  TPR/TVR    Resistance Results (Metric) 11:44 .77 dsc-5     334.27  dsc-5         Resistance Results (Wood) 11:44 AM 2.99 STEPHENS     4.18 STEPHENS         Stoke Volume Results Phase: Baseline     Time RVSW (gm*m) LVSW (gm*m) RVSW-I (gm*m/m2) LVSW-I (gm*m/m2)   Stroke Work Results 11:44 AM 30.37     12.17

## 2021-05-19 NOTE — PROGRESS NOTES
Patient tolerated recovery stage well. VSS, right neck site clean/dry/intact, no hematoma, and denies pain. Patient tolerated PO food and fluids. Teaching was done and discharge instructions were given. Patient ambulated, voided.. Patient discharged from the hospital via wheel chair to home with wife.

## 2021-05-19 NOTE — IP AVS SNAPSHOT
[facility] [address] [address]                                    After Visit Summary   5/19/2021    Olivier Gómez    MRN: [id number]           After Visit Summary Signature Page    I have received my discharge instructions, and my questions have been answered. I have discussed any challenges I see with this plan with the nurse or doctor.    ..........................................................................................................................................  Patient/Patient Representative Signature      ..........................................................................................................................................  Patient Representative Print Name and Relationship to Patient    ..................................................               ................................................  Date                                   Time    ..........................................................................................................................................  Reviewed by Signature/Title    ...................................................              ..............................................  Date                                               Time          22EPIC Rev 08/18

## 2021-05-19 NOTE — LETTER
5/19/2021      RE: Olivier Gómez  [address]  [place] [address]       Dear Colleague,    Thank you for the opportunity to participate in the care of your patient, Olivier Gómez, at the [facility] [place] [facility]. Please see a copy of my visit note below.    Olivier is a 56 year old who is being evaluated via a billable video visit.      How would you like to obtain your AVS? MyChart  If the video visit is dropped, the invitation should be resent by: Send to e-mail at: [email or web address]  Will anyone else be joining your video visit? No        Please do not hesitate to contact me if you have any questions/concerns.     Sincerely,     Dk Quan MD

## 2021-05-19 NOTE — DISCHARGE INSTRUCTIONS
[facility]                        Interventional Cardiology  Discharge Instructions   Post Right Heart Cath      AFTER YOU GO HOME:    DO drink plenty of fluids    DO resume your regular diet and medications unless otherwise instructed by your Primary Physician    Do Not scrub the procedure site vigorously    No lotion or powder to the puncture site for 3 days    CALL YOUR PRIMARY PHYSICIAN IF: You may resume all normal activity.  Monitor neck site for bleeding, swelling, or voice changes. If you notice bleeding or swelling immediately apply pressure to the site and call number below to speak with Cardiology Fellow.  If you experience any changes in your breathing you should call your doctor immediately or come to the closest Emergency Department.  Do not drive yourself.    ADDITIONAL INSTRUCTIONS: Medications: You are to resume all home medications including anticoagulation therapy unless otherwise advised by your primary cardiologist or nurse coordinator.    Follow Up: Per your primary cardiology team    If you have any questions or concerns regarding your procedure site please call [phone] at anytime and ask for Cardiology Fellow on call.  They are available 24 hours a day.  You may also contact the Cardiology Clinic after hours number at [phone].                                                       Telephone Numbers [phone] Monday-Friday 8:00 am to 4:30 pm    [phone] After 4:30 pm Monday-Friday, Weekends & Holidays  Ask for Interventional Cardiologist on call. Someone is on call 24 hours/day   [facility] toll free number [phone] Monday-Friday 8:00 am to 4:30 pm   [facility] Emergency Dept [phone]

## 2021-05-19 NOTE — Clinical Note
dry, intact, no bleeding and no hematoma. 7fr RIJ sheath removed, manual pressure applied, hemostasis achieved, bandage placed

## 2021-05-19 NOTE — PROGRESS NOTES
Pt prepped for right heart cath.LMX cream applied to right neck.Consent signed with wife at bedside.Discharge instructions were reviewed and signed since pt has had several of [facility] and know the instructions.

## 2021-05-27 NOTE — NURSING NOTE
"Orders placed for 4 month F/U and patient marked \"ready for checkout.\" Chantel Erickson RN on 5/27/2021 at 3:42 PM    "

## 2021-06-15 DIAGNOSIS — I27.20 PULMONARY HYPERTENSION (H): ICD-10-CM

## 2021-06-17 NOTE — TELEPHONE ENCOUNTER
furosemide 20 mg tablet      Take 1 tablet (20 mg) by mouth daily  Last Written Prescription Date:  11-16-20  Last Fill Quantity: 90,   # refills: 3  Last Office Visit : 5-19-21  Future Office visit:  none    Routing refill request to provider for review/approval because:  Abnormal Cr: reviewed by provider, last clinic note  *pharmacy: patient states taking more than one per day                quite often.                  CAn we get a new Rx? Thanks  Pt taking differently than prescribed- per pharmacy

## 2021-06-18 ENCOUNTER — MYC MEDICAL ADVICE (OUTPATIENT)
Dept: CARDIOLOGY | Facility: CLINIC | Age: 56
End: 2021-06-18

## 2021-06-18 DIAGNOSIS — I27.20 PULMONARY HYPERTENSION (H): ICD-10-CM

## 2021-06-18 RX ORDER — FUROSEMIDE 20 MG
20 TABLET ORAL DAILY
Qty: 90 TABLET | Refills: 3 | Status: SHIPPED | OUTPATIENT
Start: 2021-06-18 | End: 2021-06-21

## 2021-06-18 NOTE — TELEPHONE ENCOUNTER
"Medication Refill double check:    Last virtual visit was on 5/19/21 with .    Follow up was recommended for 4 months.    Any additional encounters with changes to requested med? no    Authorizing provider is: Dr. Quan    Refill was approved.     Additional orders/notes:   After I signed the refill order, I noticed pharmacy had lkaced a message on the Rx stating patient advises them he uses more than 1 tab daily \"often\" so they were looking for a new Rx to represent that.    I am unable to locate any documentation that he was advised to do this, so I forwarded this encounter to Roberto Erickson asking her to follow up with patient & MD on this.    Ana Ashraf RN on 6/18/2021 at 8:29 AM      "

## 2021-06-21 ENCOUNTER — TRANSFERRED RECORDS (OUTPATIENT)
Dept: HEALTH INFORMATION MANAGEMENT | Facility: CLINIC | Age: 56
End: 2021-06-21

## 2021-06-21 RX ORDER — FUROSEMIDE 20 MG
20 TABLET ORAL DAILY
Qty: 120 TABLET | Refills: 3 | Status: SHIPPED | OUTPATIENT
Start: 2021-06-21 | End: 2022-06-30

## 2021-07-13 DIAGNOSIS — I27.20 PULMONARY HYPERTENSION (H): ICD-10-CM

## 2021-07-15 NOTE — TELEPHONE ENCOUNTER
predniSONE (DELTASONE) 2.5 MG  Last Written Prescription Date:  6/30/20  Last Fill Quantity: 90,   # refills: 3  Last Office Visit : 5/19/21  Future Office visit:  NONE  RTC   4 MOS  Routing refill request to provider for review/approval because:   Drug not on the refill protocol   #90 ?

## 2021-07-16 RX ORDER — PREDNISONE 2.5 MG/1
5 TABLET ORAL EVERY OTHER DAY
Qty: 90 TABLET | Refills: 3 | Status: SHIPPED | OUTPATIENT
Start: 2021-07-16 | End: 2022-06-30

## 2021-07-22 ENCOUNTER — TRANSFERRED RECORDS (OUTPATIENT)
Dept: HEALTH INFORMATION MANAGEMENT | Facility: CLINIC | Age: 56
End: 2021-07-22

## 2021-09-05 NOTE — PROGRESS NOTES
CARDIOLOGY PH CLINIC VIDEO VISIT    Date of video visit:  09/07/21      Olivier Gómez is a 56 year old male who is being evaluated via a billable video visit.        I have reviewed and updated the patient's Past Medical History, Social History, Family History and Medication List.    MEDICATIONS:  Current Outpatient Medications   Medication Sig Dispense Refill     atovaquone (MEPRON) 750 MG/5ML suspension Take 1,500 mg by mouth daily       calcium carbonate (OS-PANFILO 500 MG [place]. [place]) 500 MG tablet Take 600 mg by mouth 2 times daily       CLONAZEPAM PO Take 1 mg by mouth At Bedtime        digoxin (LANOXIN) 125 MCG tablet Take 1 tablet (125 mcg) by mouth daily 90 tablet 3     FLUCONAZOLE PO Take 200 mg by mouth daily       furosemide (LASIX) 20 MG tablet Take 1 tablet (20 mg) by mouth daily Can take an additional 20 mg weekly PRN. Call cardiology clinic if taking more than 20 mg PRN a week 120 tablet 3     OPSUMIT 10 MG tablet Take 1 tablet (10 mg) by mouth daily 30 tablet 11     Pantoprazole Sodium (PROTONIX PO) Take 40 mg by mouth daily        Penicillin V Potassium (PEN-VEE K OR) Take 500 mg by mouth 2 times daily       prednisoLONE 5 MG tablet Take 5 mg by mouth daily       predniSONE (DELTASONE) 2.5 MG tablet Take 2 tablets (5 mg) by mouth every other day 90 tablet 3     rosuvastatin (CRESTOR) 5 MG tablet Take 1 tablet (5 mg) by mouth daily 90 tablet 3     ruxolitinib (JAKAFI) 5 MG TABS tablet CHEMO Take 1 tablet (5 mg) by mouth 2 times daily 60 tablet 0     selexipag (UPTRAVI) 1600 MCG tablet Take 1 tablet (1,600 mcg) by mouth every 12 hours 60 tablet 11     Specialty Vitamins Products (MAGNESIUM PLUS PROTEIN) 133 MG tablet Take 266 mg by mouth daily (2 tablets)       tadalafil, PAH, 20 MG TABS Take 2 tablets (40 mg) by mouth daily 60 tablet 11     Ursodiol (ACTIGALL PO) Take 300 mg by mouth 2 times daily        VALACYCLOVIR HCL PO Take 500 mg by mouth every other day       VITAMIN D, CHOLECALCIFEROL, PO Take  "2,000 Units by mouth daily          ALLERGIES  Tegaderm transparent dressing (informational only)      Self reported vitals:  Weight: 267#  BP not reported      Brief physical exam:  General: In no acute distress, upright and calm.  Eyes: No apparent redness or discharge.   Chest: No labored breathing, no cough during exam or audible wheezing.   Neuro: No obvious focal defects or tremors.   Psych: Alert and oriented. Does not appear anxious.     The rest of a comprehensive physical examination is deferred due to public health emergency video visit restrictions.       Primary PH cardiologist: Dr. Quan      HPI:  Mr. Gómez is a pleasant 56 year old male with a PMhx including AML s/p bone marrow transplant, CKD, and scleroderma. He also has pulmonary hypertension, and is maintained on combination therapy with tadalafil, macitentan, and selexipag (previously on IV prostacyclin therapy). He was seen last by Dr. Dixon via virtual visit in May after having a repeat hemodynamic assessment. Notably, his PVR and PA pressures had normalized while on therapy. At that time, his digoxin was stopped, and his Jakafi was reduced to 5mg once daily.    Today, we are doing a virtual visit for follow up. He tells me that overall, his breathing is \"excellent.\" He works out on his elliptical machine without significant CORREA, and denies dizziness/presyncope. He has not had any chest pain or palpitations. He denies worsening lower extremity edema, but admits he hasn't been adhering to his low salt diet very well recently. He will take an additional Lasix on the days he knows he has more sodium in his diet. In fact, he went to the [facility] yesterday, and was able to walk around without any difficulty.     He had some labs done locally at [place] last week, which were reviewed as below.       CURRENT PULMONARY HYPERTENSION REGIMEN:    PAH Rx: tadalafil 40mg daily, Opsumit 10mg daily, Uptravi 1600mcg BID.   Jakafi 5mg " daily  (previously on IV Flolan, transitioned to Uptravi march 2019)     Diuretics: Lasix 20mg daily, takes an occasional extra Lasix 20mg (1-2 times per week)    Oxygen: None    Anticoagulation: None      Assessment/Plan:    1. Pulmonary Hypertension.   --Mr. Gómez has PAH in the setting of prior AML s/p BMP and connective tissue disease. He remains on combination therapy with Opsumit, Adcirca, and Uptravi. He was also placed on Jakafi by Dr. Quan. He is off digoxin. Currently, he relays that his breathing is excellent and has no new concerns. I made no changes today.   --He reports no worsening edema overall, though NT-proBNP is up a bit from previous. Upon discussion, he tells me that he hasn't been adhering to his low salt diet, which may be a contributor. He plans to tighten up on this. For now, will keep him on Lasix 20mg daily, with an additional 20mg daily PRN.    Follow up plan: Return in 3 months to see Dr. Quan with labs, or sooner with any new concerns.       Testing/labs:    Most recent labs:             Other recent pertinent testing:    [facility] 5/19/2021  Hemodynamics RA: 5/8/5  RV: 44/5  PA: 44/20/28  PCWP: 10/9/7    PA Sat: 71.1%  Carter CO: 6.7 L/min  Carter CI: 2.68 L/min/m2    TD CO: 9.85 L/min  TD CI: 3.95 L/min/m2    PVR: 2.99 STEPHENS  TRP: 4.18 STEPHENS    Right sided filling pressures are normal. Left sided filling pressures are normal. Moderately elevated pulmonary artery hypertension. Left ventricular filling pressures are normal. Normal cardiac output level.       Echo 9/2/2020  Interpretation Summary  Left ventricular function, chamber size, wall motion, and wall thickness are  normal.The EF is 60-65%.  Mild right ventricular dilation is present. Global right ventricular function  is normal.  IVC diameter <2.1 cm collapsing >50% with sniff suggests a normal RA pressure  of 3 mmHg.  Right ventricular systolic pressure is 42mmHg above the right atrial pressure.  No pericardial effusion is  present.  Compared to prior, measured RVSP is lower, no other change.      NYHA Functional Class:  Functional class 1-2    1--No limitation of activity  2--Slight limitation, ordinary activities may cause symptoms  3--Marked limitation, less than ordinary activities cause symptoms  4--Severe limitation, minimal activity causes symptoms, or symptoms at rest      Video-Visit Details    Type of service:  Video Visit    Video Start Time: 1040  Video End Time: 1049    An additional 15 minutes was spent today performing chart and history review, pre and post visit documentation, and care coordination.      Originating Location (pt. Location): Home    Distant Location (provider location):  [facility]    Platform used for Video Visit: Celio Lynne PA-C  [facility]  Pager [phone]

## 2021-09-07 ENCOUNTER — VIRTUAL VISIT (OUTPATIENT)
Dept: CARDIOLOGY | Facility: CLINIC | Age: 56
End: 2021-09-07
Attending: PHYSICIAN ASSISTANT
Payer: COMMERCIAL

## 2021-09-07 DIAGNOSIS — I31.39 PERICARDIAL EFFUSION: ICD-10-CM

## 2021-09-07 DIAGNOSIS — D89.813 GRAFT-VERSUS-HOST DISEASE (H): ICD-10-CM

## 2021-09-07 DIAGNOSIS — R06.02 SOB (SHORTNESS OF BREATH): ICD-10-CM

## 2021-09-07 DIAGNOSIS — I27.20 PULMONARY HYPERTENSION (H): ICD-10-CM

## 2021-09-07 PROCEDURE — 99213 OFFICE O/P EST LOW 20 MIN: CPT | Mod: 95 | Performed by: PHYSICIAN ASSISTANT

## 2021-09-07 NOTE — PATIENT INSTRUCTIONS
Thank you for visiting the Pulmonary Hypertension Clinic virtually today.      Today we discussed:   No medication changes today.  Work on tightening up the sodium in your diet.   Call with any new concerns, more swelling, or more shortness of breath.       Follow up Appointment Information:  Return in 3 months to see Dr Quan with repeat labs.       Additional Instructions:    1. Continue staying active and eat a heart healthy, low sodium diet.     2. Please keep current list of medications with you at all times.     3. Remember to weigh yourself daily after voiding and write it down on a log. If you have gained/lost 2 pounds overnight or 5 pounds in a week contact us for medication adjustments or further instructions.    4. Please call us immediately if you have syncope (fainting or passing out), chest pain, worsening edema (swelling or weight gain), or general worsening in how you are feeling.     --------------------------------------------------------------------------------------------------------------    If you have questions or concerns please contact us at:    Roberto Erickson RN, BSN   Verna Nguyen (Schedule,Prior Auth)  Nurse Coordinator     Clinic   Pulmonary Hypertension   Pulmonary Hypertension  [facility]  [facility]  (Phone)[phone]    (Phone) [phone]        (Fax) [phone]      ** Please note that you will NOT receive a reminder call regarding your scheduled testing, reminder calls are for provider appointments only.  If you are scheduled for testing within the [employer] system you may receive a call regarding pre-registration for billing purposes only.**     --------------------------------------------------------------------------------------------------------------    Interested in joining a support group?    Pulmonary Hypertension Association  Https://www.phassociation.org/  **Look at the Events Tab** They even  have Support Groups that you can call into    [facility] Support Group  Second Saturday of the Month from 1-3 PM   Location: [address] [place] [address]  Leader: Sofia Hussein  Phone: [phone] or [phone]  Email: [email or web address]

## 2021-09-07 NOTE — NURSING NOTE
"Olivier is a 56 year old who is being evaluated via a billable video visit.      How would you like to obtain your AVS? MyChart  If the video visit is dropped, the invitation should be resent by: Text to cell phone: [phone]  Will anyone else be joining your video visit? No   Vitals - Patient Reported  Weight (Patient Reported): 117.9 kg (260 lb)  Height (Patient Reported): 195.6 cm (6' 5\")  BMI (Based on Pt Reported Ht/Wt): 30.83  Pain Score: No Pain (0) (No SOB)      Vitals were taken and medications were reconciled.   Estuardo Bradshaw, EMT  10:39 AM      "

## 2021-09-07 NOTE — LETTER
9/7/2021      RE: Olivier Gómez  [address]  [place] [address]       Dear Colleague,    Thank you for the opportunity to participate in the care of your patient, Olivier Gómez, at the [facility] [place] [facility]. Please see a copy of my visit note below.          CARDIOLOGY PH CLINIC VIDEO VISIT    Date of video visit:  09/07/21      Olivier Gómez is a 56 year old male who is being evaluated via a billable video visit.        I have reviewed and updated the patient's Past Medical History, Social History, Family History and Medication List.    MEDICATIONS:  Current Outpatient Medications   Medication Sig Dispense Refill     atovaquone (MEPRON) 750 MG/5ML suspension Take 1,500 mg by mouth daily       calcium carbonate (OS-PANFILO 500 MG [place]. [place]) 500 MG tablet Take 600 mg by mouth 2 times daily       CLONAZEPAM PO Take 1 mg by mouth At Bedtime        digoxin (LANOXIN) 125 MCG tablet Take 1 tablet (125 mcg) by mouth daily 90 tablet 3     FLUCONAZOLE PO Take 200 mg by mouth daily       furosemide (LASIX) 20 MG tablet Take 1 tablet (20 mg) by mouth daily Can take an additional 20 mg weekly PRN. Call cardiology clinic if taking more than 20 mg PRN a week 120 tablet 3     OPSUMIT 10 MG tablet Take 1 tablet (10 mg) by mouth daily 30 tablet 11     Pantoprazole Sodium (PROTONIX PO) Take 40 mg by mouth daily        Penicillin V Potassium (PEN-VEE K OR) Take 500 mg by mouth 2 times daily       prednisoLONE 5 MG tablet Take 5 mg by mouth daily       predniSONE (DELTASONE) 2.5 MG tablet Take 2 tablets (5 mg) by mouth every other day 90 tablet 3     rosuvastatin (CRESTOR) 5 MG tablet Take 1 tablet (5 mg) by mouth daily 90 tablet 3     ruxolitinib (JAKAFI) 5 MG TABS tablet CHEMO Take 1 tablet (5 mg) by mouth 2 times daily 60 tablet 0     selexipag (UPTRAVI) 1600 MCG tablet Take 1 tablet (1,600 mcg) by mouth every 12 hours 60 tablet 11     Specialty  "Vitamins Products (MAGNESIUM PLUS PROTEIN) 133 MG tablet Take 266 mg by mouth daily (2 tablets)       tadalafil, PAH, 20 MG TABS Take 2 tablets (40 mg) by mouth daily 60 tablet 11     Ursodiol (ACTIGALL PO) Take 300 mg by mouth 2 times daily        VALACYCLOVIR HCL PO Take 500 mg by mouth every other day       VITAMIN D, CHOLECALCIFEROL, PO Take 2,000 Units by mouth daily          ALLERGIES  Tegaderm transparent dressing (informational only)      Self reported vitals:  Weight: 267#  BP not reported      Brief physical exam:  General: In no acute distress, upright and calm.  Eyes: No apparent redness or discharge.   Chest: No labored breathing, no cough during exam or audible wheezing.   Neuro: No obvious focal defects or tremors.   Psych: Alert and oriented. Does not appear anxious.     The rest of a comprehensive physical examination is deferred due to public [facility] emergency video visit restrictions.       Primary PH cardiologist: Dr. Quan      HPI:  Mr. Gómez is a pleasant 56 year old male with a PMhx including AML s/p bone marrow transplant, CKD, and scleroderma. He also has pulmonary hypertension, and is maintained on combination therapy with tadalafil, macitentan, and selexipag (previously on IV prostacyclin therapy). He was seen last by Dr. Dixon via virtual visit in May after having a repeat hemodynamic assessment. Notably, his PVR and PA pressures had normalized while on therapy. At that time, his digoxin was stopped, and his Jakafi was reduced to 5mg once daily.    Today, we are doing a virtual visit for follow up. He tells me that overall, his breathing is \"excellent.\" He works out on his elliptical machine without significant CORREA, and denies dizziness/presyncope. He has not had any chest pain or palpitations. He denies worsening lower extremity edema, but admits he hasn't been adhering to his low salt diet very well recently. He will take an additional Lasix on the days he knows he has more sodium " in his diet. In fact, he went to the [employer] [place] yesterday, and was able to walk around without any difficulty.     He had some labs done locally at [facility] last week, which were reviewed as below.       CURRENT PULMONARY HYPERTENSION REGIMEN:    PAH Rx: tadalafil 40mg daily, Opsumit 10mg daily, Uptravi 1600mcg BID.   Jakafi 5mg daily  (previously on IV Flolan, transitioned to Uptravi march 2019)     Diuretics: Lasix 20mg daily, takes an occasional extra Lasix 20mg (1-2 times per week)    Oxygen: None    Anticoagulation: None      Assessment/Plan:    1. Pulmonary Hypertension.   --Mr. Gómez has PAH in the setting of prior AML s/p BMP and connective tissue disease. He remains on combination therapy with Opsumit, Adcirca, and Uptravi. He was also placed on Jakafi by Dr. Quan. He is off digoxin. Currently, he relays that his breathing is excellent and has no new concerns. I made no changes today.   --He reports no worsening edema overall, though NT-proBNP is up a bit from previous. Upon discussion, he tells me that he hasn't been adhering to his low salt diet, which may be a contributor. He plans to tighten up on this. For now, will keep him on Lasix 20mg daily, with an additional 20mg daily PRN.    Follow up plan: Return in 3 months to see Dr. Quan with labs, or sooner with any new concerns.       Testing/labs:    Most recent labs:             Other recent pertinent testing:    [facility] 5/19/2021  Hemodynamics RA: 5/8/5  RV: 44/5  PA: 44/20/28  PCWP: 10/9/7    PA Sat: 71.1%  Carter CO: 6.7 L/min  Carter CI: 2.68 L/min/m2    TD CO: 9.85 L/min  TD CI: 3.95 L/min/m2    PVR: 2.99 STEPHENS  TRP: 4.18 STEPHENS    Right sided filling pressures are normal. Left sided filling pressures are normal. Moderately elevated pulmonary artery hypertension. Left ventricular filling pressures are normal. Normal cardiac output level.       Echo 9/2/2020  Interpretation Summary  Left ventricular function, chamber size, wall motion, and wall thickness  are  normal.The EF is 60-65%.  Mild right ventricular dilation is present. Global right ventricular function  is normal.  IVC diameter <2.1 cm collapsing >50% with sniff suggests a normal RA pressure  of 3 mmHg.  Right ventricular systolic pressure is 42mmHg above the right atrial pressure.  No pericardial effusion is present.  Compared to prior, measured RVSP is lower, no other change.      NYHA Functional Class:  Functional class 1-2    1--No limitation of activity  2--Slight limitation, ordinary activities may cause symptoms  3--Marked limitation, less than ordinary activities cause symptoms  4--Severe limitation, minimal activity causes symptoms, or symptoms at rest      Video-Visit Details    Type of service:  Video Visit    Video Start Time: 1040  Video End Time: 1049    An additional 15 minutes was spent today performing chart and history review, pre and post visit documentation, and care coordination.      Originating Location (pt. Location): Home    Distant Location (provider location):  [facility]    Platform used for Video Visit: Celio Lynne PA-C  [facility]  Pager [phone]

## 2021-09-07 NOTE — NURSING NOTE
"Doing well, I made no changes today.   Please put him in for a 3-4 month follow up with MP with labs prior.      Orders placed and patient marked \"ready for checkout.\" Chantel Erickson RN on 9/7/2021 at 11:05 AM    "

## 2021-10-24 ENCOUNTER — HEALTH MAINTENANCE LETTER (OUTPATIENT)
Age: 56
End: 2021-10-24

## 2022-01-05 ENCOUNTER — MYC MEDICAL ADVICE (OUTPATIENT)
Dept: CARDIOLOGY | Facility: CLINIC | Age: 57
End: 2022-01-05
Payer: COMMERCIAL

## 2022-01-13 DIAGNOSIS — Z53.9 ERRONEOUS ENCOUNTER--DISREGARD: Primary | ICD-10-CM

## 2022-01-25 ENCOUNTER — VIRTUAL VISIT (OUTPATIENT)
Dept: CARDIOLOGY | Facility: CLINIC | Age: 57
End: 2022-01-25
Attending: INTERNAL MEDICINE
Payer: COMMERCIAL

## 2022-01-25 DIAGNOSIS — I27.20 PULMONARY HYPERTENSION (H): Primary | ICD-10-CM

## 2022-01-25 DIAGNOSIS — R06.02 SOB (SHORTNESS OF BREATH): ICD-10-CM

## 2022-01-25 PROCEDURE — 99214 OFFICE O/P EST MOD 30 MIN: CPT | Mod: 95 | Performed by: PHYSICIAN ASSISTANT

## 2022-01-25 NOTE — NURSING NOTE
Chief Complaint   Patient presents with     Follow Up     4 month follow up for hypertension with local labs done       [facility]    Kia Negron

## 2022-01-25 NOTE — LETTER
1/25/2022      RE: Olivier Gómez  [address]  [place] [address]       Dear Colleague,    Thank you for the opportunity to participate in the care of your patient, Olivier Gómez, at the [facility]ART CLINIC [place] at [facility]. Please see a copy of my visit note below.      CARDIOLOGY PH CLINIC VIDEO VISIT    Date of video visit:  01/25/22      Olivier Gómez is a 56 year old male who is being evaluated via a billable video visit.        I have reviewed and updated the patient's Past Medical History, Social History, Family History and Medication List.    MEDICATIONS:  Current Outpatient Medications   Medication Sig Dispense Refill     atovaquone (MEPRON) 750 MG/5ML suspension Take 1,500 mg by mouth daily       calcium carbonate (OS-PANFILO 500 MG [place]. CA) 500 MG tablet Take 600 mg by mouth 2 times daily       CLONAZEPAM PO Take 1 mg by mouth At Bedtime        digoxin (LANOXIN) 125 MCG tablet Take 1 tablet (125 mcg) by mouth daily 90 tablet 3     FLUCONAZOLE PO Take 200 mg by mouth daily       furosemide (LASIX) 20 MG tablet Take 1 tablet (20 mg) by mouth daily Can take an additional 20 mg weekly PRN. Call cardiology clinic if taking more than 20 mg PRN a week 120 tablet 3     OPSUMIT 10 MG tablet Take 1 tablet (10 mg) by mouth daily 30 tablet 11     Pantoprazole Sodium (PROTONIX PO) Take 40 mg by mouth daily        Penicillin V Potassium (PEN-VEE K OR) Take 500 mg by mouth 2 times daily       predniSONE (DELTASONE) 2.5 MG tablet Take 2 tablets (5 mg) by mouth every other day 90 tablet 3     rosuvastatin (CRESTOR) 5 MG tablet Take 1 tablet (5 mg) by mouth daily 90 tablet 3     ruxolitinib (JAKAFI) 5 MG TABS tablet Take 1 tablet (5 mg) by mouth daily 60 tablet 0     selexipag (UPTRAVI) 1600 MCG tablet Take 1 tablet (1,600 mcg) by mouth every 12 hours 60 tablet 11     Specialty Vitamins Products (MAGNESIUM PLUS PROTEIN) 133 MG tablet Take 266 mg by mouth  daily (2 tablets)       tadalafil, PAH, 20 MG TABS Take 2 tablets (40 mg) by mouth daily 60 tablet 11     Ursodiol (ACTIGALL PO) Take 300 mg by mouth 2 times daily        VALACYCLOVIR HCL PO Take 500 mg by mouth every other day       VITAMIN D, CHOLECALCIFEROL, PO Take 2,000 Units by mouth daily        prednisoLONE 5 MG tablet Take 5 mg by mouth daily         ALLERGIES  Tegaderm transparent dressing (informational only)      Self reported vitals:  Weight: not reported  BP not reported      Brief physical exam:  General: In no acute distress, upright and calm.  Eyes: No apparent redness or discharge.   Chest: No labored breathing, no cough during exam or audible wheezing.   Neuro: No obvious focal defects or tremors.   Psych: Alert and oriented. Does not appear anxious.     The rest of a comprehensive physical examination is deferred due to public [facility] emergency video visit restrictions.       Primary PH cardiologist: Dr. Quan      HPI:  Mr. Gómez is a pleasant 56 year old male with a PMhx including AML s/p bone marrow transplant, CKD, and scleroderma. He also has pulmonary hypertension, and is maintained on combination therapy with tadalafil, macitentan, and selexipag (previously on IV prostacyclin therapy). He was seen by Dr. Dixon via virtual visit in May after having a repeat hemodynamic assessment. Notably, his PVR and PA pressures had normalized while on therapy. At that time, his digoxin was stopped, and his Jakafi was reduced to 5mg once daily. When I met back with him last in September, he continued to do well with no new concerns. I made no changes at that time.    I'm seeing Olivier carlos today virtually today as he wanted to discuss that over the last few months he has noted occasional CORREA, but this is only in the evenings, usually after 7pm. Interestingly, he is able to go up and down the stairs during the day without any breathing concerns at all. He denies any new chest pain, palpitations,  dizziness, or presyncope. He has not had any new LE edema and says his weight is slowly going up but thinks he's eating more. He is taking his medications as prescribed but wonders if he is on high enough doses and/or if he is metabolizing them too quickly.     Notably, he recently tested positive for COVID-19 after developing a cough and chest congestion. He then received monoclonal antibodies last week. His wife relays that he had some shortness of breath initially but quickly improved with treatment. He confirms that his concerns as above started a few months prior to COVID.     No new labs performed prior to our visit today.       CURRENT PULMONARY HYPERTENSION REGIMEN:    PAH Rx: tadalafil 40mg daily, Opsumit 10mg daily, Uptravi 1600mcg BID.   Jakafi 5mg daily  (previously on IV Flolan, transitioned to Uptravi march 2019)     Diuretics: Lasix 20mg daily, takes an occasional extra Lasix 20mg PRN    Oxygen: None    Anticoagulation: None      Assessment/Plan:    1. Pulmonary Hypertension.   --Mr. Gómez has PAH in the setting of prior AML s/p BMP and connective tissue disease. He remains on combination therapy with Opsumit, Adcirca, and Uptravi. He was also placed on Jakafi by Dr. Quan, and was reduced from BID to once daily last summer. He was also taken off digoxin at that time. It sounds like he was doing very well until a few months ago when he noted some CORREA, though notably, he says this is only in the evenings. During the day he has no limitations.   --We discussed timing of his medications today. He takes Uptravi BID, the Opsumit in the AM, and tadalafil in the PM along with the Jakafi. He will try taking the Jakafi in the morning instead to see if this allows the effects to last later into the evening. Will then have him return in a month or two with repeat echocardiogram for further evaluation.    --He reports no worsening edema overall, though does endorse some slow weight gain.For now, will keep him  on Lasix 20mg daily, with an additional 20mg daily PRN. Repeat labs at follow up visit as well.       Follow up plan: Return in ~6 weeks to see Dr Quan, with labs and echocardiogram.       Testing/labs:    Most recent labs:                 Other recent pertinent testing:    RHC 5/19/2021  Hemodynamics RA: 5/8/5  RV: 44/5  PA: 44/20/28  PCWP: 10/9/7    PA Sat: 71.1%  Carter CO: 6.7 L/min  Carter CI: 2.68 L/min/m2    TD CO: 9.85 L/min  TD CI: 3.95 L/min/m2    PVR: 2.99 STEPHENS  TRP: 4.18 STEPHENS    Right sided filling pressures are normal. Left sided filling pressures are normal. Moderately elevated pulmonary artery hypertension. Left ventricular filling pressures are normal. Normal cardiac output level.       Echo 9/2/2020  Interpretation Summary  Left ventricular function, chamber size, wall motion, and wall thickness are  normal.The EF is 60-65%.  Mild right ventricular dilation is present. Global right ventricular function  is normal.  IVC diameter <2.1 cm collapsing >50% with sniff suggests a normal RA pressure  of 3 mmHg.  Right ventricular systolic pressure is 42mmHg above the right atrial pressure.  No pericardial effusion is present.  Compared to prior, measured RVSP is lower, no other change.      NYHA Functional Class:  Functional class 2      Video-Visit Details    Type of service:  Video Visit    Video Start Time: 1614  Video End Time: 1637    An additional 15 minutes was spent today performing chart and history review, pre and post visit documentation, and care coordination.      Originating Location (pt. Location): Home    Distant Location (provider location):  [facility]    Platform used for Video Visit: Celio Lynne PA-C  [facility]  Pager [phone]

## 2022-01-25 NOTE — PATIENT INSTRUCTIONS
Thank you for visiting the Pulmonary Hypertension Clinic virtually today.      Today we discussed:   We will try moving your jakafi to the morning. Continue all other medications unchanged.       Follow up Appointment Information:  We will arrange for an echocardiogram and labs, with a visit with Dr. Quan after you return from [place].         Additional Instructions:    1. Continue staying active and eat a heart healthy, low sodium diet.     2. Please keep current list of medications with you at all times.     3. Remember to weigh yourself daily after voiding and write it down on a log. If you have gained/lost 2 pounds overnight or 5 pounds in a week contact us for medication adjustments or further instructions.    4. Please call us immediately if you have syncope (fainting or passing out), chest pain, worsening edema (swelling or weight gain), or general worsening in how you are feeling.     --------------------------------------------------------------------------------------------------------------    If you have questions or concerns please contact us at:    Roberto Erickson RN, BSN   Verna Nguyen (Schedule,Prior Auth)  Nurse Coordinator     Clinic   Pulmonary Hypertension   Pulmonary Hypertension  [facility]  [facility]  (Phone)[phone]    (Phone) [phone]        (Fax) [phone]      ** Please note that you will NOT receive a reminder call regarding your scheduled testing, reminder calls are for provider appointments only.  If you are scheduled for testing within the [employer] system you may receive a call regarding pre-registration for billing purposes only.**     --------------------------------------------------------------------------------------------------------------    Interested in joining a support group?    Pulmonary Hypertension Association  [email or web address].phassociation.org/  **Look at the Events Tab** They even have  Support Groups that you can call into    [facility] Support Group  Second Saturday of the Month from 1-3 PM   Location: [address] [place] [address]  Leader: Sofia Hussein  Phone: [phone] or [phone]  Email: [email or web address]

## 2022-01-25 NOTE — PROGRESS NOTES
Olivier is a 56 year old who is being evaluated via a billable video visit.      How would you like to obtain your AVS? MyChart  If the video visit is dropped, the invitation should be resent by: Text to cell phone: [phone]  Will anyone else be joining your video visit? No             CARDIOLOGY  CLINIC VIDEO VISIT    Date of video visit:  01/25/22      Olivier Gómez is a 56 year old male who is being evaluated via a billable video visit.        I have reviewed and updated the patient's Past Medical History, Social History, Family History and Medication List.    MEDICATIONS:  Current Outpatient Medications   Medication Sig Dispense Refill     atovaquone (MEPRON) 750 MG/5ML suspension Take 1,500 mg by mouth daily       calcium carbonate (OS-PANFILO 500 MG [place]. CA) 500 MG tablet Take 600 mg by mouth 2 times daily       CLONAZEPAM PO Take 1 mg by mouth At Bedtime        digoxin (LANOXIN) 125 MCG tablet Take 1 tablet (125 mcg) by mouth daily 90 tablet 3     FLUCONAZOLE PO Take 200 mg by mouth daily       furosemide (LASIX) 20 MG tablet Take 1 tablet (20 mg) by mouth daily Can take an additional 20 mg weekly PRN. Call cardiology clinic if taking more than 20 mg PRN a week 120 tablet 3     OPSUMIT 10 MG tablet Take 1 tablet (10 mg) by mouth daily 30 tablet 11     Pantoprazole Sodium (PROTONIX PO) Take 40 mg by mouth daily        Penicillin V Potassium (PEN-VEE K OR) Take 500 mg by mouth 2 times daily       predniSONE (DELTASONE) 2.5 MG tablet Take 2 tablets (5 mg) by mouth every other day 90 tablet 3     rosuvastatin (CRESTOR) 5 MG tablet Take 1 tablet (5 mg) by mouth daily 90 tablet 3     ruxolitinib (JAKAFI) 5 MG TABS tablet Take 1 tablet (5 mg) by mouth daily 60 tablet 0     selexipag (UPTRAVI) 1600 MCG tablet Take 1 tablet (1,600 mcg) by mouth every 12 hours 60 tablet 11     Specialty Vitamins Products (MAGNESIUM PLUS PROTEIN) 133 MG tablet Take 266 mg by mouth daily (2 tablets)       tadalafil, PAH, 20 MG TABS Take 2  tablets (40 mg) by mouth daily 60 tablet 11     Ursodiol (ACTIGALL PO) Take 300 mg by mouth 2 times daily        VALACYCLOVIR HCL PO Take 500 mg by mouth every other day       VITAMIN D, CHOLECALCIFEROL, PO Take 2,000 Units by mouth daily        prednisoLONE 5 MG tablet Take 5 mg by mouth daily         ALLERGIES  Tegaderm transparent dressing (informational only)      Self reported vitals:  Weight: not reported  BP not reported      Brief physical exam:  General: In no acute distress, upright and calm.  Eyes: No apparent redness or discharge.   Chest: No labored breathing, no cough during exam or audible wheezing.   Neuro: No obvious focal defects or tremors.   Psych: Alert and oriented. Does not appear anxious.     The rest of a comprehensive physical examination is deferred due to public health emergency video visit restrictions.       Primary PH cardiologist: Dr. Quan      HPI:  Mr. Gómez is a pleasant 56 year old male with a PMhx including AML s/p bone marrow transplant, CKD, and scleroderma. He also has pulmonary hypertension, and is maintained on combination therapy with tadalafil, macitentan, and selexipag (previously on IV prostacyclin therapy). He was seen by Dr. Dixon via virtual visit in May after having a repeat hemodynamic assessment. Notably, his PVR and PA pressures had normalized while on therapy. At that time, his digoxin was stopped, and his Jakafi was reduced to 5mg once daily. When I met back with him last in September, he continued to do well with no new concerns. I made no changes at that time.    I'm seeing Olivier back today virtually today as he wanted to discuss that over the last few months he has noted occasional CORREA, but this is only in the evenings, usually after 7pm. Interestingly, he is able to go up and down the stairs during the day without any breathing concerns at all. He denies any new chest pain, palpitations, dizziness, or presyncope. He has not had any new LE edema and  says his weight is slowly going up but thinks he's eating more. He is taking his medications as prescribed but wonders if he is on high enough doses and/or if he is metabolizing them too quickly.     Notably, he recently tested positive for COVID-19 after developing a cough and chest congestion. He then received monoclonal antibodies last week. His wife relays that he had some shortness of breath initially but quickly improved with treatment. He confirms that his concerns as above started a few months prior to COVID.     No new labs performed prior to our visit today.       CURRENT PULMONARY HYPERTENSION REGIMEN:    PAH Rx: tadalafil 40mg daily, Opsumit 10mg daily, Uptravi 1600mcg BID.   Jakafi 5mg daily  (previously on IV Flolan, transitioned to Uptravi march 2019)     Diuretics: Lasix 20mg daily, takes an occasional extra Lasix 20mg PRN    Oxygen: None    Anticoagulation: None      Assessment/Plan:    1. Pulmonary Hypertension.   --Mr. Gómez has PAH in the setting of prior AML s/p BMP and connective tissue disease. He remains on combination therapy with Opsumit, Adcirca, and Uptravi. He was also placed on Jakafi by Dr. Quan, and was reduced from BID to once daily last summer. He was also taken off digoxin at that time. It sounds like he was doing very well until a few months ago when he noted some CORREA, though notably, he says this is only in the evenings. During the day he has no limitations.   --We discussed timing of his medications today. He takes Uptravi BID, the Opsumit in the AM, and tadalafil in the PM along with the Jakafi. He will try taking the Jakafi in the morning instead to see if this allows the effects to last later into the evening. Will then have him return in a month or two with repeat echocardiogram for further evaluation.    --He reports no worsening edema overall, though does endorse some slow weight gain.For now, will keep him on Lasix 20mg daily, with an additional 20mg daily PRN. Repeat  labs at follow up visit as well.       Follow up plan: Return in ~6 weeks to see Dr Quan, with labs and echocardiogram.       Testing/labs:    Most recent labs:                 Other recent pertinent testing:    RHC 5/19/2021  Hemodynamics RA: 5/8/5  RV: 44/5  PA: 44/20/28  PCWP: 10/9/7    PA Sat: 71.1%  Carter CO: 6.7 L/min  Carter CI: 2.68 L/min/m2    TD CO: 9.85 L/min  TD CI: 3.95 L/min/m2    PVR: 2.99 STEPHENS  TRP: 4.18 STEPHENS    Right sided filling pressures are normal. Left sided filling pressures are normal. Moderately elevated pulmonary artery hypertension. Left ventricular filling pressures are normal. Normal cardiac output level.       Echo 9/2/2020  Interpretation Summary  Left ventricular function, chamber size, wall motion, and wall thickness are  normal.The EF is 60-65%.  Mild right ventricular dilation is present. Global right ventricular function  is normal.  IVC diameter <2.1 cm collapsing >50% with sniff suggests a normal RA pressure  of 3 mmHg.  Right ventricular systolic pressure is 42mmHg above the right atrial pressure.  No pericardial effusion is present.  Compared to prior, measured RVSP is lower, no other change.      NYHA Functional Class:  Functional class 2      Video-Visit Details    Type of service:  Video Visit    Video Start Time: 1614  Video End Time: 1637    An additional 15 minutes was spent today performing chart and history review, pre and post visit documentation, and care coordination.      Originating Location (pt. Location): Home    Distant Location (provider location):  [facility]    Platform used for Video Visit: Celio Lynne PA-C  [facility]  Pager [phone]

## 2022-01-26 ENCOUNTER — TELEPHONE (OUTPATIENT)
Dept: CARDIOLOGY | Facility: CLINIC | Age: 57
End: 2022-01-26
Payer: COMMERCIAL

## 2022-01-26 NOTE — TELEPHONE ENCOUNTER
----- Message from Chantel Erickson RN sent at 1/25/2022  4:45 PM CST -----  Regarding: FW: vv update    ----- Message -----  From: Maria Fernanda Lynne PA  Sent: 1/25/2022   4:42 PM CST  To: Cardiology Ph Nurse-  Subject: vv update                                        Only change I made today was to try to move his jakafi to morning instead of evening.  Everything else unchanged.    Please have him return in 4-6 weeks with an echo and labs, to see Avelina in person. He will be in [place] until the first week of march.   He would like the week of march 14 if possible, and more specifically March 14 or 16th if MP is available that day.    Please call him to schedule this.     Tim Irving

## 2022-01-28 ENCOUNTER — TELEPHONE (OUTPATIENT)
Dept: CARDIOLOGY | Facility: CLINIC | Age: 57
End: 2022-01-28
Payer: COMMERCIAL

## 2022-01-28 NOTE — TELEPHONE ENCOUNTER
Please see if patient can see beverly, echo and labs not on march 22nd due to no echos, once scheduled please remove hold on March 22nd

## 2022-01-28 NOTE — TELEPHONE ENCOUNTER
----- Message from Chantel Erickson RN sent at 1/28/2022  1:23 PM CST -----  Regarding: RE: March F/U  Lies ;)     Nemo, can you reach out to the patient and reschedule for a different day so we can get an echo done as well?  ----- Message -----  From: Grace Pulido CMA  Sent: 1/28/2022   1:21 PM CST  To: Nemo Carver, Chantel Erickson RN, #  Subject: RE: March F/U                                    Hi -    Congenital echo spots or for congenital echos only. The technician will not perform a regular echo.    Jeannette  ----- Message -----  From: Chantel Erickson RN  Sent: 1/28/2022   1:20 PM CST  To: Grace Pulido CMA, Nemo Cavrer, #  Subject: RE: March F/U                                    Grace,    Thoughts? Can I steal a congenital echo spot?    Roberto  ----- Message -----  From: Nemo Carver  Sent: 1/28/2022   1:14 PM CST  To: Chantel Erickson RN, #  Subject: RE: March F/U                                    There is 0 echos for that day unless you ask grace pulido to take her 11 am echo for congenital ?    ----- Message -----  From: Chantel Erickson RN  Sent: 1/26/2022   9:14 AM CST  To: Clinic Coordinators-Card-  Subject: March F/U                                        Patient will need a follow up with Dr. Quan on March 22 at 1 PM (in person) with labs and echo prior. I have placed a hold on the spot.    Thank you!Roberto

## 2022-02-04 DIAGNOSIS — I27.20 PULMONARY HYPERTENSION (H): ICD-10-CM

## 2022-02-04 NOTE — TELEPHONE ENCOUNTER
The pt is scheduled on 3/22/22 for lab and with Avelina. I sent the pt a Vrvana message concerning scheduling the echo prior to 3/22.  I included my direct line if the pt need assistance.  Zohaib Pickett on 2/3/2022 at 8:32 PM

## 2022-02-07 RX ORDER — TADALAFIL 20 MG/1
40 TABLET ORAL DAILY
Qty: 60 TABLET | Refills: 11 | Status: SHIPPED | OUTPATIENT
Start: 2022-02-07 | End: 2022-03-30 | Stop reason: ALTCHOICE

## 2022-02-07 NOTE — TELEPHONE ENCOUNTER
tadalafil, PAH, 20 MG TABS      Last Written Prescription Date:  2/4/2021  Last Fill Quantity: 60,   # refills: 11  Last Office Visit : 1/25/2022  Future Office visit:  3/22/2022    Routing refill request to provider for review/approval because:  Medication not on the Cardiology refill protocol.

## 2022-02-13 ENCOUNTER — HEALTH MAINTENANCE LETTER (OUTPATIENT)
Age: 57
End: 2022-02-13

## 2022-02-14 ENCOUNTER — MYC MEDICAL ADVICE (OUTPATIENT)
Dept: CARDIOLOGY | Facility: CLINIC | Age: 57
End: 2022-02-14
Payer: COMMERCIAL

## 2022-02-18 DIAGNOSIS — I27.20 PULMONARY HYPERTENSION (H): ICD-10-CM

## 2022-03-22 ENCOUNTER — TELEPHONE (OUTPATIENT)
Dept: CARDIOLOGY | Facility: CLINIC | Age: 57
End: 2022-03-22
Payer: COMMERCIAL

## 2022-03-22 ENCOUNTER — VIRTUAL VISIT (OUTPATIENT)
Dept: CARDIOLOGY | Facility: CLINIC | Age: 57
End: 2022-03-22
Attending: INTERNAL MEDICINE
Payer: COMMERCIAL

## 2022-03-22 DIAGNOSIS — Z11.59 ENCOUNTER FOR SCREENING FOR OTHER VIRAL DISEASES: Primary | ICD-10-CM

## 2022-03-22 DIAGNOSIS — R06.02 SOB (SHORTNESS OF BREATH): ICD-10-CM

## 2022-03-22 DIAGNOSIS — I27.20 PULMONARY HYPERTENSION (H): ICD-10-CM

## 2022-03-22 DIAGNOSIS — I27.0 PRIMARY PULMONARY HYPERTENSION (H): Primary | ICD-10-CM

## 2022-03-22 RX ORDER — LIDOCAINE 40 MG/G
CREAM TOPICAL
Status: CANCELLED | OUTPATIENT
Start: 2022-03-22

## 2022-03-22 NOTE — TELEPHONE ENCOUNTER
Unable to reach; LM asking if the date and time for RHC with TT works. Provided direct number for call back. Chantel Erickson RN on 3/22/2022 at 11:09 AM    ----- Message from Ying Tovar sent at 3/22/2022 11:04 AM CDT -----  Regarding: RE: RHC TT  3-30 @ 8:30    J  ----- Message -----  From: Chantel Erickson RN  Sent: 3/22/2022  10:48 AM CDT  To: Ying Tovar  Subject: RHC TT                                           Chirag He,    When is your next available RHC with Thenappan?    Roberto

## 2022-03-22 NOTE — LETTER
3/22/2022      RE: Olivier Gómez  [address]  [place] [address]       Dear Colleague,    Thank you for the opportunity to participate in the care of your patient, Olivier Gómez, at the [facility] HEART CLINIC [place] [facility]. Please see a copy of my visit note below.    Visit canceled   Will see following week in [id number]}          Please do not hesitate to contact me if you have any questions/concerns.     Sincerely,     Dk Quan MD

## 2022-03-22 NOTE — NURSING NOTE
Chief Complaint   Patient presents with     Follow Up     PH, pt states he spoke with Dr. Quan yesterday but he said he would be calling him back, no vitals to report for todays visit     Patient denies any changes regarding medication and allergies and states all information remains accurate since last reviewed.    Galileo Adams, VF/CMA

## 2022-03-29 ENCOUNTER — TELEPHONE (OUTPATIENT)
Dept: CARDIOLOGY | Facility: CLINIC | Age: 57
End: 2022-03-29
Payer: COMMERCIAL

## 2022-03-30 ENCOUNTER — HOSPITAL ENCOUNTER (OUTPATIENT)
Facility: CLINIC | Age: 57
Discharge: HOME | End: 2022-03-30
Attending: INTERNAL MEDICINE | Admitting: INTERNAL MEDICINE
Payer: COMMERCIAL

## 2022-03-30 ENCOUNTER — TELEPHONE (OUTPATIENT)
Dept: CARDIOLOGY | Facility: CLINIC | Age: 57
End: 2022-03-30

## 2022-03-30 ENCOUNTER — APPOINTMENT (OUTPATIENT)
Dept: MEDSURG UNIT | Facility: CLINIC | Age: 57
End: 2022-03-30
Attending: INTERNAL MEDICINE
Payer: COMMERCIAL

## 2022-03-30 ENCOUNTER — OFFICE VISIT (OUTPATIENT)
Dept: CARDIOLOGY | Facility: CLINIC | Age: 57
End: 2022-03-30
Attending: INTERNAL MEDICINE
Payer: COMMERCIAL

## 2022-03-30 ENCOUNTER — APPOINTMENT (OUTPATIENT)
Dept: LAB | Facility: CLINIC | Age: 57
End: 2022-03-30
Attending: INTERNAL MEDICINE
Payer: COMMERCIAL

## 2022-03-30 ENCOUNTER — LAB (OUTPATIENT)
Dept: LAB | Facility: CLINIC | Age: 57
End: 2022-03-30
Payer: COMMERCIAL

## 2022-03-30 ENCOUNTER — ANCILLARY PROCEDURE (OUTPATIENT)
Dept: CT IMAGING | Facility: CLINIC | Age: 57
End: 2022-03-30
Attending: INTERNAL MEDICINE
Payer: COMMERCIAL

## 2022-03-30 VITALS
WEIGHT: 278.2 LBS | DIASTOLIC BLOOD PRESSURE: 66 MMHG | OXYGEN SATURATION: 97 % | HEIGHT: 77 IN | HEART RATE: 66 BPM | BODY MASS INDEX: 32.85 KG/M2 | SYSTOLIC BLOOD PRESSURE: 103 MMHG

## 2022-03-30 VITALS
RESPIRATION RATE: 18 BRPM | OXYGEN SATURATION: 96 % | DIASTOLIC BLOOD PRESSURE: 82 MMHG | HEART RATE: 62 BPM | TEMPERATURE: 98.9 F | SYSTOLIC BLOOD PRESSURE: 138 MMHG

## 2022-03-30 DIAGNOSIS — R06.02 SOB (SHORTNESS OF BREATH): Primary | ICD-10-CM

## 2022-03-30 DIAGNOSIS — I27.20 PULMONARY HYPERTENSION (H): ICD-10-CM

## 2022-03-30 DIAGNOSIS — D89.813 GRAFT-VERSUS-HOST DISEASE (H): ICD-10-CM

## 2022-03-30 DIAGNOSIS — I27.0 PRIMARY PULMONARY HYPERTENSION (H): ICD-10-CM

## 2022-03-30 DIAGNOSIS — R06.02 SOB (SHORTNESS OF BREATH): ICD-10-CM

## 2022-03-30 DIAGNOSIS — I31.39 PERICARDIAL EFFUSION: ICD-10-CM

## 2022-03-30 LAB
ALBUMIN SERPL-MCNC: 3.2 G/DL (ref 3.4–5)
ALP SERPL-CCNC: 66 U/L (ref 40–150)
ALT SERPL W P-5'-P-CCNC: 39 U/L (ref 0–70)
ANION GAP SERPL CALCULATED.3IONS-SCNC: 5 MMOL/L (ref 3–14)
AST SERPL W P-5'-P-CCNC: 30 U/L (ref 0–45)
BASOPHILS # BLD AUTO: 0 10E3/UL (ref 0–0.2)
BASOPHILS NFR BLD AUTO: 1 %
BILIRUB SERPL-MCNC: 0.5 MG/DL (ref 0.2–1.3)
BUN SERPL-MCNC: 24 MG/DL (ref 7–30)
CALCIUM SERPL-MCNC: 9.2 MG/DL (ref 8.5–10.1)
CHLORIDE BLD-SCNC: 109 MMOL/L (ref 94–109)
CO2 SERPL-SCNC: 28 MMOL/L (ref 20–32)
CREAT SERPL-MCNC: 1.94 MG/DL (ref 0.66–1.25)
CRP SERPL-MCNC: 2.9 MG/L (ref 0–8)
D DIMER PPP FEU-MCNC: 0.97 UG/ML FEU (ref 0–0.5)
EOSINOPHIL # BLD AUTO: 0.1 10E3/UL (ref 0–0.7)
EOSINOPHIL NFR BLD AUTO: 2 %
ERYTHROCYTE [DISTWIDTH] IN BLOOD BY AUTOMATED COUNT: 21.9 % (ref 10–15)
ERYTHROCYTE [SEDIMENTATION RATE] IN BLOOD BY WESTERGREN METHOD: 8 MM/HR (ref 0–20)
GFR SERPL CREATININE-BSD FRML MDRD: 40 ML/MIN/1.73M2
GLUCOSE BLD-MCNC: 103 MG/DL (ref 70–99)
HCT VFR BLD AUTO: 32.4 % (ref 40–53)
HGB BLD-MCNC: 9.9 G/DL (ref 13.3–17.7)
IMM GRANULOCYTES # BLD: 0 10E3/UL
IMM GRANULOCYTES NFR BLD: 1 %
LDH SERPL L TO P-CCNC: 267 U/L (ref 85–227)
LYMPHOCYTES # BLD AUTO: 1.8 10E3/UL (ref 0.8–5.3)
LYMPHOCYTES NFR BLD AUTO: 27 %
MCH RBC QN AUTO: 26.7 PG (ref 26.5–33)
MCHC RBC AUTO-ENTMCNC: 30.6 G/DL (ref 31.5–36.5)
MCV RBC AUTO: 87 FL (ref 78–100)
MONOCYTES # BLD AUTO: 1.2 10E3/UL (ref 0–1.3)
MONOCYTES NFR BLD AUTO: 18 %
NEUTROPHILS # BLD AUTO: 3.5 10E3/UL (ref 1.6–8.3)
NEUTROPHILS NFR BLD AUTO: 51 %
NRBC # BLD AUTO: 0 10E3/UL
NRBC BLD AUTO-RTO: 0 /100
NT-PROBNP SERPL-MCNC: 1158 PG/ML (ref 0–900)
PLATELET # BLD AUTO: 374 10E3/UL (ref 150–450)
POTASSIUM BLD-SCNC: 3.8 MMOL/L (ref 3.4–5.3)
PROT SERPL-MCNC: 5.8 G/DL (ref 6.8–8.8)
RBC # BLD AUTO: 3.71 10E6/UL (ref 4.4–5.9)
SODIUM SERPL-SCNC: 142 MMOL/L (ref 133–144)
WBC # BLD AUTO: 6.6 10E3/UL (ref 4–11)

## 2022-03-30 PROCEDURE — 83615 LACTATE (LD) (LDH) ENZYME: CPT | Performed by: PATHOLOGY

## 2022-03-30 PROCEDURE — 999N000132 HC STATISTIC PP CARE STAGE 1

## 2022-03-30 PROCEDURE — 71275 CT ANGIOGRAPHY CHEST: CPT | Mod: GC | Performed by: RADIOLOGY

## 2022-03-30 PROCEDURE — 80053 COMPREHEN METABOLIC PANEL: CPT | Performed by: INTERNAL MEDICINE

## 2022-03-30 PROCEDURE — 93451 RIGHT HEART CATH: CPT | Performed by: INTERNAL MEDICINE

## 2022-03-30 PROCEDURE — 86140 C-REACTIVE PROTEIN: CPT | Performed by: INTERNAL MEDICINE

## 2022-03-30 PROCEDURE — G0463 HOSPITAL OUTPT CLINIC VISIT: HCPCS | Mod: 25

## 2022-03-30 PROCEDURE — 85025 COMPLETE CBC W/AUTO DIFF WBC: CPT | Performed by: INTERNAL MEDICINE

## 2022-03-30 PROCEDURE — 85652 RBC SED RATE AUTOMATED: CPT | Performed by: PATHOLOGY

## 2022-03-30 PROCEDURE — 85379 FIBRIN DEGRADATION QUANT: CPT | Mod: 90 | Performed by: PATHOLOGY

## 2022-03-30 PROCEDURE — 272N000001 HC OR GENERAL SUPPLY STERILE: Performed by: INTERNAL MEDICINE

## 2022-03-30 PROCEDURE — 99000 SPECIMEN HANDLING OFFICE-LAB: CPT | Performed by: PATHOLOGY

## 2022-03-30 PROCEDURE — 999N000142 HC STATISTIC PROCEDURE PREP ONLY

## 2022-03-30 PROCEDURE — 36415 COLL VENOUS BLD VENIPUNCTURE: CPT | Performed by: INTERNAL MEDICINE

## 2022-03-30 PROCEDURE — 83880 ASSAY OF NATRIURETIC PEPTIDE: CPT | Performed by: INTERNAL MEDICINE

## 2022-03-30 PROCEDURE — 93451 RIGHT HEART CATH: CPT | Mod: 26 | Performed by: INTERNAL MEDICINE

## 2022-03-30 PROCEDURE — 99215 OFFICE O/P EST HI 40 MIN: CPT | Mod: 95 | Performed by: INTERNAL MEDICINE

## 2022-03-30 PROCEDURE — 36415 COLL VENOUS BLD VENIPUNCTURE: CPT | Performed by: PATHOLOGY

## 2022-03-30 PROCEDURE — 250N000009 HC RX 250: Performed by: INTERNAL MEDICINE

## 2022-03-30 PROCEDURE — C1894 INTRO/SHEATH, NON-LASER: HCPCS | Performed by: INTERNAL MEDICINE

## 2022-03-30 RX ORDER — IOPAMIDOL 755 MG/ML
77 INJECTION, SOLUTION INTRAVASCULAR ONCE
Status: COMPLETED | OUTPATIENT
Start: 2022-03-30 | End: 2022-03-30

## 2022-03-30 RX ORDER — LIDOCAINE 40 MG/G
CREAM TOPICAL
Status: COMPLETED | OUTPATIENT
Start: 2022-03-30 | End: 2022-03-30

## 2022-03-30 RX ADMIN — LIDOCAINE: 40 CREAM TOPICAL at 08:25

## 2022-03-30 RX ADMIN — IOPAMIDOL 77 ML: 755 INJECTION, SOLUTION INTRAVASCULAR at 14:17

## 2022-03-30 ASSESSMENT — PAIN SCALES - GENERAL: PAINLEVEL: NO PAIN (0)

## 2022-03-30 NOTE — DISCHARGE INSTRUCTIONS
[facility]                        Interventional Cardiology  Discharge Instructions   Post Right Heart Cath  AFTER YOU GO HOME:    DO drink plenty of fluids    DO resume your regular diet and medications unless otherwise instructed by your Primary Physician    Do Not scrub the procedure site vigorously    No lotion or powder to the puncture site for 3 days    CALL YOUR PRIMARY PHYSICIAN IF: You may resume all normal activity.  Monitor neck site for bleeding, swelling, or voice changes. If you notice bleeding or swelling immediately apply pressure to the site and call number below to speak with Cardiology Fellow.  If you experience any changes in your breathing you should call your doctor immediately or come to the closest Emergency Department.  Do not drive yourself.    ADDITIONAL INSTRUCTIONS: Medications: You are to resume all home medications including anticoagulation therapy unless otherwise advised by your primary cardiologist or nurse coordinator.    Follow Up: Per your primary cardiology team    If you have any questions or concerns regarding your procedure site please call [phone] at anytime and ask for Cardiology Fellow on call.  They are available 24 hours a day.  You may also contact the Cardiology Clinic after hours number at [phone].    Telephone Numbers [phone] Monday-Friday 8:00 am to 4:30 pm    [phone] After 4:30 pm Monday-Friday, Weekends & Holidays  Ask for Interventional Cardiologist on call. Someone is on call 24 hours/day   [facility] toll free number [phone] Monday-Friday 8:00 am to 4:30 pm   [facility] Emergency Dept [phone]

## 2022-03-30 NOTE — PROGRESS NOTES
Patient returned to 2A post RHC.  VSS.  Right neck site C/D/I, no hematoma.  Denies pain.  Dr. Lundy at bedside talking with patient/wife.  Will check back shortly and offer PO food/fluids and go through discharge instructions.

## 2022-03-30 NOTE — NURSING NOTE
Chief Complaint   Patient presents with     Follow Up     Return for 6 week PH F/U with labs completed locally     Vitals were taken and medications reconciled.    Anup Cleaning, EMT  12:23 PM

## 2022-03-30 NOTE — PROGRESS NOTES
Pt prepped for RHC, reports having many done in the past. No active complaints. Family at bedside.

## 2022-03-30 NOTE — PROGRESS NOTES
Olivier Gómez is a 55 year old who is being evaluated via a billable video visit.      FaceTime Video visit x 30 minutes    Adelso Delgado M.D.  [place], [place]      Impression:  1. PAH  2. History of BMT for AML  3. History of prostate cancer  4. Anemia  5. High cardiac output  6. Prednisone dependence  7. Hyperlipidemia treated    Impression:    The patient's current medical regimen as outlined below has markedly and consistently improved his pulmonary hypertension.  When first seen by us the mean PA pressure was 65 (now 31)  He must stay on the current medical regimen as outlined below!    Plan:  1. Discontinue tadalafil  2. Start Riociguat 1 TID  3. Increase Jakafi 10 mgs once daily  4. Weekly laboratories  5. RTC 1 month  6. Labs with Dr. Adelso Delgado next week: LDH, DDIMER, BMP.  Please also arrange for VQ lung scan as CT scan negative for chronic PE but had COVOD in January and has elevated DDIMER        55 year old white male malpractice  seen for follow-up of severe pulmonary hypertension.  Patient has previous history of AML treated at [facility] with BMT.  His PAH has not necessarily been felt to be secondary to GVH.  He presented with severe, advanced PAH requiring parenteral prostanoids and generally normalized PA pressures on three drug therapy.  He wished to be weaned off of parenteral prostanoids and this was done carefully and sequentially and transitioned off of parenteral prostanoids.  However, experienced a severe increase in PA pressures, and we elected to continue his oral medical regimen but add a JANUS kinase inhibitor to cover the potential of GVH.  On this regimen he experienced marked improvement as reflected in his most recent catheterization below.      He has had hallmarks of autoimmune disease. including skin changes that look in some ways like scleroderma, as well as irving titre GENEVIEVE.    Constitutional: without weight loss, fever, chills, night sweats  HEENT:  without visual changes, swallow difficulties  Pulmonary: without shortness of breath, cough, wheeze, hemoptysis  Cardiac: without chest pain, CORREA, PND, orthopnea, edema, palpitation, pre-syncope, syncope,  GI: without diarrhea, + occasional constipation, jaundice, melena, GERD, hematemesis  : without frequency, urgency, dysuria, hematuria and recent follow up for prostate cancer negative by report  Skin: rash, bruise, open lesions but some skin thickening  Neuro: without TIA, focal neurologic complaints, seizure, trauma  Ortho: without pain, swelling, mobility impairment  Endocrine: diabetes, thyroid, heat/cold intolerance, polyuria, polyphagia, change bowel habits.  Sleep:no JULISSA, periodic breathing, fatigue  :  Medications    Current Outpatient Medications   Medication     atovaquone (MEPRON) 750 MG/5ML suspension     calcium carbonate (OS-PANFILO 500 MG [place]. [place]) 500 MG tablet     CLONAZEPAM PO     FLUCONAZOLE PO     furosemide (LASIX) 20 MG tablet     OPSUMIT 10 MG tablet     Pantoprazole Sodium (PROTONIX PO)     Penicillin V Potassium (PEN-VEE K OR)     predniSONE (DELTASONE) 2.5 MG tablet     riociguat (ADEMPAS) 1 MG tablet     rosuvastatin (CRESTOR) 5 MG tablet     ruxolitinib (JAKAFI) 5 MG TABS tablet     selexipag (UPTRAVI) 1600 MCG tablet     Specialty Vitamins Products (MAGNESIUM PLUS PROTEIN) 133 MG tablet     Ursodiol (ACTIGALL PO)     VALACYCLOVIR HCL PO     VITAMIN D, CHOLECALCIFEROL, PO     No current facility-administered medications for this visit.       Wt Readings from Last 24 Encounters:   03/30/22 126.2 kg (278 lb 3.2 oz)   05/19/21 117 kg (258 lb)   12/02/19 115.3 kg (254 lb 1.6 oz)   12/02/19 113.4 kg (250 lb)   08/27/19 115.5 kg (254 lb 9.6 oz)   05/15/19 117 kg (258 lb)   03/20/19 115.1 kg (253 lb 11.2 oz)   02/19/19 113.4 kg (250 lb)   10/10/18 117.9 kg (260 lb)   06/27/18 115.6 kg (254 lb 12.8 oz)   06/06/18 111.6 kg (246 lb)   01/16/18 114.3 kg (252 lb)   09/12/17 113.7 kg (250 lb 11.2 oz)    09/12/17 113.4 kg (250 lb)   08/22/17 115 kg (253 lb 9.6 oz)   07/19/17 115.3 kg (254 lb 1.6 oz)   06/23/17 117.3 kg (258 lb 9.6 oz)   06/07/17 122 kg (269 lb)   05/15/17 124.7 kg (275 lb)   05/01/17 125.1 kg (275 lb 11.2 oz)   04/11/17 129.5 kg (285 lb 8 oz)   04/12/16 133.8 kg (294 lb 15.6 oz)   08/26/15 133.8 kg (294 lb 15.6 oz)   08/13/15 132.5 kg (292 lb)       [facility] 3/2021      WBC 6900  HgB 10.8  Plt count 501,000  creratinine 1.9    Normal liver functions    EXAMINATION: CTA pulmonary angiogram, 3/30/2022 2:35 PM      COMPARISON: CT 4/27/2017. VQ scan 8/31/2016.     HISTORY: Shortness of breath. History of pulmonary hypertension.     TECHNIQUE: Volumetric helical acquisition of CT images of the chest  from the lung apices to the kidneys were acquired after the  administration of 77.0 mL of Isovue-370 IV contrast. Flash technique  with free breathing acquisition.  Post-processed multiplanar and/or  MIP reformations were obtained, archived to PACS and used in  interpretation of this study.      FINDINGS:  Contrast bolus is: adequate.  Exam is negative for acute  pulmonary embolism.      There is no axillary, mediastinal, or hilar adenopathy. Heart size is  normal. No pericardial effusion. Normal configuration of the great  vessels. Ascending aorta is nonaneurysmal. Main pulmonary artery  measures 4.6 cm. Calcified mediastinal lymph node. Thoracic esophagus  is within normal limits.     The central tracheal bronchial tree is patent. No pleural effusion. No  pneumothorax. Bibasilar atelectasis. Fibroatelectasis within the  periphery of the right upper and lower lobes.      Pulmonary nodules with referenced from series 6:  - New 5 mm pulmonary nodule within the left lingula (image 194).  - New right lower lobe subpleural nodule measuring approximately 4 mm  (image 256), this is at a site of prior pleural effusion and  consolidation.   -Decreased size of a now 3 mm solid  pulmonary nodule within the right  upper lobe (image 95), previously measured 5 mm.     Calcified left lower lobe granuloma.     Limited evaluation of the upper abdomen demonstrates no acute  findings.     Bones and soft tissue:  Mild multilevel degenerative change of the visualized spine. No  suspicious osseous lesion. Gynecomastia.                                                                         IMPRESSION:   1. Exam is negative for acute pulmonary embolism.   2. Multiple pulmonary nodules for example there is a new 5 mm solid  nodule within the left lingula. Recommend close attention on  follow-up.  3. Dilated main pulmonary artery measuring up to 4.6 cm, this is  compatible with known pulmonary hypertension.  4. Sequela of prior granulomatous disease. CT 2022    Catheterization 2022    HR: 64 bpm  BP: 109/63 (80) mmHg  Peripheral O2 sat: 99%    RA: 10/14 (10) mm Hg   RV: 74/-, 10 mm Hg   PA: 70/30 (40) mm Hg   mPCWP: 13 mm Hg     PA sat:  69.9%   Hb: 9.8 g/dL  Measured VO2: 361 ml/min    Direct Alisha CO: 9.3 L/min   Alisha CI: 3.6 L/min/m2   TDCO: 7.2 L/min   TD CI: 2.8 L/min/m2     TP mm Hg   PVR (ALISHA): 2.9 STEPHENS    Right sided filling pressures are mildly elevated. Left sided filling pressures are mildly elevated. Moderately elevated pulmonary artery hypertension. Normal cardiac output level. Hemodynamic data has been modified in Epic per physician review.       Pressures Phase: Baseline     Time Systolic (mmHg) Diastolic (mmHg) Mean (mmHg) A Wave (mmHg) V Wave (mmHg) EDP (mmHg) Max dp/dt (mmHg/sec) HR (bpm) Content (mL/dL) SAT (%)   RA Pressures  9:01 AM   10    10    14      64        RV Pressures  8:38 AM       630           9:02 AM 74        10     63        PA Pressures  9:05 AM 70    30    40        60        PCW Pressures  9:06 AM   13    22    20      60        AO Pressures  9:14     63    80        56          Blood Flow Results Phase: Baseline     Time Results  Indexed Values  (L/min/m2)   QP  8:38 AM 9.31 L/min    3.61      QS  8:38 AM 9.31 L/min    3.61        Blood Oximetry Phase: Baseline     Time Hb  SAT(%)  PO2  Content (mL/dL) PA Sat (%)   PA  8:38 AM  69.9 %      69.9      Art  8:38 AM  99 %     13.19         Cardiac Output Phase: Baseline     Time TDCO (L/min) TDCI (L/min/m2) Carter C.O. (L/min) Carter C.I. (L/min/m2) Carter HR (bpm)   Cardiac Output Results  8:38 AM 7.23    2.81    9.31    3.61         9:07 AM 7.23            Resistance Results Phase: Baseline     Time PVR  SVR  TPR  TVR  PVR/SVR  TPR/TVR    Resistance Results (Metric)  8:38  dsc-5    601.49 dsc-5    343.71 dsc-5    687.42 dsc-5    0.39    0.5      Resistance Results (Wood)  8:38 AM 2.9 STEPHENS    7.52 STEPHENS    4.3 STEPHENS    8.59 STEPHENS    0.39    0.5        Stoke Volume Results Phase: Baseline     Time RVSW (gm*m) LVSW (gm*m) RVSW-I (gm*m/m2) LVSW-I (gm*m/m2)   Stroke Work Results  8:38 AM 63.29    151.45    24.58    58.82          Echcocardiogram     Name: BENEDICT ESTES  MRN: [id number]  : 1965  Study Date: 2020 10:05 AM  Age: 55 yrs  Gender: Male  Patient Location: [facility]  Reason For Study: Pulmonary hypertension (H), SOB (shortness of breath)  Ordering Physician: PILI SEGURA  Referring Physician: PILI SEGURA  Performed By: Rosa Leal RDCS, RVT     BSA: 2.5 m2  Height: 77 in  Weight: 254 lb  BP: 125/56 mmHg  _____________________________________________________________________________  __        Procedure  Complete Portable Echo Adult.  _____________________________________________________________________________  __        Interpretation Summary  Left ventricular function, chamber size, wall motion, and wall thickness are  normal.The EF is 60-65%.  Mild right ventricular dilation is present. Global right ventricular function  is normal.  IVC diameter <2.1 cm collapsing >50% with sniff suggests a normal RA pressure  of 3 mmHg.  Right ventricular systolic pressure is 42mmHg above the right  atrial pressure.  No pericardial effusion is present.  Compared to prior, measured RVSP is lower, no other change.  _____________________________________________________________________________  __        Left Ventricle  Left ventricular function, chamber size, wall motion, and wall thickness are  normal.The EF is 60-65%. Left ventricular diastolic function is normal.  Abnormal non-specific septal motion is present.     Right Ventricle  Global right ventricular function is normal. Mild right ventricular dilation  is present.     Atria  Both atria appear normal.     Mitral Valve  The mitral valve is normal.        Aortic Valve  Aortic valve is normal in structure and function.     Tricuspid Valve  The tricuspid valve is normal. Trace tricuspid insufficiency is present. Right  ventricular systolic pressure is 42mmHg above the right atrial pressure.     Pulmonic Valve  The pulmonic valve is normal.     Vessels  Mild dilatation of the aorta is present. Sinuses of Valsalva 4.1 cm. Ascending  aorta 3.7 cm. IVC diameter <2.1 cm collapsing >50% with sniff suggests a  normal RA pressure of 3 mmHg.     Pericardium  No pericardial effusion is present.     _____________________________________________________________________________  __  MMode/2D Measurements & Calculations  IVSd: 0.92 cm  LVIDd: 5.5 cm  LVIDs: 3.4 cm  LVPWd: 1.2 cm  FS: 39.2 %     LV mass(C)d: 239.4 grams  LV mass(C)dI: 96.7 grams/m2  Ao root diam: 4.1 cm  asc Aorta Diam: 3.7 cm  LA Volume (BP): 79.0 ml  RWT: 0.45        Doppler Measurements & Calculations  MV E max nieves: 80.2 cm/sec  MV A max nieves: 74.8 cm/sec  MV E/A: 1.1  MV dec time: 0.20 sec  PA acc time: 0.09 sec  PI end-d nieves: 113.1 cm/sec  TR max nieves: 300.7 cm/sec  TR max P.3 mmHg  E/E' av.4  Lateral E/e': 7.7  Medial E/e': 11.0     CC: Adelso Elder M.D.

## 2022-03-30 NOTE — PATIENT INSTRUCTIONS
You were seen today in the Cardiovascular Clinic at the [facility].   Cardiology Providers you saw during your visit:    Dr. Quan    Diagnosis:   Pulmonary Hypertension    Recommendations:   1.  Please increase your Jakafi to 10mg daily.  2.  Stop your tadalafil, wait 2 days then start riociguat 1mg three times daily - Roberto will be in contact with you about this change.  Delay until you've heard from her.  2.  Please have Chest CT with contrast completed today.    3.  Please have additional labs drawn downstairs    UPDATE after CT:   Have labs completed in [place] in 1 week - LDH, D.Dimer, CMP and Hgb      Please come back upstairs after these are completed.     Follow-up:   Please scheduled follow-up with with Dr. Quan monthly    For emergencies call 911.     If you have any questions regarding your visit please contact your care team:       [facility]    Cardiology Care Coordinator-  Roberto Erickson RN, BSN                                   Nurse Coordinator                                           Pulmonary Hypertension                                [facility]                 (Phone)[phone]                                                                                                                      Appointment scheduling or nurse questions: [phone]    On Call Cardiologist for after hours or on weekends: [phone]    option #4    If you need a medication refill please contact your pharmacy.  Please allow 3 business days for your refill to be completed.    As always, thank you for trusting us with your health care needs!

## 2022-03-30 NOTE — PROGRESS NOTES
Patient appropriate for discharge.  VSS.  Right neck site C/D/I, no hematoma.  Denies pain.  Declined PO food and fluids.  Teaching was done and discharge instructions were given by prep RN - patient and family with no further questions.  Patient discharged independently from hospital with family.

## 2022-03-30 NOTE — LETTER
3/30/2022      RE: Olivier Gómez  [address]  [place] [address]       Dear Colleague,    Thank you for the opportunity to participate in the care of your patient, Olivier Gómez, at the [facility] [place] [facility]. Please see a copy of my visit note below.    Olivier Gómez is a 55 year old who is being evaluated via a billable video visit.        The patient's current medical regimen as outlined below has markedly and consistently improved his pulmonary hypertension.  When first seen by us the mean PA pressure was 65 (now 31)  He must stay on the current medical regimen as outlined below!    Plan:  1. Discontinue tadalafil  2. Start Riociguat 1 TID  3. Increase Jakafi 10 mgs once daily  4. Weekly laboratories  5. RTC 1 month  6. Labs with Dr. Adelso Delgado next week: LDH, DDIMER, BMP.  Please also arrange for VQ lung scan as CT scan negative for chronic PE but had COVOD in January and has elevated DDIMER        55 year old white male malpractice  seen for follow-up of severe pulmonary hypertension.  Patient has [facility]  His PAH has not necessarily been felt to be secondary to GVH.  He presented with severe, advanced PAH requiring parenteral prostanoids and generally normalized PA pressures on three drug therapy.  He wished to be weaned off of parenteral prostanoids and this was done carefully and sequentially and transitioned off of parenteral prostanoids.  However, experienced a severe increase in PA pressures, and we elected to continue his oral medical regimen but add a JANUS kinase inhibitor to cover the potential of GVH.  On this regimen he experienced marked improvement as reflected in his most recent catheterization below.      He has had hallmarks of autoimmune disease. including skin changes that look in some ways like scleroderma, as well as irving titre  GENEVIEVE.    Constitutional: without weight loss, fever, chills, night sweats  HEENT: without visual changes, swallow difficulties  Pulmonary: without shortness of breath, cough, wheeze, hemoptysis  Cardiac: without chest pain, CORREA, PND, orthopnea, edema, palpitation, pre-syncope, syncope,  GI: without diarrhea, + occasional constipation, jaundice, melena, GERD, hematemesis  : without frequency, urgency, dysuria, hematuria and recent follow up for prostate cancer negative by report  Skin: rash, bruise, open lesions but some skin thickening  Neuro: without TIA, focal neurologic complaints, seizure, trauma  Ortho: without pain, swelling, mobility impairment  Endocrine: diabetes, thyroid, heat/cold intolerance, polyuria, polyphagia, change bowel habits.  Sleep:no JULISSA, periodic breathing, fatigue  :  Medications    Current Outpatient Medications   Medication     atovaquone (MEPRON) 750 MG/5ML suspension     calcium carbonate (OS-PANFILO 500 MG [place]. [place]) 500 MG tablet     CLONAZEPAM PO     FLUCONAZOLE PO     furosemide (LASIX) 20 MG tablet     OPSUMIT 10 MG tablet     Pantoprazole Sodium (PROTONIX PO)     Penicillin V Potassium (PEN-VEE K OR)     predniSONE (DELTASONE) 2.5 MG tablet     riociguat (ADEMPAS) 1 MG tablet     rosuvastatin (CRESTOR) 5 MG tablet     ruxolitinib (JAKAFI) 5 MG TABS tablet     selexipag (UPTRAVI) 1600 MCG tablet     Specialty Vitamins Products (MAGNESIUM PLUS PROTEIN) 133 MG tablet     Ursodiol (ACTIGALL PO)     VALACYCLOVIR HCL PO     VITAMIN D, CHOLECALCIFEROL, PO     No current facility-administered medications for this visit.       Wt Readings from Last 24 Encounters:   03/30/22 126.2 kg (278 lb 3.2 oz)   05/19/21 117 kg (258 lb)   12/02/19 115.3 kg (254 lb 1.6 oz)   12/02/19 113.4 kg (250 lb)   08/27/19 115.5 kg (254 lb 9.6 oz)   05/15/19 117 kg (258 lb)   03/20/19 115.1 kg (253 lb 11.2 oz)   02/19/19 113.4 kg (250 lb)   10/10/18 117.9 kg (260 lb)   06/27/18 115.6 kg (254 lb 12.8 oz)   06/06/18  111.6 kg (246 lb)   01/16/18 114.3 kg (252 lb)   09/12/17 113.7 kg (250 lb 11.2 oz)   09/12/17 113.4 kg (250 lb)   08/22/17 115 kg (253 lb 9.6 oz)   07/19/17 115.3 kg (254 lb 1.6 oz)   06/23/17 117.3 kg (258 lb 9.6 oz)   06/07/17 122 kg (269 lb)   05/15/17 124.7 kg (275 lb)   05/01/17 125.1 kg (275 lb 11.2 oz)   04/11/17 129.5 kg (285 lb 8 oz)   04/12/16 133.8 kg (294 lb 15.6 oz)   08/26/15 133.8 kg (294 lb 15.6 oz)   08/13/15 132.5 kg (292 lb)       [facility] [place] 3/2021      WBC 6900  HgB 10.8  Plt count 501,000  creratinine 1.9    Normal liver functions    EXAMINATION: CTA pulmonary angiogram, 3/30/2022 2:35 PM      COMPARISON: CT 4/27/2017. VQ scan 8/31/2016.     HISTORY: Shortness of breath. History of pulmonary hypertension.     TECHNIQUE: Volumetric helical acquisition of CT images of the chest  from the lung apices to the kidneys were acquired after the  administration of 77.0 mL of Isovue-370 IV contrast. Flash technique  with free breathing acquisition.  Post-processed multiplanar and/or  MIP reformations were obtained, archived to PACS and used in  interpretation of this study.      FINDINGS:  Contrast bolus is: adequate.  Exam is negative for acute  pulmonary embolism.      There is no axillary, mediastinal, or hilar adenopathy. Heart size is  normal. No pericardial effusion. Normal configuration of the great  vessels. Ascending aorta is nonaneurysmal. Main pulmonary artery  measures 4.6 cm. Calcified mediastinal lymph node. Thoracic esophagus  is within normal limits.     The central tracheal bronchial tree is patent. No pleural effusion. No  pneumothorax. Bibasilar atelectasis. Fibroatelectasis within the  periphery of the right upper and lower lobes.      Pulmonary nodules with referenced from series 6:  - New 5 mm pulmonary nodule within the left lingula (image 194).  - New right lower lobe subpleural nodule measuring approximately 4 mm  (image 256), this is at a site of prior  pleural effusion and  consolidation.   -Decreased size of a now 3 mm solid pulmonary nodule within the right  upper lobe (image 95), previously measured 5 mm.     Calcified left lower lobe granuloma.     Limited evaluation of the upper abdomen demonstrates no acute  findings.     Bones and soft tissue:  Mild multilevel degenerative change of the visualized spine. No  suspicious osseous lesion. Gynecomastia.                                                                         IMPRESSION:   1. Exam is negative for acute pulmonary embolism.   2. Multiple pulmonary nodules for example there is a new 5 mm solid  nodule within the left lingula. Recommend close attention on  follow-up.  3. Dilated main pulmonary artery measuring up to 4.6 cm, this is  compatible with known pulmonary hypertension.  4. Sequela of prior granulomatous disease. CT 2022    Catheterization 2022    HR: 64 bpm  BP: 109/63 (80) mmHg  Peripheral O2 sat: 99%    RA: 10/14 (10) mm Hg   RV: 74/-, 10 mm Hg   PA: 70/30 (40) mm Hg   mPCWP: 13 mm Hg     PA sat:  69.9%   Hb: 9.8 g/dL  Measured VO2: 361 ml/min    Direct Alisha CO: 9.3 L/min   Alisha CI: 3.6 L/min/m2   TDCO: 7.2 L/min   TD CI: 2.8 L/min/m2     TP mm Hg   PVR (ALISHA): 2.9 STEPHENS    Right sided filling pressures are mildly elevated. Left sided filling pressures are mildly elevated. Moderately elevated pulmonary artery hypertension. Normal cardiac output level. Hemodynamic data has been modified in Epic per physician review.       Pressures Phase: Baseline     Time Systolic (mmHg) Diastolic (mmHg) Mean (mmHg) A Wave (mmHg) V Wave (mmHg) EDP (mmHg) Max dp/dt (mmHg/sec) HR (bpm) Content (mL/dL) SAT (%)   RA Pressures  9:01 AM   10    10    14      64        RV Pressures  8:38 AM       630           9:02 AM 74        10     63        PA Pressures  9:05 AM 70    30    40        60        PCW Pressures  9:06 AM   13    22    20      60        AO Pressures  9:14     63    80        56           Blood Flow Results Phase: Baseline     Time Results  Indexed Values (L/min/m2)   QP  8:38 AM 9.31 L/min    3.61      QS  8:38 AM 9.31 L/min    3.61        Blood Oximetry Phase: Baseline     Time Hb  SAT(%)  PO2  Content (mL/dL) PA Sat (%)   PA  8:38 AM  69.9 %      69.9      Art  8:38 AM  99 %     13.19         Cardiac Output Phase: Baseline     Time TDCO (L/min) TDCI (L/min/m2) Carter C.O. (L/min) Carter C.I. (L/min/m2) Carter HR (bpm)   Cardiac Output Results  8:38 AM 7.23    2.81    9.31    3.61         9:07 AM 7.23            Resistance Results Phase: Baseline     Time PVR  SVR  TPR  TVR  PVR/SVR  TPR/TVR    Resistance Results (Metric)  8:38  dsc-5    601.49 dsc-5    343.71 dsc-5    687.42 dsc-5    0.39    0.5      Resistance Results (Wood)  8:38 AM 2.9 STEPHENS    7.52 STEPHENS    4.3 STEPHENS    8.59 STEPHENS    0.39    0.5        Stoke Volume Results Phase: Baseline     Time RVSW (gm*m) LVSW (gm*m) RVSW-I (gm*m/m2) LVSW-I (gm*m/m2)   Stroke Work Results  8:38 AM 63.29    151.45    24.58    58.82          Echcocardiogram     Name: BENEDICT ESTES  MRN: [id number]  : 1965  Study Date: 2020 10:05 AM  Age: 55 yrs  Gender: Male  Patient Location: [facility]  Reason For Study: Pulmonary hypertension (H), SOB (shortness of breath)  Ordering Physician: PILI SEGURA  Referring Physician: PILI SEGURA  Performed By: Rosa Leal RDCS, OLIVERIO     BSA: 2.5 m2  Height: 77 in  Weight: 254 lb  BP: 125/56 mmHg  _____________________________________________________________________________  __        Procedure  Complete Portable Echo Adult.  _____________________________________________________________________________  __        Interpretation Summary  Left ventricular function, chamber size, wall motion, and wall thickness are  normal.The EF is 60-65%.  Mild right ventricular dilation is present. Global right ventricular function  is normal.  IVC diameter <2.1 cm collapsing >50% with sniff suggests a normal RA pressure  of 3  mmHg.  Right ventricular systolic pressure is 42mmHg above the right atrial pressure.  No pericardial effusion is present.  Compared to prior, measured RVSP is lower, no other change.  _____________________________________________________________________________  __        Left Ventricle  Left ventricular function, chamber size, wall motion, and wall thickness are  normal.The EF is 60-65%. Left ventricular diastolic function is normal.  Abnormal non-specific septal motion is present.     Right Ventricle  Global right ventricular function is normal. Mild right ventricular dilation  is present.     Atria  Both atria appear normal.     Mitral Valve  The mitral valve is normal.        Aortic Valve  Aortic valve is normal in structure and function.     Tricuspid Valve  The tricuspid valve is normal. Trace tricuspid insufficiency is present. Right  ventricular systolic pressure is 42mmHg above the right atrial pressure.     Pulmonic Valve  The pulmonic valve is normal.     Vessels  Mild dilatation of the aorta is present. Sinuses of Valsalva 4.1 cm. Ascending  aorta 3.7 cm. IVC diameter <2.1 cm collapsing >50% with sniff suggests a  normal RA pressure of 3 mmHg.     Pericardium  No pericardial effusion is present.     _____________________________________________________________________________  __  MMode/2D Measurements & Calculations  IVSd: 0.92 cm  LVIDd: 5.5 cm  LVIDs: 3.4 cm  LVPWd: 1.2 cm  FS: 39.2 %     LV mass(C)d: 239.4 grams  LV mass(C)dI: 96.7 grams/m2  Ao root diam: 4.1 cm  asc Aorta Diam: 3.7 cm  LA Volume (BP): 79.0 ml  RWT: 0.45        Doppler Measurements & Calculations  MV E max nieves: 80.2 cm/sec  MV A max nieves: 74.8 cm/sec  MV E/A: 1.1  MV dec time: 0.20 sec  PA acc time: 0.09 sec  PI end-d nieves: 113.1 cm/sec  TR max nieves: 300.7 cm/sec  TR max P.3 mmHg  E/E' av.4  Lateral E/e': 7.7  Medial E/e': 11.0     CC: Adelso Elder M.D.      Please do not hesitate to contact me if you have  any questions/concerns.     Sincerely,     Dk Quan MD

## 2022-03-31 ENCOUNTER — TELEPHONE (OUTPATIENT)
Dept: CARDIOLOGY | Facility: CLINIC | Age: 57
End: 2022-03-31
Payer: COMMERCIAL

## 2022-03-31 ENCOUNTER — TELEPHONE (OUTPATIENT)
Dept: CARDIOLOGY | Facility: CLINIC | Age: 57
End: 2022-03-31

## 2022-03-31 DIAGNOSIS — I27.20 PULMONARY HYPERTENSION (H): ICD-10-CM

## 2022-03-31 DIAGNOSIS — I31.39 PERICARDIAL EFFUSION: ICD-10-CM

## 2022-03-31 DIAGNOSIS — D89.813 GRAFT-VERSUS-HOST DISEASE (H): ICD-10-CM

## 2022-03-31 NOTE — TELEPHONE ENCOUNTER
PA Initiation    Medication: jakafi- pa pending  Insurance Company: [facility] - Phone [phone] Fax [phone]  Pharmacy Filling the Rx: [facility] - [place] - [address] [facility]  Filling Pharmacy Phone: [phone]  Filling Pharmacy Fax:    Start Date: 3/31/2022

## 2022-03-31 NOTE — TELEPHONE ENCOUNTER
The pt would like  to know the name of the medication that the we discussing is called Regatine/Papabarine/protaglandin.  Zohaib Pickett on 3/30/2022 at 7:36 PM    Staff message sent to Dr. Quan with medication update. Chantel Erickson RN on 3/31/2022 at 7:22 AM

## 2022-03-31 NOTE — TELEPHONE ENCOUNTER
Prior Authorization Not Needed per Insurance    Medication: jakafi- pa not needed  Insurance Company: [facility] - Phone [phone] Fax [phone]  Expected CoPay:      Pharmacy Filling the Rx: [facility] - [place] - [address] [facility]  Pharmacy Notified: No  Patient Notified: No

## 2022-03-31 NOTE — TELEPHONE ENCOUNTER
PA Initiation    Medication: Adempas 1mg  Insurance Company:  - Phone [phone] Fax [phone]  Pharmacy Filling the Rx: [facility] - [place], [place] - [address]  Filling Pharmacy Phone:    Filling Pharmacy Fax:    Start Date: 3/31/2022    *Prior authorization completed and submitted through CoverMyMeds for expedited review along with labs, right heart catheterization report and clinic note.

## 2022-04-04 ENCOUNTER — MYC MEDICAL ADVICE (OUTPATIENT)
Dept: CARDIOLOGY | Facility: CLINIC | Age: 57
End: 2022-04-04
Payer: COMMERCIAL

## 2022-04-04 ENCOUNTER — TELEPHONE (OUTPATIENT)
Dept: CARDIOLOGY | Facility: CLINIC | Age: 57
End: 2022-04-04
Payer: COMMERCIAL

## 2022-04-04 DIAGNOSIS — I27.20 PULMONARY HYPERTENSION (H): Primary | ICD-10-CM

## 2022-04-04 DIAGNOSIS — I27.20 PULMONARY HYPERTENSION (H): ICD-10-CM

## 2022-04-04 DIAGNOSIS — R06.02 SOB (SHORTNESS OF BREATH): ICD-10-CM

## 2022-04-04 RX ORDER — RUXOLITINIB 5 MG/1
TABLET ORAL
Refills: 6 | OUTPATIENT
Start: 2022-04-04

## 2022-04-04 NOTE — TELEPHONE ENCOUNTER
Orders placed and faxed to Dr. Delgado's office. Chantel Erickson RN on 4/4/2022 at 9:36 AM    ----- Message from Marlys Anderson RN sent at 4/1/2022  4:57 PM CDT -----  Regarding: RE: Adempas PA  It looks like he wanted labs and a VQ Scan locally.  I will send on Monday

## 2022-04-06 RX ORDER — MACITENTAN 10 MG/1
10 TABLET, FILM COATED ORAL DAILY
Qty: 30 TABLET | Refills: 11 | Status: SHIPPED | OUTPATIENT
Start: 2022-04-06 | End: 2023-04-12

## 2022-04-06 NOTE — TELEPHONE ENCOUNTER
OPSUMIT 10 MG tablet      Last Written Prescription Date:  4/21/2021  Last Fill Quantity: 30,   # refills: 11  Last Office Visit : 3/30/2022  Future Office visit:  5/10/2022    Routing refill request to provider for review/approval because:  Medication not on the Cardiology refill protocol.

## 2022-04-08 RX ORDER — TADALAFIL 20 MG/1
40 TABLET ORAL DAILY
Qty: 30 TABLET | Refills: 0 | Status: SHIPPED | OUTPATIENT
Start: 2022-04-08 | End: 2022-04-21

## 2022-04-08 NOTE — TELEPHONE ENCOUNTER
Called patient to update regarding medication questions. Continue on Tadalafil until PA approved for adempas. 30 day supply sent to pharmacy.     Aria Bettencourt RN on 4/8/2022 at 1:05 PM

## 2022-04-14 ENCOUNTER — TELEPHONE (OUTPATIENT)
Dept: CARDIOLOGY | Facility: CLINIC | Age: 57
End: 2022-04-14
Payer: COMMERCIAL

## 2022-04-14 NOTE — TELEPHONE ENCOUNTER
PA Initiation    Medication: Adempas 1mg  Insurance Company: [facility] - Phone [phone] Fax [phone]  Pharmacy Filling the Rx: [facility] - [place], [place] - [address]  Filling Pharmacy Phone:    [facility]ax[facility]   Start Date: 4/14/2022    ----------------------------  Insurance: [employer]  BIN:   PCN:   ID#:   GRP#:     Prior authorization completed and submitted through [employer] for expedited review along with right heart catheterization report and clinic note.

## 2022-04-19 DIAGNOSIS — I27.20 PULMONARY HYPERTENSION (H): ICD-10-CM

## 2022-04-19 NOTE — TELEPHONE ENCOUNTER
Health Call Center    Phone Message    May a detailed message be left on voicemail: no     Reason for Call: Other: [facility] was calling on behalf of the patient on the status of his medication. [facility] has not recieved the prescription yet. Please call the patient directly and discuss.      Action Taken: Other: Cardiology    Travel Screening: Not Applicable

## 2022-04-20 DIAGNOSIS — R06.02 SOB (SHORTNESS OF BREATH): ICD-10-CM

## 2022-04-20 DIAGNOSIS — I27.20 PULMONARY HYPERTENSION (H): ICD-10-CM

## 2022-04-20 NOTE — TELEPHONE ENCOUNTER
"[facility] Call Center    Phone Message    May a detailed message be left on voicemail: yes     Reason for Call: Other: Pt is still waiting for riociguat (ADEMPAS) 1 MG tablet to be filled.  Based on notes in chart, the med was sent to [facility] in [place] and they were not able to fill it - note states: \"Prior authorization: Closed - The  is not the PA processor for this patient and medication combination.\"   There is no further movement on this medication.  Pt was told by Dr. Quan to get started on it right away in mid March.   Pt states it needs to be called into [facility] - [place], [place] - [address] due to the way it is formulated.  Please resubmit the perscription to the [facility] so pt can start taking this med.      Action Taken: Message routed to:  [facility] cardio    Travel Screening: Not Applicable                                                                        "

## 2022-04-20 NOTE — TELEPHONE ENCOUNTER
Medication Refill double check:    Last office visit was on 3/30/22 with .    Follow up was recommended for 1 month.    Any additional encounters with changes to requested med? no    Authorizing provider is:     Refill was pended .     Additional orders/notes: sent msg to [facility] asking if patient is up-titrating or on stable dose.      Ana Ashraf RN on 4/20/2022 at 11:38 AM

## 2022-04-20 NOTE — TELEPHONE ENCOUNTER
APPROVAL  Authorization Effective Date: 4/14/2022  Authorization Expiration Date:  indefinite   Medication: Adempas 1mg  Approved Dose/Quantity: 90/30  Reference #: [id number]   Insurance Company: [employer] - Phone [phone] Fax [phone]  Expected CoPay:       CoPay Card Available:      Foundation Assistance Needed:    Which Pharmacy is filling the prescription (Not needed for infusion/clinic administered): [facility] - [place], [place] - [address] [place] [address]  Pharmacy Notified:    Patient Notified:

## 2022-04-20 NOTE — TELEPHONE ENCOUNTER
riociguat (ADEMPAS) 1 MG tablet       Last Written Prescription Date:  3-30-22  Last Fill Quantity: 90,   # refills: 3  Last Office Visit : 3-30-22  Future Office visit:  5-10-22    Routing refill request to provider for review/approval because:  Pharm transfer  Pt Diagnosis: Pulmonary HTN  High Priority: pt out of  Med- has not started    See phone note below

## 2022-04-21 ENCOUNTER — TELEPHONE (OUTPATIENT)
Dept: CARDIOLOGY | Facility: CLINIC | Age: 57
End: 2022-04-21
Payer: COMMERCIAL

## 2022-04-21 ENCOUNTER — TELEPHONE (OUTPATIENT)
Dept: CARDIOLOGY | Facility: CLINIC | Age: 57
End: 2022-04-21

## 2022-04-21 RX ORDER — TADALAFIL 20 MG/1
40 TABLET ORAL DAILY
Qty: 30 TABLET | Refills: 0 | Status: SHIPPED | OUTPATIENT
Start: 2022-04-21 | End: 2022-04-26

## 2022-04-21 NOTE — TELEPHONE ENCOUNTER
*Contacted [facility] to follow-up and make sure outreach was made. Spoke with Felix who stated that pt was contacted earlier this morning after the office called. Felix stated the pt was consented over the phone and documents have been emailed. Per Felix, the pt stated he would get them signed and sent back ASAP.

## 2022-04-21 NOTE — TELEPHONE ENCOUNTER
Unable to reach Danielle;  with direct number for call back. Chantel Erickson RN on 4/21/2022 at 12:00 PM    Patient's wife concerned about patient flying; advised I would touch base with Dr. Quan regarding safety concerns. Chantel Erickson RN on 4/21/2022 at 3:27 PM    Spoke with Tawny with [facility] who advised paperwork has been received and is in the process of being approved with his insurance. Asked Tawny to reach out to patient with updates on approval. Chantel Erickson RN on 4/21/2022 at 3:47 PM    Confirmed with Olivier that paperwork was received and is in process with the insurance company. Patient does not have any hesitations flying. Advised I would reach out again to Adempas [employer] tomorrow for follow up. Patient did not have any further questions or concerns. Chantel Erickson RN on 4/21/2022 at 3:58 PM

## 2022-04-21 NOTE — TELEPHONE ENCOUNTER
*Received a call from Jeannette at [facility] with regards to the patient and wanted to know why Adempas has not been started. Informed Jeannette that prior authorization was approved but there is a process with the  specialty medications. Told Jeannette that outreach will be made to patient.    *Contacted pt's wife, Danielle, and informed her that [employer] put up a roadblock with regards to submitting prior authorizations which delayed the process. Danielle stated the prior auth was approved a week ago, but nothing has happened since. Informed Danielle that the referral was sent off and is requiring the pt's signature, just like Uptravi. Told Danielle a follow-up will be made with [employer] to see if pt's case can be expedited. Danielle asked if pt would have any side effects. Informed Danielle that patient's normally have lightheadedness due to B/P levels as a potential side effect but would have the nurse follow-up in further detail. Danielle wanted to also confirm that none of pt's afternoon medication had an interaction with his medication. Informed Danielle that message will also be sent to RN.    Danielle also discussed that they think pt needs to take a 200mcg tablet on Uptravi in the afternoon. Stated to Danielle that it could be a possibility and the request could be made for an additional quantity but Dr. Quan would need to be in agreement. Danielle stated she is fully aware and will try the transition first. Danielle requested the same follow-up be made with Olivier. Agreed to plan.    *Contacted [employer] and spoke with Tawny. Tawny stated that without the patient's signature, the quick start program cannot be requested. Tawny stated that they are able to expedite the pt's request and receive electronically signed documents. Tawny stated she will expedite the request over to their  team to send an email to pt within the hour.    *Contacted pt to discuss. Informed pt that he will be receiving some documents from [facility] [employer] to sign  electronically. Informed pt that the same documents will be emailed to him in a non-docusign format as well in case pt does not receive the email. Confirmed pt's email and updated in system. Pt declined further questions.    Adempas [employer] documents sent to pt with instructions.

## 2022-04-21 NOTE — TELEPHONE ENCOUNTER
[facility] Call Center    Phone Message    May a detailed message be left on voicemail: yes     Reason for Call: Other: Patient's spouse is calling rearding the medication adempas that the patient was supposed to start 3 weeks ago. Please call and discuss.      Action Taken: Other: Cardiology    Travel Screening: Not Applicable

## 2022-04-21 NOTE — TELEPHONE ENCOUNTER
tadalafil, PAH, 20 MG   Last Written Prescription Date:  4/8/22  Last Fill Quantity: 30,   # refills: 0  Last Office Visit : 3/30/22  Future Office visit:  5/10/22  Routing refill request to provider for review/approval because:   Drug not on the refill protocol

## 2022-04-21 NOTE — TELEPHONE ENCOUNTER
PA Initiation    Medication: Tadalafil (PAH) 20mg (60/30)  Insurance Company: [facility] - Phone [phone] Fax [phone]  Pharmacy Filling the Rx: [facility] - [place], [place] - [address] [place] [address]  Filling Pharmacy Phone:    Filling Pharmacy Fax:    Start Date: 4/21/2022    *Prior authorization completed and submitted through CoverMyMeds for expedited review along with right heart catheterization report

## 2022-04-21 NOTE — TELEPHONE ENCOUNTER
Medication Refill double check:    Last office visit was on 3/30/2 with .    Follow up was recommended for 1 month.    Any additional encounters with changes to requested med? Yes, med stopped once he begins Adempas.  PA in process.    Authorizing provider is: Dr. Quan    Limited refill was approved as he will be stopping medication soon.     Additional orders/notes:       Ana Ashraf RN on 4/21/2022 at 2:29 PM

## 2022-04-21 NOTE — TELEPHONE ENCOUNTER
Adempas enrollment completed and faxed to [facility] [employer] hub for expedited review along with prior authorization approval. Requested Adempas assistance to obtain patients signature.     Nemo Carver  [facility]    Pulmonary Hypertension   [facility]  (p) [phone]

## 2022-04-22 NOTE — TELEPHONE ENCOUNTER
Spoke with Louise and provided verbal signature for Adempas Rx. Confirmed with Olivier that prescription is on file. Chantel Erickson RN on 4/22/2022 at 3:49 PM    [facility] Call [facility]    Phone Message    May a detailed message be left on voicemail: yes     Reason for Call: Other: Calling on the status of the medication request. Please call patient when it has been sent over to the pharmacy.      Action Taken: Other: Cardiology    Travel Screening: Not Applicable

## 2022-04-26 DIAGNOSIS — I27.20 PULMONARY HYPERTENSION (H): ICD-10-CM

## 2022-04-26 RX ORDER — TADALAFIL 20 MG/1
40 TABLET ORAL DAILY
Qty: 10 TABLET | Refills: 0 | Status: ON HOLD | OUTPATIENT
Start: 2022-04-26 | End: 2022-07-15

## 2022-04-26 RX ORDER — TADALAFIL 20 MG/1
40 TABLET ORAL DAILY
Qty: 15 TABLET | Refills: 0 | Status: SHIPPED | OUTPATIENT
Start: 2022-04-26 | End: 2022-04-26

## 2022-05-10 ENCOUNTER — VIRTUAL VISIT (OUTPATIENT)
Dept: CARDIOLOGY | Facility: CLINIC | Age: 57
End: 2022-05-10
Attending: INTERNAL MEDICINE
Payer: COMMERCIAL

## 2022-05-10 ENCOUNTER — TELEPHONE (OUTPATIENT)
Dept: CARDIOLOGY | Facility: CLINIC | Age: 57
End: 2022-05-10
Payer: COMMERCIAL

## 2022-05-10 DIAGNOSIS — I27.20 PULMONARY HYPERTENSION (H): ICD-10-CM

## 2022-05-10 DIAGNOSIS — R06.02 SOB (SHORTNESS OF BREATH): ICD-10-CM

## 2022-05-10 PROCEDURE — 99214 OFFICE O/P EST MOD 30 MIN: CPT | Mod: 95 | Performed by: INTERNAL MEDICINE

## 2022-05-10 RX ORDER — LIDOCAINE 40 MG/G
CREAM TOPICAL
Status: CANCELLED | OUTPATIENT
Start: 2022-05-10

## 2022-05-10 NOTE — TELEPHONE ENCOUNTER
----- Message from Marlys Anderson RN sent at 5/10/2022  3:56 PM CDT -----  Pt needs to following labs faxed to his local Lab    (CBC with platelets, Blood Smear, Iron and Iron Binding, Soluble Tranfarrin, Ferritin, LDH, CMP, N-Terminal Pro BNP)    Right heart catheterization with follow up visit in July.    Marlys Anderson RN BSN [facility] PHN CHFN

## 2022-05-10 NOTE — LETTER
5/10/2022      RE: Olivier Gómez  [address]  [place] [address]       Dear Colleague,    Thank you for the opportunity to participate in the care of your patient, Olivier Gómez, at the [facility] [place] at [facility]. Please see a copy of my visit note below.    Olivier is a 57 year old who is being evaluated via a billable video visit.      Impression:  1. Remote AML treated with BMT  2. Pulmonary hypertension  3. Anemia      Current Outpatient Medications   Medication     atovaquone (MEPRON) 750 MG/5ML suspension     calcium carbonate (OS-PANFILO 500 MG [place]. [place]) 500 MG tablet     CLONAZEPAM PO     FLUCONAZOLE PO     furosemide (LASIX) 20 MG tablet     OPSUMIT 10 MG tablet     Pantoprazole Sodium (PROTONIX PO)     Penicillin V Potassium (PEN-VEE K OR)     predniSONE (DELTASONE) 2.5 MG tablet     riociguat (ADEMPAS) 1 MG tablet     rosuvastatin (CRESTOR) 5 MG tablet     ruxolitinib (JAKAFI) 5 MG TABS tablet     selexipag (UPTRAVI) 1600 MCG tablet     Specialty Vitamins Products (MAGNESIUM PLUS PROTEIN) 133 MG tablet     Ursodiol (ACTIGALL PO)     VALACYCLOVIR HCL PO     VITAMIN D, CHOLECALCIFEROL, PO     tadalafil, PAH, 20 MG TABS     No current facility-administered medications for this visit.       Telephone visit x 30 minutes to assess impact of switch to riociguat from tadalafil. He is markedly improved with complete absence of shortness of breath since starting new medical regimen.  He was previously found to have unexplained anemia without melena, hematochezia, history of GI bleeding, abnormal diet.     Constitutional: weight loss, fever, chills, night sweats  HEENT: without visual changes, swallow difficulties  Pulmonary: without shortness of breath, cough, wheeze, hemoptysis  Cardiac: without chest pain, CORREA, PND, orthopnea, edema, palpitation, pre-syncope, syncope,  GI: without diarrhea, constipation, jaundice, melena, GERD,  hematemesis  : without frequency, urgency, dysuria, hematuria  Skin: rash, bruise, open lesions  Neuro: without TIA, focal neurologic complaints, seizure, trauma  Ortho: without pain, swelling, mobility impairment  Endocrine: diabetes, thyroid, heat/cold intolerance, polyuria, polyphagia, change bowel habits.  Sleep:no JULISSA, periodic breathing, fatigue  Plan   1. Continue riociguat.  2. RTC later July with right heart catheterization  3. Labs soon at home to evaluate anemia: CBC with platelets, blood smear, LDH, iron studies, CMP,BNP, reticulocyte count      Please do not hesitate to contact me if you have any questions/concerns.     Sincerely,     Dk Quan MD

## 2022-05-10 NOTE — PATIENT INSTRUCTIONS
Medication Changes:  No changes    Follow up Appointment Information:  Completed labs locally- please schedule a lab appointment  Schedule for a Right heart catheterization in July with a follow-up with Avelina        We are located on the third floor of the [facility] ([facility]) on the [facility].  Our address is     [address] [place] [address] on [address]rd [place] [address]    Thank you for allowing us to be a part of your care here at the [facility]    If you have questions or concerns please contact us at:    Roberto Erickson RN, BSN   Nemo Carver (Schedule,Prior Auth)  Nurse Coordinator     Clinic   Pulmonary Hypertension   Pulmonary Hypertension  [facility]  [facility]  (Phone)[phone]    (Phone) [phone]        (Fax) [phone]    ** Please note that you will NOT receive a reminder call regarding your scheduled testing, reminder calls are for provider appointments only.  If you are scheduled for testing within the [employer] system you may receive a call regarding pre-registration for billing purposes only.**     Remember to weigh yourself daily after voiding and before you consume any food or beverages and log the numbers.  If you have gained/lost 2 pounds overnight or 5 pounds in a week contact us immediately for medication adjustments or further instructions.   **Please call us immediately if you have any syncope, chest pain, edema, or decline in your functional status.    Support Group:  Pulmonary Hypertension Association  Https://www.phassociation.org/  **Look at the Events Tab** They even have Support Groups that you can call into    [facility] PH Support Group  Second Saturday of the Month from 1-3 PM   Location: [address] [place] [address]  Leader: Sofia Durand   Phone: [phone]  Email: [email or web address]

## 2022-05-10 NOTE — PROGRESS NOTES
Olivier is a 57 year old who is being evaluated via a billable video visit.      Impression:  1. Remote AML treated with BMT  2. Pulmonary hypertension  3. Anemia      Current Outpatient Medications   Medication     atovaquone (MEPRON) 750 MG/5ML suspension     calcium carbonate (OS-PANFILO 500 MG [place]. [place]) 500 MG tablet     CLONAZEPAM PO     FLUCONAZOLE PO     furosemide (LASIX) 20 MG tablet     OPSUMIT 10 MG tablet     Pantoprazole Sodium (PROTONIX PO)     Penicillin V Potassium (PEN-VEE K OR)     predniSONE (DELTASONE) 2.5 MG tablet     riociguat (ADEMPAS) 1 MG tablet     rosuvastatin (CRESTOR) 5 MG tablet     ruxolitinib (JAKAFI) 5 MG TABS tablet     selexipag (UPTRAVI) 1600 MCG tablet     Specialty Vitamins Products (MAGNESIUM PLUS PROTEIN) 133 MG tablet     Ursodiol (ACTIGALL PO)     VALACYCLOVIR HCL PO     VITAMIN D, CHOLECALCIFEROL, PO     tadalafil, PAH, 20 MG TABS     No current facility-administered medications for this visit.       Telephone visit x 30 minutes to assess impact of switch to riociguat from tadalafil. He is markedly improved with complete absence of shortness of breath since starting new medical regimen.  He was previously found to have unexplained anemia without melena, hematochezia, history of GI bleeding, abnormal diet.     Constitutional: weight loss, fever, chills, night sweats  HEENT: without visual changes, swallow difficulties  Pulmonary: without shortness of breath, cough, wheeze, hemoptysis  Cardiac: without chest pain, CORREA, PND, orthopnea, edema, palpitation, pre-syncope, syncope,  GI: without diarrhea, constipation, jaundice, melena, GERD, hematemesis  : without frequency, urgency, dysuria, hematuria  Skin: rash, bruise, open lesions  Neuro: without TIA, focal neurologic complaints, seizure, trauma  Ortho: without pain, swelling, mobility impairment  Endocrine: diabetes, thyroid, heat/cold intolerance, polyuria, polyphagia, change bowel habits.  Sleep:no JULISSA, periodic breathing,  fatigue  Plan   1. Continue riociguat.  2. RTC later July with right heart catheterization  3. Labs soon at home to evaluate anemia: CBC with platelets, blood smear, LDH, iron studies, CMP,BNP, reticulocyte count

## 2022-05-10 NOTE — NURSING NOTE
Chief Complaint   Patient presents with     Follow Up     1 month follow up, pt states medication Adempas that was prescribed has been working well for him. Reviewed medications with pt, no changes.      Gilma Mobley, Visit Facilitator/MA.

## 2022-05-10 NOTE — NURSING NOTE
Plan from VV     Medication Changes:  No changes    Follow up Appointment Information:  Completed labs locally- labs faxed to [facility]. Patient to self-schedule  Schedule for a Right heart catheterization in July with a follow-up with Avelina     Patient to be called for scheduling after virtual visit by    Aria Bettencourt RN on 5/10/2022 at 4:02 PM

## 2022-05-17 ENCOUNTER — MYC MEDICAL ADVICE (OUTPATIENT)
Dept: CARDIOLOGY | Facility: CLINIC | Age: 57
End: 2022-05-17
Payer: COMMERCIAL

## 2022-05-23 NOTE — TELEPHONE ENCOUNTER
Patient had a question regarding his blood smear; advised it was ordered by Dr. Quan to evaluate the cause of his anemia. Chantel Erickson RN on 5/23/2022 at 1:29 PM

## 2022-06-18 NOTE — PROVIDER NOTIFICATION
At 1420 SWAN noted to not have a waveform. Luis Daniel Moon MD notified. in to assess SWAN. Initially marked at 76, MD pulled SWAN to 68. Pt denies any discomfort. X-ray completed. MD notified of X-ray, waiting on orders.   
Cards 2 resident and fellow notified and aware of pt's Sv02, SaO2, oxygen delivery, asymptomatic, and hemodynamic numbers at 0830 and 0130. Verbal order ok'd for SaO2 to be 87-89%.Cards 2 fellow at bedside to assess patient and discuss plan of care with pt and wife. Will continue to monitor.   
Adult

## 2022-06-27 DIAGNOSIS — I27.20 PULMONARY HYPERTENSION (H): ICD-10-CM

## 2022-06-30 RX ORDER — FUROSEMIDE 20 MG
TABLET ORAL
Qty: 120 TABLET | Refills: 3 | Status: SHIPPED | OUTPATIENT
Start: 2022-06-30 | End: 2022-06-30

## 2022-06-30 RX ORDER — FUROSEMIDE 20 MG
TABLET ORAL
Qty: 120 TABLET | Refills: 3 | Status: SHIPPED | OUTPATIENT
Start: 2022-06-30 | End: 2023-07-07

## 2022-06-30 RX ORDER — PREDNISONE 2.5 MG/1
5 TABLET ORAL EVERY OTHER DAY
Qty: 90 TABLET | Refills: 3 | Status: SHIPPED | OUTPATIENT
Start: 2022-06-30 | End: 2023-07-07

## 2022-06-30 NOTE — TELEPHONE ENCOUNTER
prednisone 2.5 mg tablet     Last Written Prescription Date:  7/16/21  Last Fill Quantity: 90,   # refills: 3  Last Office Visit : 5/10/22  Future Office visit:  7/26/22    Routing refill request to provider for review/approval because:  Drug not on the [facility] refill protocol   Thank-you, Sharona BASSETT RN Medication Refill Team

## 2022-07-12 ENCOUNTER — MYC MEDICAL ADVICE (OUTPATIENT)
Dept: CARDIOLOGY | Facility: CLINIC | Age: 57
End: 2022-07-12

## 2022-07-14 ENCOUNTER — TELEPHONE (OUTPATIENT)
Dept: CARDIOLOGY | Facility: CLINIC | Age: 57
End: 2022-07-14

## 2022-07-14 NOTE — TELEPHONE ENCOUNTER
Left voicemail for patient to complete Travel Screen for Cardiac Cath Lab appointment on 7/15 and inform patient of updated Visitor Policy.       Informed pt on message of need for covid test

## 2022-07-15 ENCOUNTER — HOSPITAL ENCOUNTER (OUTPATIENT)
Facility: CLINIC | Age: 57
Discharge: HOME | End: 2022-07-15
Attending: INTERNAL MEDICINE | Admitting: INTERNAL MEDICINE
Payer: COMMERCIAL

## 2022-07-15 ENCOUNTER — APPOINTMENT (OUTPATIENT)
Dept: MEDSURG UNIT | Facility: CLINIC | Age: 57
End: 2022-07-15
Attending: INTERNAL MEDICINE
Payer: COMMERCIAL

## 2022-07-15 ENCOUNTER — APPOINTMENT (OUTPATIENT)
Dept: LAB | Facility: CLINIC | Age: 57
End: 2022-07-15
Attending: INTERNAL MEDICINE
Payer: COMMERCIAL

## 2022-07-15 VITALS
DIASTOLIC BLOOD PRESSURE: 60 MMHG | HEART RATE: 80 BPM | RESPIRATION RATE: 20 BRPM | BODY MASS INDEX: 30.7 KG/M2 | TEMPERATURE: 98.7 F | HEIGHT: 77 IN | OXYGEN SATURATION: 99 % | SYSTOLIC BLOOD PRESSURE: 100 MMHG | WEIGHT: 260 LBS

## 2022-07-15 DIAGNOSIS — R06.02 SOB (SHORTNESS OF BREATH): ICD-10-CM

## 2022-07-15 DIAGNOSIS — I27.20 PULMONARY HYPERTENSION (H): ICD-10-CM

## 2022-07-15 LAB
ALBUMIN SERPL BCG-MCNC: 4.5 G/DL (ref 3.5–5.2)
ALP SERPL-CCNC: 93 U/L (ref 40–129)
ALT SERPL W P-5'-P-CCNC: 26 U/L (ref 10–50)
ANION GAP SERPL CALCULATED.3IONS-SCNC: 10 MMOL/L (ref 7–15)
AST SERPL W P-5'-P-CCNC: 32 U/L (ref 10–50)
BASOPHILS # BLD AUTO: 0 10E3/UL (ref 0–0.2)
BASOPHILS NFR BLD AUTO: 0 %
BILIRUB SERPL-MCNC: 0.5 MG/DL
BUN SERPL-MCNC: 19.8 MG/DL (ref 6–20)
CALCIUM SERPL-MCNC: 10 MG/DL (ref 8.6–10)
CHLORIDE SERPL-SCNC: 105 MMOL/L (ref 98–107)
CREAT SERPL-MCNC: 1.76 MG/DL (ref 0.67–1.17)
DEPRECATED HCO3 PLAS-SCNC: 26 MMOL/L (ref 22–29)
EOSINOPHIL # BLD AUTO: 0.1 10E3/UL (ref 0–0.7)
EOSINOPHIL NFR BLD AUTO: 1 %
ERYTHROCYTE [DISTWIDTH] IN BLOOD BY AUTOMATED COUNT: 20.2 % (ref 10–15)
GFR SERPL CREATININE-BSD FRML MDRD: 44 ML/MIN/1.73M2
GLUCOSE SERPL-MCNC: 110 MG/DL (ref 70–99)
HCT VFR BLD AUTO: 36.8 % (ref 40–53)
HGB BLD-MCNC: 11.8 G/DL (ref 13.3–17.7)
HGB BLD-MCNC: 12 G/DL (ref 13.3–17.7)
IMM GRANULOCYTES # BLD: 0 10E3/UL
IMM GRANULOCYTES NFR BLD: 0 %
LYMPHOCYTES # BLD AUTO: 1.3 10E3/UL (ref 0.8–5.3)
LYMPHOCYTES NFR BLD AUTO: 22 %
MCH RBC QN AUTO: 29.9 PG (ref 26.5–33)
MCHC RBC AUTO-ENTMCNC: 32.6 G/DL (ref 31.5–36.5)
MCV RBC AUTO: 92 FL (ref 78–100)
MONOCYTES # BLD AUTO: 0.9 10E3/UL (ref 0–1.3)
MONOCYTES NFR BLD AUTO: 15 %
NEUTROPHILS # BLD AUTO: 3.7 10E3/UL (ref 1.6–8.3)
NEUTROPHILS NFR BLD AUTO: 62 %
NRBC # BLD AUTO: 0 10E3/UL
NRBC BLD AUTO-RTO: 0 /100
NT-PROBNP SERPL-MCNC: 707 PG/ML (ref 0–900)
OXYHGB MFR BLDV: 75 % (ref 92–100)
PLATELET # BLD AUTO: 436 10E3/UL (ref 150–450)
POTASSIUM SERPL-SCNC: 4.5 MMOL/L (ref 3.4–5.3)
PROT SERPL-MCNC: 7 G/DL (ref 6.4–8.3)
RBC # BLD AUTO: 4.01 10E6/UL (ref 4.4–5.9)
SODIUM SERPL-SCNC: 141 MMOL/L (ref 136–145)
WBC # BLD AUTO: 6 10E3/UL (ref 4–11)

## 2022-07-15 PROCEDURE — 999N000132 HC STATISTIC PP CARE STAGE 1

## 2022-07-15 PROCEDURE — 36415 COLL VENOUS BLD VENIPUNCTURE: CPT | Performed by: INTERNAL MEDICINE

## 2022-07-15 PROCEDURE — 93451 RIGHT HEART CATH: CPT | Mod: 26 | Performed by: INTERNAL MEDICINE

## 2022-07-15 PROCEDURE — 80053 COMPREHEN METABOLIC PANEL: CPT | Performed by: INTERNAL MEDICINE

## 2022-07-15 PROCEDURE — 250N000009 HC RX 250: Performed by: INTERNAL MEDICINE

## 2022-07-15 PROCEDURE — 83880 ASSAY OF NATRIURETIC PEPTIDE: CPT | Performed by: INTERNAL MEDICINE

## 2022-07-15 PROCEDURE — C1894 INTRO/SHEATH, NON-LASER: HCPCS | Performed by: INTERNAL MEDICINE

## 2022-07-15 PROCEDURE — 82810 BLOOD GASES O2 SAT ONLY: CPT

## 2022-07-15 PROCEDURE — 85025 COMPLETE CBC W/AUTO DIFF WBC: CPT | Performed by: INTERNAL MEDICINE

## 2022-07-15 PROCEDURE — 272N000001 HC OR GENERAL SUPPLY STERILE: Performed by: INTERNAL MEDICINE

## 2022-07-15 PROCEDURE — 999N000142 HC STATISTIC PROCEDURE PREP ONLY

## 2022-07-15 PROCEDURE — 85018 HEMOGLOBIN: CPT | Mod: 91

## 2022-07-15 PROCEDURE — 93451 RIGHT HEART CATH: CPT | Performed by: INTERNAL MEDICINE

## 2022-07-15 RX ORDER — LIDOCAINE 40 MG/G
CREAM TOPICAL
Status: COMPLETED | OUTPATIENT
Start: 2022-07-15 | End: 2022-07-15

## 2022-07-15 RX ADMIN — LIDOCAINE: 40 CREAM TOPICAL at 12:15

## 2022-07-15 NOTE — PROGRESS NOTES
Pt arrived to 2A from home for RHC. VSS. Denies pain*. Awaiting on Consent H&P current. Allergies reviewed with pt. Appropriately NPO.  Prep completed. Wife senait  at bedside; wife will be transporting patient to home

## 2022-07-15 NOTE — PROGRESS NOTES
Patient arrived to floor with RN from CCL s/p [facility]. RIJ site CDI, no hematoma. VSS. Discharge instructions reviewed with patient and his wife.

## 2022-07-15 NOTE — PRE-PROCEDURE
GENERAL PRE-PROCEDURE:   Procedure:  Right heart catheterization  Date/Time:  7/15/2022 12:51 PM    Verbal consent obtained?: Yes    Written consent obtained?: Yes    Risks and benefits: Risks, benefits and alternatives were discussed    DC Plan: Appropriate discharge home plan in place for patients who are going home after procedure   Consent given by:  Patient  Patient states understanding of procedure being performed: Yes    Patient's understanding of procedure matches consent: Yes    Procedure consent matches procedure scheduled: Yes    Appropriately NPO:  Yes  ASA Class:  2  Mallampati  :  Grade 1- soft palate, uvula, tonsillar pillars, and posterior pharyngeal wall visible  Lungs:  Lungs clear with good breath sounds bilaterally  Heart:  Normal heart sounds and rate  History & Physical reviewed:  History and physical reviewed and no updates needed  Statement of review:  I have reviewed the lab findings, diagnostic data, medications, and the plan for sedation    PILAR Richey [facility] Cardiology

## 2022-07-15 NOTE — DISCHARGE INSTRUCTIONS
[facility]                        Interventional Cardiology  Discharge Instructions   Post Right Heart Cath      AFTER YOU GO HOME:  DO drink plenty of fluids  DO resume your regular diet and medications unless otherwise instructed by your Primary Physician  Do Not scrub the procedure site vigorously  No lotion or powder to the puncture site for 3 days    CALL YOUR PRIMARY PHYSICIAN IF: You may resume all normal activity.  Monitor neck site for bleeding, swelling, or voice changes. If you notice bleeding or swelling immediately apply pressure to the site and call number below to speak with Cardiology Fellow.  If you experience any changes in your breathing you should call your doctor immediately or come to the closest Emergency Department.  Do not drive yourself.    ADDITIONAL INSTRUCTIONS: Medications: You are to resume all home medications including anticoagulation therapy unless otherwise advised by your primary cardiologist or nurse coordinator.    Follow Up: Per your primary cardiology team    If you have any questions or concerns regarding your procedure site please call [phone] at anytime and ask for Cardiology Fellow on call.  They are available 24 hours a day.  You may also contact the Cardiology Clinic after hours number at [phone].                                                       Telephone Numbers [phone] Monday-Friday 8:00 am to 4:30 pm    [phone] After 4:30 pm Monday-Friday, Weekends & Holidays  Ask for Interventional Cardiologist on call. Someone is on call 24 hours/day   [facility] toll free number [phone] Monday-Friday 8:00 am to 4:30 pm   [facility] Emergency Dept [phone]

## 2022-09-06 ENCOUNTER — VIRTUAL VISIT (OUTPATIENT)
Dept: CARDIOLOGY | Facility: CLINIC | Age: 57
End: 2022-09-06
Attending: INTERNAL MEDICINE
Payer: COMMERCIAL

## 2022-09-06 ENCOUNTER — HOSPITAL ENCOUNTER (OUTPATIENT)
Facility: CLINIC | Age: 57
End: 2022-09-06
Attending: INTERNAL MEDICINE | Admitting: INTERNAL MEDICINE
Payer: COMMERCIAL

## 2022-09-06 DIAGNOSIS — I27.20 PULMONARY HYPERTENSION (H): Primary | ICD-10-CM

## 2022-09-06 DIAGNOSIS — I27.20 PULMONARY HYPERTENSION (H): ICD-10-CM

## 2022-09-06 PROCEDURE — 99214 OFFICE O/P EST MOD 30 MIN: CPT | Mod: 95 | Performed by: INTERNAL MEDICINE

## 2022-09-06 RX ORDER — LIDOCAINE 40 MG/G
CREAM TOPICAL
Status: CANCELLED | OUTPATIENT
Start: 2022-09-06

## 2022-09-06 NOTE — NURSING NOTE
Chief Complaint   Patient presents with     Follow Up     Follow up after RHC, no updates per pt. Medications reviewed with pt, no changes per pt. No pt reported vitals today. Pt in [place] for visit today     Patient denies any changes since check-in regarding medication and allergies and states all information entered during check-in remains accurate.    Gilma Mobley, Visit Facilitator/MA.

## 2022-09-06 NOTE — PATIENT INSTRUCTIONS
Follow up Appointment Information/Plan:  Scheduled for a right heart catheterization in January/February followed by a virtual follow-up with Maria Fernanda Lynne      We are located on the third floor of the [facility] ([facility]) on the [facility].  Our address is     [address] [place] [address] [place] [address]rd [place] [address]    Thank you for allowing us to be a part of your care here at the [facility]    If you have questions or concerns please contact us at:    Roberto Erickson RN, BSN   Nemo Carver (Schedule,Prior Auth)  Nurse Coordinator     Clinic   Pulmonary Hypertension   Pulmonary Hypertension  [facility]  [facility]  (Phone)[phone]    (Phone) [phone]        (Fax) [phone]    ** Please note that you will NOT receive a reminder call regarding your scheduled testing, reminder calls are for provider appointments only.  If you are scheduled for testing within the [employer] system you may receive a call regarding pre-registration for billing purposes only.**     Remember to weigh yourself daily after voiding and before you consume any food or beverages and log the numbers.  If you have gained/lost 2 pounds overnight or 5 pounds in a week contact us immediately for medication adjustments or further instructions.   **Please call us immediately if you have any syncope, chest pain, edema, or decline in your functional status.    Support Group:  Pulmonary Hypertension Association  Https://www.phassociation.org/  **Look at the Events Tab** They even have Support Groups that you can call into    [facility] PH Support Group  Second Saturday of the Month from 1-3 PM   Location: [address] [place] [address]  Leader: Sofia Durand   Phone: [phone]  Email: [email or web address]

## 2022-09-06 NOTE — Clinical Note
9/6/2022      RE: Olivier Gómez  [address]  [place] [address]       Dear Colleague,    Thank you for the opportunity to participate in the care of your patient, Olivier Gómez, at the [facility] [place] at [facility]. Please see a copy of my visit note below.    No notes on file    Please do not hesitate to contact me if you have any questions/concerns.     Sincerely,     Dk Quan MD

## 2022-09-06 NOTE — NURSING NOTE
Plan    Scheduled for a right heart catheterization in January/February followed by a virtual follow-up with Maria Fernanda Lynne     Patient to be called for scheduling after virtual visit by      Patient marked ready for checkout. No visit in clinic. Virtual visit

## 2022-09-06 NOTE — PROGRESS NOTES
Olivier is a 57 year old who is being evaluated via a billable video visit.    Video Visit FaceTime x 30 minutes with patient permission     Impression:  1. Remote AML treated with BMT  2. Pulmonary hypertension  3. Anemia    Impression/Plan    1. Remarkable improvement in hemodynamics with PVR of 3.1 with rescue consisting of Jakafi, selexipag, riociguat, optsumit!!  2. Repeat heart catheterization in January/February    The patient returns for follow-up of pulmonary hypertension.  There is no interim history of chest pain, tightness, paroxysmal nocturnal dyspnea, orthopnea, peripheral edema, palpitation, pre-syncope, syncope.  Exercise tolerance is stable.  The patient is attempting to exercise regularly and following a sodium restricted, calorically appropriate diet.  Medications are reviewed and the patient is taking medications as prescribed.  The patient is generally sleeping well.           Current Outpatient Medications   Medication     atovaquone (MEPRON) 750 MG/5ML suspension     calcium carbonate (OS-PANFILO 500 MG [place]. [place]) 500 MG tablet     CLONAZEPAM PO     FLUCONAZOLE PO     furosemide (LASIX) 20 MG tablet     OPSUMIT 10 MG tablet     Pantoprazole Sodium (PROTONIX PO)     Penicillin V Potassium (PEN-VEE K OR)     predniSONE (DELTASONE) 2.5 MG tablet     riociguat (ADEMPAS) 1 MG tablet     rosuvastatin (CRESTOR) 5 MG tablet     ruxolitinib (JAKAFI) 5 MG TABS tablet     selexipag (UPTRAVI) 1600 MCG tablet     Specialty Vitamins Products (MAGNESIUM PLUS PROTEIN) 133 MG tablet     Ursodiol (ACTIGALL PO)     VALACYCLOVIR HCL PO     VITAMIN D, CHOLECALCIFEROL, PO     No current facility-administered medications for this visit.           Constitutional:no weight loss, fever, chills, night sweats  HEENT: without visual changes, swallow difficulties  Pulmonary: without shortness of breath, cough, wheeze, hemoptysis  Cardiac: without chest pain, CORREA, PND, orthopnea, edema, palpitation, pre-syncope, syncope,  GI:  without diarrhea, constipation, jaundice, melena, GERD, hematemesis  : without frequency, urgency, dysuria, hematuria  Skin: rash, bruise, open lesions  Neuro: without TIA, focal neurologic complaints, seizure, trauma  Ortho: without pain, swelling, mobility impairment  Endocrine: diabetes, thyroid, heat/cold intolerance, polyuria, polyphagia, change bowel habits.  Sleep:no JULISSA, periodic breathing, fatigue        Normal right sided filling pressures    Mild PH    Left sided filling pressures are normal.    Normal cardiac output level.    Improvement in PA pressure when compared to last hemodynamic assessment     Elevated right-sided filling pressures and pulmonary pressures improved from [facility] 03/2022. Normal left-sided pressures and cardiac output.          Hemodynamics    RA 5  RV 50/5  PA 50/22/32  PCWP 11    CO/CI 6.9/2.8 by Carter; 6.7/2.7 by TD  VO2 251    PA sat 75.3%  Ao sat 98%  Hemoglobin 11.8    PVR 3.1  TPR 4.6 Right sided filling pressures are mildly elevated. Left sided filling pressures are normal. Moderately elevated pulmonary artery hypertension. Normal cardiac output level.     Pressures Phase: Baseline     Time Systolic (mmHg) Diastolic (mmHg) Mean (mmHg) A Wave (mmHg) V Wave (mmHg) EDP (mmHg) Max dp/dt (mmHg/sec) HR (bpm) Content (mL/dL) SAT (%)   RA Pressures  1:49 PM   5    8    4      72        RV Pressures  1:49 PM 50        5     72        PA Pressures  1:50 PM 50    22    32        69        PCW Pressures  1:50 PM   11    10    8      61        AO Pressures  1:35     59    76        67          Blood Flow Results Phase: Baseline     Time Results  Indexed Values (L/min/m2)   QP  1:32 PM 6.89 L/min    2.75      QS  1:32 PM 6.89 L/min    2.75        Blood Oximetry Phase: Baseline     Time Hb  SAT(%)  PO2  Content (mL/dL) PA Sat (%)   PA  1:32 PM  75.3 %      75.3      Art  1:32 PM  98 %     15.73         Cardiac Output Phase: Baseline     Time TDCO (L/min) TDCI (L/min/m2) Carter C.O. (L/min)  Carter C.I. (L/min/m2) Carter HR (bpm)   Cardiac Output Results  1:32 PM 6.72    2.69    6.89    2.75         1:52 PM 6.72            Resistance Results Phase: Baseline     Time PVR  SVR  TPR  TVR  PVR/SVR  TPR/TVR    Resistance Results (Metric)  1:32 .77 dsc-5    824.16 dsc-5    371.45 dsc-5    882.2 dsc-5    0.3    0.42      Resistance Results (Wood)  1:32 PM 3.05 STEPHENS    10.3 STEPHENS    4.64 STEPHENS    11.03 STEPHENS    0.3    0.42        Stoke Volume Results Phase: Baseline     Time RVSW (gm*m) LVSW (gm*m) RVSW-I (gm*m/m2) LVSW-I (gm*m/m2)   Stroke Work Results  1:32 PM 36.67

## 2022-09-19 ENCOUNTER — TELEPHONE (OUTPATIENT)
Dept: CARDIOLOGY | Facility: CLINIC | Age: 57
End: 2022-09-19

## 2022-09-23 ENCOUNTER — TELEPHONE (OUTPATIENT)
Dept: CARDIOLOGY | Facility: CLINIC | Age: 57
End: 2022-09-23

## 2022-10-05 ENCOUNTER — TELEPHONE (OUTPATIENT)
Dept: CARDIOLOGY | Facility: CLINIC | Age: 57
End: 2022-10-05

## 2022-10-05 DIAGNOSIS — I27.20 PULMONARY HYPERTENSION (H): ICD-10-CM

## 2022-10-05 DIAGNOSIS — R06.02 SOB (SHORTNESS OF BREATH): ICD-10-CM

## 2022-10-06 NOTE — TELEPHONE ENCOUNTER
PA Initiation    Medication: riociguat (ADEMPAS) 2.5 MG tablet  Insurance Company: [facility] - Phone [phone] Fax [phone]  Pharmacy Filling the Rx: [id number]  Filling Pharmacy Phone:    Filling Pharmacy Fax:    Start Date: 10/6/2022      [facility] Key: [id number]        Thank you,    Lucy Shipley [facility]  Specialty Pharmacy Clinic Liaison - CardiologyNeurologyMultiple [facility] [address]  [address] [place] [address]  Ph: [phone] Fax: [phone]  [email or web address]

## 2022-10-12 NOTE — TELEPHONE ENCOUNTER
PA Initiation    Medication: riociguat (ADEMPAS) 2.5 MG tablet  Insurance Company: [employer] - Phone [phone] Fax [phone]  Pharmacy Filling the Rx: [facility] [address]  Filling Pharmacy Phone: [phone]  Filling Pharmacy Fax: [phone]  Start Date: 10/12/2022    Submitted PA via [employer] Portal. No reference number was given. Will check on this 10/13/22

## 2022-10-16 ENCOUNTER — HEALTH MAINTENANCE LETTER (OUTPATIENT)
Age: 57
End: 2022-10-16

## 2022-10-18 NOTE — TELEPHONE ENCOUNTER
Prior Authorization Not Needed per Insurance    Medication: riociguat (ADEMPAS) 2.5 MG tablet  Insurance Company: [facility] - Phone [phone] Fax [phone]  Expected CoPay:      Pharmacy Filling the Rx: [facility] - [address] [place] [address]  Pharmacy Notified: No  Patient Notified: No    Spoke with rep at [facility]- there is already a perpetual authorization on file (meaning it does not ). Medication was last filled 22, next fill is 10/25/22.

## 2022-11-15 ENCOUNTER — TELEPHONE (OUTPATIENT)
Dept: CARDIOLOGY | Facility: CLINIC | Age: 57
End: 2022-11-15

## 2022-11-15 NOTE — TELEPHONE ENCOUNTER
Prior Authorization Retail Medication Request    Medication/Dose: Jakafi  ICD code (if different than what is on RX):    Previously Tried and Failed:  N/A  Rationale:  Pulmonary hypertension    Insurance Name: [employer]  Insurance ID:  [id number]      Pharmacy Information (if different than what is on RX)  Name:    Phone:

## 2022-11-16 NOTE — TELEPHONE ENCOUNTER
Patient advised he has enough pills to get him through Friday; will resend urgent request for PA. Chantel Erickson RN on 11/16/2022 at 9:26 AM    Called Dr. Martin's office and spoke with PA liason; advised medication was prescribed by Dr. Martin and needs a PA. Representative stated she will call the patient and will work on getting the PA sorted out. Chantel Erickson RN on 11/17/2022 at 12:39 PM

## 2022-11-17 ENCOUNTER — TELEPHONE (OUTPATIENT)
Dept: CARDIOLOGY | Facility: CLINIC | Age: 57
End: 2022-11-17

## 2022-11-17 NOTE — TELEPHONE ENCOUNTER
PA Initiation    Medication: Jakafi PA pending  Insurance Company: [facility] - Phone [phone] Fax [phone]  Pharmacy Filling the Rx: [facility] - [place], [address]  Filling Pharmacy Phone:    Filling Pharmacy Fax:    Start Date: 11/17/2022  Submitted on the [facility] Portal. Per representative request has been received.  Reference # [id number]

## 2022-11-17 NOTE — TELEPHONE ENCOUNTER
Spoke with Dr. Martin's office, who is the prescribing physician for this cancer medication. PA liaison will call patient to follow up on PA request and take over prior auth. Chantel Erickson RN on 11/17/2022 at 12:42 PM

## 2022-11-17 NOTE — TELEPHONE ENCOUNTER
[facility] Call Center    Phone Message    May a detailed message be left on voicemail: yes     Reason for Call: Other: Pharmacy calling requesting Prior Authorization for ruxolitinib (JAKAFI) 5 MG TABS tablet and states patient needs this ASAP as they will be out of medication 11/18/22.       Action Taken: Other: Cardiology    Travel Screening: Not Applicable     Thank you!  [facility]

## 2022-12-07 DIAGNOSIS — I27.20 PULMONARY HYPERTENSION (H): Primary | ICD-10-CM

## 2022-12-07 DIAGNOSIS — R06.02 SOB (SHORTNESS OF BREATH): ICD-10-CM

## 2022-12-07 RX ORDER — LIDOCAINE 40 MG/G
CREAM TOPICAL
Status: CANCELLED | OUTPATIENT
Start: 2022-12-07

## 2022-12-29 ENCOUNTER — TELEPHONE (OUTPATIENT)
Dept: CARDIOLOGY | Facility: CLINIC | Age: 57
End: 2022-12-29

## 2023-01-26 ENCOUNTER — MYC MEDICAL ADVICE (OUTPATIENT)
Dept: CARDIOLOGY | Facility: CLINIC | Age: 58
End: 2023-01-26
Payer: COMMERCIAL

## 2023-01-30 NOTE — TELEPHONE ENCOUNTER
Unable to reach patient to review pre-procedure instructions; asked patient to call me when able to discuss. Chantel Erickson RN on 1/30/2023 at 11:54 AM    Unable to reach x2; left detailed message regarding pre-procedure instructions and asked patient to call me back with any questions or concerns. Chantel Erickson RN on 2/2/2023 at 8:55 AM

## 2023-02-03 ENCOUNTER — APPOINTMENT (OUTPATIENT)
Dept: LAB | Facility: CLINIC | Age: 58
End: 2023-02-03
Attending: INTERNAL MEDICINE
Payer: COMMERCIAL

## 2023-02-03 ENCOUNTER — HOSPITAL ENCOUNTER (OUTPATIENT)
Facility: CLINIC | Age: 58
Discharge: HOME | End: 2023-02-03
Attending: INTERNAL MEDICINE | Admitting: INTERNAL MEDICINE
Payer: COMMERCIAL

## 2023-02-03 ENCOUNTER — APPOINTMENT (OUTPATIENT)
Dept: MEDSURG UNIT | Facility: CLINIC | Age: 58
End: 2023-02-03
Attending: INTERNAL MEDICINE
Payer: COMMERCIAL

## 2023-02-03 VITALS
SYSTOLIC BLOOD PRESSURE: 133 MMHG | TEMPERATURE: 98.1 F | WEIGHT: 280.7 LBS | OXYGEN SATURATION: 98 % | DIASTOLIC BLOOD PRESSURE: 59 MMHG | BODY MASS INDEX: 33.14 KG/M2 | RESPIRATION RATE: 16 BRPM | HEART RATE: 64 BPM | HEIGHT: 77 IN

## 2023-02-03 DIAGNOSIS — R06.02 SOB (SHORTNESS OF BREATH): ICD-10-CM

## 2023-02-03 DIAGNOSIS — I27.20 PULMONARY HYPERTENSION (H): ICD-10-CM

## 2023-02-03 LAB
ACANTHOCYTES BLD QL SMEAR: SLIGHT
ALBUMIN SERPL BCG-MCNC: 4.2 G/DL (ref 3.5–5.2)
ALP SERPL-CCNC: 77 U/L (ref 40–129)
ALT SERPL W P-5'-P-CCNC: 34 U/L (ref 10–50)
ANION GAP SERPL CALCULATED.3IONS-SCNC: 9 MMOL/L (ref 7–15)
AST SERPL W P-5'-P-CCNC: 30 U/L (ref 10–50)
BASOPHILS # BLD AUTO: 0 10E3/UL (ref 0–0.2)
BASOPHILS NFR BLD AUTO: 0 %
BILIRUB SERPL-MCNC: 0.3 MG/DL
BUN SERPL-MCNC: 21 MG/DL (ref 6–20)
BURR CELLS BLD QL SMEAR: SLIGHT
CALCIUM SERPL-MCNC: 9.6 MG/DL (ref 8.6–10)
CHLORIDE SERPL-SCNC: 104 MMOL/L (ref 98–107)
CREAT SERPL-MCNC: 1.78 MG/DL (ref 0.67–1.17)
DEPRECATED HCO3 PLAS-SCNC: 26 MMOL/L (ref 22–29)
EOSINOPHIL # BLD AUTO: 0.1 10E3/UL (ref 0–0.7)
EOSINOPHIL NFR BLD AUTO: 1 %
ERYTHROCYTE [DISTWIDTH] IN BLOOD BY AUTOMATED COUNT: 18.2 % (ref 10–15)
GFR SERPL CREATININE-BSD FRML MDRD: 44 ML/MIN/1.73M2
GLUCOSE SERPL-MCNC: 118 MG/DL (ref 70–99)
HCT VFR BLD AUTO: 36.7 % (ref 40–53)
HGB BLD-MCNC: 11.6 G/DL (ref 13.3–17.7)
HGB BLD-MCNC: 11.9 G/DL (ref 13.3–17.7)
HOWELL-JOLLY BOD BLD QL SMEAR: PRESENT
IMM GRANULOCYTES # BLD: 0 10E3/UL
IMM GRANULOCYTES NFR BLD: 1 %
INR PPP: 0.93 (ref 0.85–1.15)
LYMPHOCYTES # BLD AUTO: 1.6 10E3/UL (ref 0.8–5.3)
LYMPHOCYTES NFR BLD AUTO: 26 %
MCH RBC QN AUTO: 29.8 PG (ref 26.5–33)
MCHC RBC AUTO-ENTMCNC: 32.4 G/DL (ref 31.5–36.5)
MCV RBC AUTO: 92 FL (ref 78–100)
MONOCYTES # BLD AUTO: 1.3 10E3/UL (ref 0–1.3)
MONOCYTES NFR BLD AUTO: 21 %
NEUTROPHILS # BLD AUTO: 3.1 10E3/UL (ref 1.6–8.3)
NEUTROPHILS NFR BLD AUTO: 51 %
NRBC # BLD AUTO: 0 10E3/UL
NRBC BLD AUTO-RTO: 0 /100
NT-PROBNP SERPL-MCNC: 589 PG/ML (ref 0–900)
OXYHGB MFR BLDV: 76 % (ref 92–100)
PLAT MORPH BLD: ABNORMAL
PLATELET # BLD AUTO: 383 10E3/UL (ref 150–450)
POTASSIUM SERPL-SCNC: 4.4 MMOL/L (ref 3.4–5.3)
PROT SERPL-MCNC: 6.4 G/DL (ref 6.4–8.3)
RBC # BLD AUTO: 3.99 10E6/UL (ref 4.4–5.9)
RBC MORPH BLD: ABNORMAL
SODIUM SERPL-SCNC: 139 MMOL/L (ref 136–145)
TARGETS BLD QL SMEAR: SLIGHT
WBC # BLD AUTO: 6 10E3/UL (ref 4–11)

## 2023-02-03 PROCEDURE — 250N000009 HC RX 250: Performed by: INTERNAL MEDICINE

## 2023-02-03 PROCEDURE — 85018 HEMOGLOBIN: CPT

## 2023-02-03 PROCEDURE — C1894 INTRO/SHEATH, NON-LASER: HCPCS | Performed by: INTERNAL MEDICINE

## 2023-02-03 PROCEDURE — 272N000001 HC OR GENERAL SUPPLY STERILE: Performed by: INTERNAL MEDICINE

## 2023-02-03 PROCEDURE — 999N000142 HC STATISTIC PROCEDURE PREP ONLY

## 2023-02-03 PROCEDURE — 80053 COMPREHEN METABOLIC PANEL: CPT | Performed by: INTERNAL MEDICINE

## 2023-02-03 PROCEDURE — 85610 PROTHROMBIN TIME: CPT | Performed by: INTERNAL MEDICINE

## 2023-02-03 PROCEDURE — 85025 COMPLETE CBC W/AUTO DIFF WBC: CPT | Performed by: INTERNAL MEDICINE

## 2023-02-03 PROCEDURE — 83880 ASSAY OF NATRIURETIC PEPTIDE: CPT | Performed by: INTERNAL MEDICINE

## 2023-02-03 PROCEDURE — 36415 COLL VENOUS BLD VENIPUNCTURE: CPT | Performed by: INTERNAL MEDICINE

## 2023-02-03 PROCEDURE — 82810 BLOOD GASES O2 SAT ONLY: CPT

## 2023-02-03 PROCEDURE — 93451 RIGHT HEART CATH: CPT | Performed by: INTERNAL MEDICINE

## 2023-02-03 PROCEDURE — 999N000132 HC STATISTIC PP CARE STAGE 1

## 2023-02-03 PROCEDURE — 93451 RIGHT HEART CATH: CPT | Mod: 26 | Performed by: INTERNAL MEDICINE

## 2023-02-03 RX ORDER — LIDOCAINE 40 MG/G
CREAM TOPICAL
Status: COMPLETED | OUTPATIENT
Start: 2023-02-03 | End: 2023-02-03

## 2023-02-03 RX ORDER — DOCUSATE SODIUM 100 MG/1
200 CAPSULE, LIQUID FILLED ORAL 2 TIMES DAILY
COMMUNITY

## 2023-02-03 RX ADMIN — LIDOCAINE: 40 CREAM TOPICAL at 08:59

## 2023-02-03 ASSESSMENT — ACTIVITIES OF DAILY LIVING (ADL)
ADLS_ACUITY_SCORE: 37
ADLS_ACUITY_SCORE: 37

## 2023-02-03 NOTE — DISCHARGE INSTRUCTIONS
[facility]                        Interventional Cardiology  Discharge Instructions   Post Right Heart Cath      AFTER YOU GO HOME:  DO drink plenty of fluids  DO resume your regular diet and medications unless otherwise instructed by your Primary Physician  Do Not scrub the procedure site vigorously  No lotion or powder to the puncture site for 3 days    CALL YOUR PRIMARY PHYSICIAN IF: You may resume all normal activity.  Monitor neck site for bleeding, swelling, or voice changes. If you notice bleeding or swelling immediately apply pressure to the site and call number below to speak with Cardiology Fellow.  If you experience any changes in your breathing you should call your doctor immediately or come to the closest Emergency Department.  Do not drive yourself.    ADDITIONAL INSTRUCTIONS: Medications: You are to resume all home medications including anticoagulation therapy unless otherwise advised by your primary cardiologist or nurse coordinator.    Follow Up: Per your primary cardiology team    If you have any questions or concerns regarding your procedure site please call [phone] at anytime and ask for Cardiology Fellow on call.  They are available 24 hours a day.  You may also contact the Cardiology Clinic after hours number at [phone].                                                       Telephone Numbers [phone] Monday-Friday 8:00 am to 4:30 pm    [phone] After 4:30 pm Monday-Friday, Weekends & Holidays  Ask for Interventional Cardiologist on call. Someone is on call 24 hours/day   [facility] toll free number [phone] Monday-Friday 8:00 am to 4:30 pm   [facility] Emergency Dept [phone]

## 2023-02-03 NOTE — Clinical Note
Asymptomatic.  Continue BB for rate control.  Anticoagulated on Warfarin, managed by Dr Brand.  Denies abnormal bleeding/bruising at this time.     Patient education provided.

## 2023-02-03 NOTE — PROGRESS NOTES
Pt back from CCL s/p RHC.  VSS.  Pt alert and oriented x4.  Pt denies any pain.  Rt neck site F/D/I.  1035-Discharge instructions went over with and given to pt by Sada MCKAY RN-pt has no further questions.  Dr Batista updating pt on the test result.  1040-Pt discharged to home with family.

## 2023-02-03 NOTE — PROGRESS NOTES
Pt arrives to 2a for RHC. Consent needs to be signed. Discharge instructions reviewed with pt pre procedure, pt verbalizes understanding.

## 2023-02-05 NOTE — TELEPHONE ENCOUNTER
Prior Authorization Approval    Authorization Effective Date: 4/14/2022  Authorization Expiration Date:    Medication: Tadalafil (PAH) 20mg (60/30) - Approved  Approved Dose/Quantity: 60 tabs/30 days  Reference #: [id number]   Insurance Company: [employer] - Phone [phone] Fax [phone]  Which Pharmacy is filling the prescription (Not needed for infusion/clinic administered): [facility] - [place], [place] - [address]

## 2023-02-07 ENCOUNTER — VIRTUAL VISIT (OUTPATIENT)
Dept: CARDIOLOGY | Facility: CLINIC | Age: 58
End: 2023-02-07
Attending: PHYSICIAN ASSISTANT
Payer: COMMERCIAL

## 2023-02-07 DIAGNOSIS — I27.20 PULMONARY HYPERTENSION (H): ICD-10-CM

## 2023-02-07 DIAGNOSIS — I27.20 PULMONARY HYPERTENSION (H): Primary | ICD-10-CM

## 2023-02-07 DIAGNOSIS — R06.02 SOB (SHORTNESS OF BREATH): ICD-10-CM

## 2023-02-07 PROCEDURE — G0463 HOSPITAL OUTPT CLINIC VISIT: HCPCS | Mod: PN,GT | Performed by: PHYSICIAN ASSISTANT

## 2023-02-07 PROCEDURE — 99213 OFFICE O/P EST LOW 20 MIN: CPT | Mod: 95 | Performed by: PHYSICIAN ASSISTANT

## 2023-02-07 ASSESSMENT — ENCOUNTER SYMPTOMS
EYE WATERING: 0
DOUBLE VISION: 0
EYE REDNESS: 0
EYE IRRITATION: 0
EYE PAIN: 0

## 2023-02-07 NOTE — NURSING NOTE
Chief Complaint   Patient presents with     Follow Up     RHC, Pt states medications are accurate via echeck in and that they were all also reviewed on Friday, pt states no vitals taken since friday     Is the patient currently in the state of [place]? NO    Visit mode:VIDEO    If the visit is dropped, the patient can be reconnected by: VIDEO VISIT: Send to e-mail at: [email or web address]    Will anyone else be joining the visit? NO      How would you like to obtain your AVS? MyChart    Are changes needed to the allergy or medication list? NO    Comments or concerns related to today's visit: n/a    Galileo Adams VF/CMA

## 2023-02-07 NOTE — LETTER
2/7/2023      RE: Olivier Gómez  [address]  [place] [address]       Dear Colleague,    Thank you for the opportunity to participate in the care of your patient, Olivier Gómez, at the [facility] [place] at [facility]. Please see a copy of my visit note below.    Mode of Communication:  Video Conference via [employer] - Patient Reported  Weight (Patient Reported): 124.7 kg (275 lb)  Pain Score: No Pain (0)            CARDIOLOGY PH CLINIC VIDEO VISIT    Date of video visit: 02/07/23      Olivier Gómez is a 57 year old male who is being evaluated via a billable video visit.        I have reviewed and updated the patient's Past Medical History, Social History, Family History and Medication List.    MEDICATIONS:  Current Outpatient Medications   Medication Sig Dispense Refill     atovaquone (MEPRON) 750 MG/5ML suspension Take 1,500 mg by mouth daily       calcium carbonate (OS-PANFILO 500 MG [place]. [place]) 500 MG tablet Take 600 mg by mouth 2 times daily       CLONAZEPAM PO Take 1 mg by mouth At Bedtime        docusate sodium (COLACE) 100 MG capsule Take 200 mg by mouth 2 times daily       FLUCONAZOLE PO Take 200 mg by mouth daily       furosemide (LASIX) 20 MG tablet Take one tab daily 120 tablet 3     OPSUMIT 10 MG tablet Take 1 tablet (10 mg) by mouth daily 30 tablet 11     Pantoprazole Sodium (PROTONIX PO) Take 40 mg by mouth daily        Penicillin V Potassium (PEN-VEE K OR) Take 500 mg by mouth 2 times daily       predniSONE (DELTASONE) 2.5 MG tablet Take 2 tablets (5 mg) by mouth every other day 90 tablet 3     riociguat (ADEMPAS) 2.5 MG tablet Take 1 tablet (2.5 mg) by mouth 3 times daily 90 tablet 11     rosuvastatin (CRESTOR) 5 MG tablet Take 1 tablet (5 mg) by mouth daily 90 tablet 3     ruxolitinib (JAKAFI) 5 MG TABS tablet Take 2 tablets (10 mg) by mouth daily 180 tablet 3     selexipag (UPTRAVI) 1600 MCG tablet Take 1 tablet  (1,600 mcg) by mouth every 12 hours 60 tablet 11     Specialty Vitamins Products (MAGNESIUM PLUS PROTEIN) 133 MG tablet Take 266 mg by mouth daily (2 tablets)       Ursodiol (ACTIGALL PO) Take 300 mg by mouth 2 times daily        VALACYCLOVIR HCL PO Take 500 mg by mouth every other day       VITAMIN D, CHOLECALCIFEROL, PO Take 2,000 Units by mouth daily          ALLERGIES  Tegaderm transparent dressing (informational only)      Brief physical exam:  General: In no acute distress, upright and calm.  Eyes: No apparent redness or discharge.   Chest: No labored breathing, no cough during exam or audible wheezing.   Neuro: No obvious focal defects or tremors.   Psych: Alert and oriented. Does not appear anxious.     The rest of a comprehensive physical examination is deferred due to public health emergency video visit restrictions.       Primary PH cardiologist: Dr. Quan      HPI:  Mr. Gómez is a pleasant 57 year old male with a PMhx including AML s/p bone marrow transplant, CKD, and scleroderma. He also has pulmonary hypertension, and is maintained on combination therapy with tadalafil, macitentan, and selexipag (previously on IV prostacyclin therapy). He is also on Jakafi. He was seen last by Dr. Quan in September at which time he was doing well with no new concerns. It was recommended that he return after the new year with repeat hemodynamic testing.    Today, I am meeting virtually with Olivier to follow up. He had his RHC done last week which we reviewed. His pressures remains stable, and his CO remains preserved. His PVR has gone down ever further and is normal at 2.14wu. He tells me that he has been feeling very well. He is not currently limited by his breathing and exercises on his elliptical 30 minutes daily without issues. He would like to get back to doing some weight lifting if possible (though I cautioned him about heavy weights/valsalva). He denies any new CP, palpitations, dizziness, or presyncope. No  new LE edema or orthopnea.        The patient did not have any new labs performed for our visit today, but most recent available labs were reviewed as below.         CURRENT PULMONARY HYPERTENSION REGIMEN:     PAH Rx: tadalafil 40mg daily, Opsumit 10mg daily, Uptravi 1600mcg BID, Jakafi 5mg daily  (previously on IV Flolan, transitioned to Uptravi march 2019)      Diuretics: Lasix 20mg daily, only rarely takes an extra if high salt meal.     Oxygen: None     Anticoagulation: None        Assessment/Plan:     1. Pulmonary Hypertension.              --Mr. Gómez has PAH in the setting of prior AML s/p BMP and connective tissue disease. He remains on combination therapy with Opsumit, Adcirca, and Uptravi. He was also placed on Jakafi by Dr. Quan, with good result. His RHC last week shows normal PVR and CO, with only mildly elevated PA pressures. Clinically, he is doing very well and REVEAL risk score is low at 5.               --He reports no new edema and left sided filling pressures were normal. Continue Lasix 20mg daily as is. NT-proBNP  has trended downward over the last year, and CKD appears stable.     Follow up: Return in 4 months to see Dr. Quan with repeat labs and echocardiogram, or sooner if needed.         Testing/labs:    Most recent labs:    Latest Reference Range & Units 02/03/23 08:01   Sodium 136 - 145 mmol/L 139   Potassium 3.4 - 5.3 mmol/L 4.4   Chloride 98 - 107 mmol/L 104   Carbon Dioxide (CO2) 22 - 29 mmol/L 26   Urea Nitrogen 6.0 - 20.0 mg/dL 21.0 (H)   Creatinine 0.67 - 1.17 mg/dL 1.78 (H)   GFR Estimate >60 mL/min/1.73m2 44 (L)   Calcium 8.6 - 10.0 mg/dL 9.6   Anion Gap 7 - 15 mmol/L 9   Albumin 3.5 - 5.2 g/dL 4.2   Protein Total 6.4 - 8.3 g/dL 6.4   Alkaline Phosphatase 40 - 129 U/L 77   ALT 10 - 50 U/L 34   AST 10 - 50 U/L 30   Bilirubin Total <=1.2 mg/dL 0.3   Glucose 70 - 99 mg/dL 118 (H)   N-Terminal Pro BNP Inpatient 0 - 900 pg/mL 589   WBC 4.0 - 11.0 10e3/uL 6.0   Hemoglobin 13.3 -  17.7 g/dL 11.9 (L)   Hematocrit 40.0 - 53.0 % 36.7 (L)   Platelet Count 150 - 450 10e3/uL 383   RBC Count 4.40 - 5.90 10e6/uL 3.99 (L)   MCV 78 - 100 fL 92   MCH 26.5 - 33.0 pg 29.8   MCHC 31.5 - 36.5 g/dL 32.4   RDW 10.0 - 15.0 % 18.2 (H)   % Neutrophils % 51   % Lymphocytes % 26   % Monocytes % 21   % Eosinophils % 1   % Basophils % 0   Absolute Basophils 0.0 - 0.2 10e3/uL 0.0   Absolute Eosinophils 0.0 - 0.7 10e3/uL 0.1   Absolute Immature Granulocytes <=0.4 10e3/uL 0.0   Absolute Lymphocytes 0.8 - 5.3 10e3/uL 1.6   Absolute Monocytes 0.0 - 1.3 10e3/uL 1.3   % Immature Granulocytes % 1   Absolute Neutrophils 1.6 - 8.3 10e3/uL 3.1   Absolute NRBCs 10e3/uL 0.0   NRBCs per 100 WBC <1 /100 0   RBC Morphology  Confirmed RBC Indices   Platelet Morphology Automated Count Confirmed. Platelet morphology is normal.  Automated Count Confirmed. Platelet morphology is normal.   Acanthocytes None Seen  Slight !   Target Cells None Seen  Slight !   [place] Cells None Seen  Slight !   Hernadez [place] Bodies None Seen  Present !   INR 0.85 - 1.15  0.93   (H): Data is abnormally high  (L): Data is abnormally low  !: Data is abnormal    Other recent pertinent testing:    RHC 2/3/22  Conclusion      Mild pulmonary hypertension    Normal biventricular filling pressure    Normal cardiac output    No significant change in PA pressure, PVR and cardiac output when compared to last right heart catheterization    Pressures Phase: Baseline     Time Systolic (mmHg) Diastolic (mmHg) Mean (mmHg) A Wave (mmHg) V Wave (mmHg) EDP (mmHg) Max dp/dt (mmHg/sec) HR (bpm) Content (mL/dL) SAT (%)   RA Pressures 10:10 AM   5    5    12      57        RV Pressures 10:11 AM 51        5     65        PA Pressures 10:11 AM 50    20    30        61        PCW Pressures 10:12 AM   13        55        Art Pressures  9:52     59    79        59          Blood Flow Results Phase: Baseline     Time Results  Indexed Values (L/min/m2)   QP  9:45 AM 7.94 L/min     3.07      QS  9:45 AM 7.94 L/min    3.07        Blood Oximetry Phase: Baseline     Time Hb  SAT(%)  PO2  Content (mL/dL) PA Sat (%)   PA  9:45 AM  75.5 %      75.5      Art  9:45 AM  100 %     15.78         Cardiac Output Phase: Baseline     Time TDCO (L/min) TDCI (L/min/m2) Carter C.O. (L/min) Carter C.I. (L/min/m2) Carter HR (bpm)   Cardiac Output Results  9:45 AM 7    2.71    7.94    3.07        10:14 AM 7            Resistance Results Phase: Baseline     Time PVR  SVR  TPR  TVR  PVR/SVR  TPR/TVR    Resistance Results (Metric)  9:45 .18 dsc-5    745.14 dsc-5    302.08 dsc-5    795.49 dsc-5    0.23    0.38      Resistance Results (Wood)  9:45 AM 2.14 STEPHENS    9.32 STEPHENS    3.78 STEPHENS    9.95 STEPHENS    0.23    0.38             Echo 9/2020  Interpretation Summary  Left ventricular function, chamber size, wall motion, and wall thickness are  normal.The EF is 60-65%.  Mild right ventricular dilation is present. Global right ventricular function  is normal.  IVC diameter <2.1 cm collapsing >50% with sniff suggests a normal RA pressure  of 3 mmHg.  Right ventricular systolic pressure is 42mmHg above the right atrial pressure.  No pericardial effusion is present.  Compared to prior, measured RVSP is lower, no other change      NYHA Functional Class:  1      REVEAL RISK SCORE CALCULATOR: 5      RISK SCORE:  0-6 low risk     7-8 intermediate risk     >/=9 high risk       Video-Visit Details    Type of service:  Video Visit    Video Start Time: 1046  Video End Time: 1054    An additional 15 minutes was spent today performing chart and history review, pre and post visit documentation, and care coordination.      Originating Location (pt. Location): Home    Distant Location (provider location):  On-site    Platform used for Video Visit: Celio Lynne PA-C  [facility]  Pager [phone]

## 2023-02-07 NOTE — PROGRESS NOTES
Mode of Communication:  Video Conference via Friday - Patient Reported  Weight (Patient Reported): 124.7 kg (275 lb)  Pain Score: No Pain (0)            CARDIOLOGY PH CLINIC VIDEO VISIT    Date of video visit: 02/07/23      Olivier Gómez is a 57 year old male who is being evaluated via a billable video visit.        I have reviewed and updated the patient's Past Medical History, Social History, Family History and Medication List.    MEDICATIONS:  Current Outpatient Medications   Medication Sig Dispense Refill     atovaquone (MEPRON) 750 MG/5ML suspension Take 1,500 mg by mouth daily       calcium carbonate (OS-PANFILO 500 MG [place]. [place]) 500 MG tablet Take 600 mg by mouth 2 times daily       CLONAZEPAM PO Take 1 mg by mouth At Bedtime        docusate sodium (COLACE) 100 MG capsule Take 200 mg by mouth 2 times daily       FLUCONAZOLE PO Take 200 mg by mouth daily       furosemide (LASIX) 20 MG tablet Take one tab daily 120 tablet 3     OPSUMIT 10 MG tablet Take 1 tablet (10 mg) by mouth daily 30 tablet 11     Pantoprazole Sodium (PROTONIX PO) Take 40 mg by mouth daily        Penicillin V Potassium (PEN-VEE K OR) Take 500 mg by mouth 2 times daily       predniSONE (DELTASONE) 2.5 MG tablet Take 2 tablets (5 mg) by mouth every other day 90 tablet 3     riociguat (ADEMPAS) 2.5 MG tablet Take 1 tablet (2.5 mg) by mouth 3 times daily 90 tablet 11     rosuvastatin (CRESTOR) 5 MG tablet Take 1 tablet (5 mg) by mouth daily 90 tablet 3     ruxolitinib (JAKAFI) 5 MG TABS tablet Take 2 tablets (10 mg) by mouth daily 180 tablet 3     selexipag (UPTRAVI) 1600 MCG tablet Take 1 tablet (1,600 mcg) by mouth every 12 hours 60 tablet 11     Specialty Vitamins Products (MAGNESIUM PLUS PROTEIN) 133 MG tablet Take 266 mg by mouth daily (2 tablets)       Ursodiol (ACTIGALL PO) Take 300 mg by mouth 2 times daily        VALACYCLOVIR HCL PO Take 500 mg by mouth every other day       VITAMIN D, CHOLECALCIFEROL, PO Take 2,000 Units by  mouth daily          ALLERGIES  Tegaderm transparent dressing (informational only)      Brief physical exam:  General: In no acute distress, upright and calm.  Eyes: No apparent redness or discharge.   Chest: No labored breathing, no cough during exam or audible wheezing.   Neuro: No obvious focal defects or tremors.   Psych: Alert and oriented. Does not appear anxious.     The rest of a comprehensive physical examination is deferred due to public health emergency video visit restrictions.       Primary PH cardiologist: Dr. Quan      HPI:  Mr. Gómez is a pleasant 57 year old male with a PMhx including AML s/p bone marrow transplant, CKD, and scleroderma. He also has pulmonary hypertension, and is maintained on combination therapy with tadalafil, macitentan, and selexipag (previously on IV prostacyclin therapy). He is also on Jakafi. He was seen last by Dr. Quan in September at which time he was doing well with no new concerns. It was recommended that he return after the new year with repeat hemodynamic testing.    Today, I am meeting virtually with Olivier to follow up. He had his RHC done last week which we reviewed. His pressures remains stable, and his CO remains preserved. His PVR has gone down ever further and is normal at 2.14wu. He tells me that he has been feeling very well. He is not currently limited by his breathing and exercises on his elliptical 30 minutes daily without issues. He would like to get back to doing some weight lifting if possible (though I cautioned him about heavy weights/valsalva). He denies any new CP, palpitations, dizziness, or presyncope. No new LE edema or orthopnea.        The patient did not have any new labs performed for our visit today, but most recent available labs were reviewed as below.         CURRENT PULMONARY HYPERTENSION REGIMEN:     PAH Rx: tadalafil 40mg daily, Opsumit 10mg daily, Uptravi 1600mcg BID, Jakafi 5mg daily  (previously on IV Flolan, transitioned to  Uptravi march 2019)      Diuretics: Lasix 20mg daily, only rarely takes an extra if high salt meal.     Oxygen: None     Anticoagulation: None        Assessment/Plan:     1. Pulmonary Hypertension.              --Mr. Gómez has PAH in the setting of prior AML s/p BMP and connective tissue disease. He remains on combination therapy with Opsumit, Adcirca, and Uptravi. He was also placed on Jakafi by Dr. Quan, with good result. His RHC last week shows normal PVR and CO, with only mildly elevated PA pressures. Clinically, he is doing very well and REVEAL risk score is low at 5.               --He reports no new edema and left sided filling pressures were normal. Continue Lasix 20mg daily as is. NT-proBNP  has trended downward over the last year, and CKD appears stable.     Follow up: Return in 4 months to see Dr. Quan with repeat labs and echocardiogram, or sooner if needed.         Testing/labs:    Most recent labs:    Latest Reference Range & Units 02/03/23 08:01   Sodium 136 - 145 mmol/L 139   Potassium 3.4 - 5.3 mmol/L 4.4   Chloride 98 - 107 mmol/L 104   Carbon Dioxide (CO2) 22 - 29 mmol/L 26   Urea Nitrogen 6.0 - 20.0 mg/dL 21.0 (H)   Creatinine 0.67 - 1.17 mg/dL 1.78 (H)   GFR Estimate >60 mL/min/1.73m2 44 (L)   Calcium 8.6 - 10.0 mg/dL 9.6   Anion Gap 7 - 15 mmol/L 9   Albumin 3.5 - 5.2 g/dL 4.2   Protein Total 6.4 - 8.3 g/dL 6.4   Alkaline Phosphatase 40 - 129 U/L 77   ALT 10 - 50 U/L 34   AST 10 - 50 U/L 30   Bilirubin Total <=1.2 mg/dL 0.3   Glucose 70 - 99 mg/dL 118 (H)   N-Terminal Pro BNP Inpatient 0 - 900 pg/mL 589   WBC 4.0 - 11.0 10e3/uL 6.0   Hemoglobin 13.3 - 17.7 g/dL 11.9 (L)   Hematocrit 40.0 - 53.0 % 36.7 (L)   Platelet Count 150 - 450 10e3/uL 383   RBC Count 4.40 - 5.90 10e6/uL 3.99 (L)   MCV 78 - 100 fL 92   MCH 26.5 - 33.0 pg 29.8   MCHC 31.5 - 36.5 g/dL 32.4   RDW 10.0 - 15.0 % 18.2 (H)   % Neutrophils % 51   % Lymphocytes % 26   % Monocytes % 21   % Eosinophils % 1   % Basophils % 0    Absolute Basophils 0.0 - 0.2 10e3/uL 0.0   Absolute Eosinophils 0.0 - 0.7 10e3/uL 0.1   Absolute Immature Granulocytes <=0.4 10e3/uL 0.0   Absolute Lymphocytes 0.8 - 5.3 10e3/uL 1.6   Absolute Monocytes 0.0 - 1.3 10e3/uL 1.3   % Immature Granulocytes % 1   Absolute Neutrophils 1.6 - 8.3 10e3/uL 3.1   Absolute NRBCs 10e3/uL 0.0   NRBCs per 100 WBC <1 /100 0   RBC Morphology  Confirmed RBC Indices   Platelet Morphology Automated Count Confirmed. Platelet morphology is normal.  Automated Count Confirmed. Platelet morphology is normal.   Acanthocytes None Seen  Slight !   Target Cells None Seen  Slight !   [place] Cells None Seen  Slight !   Hernadez [place] Bodies None Seen  Present !   INR 0.85 - 1.15  0.93   (H): Data is abnormally high  (L): Data is abnormally low  !: Data is abnormal    Other recent pertinent testing:    RHC 2/3/22  Conclusion      Mild pulmonary hypertension    Normal biventricular filling pressure    Normal cardiac output    No significant change in PA pressure, PVR and cardiac output when compared to last right heart catheterization    Pressures Phase: Baseline     Time Systolic (mmHg) Diastolic (mmHg) Mean (mmHg) A Wave (mmHg) V Wave (mmHg) EDP (mmHg) Max dp/dt (mmHg/sec) HR (bpm) Content (mL/dL) SAT (%)   RA Pressures 10:10 AM   5    5    12      57        RV Pressures 10:11 AM 51        5     65        PA Pressures 10:11 AM 50    20    30        61        PCW Pressures 10:12 AM   13        55        Art Pressures  9:52     59    79        59          Blood Flow Results Phase: Baseline     Time Results  Indexed Values (L/min/m2)   QP  9:45 AM 7.94 L/min    3.07      QS  9:45 AM 7.94 L/min    3.07        Blood Oximetry Phase: Baseline     Time Hb  SAT(%)  PO2  Content (mL/dL) PA Sat (%)   PA  9:45 AM  75.5 %      75.5      Art  9:45 AM  100 %     15.78         Cardiac Output Phase: Baseline     Time TDCO (L/min) TDCI (L/min/m2) Carter C.O. (L/min) Carter C.I. (L/min/m2) Carter HR (bpm)   Cardiac  Output Results  9:45 AM 7    2.71    7.94    3.07        10:14 AM 7            Resistance Results Phase: Baseline     Time PVR  SVR  TPR  TVR  PVR/SVR  TPR/TVR    Resistance Results (Metric)  9:45 .18 dsc-5    745.14 dsc-5    302.08 dsc-5    795.49 dsc-5    0.23    0.38      Resistance Results (Wood)  9:45 AM 2.14 STEPHENS    9.32 STEPHENS    3.78 STEPHENS    9.95 STEPHENS    0.23    0.38             Echo 9/2020  Interpretation Summary  Left ventricular function, chamber size, wall motion, and wall thickness are  normal.The EF is 60-65%.  Mild right ventricular dilation is present. Global right ventricular function  is normal.  IVC diameter <2.1 cm collapsing >50% with sniff suggests a normal RA pressure  of 3 mmHg.  Right ventricular systolic pressure is 42mmHg above the right atrial pressure.  No pericardial effusion is present.  Compared to prior, measured RVSP is lower, no other change      NYHA Functional Class:  1      REVEAL RISK SCORE CALCULATOR: 5      RISK SCORE:  0-6 low risk     7-8 intermediate risk     >/=9 high risk       Video-Visit Details    Type of service:  Video Visit    Video Start Time: 1046  Video End Time: 1054    An additional 15 minutes was spent today performing chart and history review, pre and post visit documentation, and care coordination.      Originating Location (pt. Location): Home    Distant Location (provider location):  On-site    Platform used for Video Visit: Celio Lynne PA-C  [facility]  Pager [phone]

## 2023-02-10 NOTE — TELEPHONE ENCOUNTER
selexipag (UPTRAVI) 1600 MCG tablet  Last Written Prescription Date:  2/18/22  Last Fill Quantity:60   # refills: 11  Last Office Visit :2/7/23  Future Office visit: none      Routing refill request to provider for review/approval because: not on protocol

## 2023-04-07 DIAGNOSIS — I27.20 PULMONARY HYPERTENSION (H): ICD-10-CM

## 2023-04-11 NOTE — TELEPHONE ENCOUNTER
OPSUMIT 10 MG tablet  Last Written Prescription Date: 4/6/22  Last Fill Quantity: 30,   # refills: 11  Last Office Visit :2/7/23  Future Office visit:  None      Routing refill request to provider for review/approval because: not on protocol

## 2023-04-12 RX ORDER — MACITENTAN 10 MG/1
TABLET, FILM COATED ORAL
Qty: 30 TABLET | Refills: 11 | Status: SHIPPED | OUTPATIENT
Start: 2023-04-12 | End: 2024-04-04

## 2023-04-25 ENCOUNTER — TELEPHONE (OUTPATIENT)
Dept: CARDIOLOGY | Facility: CLINIC | Age: 58
End: 2023-04-25
Payer: COMMERCIAL

## 2023-06-16 DIAGNOSIS — I27.20 PULMONARY HTN (H): Primary | ICD-10-CM

## 2023-06-16 DIAGNOSIS — R06.09 DOE (DYSPNEA ON EXERTION): ICD-10-CM

## 2023-06-19 ENCOUNTER — TELEPHONE (OUTPATIENT)
Dept: CARDIOLOGY | Facility: CLINIC | Age: 58
End: 2023-06-19
Payer: COMMERCIAL

## 2023-07-03 DIAGNOSIS — I27.20 PULMONARY HYPERTENSION (H): ICD-10-CM

## 2023-07-07 RX ORDER — FUROSEMIDE 20 MG
20 TABLET ORAL DAILY
Qty: 90 TABLET | Refills: 3 | Status: SHIPPED | OUTPATIENT
Start: 2023-07-07 | End: 2024-07-25

## 2023-07-07 RX ORDER — PREDNISONE 2.5 MG/1
5 TABLET ORAL EVERY OTHER DAY
Qty: 90 TABLET | Refills: 3 | Status: SHIPPED | OUTPATIENT
Start: 2023-07-07

## 2023-07-07 NOTE — TELEPHONE ENCOUNTER
predniSONE (DELTASONE) 2.5 MG tablet      Last Written Prescription Date:  6/30/22  Last Fill Quantity: 90,   # refills: 3  Last Office Visit : 2/7/23  Future Office visit:  10/25/23    Routing refill request to provider for review/approval because:  Drug not on the [facility], [facility] or [facility] refill protocol       furosemide (LASIX) 20 MG tablet  Last Clinic Visit: 2/7/2023  [facility]  Continue Lasix 20mg daily as is.

## 2023-08-24 ENCOUNTER — MYC MEDICAL ADVICE (OUTPATIENT)
Dept: CARDIOLOGY | Facility: CLINIC | Age: 58
End: 2023-08-24
Payer: COMMERCIAL

## 2023-08-25 ENCOUNTER — MYC MEDICAL ADVICE (OUTPATIENT)
Dept: CARDIOLOGY | Facility: CLINIC | Age: 58
End: 2023-08-25
Payer: COMMERCIAL

## 2023-10-03 DIAGNOSIS — R06.02 SOB (SHORTNESS OF BREATH): ICD-10-CM

## 2023-10-03 DIAGNOSIS — I27.20 PULMONARY HYPERTENSION (H): ICD-10-CM

## 2023-10-23 NOTE — PROGRESS NOTES
mpression:  1. Remote AML treated with BMT  2. Pulmonary hypertension  3. Anemia    Impression/Plan    1. Remarkable improvement in hemodynamics with PVR of 3.1 with rescue consisting of Jakafi, selexipag, riociguat, optsumit!!  2. Repeat heart catheterization in January/February    The patient returns for follow-up of pulmonary hypertension.  There is no interim history of chest pain, tightness, paroxysmal nocturnal dyspnea, orthopnea, peripheral edema, palpitation, pre-syncope, syncope.  Exercise tolerance is stable.  The patient is attempting to exercise regularly and following a sodium restricted, calorically appropriate diet.  Medications are reviewed and the patient is taking medications as prescribed.  The patient is generally sleeping well.           Current Outpatient Medications   Medication    atovaquone (MEPRON) 750 MG/5ML suspension    calcium carbonate (OS-PANFILO 500 MG [place]. [place]) 500 MG tablet    CLONAZEPAM PO    FLUCONAZOLE PO    furosemide (LASIX) 20 MG tablet    OPSUMIT 10 MG tablet    Pantoprazole Sodium (PROTONIX PO)    Penicillin V Potassium (PEN-VEE K OR)    predniSONE (DELTASONE) 2.5 MG tablet    riociguat (ADEMPAS) 1 MG tablet    rosuvastatin (CRESTOR) 5 MG tablet    ruxolitinib (JAKAFI) 5 MG TABS tablet    selexipag (UPTRAVI) 1600 MCG tablet    Specialty Vitamins Products (MAGNESIUM PLUS PROTEIN) 133 MG tablet    Ursodiol (ACTIGALL PO)    VALACYCLOVIR HCL PO    VITAMIN D, CHOLECALCIFEROL, PO     No current facility-administered medications for this visit.           Constitutional:no weight loss, fever, chills, night sweats  HEENT: without visual changes, swallow difficulties  Pulmonary: without shortness of breath, cough, wheeze, hemoptysis  Cardiac: without chest pain, CORREA, PND, orthopnea, edema, palpitation, pre-syncope, syncope,  GI: without diarrhea, constipation, jaundice, melena, GERD, hematemesis  : without frequency, urgency, dysuria, hematuria  Skin: rash, bruise, open lesions  Neuro:  without TIA, focal neurologic complaints, seizure, trauma  Ortho: without pain, swelling, mobility impairment  Endocrine: diabetes, thyroid, heat/cold intolerance, polyuria, polyphagia, change bowel habits.  Sleep:no JULISSA, periodic breathing, fatigue      Normal right sided filling pressures  Mild PH  Left sided filling pressures are normal.  Normal cardiac output level.  Improvement in PA pressure when compared to last hemodynamic assessment     Elevated right-sided filling pressures and pulmonary pressures improved from [facility] 03/2022. Normal left-sided pressures and cardiac output.          Hemodynamics    RA 5  RV 50/5  PA 50/22/32  PCWP 11    CO/CI 6.9/2.8 by Carter; 6.7/2.7 by TD  VO2 251    PA sat 75.3%  Ao sat 98%  Hemoglobin 11.8    PVR 3.1  TPR 4.6 Right sided filling pressures are mildly elevated. Left sided filling pressures are normal. Moderately elevated pulmonary artery hypertension. Normal cardiac output level.     Pressures Phase: Baseline     Time Systolic (mmHg) Diastolic (mmHg) Mean (mmHg) A Wave (mmHg) V Wave (mmHg) EDP (mmHg) Max dp/dt (mmHg/sec) HR (bpm) Content (mL/dL) SAT (%)   RA Pressures  1:49 PM   5    8    4      72        RV Pressures  1:49 PM 50        5     72        PA Pressures  1:50 PM 50    22    32        69        PCW Pressures  1:50 PM   11    10    8      61        AO Pressures  1:35     59    76        67          Blood Flow Results Phase: Baseline     Time Results  Indexed Values (L/min/m2)   QP  1:32 PM 6.89 L/min    2.75      QS  1:32 PM 6.89 L/min    2.75        Blood Oximetry Phase: Baseline     Time Hb  SAT(%)  PO2  Content (mL/dL) PA Sat (%)   PA  1:32 PM  75.3 %      75.3      Art  1:32 PM  98 %     15.73         Cardiac Output Phase: Baseline     Time TDCO (L/min) TDCI (L/min/m2) Carter C.O. (L/min) Carter C.I. (L/min/m2) Carter HR (bpm)   Cardiac Output Results  1:32 PM 6.72    2.69    6.89    2.75         1:52 PM 6.72            Resistance Results Phase: Baseline      Time PVR  SVR  TPR  TVR  PVR/SVR  TPR/TVR    Resistance Results (Metric)  1:32 .77 dsc-5    824.16 dsc-5    371.45 dsc-5    882.2 dsc-5    0.3    0.42      Resistance Results (Wood)  1:32 PM 3.05 STEPHENS    10.3 STEPHENS    4.64 STEPHENS    11.03 STEPHENS    0.3    0.42        Stoke Volume Results Phase: Baseline     Time RVSW (gm*m) LVSW (gm*m) RVSW-I (gm*m/m2) LVSW-I (gm*m/m2)   Stroke Work Results  1:32 PM 36.67

## 2023-10-24 ENCOUNTER — OFFICE VISIT (OUTPATIENT)
Dept: CARDIOLOGY | Facility: CLINIC | Age: 58
End: 2023-10-24
Attending: INTERNAL MEDICINE
Payer: COMMERCIAL

## 2023-10-24 ENCOUNTER — HOSPITAL ENCOUNTER (OUTPATIENT)
Dept: CARDIOLOGY | Facility: CLINIC | Age: 58
Discharge: HOME | End: 2023-10-24
Attending: PHYSICIAN ASSISTANT
Payer: COMMERCIAL

## 2023-10-24 ENCOUNTER — LAB (OUTPATIENT)
Dept: LAB | Facility: CLINIC | Age: 58
End: 2023-10-24
Payer: COMMERCIAL

## 2023-10-24 VITALS
WEIGHT: 290.6 LBS | OXYGEN SATURATION: 97 % | SYSTOLIC BLOOD PRESSURE: 116 MMHG | DIASTOLIC BLOOD PRESSURE: 70 MMHG | HEART RATE: 83 BPM | HEIGHT: 77 IN | BODY MASS INDEX: 34.31 KG/M2

## 2023-10-24 DIAGNOSIS — I27.20 PULMONARY HTN (H): ICD-10-CM

## 2023-10-24 DIAGNOSIS — R06.09 DOE (DYSPNEA ON EXERTION): ICD-10-CM

## 2023-10-24 DIAGNOSIS — I27.20 PULMONARY HYPERTENSION (H): ICD-10-CM

## 2023-10-24 DIAGNOSIS — R06.02 SOB (SHORTNESS OF BREATH): Primary | ICD-10-CM

## 2023-10-24 LAB
ALBUMIN SERPL BCG-MCNC: 4.2 G/DL (ref 3.5–5.2)
ALP SERPL-CCNC: 91 U/L (ref 40–129)
ALT SERPL W P-5'-P-CCNC: 39 U/L (ref 0–70)
ANION GAP SERPL CALCULATED.3IONS-SCNC: 10 MMOL/L (ref 7–15)
AST SERPL W P-5'-P-CCNC: 37 U/L (ref 0–45)
BILIRUB SERPL-MCNC: 0.3 MG/DL
BUN SERPL-MCNC: 22.1 MG/DL (ref 8–23)
CALCIUM SERPL-MCNC: 9.5 MG/DL (ref 8.6–10)
CHLORIDE SERPL-SCNC: 105 MMOL/L (ref 98–107)
CREAT SERPL-MCNC: 1.9 MG/DL (ref 0.67–1.17)
DEPRECATED HCO3 PLAS-SCNC: 27 MMOL/L (ref 22–29)
EGFRCR SERPLBLD CKD-EPI 2021: 40 ML/MIN/1.73M2
ERYTHROCYTE [DISTWIDTH] IN BLOOD BY AUTOMATED COUNT: 19.2 % (ref 10–15)
GLUCOSE SERPL-MCNC: 119 MG/DL (ref 70–99)
HCT VFR BLD AUTO: 34.8 % (ref 40–53)
HGB BLD-MCNC: 11.5 G/DL (ref 13.3–17.7)
LVEF ECHO: NORMAL
MCH RBC QN AUTO: 29.7 PG (ref 26.5–33)
MCHC RBC AUTO-ENTMCNC: 33 G/DL (ref 31.5–36.5)
MCV RBC AUTO: 90 FL (ref 78–100)
NT-PROBNP SERPL-MCNC: 912 PG/ML (ref 0–900)
PLATELET # BLD AUTO: 346 10E3/UL (ref 150–450)
POTASSIUM SERPL-SCNC: 3.9 MMOL/L (ref 3.4–5.3)
PROT SERPL-MCNC: 6.5 G/DL (ref 6.4–8.3)
RBC # BLD AUTO: 3.87 10E6/UL (ref 4.4–5.9)
SODIUM SERPL-SCNC: 142 MMOL/L (ref 135–145)
WBC # BLD AUTO: 6.5 10E3/UL (ref 4–11)

## 2023-10-24 PROCEDURE — 99214 OFFICE O/P EST MOD 30 MIN: CPT | Mod: 25 | Performed by: INTERNAL MEDICINE

## 2023-10-24 PROCEDURE — 99213 OFFICE O/P EST LOW 20 MIN: CPT | Mod: 25 | Performed by: INTERNAL MEDICINE

## 2023-10-24 PROCEDURE — 93306 TTE W/DOPPLER COMPLETE: CPT

## 2023-10-24 PROCEDURE — 36415 COLL VENOUS BLD VENIPUNCTURE: CPT | Performed by: PATHOLOGY

## 2023-10-24 PROCEDURE — 93306 TTE W/DOPPLER COMPLETE: CPT | Mod: 26 | Performed by: STUDENT IN AN ORGANIZED HEALTH CARE EDUCATION/TRAINING PROGRAM

## 2023-10-24 PROCEDURE — 85027 COMPLETE CBC AUTOMATED: CPT | Performed by: PATHOLOGY

## 2023-10-24 PROCEDURE — 83880 ASSAY OF NATRIURETIC PEPTIDE: CPT | Performed by: PATHOLOGY

## 2023-10-24 PROCEDURE — 80053 COMPREHEN METABOLIC PANEL: CPT | Performed by: PATHOLOGY

## 2023-10-24 RX ORDER — LIDOCAINE 40 MG/G
CREAM TOPICAL
Status: CANCELLED | OUTPATIENT
Start: 2023-10-24

## 2023-10-24 ASSESSMENT — PAIN SCALES - GENERAL: PAINLEVEL: NO PAIN (0)

## 2023-10-24 NOTE — LETTER
10/24/2023      RE: Olivier Gómez  [address]  [place] [address]       Dear Colleague,    Thank you for the opportunity to participate in the care of your patient, Olivier Gómez, at the [facility] [place] [facility]. Please see a copy of my visit note below.    mpression:  1. Remote AML treated with BMT  2. Pulmonary hypertension  3. Anemia    Impression/Plan    1. Remarkable improvement in hemodynamics with PVR of 3.1 with rescue consisting of Jakafi, selexipag, riociguat, optsumit!!  2. Repeat heart catheterization in January/February    The patient returns for follow-up of pulmonary hypertension.  There is no interim history of chest pain, tightness, paroxysmal nocturnal dyspnea, orthopnea, peripheral edema, palpitation, pre-syncope, syncope.  Exercise tolerance is stable.  The patient is attempting to exercise regularly and following a sodium restricted, calorically appropriate diet.  Medications are reviewed and the patient is taking medications as prescribed.  The patient is generally sleeping well.           Current Outpatient Medications   Medication    atovaquone (MEPRON) 750 MG/5ML suspension    calcium carbonate (OS-PANFILO 500 MG [place]. [place]) 500 MG tablet    CLONAZEPAM PO    FLUCONAZOLE PO    furosemide (LASIX) 20 MG tablet    OPSUMIT 10 MG tablet    Pantoprazole Sodium (PROTONIX PO)    Penicillin V Potassium (PEN-VEE K OR)    predniSONE (DELTASONE) 2.5 MG tablet    riociguat (ADEMPAS) 1 MG tablet    rosuvastatin (CRESTOR) 5 MG tablet    ruxolitinib (JAKAFI) 5 MG TABS tablet    selexipag (UPTRAVI) 1600 MCG tablet    Specialty Vitamins Products (MAGNESIUM PLUS PROTEIN) 133 MG tablet    Ursodiol (ACTIGALL PO)    VALACYCLOVIR HCL PO    VITAMIN D, CHOLECALCIFEROL, PO     No current facility-administered medications for this visit.           Constitutional:no weight loss, fever, chills, night sweats  HEENT: without visual changes,  swallow difficulties  Pulmonary: without shortness of breath, cough, wheeze, hemoptysis  Cardiac: without chest pain, CORREA, PND, orthopnea, edema, palpitation, pre-syncope, syncope,  GI: without diarrhea, constipation, jaundice, melena, GERD, hematemesis  : without frequency, urgency, dysuria, hematuria  Skin: rash, bruise, open lesions  Neuro: without TIA, focal neurologic complaints, seizure, trauma  Ortho: without pain, swelling, mobility impairment  Endocrine: diabetes, thyroid, heat/cold intolerance, polyuria, polyphagia, change bowel habits.  Sleep:no JULISSA, periodic breathing, fatigue      Normal right sided filling pressures  Mild PH  Left sided filling pressures are normal.  Normal cardiac output level.  Improvement in PA pressure when compared to last hemodynamic assessment     Elevated right-sided filling pressures and pulmonary pressures improved from [facility] 03/2022. Normal left-sided pressures and cardiac output.          Hemodynamics    RA 5  RV 50/5  PA 50/22/32  PCWP 11    CO/CI 6.9/2.8 by Carter; 6.7/2.7 by TD  VO2 251    PA sat 75.3%  Ao sat 98%  Hemoglobin 11.8    PVR 3.1  TPR 4.6 Right sided filling pressures are mildly elevated. Left sided filling pressures are normal. Moderately elevated pulmonary artery hypertension. Normal cardiac output level.     Pressures Phase: Baseline     Time Systolic (mmHg) Diastolic (mmHg) Mean (mmHg) A Wave (mmHg) V Wave (mmHg) EDP (mmHg) Max dp/dt (mmHg/sec) HR (bpm) Content (mL/dL) SAT (%)   RA Pressures  1:49 PM   5    8    4      72        RV Pressures  1:49 PM 50        5     72        PA Pressures  1:50 PM 50    22    32        69        PCW Pressures  1:50 PM   11    10    8      61        AO Pressures  1:35     59    76        67          Blood Flow Results Phase: Baseline     Time Results  Indexed Values (L/min/m2)   QP  1:32 PM 6.89 L/min    2.75      QS  1:32 PM 6.89 L/min    2.75        Blood Oximetry Phase: Baseline     Time Hb  SAT(%)  PO2  Content  (mL/dL) PA Sat (%)   PA  1:32 PM  75.3 %      75.3      Art  1:32 PM  98 %     15.73         Cardiac Output Phase: Baseline     Time TDCO (L/min) TDCI (L/min/m2) Carter C.O. (L/min) Carter C.I. (L/min/m2) Carter HR (bpm)   Cardiac Output Results  1:32 PM 6.72    2.69    6.89    2.75         1:52 PM 6.72            Resistance Results Phase: Baseline     Time PVR  SVR  TPR  TVR  PVR/SVR  TPR/TVR    Resistance Results (Metric)  1:32 .77 dsc-5    824.16 dsc-5    371.45 dsc-5    882.2 dsc-5    0.3    0.42      Resistance Results (Wood)  1:32 PM 3.05 STEPHENS    10.3 STEPHENS    4.64 STEPHENS    11.03 STEPHENS    0.3    0.42        Stoke Volume Results Phase: Baseline     Time RVSW (gm*m) LVSW (gm*m) RVSW-I (gm*m/m2) LVSW-I (gm*m/m2)   Stroke Work Results  1:32 PM 36.67               Please do not hesitate to contact me if you have any questions/concerns.     Sincerely,     Dk Quan MD

## 2023-10-24 NOTE — PATIENT INSTRUCTIONS
You were seen today in the Pulmonary Hypertension Clinic at the [facility].     Cardiology Provider you saw during your visit:    Dr. Quan    Results:   Component      Latest Ref Rng 10/24/2023  8:51 AM   Sodium      135 - 145 mmol/L 142    Potassium      3.4 - 5.3 mmol/L 3.9    Carbon Dioxide (CO2)      22 - 29 mmol/L 27    Anion Gap      7 - 15 mmol/L 10    Urea Nitrogen      8.0 - 23.0 mg/dL 22.1    Creatinine      0.67 - 1.17 mg/dL 1.90 (H)    GFR Estimate      >60 mL/min/1.73m2 40 (L)    Calcium      8.6 - 10.0 mg/dL 9.5    Chloride      98 - 107 mmol/L 105    Glucose      70 - 99 mg/dL 119 (H)    Alkaline Phosphatase      40 - 129 U/L 91    AST      0 - 45 U/L 37    ALT      0 - 70 U/L 39    Protein Total      6.4 - 8.3 g/dL 6.5    Albumin      3.5 - 5.2 g/dL 4.2    Bilirubin Total      <=1.2 mg/dL 0.3    WBC      4.0 - 11.0 10e3/uL 6.5    RBC Count      4.40 - 5.90 10e6/uL 3.87 (L)    Hemoglobin      13.3 - 17.7 g/dL 11.5 (L)    Hematocrit      40.0 - 53.0 % 34.8 (L)    MCV      78 - 100 fL 90    MCH      26.5 - 33.0 pg 29.7    MCHC      31.5 - 36.5 g/dL 33.0    RDW      10.0 - 15.0 % 19.2 (H)    Platelet Count      150 - 450 10e3/uL 346    N-Terminal Pro Bnp      0 - 900 pg/mL 912 (H)       Legend:  (H) High  (L) Low    Recommendations:   Right heart catheterization and echocardiogram in April    Pre-procedure instructions - Right heart catheterization  Patient Education    Your arrival time is TBD.  Location is [address] Waiting Room  Please plan on being at the hospital all day.  At any time, emergencies and/or urgent cases may come up which could delay the start of your procedure.    Pre-procedure instructions - Right heart catheterization  No solid food for 8 hours prior  Nothing to drink 2 hours prior to arrival time  You can take your morning medications (except diabetic and blood thinners) with  sips of water  We recommend you arrange for a ride to drop you off and pick you up, in the instance, you are unable to drive home, however you should be able to function as you normally would after the procedure    You will need to follow up with one of our cardiology APPs 1-2 weeks after your procedure. If you need help scheduling or rescheduling your appointment, please call [phone]      Follow-up:   Follow up with RAZA Jones after testing    Please call us immediately if you have any syncope (fainting or passing out), chest pain, edema (swelling or weight gain), or decline in your functional status (general decline in how you are feeling).    If you have emergent concerns after hours or on the weekend, please call our on-call Cardiologist at [phone], option 4. For emergencies call [phone].     Thank you for allowing us to be a part of your care here at the [facility]    If you have questions or concerns please contact us at:    Aria Bettencourt RN (P: [phone])    Nurse Coordinator       Pulmonary Hypertension     [facility]         JUN Montero   (Prior Authorizations)    ()  Clinic   Clinic   Pulmonary Hypertension   Pulmonary Hypertension  [facility]  (P)[phone]    (P) [phone]  (F) [phone]

## 2023-10-24 NOTE — NURSING NOTE
Chief Complaint   Patient presents with    Follow Up     4MO with labs and echo     Vitals were taken and medications reconciled.    Ambrose Main, EMT  10:00 AM

## 2023-10-24 NOTE — NURSING NOTE
Procedures and/or Testing: Patient given instructions regarding  Echocardiogram, . Discussed purpose, preparation, procedure and when to expect results reported back to the patient. Patient demonstrated understanding of this information and agreed to call with further questions or concerns.  Right Heart Catheterization: Patient was instructed regarding right heart catheterization, including discussion of the procedure, preparation, intra-procedural steps, and recovery at home. Patient demonstrated understanding of this information and agreed to call with further questions or concerns.  Labs: Patient was given results of the laboratory testing obtained today. Patient demonstrated understanding of this information and agreed to call with further questions or concerns.   Diet: Patient instructed regarding a heart healthy diet, including discussion of reduced fat and sodium intake. Patient demonstrated understanding of this information and agreed to call with further questions or concerns.  Return Appointment: Patient given instructions regarding scheduling next clinic visit. Patient demonstrated understanding of this information and agreed to call with further questions or concerns.    AVS printed and staff message sent to [facility] to schedule F/U testing in April. Chantel Erickson RN on 10/24/2023 at 11:40 AM

## 2023-11-04 ENCOUNTER — HEALTH MAINTENANCE LETTER (OUTPATIENT)
Age: 58
End: 2023-11-04

## 2023-12-19 ENCOUNTER — TELEPHONE (OUTPATIENT)
Dept: CARDIOLOGY | Facility: CLINIC | Age: 58
End: 2023-12-19
Payer: COMMERCIAL

## 2023-12-19 NOTE — TELEPHONE ENCOUNTER
Cath Lab Case Request/Order    Location: [address] [address] [facility] Waiting Room    Procedure: Right Heart Cath (RHC)    Procedure Date:  4/5    Patient Arrival Time: 7    Procedure Time: 0830 (pending emergency)    Ordering Provider: Dr. Dk Quan    Performing Cardiologist: Dr. Nikkie Lundy    Inpatient Bed Needed: No    Post-  Procedure KELLY appointment scheduled (1 - 2 weeks): YES     Date:  4/8     Provider: Guera     Communicated Patient Instructions:   NURSE WILL GO OVER ALL PRE PROCEDURE INSTRUCTIONS TO PATIENT     Appointment was scheduled: Over the phone    Patient expressed understanding of above instructions and denied further questions at this time.    Nemo Carver

## 2024-02-09 DIAGNOSIS — I27.20 PULMONARY HYPERTENSION (H): ICD-10-CM

## 2024-03-28 DIAGNOSIS — I27.20 PULMONARY HYPERTENSION (H): ICD-10-CM

## 2024-03-29 NOTE — TELEPHONE ENCOUNTER
[employer] message sent to patient and was read    Aria Bettencourt RN on 3/29/2024 at 2:30 PM

## 2024-04-04 RX ORDER — MACITENTAN 10 MG/1
TABLET, FILM COATED ORAL
Qty: 30 TABLET | Refills: 11 | Status: SHIPPED | OUTPATIENT
Start: 2024-04-04

## 2024-04-04 NOTE — TELEPHONE ENCOUNTER
OPSUMIT 10 MG tablet 30 tablet 11 4/12/2023     Last Office Visit: 10/24/23  Future Office visit:   4/8/24    Routing refill request to provider for review/approval because:  Drug not on the [facility], [facility] or [facility] refill protocol     Melissa De Anda RN  [facility] Red Flag Triage/MRT

## 2024-04-05 ENCOUNTER — HOSPITAL ENCOUNTER (OUTPATIENT)
Facility: CLINIC | Age: 59
Discharge: HOME | End: 2024-04-05
Attending: INTERNAL MEDICINE | Admitting: INTERNAL MEDICINE
Payer: COMMERCIAL

## 2024-04-05 ENCOUNTER — APPOINTMENT (OUTPATIENT)
Dept: MEDSURG UNIT | Facility: CLINIC | Age: 59
End: 2024-04-05
Attending: INTERNAL MEDICINE
Payer: COMMERCIAL

## 2024-04-05 ENCOUNTER — APPOINTMENT (OUTPATIENT)
Dept: LAB | Facility: CLINIC | Age: 59
End: 2024-04-05
Attending: INTERNAL MEDICINE
Payer: COMMERCIAL

## 2024-04-05 ENCOUNTER — HOSPITAL ENCOUNTER (OUTPATIENT)
Dept: CARDIOLOGY | Facility: CLINIC | Age: 59
Discharge: HOME | End: 2024-04-05
Attending: INTERNAL MEDICINE | Admitting: INTERNAL MEDICINE
Payer: COMMERCIAL

## 2024-04-05 VITALS
HEART RATE: 61 BPM | SYSTOLIC BLOOD PRESSURE: 133 MMHG | BODY MASS INDEX: 33.83 KG/M2 | OXYGEN SATURATION: 95 % | DIASTOLIC BLOOD PRESSURE: 73 MMHG | WEIGHT: 282.41 LBS | RESPIRATION RATE: 16 BRPM | TEMPERATURE: 98 F

## 2024-04-05 DIAGNOSIS — I27.20 PULMONARY HYPERTENSION (H): ICD-10-CM

## 2024-04-05 DIAGNOSIS — R06.02 SOB (SHORTNESS OF BREATH): ICD-10-CM

## 2024-04-05 LAB
ALBUMIN SERPL BCG-MCNC: 4.3 G/DL (ref 3.5–5.2)
ALP SERPL-CCNC: 93 U/L (ref 40–150)
ALT SERPL W P-5'-P-CCNC: 36 U/L (ref 0–70)
ANION GAP SERPL CALCULATED.3IONS-SCNC: 9 MMOL/L (ref 7–15)
AST SERPL W P-5'-P-CCNC: 36 U/L (ref 0–45)
BASOPHILS # BLD AUTO: 0 10E3/UL (ref 0–0.2)
BASOPHILS NFR BLD AUTO: 1 %
BILIRUB SERPL-MCNC: 0.5 MG/DL
BUN SERPL-MCNC: 16.6 MG/DL (ref 8–23)
CALCIUM SERPL-MCNC: 10.2 MG/DL (ref 8.6–10)
CHLORIDE SERPL-SCNC: 104 MMOL/L (ref 98–107)
CREAT SERPL-MCNC: 1.77 MG/DL (ref 0.67–1.17)
DEPRECATED HCO3 PLAS-SCNC: 27 MMOL/L (ref 22–29)
EGFRCR SERPLBLD CKD-EPI 2021: 44 ML/MIN/1.73M2
EOSINOPHIL # BLD AUTO: 0.1 10E3/UL (ref 0–0.7)
EOSINOPHIL NFR BLD AUTO: 1 %
ERYTHROCYTE [DISTWIDTH] IN BLOOD BY AUTOMATED COUNT: 18.6 % (ref 10–15)
GLUCOSE SERPL-MCNC: 107 MG/DL (ref 70–99)
HCT VFR BLD AUTO: 36.4 % (ref 40–53)
HGB BLD-MCNC: 11.9 G/DL (ref 13.3–17.7)
HGB BLD-MCNC: 12.2 G/DL (ref 13.3–17.7)
IMM GRANULOCYTES # BLD: 0 10E3/UL
IMM GRANULOCYTES NFR BLD: 0 %
INR PPP: 1 (ref 0.85–1.15)
LVEF ECHO: NORMAL
LYMPHOCYTES # BLD AUTO: 1.8 10E3/UL (ref 0.8–5.3)
LYMPHOCYTES NFR BLD AUTO: 28 %
MCH RBC QN AUTO: 31.4 PG (ref 26.5–33)
MCHC RBC AUTO-ENTMCNC: 33.5 G/DL (ref 31.5–36.5)
MCV RBC AUTO: 94 FL (ref 78–100)
MONOCYTES # BLD AUTO: 1.2 10E3/UL (ref 0–1.3)
MONOCYTES NFR BLD AUTO: 20 %
NEUTROPHILS # BLD AUTO: 3.1 10E3/UL (ref 1.6–8.3)
NEUTROPHILS NFR BLD AUTO: 50 %
NRBC # BLD AUTO: 0 10E3/UL
NRBC BLD AUTO-RTO: 0 /100
NT-PROBNP SERPL-MCNC: 571 PG/ML (ref 0–900)
PLATELET # BLD AUTO: 337 10E3/UL (ref 150–450)
POTASSIUM SERPL-SCNC: 3.8 MMOL/L (ref 3.4–5.3)
PROT SERPL-MCNC: 6.6 G/DL (ref 6.4–8.3)
RBC # BLD AUTO: 3.88 10E6/UL (ref 4.4–5.9)
SODIUM SERPL-SCNC: 140 MMOL/L (ref 135–145)
WBC # BLD AUTO: 6.2 10E3/UL (ref 4–11)

## 2024-04-05 PROCEDURE — 250N000009 HC RX 250: Performed by: INTERNAL MEDICINE

## 2024-04-05 PROCEDURE — 999N000132 HC STATISTIC PP CARE STAGE 1

## 2024-04-05 PROCEDURE — 93306 TTE W/DOPPLER COMPLETE: CPT

## 2024-04-05 PROCEDURE — C1751 CATH, INF, PER/CENT/MIDLINE: HCPCS | Performed by: INTERNAL MEDICINE

## 2024-04-05 PROCEDURE — 80053 COMPREHEN METABOLIC PANEL: CPT | Performed by: INTERNAL MEDICINE

## 2024-04-05 PROCEDURE — 85018 HEMOGLOBIN: CPT | Mod: 91

## 2024-04-05 PROCEDURE — 83880 ASSAY OF NATRIURETIC PEPTIDE: CPT | Performed by: INTERNAL MEDICINE

## 2024-04-05 PROCEDURE — 272N000001 HC OR GENERAL SUPPLY STERILE: Performed by: INTERNAL MEDICINE

## 2024-04-05 PROCEDURE — 999N000142 HC STATISTIC PROCEDURE PREP ONLY

## 2024-04-05 PROCEDURE — C1894 INTRO/SHEATH, NON-LASER: HCPCS | Performed by: INTERNAL MEDICINE

## 2024-04-05 PROCEDURE — 93451 RIGHT HEART CATH: CPT | Mod: 26 | Performed by: INTERNAL MEDICINE

## 2024-04-05 PROCEDURE — 93451 RIGHT HEART CATH: CPT | Performed by: INTERNAL MEDICINE

## 2024-04-05 PROCEDURE — 85610 PROTHROMBIN TIME: CPT | Performed by: INTERNAL MEDICINE

## 2024-04-05 PROCEDURE — 85004 AUTOMATED DIFF WBC COUNT: CPT | Performed by: INTERNAL MEDICINE

## 2024-04-05 PROCEDURE — 93306 TTE W/DOPPLER COMPLETE: CPT | Mod: 26 | Performed by: STUDENT IN AN ORGANIZED HEALTH CARE EDUCATION/TRAINING PROGRAM

## 2024-04-05 PROCEDURE — 36415 COLL VENOUS BLD VENIPUNCTURE: CPT | Performed by: INTERNAL MEDICINE

## 2024-04-05 RX ORDER — LIDOCAINE 40 MG/G
CREAM TOPICAL
Status: COMPLETED | OUTPATIENT
Start: 2024-04-05 | End: 2024-04-05

## 2024-04-05 RX ORDER — ASCORBIC ACID
1 CRYSTALS ORAL DAILY
COMMUNITY

## 2024-04-05 RX ADMIN — LIDOCAINE: 40 CREAM TOPICAL at 08:37

## 2024-04-05 ASSESSMENT — ACTIVITIES OF DAILY LIVING (ADL)
ADLS_ACUITY_SCORE: 38

## 2024-04-05 NOTE — PROGRESS NOTES
Pt arrived on 2a post RHC. VSS Ra. Dressing c/d/I. No pain. Discharge instructions reviewed, copy given to pt. Pt sipping on coffee, declines food at this time. Pt discharged home accompanied by wife.

## 2024-04-05 NOTE — DISCHARGE INSTRUCTIONS
[facility]                        Interventional Cardiology  Discharge Instructions   Post Right Heart Cath and/or Heart Biopsy      AFTER YOU GO HOME:  DO drink plenty of fluids  DO resume your regular diet and medications unless otherwise instructed by your Primary Physician  Do Not scrub the procedure site vigorously  No lotion or powder to the puncture site for 3 days    CALL YOUR PRIMARY PHYSICIAN IF: You may resume all normal activity.  Monitor neck site for bleeding, swelling, or voice changes. If you notice bleeding or swelling immediately apply pressure to the site and call number below to speak with Cardiology Fellow.  If you experience any changes in your breathing you should call your doctor immediately or come to the closest Emergency Department.  Do not drive yourself.    ADDITIONAL INSTRUCTIONS: Medications: You are to resume all home medications including anticoagulation therapy unless otherwise advised by your primary cardiologist or nurse coordinator.    Follow Up: Per your primary cardiology team    If you have any questions or concerns regarding your procedure site please call [phone] at anytime and ask for Cardiology Fellow on call.  They are available 24 hours a day.  You may also contact the Cardiology Clinic after hours number at [phone].                                                       Telephone Numbers [phone] Monday-Friday 8:00 am to 4:30 pm    [phone] After 4:30 pm Monday-Friday, Weekends & Holidays  Ask for Interventional Cardiologist on call. Someone is on call 24 hours/day   [facility] toll free number [phone] Monday-Friday 8:00 am to 4:30 pm   [facility] Emergency Dept [phone]

## 2024-04-07 NOTE — PROGRESS NOTES
CARDIOLOGY PH CLINIC VIDEO VISIT    Date of video visit: 04/08/24      Olivier Gómez is a 58 year old male who is being evaluated via a billable video visit.        Self reported vitals:  Weight: N/A  BP: 122/67  HR: 73    Review Of Systems  Skin: Not assessed  Eyes: Not assessed  Ears/Nose/Throat: Not assessed  Respiratory: Positive for CORREA - stairs; sleep apnea, wears a CPAP  Cardiovascular: Positive for edema when eating high sodium foods  Gastrointestinal: Not assessed  Genitourinary: Not assessed  Musculoskeletal: Not assessed  Neurologic: Not assessed  Psychiatric: Not assessed  Hematologic/Lymphatic/Immunologic: Not assessed  Endocrine: Not assessed      MEDICATIONS:  Current Outpatient Medications   Medication Sig Dispense Refill    atovaquone (MEPRON) 750 MG/5ML suspension Take 1,500 mg by mouth daily      calcium carbonate (OS-PANFILO 500 MG [place]. [place]) 500 MG tablet Take 600 mg by mouth 2 times daily      CLONAZEPAM PO Take 1 mg by mouth At Bedtime       docusate sodium (COLACE) 100 MG capsule Take 200 mg by mouth 2 times daily      FLUCONAZOLE PO Take 200 mg by mouth daily      furosemide (LASIX) 20 MG tablet Take 1 tablet (20 mg) by mouth daily 90 tablet 3    OPSUMIT 10 MG tablet TAKE 1 TABLET BY MOUTH DAILY. DO NOT HANDLE IF PREGNANT. DO NOT SPLIT, CRUSH, OR CHEW. REVIEW MEDICATION GUIDE. 30 tablet 11    Pantoprazole Sodium (PROTONIX PO) Take 40 mg by mouth daily       Penicillin V Potassium (PEN-VEE K OR) Take 500 mg by mouth 2 times daily      predniSONE (DELTASONE) 2.5 MG tablet Take 2 tablets (5 mg) by mouth every other day 90 tablet 3    riociguat (ADEMPAS) 2.5 MG tablet Take 1 tablet (2.5 mg) by mouth 3 times daily 90 tablet 11    rosuvastatin (CRESTOR) 5 MG tablet Take 1 tablet (5 mg) by mouth daily 90 tablet 3    ruxolitinib (JAKAFI) 5 MG TABS tablet Take 2 tablets (10 mg) by mouth daily (Patient taking differently: Take 5 mg by mouth daily) 180 tablet 3    selexipag (UPTRAVI) 1600 MCG tablet Take  1 tablet (1,600 mcg) by mouth every 12 hours 60 tablet 11    Specialty Vitamins Products (MAGNESIUM PLUS PROTEIN) 133 MG tablet Take 266 mg by mouth daily (2 tablets)      Ursodiol (ACTIGALL PO) Take 300 mg by mouth 2 times daily       VALACYCLOVIR HCL PO Take 500 mg by mouth every other day      VITAMIN D, CHOLECALCIFEROL, PO Take 2,000 Units by mouth daily       Vitamin E 200 units TABS Take 1 tablet by mouth daily         Primary PH cardiologist: Dr. Quan        HPI:  Mr. Gómez is a pleasant 58 year old male with a PMhx including AML s/p bone marrow transplant, CKD, and scleroderma. He also has pulmonary hypertension, and is maintained on combination therapy with riociguat, macitentan, and selexipag (previously on IV prostacyclin therapy). He is also on Jakafi. He was seen last by Dr. Quan in Oct of 2023 at which time he was doing well. It was recommended that he return this spring with repeat hemodynamic testing.     Today, I am meeting virtually with Olivier to follow up. He had his RHC done last week which we reviewed. His pressures and PVR are both up slightly from last year, though his cardiac output remains normal. Per Echo, his RV was not well visualized but reported appearing mildly enlarged with low normal function. RVSP was reported at 48mmHg +RAP. No pericardial effusion was present. He tells me that despite his pressures going up a bit he's been feeling quite good. In fact, prior to our visit today he did 30 minutes on his elliptical machine at home without any difficulty. He denies any new chest discomfort, or dizziness. He has occasional mild LE edema if he has too much sodium but no worse than his usual.      The patient did not have any new labs performed for our visit today, but most recent available labs were reviewed as below.         CURRENT PULMONARY HYPERTENSION REGIMEN:     PAH Rx: Adempas 2.5mg TID, Opsumit 10mg daily, Uptravi 1600mcg BID, Jakafi 5mg daily  (previously on IV Flolan,  transitioned to Uptravi March 2019, previously on tadalafil-transitioned to riociguat)     Diuretics: Lasix 20mg daily    Oxygen: None     Anticoagulation: None        Assessment/Plan:     1. Pulmonary Hypertension.              --Mr. Gómez has moderate PAH in the setting of prior AML s/p BMT and connective tissue disease. He remains on combination therapy with Opsumit, Adcirca, and Uptravi. He was also placed on Jakafi by Dr. Quan. His RHC last week shows a slight increase in both his PA pressures and PVR, though CO remains normal. Will d/w Dr. Quan whether he would like to consider addition of sotatercept. Clinically,, however, he continues to do very well, and is a FC 1. Additionally,  COMPERA risk score is low at 1. [Addendum: Reviewed with Dr. Quan--ok to hold off on sotatercept for now, will continue to monitor.]              --He reports no new edema and left sided filling pressures were normal. Continue Lasix 20mg daily as is. NT-proBNP is not elevated, and CKD appears stable.    --He does not currently utilize supplemental oxygen.    --He is on immunosuppression, s/p BMT.     Follow up plan: Return in ~6 months to see Dr. Quan. I asked the patient to call sooner with any new concerns.       Testing/labs:    Most recent labs:      Latest Reference Range & Units 04/05/24 07:29   Sodium 135 - 145 mmol/L 140   Potassium 3.4 - 5.3 mmol/L 3.8   Chloride 98 - 107 mmol/L 104   Carbon Dioxide (CO2) 22 - 29 mmol/L 27   Urea Nitrogen 8.0 - 23.0 mg/dL 16.6   Creatinine 0.67 - 1.17 mg/dL 1.77 (H)   GFR Estimate >60 mL/min/1.73m2 44 (L)   Calcium 8.6 - 10.0 mg/dL 10.2 (H)   Anion Gap 7 - 15 mmol/L 9   Albumin 3.5 - 5.2 g/dL 4.3   Protein Total 6.4 - 8.3 g/dL 6.6   Alkaline Phosphatase 40 - 150 U/L 93   ALT 0 - 70 U/L 36   AST 0 - 45 U/L 36   Bilirubin Total <=1.2 mg/dL 0.5   Glucose 70 - 99 mg/dL 107 (H)   N-Terminal Pro BNP Inpatient 0 - 900 pg/mL 571   WBC 4.0 - 11.0 10e3/uL 6.2   Hemoglobin 13.3 - 17.7  g/dL 12.2 (L)   Hematocrit 40.0 - 53.0 % 36.4 (L)   Platelet Count 150 - 450 10e3/uL 337   RBC Count 4.40 - 5.90 10e6/uL 3.88 (L)   MCV 78 - 100 fL 94   MCH 26.5 - 33.0 pg 31.4   MCHC 31.5 - 36.5 g/dL 33.5   RDW 10.0 - 15.0 % 18.6 (H)   (H): Data is abnormally high  (L): Data is abnormally low      Other most recent pertinent testing:    RHC 4/5/24    Hemodynamics    RA:3   RV:68/3  PA:68/20/38  Wedge:10    Carter; CO:9.0, CI:3.53  Thermodilution; CO:6.50, CI:2.53  PVR:3.1   Right sided filling pressures are normal. Left sided filling pressures are normal. Moderately elevated pulmonary artery hypertension. Normal cardiac output level. Hemodynamic data has been modified in Epic per physician review.     Echo 4/5/24  Interpretation Summary  Global and regional left ventricular function is normal with an EF of 55-60%.  Paradoxical septal motion consistent with right ventricular pressure overload  is present.  The RV is not well visualized but appears mildly enlarged in cavity size with  low normal cardiac function.  RVGLS -20.2% and RV free wall strain -26.2%. 3D RVEF 47%.  The right ventricular systolic pressure is 48mmHg above the right atrial  pressure.  Pulmonary hypertension is present.  IVC diameter <2.1 cm collapsing >50% with sniff suggests a normal RA pressure  of 3 mmHg.  No pericardial effusion is present.    NYHA Functional Class:  1      Video-Visit Details    Type of service:  Video Visit    Video Start Time: 0809  Video End Time: 0818    An additional 15 minutes was spent today performing chart and history review, pre and post visit documentation, patient education, and care coordination.    The longitudinal plan of care for the diagnosis(es)/condition(s) as documented were addressed during this visit. Due to the added complexity in care, I will continue to support Olivier in the subsequent management and with ongoing continuity of care.      Originating Location (pt. Location): Home    Distant Location  (provider location):  On-site    Platform used for Video Visit: Celio Lynne PA-C  [facility]  Pager [phone]

## 2024-04-08 ENCOUNTER — VIRTUAL VISIT (OUTPATIENT)
Dept: CARDIOLOGY | Facility: CLINIC | Age: 59
End: 2024-04-08
Payer: COMMERCIAL

## 2024-04-08 DIAGNOSIS — R06.02 SOB (SHORTNESS OF BREATH): ICD-10-CM

## 2024-04-08 DIAGNOSIS — I27.20 PULMONARY HYPERTENSION (H): ICD-10-CM

## 2024-04-08 PROCEDURE — 99213 OFFICE O/P EST LOW 20 MIN: CPT | Mod: 95 | Performed by: PHYSICIAN ASSISTANT

## 2024-04-08 NOTE — LETTER
4/8/2024    Adelsoveronika Hickson  [address] [facility] [place] [address]    RE: Olivier Gómez       Dear Colleague,     I had the pleasure of seeing Olivier Gómez in the [facility].      CARDIOLOGY PH CLINIC VIDEO VISIT    Date of video visit: 04/08/24      Olivier Gómez is a 58 year old male who is being evaluated via a billable video visit.        Self reported vitals:  Weight: N/A  BP: 122/67  HR: 73    Review Of Systems  Skin: Not assessed  Eyes: Not assessed  Ears/Nose/Throat: Not assessed  Respiratory: Positive for CORREA - stairs; sleep apnea, wears a CPAP  Cardiovascular: Positive for edema when eating high sodium foods  Gastrointestinal: Not assessed  Genitourinary: Not assessed  Musculoskeletal: Not assessed  Neurologic: Not assessed  Psychiatric: Not assessed  Hematologic/Lymphatic/Immunologic: Not assessed  Endocrine: Not assessed      MEDICATIONS:  Current Outpatient Medications   Medication Sig Dispense Refill    atovaquone (MEPRON) 750 MG/5ML suspension Take 1,500 mg by mouth daily      calcium carbonate (OS-PANFILO 500 MG [place]. [place]) 500 MG tablet Take 600 mg by mouth 2 times daily      CLONAZEPAM PO Take 1 mg by mouth At Bedtime       docusate sodium (COLACE) 100 MG capsule Take 200 mg by mouth 2 times daily      FLUCONAZOLE PO Take 200 mg by mouth daily      furosemide (LASIX) 20 MG tablet Take 1 tablet (20 mg) by mouth daily 90 tablet 3    OPSUMIT 10 MG tablet TAKE 1 TABLET BY MOUTH DAILY. DO NOT HANDLE IF PREGNANT. DO NOT SPLIT, CRUSH, OR CHEW. REVIEW MEDICATION GUIDE. 30 tablet 11    Pantoprazole Sodium (PROTONIX PO) Take 40 mg by mouth daily       Penicillin V Potassium (PEN-VEE K OR) Take 500 mg by mouth 2 times daily      predniSONE (DELTASONE) 2.5 MG tablet Take 2 tablets (5 mg) by mouth every other day 90 tablet 3    riociguat (ADEMPAS) 2.5 MG tablet Take 1 tablet (2.5 mg) by mouth 3 times daily 90 tablet 11    rosuvastatin (CRESTOR) 5 MG tablet Take 1 tablet (5 mg) by mouth daily 90 tablet 3     ruxolitinib (JAKAFI) 5 MG TABS tablet Take 2 tablets (10 mg) by mouth daily (Patient taking differently: Take 5 mg by mouth daily) 180 tablet 3    selexipag (UPTRAVI) 1600 MCG tablet Take 1 tablet (1,600 mcg) by mouth every 12 hours 60 tablet 11    Specialty Vitamins Products (MAGNESIUM PLUS PROTEIN) 133 MG tablet Take 266 mg by mouth daily (2 tablets)      Ursodiol (ACTIGALL PO) Take 300 mg by mouth 2 times daily       VALACYCLOVIR HCL PO Take 500 mg by mouth every other day      VITAMIN D, CHOLECALCIFEROL, PO Take 2,000 Units by mouth daily       Vitamin E 200 units TABS Take 1 tablet by mouth daily         Primary PH cardiologist: Dr. Quan        HPI:  Mr. Gómez is a pleasant 58 year old male with a PMhx including AML s/p bone marrow transplant, CKD, and scleroderma. He also has pulmonary hypertension, and is maintained on combination therapy with riociguat, macitentan, and selexipag (previously on IV prostacyclin therapy). He is also on Jakafi. He was seen last by Dr. Quan in Oct of 2023 at which time he was doing well. It was recommended that he return this spring with repeat hemodynamic testing.     Today, I am meeting virtually with Olivier to follow up. He had his RHC done last week which we reviewed. His pressures and PVR are both up slightly from last year, though his cardiac output remains normal. Per Echo, his RV was not well visualized but reported appearing mildly enlarged with low normal function. RVSP was reported at 48mmHg +RAP. No pericardial effusion was present. He tells me that despite his pressures going up a bit he's been feeling quite good. In fact, prior to our visit today he did 30 minutes on his elliptical machine at home without any difficulty. He denies any new chest discomfort, or dizziness. He has occasional mild LE edema if he has too much sodium but no worse than his usual.      The patient did not have any new labs performed for our visit today, but most recent available  labs were reviewed as below.         CURRENT PULMONARY HYPERTENSION REGIMEN:     PAH Rx: Adempas 2.5mg TID, Opsumit 10mg daily, Uptravi 1600mcg BID, Jakafi 5mg daily  (previously on IV Flolan, transitioned to Uptravi March 2019, previously on tadalafil-transitioned to riociguat)     Diuretics: Lasix 20mg daily    Oxygen: None     Anticoagulation: None        Assessment/Plan:     1. Pulmonary Hypertension.              --Mr. Gómez has moderate PAH in the setting of prior AML s/p BMT and connective tissue disease. He remains on combination therapy with Opsumit, Adcirca, and Uptravi. He was also placed on Jakafi by Dr. Quan. His RHC last week shows a slight increase in both his PA pressures and PVR, though CO remains normal. Will d/w Dr. Quan whether he would like to consider addition of sotatercept. Clinically,, however, he continues to do very well, and is a FC 1. Additionally,  COMPERA risk score is low at 1.               --He reports no new edema and left sided filling pressures were normal. Continue Lasix 20mg daily as is. NT-proBNP is not elevated, and CKD appears stable.    --He does not currently utilize supplemental oxygen.    --He is on immunosuppression, s/p BMT.     Follow up plan: Return in ~6 months to see Dr. Quan. I asked the patient to call sooner with any new concerns.       Testing/labs:    Most recent labs:      Latest Reference Range & Units 04/05/24 07:29   Sodium 135 - 145 mmol/L 140   Potassium 3.4 - 5.3 mmol/L 3.8   Chloride 98 - 107 mmol/L 104   Carbon Dioxide (CO2) 22 - 29 mmol/L 27   Urea Nitrogen 8.0 - 23.0 mg/dL 16.6   Creatinine 0.67 - 1.17 mg/dL 1.77 (H)   GFR Estimate >60 mL/min/1.73m2 44 (L)   Calcium 8.6 - 10.0 mg/dL 10.2 (H)   Anion Gap 7 - 15 mmol/L 9   Albumin 3.5 - 5.2 g/dL 4.3   Protein Total 6.4 - 8.3 g/dL 6.6   Alkaline Phosphatase 40 - 150 U/L 93   ALT 0 - 70 U/L 36   AST 0 - 45 U/L 36   Bilirubin Total <=1.2 mg/dL 0.5   Glucose 70 - 99 mg/dL 107 (H)   N-Terminal  Pro BNP Inpatient 0 - 900 pg/mL 571   WBC 4.0 - 11.0 10e3/uL 6.2   Hemoglobin 13.3 - 17.7 g/dL 12.2 (L)   Hematocrit 40.0 - 53.0 % 36.4 (L)   Platelet Count 150 - 450 10e3/uL 337   RBC Count 4.40 - 5.90 10e6/uL 3.88 (L)   MCV 78 - 100 fL 94   MCH 26.5 - 33.0 pg 31.4   MCHC 31.5 - 36.5 g/dL 33.5   RDW 10.0 - 15.0 % 18.6 (H)   (H): Data is abnormally high  (L): Data is abnormally low      Other most recent pertinent testing:    RHC 4/5/24    Hemodynamics    RA:3   RV:68/3  PA:68/20/38  Wedge:10    Carter; CO:9.0, CI:3.53  Thermodilution; CO:6.50, CI:2.53  PVR:3.1   Right sided filling pressures are normal. Left sided filling pressures are normal. Moderately elevated pulmonary artery hypertension. Normal cardiac output level. Hemodynamic data has been modified in [facility] per physician review.     Echo 4/5/24  Interpretation Summary  Global and regional left ventricular function is normal with an EF of 55-60%.  Paradoxical septal motion consistent with right ventricular pressure overload  is present.  The RV is not well visualized but appears mildly enlarged in cavity size with  low normal cardiac function.  RVGLS -20.2% and RV free wall strain -26.2%. 3D RVEF 47%.  The right ventricular systolic pressure is 48mmHg above the right atrial  pressure.  Pulmonary hypertension is present.  IVC diameter <2.1 cm collapsing >50% with sniff suggests a normal RA pressure  of 3 mmHg.  No pericardial effusion is present.    NYHA Functional Class:  1      Video-Visit Details    Type of service:  Video Visit    Video Start Time: 0809  Video End Time: 0818    An additional 15 minutes was spent today performing chart and history review, pre and post visit documentation, patient education, and care coordination.    The longitudinal plan of care for the diagnosis(es)/condition(s) as documented were addressed during this visit. Due to the added complexity in care, I will continue to support Olivier in the subsequent management and with ongoing  continuity of care.      Originating Location (pt. Location): Home    Distant Location (provider location):  On-site    Platform used for Video Visit: Celio Lynne PA-C  [facility]  Pager [phone]    Thank you for allowing me to participate in the care of your patient.      Sincerely,   RAZA Jones   [facility]  cc:   RAZA Jones  [facility]  [address] [place] [id number]

## 2024-07-21 DIAGNOSIS — I27.20 PULMONARY HYPERTENSION (H): ICD-10-CM

## 2024-07-25 ENCOUNTER — TELEPHONE (OUTPATIENT)
Dept: CARDIOLOGY | Facility: CLINIC | Age: 59
End: 2024-07-25
Payer: COMMERCIAL

## 2024-07-25 DIAGNOSIS — I27.20 PULMONARY HYPERTENSION (H): ICD-10-CM

## 2024-07-25 RX ORDER — FUROSEMIDE 20 MG
20 TABLET ORAL DAILY
Qty: 90 TABLET | Refills: 2 | Status: SHIPPED | OUTPATIENT
Start: 2024-07-25 | End: 2024-07-29

## 2024-07-25 NOTE — TELEPHONE ENCOUNTER
[facility] Call Center    Phone Message    May a detailed message be left on voicemail: yes     Reason for Call: Medication Refill Request    Has the patient contacted the pharmacy for the refill? Yes   Name of medication being requested: furosimide  Provider who prescribed the medication: Juan Carlos  Pharmacy:    [facility] - [place], [place] - [address] [place] [address] PHARMACY - [place] - [address]  Date medication is needed: asap       Action Taken: Other: cardio    Travel Screening: Not Applicable     Date of Service:

## 2024-07-26 NOTE — TELEPHONE ENCOUNTER
Last Clinic Visit: 4/8/2024  [facility]       Continue Lasix 20mg daily as is. NT-proBNP is not elevated, and CKD appears stable

## 2024-07-29 RX ORDER — FUROSEMIDE 20 MG
20 TABLET ORAL DAILY
Qty: 90 TABLET | Refills: 3 | Status: SHIPPED | OUTPATIENT
Start: 2024-07-29

## 2024-08-27 DIAGNOSIS — I27.20 PULMONARY HYPERTENSION (H): ICD-10-CM

## 2024-08-27 DIAGNOSIS — R06.02 SOB (SHORTNESS OF BREATH): ICD-10-CM

## 2024-09-03 RX ORDER — RIOCIGUAT 2.5 MG/1
2.5 TABLET, FILM COATED ORAL 3 TIMES DAILY
Qty: 90 TABLET | Refills: 11 | Status: SHIPPED | OUTPATIENT
Start: 2024-09-03

## 2024-10-14 ENCOUNTER — TELEPHONE (OUTPATIENT)
Dept: CARDIOLOGY | Facility: CLINIC | Age: 59
End: 2024-10-14
Payer: COMMERCIAL

## 2024-10-16 ENCOUNTER — TELEPHONE (OUTPATIENT)
Dept: CARDIOLOGY | Facility: CLINIC | Age: 59
End: 2024-10-16
Payer: COMMERCIAL

## 2024-10-16 NOTE — TELEPHONE ENCOUNTER
Patient Contacted for the patient to call back and schedule the following:    Appointment type: RTN PULM HYPER  Provider: IMELDA  Return date: NEXT AVAILABLE  Specialty phone number: [phone] OPT 1  Additional appointment(s) needed: LABS PRIOR  Additonal Notes: N/A

## 2024-10-23 ENCOUNTER — TELEPHONE (OUTPATIENT)
Dept: CARDIOLOGY | Facility: CLINIC | Age: 59
End: 2024-10-23
Payer: COMMERCIAL

## 2024-10-23 NOTE — TELEPHONE ENCOUNTER
10/23 Patient confirmed scheduled appointment:  Date: 12/4/2024  Time: 2:30 pm  Visit type: Return Pulmonary Hypertension  Provider: Avelina  Location: [facility]  Testing/imaging: labs prior   Additional notes: n/a

## 2024-12-04 ENCOUNTER — VIRTUAL VISIT (OUTPATIENT)
Dept: CARDIOLOGY | Facility: CLINIC | Age: 59
End: 2024-12-04
Attending: PHYSICIAN ASSISTANT
Payer: COMMERCIAL

## 2024-12-04 VITALS — BODY MASS INDEX: 33.06 KG/M2 | HEIGHT: 77 IN | WEIGHT: 280 LBS

## 2024-12-04 DIAGNOSIS — I27.20 PULMONARY HYPERTENSION (H): ICD-10-CM

## 2024-12-04 DIAGNOSIS — R06.02 SOB (SHORTNESS OF BREATH): ICD-10-CM

## 2024-12-04 ASSESSMENT — PAIN SCALES - GENERAL: PAINLEVEL_OUTOF10: NO PAIN (0)

## 2024-12-04 NOTE — PROGRESS NOTES
Virtual Visit Details    Video visit by Laith x 30 minutes with patient at home and I in clinic    The patient returns for follow-up of PAH  There is no interim history of chest pain, tightness, paroxysmal nocturnal dyspnea, orthopnea, peripheral edema, palpitation, pre-syncope, syncope, .  Exercise tolerance is stable.  The patient is attempting to exercise regularly and following a sodium restricted, calorically appropriate diet.  Medications are reviewed and the patient is taking medications as prescribed.  The patient is generally sleeping well.      Plan:  [facility] in early January, risks and benefits discussed          Current Outpatient Medications   Medication Sig Dispense Refill    ADEMPAS 2.5 MG tablet TAKE 1 TABLET THREE TIMES A DAY. 90 tablet 11    atovaquone (MEPRON) 750 MG/5ML suspension Take 1,500 mg by mouth daily (Patient not taking: Reported on 4/8/2024)      calcium carbonate (OS-PANFILO 500 MG [place]. CA) 500 MG tablet Take 600 mg by mouth 2 times daily      CLONAZEPAM PO Take 1 mg by mouth at bedtime Taking 1.5-2 mg at bedtime      docusate sodium (COLACE) 100 MG capsule Take 200 mg by mouth 2 times daily      FLUCONAZOLE PO Take 200 mg by mouth daily      furosemide (LASIX) 20 MG tablet Take 1 tablet (20 mg) by mouth daily 90 tablet 3    OPSUMIT 10 MG tablet TAKE 1 TABLET BY MOUTH DAILY. DO NOT HANDLE IF PREGNANT. DO NOT SPLIT, CRUSH, OR CHEW. REVIEW MEDICATION GUIDE. 30 tablet 11    Pantoprazole Sodium (PROTONIX PO) Take 40 mg by mouth daily       Penicillin V Potassium (PEN-VEE K OR) Take 500 mg by mouth 2 times daily      predniSONE (DELTASONE) 2.5 MG tablet Take 2 tablets (5 mg) by mouth every other day 90 tablet 3    rosuvastatin (CRESTOR) 5 MG tablet Take 1 tablet (5 mg) by mouth daily 90 tablet 3    ruxolitinib (JAKAFI) 5 MG TABS tablet Take 2 tablets (10 mg) by mouth daily (Patient taking differently: Take 5 mg by mouth daily) 180 tablet 3    selexipag (UPTRAVI) 1600 MCG tablet Take 1 tablet  (1,600 mcg) by mouth every 12 hours 60 tablet 11    Specialty Vitamins Products (MAGNESIUM PLUS PROTEIN) 133 MG tablet Take 266 mg by mouth daily (2 tablets)      Ursodiol (ACTIGALL PO) Take 300 mg by mouth 3 times daily      VALACYCLOVIR HCL PO Take 500 mg by mouth every other day      VITAMIN D, CHOLECALCIFEROL, PO Take 2,000 Units by mouth 2 times daily      Vitamin E 200 units TABS Take 1 tablet by mouth daily       No current facility-administered medications for this visit.

## 2024-12-04 NOTE — NURSING NOTE
Patient declined medication review      Current patient location: [address]  [place] [address]    Is the patient currently in the state of [place]? NO    Visit mode:VIDEO    If the visit is dropped, the patient can be reconnected by:VIDEO VISIT: Text to cell phone:   Telephone Information:   Mobile [phone]    and VIDEO VISIT: Send to e-mail at: [email or web address]    Will anyone else be joining the visit? Danielle-Pt's Wife might join   (If patient encounters technical issues they should call [phone]    Are changes needed to the allergy or medication list? Pt declined med review    Are refills needed on medications prescribed by this physician? NO    Rooming Documentation:  Questionnaire(s) completed    Reason for visit: [facility]

## 2024-12-04 NOTE — PATIENT INSTRUCTIONS
Thank you for visiting the Pulmonary Hypertension Clinic today.     Recommendations:    Dr. Quan recommends you have a repeat Right Heart Cath sometime early January and see Dr. Quan.  We will connect very soon about scheduling.      We are located on the third floor of the [facility] on the [facility].  Our address is      [address] [place] [address] on [address]rd [place] [address]       Thank you for allowing us to be a part of your care here at the [facility]     If you have questions or concerns please contact us at:     Aria Bettencourt RN, BSN                                     Nurse Coordinator                                                       Pulmonary Hypertension                                              [facility]                              (Phone)[phone]                                                      Louise Marks, JUN Carver   (Prior Authorizations)                            ()  Clinic            Clinic   Pulmonary Hypertension                        Pulmonary Hypertension  [facility]  (P)[phone]                                    (P) [phone]  (F) [phone]                                                            ** Please note that you will NOT receive a reminder call regarding your scheduled testing, reminder calls are for provider appointments only.  If you are scheduled for testing within the [facility] system you may receive a call regarding pre-registration for billing purposes only.**      Support Group:  Pulmonary Hypertension Association  [email or web address].phassociation.org/  **Look at the Events Tab** They even have Support Groups that you can call into     [facility] PH Support  Group  Second Saturday of the Month from 1-3 PM   Location: [address] [place] [address] [facility] [address]  Leader: Sofia Durand  Phone: [phone]  Email: [email or web address]      Great Videos about Pulmonary Hypertension!!  Scan ME!    Website: [email or web address]

## 2024-12-04 NOTE — LETTER
12/4/2024      RE: Olivier Gómez  [address]  [place] [address]       Dear Colleague,    Thank you for the opportunity to participate in the care of your patient, Olivier Gómez, at the [facility] HEART CLINIC [place] [facility]. Please see a copy of my visit note below.    Virtual Visit Details    Video visit by Laith x 30 minutes with patient at home and I in clinic    The patient returns for follow-up of PAH  There is no interim history of chest pain, tightness, paroxysmal nocturnal dyspnea, orthopnea, peripheral edema, palpitation, pre-syncope, syncope, .  Exercise tolerance is stable.  The patient is attempting to exercise regularly and following a sodium restricted, calorically appropriate diet.  Medications are reviewed and the patient is taking medications as prescribed.  The patient is generally sleeping well.      Plan:  RHC in early January, risks and benefits discussed          Current Outpatient Medications   Medication Sig Dispense Refill     ADEMPAS 2.5 MG tablet TAKE 1 TABLET THREE TIMES A DAY. 90 tablet 11     atovaquone (MEPRON) 750 MG/5ML suspension Take 1,500 mg by mouth daily (Patient not taking: Reported on 4/8/2024)       calcium carbonate (OS-PANFILO 500 MG [place]. [place]) 500 MG tablet Take 600 mg by mouth 2 times daily       CLONAZEPAM PO Take 1 mg by mouth at bedtime Taking 1.5-2 mg at bedtime       docusate sodium (COLACE) 100 MG capsule Take 200 mg by mouth 2 times daily       FLUCONAZOLE PO Take 200 mg by mouth daily       furosemide (LASIX) 20 MG tablet Take 1 tablet (20 mg) by mouth daily 90 tablet 3     OPSUMIT 10 MG tablet TAKE 1 TABLET BY MOUTH DAILY. DO NOT HANDLE IF PREGNANT. DO NOT SPLIT, CRUSH, OR CHEW. REVIEW MEDICATION GUIDE. 30 tablet 11     Pantoprazole Sodium (PROTONIX PO) Take 40 mg by mouth daily        Penicillin V Potassium (PEN-VEE K OR) Take 500 mg by mouth 2 times daily       predniSONE (DELTASONE) 2.5 MG  tablet Take 2 tablets (5 mg) by mouth every other day 90 tablet 3     rosuvastatin (CRESTOR) 5 MG tablet Take 1 tablet (5 mg) by mouth daily 90 tablet 3     ruxolitinib (JAKAFI) 5 MG TABS tablet Take 2 tablets (10 mg) by mouth daily (Patient taking differently: Take 5 mg by mouth daily) 180 tablet 3     selexipag (UPTRAVI) 1600 MCG tablet Take 1 tablet (1,600 mcg) by mouth every 12 hours 60 tablet 11     Specialty Vitamins Products (MAGNESIUM PLUS PROTEIN) 133 MG tablet Take 266 mg by mouth daily (2 tablets)       Ursodiol (ACTIGALL PO) Take 300 mg by mouth 3 times daily       VALACYCLOVIR HCL PO Take 500 mg by mouth every other day       VITAMIN D, CHOLECALCIFEROL, PO Take 2,000 Units by mouth 2 times daily       Vitamin E 200 units TABS Take 1 tablet by mouth daily       No current facility-administered medications for this visit.            Please do not hesitate to contact me if you have any questions/concerns.     Sincerely,     Dk Quan MD

## 2024-12-05 ENCOUNTER — TELEPHONE (OUTPATIENT)
Dept: CARDIOLOGY | Facility: CLINIC | Age: 59
End: 2024-12-05
Payer: COMMERCIAL

## 2024-12-05 RX ORDER — LIDOCAINE 40 MG/G
CREAM TOPICAL
OUTPATIENT
Start: 2024-12-05

## 2024-12-05 NOTE — TELEPHONE ENCOUNTER
Cath Lab Case Request/Order    Location: [address] [address] [facility] Waiting Room    Procedure: Right Heart Cath (RHC)    Procedure Date: 1/8    Patient Arrival Time: 7:00 AM    Procedure Time: 0830 (pending emergency)    Ordering Provider: Dr. Dk Quan    Performing Cardiologist: Dr. Nikkie Lundy    Inpatient Bed Needed: No    Post-  Procedure KELLY appointment scheduled (1 - 2 weeks): N/A, RHC      Communicated Patient Instructions:  NURSE TO COMMUNICATE    Appointment was scheduled: Over the phone    Patient expressed understanding of above instructions and denied further questions at this time.    makenna Guerra

## 2025-01-08 ENCOUNTER — HOSPITAL ENCOUNTER (OUTPATIENT)
Facility: CLINIC | Age: 60
Discharge: HOME | End: 2025-01-08
Attending: INTERNAL MEDICINE | Admitting: INTERNAL MEDICINE
Payer: COMMERCIAL

## 2025-01-08 ENCOUNTER — OFFICE VISIT (OUTPATIENT)
Dept: CARDIOLOGY | Facility: CLINIC | Age: 60
End: 2025-01-08
Attending: INTERNAL MEDICINE
Payer: COMMERCIAL

## 2025-01-08 VITALS
WEIGHT: 285.7 LBS | HEART RATE: 79 BPM | DIASTOLIC BLOOD PRESSURE: 68 MMHG | OXYGEN SATURATION: 98 % | BODY MASS INDEX: 33.88 KG/M2 | SYSTOLIC BLOOD PRESSURE: 132 MMHG

## 2025-01-08 VITALS
HEART RATE: 77 BPM | DIASTOLIC BLOOD PRESSURE: 77 MMHG | RESPIRATION RATE: 16 BRPM | SYSTOLIC BLOOD PRESSURE: 118 MMHG | OXYGEN SATURATION: 100 % | TEMPERATURE: 97.9 F

## 2025-01-08 DIAGNOSIS — I27.20 PULMONARY HYPERTENSION (H): ICD-10-CM

## 2025-01-08 DIAGNOSIS — R06.02 SOB (SHORTNESS OF BREATH): ICD-10-CM

## 2025-01-08 LAB
ANION GAP SERPL CALCULATED.3IONS-SCNC: 11 MMOL/L (ref 7–15)
BASOPHILS # BLD AUTO: 0 10E3/UL (ref 0–0.2)
BASOPHILS NFR BLD AUTO: 0 %
BUN SERPL-MCNC: 27 MG/DL (ref 8–23)
CALCIUM SERPL-MCNC: 10.3 MG/DL (ref 8.8–10.4)
CHLORIDE SERPL-SCNC: 105 MMOL/L (ref 98–107)
CREAT SERPL-MCNC: 1.81 MG/DL (ref 0.67–1.17)
EGFRCR SERPLBLD CKD-EPI 2021: 42 ML/MIN/1.73M2
EOSINOPHIL # BLD AUTO: 0.1 10E3/UL (ref 0–0.7)
EOSINOPHIL NFR BLD AUTO: 2 %
ERYTHROCYTE [DISTWIDTH] IN BLOOD BY AUTOMATED COUNT: 19.5 % (ref 10–15)
GLUCOSE SERPL-MCNC: 115 MG/DL (ref 70–99)
HCO3 SERPL-SCNC: 24 MMOL/L (ref 22–29)
HCT VFR BLD AUTO: 39.8 % (ref 40–53)
HGB BLD-MCNC: 12.2 G/DL (ref 13.3–17.7)
HGB BLD-MCNC: 12.9 G/DL (ref 13.3–17.7)
IMM GRANULOCYTES # BLD: 0 10E3/UL
IMM GRANULOCYTES NFR BLD: 0 %
INR PPP: 0.95 (ref 0.85–1.15)
LYMPHOCYTES # BLD AUTO: 2 10E3/UL (ref 0.8–5.3)
LYMPHOCYTES NFR BLD AUTO: 34 %
MCH RBC QN AUTO: 29.2 PG (ref 26.5–33)
MCHC RBC AUTO-ENTMCNC: 32.4 G/DL (ref 31.5–36.5)
MCV RBC AUTO: 90 FL (ref 78–100)
MONOCYTES # BLD AUTO: 1.1 10E3/UL (ref 0–1.3)
MONOCYTES NFR BLD AUTO: 18 %
NEUTROPHILS # BLD AUTO: 2.7 10E3/UL (ref 1.6–8.3)
NEUTROPHILS NFR BLD AUTO: 46 %
NRBC # BLD AUTO: 0 10E3/UL
NRBC BLD AUTO-RTO: 0 /100
NT-PROBNP SERPL-MCNC: 763 PG/ML (ref 0–900)
OXYHGB MFR BLDV: 74 % (ref 70–75)
PLATELET # BLD AUTO: 376 10E3/UL (ref 150–450)
POTASSIUM SERPL-SCNC: 4.1 MMOL/L (ref 3.4–5.3)
RBC # BLD AUTO: 4.42 10E6/UL (ref 4.4–5.9)
SODIUM SERPL-SCNC: 140 MMOL/L (ref 135–145)
WBC # BLD AUTO: 5.9 10E3/UL (ref 4–11)

## 2025-01-08 PROCEDURE — 82565 ASSAY OF CREATININE: CPT | Performed by: INTERNAL MEDICINE

## 2025-01-08 PROCEDURE — 99207 PR NO CHARGE LOS: CPT | Performed by: NURSE PRACTITIONER

## 2025-01-08 PROCEDURE — 99214 OFFICE O/P EST MOD 30 MIN: CPT | Performed by: INTERNAL MEDICINE

## 2025-01-08 PROCEDURE — 83880 ASSAY OF NATRIURETIC PEPTIDE: CPT | Performed by: INTERNAL MEDICINE

## 2025-01-08 PROCEDURE — 99213 OFFICE O/P EST LOW 20 MIN: CPT | Performed by: INTERNAL MEDICINE

## 2025-01-08 PROCEDURE — C1894 INTRO/SHEATH, NON-LASER: HCPCS | Performed by: INTERNAL MEDICINE

## 2025-01-08 PROCEDURE — 85610 PROTHROMBIN TIME: CPT | Performed by: INTERNAL MEDICINE

## 2025-01-08 PROCEDURE — 36415 COLL VENOUS BLD VENIPUNCTURE: CPT | Performed by: INTERNAL MEDICINE

## 2025-01-08 PROCEDURE — 85004 AUTOMATED DIFF WBC COUNT: CPT | Performed by: INTERNAL MEDICINE

## 2025-01-08 PROCEDURE — 250N000009 HC RX 250: Performed by: INTERNAL MEDICINE

## 2025-01-08 PROCEDURE — 80048 BASIC METABOLIC PNL TOTAL CA: CPT | Performed by: INTERNAL MEDICINE

## 2025-01-08 PROCEDURE — 93451 RIGHT HEART CATH: CPT | Mod: 26 | Performed by: INTERNAL MEDICINE

## 2025-01-08 PROCEDURE — C1751 CATH, INF, PER/CENT/MIDLINE: HCPCS | Performed by: INTERNAL MEDICINE

## 2025-01-08 PROCEDURE — 85018 HEMOGLOBIN: CPT | Performed by: INTERNAL MEDICINE

## 2025-01-08 PROCEDURE — 82435 ASSAY OF BLOOD CHLORIDE: CPT | Performed by: INTERNAL MEDICINE

## 2025-01-08 PROCEDURE — 82810 BLOOD GASES O2 SAT ONLY: CPT

## 2025-01-08 PROCEDURE — 272N000001 HC OR GENERAL SUPPLY STERILE: Performed by: INTERNAL MEDICINE

## 2025-01-08 PROCEDURE — 93451 RIGHT HEART CATH: CPT | Performed by: INTERNAL MEDICINE

## 2025-01-08 PROCEDURE — 85018 HEMOGLOBIN: CPT

## 2025-01-08 RX ORDER — LIDOCAINE 40 MG/G
CREAM TOPICAL
Status: COMPLETED | OUTPATIENT
Start: 2025-01-08 | End: 2025-01-08

## 2025-01-08 RX ORDER — LIDOCAINE 40 MG/G
CREAM TOPICAL
OUTPATIENT
Start: 2025-01-08

## 2025-01-08 RX ORDER — CYCLOBENZAPRINE HCL 10 MG
10 TABLET ORAL ONCE
COMMUNITY

## 2025-01-08 RX ADMIN — LIDOCAINE: 40 CREAM TOPICAL at 08:01

## 2025-01-08 ASSESSMENT — PAIN SCALES - GENERAL: PAINLEVEL_OUTOF10: NO PAIN (0)

## 2025-01-08 ASSESSMENT — ACTIVITIES OF DAILY LIVING (ADL)
ADLS_ACUITY_SCORE: 45

## 2025-01-08 NOTE — NURSING NOTE
Chief Complaint   Patient presents with    Follow Up      follow-up after RHC       Vitals were take, medications reconciled     Dylan Mullen, EMT    12:10 PM

## 2025-01-08 NOTE — PROGRESS NOTES
Pt alert and oriented. Right neck site WNL. No bleeding or hematoma. Primapore in place. Dr. Lundy came to see pt at bedside. Ambulated to lobby. Pt's wife transported patient home.

## 2025-01-08 NOTE — LETTER
1/8/2025      RE: Olivier Gómez  [address]  [place] [address]       Dear Colleague,    Thank you for the opportunity to participate in the care of your patient, Olivier Gómez, at the [facility] HEART CLINIC [place] at [facility]. Please see a copy of my visit note below.      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  I had the opportunity to see Olivier Gómez today (results noted below).  He continues to do remarkably well as noted in the heart catheterization results noted below.  He remains NYHA functional class 2,  He has no symptoms of right heart failure.    We made no changes in his medical regimen, asking him to return to see us in July, sooner should he not be doing well.            Wt Readings from Last 24 Encounters:   01/08/25 129.6 kg (285 lb 11.2 oz)   12/04/24 127 kg (280 lb)   04/05/24 128.1 kg (282 lb 6.6 oz)   10/24/23 131.8 kg (290 lb 9.6 oz)   02/03/23 127.3 kg (280 lb 11.2 oz)   07/15/22 117.9 kg (260 lb)   03/30/22 126.2 kg (278 lb 3.2 oz)   05/19/21 117 kg (258 lb)   12/02/19 115.3 kg (254 lb 1.6 oz)   12/02/19 113.4 kg (250 lb)   08/27/19 115.5 kg (254 lb 9.6 oz)   05/15/19 117 kg (258 lb)   03/20/19 115.1 kg (253 lb 11.2 oz)   02/19/19 113.4 kg (250 lb)   10/10/18 117.9 kg (260 lb)   06/27/18 115.6 kg (254 lb 12.8 oz)   06/06/18 111.6 kg (246 lb)   01/16/18 114.3 kg (252 lb)   09/12/17 113.7 kg (250 lb 11.2 oz)   09/12/17 113.4 kg (250 lb)   08/22/17 115 kg (253 lb 9.6 oz)   07/19/17 115.3 kg (254 lb 1.6 oz)   06/23/17 117.3 kg (258 lb 9.6 oz)   06/07/17 122 kg (269 lb)       Latest Reference Range & Units 01/08/25 07:20   Sodium 135 - 145 mmol/L 140   Potassium 3.4 - 5.3 mmol/L 4.1   Chloride 98 - 107 mmol/L 105   Carbon  Dioxide (CO2) 22 - 29 mmol/L 24   Urea Nitrogen 8.0 - 23.0 mg/dL 27.0 (H)   Creatinine 0.67 - 1.17 mg/dL 1.81 (H)   GFR Estimate >60 mL/min/1.73m2 42 (L)   Calcium 8.8 - 10.4 mg/dL 10.3   Anion Gap 7 - 15 mmol/L 11   Glucose 70 - 99 mg/dL 115 (H)   N-Terminal Pro BNP Inpatient 0 - 900 pg/mL 763   WBC 4.0 - 11.0 10e3/uL 5.9   Hemoglobin 13.3 - 17.7 g/dL 12.9 (L)   Hematocrit 40.0 - 53.0 % 39.8 (L)   Platelet Count 150 - 450 10e3/uL 376   RBC Count 4.40 - 5.90 10e6/uL 4.42   MCV 78 - 100 fL 90   MCH 26.5 - 33.0 pg 29.2   MCHC 31.5 - 36.5 g/dL 32.4   RDW 10.0 - 15.0 % 19.5 (H)   % Neutrophils % 46   % Lymphocytes % 34   % Monocytes % 18   % Eosinophils % 2   % Basophils % 0   Absolute Basophils 0.0 - 0.2 10e3/uL 0.0   Absolute Eosinophils 0.0 - 0.7 10e3/uL 0.1   Absolute Immature Granulocytes <=0.4 10e3/uL 0.0   Absolute Lymphocytes 0.8 - 5.3 10e3/uL 2.0   Absolute Monocytes 0.0 - 1.3 10e3/uL 1.1   % Immature Granulocytes % 0   Absolute Neutrophils 1.6 - 8.3 10e3/uL 2.7   Absolute NRBCs 10e3/uL 0.0   NRBCs per 100 WBC <1 /100 0   (    Current Outpatient Medications   Medication Sig Dispense Refill     ADEMPAS 2.5 MG tablet TAKE 1 TABLET THREE TIMES A DAY. 90 tablet 11     calcium carbonate (OS-PANFILO 500 MG [place]. [place]) 500 MG tablet Take 600 mg by mouth 2 times daily       CLONAZEPAM PO Take 1 mg by mouth at bedtime Taking 1.5-2 mg at bedtime       cyclobenzaprine (FLEXERIL) 10 MG tablet Take 10 mg by mouth once.       docusate sodium (COLACE) 100 MG capsule Take 200 mg by mouth 2 times daily       FLUCONAZOLE PO Take 200 mg by mouth daily       furosemide (LASIX) 20 MG tablet Take 1 tablet (20 mg) by mouth daily 90 tablet 3     OPSUMIT 10 MG tablet TAKE 1 TABLET BY MOUTH DAILY. DO NOT HANDLE IF PREGNANT. DO NOT SPLIT, CRUSH, OR CHEW. REVIEW MEDICATION GUIDE. 30 tablet 11     Pantoprazole Sodium (PROTONIX PO) Take 40 mg by mouth daily        Penicillin V Potassium (PEN-VEE K OR) Take 500 mg by mouth 2 times daily        predniSONE (DELTASONE) 2.5 MG tablet Take 2 tablets (5 mg) by mouth every other day 90 tablet 3     rosuvastatin (CRESTOR) 5 MG tablet Take 1 tablet (5 mg) by mouth daily 90 tablet 3     ruxolitinib (JAKAFI) 5 MG TABS tablet Take 2 tablets (10 mg) by mouth daily 180 tablet 3     selexipag (UPTRAVI) 1600 MCG tablet Take 1 tablet (1,600 mcg) by mouth every 12 hours 60 tablet 11     Specialty Vitamins Products (MAGNESIUM PLUS PROTEIN) 133 MG tablet Take 266 mg by mouth daily (2 tablets)       Ursodiol (ACTIGALL PO) Take 300 mg by mouth 3 times daily       VALACYCLOVIR HCL PO Take 500 mg by mouth every other day       VITAMIN D, CHOLECALCIFEROL, PO Take 2,000 Units by mouth 2 times daily       Vitamin E 200 units TABS Take 1 tablet by mouth daily       No current facility-administered medications for this visit.          pulmonary hypertension           Pressures Phase: Baseline     Time Systolic (mmHg) Diastolic (mmHg) Mean (mmHg) A Wave (mmHg) V Wave (mmHg) EDP (mmHg) Max dp/dt (mmHg/sec) HR (bpm) Content (mL/dL) SAT (%)   RA Pressures  9:06 AM   2    2    5      69        RV Pressures  8:43 AM       384           9:07 AM 53        4     73        PA Pressures  9:07 AM 51    20    30        73        PCW Pressures  9:08 AM   8        64        AO Pressures  8:55     58    83        68          Blood Flow Results Phase: Baseline     Time Results Indexed Values (L/min/m2)   QP  8:43 AM 10.87 L/min    4.22      QS  8:43 AM 10.87 L/min    4.22        Blood Oximetry Phase: Baseline     Time Hb SAT(%) PO2 Content (mL/dL) PA Sat (%)   PA  8:43 AM  75.6 %      75.6      Art  8:43 AM  97 %     16.09         Cardiac Output Phase: Baseline     Time TDCO (L/min) TDCI (L/min/m2) Carter C.O. (L/min) Carter C.I. (L/min/m2) Carter HR (bpm)   Cardiac Output Results  8:43 AM 7.73    3    10.87    4.22         9:11 AM 7.73            Resistance Results Phase: Baseline     Time PVR SVR TPR TVR PVR/SVR TPR/TVR   Resistance Results  (Metric)  8:43 .86 dsc-5    595.93 dsc-5    220.71 dsc-5    610.64 dsc-5    0.27    0.36      Resistance Results (Wood)  8:43 AM 2.02 STEPHENS    7.45 STEPHENS    2.76 STEPHENS    7.63 STEPHENS    0.27    0.36        Stoke Volume Results Phase: Baseline     Time RVSW (gm*m) LVSW (gm*m) RVSW-I (gm*m/m2) LVSW-I (gm*m/m2)   Stroke Work Results  8:43 AM 56.71    163.07    22.02    63.33        Hemodynamic Waveforms -- Encounter Level:    Hemodynamic Waveforms: None found at the encounter level.  Hemodynamic Waveforms -- Order Level on 2025:    Scan on 2025 9:24 AM by Outside, Provider    Name: BENEDICT ESTES  MRN: [id number]  : 1965  Study Date: 2024 07:38 AM  Age: 58 yrs  Gender: Male  Patient [facility]: [facility]  Reason For Study: Shortness of breath, Pulmonary hypertension (H)  Ordering Physician: PILI SEGURA  Referring Physician: PILI SEGURA  Performed By: Nydia Connelly RDCS     BSA: 2.6 m2  Height: 77 in  Weight: 290 lb  HR: 63  BP: 122/60 mmHg  ______________________________________________________________________________  Procedure  Echocardiogram with two-dimensional, color and spectral Doppler performed. 3D  image acquisition, reconstruction, and real-time interpretation was performed.  ______________________________________________________________________________  Interpretation Summary  Global and regional left ventricular function is normal with an EF of 55-60%.  Paradoxical septal motion consistent with right ventricular pressure overload  is present.  The RV is not well visualized but appears mildly enlarged in cavity size with  low normal cardiac function.  RVGLS -20.2% and RV free wall strain -26.2%. 3D RVEF 47%.  The right ventricular systolic pressure is 48mmHg above the right atrial  pressure.  Pulmonary hypertension is present.  IVC diameter <2.1 cm collapsing >50% with sniff suggests a normal RA pressure  of 3 mmHg.  No pericardial effusion is  present.  ______________________________________________________________________________  Left Ventricle  Global and regional left ventricular function is normal with an EF of 55-60%.  Left ventricular size is normal. Left ventricular diastolic function is  indeterminate. Paradoxical septal motion consistent with right ventricular  pressure overload is present.     Right Ventricle  The RV is not well visualized but appears mildly enlarged in cavity size with  low normal cardiac function. There is mild right ventricular hypertrophy.     Atria  Moderate biatrial enlargement is present.     Mitral Valve  Trace mitral insufficiency is present.     Aortic Valve  Aortic valve is normal in structure and function.     Tricuspid Valve  Mild tricuspid insufficiency is present. The right ventricular systolic  pressure is 48mmHg above the right atrial pressure. Pulmonary hypertension is  present.     Pulmonic Valve  On Doppler interrogation, there is no significant stenosis or regurgitation.  The valve leaflets are not well visualized.     Vessels  Aortic root aneurysm is present. IVC diameter <2.1 cm collapsing >50% with  sniff suggests a normal RA pressure of 3 mmHg.     Pericardium  No pericardial effusion is present.     Compared to Previous Study  No significant changes noted.  ______________________________________________________________________________  MMode/2D Measurements & Calculations  IVSd: 0.70 cm     LVIDd: 5.5 cm  LVIDs: 3.4 cm  LVPWd: 0.81 cm  FS: 38.3 %  LV mass(C)d: 150.9 grams  LV mass(C)dI: 57.6 grams/m2  Ao root diam: 4.0 cm  asc Aorta Diam: 3.8 cm  LVOT diam: 2.3 cm  LVOT area: 4.0 cm2  RVOT diam: 3.8 cm  Ao root diam index Ht(cm/m): 2.0  Ao root diam index BSA (cm/m2): 1.5  Asc Ao diam index BSA (cm/m2): 1.4  Asc Ao diam index Ht(cm/m): 1.9  LA Volume (BP): 130.0 ml     LA Volume Index (BP): 49.6 ml/m2  RV Base: 5.7 cm  RWT: 0.29  TAPSE: 3.3 cm     Doppler Measurements & Calculations  MV E max nieves:  101.0 cm/sec  MV A max nguyễn: 74.9 cm/sec  MV E/A: 1.3  MV dec slope: 363.6 cm/sec2  MV dec time: 0.28 sec  Ao V2 max: 121.0 cm/sec  Ao max P.9 mmHg  Ao V2 mean: 83.4 cm/sec  Ao mean PG: 3.0 mmHg  Ao V2 VTI: 27.2 cm  MYKE(I,D): 3.6 cm2  MYKE(V,D): 3.8 cm2  LV V1 max P.3 mmHg  LV V1 max: 115.0 cm/sec  LV V1 VTI: 24.6 cm  SV(LVOT): 98.8 ml  SI(LVOT): 37.7 ml/m2  PA acc time: 0.11 sec  TR max nguyễn: 348.1 cm/sec  TR max P.5 mmHg  Qp/Qs: 2.5/1.0     AV Nguyễn Ratio (DI): 0.95  MYKE Index (cm2/m2): 1.4  E/E' av.3  Lateral E/e': 7.9  Medial E/e': 8.8  PVR: 158.4  RV S Nguyễn: 18.8 cm/sec     Measurements from QLAB  EDV (RV HM): 248.5 ml  EF (RV HM): 44.4 %  ESV (RV HM): 138.3 ml  FAC (RV HM): 31.4 %  RV 4C LS (AS): -20.2 %  RV Free Wall LS (AS): -26.2 %  RVDd base (RVD1) (RV HM): 55.3 mm     RVDd mid (RVD2) (RV HM): 53.2 mm  RVLd (RVD3) (RV HM): 101.5 mm  RVLS (Freewall) (RV HM):       Please do not hesitate to contact me if you have any questions/concerns.     Sincerely,     Dk Quan MD

## 2025-01-08 NOTE — PATIENT INSTRUCTIONS
Thank you for visiting the Pulmonary Hypertension Clinic today.     Recommendations:    Please follow-up with Dr. Quan in the end of July with a Right Heart Cath (with Dr. Lundy) and Echocardiogram.  We will be in touch to schedule.    Pre-procedure instructions - Right Heart Cath   Patient Education    Your arrival time is TBD.  Location is [address] Waiting Room  Please plan on being at the hospital all day. If you are on dialysis, DO NOT schedule on a dialysis day.  At any time, emergencies and/or urgent cases may come up which could delay the start of your procedure.    Pre-procedure instructions - Right heart catheterization  No solid food for 8 hours prior  Nothing to drink 2 hours prior to arrival time  You can take your morning medications (except diabetic and blood thinners) with sips of water  We recommend you arrange for a ride to drop you off and pick you up, in the instance, you are unable to drive home, however you should be able to function as you normally would after the procedure     Diabetic Medication Instructions  Hold oral diabetic medication in morning of your procedure and for 48 hours after IV contrast is given  Typical instructions for insulin diabetic medication holding are below. However, please reach out to your Primary Care Provider or Endocrinologist for specific instructions  DO NOT take any oral diabetic medication, short-acting diabetes medications/insulin, humalog or regular insulin the morning of your test  Take   dose of long-acting insulin (Lantus, Levemir) the day of your test  Remember to bring your glucometer and insulin with you to take after your test if needed  GLP-1 Agonists Instructions  DO NOT take injectable GLP-1 agonists semaglutide (Ozempic, Wegovy), dulaglutide (Trulicity), exenatide ER (Bydureon), tirzepatide (Mounjaro), or oral semaglutide (Rybelsus) for 7 days  prior your procedure  Hold once daily injectable GLP-1 agonists exenatide (Byetta), liraglutide (Saxenda, Victoza), lixisenatide (Soligua) the day before and day of your procedure                Anticoagulation Medication Instructions   NA        We are located on the third floor of the [facility] on the [facility].  Our address is      [address] [place] [address] [place] [address] [place] [address]       Thank you for allowing us to be a part of your care here at the [facility]     If you have questions or concerns please contact us at:     Aria Bettencourt RN, BSN                                     Nurse Coordinator                                                       Pulmonary Hypertension                                              [facility]                              (Phone)[phone]                                                      Louise Marks, JUN Carver   (Prior Authorizations)                            ()  Clinic            Clinic   Pulmonary Hypertension                        Pulmonary Hypertension  [facility]        [facility]  (P)[phone]                                    (P) [phone]  (F) [phone]                                                            ** Please note that you will NOT receive a reminder call regarding your scheduled testing, reminder calls are for provider appointments only.  If you are scheduled for testing within the [facility] system you may receive a call regarding pre-registration for billing purposes only.**      Support Group:  Pulmonary Hypertension Association  Https://www.phassociation.org/  **Look at the Events Tab** They even have Support Groups that you can call into     [facility] PH Support  Group  Second Saturday of the Month from 1-3 PM   Location: [address] [place] [address] [facility] [address]  Leader: Sofia Durand  Phone: [phone]  Email: [email or web address]      Great Videos about Pulmonary Hypertension!!  Scan ME!    Website: [email or web address]

## 2025-01-08 NOTE — PROGRESS NOTES
Consenting/Education for Cardiology Procedure: Right heart catheterization     Patient understands we would like to perform the listed procedure(s) due to PAH.    The patient understands the following:     The procedure was described to the patient in detail.    No sedation is planned for this procedure. Patient understands risks and complications of the procedure which include but are not limited to bruising/swelling around the incision site, infection, bleeding, allergic reaction to local anesthetic, air embolism, arterial puncture, stroke, heart attack, need for emergency heart surgery, death.       Patient verbalized understanding of risks and benefits and has elected to proceed with the procedure or procedures listed above.    PILAR Saez CNP  Cardiology

## 2025-01-08 NOTE — PROGRESS NOTES
Pt returned from CCL s/p RHC. Pt alert and oriented. VSS. Sats >92% on RA. Pt denies any pain. Right neck site WNL. No bleeding or hematoma. Primapore in place. Discharge instructions reviewed with pt. Pt verbalized understanding. Dr. Lundy to see pt at bedside.

## 2025-01-08 NOTE — PROGRESS NOTES
Pt prepped for RHC. Pt alert and oriented. VSS. Sats >92% on RA. Chronic pain on left lower back from fall last month. Labs resulted. Consent signed. Pt's spouse Danielle at bedside.

## 2025-01-08 NOTE — DISCHARGE INSTRUCTIONS
[facility]                        Interventional Cardiology  Discharge Instructions   Post Right Heart Cath       AFTER YOU GO HOME:  DO drink plenty of fluids  DO resume your regular diet and medications unless otherwise instructed by your Primary Physician  Do Not scrub the procedure site vigorously  No lotion or powder to the puncture site for 3 days    CALL YOUR PRIMARY PHYSICIAN IF: You may resume all normal activity.  Monitor neck site for bleeding, swelling, or voice changes. If you notice bleeding or swelling immediately apply pressure to the site and call number below to speak with Cardiology Fellow.  If you experience any changes in your breathing you should call your doctor immediately or come to the closest Emergency Department.  Do not drive yourself.    ADDITIONAL INSTRUCTIONS: Medications: You are to resume all home medications including anticoagulation therapy unless otherwise advised by your primary cardiologist or nurse coordinator.    Follow Up: Per your primary cardiology team    If you have any questions or concerns regarding your procedure site please call [phone] at anytime and ask for Cardiology Fellow on call.  They are available 24 hours a day.  You may also contact the Cardiology Clinic after hours number at [phone].                                                       Telephone Numbers [phone] Monday-Friday 8:00 am to 4:30 pm    [phone] After 4:30 pm Monday-Friday, Weekends & Holidays  Ask for Interventional Cardiologist on call. Someone is on call 24 hours/day   [facility] toll free number [phone] Monday-Friday 8:00 am to 4:30 pm   [facility] Emergency Dept [phone]

## 2025-01-08 NOTE — PROGRESS NOTES
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  I had the opportunity to see Olivier Gómez today (results noted below).  He continues to do remarkably well as noted in the heart catheterization results noted below.  He remains NYHA functional class 2,  He has no symptoms of right heart failure.    We made no changes in his medical regimen, asking him to return to see us in July, sooner should he not be doing well.            Wt Readings from Last 24 Encounters:   01/08/25 129.6 kg (285 lb 11.2 oz)   12/04/24 127 kg (280 lb)   04/05/24 128.1 kg (282 lb 6.6 oz)   10/24/23 131.8 kg (290 lb 9.6 oz)   02/03/23 127.3 kg (280 lb 11.2 oz)   07/15/22 117.9 kg (260 lb)   03/30/22 126.2 kg (278 lb 3.2 oz)   05/19/21 117 kg (258 lb)   12/02/19 115.3 kg (254 lb 1.6 oz)   12/02/19 113.4 kg (250 lb)   08/27/19 115.5 kg (254 lb 9.6 oz)   05/15/19 117 kg (258 lb)   03/20/19 115.1 kg (253 lb 11.2 oz)   02/19/19 113.4 kg (250 lb)   10/10/18 117.9 kg (260 lb)   06/27/18 115.6 kg (254 lb 12.8 oz)   06/06/18 111.6 kg (246 lb)   01/16/18 114.3 kg (252 lb)   09/12/17 113.7 kg (250 lb 11.2 oz)   09/12/17 113.4 kg (250 lb)   08/22/17 115 kg (253 lb 9.6 oz)   07/19/17 115.3 kg (254 lb 1.6 oz)   06/23/17 117.3 kg (258 lb 9.6 oz)   06/07/17 122 kg (269 lb)       Latest Reference Range & Units 01/08/25 07:20   Sodium 135 - 145 mmol/L 140   Potassium 3.4 - 5.3 mmol/L 4.1   Chloride 98 - 107 mmol/L 105   Carbon Dioxide (CO2) 22 - 29 mmol/L 24   Urea Nitrogen 8.0 - 23.0 mg/dL 27.0 (H)   Creatinine 0.67 - 1.17 mg/dL 1.81 (H)   GFR Estimate >60 mL/min/1.73m2 42 (L)   Calcium 8.8 - 10.4 mg/dL 10.3   Anion Gap 7 - 15 mmol/L 11   Glucose 70 - 99 mg/dL 115 (H)   N-Terminal Pro BNP Inpatient 0 - 900 pg/mL 763   WBC 4.0 - 11.0 10e3/uL 5.9   Hemoglobin  13.3 - 17.7 g/dL 12.9 (L)   Hematocrit 40.0 - 53.0 % 39.8 (L)   Platelet Count 150 - 450 10e3/uL 376   RBC Count 4.40 - 5.90 10e6/uL 4.42   MCV 78 - 100 fL 90   MCH 26.5 - 33.0 pg 29.2   MCHC 31.5 - 36.5 g/dL 32.4   RDW 10.0 - 15.0 % 19.5 (H)   % Neutrophils % 46   % Lymphocytes % 34   % Monocytes % 18   % Eosinophils % 2   % Basophils % 0   Absolute Basophils 0.0 - 0.2 10e3/uL 0.0   Absolute Eosinophils 0.0 - 0.7 10e3/uL 0.1   Absolute Immature Granulocytes <=0.4 10e3/uL 0.0   Absolute Lymphocytes 0.8 - 5.3 10e3/uL 2.0   Absolute Monocytes 0.0 - 1.3 10e3/uL 1.1   % Immature Granulocytes % 0   Absolute Neutrophils 1.6 - 8.3 10e3/uL 2.7   Absolute NRBCs 10e3/uL 0.0   NRBCs per 100 WBC <1 /100 0   (    Current Outpatient Medications   Medication Sig Dispense Refill    ADEMPAS 2.5 MG tablet TAKE 1 TABLET THREE TIMES A DAY. 90 tablet 11    calcium carbonate (OS-PANFILO 500 MG [place]. [place]) 500 MG tablet Take 600 mg by mouth 2 times daily      CLONAZEPAM PO Take 1 mg by mouth at bedtime Taking 1.5-2 mg at bedtime      cyclobenzaprine (FLEXERIL) 10 MG tablet Take 10 mg by mouth once.      docusate sodium (COLACE) 100 MG capsule Take 200 mg by mouth 2 times daily      FLUCONAZOLE PO Take 200 mg by mouth daily      furosemide (LASIX) 20 MG tablet Take 1 tablet (20 mg) by mouth daily 90 tablet 3    OPSUMIT 10 MG tablet TAKE 1 TABLET BY MOUTH DAILY. DO NOT HANDLE IF PREGNANT. DO NOT SPLIT, CRUSH, OR CHEW. REVIEW MEDICATION GUIDE. 30 tablet 11    Pantoprazole Sodium (PROTONIX PO) Take 40 mg by mouth daily       Penicillin V Potassium (PEN-VEE K OR) Take 500 mg by mouth 2 times daily      predniSONE (DELTASONE) 2.5 MG tablet Take 2 tablets (5 mg) by mouth every other day 90 tablet 3    rosuvastatin (CRESTOR) 5 MG tablet Take 1 tablet (5 mg) by mouth daily 90 tablet 3    ruxolitinib (JAKAFI) 5 MG TABS tablet Take 2 tablets (10 mg) by mouth daily 180 tablet 3    selexipag (UPTRAVI) 1600 MCG tablet Take 1 tablet (1,600 mcg) by mouth every  12 hours 60 tablet 11    Specialty Vitamins Products (MAGNESIUM PLUS PROTEIN) 133 MG tablet Take 266 mg by mouth daily (2 tablets)      Ursodiol (ACTIGALL PO) Take 300 mg by mouth 3 times daily      VALACYCLOVIR HCL PO Take 500 mg by mouth every other day      VITAMIN D, CHOLECALCIFEROL, PO Take 2,000 Units by mouth 2 times daily      Vitamin E 200 units TABS Take 1 tablet by mouth daily       No current facility-administered medications for this visit.          pulmonary hypertension           Pressures Phase: Baseline     Time Systolic (mmHg) Diastolic (mmHg) Mean (mmHg) A Wave (mmHg) V Wave (mmHg) EDP (mmHg) Max dp/dt (mmHg/sec) HR (bpm) Content (mL/dL) SAT (%)   RA Pressures  9:06 AM   2    2    5      69        RV Pressures  8:43 AM       384           9:07 AM 53        4     73        PA Pressures  9:07 AM 51    20    30        73        PCW Pressures  9:08 AM   8        64        AO Pressures  8:55     58    83        68          Blood Flow Results Phase: Baseline     Time Results Indexed Values (L/min/m2)   QP  8:43 AM 10.87 L/min    4.22      QS  8:43 AM 10.87 L/min    4.22        Blood Oximetry Phase: Baseline     Time Hb SAT(%) PO2 Content (mL/dL) PA Sat (%)   PA  8:43 AM  75.6 %      75.6      Art  8:43 AM  97 %     16.09         Cardiac Output Phase: Baseline     Time TDCO (L/min) TDCI (L/min/m2) Carter C.O. (L/min) Carter C.I. (L/min/m2) Carter HR (bpm)   Cardiac Output Results  8:43 AM 7.73    3    10.87    4.22         9:11 AM 7.73            Resistance Results Phase: Baseline     Time PVR SVR TPR TVR PVR/SVR TPR/TVR   Resistance Results (Metric)  8:43 .86 dsc-5    595.93 dsc-5    220.71 dsc-5    610.64 dsc-5    0.27    0.36      Resistance Results (Wood)  8:43 AM 2.02 STEPHENS    7.45 STEPHENS    2.76 STEPHENS    7.63 STEPHENS    0.27    0.36        Stoke Volume Results Phase: Baseline     Time RVSW (gm*m) LVSW (gm*m) RVSW-I (gm*m/m2) LVSW-I (gm*m/m2)   Stroke Work Results  8:43 AM 56.71    163.07    22.02    63.33         Hemodynamic Waveforms -- Encounter Level:    Hemodynamic Waveforms: None found at the encounter level.  Hemodynamic Waveforms -- Order Level on 2025:    Scan on 2025 9:24 AM by Outside, Provider    Name: BENEDICT ESTES  MRN: [id number]  : 1965  Study Date: 2024 07:38 AM  Age: 58 yrs  Gender: Male  Patient Location: [facility]  Reason For Study: Shortness of breath, Pulmonary hypertension (H)  Ordering Physician: PILI SEGURA  Referring Physician: PILI SEGURA  Performed By: Nydia Connelly LANDY     BSA: 2.6 m2  Height: 77 in  Weight: 290 lb  HR: 63  BP: 122/60 mmHg  ______________________________________________________________________________  Procedure  Echocardiogram with two-dimensional, color and spectral Doppler performed. 3D  image acquisition, reconstruction, and real-time interpretation was performed.  ______________________________________________________________________________  Interpretation Summary  Global and regional left ventricular function is normal with an EF of 55-60%.  Paradoxical septal motion consistent with right ventricular pressure overload  is present.  The RV is not well visualized but appears mildly enlarged in cavity size with  low normal cardiac function.  RVGLS -20.2% and RV free wall strain -26.2%. 3D RVEF 47%.  The right ventricular systolic pressure is 48mmHg above the right atrial  pressure.  Pulmonary hypertension is present.  IVC diameter <2.1 cm collapsing >50% with sniff suggests a normal RA pressure  of 3 mmHg.  No pericardial effusion is present.  ______________________________________________________________________________  Left Ventricle  Global and regional left ventricular function is normal with an EF of 55-60%.  Left ventricular size is normal. Left ventricular diastolic function is  indeterminate. Paradoxical septal motion consistent with right ventricular  pressure overload is present.     Right Ventricle  The RV is  not well visualized but appears mildly enlarged in cavity size with  low normal cardiac function. There is mild right ventricular hypertrophy.     Atria  Moderate biatrial enlargement is present.     Mitral Valve  Trace mitral insufficiency is present.     Aortic Valve  Aortic valve is normal in structure and function.     Tricuspid Valve  Mild tricuspid insufficiency is present. The right ventricular systolic  pressure is 48mmHg above the right atrial pressure. Pulmonary hypertension is  present.     Pulmonic Valve  On Doppler interrogation, there is no significant stenosis or regurgitation.  The valve leaflets are not well visualized.     Vessels  Aortic root aneurysm is present. IVC diameter <2.1 cm collapsing >50% with  sniff suggests a normal RA pressure of 3 mmHg.     Pericardium  No pericardial effusion is present.     Compared to Previous Study  No significant changes noted.  ______________________________________________________________________________  MMode/2D Measurements & Calculations  IVSd: 0.70 cm     LVIDd: 5.5 cm  LVIDs: 3.4 cm  LVPWd: 0.81 cm  FS: 38.3 %  LV mass(C)d: 150.9 grams  LV mass(C)dI: 57.6 grams/m2  Ao root diam: 4.0 cm  asc Aorta Diam: 3.8 cm  LVOT diam: 2.3 cm  LVOT area: 4.0 cm2  RVOT diam: 3.8 cm  Ao root diam index Ht(cm/m): 2.0  Ao root diam index BSA (cm/m2): 1.5  Asc Ao diam index BSA (cm/m2): 1.4  Asc Ao diam index Ht(cm/m): 1.9  LA Volume (BP): 130.0 ml     LA Volume Index (BP): 49.6 ml/m2  RV Base: 5.7 cm  RWT: 0.29  TAPSE: 3.3 cm     Doppler Measurements & Calculations  MV E max nieves: 101.0 cm/sec  MV A max nieves: 74.9 cm/sec  MV E/A: 1.3  MV dec slope: 363.6 cm/sec2  MV dec time: 0.28 sec  Ao V2 max: 121.0 cm/sec  Ao max P.9 mmHg  Ao V2 mean: 83.4 cm/sec  Ao mean PG: 3.0 mmHg  Ao V2 VTI: 27.2 cm  MYKE(I,D): 3.6 cm2  MYKE(V,D): 3.8 cm2  LV V1 max P.3 mmHg  LV V1 max: 115.0 cm/sec  LV V1 VTI: 24.6 cm  SV(LVOT): 98.8 ml  SI(LVOT): 37.7 ml/m2  PA acc time: 0.11 sec  TR max  nguyễn: 348.1 cm/sec  TR max P.5 mmHg  Qp/Qs: 2.5/1.0     AV Nguyễn Ratio (DI): 0.95  MYKE Index (cm2/m2): 1.4  E/E' av.3  Lateral E/e': 7.9  Medial E/e': 8.8  PVR: 158.4  RV S Nguyễn: 18.8 cm/sec     Measurements from QLAB  EDV (RV HM): 248.5 ml  EF (RV HM): 44.4 %  ESV (RV HM): 138.3 ml  FAC (RV HM): 31.4 %  RV 4C LS (AS): -20.2 %  RV Free Wall LS (AS): -26.2 %  RVDd base (RVD1) (RV HM): 55.3 mm     RVDd mid (RVD2) (RV HM): 53.2 mm  RVLd (RVD3) (RV HM): 101.5 mm  RVLS (Freewall) (RV HM):

## 2025-01-30 DIAGNOSIS — I27.20 PULMONARY HYPERTENSION (H): ICD-10-CM

## 2025-03-21 DIAGNOSIS — I27.20 PULMONARY HYPERTENSION (H): ICD-10-CM

## 2025-03-26 RX ORDER — MACITENTAN 10 MG/1
TABLET, FILM COATED ORAL
Qty: 30 TABLET | Refills: 9 | Status: SHIPPED | OUTPATIENT
Start: 2025-03-26

## 2025-03-26 NOTE — TELEPHONE ENCOUNTER
Medication Refill double check:    Last office visit was on 1/8/25 with Dr. Quan.    Follow up was recommended for July.    Any additional encounters with changes to requested med? no    Authorizing provider is: Dr. Quan    Refill was approved.     Additional orders/notes:       Ana Ashraf RN on 3/26/2025 at 3:44 PM

## 2025-03-26 NOTE — TELEPHONE ENCOUNTER
Last Written Prescription:     OPSUMIT 10 MG tablet 30 tablet 11 4/4/2024 -- Yes   Sig: TAKE 1 TABLET BY MOUTH DAILY. DO NOT HANDLE IF PREGNANT. DO NOT SPLIT, CRUSH, OR CHEW. REVIEW MEDICATION GUIDE.     ----------------------  Last Visit Date: 12/4/24  Future Visit Date: None  ----------------------             Refill decision: Medication unable to be refilled by RN due to: Medication not included in refill protocol policy  OPSUMIT 10 MG tablet        Request from pharmacy:  Requested Prescriptions   Pending Prescriptions Disp Refills    OPSUMIT 10 MG tablet [Pharmacy Med Name: OPSUMIT 10MG] 30 tablet 11     Sig: TAKE 1 TABLET BY MOUTH 1 TIME A DAY. DO NOT HANDLE IF PREGNANT. DO NOT SPLIT, CRUSH, OR CHEW. REVIEW MEDICATION GUIDE       Antihypertensive Agents Failed - 3/26/2025  2:17 PM        Failed - Medication is active on med list        Failed - Has GFR on file in past 12 months and most recent value is normal        Passed - Most recent blood pressure under 140/90 in past 12 months     BP Readings from Last 3 Encounters:   01/08/25 132/68   01/08/25 118/77   04/05/24 133/73       Systolic (Patient Reported): 122 (4/8/2024  7:55 AM)  Diastolic (Patient Reported): 67 (4/8/2024  7:55 AM)              Passed - Medication indicated for associated diagnosis     Medication is associated with one or more of the following diagnoses:     Pulmonary Hypertension          Passed - Recent (12 mo) or future (90 days) visit within the authorizing provider's specialty     The patient must have completed an in-person or virtual visit within the past 12 months or has a future visit scheduled within the next 90 days with the authorizing provider s specialty.  Urgent care and e-visits do not qualify as an office visit for this protocol.          Passed - Patient is age 18 or older

## 2025-04-08 ENCOUNTER — TELEPHONE (OUTPATIENT)
Dept: CARDIOLOGY | Facility: CLINIC | Age: 60
End: 2025-04-08
Payer: COMMERCIAL

## 2025-04-08 NOTE — TELEPHONE ENCOUNTER
Patient Contacted for the patient to call back and schedule the following:    Appointment type: [facility]   Provider: CARL COONEY  Return date: JULY   Specialty phone number: [phone] OPT 1   Additional appointment(s) needed: N/A   Additonal Notes: WAITN FOR ALONZO JULY SCHEDULE TO COME OUT

## 2025-05-06 ENCOUNTER — TELEPHONE (OUTPATIENT)
Dept: CARDIOLOGY | Facility: CLINIC | Age: 60
End: 2025-05-06
Payer: COMMERCIAL

## 2025-05-06 NOTE — TELEPHONE ENCOUNTER
Cath Lab Case Request/Order    Location: [address] [address] [facility] Waiting Room    Procedure: Right Heart Cath (RHC)    Procedure Date: 7/9    Patient Arrival Time: 7    Procedure Time: 2nd case to follow    Ordering Provider: Dr. Dk Quan    Performing Cardiologist: Dr. Nikkie Lundy    Inpatient Bed Needed: No    Post-  Procedure KELLY appointment scheduled (1 - 2 weeks): YES     Date: 7/15     Provider: Avelina     Communicated Patient Instructions:     NPO, nothing to eat 8 hours and drink 2 hours before arrival time: No     , need to arrange a ride home - unable to drive post- procedure: No     Adult at home, need a responsible adult to stay with patient 24 hours post- procedure: No    Appointment was scheduled: David    Patient expressed understanding of above instructions and denied further questions at this time.    Nemo Carver

## 2025-07-09 ENCOUNTER — HOSPITAL ENCOUNTER (OUTPATIENT)
Dept: CARDIOLOGY | Facility: CLINIC | Age: 60
Discharge: HOME | End: 2025-07-09
Attending: INTERNAL MEDICINE | Admitting: INTERNAL MEDICINE
Payer: COMMERCIAL

## 2025-07-09 ENCOUNTER — HOSPITAL ENCOUNTER (OUTPATIENT)
Facility: CLINIC | Age: 60
Discharge: HOME | End: 2025-07-09
Attending: INTERNAL MEDICINE | Admitting: INTERNAL MEDICINE
Payer: COMMERCIAL

## 2025-07-09 VITALS
DIASTOLIC BLOOD PRESSURE: 75 MMHG | RESPIRATION RATE: 14 BRPM | HEIGHT: 77 IN | TEMPERATURE: 98.4 F | SYSTOLIC BLOOD PRESSURE: 116 MMHG | BODY MASS INDEX: 32.07 KG/M2 | WEIGHT: 271.61 LBS | OXYGEN SATURATION: 97 % | HEART RATE: 91 BPM

## 2025-07-09 DIAGNOSIS — R06.02 SOB (SHORTNESS OF BREATH): ICD-10-CM

## 2025-07-09 DIAGNOSIS — I27.20 PULMONARY HYPERTENSION (H): ICD-10-CM

## 2025-07-09 LAB
ALBUMIN SERPL BCG-MCNC: 4.2 G/DL (ref 3.5–5.2)
ALP SERPL-CCNC: 128 U/L (ref 40–150)
ALT SERPL W P-5'-P-CCNC: 26 U/L (ref 0–70)
ANION GAP SERPL CALCULATED.3IONS-SCNC: 13 MMOL/L (ref 7–15)
AST SERPL W P-5'-P-CCNC: 35 U/L (ref 0–45)
BILIRUB SERPL-MCNC: 0.5 MG/DL
BUN SERPL-MCNC: 17.8 MG/DL (ref 8–23)
CALCIUM SERPL-MCNC: 9.7 MG/DL (ref 8.8–10.4)
CHLORIDE SERPL-SCNC: 102 MMOL/L (ref 98–107)
CREAT SERPL-MCNC: 1.77 MG/DL (ref 0.67–1.17)
EGFRCR SERPLBLD CKD-EPI 2021: 43 ML/MIN/1.73M2
ERYTHROCYTE [DISTWIDTH] IN BLOOD BY AUTOMATED COUNT: 17.3 % (ref 10–15)
GLUCOSE SERPL-MCNC: 123 MG/DL (ref 70–99)
HCO3 SERPL-SCNC: 20 MMOL/L (ref 22–29)
HCT VFR BLD AUTO: 41.7 % (ref 40–53)
HGB BLD-MCNC: 13.8 G/DL (ref 13.3–17.7)
HGB BLD-MCNC: 13.9 G/DL (ref 13.3–17.7)
LVEF ECHO: NORMAL
MCH RBC QN AUTO: 30.3 PG (ref 26.5–33)
MCHC RBC AUTO-ENTMCNC: 33.3 G/DL (ref 31.5–36.5)
MCV RBC AUTO: 91 FL (ref 78–100)
NT-PROBNP SERPL-MCNC: 445 PG/ML (ref 0–177)
OXYHGB MFR BLDV: 75 % (ref 70–75)
PLATELET # BLD AUTO: 377 10E3/UL (ref 150–450)
POTASSIUM SERPL-SCNC: 4 MMOL/L (ref 3.4–5.3)
PROT SERPL-MCNC: 7.1 G/DL (ref 6.4–8.3)
RBC # BLD AUTO: 4.58 10E6/UL (ref 4.4–5.9)
SODIUM SERPL-SCNC: 135 MMOL/L (ref 135–145)
WBC # BLD AUTO: 8.8 10E3/UL (ref 4–11)

## 2025-07-09 PROCEDURE — 85018 HEMOGLOBIN: CPT | Mod: XU

## 2025-07-09 PROCEDURE — 93306 TTE W/DOPPLER COMPLETE: CPT

## 2025-07-09 PROCEDURE — 80053 COMPREHEN METABOLIC PANEL: CPT | Performed by: INTERNAL MEDICINE

## 2025-07-09 PROCEDURE — 93451 RIGHT HEART CATH: CPT | Mod: 26 | Performed by: INTERNAL MEDICINE

## 2025-07-09 PROCEDURE — C1894 INTRO/SHEATH, NON-LASER: HCPCS | Performed by: INTERNAL MEDICINE

## 2025-07-09 PROCEDURE — 83880 ASSAY OF NATRIURETIC PEPTIDE: CPT | Performed by: INTERNAL MEDICINE

## 2025-07-09 PROCEDURE — C1751 CATH, INF, PER/CENT/MIDLINE: HCPCS | Performed by: INTERNAL MEDICINE

## 2025-07-09 PROCEDURE — 36415 COLL VENOUS BLD VENIPUNCTURE: CPT | Performed by: INTERNAL MEDICINE

## 2025-07-09 PROCEDURE — 250N000009 HC RX 250: Performed by: INTERNAL MEDICINE

## 2025-07-09 PROCEDURE — 85018 HEMOGLOBIN: CPT | Performed by: INTERNAL MEDICINE

## 2025-07-09 PROCEDURE — 93451 RIGHT HEART CATH: CPT | Performed by: INTERNAL MEDICINE

## 2025-07-09 PROCEDURE — 82810 BLOOD GASES O2 SAT ONLY: CPT

## 2025-07-09 PROCEDURE — 272N000001 HC OR GENERAL SUPPLY STERILE: Performed by: INTERNAL MEDICINE

## 2025-07-09 RX ORDER — LIDOCAINE 40 MG/G
CREAM TOPICAL
Status: COMPLETED | OUTPATIENT
Start: 2025-07-09 | End: 2025-07-09

## 2025-07-09 RX ADMIN — LIDOCAINE: 40 CREAM TOPICAL at 08:50

## 2025-07-09 ASSESSMENT — ACTIVITIES OF DAILY LIVING (ADL)
ADLS_ACUITY_SCORE: 45

## 2025-07-09 NOTE — DISCHARGE INSTRUCTIONS
[facility]                        Interventional Cardiology  Discharge Instructions   Post Right Heart Cath      AFTER YOU GO HOME:  Take it easy for the rest of the day: Do not do strenuous exercise and do not lift, pull, or push anything heavy.  For at least 24 hours, keep the bandage over the spot where the catheter was inserted.   You may shower 24 hours after the procedure. Do not scrub the procedure site vigorously. Pat the incision dry. Do not submerge the site (ie bath, pool) for 48 hours  No lotion or powder to the puncture site for 3 days  You may put an ice or a cold pack on the area for 10 to 20 minutes at a time to help with soreness or swelling.   Resume your regular diet and medications unless otherwise instructed by your Primary Physician    CALL THE PHYSICIAN IF  If you have a fast-growing, painful lump at the catheter site.  If you have symptoms of infection, such as: Increased pain, swelling, warmth, redness, red streaks leading from the area, pus draining from the area, and/or a fever.    ADDITIONAL INSTRUCTIONS: Monitor neck site for bleeding, swelling, or voice changes. If you notice bleeding or swelling immediately apply pressure to the site for 15 minutes, and call number below to speak with Cardiology Fellow.  If you experience any changes in your breathing you should call your doctor immediately or come to the closest Emergency Department.  Do not drive yourself    MEDICATIONS: You are to resume all home medications including anticoagulation therapy unless otherwise advised by your primary cardiologist or nurse coordinator.    Follow Up: Per your primary cardiology team    If you have any questions or concerns regarding your procedure site please call [phone] at any time & press option 4 to speak to the .  Ask for the Cardiology Fellow on call.  They are available 24 hours a day.  You may also contact the Cardiology Clinic after hours number at  [phone].                                                       Telephone Numbers [phone] Monday-Friday 8:00 am to 4:30 pm    [phone] After 4:30 pm Monday-Friday, Weekends & Holidays  Ask for Interventional Cardiologist on call. Someone is on call 24 hours/day   [facility] toll free number [phone] Monday-Friday 8:00 am to 4:30 pm   [facility] Emergency Dept [phone]

## 2025-07-09 NOTE — PROGRESS NOTES
Pt arrived from CCL s/p RHC. Pt alert and oriented. VSS. Sats >92% on RA. Pt denies any pain. Right neck site WNL. No bleeding or hematoma. Denies any respiratory issues. [place] in place. Awaiting Dr. Lundy at bedside. Continue to monitor pt status and notify MD with any changes or concerns.

## 2025-07-09 NOTE — PROGRESS NOTES
Consenting/Education for Cardiology Procedure: Right heart catheterization     Patient understands we would like to perform the listed procedure(s) due to PH.    The patient understands the following:     The procedure was described to the patient in detail.    No sedation is planned for this procedure. Patient understands risks and complications of the procedure which include but are not limited to bruising/swelling around the incision site, infection, bleeding, allergic reaction to local anesthetic, air embolism, arterial puncture, stroke, heart attack, need for emergency heart surgery, death.       Patient verbalized understanding of risks and benefits and has elected to proceed with the procedure or procedures listed above.    Clinically Significant Risk Factors Present on Admission                  # Hypertension: Noted on problem list                     Pulmonary Heart Disease (Pulmonary hypertension or Cor pulmonale): Pulmonary Hypertension, unspecified             Berenice Boss, JONATHON  Cardiology

## 2025-07-09 NOTE — PROGRESS NOTES
Pt arrived to 2A from home for RHC. VSS. Denies pain. Consent obtained. Lab resulted. Allergies reviewed with pt. Appropriately NPO.  Prep completed. Wife at bedside; will be transporting patient to home.

## 2025-07-14 NOTE — PROGRESS NOTES
Adelso Delgado  [place], [place]        Magdaleno Maurisio  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  He is otherwise doing well but still has residual back pain from his slip on the ice.  He is doing well and I made no changes to his regimen , asking him to follow with you both on a close and regular basis.    nterpretation Summary  Global and regional left ventricular function is normal with an EF of 55-60%.  Paradoxical septal motion consistent with right ventricular pressure and  volume overload is present.  RVEDv 293ml. RVESv 192.2ml. RV EF 34.4%.  TAPSE 15.7mm. RVFAC 34.5%.  RVFWLS -18.5%.  The right ventricular systolic pressure is 78mmHg above the right atrial  pressure.  IVC diameter and respiratory changes fall into an intermediate range  suggesting an RA pressure of 8 mmHg.  No pericardial effusion is present.  ______________________________________________________________________________  Left Ventricle  Global and regional left ventricular function is normal with an EF of 55-60%.  Left ventricular wall thickness is normal. Left ventricular size is normal.  Grade I or early diastolic dysfunction. Paradoxical septal motion consistent  with right ventricular pressure and volume overload is present.     Right Ventricle  RVEDv 293ml. RVESv 192.2ml. RV EF 34.4%.  TAPSE 15.7mm. RVFAC 34.5%.  RVFWLS -18.5%.     Atria  The left atrium appears normal. Moderate right atrial enlargement is present.  The atrial septum is intact as  assessed by color Doppler .     Mitral Valve  The mitral valve is normal.     Tricuspid Valve  The tricuspid valve is normal. Mild tricuspid insufficiency is present. The  right ventricular systolic pressure is 78mmHg above the right atrial pressure.     Pulmonic Valve  The pulmonic valve is normal. Trace pulmonic insufficiency is present.     Vessels  The thoracic aorta is normal. The pulmonary artery cannot be assessed. IVC  diameter and respiratory changes fall into an intermediate range suggesting an  RA pressure of 8 mmHg.     Pericardium  No pericardial effusion is present.     ______________________________________________________________________________  MMode/2D Measurements & Calculations  IVSd: 1.2 cm  LVIDd: 5.0 cm  LVIDs: 3.3 cm  LVPWd: 1.0 cm  FS: 34.5 %  LV mass(C)d: 202.6 grams  LV mass(C)dI: 77.9 grams/m2  Ao root diam: 3.7 cm  asc Aorta Diam: 3.8 cm  LVOT diam: 2.6 cm  LVOT area: 5.2 cm2     Ao root diam index Ht(cm/m): 1.9  Ao root diam index BSA (cm/m2): 1.4  Asc Ao diam index BSA (cm/m2): 1.5  Asc Ao diam index Ht(cm/m): 1.9  LA Volume (BP): 68.5 ml  LA Volume Index (BP): 26.3 ml/m2  RV Base: 5.4 cm  RWT: 0.41  TAPSE: 2.4 cm     Doppler Measurements & Calculations  MV E max nguyễn: 61.8 cm/sec  MV A max nguyễn: 89.1 cm/sec  MV E/A: 0.69  MV dec time: 0.14 sec  LV V1 max P.9 mmHg  LV V1 max: 111.0 cm/sec  LV V1 VTI: 17.3 cm  SV(LVOT): 89.9 ml  SI(LVOT): 34.6 ml/m2  PA acc time: 0.11 sec  TR max nguyễn: 442.7 cm/sec  TR max P.1 mmHg  E/E' av.2  Lateral E/e': 4.9  Medial E/e': 7.5  RV S Nguyễn: 12.9 cm/sec     Measurements from QLAB  EDV (RV HM): 293.0 ml  EF (RV HM): 34.4 %  ESV (RV HM): 192.2 ml     FAC (RV HM): 34.5 %  RV 4C LS (AS): -13.4 %  RV Free Wall LS (AS): -13.1 %  RVDd base (RVD1) (RV HM): 57.1 mm  RVDd mid (RVD2) (RV HM): 59.0 mm  RVLd (RVD3) (RV HM): 107.9 mm  RVLS (Freewall) (RV HM): -18.5 %  RVLS (Septum) (RV HM): -13.3  %  ______________________________________________________________________________  Report approved by: ROLANDO Louise MD on 07/09/2025 08:49 AM          P: 106/62 mmHg    RA: 1 mmHg  RV: 50/1 mmHg  PA: 50/20 (32) mmHg  W: 8 mmHg  PA sat: 74.9%  Carter CO/CI: 9.6 / 3.7  Thermo CO/CI: 8.9 / 3.5  PVR: 2.5 STEPHENS  VO2: 381 Right sided filling pressures are normal. Left sided filling pressures are normal. Mild elevated pulmonary hypertension. Normal cardiac output level. Hemodynamic data has been modified in Epic per physician review.     Summary        Wt Readings from Last 24 Encounters:   01/08/25 129.6 kg (285 lb 11.2 oz)   12/04/24 127 kg (280 lb)   04/05/24 128.1 kg (282 lb 6.6 oz)   10/24/23 131.8 kg (290 lb 9.6 oz)   02/03/23 127.3 kg (280 lb 11.2 oz)   07/15/22 117.9 kg (260 lb)   03/30/22 126.2 kg (278 lb 3.2 oz)   05/19/21 117 kg (258 lb)   12/02/19 115.3 kg (254 lb 1.6 oz)   12/02/19 113.4 kg (250 lb)   08/27/19 115.5 kg (254 lb 9.6 oz)   05/15/19 117 kg (258 lb)   03/20/19 115.1 kg (253 lb 11.2 oz)   02/19/19 113.4 kg (250 lb)   10/10/18 117.9 kg (260 lb)   06/27/18 115.6 kg (254 lb 12.8 oz)   06/06/18 111.6 kg (246 lb)   01/16/18 114.3 kg (252 lb)   09/12/17 113.7 kg (250 lb 11.2 oz)   09/12/17 113.4 kg (250 lb)   08/22/17 115 kg (253 lb 9.6 oz)   07/19/17 115.3 kg (254 lb 1.6 oz)   06/23/17 117.3 kg (258 lb 9.6 oz)   06/07/17 122 kg (269 lb)       Latest Reference Range & Units 01/08/25 07:20   Sodium 135 - 145 mmol/L 140   Potassium 3.4 - 5.3 mmol/L 4.1   Chloride 98 - 107 mmol/L 105   Carbon Dioxide (CO2) 22 - 29 mmol/L 24   Urea Nitrogen 8.0 - 23.0 mg/dL 27.0 (H)   Creatinine 0.67 - 1.17 mg/dL 1.81 (H)   GFR Estimate >60 mL/min/1.73m2 42 (L)   Calcium 8.8 - 10.4 mg/dL 10.3   Anion Gap 7 - 15 mmol/L 11   Glucose 70 - 99 mg/dL 115 (H)   N-Terminal Pro BNP Inpatient 0 - 900 pg/mL 763   WBC 4.0 - 11.0 10e3/uL 5.9   Hemoglobin 13.3 - 17.7 g/dL 12.9 (L)   Hematocrit 40.0 - 53.0 % 39.8 (L)   Platelet Count 150 - 450  10e3/uL 376   RBC Count 4.40 - 5.90 10e6/uL 4.42   MCV 78 - 100 fL 90   MCH 26.5 - 33.0 pg 29.2   MCHC 31.5 - 36.5 g/dL 32.4   RDW 10.0 - 15.0 % 19.5 (H)   % Neutrophils % 46   % Lymphocytes % 34   % Monocytes % 18   % Eosinophils % 2   % Basophils % 0   Absolute Basophils 0.0 - 0.2 10e3/uL 0.0   Absolute Eosinophils 0.0 - 0.7 10e3/uL 0.1   Absolute Immature Granulocytes <=0.4 10e3/uL 0.0   Absolute Lymphocytes 0.8 - 5.3 10e3/uL 2.0   Absolute Monocytes 0.0 - 1.3 10e3/uL 1.1   % Immature Granulocytes % 0   Absolute Neutrophils 1.6 - 8.3 10e3/uL 2.7   Absolute NRBCs 10e3/uL 0.0   NRBCs per 100 WBC <1 /100 0   (    Current Outpatient Medications   Medication Sig Dispense Refill    ADEMPAS 2.5 MG tablet TAKE 1 TABLET THREE TIMES A DAY. 90 tablet 11    calcium carbonate (OS-PANFILO 500 MG [place] [place]) 500 MG tablet Take 600 mg by mouth 2 times daily      CLONAZEPAM PO Take 1 mg by mouth at bedtime Taking 1.5-2 mg at bedtime      cyclobenzaprine (FLEXERIL) 10 MG tablet Take 10 mg by mouth once.      docusate sodium (COLACE) 100 MG capsule Take 200 mg by mouth 2 times daily      FLUCONAZOLE PO Take 200 mg by mouth daily      furosemide (LASIX) 20 MG tablet Take 1 tablet (20 mg) by mouth daily 90 tablet 3    OPSUMIT 10 MG tablet TAKE 1 TABLET BY MOUTH DAILY. DO NOT HANDLE IF PREGNANT. DO NOT SPLIT, CRUSH, OR CHEW. REVIEW MEDICATION GUIDE. 30 tablet 11    Pantoprazole Sodium (PROTONIX PO) Take 40 mg by mouth daily       Penicillin V Potassium (PEN-VEE K OR) Take 500 mg by mouth 2 times daily      predniSONE (DELTASONE) 2.5 MG tablet Take 2 tablets (5 mg) by mouth every other day 90 tablet 3    rosuvastatin (CRESTOR) 5 MG tablet Take 1 tablet (5 mg) by mouth daily 90 tablet 3    ruxolitinib (JAKAFI) 5 MG TABS tablet Take 2 tablets (10 mg) by mouth daily 180 tablet 3    selexipag (UPTRAVI) 1600 MCG tablet Take 1 tablet (1,600 mcg) by mouth every 12 hours 60 tablet 11    Specialty Vitamins Products (MAGNESIUM PLUS PROTEIN) 133 MG  tablet Take 266 mg by mouth daily (2 tablets)      Ursodiol (ACTIGALL PO) Take 300 mg by mouth 3 times daily      VALACYCLOVIR HCL PO Take 500 mg by mouth every other day      VITAMIN D, CHOLECALCIFEROL, PO Take 2,000 Units by mouth 2 times daily      Vitamin E 200 units TABS Take 1 tablet by mouth daily       No current facility-administered medications for this visit.          pulmonary hypertension           Pressures Phase: Baseline     Time Systolic (mmHg) Diastolic (mmHg) Mean (mmHg) A Wave (mmHg) V Wave (mmHg) EDP (mmHg) Max dp/dt (mmHg/sec) HR (bpm) Content (mL/dL) SAT (%)   RA Pressures  9:06 AM   2    2    5      69        RV Pressures  8:43 AM       384           9:07 AM 53        4     73        PA Pressures  9:07 AM 51    20    30        73        PCW Pressures  9:08 AM   8        64        AO Pressures  8:55     58    83        68          Blood Flow Results Phase: Baseline     Time Results Indexed Values (L/min/m2)   QP  8:43 AM 10.87 L/min    4.22      QS  8:43 AM 10.87 L/min    4.22        Blood Oximetry Phase: Baseline     Time Hb SAT(%) PO2 Content (mL/dL) PA Sat (%)   PA  8:43 AM  75.6 %      75.6      Art  8:43 AM  97 %     16.09         Cardiac Output Phase: Baseline     Time TDCO (L/min) TDCI (L/min/m2) Carter C.O. (L/min) Carter C.I. (L/min/m2) Carter HR (bpm)   Cardiac Output Results  8:43 AM 7.73    3    10.87    4.22         9:11 AM 7.73            Resistance Results Phase: Baseline     Time PVR SVR TPR TVR PVR/SVR TPR/TVR   Resistance Results (Metric)  8:43 .86 dsc-5    595.93 dsc-5    220.71 dsc-5    610.64 dsc-5    0.27    0.36      Resistance Results (Wood)  8:43 AM 2.02 STEPHENS    7.45 STEPHENS    2.76 STEPHENS    7.63 STEPHENS    0.27    0.36        Stoke Volume Results Phase: Baseline     Time RVSW (gm*m) LVSW (gm*m) RVSW-I (gm*m/m2) LVSW-I (gm*m/m2)   Stroke Work Results  8:43 AM 56.71    163.07    22.02    63.33        Hemodynamic Waveforms -- Encounter Level:    Hemodynamic Waveforms: None found  at the encounter level.  Hemodynamic Waveforms -- Order Level on 2025:    Scan on 2025 9:24 AM by Outside, Provider    Name: BENEDICT ESTES  MRN: [id number]  : 1965  Study Date: 2024 07:38 AM  Age: 58 yrs  Gender: Male  Patient Location: [facility]  Reason For Study: Shortness of breath, Pulmonary hypertension (H)  Ordering Physician: PILI SEGURA  Referring Physician: PILI SEGURA  Performed By: Nydia Connelly RDCS     BSA: 2.6 m2  Height: 77 in  Weight: 290 lb  HR: 63  BP: 122/60 mmHg  ______________________________________________________________________________  Procedure  Echocardiogram with two-dimensional, color and spectral Doppler performed. 3D  image acquisition, reconstruction, and real-time interpretation was performed.  ______________________________________________________________________________  Interpretation Summary  Global and regional left ventricular function is normal with an EF of 55-60%.  Paradoxical septal motion consistent with right ventricular pressure overload  is present.  The RV is not well visualized but appears mildly enlarged in cavity size with  low normal cardiac function.  RVGLS -20.2% and RV free wall strain -26.2%. 3D RVEF 47%.  The right ventricular systolic pressure is 48mmHg above the right atrial  pressure.  Pulmonary hypertension is present.  IVC diameter <2.1 cm collapsing >50% with sniff suggests a normal RA pressure  of 3 mmHg.  No pericardial effusion is present.  ______________________________________________________________________________  Left Ventricle  Global and regional left ventricular function is normal with an EF of 55-60%.  Left ventricular size is normal. Left ventricular diastolic function is  indeterminate. Paradoxical septal motion consistent with right ventricular  pressure overload is present.     Right Ventricle  The RV is not well visualized but appears mildly enlarged in cavity size with  low normal cardiac  function. There is mild right ventricular hypertrophy.     Atria  Moderate biatrial enlargement is present.     Mitral Valve  Trace mitral insufficiency is present.     Aortic Valve  Aortic valve is normal in structure and function.     Tricuspid Valve  Mild tricuspid insufficiency is present. The right ventricular systolic  pressure is 48mmHg above the right atrial pressure. Pulmonary hypertension is  present.     Pulmonic Valve  On Doppler interrogation, there is no significant stenosis or regurgitation.  The valve leaflets are not well visualized.     Vessels  Aortic root aneurysm is present. IVC diameter <2.1 cm collapsing >50% with  sniff suggests a normal RA pressure of 3 mmHg.     Pericardium  No pericardial effusion is present.     Compared to Previous Study  No significant changes noted.  ______________________________________________________________________________  MMode/2D Measurements & Calculations  IVSd: 0.70 cm     LVIDd: 5.5 cm  LVIDs: 3.4 cm  LVPWd: 0.81 cm  FS: 38.3 %  LV mass(C)d: 150.9 grams  LV mass(C)dI: 57.6 grams/m2  Ao root diam: 4.0 cm  asc Aorta Diam: 3.8 cm  LVOT diam: 2.3 cm  LVOT area: 4.0 cm2  RVOT diam: 3.8 cm  Ao root diam index Ht(cm/m): 2.0  Ao root diam index BSA (cm/m2): 1.5  Asc Ao diam index BSA (cm/m2): 1.4  Asc Ao diam index Ht(cm/m): 1.9  LA Volume (BP): 130.0 ml     LA Volume Index (BP): 49.6 ml/m2  RV Base: 5.7 cm  RWT: 0.29  TAPSE: 3.3 cm     Doppler Measurements & Calculations  MV E max nguyễn: 101.0 cm/sec  MV A max nguyễn: 74.9 cm/sec  MV E/A: 1.3  MV dec slope: 363.6 cm/sec2  MV dec time: 0.28 sec  Ao V2 max: 121.0 cm/sec  Ao max P.9 mmHg  Ao V2 mean: 83.4 cm/sec  Ao mean PG: 3.0 mmHg  Ao V2 VTI: 27.2 cm  MYKE(I,D): 3.6 cm2  MYKE(V,D): 3.8 cm2  LV V1 max P.3 mmHg  LV V1 max: 115.0 cm/sec  LV V1 VTI: 24.6 cm  SV(LVOT): 98.8 ml  SI(LVOT): 37.7 ml/m2  PA acc time: 0.11 sec  TR max nguyễn: 348.1 cm/sec  TR max P.5 mmHg  Qp/Qs: 2.5/1.0     AV Nguyễn Ratio (DI): 0.95  MYKE  Index (cm2/m2): 1.4  E/E' av.3  Lateral E/e': 7.9  Medial E/e': 8.8  PVR: 158.4  RV S Nguyễn: 18.8 cm/sec     Measurements from QLAB  EDV (RV HM): 248.5 ml  EF (RV HM): 44.4 %  ESV (RV HM): 138.3 ml  FAC (RV HM): 31.4 %  RV 4C LS (AS): -20.2 %  RV Free Wall LS (AS): -26.2 %  RVDd base (RVD1) (RV HM): 55.3 mm     RVDd mid (RVD2) (RV HM): 53.2 mm  RVLd (RVD3) (RV HM): 101.5 mm  RVLS (Freewall) (RV HM):

## 2025-07-15 ENCOUNTER — VIRTUAL VISIT (OUTPATIENT)
Dept: CARDIOLOGY | Facility: CLINIC | Age: 60
End: 2025-07-15
Attending: INTERNAL MEDICINE
Payer: COMMERCIAL

## 2025-07-15 VITALS — HEIGHT: 77 IN | WEIGHT: 268 LBS | BODY MASS INDEX: 31.64 KG/M2

## 2025-07-15 DIAGNOSIS — I27.20 PULMONARY HYPERTENSION (H): ICD-10-CM

## 2025-07-15 DIAGNOSIS — R06.02 SOB (SHORTNESS OF BREATH): ICD-10-CM

## 2025-07-15 RX ORDER — LIDOCAINE 40 MG/G
CREAM TOPICAL
OUTPATIENT
Start: 2025-07-15

## 2025-07-15 NOTE — LETTER
7/15/2025      RE: Olivier Gómez  [address]  [place] [address]       Dear Colleague,    Thank you for the opportunity to participate in the care of your patient, Olivier Gómez, at the [facility] [place] [facility]. Please see a copy of my visit note below.      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  He is otherwise doing well but still has residual back pain from his slip on the ice.  He is doing well and I made no changes to his regimen , asking him to follow with you both on a close and regular basis.    nterpretation Summary  Global and regional left ventricular function is normal with an EF of 55-60%.  Paradoxical septal motion consistent with right ventricular pressure and  volume overload is present.  RVEDv 293ml. RVESv 192.2ml. RV EF 34.4%.  TAPSE 15.7mm. RVFAC 34.5%.  RVFWLS -18.5%.  The right ventricular systolic pressure is 78mmHg above the right atrial  pressure.  IVC diameter and respiratory changes fall into an intermediate range  suggesting an RA pressure of 8 mmHg.  No pericardial effusion is present.  ______________________________________________________________________________  Left Ventricle  Global and regional left ventricular function is normal with an EF of 55-60%.  Left ventricular wall thickness is normal. Left ventricular size is normal.  Grade I or early diastolic  dysfunction. Paradoxical septal motion consistent  with right ventricular pressure and volume overload is present.     Right Ventricle  RVEDv 293ml. RVESv 192.2ml. RV EF 34.4%.  TAPSE 15.7mm. RVFAC 34.5%.  RVFWLS -18.5%.     Atria  The left atrium appears normal. Moderate right atrial enlargement is present.  The atrial septum is intact as assessed by color Doppler .     Mitral Valve  The mitral valve is normal.     Tricuspid Valve  The tricuspid valve is normal. Mild tricuspid insufficiency is present. The  right ventricular systolic pressure is 78mmHg above the right atrial pressure.     Pulmonic Valve  The pulmonic valve is normal. Trace pulmonic insufficiency is present.     Vessels  The thoracic aorta is normal. The pulmonary artery cannot be assessed. IVC  diameter and respiratory changes fall into an intermediate range suggesting an  RA pressure of 8 mmHg.     Pericardium  No pericardial effusion is present.     ______________________________________________________________________________  MMode/2D Measurements & Calculations  IVSd: 1.2 cm  LVIDd: 5.0 cm  LVIDs: 3.3 cm  LVPWd: 1.0 cm  FS: 34.5 %  LV mass(C)d: 202.6 grams  LV mass(C)dI: 77.9 grams/m2  Ao root diam: 3.7 cm  asc Aorta Diam: 3.8 cm  LVOT diam: 2.6 cm  LVOT area: 5.2 cm2     Ao root diam index Ht(cm/m): 1.9  Ao root diam index BSA (cm/m2): 1.4  Asc Ao diam index BSA (cm/m2): 1.5  Asc Ao diam index Ht(cm/m): 1.9  LA Volume (BP): 68.5 ml  LA Volume Index (BP): 26.3 ml/m2  RV Base: 5.4 cm  RWT: 0.41  TAPSE: 2.4 cm     Doppler Measurements & Calculations  MV E max nguyễn: 61.8 cm/sec  MV A max nguyễn: 89.1 cm/sec  MV E/A: 0.69  MV dec time: 0.14 sec  LV V1 max P.9 mmHg  LV V1 max: 111.0 cm/sec  LV V1 VTI: 17.3 cm  SV(LVOT): 89.9 ml  SI(LVOT): 34.6 ml/m2  PA acc time: 0.11 sec  TR max nguyễn: 442.7 cm/sec  TR max P.1 mmHg  E/E' av.2  Lateral E/e': 4.9  Medial E/e': 7.5  RV S Nguyễn: 12.9 cm/sec     Measurements from QLAB  EDV (RV HM): 293.0 ml  EF  (RV HM): 34.4 %  ESV (RV HM): 192.2 ml     FAC (RV HM): 34.5 %  RV 4C LS (AS): -13.4 %  RV Free Wall LS (AS): -13.1 %  RVDd base (RVD1) (RV HM): 57.1 mm  RVDd mid (RVD2) (RV HM): 59.0 mm  RVLd (RVD3) (RV HM): 107.9 mm  RVLS (Freewall) (RV HM): -18.5 %  RVLS (Septum) (RV HM): -13.3 %  ______________________________________________________________________________  Report approved by: ROLANDO Louise MD on 07/09/2025 08:49 AM          P: 106/62 mmHg    RA: 1 mmHg  RV: 50/1 mmHg  PA: 50/20 (32) mmHg  W: 8 mmHg  PA sat: 74.9%  Carter CO/CI: 9.6 / 3.7  Thermo CO/CI: 8.9 / 3.5  PVR: 2.5 STEPHENS  VO2: 381 Right sided filling pressures are normal. Left sided filling pressures are normal. Mild elevated pulmonary hypertension. Normal cardiac output level. Hemodynamic data has been modified in Epic per physician review.     Summary        Wt Readings from Last 24 Encounters:   01/08/25 129.6 kg (285 lb 11.2 oz)   12/04/24 127 kg (280 lb)   04/05/24 128.1 kg (282 lb 6.6 oz)   10/24/23 131.8 kg (290 lb 9.6 oz)   02/03/23 127.3 kg (280 lb 11.2 oz)   07/15/22 117.9 kg (260 lb)   03/30/22 126.2 kg (278 lb 3.2 oz)   05/19/21 117 kg (258 lb)   12/02/19 115.3 kg (254 lb 1.6 oz)   12/02/19 113.4 kg (250 lb)   08/27/19 115.5 kg (254 lb 9.6 oz)   05/15/19 117 kg (258 lb)   03/20/19 115.1 kg (253 lb 11.2 oz)   02/19/19 113.4 kg (250 lb)   10/10/18 117.9 kg (260 lb)   06/27/18 115.6 kg (254 lb 12.8 oz)   06/06/18 111.6 kg (246 lb)   01/16/18 114.3 kg (252 lb)   09/12/17 113.7 kg (250 lb 11.2 oz)   09/12/17 113.4 kg (250 lb)   08/22/17 115 kg (253 lb 9.6 oz)   07/19/17 115.3 kg (254 lb 1.6 oz)   06/23/17 117.3 kg (258 lb 9.6 oz)   06/07/17 122 kg (269 lb)       Latest Reference Range & Units 01/08/25 07:20   Sodium 135 - 145 mmol/L 140   Potassium 3.4 - 5.3 mmol/L 4.1   Chloride 98 - 107 mmol/L 105   Carbon Dioxide (CO2) 22 - 29 mmol/L 24   Urea Nitrogen 8.0 - 23.0 mg/dL 27.0 (H)   Creatinine 0.67 - 1.17 mg/dL 1.81 (H)   GFR Estimate >60 mL/min/1.73m2  42 (L)   Calcium 8.8 - 10.4 mg/dL 10.3   Anion Gap 7 - 15 mmol/L 11   Glucose 70 - 99 mg/dL 115 (H)   N-Terminal Pro BNP Inpatient 0 - 900 pg/mL 763   WBC 4.0 - 11.0 10e3/uL 5.9   Hemoglobin 13.3 - 17.7 g/dL 12.9 (L)   Hematocrit 40.0 - 53.0 % 39.8 (L)   Platelet Count 150 - 450 10e3/uL 376   RBC Count 4.40 - 5.90 10e6/uL 4.42   MCV 78 - 100 fL 90   MCH 26.5 - 33.0 pg 29.2   MCHC 31.5 - 36.5 g/dL 32.4   RDW 10.0 - 15.0 % 19.5 (H)   % Neutrophils % 46   % Lymphocytes % 34   % Monocytes % 18   % Eosinophils % 2   % Basophils % 0   Absolute Basophils 0.0 - 0.2 10e3/uL 0.0   Absolute Eosinophils 0.0 - 0.7 10e3/uL 0.1   Absolute Immature Granulocytes <=0.4 10e3/uL 0.0   Absolute Lymphocytes 0.8 - 5.3 10e3/uL 2.0   Absolute Monocytes 0.0 - 1.3 10e3/uL 1.1   % Immature Granulocytes % 0   Absolute Neutrophils 1.6 - 8.3 10e3/uL 2.7   Absolute NRBCs 10e3/uL 0.0   NRBCs per 100 WBC <1 /100 0   (    Current Outpatient Medications   Medication Sig Dispense Refill     ADEMPAS 2.5 MG tablet TAKE 1 TABLET THREE TIMES A DAY. 90 tablet 11     calcium carbonate (OS-PANFILO 500 MG [place]. [place]) 500 MG tablet Take 600 mg by mouth 2 times daily       CLONAZEPAM PO Take 1 mg by mouth at bedtime Taking 1.5-2 mg at bedtime       cyclobenzaprine (FLEXERIL) 10 MG tablet Take 10 mg by mouth once.       docusate sodium (COLACE) 100 MG capsule Take 200 mg by mouth 2 times daily       FLUCONAZOLE PO Take 200 mg by mouth daily       furosemide (LASIX) 20 MG tablet Take 1 tablet (20 mg) by mouth daily 90 tablet 3     OPSUMIT 10 MG tablet TAKE 1 TABLET BY MOUTH DAILY. DO NOT HANDLE IF PREGNANT. DO NOT SPLIT, CRUSH, OR CHEW. REVIEW MEDICATION GUIDE. 30 tablet 11     Pantoprazole Sodium (PROTONIX PO) Take 40 mg by mouth daily        Penicillin V Potassium (PEN-VEE K OR) Take 500 mg by mouth 2 times daily       predniSONE (DELTASONE) 2.5 MG tablet Take 2 tablets (5 mg) by mouth every other day 90 tablet 3     rosuvastatin (CRESTOR) 5 MG tablet Take 1 tablet (5  mg) by mouth daily 90 tablet 3     ruxolitinib (JAKAFI) 5 MG TABS tablet Take 2 tablets (10 mg) by mouth daily 180 tablet 3     selexipag (UPTRAVI) 1600 MCG tablet Take 1 tablet (1,600 mcg) by mouth every 12 hours 60 tablet 11     Specialty Vitamins Products (MAGNESIUM PLUS PROTEIN) 133 MG tablet Take 266 mg by mouth daily (2 tablets)       Ursodiol (ACTIGALL PO) Take 300 mg by mouth 3 times daily       VALACYCLOVIR HCL PO Take 500 mg by mouth every other day       VITAMIN D, CHOLECALCIFEROL, PO Take 2,000 Units by mouth 2 times daily       Vitamin E 200 units TABS Take 1 tablet by mouth daily       No current facility-administered medications for this visit.          pulmonary hypertension           Pressures Phase: Baseline     Time Systolic (mmHg) Diastolic (mmHg) Mean (mmHg) A Wave (mmHg) V Wave (mmHg) EDP (mmHg) Max dp/dt (mmHg/sec) HR (bpm) Content (mL/dL) SAT (%)   RA Pressures  9:06 AM   2    2    5      69        RV Pressures  8:43 AM       384           9:07 AM 53        4     73        PA Pressures  9:07 AM 51    20    30        73        PCW Pressures  9:08 AM   8        64        AO Pressures  8:55     58    83        68          Blood Flow Results Phase: Baseline     Time Results Indexed Values (L/min/m2)   QP  8:43 AM 10.87 L/min    4.22      QS  8:43 AM 10.87 L/min    4.22        Blood Oximetry Phase: Baseline     Time Hb SAT(%) PO2 Content (mL/dL) PA Sat (%)   PA  8:43 AM  75.6 %      75.6      Art  8:43 AM  97 %     16.09         Cardiac Output Phase: Baseline     Time TDCO (L/min) TDCI (L/min/m2) Carter C.O. (L/min) Carter C.I. (L/min/m2) Carter HR (bpm)   Cardiac Output Results  8:43 AM 7.73    3    10.87    4.22         9:11 AM 7.73            Resistance Results Phase: Baseline     Time PVR SVR TPR TVR PVR/SVR TPR/TVR   Resistance Results (Metric)  8:43 .86 dsc-5    595.93 dsc-5    220.71 dsc-5    610.64 dsc-5    0.27    0.36      Resistance Results (Wood)  8:43 AM 2.02 STEPHENS    7.45 STEPHENS     2.76 STEPHENS    7.63 STEPHENS    0.27    0.36        Stoke Volume Results Phase: Baseline     Time RVSW (gm*m) LVSW (gm*m) RVSW-I (gm*m/m2) LVSW-I (gm*m/m2)   Stroke Work Results  8:43 AM 56.71    163.07    22.02    63.33        Hemodynamic Waveforms -- Encounter Level:    Hemodynamic Waveforms: None found at the encounter level.  Hemodynamic Waveforms -- Order Level on 2025:    Scan on 2025 9:24 AM by Outside, Provider    Name: BENEDICT ESTES  MRN: [id number]  : 1965  Study Date: 2024 07:38 AM  Age: 58 yrs  Gender: Male  Patient Location: [facility]  Reason For Study: Shortness of breath, Pulmonary hypertension (H)  Ordering Physician: PILI SEGURA  Referring Physician: PILI SEGURA  Performed By: Nydia Connelly LANDY     BSA: 2.6 m2  Height: 77 in  Weight: 290 lb  HR: 63  BP: 122/60 mmHg  ______________________________________________________________________________  Procedure  Echocardiogram with two-dimensional, color and spectral Doppler performed. 3D  image acquisition, reconstruction, and real-time interpretation was performed.  ______________________________________________________________________________  Interpretation Summary  Global and regional left ventricular function is normal with an EF of 55-60%.  Paradoxical septal motion consistent with right ventricular pressure overload  is present.  The RV is not well visualized but appears mildly enlarged in cavity size with  low normal cardiac function.  RVGLS -20.2% and RV free wall strain -26.2%. 3D RVEF 47%.  The right ventricular systolic pressure is 48mmHg above the right atrial  pressure.  Pulmonary hypertension is present.  IVC diameter <2.1 cm collapsing >50% with sniff suggests a normal RA pressure  of 3 mmHg.  No pericardial effusion is present.  ______________________________________________________________________________  Left Ventricle  Global and regional left ventricular function is normal with an EF of  55-60%.  Left ventricular size is normal. Left ventricular diastolic function is  indeterminate. Paradoxical septal motion consistent with right ventricular  pressure overload is present.     Right Ventricle  The RV is not well visualized but appears mildly enlarged in cavity size with  low normal cardiac function. There is mild right ventricular hypertrophy.     Atria  Moderate biatrial enlargement is present.     Mitral Valve  Trace mitral insufficiency is present.     Aortic Valve  Aortic valve is normal in structure and function.     Tricuspid Valve  Mild tricuspid insufficiency is present. The right ventricular systolic  pressure is 48mmHg above the right atrial pressure. Pulmonary hypertension is  present.     Pulmonic Valve  On Doppler interrogation, there is no significant stenosis or regurgitation.  The valve leaflets are not well visualized.     Vessels  Aortic root aneurysm is present. IVC diameter <2.1 cm collapsing >50% with  sniff suggests a normal RA pressure of 3 mmHg.     Pericardium  No pericardial effusion is present.     Compared to Previous Study  No significant changes noted.  ______________________________________________________________________________  MMode/2D Measurements & Calculations  IVSd: 0.70 cm     LVIDd: 5.5 cm  LVIDs: 3.4 cm  LVPWd: 0.81 cm  FS: 38.3 %  LV mass(C)d: 150.9 grams  LV mass(C)dI: 57.6 grams/m2  Ao root diam: 4.0 cm  asc Aorta Diam: 3.8 cm  LVOT diam: 2.3 cm  LVOT area: 4.0 cm2  RVOT diam: 3.8 cm  Ao root diam index Ht(cm/m): 2.0  Ao root diam index BSA (cm/m2): 1.5  Asc Ao diam index BSA (cm/m2): 1.4  Asc Ao diam index Ht(cm/m): 1.9  LA Volume (BP): 130.0 ml     LA Volume Index (BP): 49.6 ml/m2  RV Base: 5.7 cm  RWT: 0.29  TAPSE: 3.3 cm     Doppler Measurements & Calculations  MV E max nieves: 101.0 cm/sec  MV A max nieves: 74.9 cm/sec  MV E/A: 1.3  MV dec slope: 363.6 cm/sec2  MV dec time: 0.28 sec  Ao V2 max: 121.0 cm/sec  Ao max P.9 mmHg  Ao V2 mean: 83.4 cm/sec  Ao  mean PG: 3.0 mmHg  Ao V2 VTI: 27.2 cm  MYKE(I,D): 3.6 cm2  MYKE(V,D): 3.8 cm2  LV V1 max P.3 mmHg  LV V1 max: 115.0 cm/sec  LV V1 VTI: 24.6 cm  SV(LVOT): 98.8 ml  SI(LVOT): 37.7 ml/m2  PA acc time: 0.11 sec  TR max nguyễn: 348.1 cm/sec  TR max P.5 mmHg  Qp/Qs: 2.5/1.0     AV Nguyễn Ratio (DI): 0.95  MYKE Index (cm2/m2): 1.4  E/E' av.3  Lateral E/e': 7.9  Medial E/e': 8.8  PVR: 158.4  RV S Nguyễn: 18.8 cm/sec     Measurements from QLAB  EDV (RV HM): 248.5 ml  EF (RV HM): 44.4 %  ESV (RV HM): 138.3 ml  FAC (RV HM): 31.4 %  RV 4C LS (AS): -20.2 %  RV Free Wall LS (AS): -26.2 %  RVDd base (RVD1) (RV HM): 55.3 mm     RVDd mid (RVD2) (RV HM): 53.2 mm  RVLd (RVD3) (RV HM): 101.5 mm  RVLS (Freewall) (RV HM):     Please do not hesitate to contact me if you have any questions/concerns.     Sincerely,     Dk Quan MD

## 2025-07-15 NOTE — PATIENT INSTRUCTIONS
You were seen today in the Pulmonary Hypertension Clinic at the [facility].     Cardiology Provider you saw during your visit:    Dr. Quan    Medication Changes:  - None    Results:   Component      Latest Ref Rng 7/9/2025  7:12 AM   Sodium      135 - 145 mmol/L 135    Potassium      3.4 - 5.3 mmol/L 4.0    Carbon Dioxide (CO2)      22 - 29 mmol/L 20 (L)    Anion Gap      7 - 15 mmol/L 13    Urea Nitrogen      8.0 - 23.0 mg/dL 17.8    Creatinine      0.67 - 1.17 mg/dL 1.77 (H)    GFR Estimate      >60 mL/min/1.73m2 43 (L)    Calcium      8.8 - 10.4 mg/dL 9.7    Chloride      98 - 107 mmol/L 102    Glucose      70 - 99 mg/dL 123 (H)    Alkaline Phosphatase      40 - 150 U/L 128    AST      0 - 45 U/L 35    ALT      0 - 70 U/L 26    Protein Total      6.4 - 8.3 g/dL 7.1    Albumin      3.5 - 5.2 g/dL 4.2    Bilirubin Total      <=1.2 mg/dL 0.5    WBC      4.0 - 11.0 10e3/uL 8.8    RBC Count      4.40 - 5.90 10e6/uL 4.58    Hemoglobin      13.3 - 17.7 g/dL 13.9    Hematocrit      40.0 - 53.0 % 41.7    MCV      78 - 100 fL 91    MCH      26.5 - 33.0 pg 30.3    MCHC      31.5 - 36.5 g/dL 33.3    RDW      10.0 - 15.0 % 17.3 (H)    Platelet Count      150 - 450 10e3/uL 377    N-Terminal Pro Bnp      0 - 177 pg/mL 445 (H)       Legend:  (L) Low  (H) High    Recommendations:   - Right heart catheterization and echocardiogram in April 2026    Pre-procedure instructions - Right Heart Cath and/or Biopsy and/or Pulmonary Angiogram  Patient Education    Your arrival time is April 2026.  Location is [address] Waiting Room  Please plan on being at the hospital all day. If you are on dialysis, DO NOT schedule on a dialysis day.  At any time, emergencies and/or urgent cases may come up which could delay the start of your procedure.    Pre-procedure instructions - Right heart catheterization  No solid food for 8 hours  prior  Nothing to drink 2 hours prior to arrival time  You can take your morning medications (except diabetic and blood thinners) with sips of water  We recommend you arrange for a ride to drop you off and pick you up, in the instance, you are unable to drive home, however you should be able to function as you normally would after the procedure              Anticoagulation Medication Instructions   NA      Follow-up:   - Follow up with Dr. Quan after testing     Please call us immediately if you have any syncope (fainting or passing out), chest pain, edema (swelling or weight gain), or decline in your functional status (general decline in how you are feeling).    If you have emergent concerns after hours or on the weekend, please call our on-call Cardiologist at [phone], option [phone]. For emergencies call [phone].     Thank you for allowing us to be a part of your care here at the [facility]    If you have questions or concerns please contact us at:    Quinton Ye RN (P: [phone])    Nurse Coordinator       Pulmonary Hypertension     [facility]         JUN Pak   (Prior Auths & Pt Assistance)   ()  Clinic   Clinic   Pulmonary Hypertension   Pulmonary Hypertension  [facility]  (P)[phone]    (P) [phone]  (F) [phone]

## 2025-07-15 NOTE — NURSING NOTE
Follow up plan:  - Right heart catheterization and echocardiogram in April 2026  - F/U with Dr. Quan after testing    Orders placed and staff message sent to Rossana. Chantel Erickson RN on 7/15/2025 at 3:36 PM

## 2025-07-15 NOTE — NURSING NOTE
Current patient location: [address]  [place] [address]    Is the patient currently in the state of [place]? NO    Visit mode: TELEPHONE    If the visit is dropped, the patient can be reconnected by:TELEPHONE VISIT: Phone number:   Telephone Information:   Mobile [phone]       Will anyone else be joining the visit? NO  (If patient encounters technical issues they should call [phone]    Are changes needed to the allergy or medication list? No    Are refills needed on medications prescribed by this physician? Discuss with provider    Rooming Documentation:  Pt had appointment before I was able to check him in     Reason for visit: RECHECK    Yanique Larsen VVF

## 2025-08-11 DIAGNOSIS — R06.02 SOB (SHORTNESS OF BREATH): ICD-10-CM

## 2025-08-11 DIAGNOSIS — I27.20 PULMONARY HYPERTENSION (H): ICD-10-CM

## 2025-08-11 RX ORDER — RIOCIGUAT 2.5 MG/1
2.5 TABLET, FILM COATED ORAL 3 TIMES DAILY
Qty: 90 TABLET | Refills: 11 | Status: SHIPPED | OUTPATIENT
Start: 2025-08-11

## 2025-08-14 DIAGNOSIS — I27.20 PULMONARY HYPERTENSION (H): ICD-10-CM

## 2025-08-14 RX ORDER — FUROSEMIDE 20 MG/1
20 TABLET ORAL DAILY
Qty: 90 TABLET | Refills: 2 | Status: SHIPPED | OUTPATIENT
Start: 2025-08-14

## (undated) DEVICE — INTRO SHEATH 7FRX10CM PINNACLE RSS702

## (undated) DEVICE — KIT RIGHT HEART CATH 60130719

## (undated) DEVICE — PACK HEART RIGHT CUSTOM SAN32RHF18

## (undated) DEVICE — Device

## (undated) DEVICE — UMMC CONVENIENCE KIT FORMALLY H9656021017160 NEW# 602101716

## (undated) DEVICE — INTRO SHEATH MICRO PLATINUM TIP 4FRX40CM 7274

## (undated) DEVICE — INTRODUCER SHEATH 4FRX40CM MICROPUNC PED G47946

## (undated) DEVICE — KIT MICROINTRODUCER VASCULAR  4FRX21GAX4CM

## (undated) DEVICE — KIT RIGHT HEART CATH H965601307191

## (undated) RX ORDER — LIDOCAINE 40 MG/G
CREAM TOPICAL
Status: DISPENSED
Start: 2019-03-20

## (undated) RX ORDER — LIDOCAINE 40 MG/G
CREAM TOPICAL
Status: DISPENSED
Start: 2025-01-08

## (undated) RX ORDER — LIDOCAINE 40 MG/G
CREAM TOPICAL
Status: DISPENSED
Start: 2023-02-03

## (undated) RX ORDER — FENTANYL CITRATE 50 UG/ML
INJECTION, SOLUTION INTRAMUSCULAR; INTRAVENOUS
Status: DISPENSED
Start: 2017-06-20

## (undated) RX ORDER — LIDOCAINE 40 MG/G
CREAM TOPICAL
Status: DISPENSED
Start: 2019-12-02

## (undated) RX ORDER — LIDOCAINE 40 MG/G
CREAM TOPICAL
Status: DISPENSED
Start: 2020-09-02

## (undated) RX ORDER — LIDOCAINE 40 MG/G
CREAM TOPICAL
Status: DISPENSED
Start: 2022-03-30

## (undated) RX ORDER — LIDOCAINE 40 MG/G
CREAM TOPICAL
Status: DISPENSED
Start: 2017-04-11

## (undated) RX ORDER — LIDOCAINE 40 MG/G
CREAM TOPICAL
Status: DISPENSED
Start: 2024-04-05

## (undated) RX ORDER — FENTANYL CITRATE 50 UG/ML
INJECTION, SOLUTION INTRAMUSCULAR; INTRAVENOUS
Status: DISPENSED
Start: 2017-04-25

## (undated) RX ORDER — LIDOCAINE 40 MG/G
CREAM TOPICAL
Status: DISPENSED
Start: 2017-09-12

## (undated) RX ORDER — LIDOCAINE 40 MG/G
CREAM TOPICAL
Status: DISPENSED
Start: 2021-05-19

## (undated) RX ORDER — LIDOCAINE 40 MG/G
CREAM TOPICAL
Status: DISPENSED
Start: 2018-06-06

## (undated) RX ORDER — LIDOCAINE HYDROCHLORIDE 10 MG/ML
INJECTION, SOLUTION EPIDURAL; INFILTRATION; INTRACAUDAL; PERINEURAL
Status: DISPENSED
Start: 2020-09-02

## (undated) RX ORDER — LIDOCAINE 40 MG/G
CREAM TOPICAL
Status: DISPENSED
Start: 2025-07-09

## (undated) RX ORDER — CEFAZOLIN SODIUM 2 G/100ML
INJECTION, SOLUTION INTRAVENOUS
Status: DISPENSED
Start: 2017-06-20

## (undated) RX ORDER — HEPARIN SODIUM,PORCINE 10 UNIT/ML
VIAL (ML) INTRAVENOUS
Status: DISPENSED
Start: 2017-06-20

## (undated) RX ORDER — LIDOCAINE HYDROCHLORIDE 10 MG/ML
INJECTION, SOLUTION EPIDURAL; INFILTRATION; INTRACAUDAL; PERINEURAL
Status: DISPENSED
Start: 2019-12-02

## (undated) RX ORDER — LIDOCAINE HYDROCHLORIDE 10 MG/ML
INJECTION, SOLUTION EPIDURAL; INFILTRATION; INTRACAUDAL; PERINEURAL
Status: DISPENSED
Start: 2022-03-30

## (undated) RX ORDER — LIDOCAINE 40 MG/G
CREAM TOPICAL
Status: DISPENSED
Start: 2019-02-19

## (undated) RX ORDER — LIDOCAINE 40 MG/G
CREAM TOPICAL
Status: DISPENSED
Start: 2022-07-15

## (undated) RX ORDER — LIDOCAINE 40 MG/G
CREAM TOPICAL
Status: DISPENSED
Start: 2019-01-17

## (undated) RX ORDER — LIDOCAINE 40 MG/G
CREAM TOPICAL
Status: DISPENSED
Start: 2019-08-27

## (undated) RX ORDER — LIDOCAINE HYDROCHLORIDE 10 MG/ML
INJECTION, SOLUTION EPIDURAL; INFILTRATION; INTRACAUDAL; PERINEURAL
Status: DISPENSED
Start: 2023-02-03

## (undated) RX ORDER — LIDOCAINE 40 MG/G
CREAM TOPICAL
Status: DISPENSED
Start: 2017-05-15

## (undated) RX ORDER — LIDOCAINE 40 MG/G
CREAM TOPICAL
Status: DISPENSED
Start: 2019-05-15

## (undated) RX ORDER — LIDOCAINE HYDROCHLORIDE 10 MG/ML
INJECTION, SOLUTION EPIDURAL; INFILTRATION; INTRACAUDAL; PERINEURAL
Status: DISPENSED
Start: 2018-06-06